# Patient Record
Sex: MALE | Race: WHITE | NOT HISPANIC OR LATINO
[De-identification: names, ages, dates, MRNs, and addresses within clinical notes are randomized per-mention and may not be internally consistent; named-entity substitution may affect disease eponyms.]

---

## 2017-01-10 ENCOUNTER — APPOINTMENT (OUTPATIENT)
Dept: ELECTROPHYSIOLOGY | Facility: CLINIC | Age: 77
End: 2017-01-10

## 2017-04-24 ENCOUNTER — APPOINTMENT (OUTPATIENT)
Dept: ELECTROPHYSIOLOGY | Facility: CLINIC | Age: 77
End: 2017-04-24

## 2017-05-03 ENCOUNTER — OUTPATIENT (OUTPATIENT)
Dept: OUTPATIENT SERVICES | Facility: HOSPITAL | Age: 77
LOS: 1 days | Discharge: HOME | End: 2017-05-03

## 2017-06-28 DIAGNOSIS — J81.0 ACUTE PULMONARY EDEMA: ICD-10-CM

## 2017-08-17 ENCOUNTER — APPOINTMENT (OUTPATIENT)
Dept: ELECTROPHYSIOLOGY | Facility: CLINIC | Age: 77
End: 2017-08-17

## 2017-10-13 ENCOUNTER — TRANSCRIPTION ENCOUNTER (OUTPATIENT)
Age: 77
End: 2017-10-13

## 2017-11-20 ENCOUNTER — APPOINTMENT (OUTPATIENT)
Dept: ELECTROPHYSIOLOGY | Facility: CLINIC | Age: 77
End: 2017-11-20
Payer: MEDICARE

## 2017-11-20 PROCEDURE — 93282 PRGRMG EVAL IMPLANTABLE DFB: CPT

## 2017-12-25 ENCOUNTER — TRANSCRIPTION ENCOUNTER (OUTPATIENT)
Age: 77
End: 2017-12-25

## 2018-01-02 ENCOUNTER — TRANSCRIPTION ENCOUNTER (OUTPATIENT)
Age: 78
End: 2018-01-02

## 2018-01-09 ENCOUNTER — TRANSCRIPTION ENCOUNTER (OUTPATIENT)
Age: 78
End: 2018-01-09

## 2018-01-09 ENCOUNTER — INPATIENT (INPATIENT)
Facility: HOSPITAL | Age: 78
LOS: 3 days | Discharge: HOME | End: 2018-01-13
Attending: HOSPITALIST

## 2018-01-09 DIAGNOSIS — I25.10 ATHEROSCLEROTIC HEART DISEASE OF NATIVE CORONARY ARTERY WITHOUT ANGINA PECTORIS: ICD-10-CM

## 2018-01-09 DIAGNOSIS — R06.02 SHORTNESS OF BREATH: ICD-10-CM

## 2018-01-17 DIAGNOSIS — I42.9 CARDIOMYOPATHY, UNSPECIFIED: ICD-10-CM

## 2018-01-17 DIAGNOSIS — I25.10 ATHEROSCLEROTIC HEART DISEASE OF NATIVE CORONARY ARTERY WITHOUT ANGINA PECTORIS: ICD-10-CM

## 2018-01-17 DIAGNOSIS — Z91.14 PATIENT'S OTHER NONCOMPLIANCE WITH MEDICATION REGIMEN: ICD-10-CM

## 2018-01-17 DIAGNOSIS — N18.5 CHRONIC KIDNEY DISEASE, STAGE 5: ICD-10-CM

## 2018-01-17 DIAGNOSIS — I13.2 HYPERTENSIVE HEART AND CHRONIC KIDNEY DISEASE WITH HEART FAILURE AND WITH STAGE 5 CHRONIC KIDNEY DISEASE, OR END STAGE RENAL DISEASE: ICD-10-CM

## 2018-01-17 DIAGNOSIS — Z95.1 PRESENCE OF AORTOCORONARY BYPASS GRAFT: ICD-10-CM

## 2018-01-17 DIAGNOSIS — I34.0 NONRHEUMATIC MITRAL (VALVE) INSUFFICIENCY: ICD-10-CM

## 2018-01-17 DIAGNOSIS — Z53.29 PROCEDURE AND TREATMENT NOT CARRIED OUT BECAUSE OF PATIENT'S DECISION FOR OTHER REASONS: ICD-10-CM

## 2018-01-17 DIAGNOSIS — R06.02 SHORTNESS OF BREATH: ICD-10-CM

## 2018-01-17 DIAGNOSIS — R09.81 NASAL CONGESTION: ICD-10-CM

## 2018-01-17 DIAGNOSIS — E11.40 TYPE 2 DIABETES MELLITUS WITH DIABETIC NEUROPATHY, UNSPECIFIED: ICD-10-CM

## 2018-01-17 DIAGNOSIS — R79.1 ABNORMAL COAGULATION PROFILE: ICD-10-CM

## 2018-01-17 DIAGNOSIS — I50.23 ACUTE ON CHRONIC SYSTOLIC (CONGESTIVE) HEART FAILURE: ICD-10-CM

## 2018-01-17 DIAGNOSIS — E11.22 TYPE 2 DIABETES MELLITUS WITH DIABETIC CHRONIC KIDNEY DISEASE: ICD-10-CM

## 2018-01-17 DIAGNOSIS — Z98.61 CORONARY ANGIOPLASTY STATUS: ICD-10-CM

## 2018-01-17 DIAGNOSIS — E78.5 HYPERLIPIDEMIA, UNSPECIFIED: ICD-10-CM

## 2018-01-17 DIAGNOSIS — R05 COUGH: ICD-10-CM

## 2018-01-17 DIAGNOSIS — I16.0 HYPERTENSIVE URGENCY: ICD-10-CM

## 2018-01-17 DIAGNOSIS — N17.9 ACUTE KIDNEY FAILURE, UNSPECIFIED: ICD-10-CM

## 2018-01-17 DIAGNOSIS — G47.33 OBSTRUCTIVE SLEEP APNEA (ADULT) (PEDIATRIC): ICD-10-CM

## 2018-01-17 DIAGNOSIS — Z79.4 LONG TERM (CURRENT) USE OF INSULIN: ICD-10-CM

## 2018-01-17 DIAGNOSIS — Z87.891 PERSONAL HISTORY OF NICOTINE DEPENDENCE: ICD-10-CM

## 2018-01-24 ENCOUNTER — APPOINTMENT (OUTPATIENT)
Dept: CARDIOLOGY | Facility: CLINIC | Age: 78
End: 2018-01-24

## 2018-01-24 VITALS
DIASTOLIC BLOOD PRESSURE: 76 MMHG | SYSTOLIC BLOOD PRESSURE: 130 MMHG | HEIGHT: 69 IN | BODY MASS INDEX: 37.18 KG/M2 | WEIGHT: 251 LBS

## 2018-01-24 RX ORDER — METOPROLOL TARTRATE 50 MG/1
50 TABLET, FILM COATED ORAL DAILY
Refills: 0 | Status: DISCONTINUED | COMMUNITY
End: 2018-01-24

## 2018-03-01 ENCOUNTER — APPOINTMENT (OUTPATIENT)
Dept: ELECTROPHYSIOLOGY | Facility: CLINIC | Age: 78
End: 2018-03-01
Payer: MEDICARE

## 2018-03-01 PROCEDURE — 93296 REM INTERROG EVL PM/IDS: CPT

## 2018-03-01 PROCEDURE — 93295 DEV INTERROG REMOTE 1/2/MLT: CPT

## 2018-03-16 ENCOUNTER — MEDICATION RENEWAL (OUTPATIENT)
Age: 78
End: 2018-03-16

## 2018-04-17 ENCOUNTER — APPOINTMENT (OUTPATIENT)
Dept: CARDIOLOGY | Facility: CLINIC | Age: 78
End: 2018-04-17

## 2018-04-17 VITALS
DIASTOLIC BLOOD PRESSURE: 66 MMHG | HEIGHT: 69 IN | BODY MASS INDEX: 36.88 KG/M2 | SYSTOLIC BLOOD PRESSURE: 110 MMHG | HEART RATE: 81 BPM | WEIGHT: 249 LBS

## 2018-04-17 RX ORDER — SOLIFENACIN SUCCINATE 10 MG/1
10 TABLET, FILM COATED ORAL
Qty: 30 | Refills: 0 | Status: DISCONTINUED | COMMUNITY
Start: 2017-08-21 | End: 2018-04-17

## 2018-04-17 RX ORDER — FUROSEMIDE 40 MG/1
40 TABLET ORAL DAILY
Refills: 0 | Status: DISCONTINUED | COMMUNITY
End: 2018-04-17

## 2018-06-04 ENCOUNTER — APPOINTMENT (OUTPATIENT)
Dept: ELECTROPHYSIOLOGY | Facility: CLINIC | Age: 78
End: 2018-06-04
Payer: MEDICARE

## 2018-06-04 PROCEDURE — 93282 PRGRMG EVAL IMPLANTABLE DFB: CPT

## 2018-07-09 ENCOUNTER — RX RENEWAL (OUTPATIENT)
Age: 78
End: 2018-07-09

## 2018-07-10 ENCOUNTER — RX RENEWAL (OUTPATIENT)
Age: 78
End: 2018-07-10

## 2018-07-24 ENCOUNTER — RX RENEWAL (OUTPATIENT)
Age: 78
End: 2018-07-24

## 2018-08-21 ENCOUNTER — MEDICATION RENEWAL (OUTPATIENT)
Age: 78
End: 2018-08-21

## 2018-08-21 ENCOUNTER — APPOINTMENT (OUTPATIENT)
Dept: CARDIOLOGY | Facility: CLINIC | Age: 78
End: 2018-08-21

## 2018-08-21 VITALS
DIASTOLIC BLOOD PRESSURE: 80 MMHG | BODY MASS INDEX: 36.58 KG/M2 | WEIGHT: 247 LBS | HEART RATE: 77 BPM | SYSTOLIC BLOOD PRESSURE: 138 MMHG | HEIGHT: 69 IN

## 2018-09-25 ENCOUNTER — APPOINTMENT (OUTPATIENT)
Dept: ELECTROPHYSIOLOGY | Facility: CLINIC | Age: 78
End: 2018-09-25
Payer: MEDICARE

## 2018-09-25 PROCEDURE — 93296 REM INTERROG EVL PM/IDS: CPT

## 2018-09-25 PROCEDURE — 93295 DEV INTERROG REMOTE 1/2/MLT: CPT

## 2018-10-12 ENCOUNTER — MEDICATION RENEWAL (OUTPATIENT)
Age: 78
End: 2018-10-12

## 2018-11-06 ENCOUNTER — MEDICATION RENEWAL (OUTPATIENT)
Age: 78
End: 2018-11-06

## 2018-11-20 ENCOUNTER — APPOINTMENT (OUTPATIENT)
Dept: CARDIOLOGY | Facility: CLINIC | Age: 78
End: 2018-11-20

## 2018-11-20 VITALS
SYSTOLIC BLOOD PRESSURE: 150 MMHG | BODY MASS INDEX: 36.73 KG/M2 | HEIGHT: 69 IN | HEART RATE: 76 BPM | WEIGHT: 248 LBS | DIASTOLIC BLOOD PRESSURE: 84 MMHG

## 2018-11-20 RX ORDER — OMEGA-3-ACID ETHYL ESTERS CAPSULES 1 G/1
1 CAPSULE, LIQUID FILLED ORAL
Qty: 180 | Refills: 3 | Status: DISCONTINUED | COMMUNITY
Start: 2018-03-16 | End: 2018-11-20

## 2018-11-22 NOTE — HISTORY OF PRESENT ILLNESS
[FreeTextEntry1] : 77 year-old male presents for hospital followup.\par \par Cardiac history of CAD s/p CABG s/p PCI, ischemic cardiomyopathy, and chronic systolic CHF s/p AICD. Previously followed with cardiologist in [place].\par \par Risk factors include hypertension, diabetes,hyperlipidemia, and CKD.\par \[facility] Presented 2 weeks ago with subacute decompensated CHF with volume overload and uncontrolled hypertension (possibly the setting of URI). Had been off Lasix for several weeks and antihypertensives for several days. Rapidly improved with diuresis.  Course complicated by KIANA.\par \par Feels well since discharge. No angina. Breathing comfortable. No palpitations, lightheadedness, syncope.  Previously had good functional capacity.  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 EF 25-35%. Moderate MR.\par \par Hospital labs reviewed:\par Creatinine 4.4\par Hemoglobin 14.7\par LFTs normal

## 2018-11-22 NOTE — ASSESSMENT
[FreeTextEntry1] : s/p CABG s/p PCI.  No angina.\par \par Severe LV dysfunction s/p AICD.\par Compensated / near euvolemic.\par Prior ACE-ARB intolerance (Cr rise).\par \par BP controlled.

## 2018-11-22 NOTE — REASON FOR VISIT
[Follow-Up - From Hospitalization] : follow-up of a recent hospitalization for [Cardiomyopathy] : cardiomyopathy [Coronary Artery Disease] : coronary artery disease [Heart Failure] : congestive heart failure [FreeTextEntry1] : Feels well.\par \par Exercising 3 / week.  30-min cardio + light weights / machines.  No exertional symptoms.\par \par No angina.  Breathing comfortable.  No palpitations, lightheadedness, syncope.\par \par Weight stable.  Recalls last Cr (3.9).  Following with Dr. Jay.\par \par Tolerating Rx.  Not taking ASA (miscommunication).  No bleeding.

## 2018-11-22 NOTE — DISCUSSION/SUMMARY
[FreeTextEntry1] : ASA 81mg.\par Cont Lipitor.\par Cont Toprol, Isosorbide, hydralazine.\par Light exercise / weight loss.\par Regular renal follow-up.\par Follow-up 3-months.

## 2018-12-10 ENCOUNTER — APPOINTMENT (OUTPATIENT)
Dept: ELECTROPHYSIOLOGY | Facility: CLINIC | Age: 78
End: 2018-12-10
Payer: MEDICARE

## 2018-12-10 PROCEDURE — 93282 PRGRMG EVAL IMPLANTABLE DFB: CPT

## 2018-12-12 ENCOUNTER — MEDICATION RENEWAL (OUTPATIENT)
Age: 78
End: 2018-12-12

## 2019-02-24 ENCOUNTER — FORM ENCOUNTER (OUTPATIENT)
Age: 79
End: 2019-02-24

## 2019-02-25 ENCOUNTER — OUTPATIENT (OUTPATIENT)
Dept: OUTPATIENT SERVICES | Facility: HOSPITAL | Age: 79
LOS: 1 days | Discharge: HOME | End: 2019-02-25

## 2019-02-25 ENCOUNTER — APPOINTMENT (OUTPATIENT)
Dept: CARDIOLOGY | Facility: CLINIC | Age: 79
End: 2019-02-25

## 2019-02-25 VITALS
HEART RATE: 81 BPM | HEIGHT: 69 IN | DIASTOLIC BLOOD PRESSURE: 68 MMHG | BODY MASS INDEX: 36.88 KG/M2 | SYSTOLIC BLOOD PRESSURE: 132 MMHG | WEIGHT: 249 LBS

## 2019-02-25 DIAGNOSIS — I34.0 NONRHEUMATIC MITRAL (VALVE) INSUFFICIENCY: ICD-10-CM

## 2019-02-25 DIAGNOSIS — I50.22 CHRONIC SYSTOLIC (CONGESTIVE) HEART FAILURE: ICD-10-CM

## 2019-02-25 RX ORDER — LIRAGLUTIDE 6 MG/ML
18 INJECTION SUBCUTANEOUS DAILY
Refills: 0 | Status: DISCONTINUED | COMMUNITY
End: 2019-02-25

## 2019-02-25 NOTE — HISTORY OF PRESENT ILLNESS
[FreeTextEntry1] : 77 year-old male presents for hospital followup.\par \par Cardiac history of CAD s/p CABG s/p PCI, ischemic cardiomyopathy, and chronic systolic CHF s/p AICD. Previously followed with cardiologist in [place].\par \par Risk factors include hypertension, diabetes,hyperlipidemia, and CKD.\par \[facility] Presented 2 weeks ago with subacute decompensated CHF with volume overload and uncontrolled hypertension (possibly the setting of URI). Had been off Lasix for several weeks and antihypertensives for several days. Rapidly improved with diuresis.  Course complicated by KIANA.\par \par Feels well since discharge. No angina. Breathing comfortable. No palpitations, lightheadedness, syncope.  Previously had good functional capacity.  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 EF 25-35%. Moderate MR.\par \par Hospital labs reviewed:\par Creatinine 4.4\par Hemoglobin 14.7\par LFTs normal

## 2019-02-25 NOTE — REASON FOR VISIT
[Cardiomyopathy] : cardiomyopathy [Coronary Artery Disease] : coronary artery disease [Heart Failure] : congestive heart failure [Follow-Up - Clinic] : a clinic follow-up of [FreeTextEntry1] : Not feeling well.\par \par Big meal / couple glasses wine Friday.  Chest burning / dyspnea overnight (worse supine).  Thought he had a cold and had several bowels of Asian soup broth with bouillon cubes.  Subsequent nocturnal symptoms with orthopnea.\par \par Exertional dyspnea.  Stopped exercising.  Breathing generally comfortable at rest.\par \par LE slightly worse.  Weight stable.  Dietary sodium seems high.\par \par No palpitations, lightheadedness, syncope.\par \par Tolerating Rx.  No bleeding.

## 2019-02-25 NOTE — ASSESSMENT
[FreeTextEntry1] : s/p CABG s/p PCI.  No angina.\par \par Severe LV dysfunction s/p AICD.\par \par Likely mild CHF decompensation.  Possibly secondary to high dietary sodium.\par Mild volume overload.\par \par Moderate MR (1/11/2018).\par \par BP controlled.

## 2019-02-25 NOTE — DISCUSSION/SUMMARY
[FreeTextEntry1] : CXR\par ECHO to reassess LVSF / MR.\par Increase Lasix 80mg q12 x 3-days, then resume 40mg q12.\par Labs.\par Cont ASA and Lipitor.\par Cont Toprol, Isosorbide, hydralazine.\par Renal follow-up.\par Follow-up 4-weeks.

## 2019-02-27 ENCOUNTER — OUTPATIENT (OUTPATIENT)
Dept: OUTPATIENT SERVICES | Facility: HOSPITAL | Age: 79
LOS: 1 days | Discharge: HOME | End: 2019-02-27

## 2019-02-27 DIAGNOSIS — I34.0 NONRHEUMATIC MITRAL (VALVE) INSUFFICIENCY: ICD-10-CM

## 2019-03-06 ENCOUNTER — INPATIENT (INPATIENT)
Facility: HOSPITAL | Age: 79
LOS: 5 days | Discharge: HOME | End: 2019-03-12
Attending: INTERNAL MEDICINE | Admitting: INTERNAL MEDICINE

## 2019-03-06 VITALS
SYSTOLIC BLOOD PRESSURE: 189 MMHG | TEMPERATURE: 96 F | DIASTOLIC BLOOD PRESSURE: 104 MMHG | WEIGHT: 242.07 LBS | HEART RATE: 83 BPM | OXYGEN SATURATION: 99 % | HEIGHT: 71 IN | RESPIRATION RATE: 18 BRPM

## 2019-03-06 DIAGNOSIS — N17.9 ACUTE KIDNEY FAILURE, UNSPECIFIED: ICD-10-CM

## 2019-03-06 LAB
ALBUMIN SERPL ELPH-MCNC: 5.3 G/DL — HIGH (ref 3.5–5.2)
ALP SERPL-CCNC: 65 U/L — SIGNIFICANT CHANGE UP (ref 30–115)
ALT FLD-CCNC: 17 U/L — SIGNIFICANT CHANGE UP (ref 0–41)
ANION GAP SERPL CALC-SCNC: 17 MMOL/L — HIGH (ref 7–14)
ANION GAP SERPL CALC-SCNC: 19 MMOL/L — HIGH (ref 7–14)
APPEARANCE UR: CLEAR — SIGNIFICANT CHANGE UP
AST SERPL-CCNC: 20 U/L — SIGNIFICANT CHANGE UP (ref 0–41)
BASE EXCESS BLDV CALC-SCNC: -1.2 MMOL/L — SIGNIFICANT CHANGE UP (ref -2–2)
BASOPHILS # BLD AUTO: 0.07 K/UL — SIGNIFICANT CHANGE UP (ref 0–0.2)
BASOPHILS NFR BLD AUTO: 0.7 % — SIGNIFICANT CHANGE UP (ref 0–1)
BILIRUB SERPL-MCNC: 0.4 MG/DL — SIGNIFICANT CHANGE UP (ref 0.2–1.2)
BILIRUB UR-MCNC: NEGATIVE — SIGNIFICANT CHANGE UP
BUN SERPL-MCNC: 140 MG/DL — CRITICAL HIGH (ref 10–20)
BUN SERPL-MCNC: 140 MG/DL — CRITICAL HIGH (ref 10–20)
CA-I SERPL-SCNC: 1.09 MMOL/L — LOW (ref 1.12–1.3)
CALCIUM SERPL-MCNC: 8.2 MG/DL — LOW (ref 8.5–10.1)
CALCIUM SERPL-MCNC: 9 MG/DL — SIGNIFICANT CHANGE UP (ref 8.5–10.1)
CHLORIDE SERPL-SCNC: 88 MMOL/L — LOW (ref 98–110)
CHLORIDE SERPL-SCNC: 89 MMOL/L — LOW (ref 98–110)
CO2 SERPL-SCNC: 27 MMOL/L — SIGNIFICANT CHANGE UP (ref 17–32)
CO2 SERPL-SCNC: 27 MMOL/L — SIGNIFICANT CHANGE UP (ref 17–32)
COLOR SPEC: YELLOW — SIGNIFICANT CHANGE UP
CREAT SERPL-MCNC: 9.2 MG/DL — CRITICAL HIGH (ref 0.7–1.5)
CREAT SERPL-MCNC: 9.4 MG/DL — CRITICAL HIGH (ref 0.7–1.5)
DIFF PNL FLD: ABNORMAL
EOSINOPHIL # BLD AUTO: 1.2 K/UL — HIGH (ref 0–0.7)
EOSINOPHIL NFR BLD AUTO: 11.4 % — HIGH (ref 0–8)
EPI CELLS # UR: ABNORMAL /HPF
GAS PNL BLDV: 136 MMOL/L — SIGNIFICANT CHANGE UP (ref 136–145)
GAS PNL BLDV: SIGNIFICANT CHANGE UP
GLUCOSE BLDC GLUCOMTR-MCNC: 259 MG/DL — HIGH (ref 70–99)
GLUCOSE BLDC GLUCOMTR-MCNC: 331 MG/DL — HIGH (ref 70–99)
GLUCOSE SERPL-MCNC: 171 MG/DL — HIGH (ref 70–99)
GLUCOSE SERPL-MCNC: 351 MG/DL — HIGH (ref 70–99)
GLUCOSE UR QL: NEGATIVE MG/DL — SIGNIFICANT CHANGE UP
HCO3 BLDV-SCNC: 26 MMOL/L — SIGNIFICANT CHANGE UP (ref 22–29)
HCT VFR BLD CALC: 46.6 % — SIGNIFICANT CHANGE UP (ref 42–52)
HCT VFR BLDA CALC: 49.2 % — HIGH (ref 34–44)
HGB BLD CALC-MCNC: 16 G/DL — SIGNIFICANT CHANGE UP (ref 14–18)
HGB BLD-MCNC: 15.6 G/DL — SIGNIFICANT CHANGE UP (ref 14–18)
HOROWITZ INDEX BLDV+IHG-RTO: 21 — SIGNIFICANT CHANGE UP
IMM GRANULOCYTES NFR BLD AUTO: 0.4 % — HIGH (ref 0.1–0.3)
KETONES UR-MCNC: NEGATIVE — SIGNIFICANT CHANGE UP
LACTATE BLDV-MCNC: 1.1 MMOL/L — SIGNIFICANT CHANGE UP (ref 0.5–1.6)
LEUKOCYTE ESTERASE UR-ACNC: NEGATIVE — SIGNIFICANT CHANGE UP
LYMPHOCYTES # BLD AUTO: 2.7 K/UL — SIGNIFICANT CHANGE UP (ref 1.2–3.4)
LYMPHOCYTES # BLD AUTO: 25.7 % — SIGNIFICANT CHANGE UP (ref 20.5–51.1)
MAGNESIUM SERPL-MCNC: 2.9 MG/DL — HIGH (ref 1.8–2.4)
MCHC RBC-ENTMCNC: 28.7 PG — SIGNIFICANT CHANGE UP (ref 27–31)
MCHC RBC-ENTMCNC: 33.5 G/DL — SIGNIFICANT CHANGE UP (ref 32–37)
MCV RBC AUTO: 85.7 FL — SIGNIFICANT CHANGE UP (ref 80–94)
MONOCYTES # BLD AUTO: 1.13 K/UL — HIGH (ref 0.1–0.6)
MONOCYTES NFR BLD AUTO: 10.8 % — HIGH (ref 1.7–9.3)
NEUTROPHILS # BLD AUTO: 5.37 K/UL — SIGNIFICANT CHANGE UP (ref 1.4–6.5)
NEUTROPHILS NFR BLD AUTO: 51 % — SIGNIFICANT CHANGE UP (ref 42.2–75.2)
NITRITE UR-MCNC: NEGATIVE — SIGNIFICANT CHANGE UP
NRBC # BLD: 0 /100 WBCS — SIGNIFICANT CHANGE UP (ref 0–0)
PCO2 BLDV: 50 MMHG — SIGNIFICANT CHANGE UP (ref 41–51)
PH BLDV: 7.32 — SIGNIFICANT CHANGE UP (ref 7.26–7.43)
PH UR: 6 — SIGNIFICANT CHANGE UP (ref 5–8)
PLATELET # BLD AUTO: 188 K/UL — SIGNIFICANT CHANGE UP (ref 130–400)
PO2 BLDV: 39 MMHG — SIGNIFICANT CHANGE UP (ref 20–40)
POTASSIUM BLDV-SCNC: 4.4 MMOL/L — SIGNIFICANT CHANGE UP (ref 3.3–5.6)
POTASSIUM SERPL-MCNC: 4.4 MMOL/L — SIGNIFICANT CHANGE UP (ref 3.5–5)
POTASSIUM SERPL-MCNC: 4.9 MMOL/L — SIGNIFICANT CHANGE UP (ref 3.5–5)
POTASSIUM SERPL-SCNC: 4.4 MMOL/L — SIGNIFICANT CHANGE UP (ref 3.5–5)
POTASSIUM SERPL-SCNC: 4.9 MMOL/L — SIGNIFICANT CHANGE UP (ref 3.5–5)
PROT SERPL-MCNC: 7.6 G/DL — SIGNIFICANT CHANGE UP (ref 6–8)
PROT UR-MCNC: 100 MG/DL
RBC # BLD: 5.44 M/UL — SIGNIFICANT CHANGE UP (ref 4.7–6.1)
RBC # FLD: 13.6 % — SIGNIFICANT CHANGE UP (ref 11.5–14.5)
RBC CASTS # UR COMP ASSIST: SIGNIFICANT CHANGE UP /HPF
SAO2 % BLDV: 70 % — SIGNIFICANT CHANGE UP
SODIUM SERPL-SCNC: 133 MMOL/L — LOW (ref 135–146)
SODIUM SERPL-SCNC: 134 MMOL/L — LOW (ref 135–146)
SP GR SPEC: 1.01 — SIGNIFICANT CHANGE UP (ref 1.01–1.03)
UROBILINOGEN FLD QL: 0.2 MG/DL — SIGNIFICANT CHANGE UP (ref 0.2–0.2)
WBC # BLD: 10.51 K/UL — SIGNIFICANT CHANGE UP (ref 4.8–10.8)
WBC # FLD AUTO: 10.51 K/UL — SIGNIFICANT CHANGE UP (ref 4.8–10.8)
WBC UR QL: SIGNIFICANT CHANGE UP /HPF

## 2019-03-06 RX ORDER — SODIUM CHLORIDE 9 MG/ML
1000 INJECTION INTRAMUSCULAR; INTRAVENOUS; SUBCUTANEOUS
Qty: 0 | Refills: 0 | Status: DISCONTINUED | OUTPATIENT
Start: 2019-03-06 | End: 2019-03-06

## 2019-03-06 RX ORDER — METOPROLOL TARTRATE 50 MG
25 TABLET ORAL DAILY
Qty: 0 | Refills: 0 | Status: DISCONTINUED | OUTPATIENT
Start: 2019-03-06 | End: 2019-03-11

## 2019-03-06 RX ORDER — HYDRALAZINE HCL 50 MG
25 TABLET ORAL EVERY 8 HOURS
Qty: 0 | Refills: 0 | Status: DISCONTINUED | OUTPATIENT
Start: 2019-03-06 | End: 2019-03-11

## 2019-03-06 RX ORDER — DOCUSATE SODIUM 100 MG
100 CAPSULE ORAL
Qty: 0 | Refills: 0 | Status: DISCONTINUED | OUTPATIENT
Start: 2019-03-06 | End: 2019-03-11

## 2019-03-06 RX ORDER — SODIUM CHLORIDE 9 MG/ML
1000 INJECTION INTRAMUSCULAR; INTRAVENOUS; SUBCUTANEOUS
Qty: 0 | Refills: 0 | Status: DISCONTINUED | OUTPATIENT
Start: 2019-03-06 | End: 2019-03-07

## 2019-03-06 RX ORDER — ATORVASTATIN CALCIUM 80 MG/1
40 TABLET, FILM COATED ORAL AT BEDTIME
Qty: 0 | Refills: 0 | Status: DISCONTINUED | OUTPATIENT
Start: 2019-03-06 | End: 2019-03-11

## 2019-03-06 RX ORDER — HYDRALAZINE HCL 50 MG
10 TABLET ORAL ONCE
Qty: 0 | Refills: 0 | Status: COMPLETED | OUTPATIENT
Start: 2019-03-06 | End: 2019-03-06

## 2019-03-06 RX ORDER — SODIUM CHLORIDE 9 MG/ML
250 INJECTION INTRAMUSCULAR; INTRAVENOUS; SUBCUTANEOUS ONCE
Qty: 0 | Refills: 0 | Status: COMPLETED | OUTPATIENT
Start: 2019-03-06 | End: 2019-03-06

## 2019-03-06 RX ORDER — AMLODIPINE BESYLATE 2.5 MG/1
5 TABLET ORAL ONCE
Qty: 0 | Refills: 0 | Status: DISCONTINUED | OUTPATIENT
Start: 2019-03-06 | End: 2019-03-06

## 2019-03-06 RX ORDER — INSULIN HUMAN 100 [IU]/ML
5 INJECTION, SOLUTION SUBCUTANEOUS ONCE
Qty: 0 | Refills: 0 | Status: COMPLETED | OUTPATIENT
Start: 2019-03-06 | End: 2019-03-06

## 2019-03-06 RX ORDER — ISOSORBIDE DINITRATE 5 MG/1
10 TABLET ORAL EVERY 8 HOURS
Qty: 0 | Refills: 0 | Status: DISCONTINUED | OUTPATIENT
Start: 2019-03-06 | End: 2019-03-11

## 2019-03-06 RX ADMIN — Medication 25 MILLIGRAM(S): at 13:29

## 2019-03-06 RX ADMIN — ISOSORBIDE DINITRATE 10 MILLIGRAM(S): 5 TABLET ORAL at 21:21

## 2019-03-06 RX ADMIN — Medication 100 MILLIGRAM(S): at 22:54

## 2019-03-06 RX ADMIN — INSULIN HUMAN 5 UNIT(S): 100 INJECTION, SOLUTION SUBCUTANEOUS at 20:16

## 2019-03-06 RX ADMIN — Medication 10 MILLIGRAM(S): at 22:54

## 2019-03-06 RX ADMIN — Medication 25 MILLIGRAM(S): at 21:21

## 2019-03-06 RX ADMIN — SODIUM CHLORIDE 250 MILLILITER(S): 9 INJECTION INTRAMUSCULAR; INTRAVENOUS; SUBCUTANEOUS at 11:41

## 2019-03-06 RX ADMIN — ATORVASTATIN CALCIUM 40 MILLIGRAM(S): 80 TABLET, FILM COATED ORAL at 21:21

## 2019-03-06 RX ADMIN — Medication 10 MILLIGRAM(S): at 23:25

## 2019-03-06 RX ADMIN — SODIUM CHLORIDE 125 MILLILITER(S): 9 INJECTION INTRAMUSCULAR; INTRAVENOUS; SUBCUTANEOUS at 11:41

## 2019-03-06 RX ADMIN — SODIUM CHLORIDE 50 MILLILITER(S): 9 INJECTION INTRAMUSCULAR; INTRAVENOUS; SUBCUTANEOUS at 20:14

## 2019-03-06 NOTE — ED PROVIDER NOTE - PMH
BPH (Benign Prostatic Hyperplasia)    CAD (coronary artery disease)    CAD (Coronary Artery Disease)    CHF (congestive heart failure)    Diabetes mellitus    Diabetes Mellitus Type II    GERD (gastroesophageal reflux disease)    H/O hyperlipidemia    History of PTCA  with stents 10 years ago ([facility])  HTN (Hypertension)    HTN - Hypertension    Hypercholesterolemia    Mitral valve regurgitation    Renal insufficiency    Sleep apnea  does not use machine BPH (Benign Prostatic Hyperplasia)    CAD (coronary artery disease)    CAD (Coronary Artery Disease)    CHF (congestive heart failure)    Diabetes mellitus    Diabetes Mellitus Type II    GERD (gastroesophageal reflux disease)    H/O hyperlipidemia    History of PTCA  with stents 10 years ago ([facility])  HTN (Hypertension)    HTN - Hypertension    Hypercholesterolemia    Mitral valve regurgitation    Renal insufficiency    Sleep apnea  does not use machine

## 2019-03-06 NOTE — H&P ADULT - NSHPPHYSICALEXAM_GEN_ALL_CORE
Gen NAD, A&O x3  CV +S1 and S2, RR  Resp +air entry B/L w no rales auscultated  Ext no edema, no CT      Vital Signs Last 24 Hrs  T(C): 35.7 (06 Mar 2019 11:22), Max: 35.7 (06 Mar 2019 11:22)  T(F): 96.2 (06 Mar 2019 11:22), Max: 96.2 (06 Mar 2019 11:22)  HR: 83 (06 Mar 2019 11:22) (83 - 83)  BP: 151/102 (06 Mar 2019 11:22) (151/102 - 189/104)  BP(mean): --  RR: 18 (06 Mar 2019 11:22) (18 - 18)  SpO2: 95% (06 Mar 2019 11:22) (95% - 99%)

## 2019-03-06 NOTE — H&P ADULT - ASSESSMENT
Pt is a 77 y/o male with Acute on Chronic Renal Failure    Strict I's and O's  Light IV hydration   Renal follow up  Monitor Creatinine  Hold Lasix  Continue to monitor pt respiratory status

## 2019-03-06 NOTE — PATIENT PROFILE ADULT - VISION (WITH CORRECTIVE LENSES IF THE PATIENT USUALLY WEARS THEM):
reading glasses at bedside./Normal vision: sees adequately in most situations; can see medication labels, newsprint

## 2019-03-06 NOTE — ED PROVIDER NOTE - CARE PLAN
Principal Discharge DX:	Renal insufficiency  Secondary Diagnosis:	Renal failure  Secondary Diagnosis:	Dehydration

## 2019-03-06 NOTE — H&P ADULT - PMH
BPH (Benign Prostatic Hyperplasia)    CAD (coronary artery disease)    CAD (Coronary Artery Disease)    CHF (congestive heart failure)    Diabetes mellitus    Diabetes Mellitus Type II    GERD (gastroesophageal reflux disease)    H/O hyperlipidemia    History of PTCA  with stents 10 years ago ([facility])  HTN (Hypertension)    HTN - Hypertension    Hypercholesterolemia    Mitral valve regurgitation    Renal insufficiency    Sleep apnea  does not use machine

## 2019-03-06 NOTE — H&P ADULT - HISTORY OF PRESENT ILLNESS
Pt is a 77 y/o male with hx DM/ESRD/HTN referred for admission from Dr Du for worsening renal failure. Pt evaluated by Dr Nance 2 days ago regarding SOB. His Lasix was inreased from 20mg q12h to 40 mg twice daily. He was seen by Dr Du yesterday blood obtained and he was notified today to present to the ER for an increase of Lasix from 4 to 9. Pt states he is voiding without difficulty.

## 2019-03-06 NOTE — ED PROVIDER NOTE - PROGRESS NOTE DETAILS
Patient seen by Dr Du in ED, Wants patient admitted to Dr. Jordan, gentle hydration and will evaluate in AM. Dr Patient accepts. Med PA aware. . Patient stable for floor admission

## 2019-03-06 NOTE — ED PROVIDER NOTE - PSH
S/P appendectomy    S/P knee surgery  b/l arthroscopic  S/P primary angioplasty with coronary stent  6-7 stents last one in 10/12  S/P tonsillectomy

## 2019-03-06 NOTE — H&P ADULT - NSHPLABSRESULTS_GEN_ALL_CORE
15.6   10.51 )-----------( 188      ( 06 Mar 2019 10:11 )             46.6       -06    134<L>  |  88<L>  |  140<HH>  ----------------------------<  171<H>  4.4   |  27  |  9.2<HH>    Ca    9.0      06 Mar 2019 10:11  Mg     2.9     -    TPro  7.6  /  Alb  5.3<H>  /  TBili  0.4  /  DBili  x   /  AST  20  /  ALT  17  /  AlkPhos  65  -              Urinalysis Basic - ( 06 Mar 2019 10:11 )    Color: Yellow / Appearance: Clear / S.015 / pH: x  Gluc: x / Ketone: Negative  / Bili: Negative / Urobili: 0.2 mg/dL   Blood: x / Protein: 100 mg/dL / Nitrite: Negative   Leuk Esterase: Negative / RBC: 1-2 /HPF / WBC 3-5 /HPF   Sq Epi: x / Non Sq Epi: Occasional /HPF / Bacteria: x          < from: Xray Chest 2 Views PA/Lat (19 @ 10:18) >    mpression:      No radiographic evidence of acute cardiopulmonary disease.      < end of copied text >

## 2019-03-06 NOTE — ED PROVIDER NOTE - CLINICAL SUMMARY MEDICAL DECISION MAKING FREE TEXT BOX
Patient presents with history of worsening kidney function on routine labs found cr was 9 his baseline was prior at 4, no chest pain or palpitation I will admit for further workup at this time.

## 2019-03-06 NOTE — ED PROVIDER NOTE - OBJECTIVE STATEMENT
Patient sent in from PMD, Cr 9 as per blood drawn yesterday. 1 week ago his cardiologist double the dose of his lasix for CHF. No chest pain, no SOB, States he had recent URI.

## 2019-03-06 NOTE — ED ADULT NURSE NOTE - PMH
BPH (Benign Prostatic Hyperplasia)    CAD (coronary artery disease)    CAD (Coronary Artery Disease)    Diabetes mellitus    Diabetes Mellitus Type II    GERD (gastroesophageal reflux disease)    H/O hyperlipidemia    History of PTCA  with stents 10 years ago ([facility])  HTN (Hypertension)    HTN - Hypertension    Hypercholesterolemia    Renal insufficiency    Sleep apnea  does not use machine BPH (Benign Prostatic Hyperplasia)    CAD (coronary artery disease)    CAD (Coronary Artery Disease)    CHF (congestive heart failure)    Diabetes mellitus    Diabetes Mellitus Type II    GERD (gastroesophageal reflux disease)    H/O hyperlipidemia    History of PTCA  with stents 10 years ago ([facility])  HTN (Hypertension)    HTN - Hypertension    Hypercholesterolemia    Mitral valve regurgitation    Renal insufficiency    Sleep apnea  does not use machine

## 2019-03-06 NOTE — ED PROVIDER NOTE - ATTENDING CONTRIBUTION TO CARE
I was present for and supervised the key and critical aspects of the procedures performed during the care of the patient. Patient presents for evaluation of richard after increasing diuretic medication approx 1 week prior, he denies any fevers or chills, he denies any shortness of breath he denies any palpitations   His baseline creat is 4 he follows with dr price.  On physical exam the patient is nc/at perrla eomi oropharynx clear cta b/l, rrr s1s2 noted radial pulses 2 += pedal pulses 2 += abd-soft nt ndb s+ ext from with no focal deficits   A/p- patient found to have elevated creat 9 no evidence of peaked t waves, no palpitations I will admit for further monitoring at this time

## 2019-03-06 NOTE — CONSULT NOTE ADULT - SUBJECTIVE AND OBJECTIVE BOX
NEPHROLOGY CONSULTATION NOTE    Patient is a 78y Male whom presented to the hospital with     PAST MEDICAL & SURGICAL HISTORY:  Mitral valve regurgitation  CHF (congestive heart failure)  BPH (Benign Prostatic Hyperplasia)  GERD (gastroesophageal reflux disease)  Sleep apnea: does not use machine  Renal insufficiency  HTN - Hypertension  H/O hyperlipidemia  CAD (coronary artery disease)  Diabetes mellitus  History of PTCA: with stents 10 years ago ([facility])  CAD (Coronary Artery Disease)  Hypercholesterolemia  Diabetes Mellitus Type II  HTN (Hypertension)  S/P appendectomy  S/P primary angioplasty with coronary stent: 6-7 stents last one in 10/12  S/P tonsillectomy  S/P knee surgery: b/l arthroscopic    Allergies:  No Known Allergies    Home Medications Reviewed    SOCIAL HISTORY:  Denies ETOH,Smoking,   FAMILY HISTORY:        REVIEW OF SYSTEMS:  CONSTITUTIONAL: No weakness, fevers or chills  EYES/ENT: No visual changes;  No vertigo or throat pain   NECK: No pain or stiffness  RESPIRATORY: No cough, wheezing, hemoptysis; No shortness of breath  CARDIOVASCULAR: No chest pain or palpitations.  GASTROINTESTINAL: No abdominal or epigastric pain. No nausea, vomiting, or hematemesis; No diarrhea or constipation. No melena or hematochezia.  GENITOURINARY: No dysuria, frequency, foamy urine, urinary urgency, incontinence or hematuria  NEUROLOGICAL: No numbness or weakness  SKIN: No itching, burning, rashes, or lesions   VASCULAR: No bilateral lower extremity edema.   All other review of systems is negative unless indicated above.    PHYSICAL EXAM:  NAD  awake and alert  moist mm  no jvd  cta bl  rrr  soft, nt   no edema  no rash    Hospital Medications:   MEDICATIONS  (STANDING):  amLODIPine   Tablet 5 milliGRAM(s) Oral once  metoprolol succinate ER 25 milliGRAM(s) Oral daily  sodium chloride 0.9%. 1000 milliLiter(s) (125 mL/Hr) IV Continuous <Continuous>        VITALS:  T(F): 96.2 (19 @ 11:22), Max: 96.2 (19 @ :22)  HR: 83 (19 @ :22)  BP: 151/102 (19 @ 11:22)  RR: 18 (19 @ 11:22)  SpO2: 95% (19 @ :22)  Wt(kg): --    Height (cm): 180.34 ( @ 09:42)  Weight (kg): 109.8 ( @ 09:42)  BMI (kg/m2): 33.8 ( @ 09:42)  BSA (m2): 2.29 ( @ 09:42)    LABS:      134<L>  |  88<L>  |  140<HH>  ----------------------------<  171<H>  4.4   |  27  |  9.2<HH>    Ca    9.0      06 Mar 2019 10:11  Mg     2.9         TPro  7.6  /  Alb  5.3<H>  /  TBili  0.4  /  DBili      /  AST  20  /  ALT  17  /  AlkPhos  65                            15.6   10.51 )-----------( 188      ( 06 Mar 2019 10:11 )             46.6       Urine Studies:  Urinalysis Basic - ( 06 Mar 2019 10:11 )    Color: Yellow / Appearance: Clear / S.015 / pH:   Gluc:  / Ketone: Negative  / Bili: Negative / Urobili: 0.2 mg/dL   Blood:  / Protein: 100 mg/dL / Nitrite: Negative   Leuk Esterase: Negative / RBC: 1-2 /HPF / WBC 3-5 /HPF   Sq Epi:  / Non Sq Epi: Occasional /HPF / Bacteria:           RADIOLOGY & ADDITIONAL STUDIES: NEPHROLOGY CONSULTATION NOTE    77 yo wm with pmh as below, sent in by me for worsening renal function.  Pt with baseline cr 4.  Developed sob / cough and lasix increased.  Labs returned from office yesterday with cr bump from 4-->9.  Pt denies any voiding complaints.  No cp, fever, swelling.    PAST MEDICAL & SURGICAL HISTORY:  Mitral valve regurgitation  CHF (congestive heart failure)  BPH (Benign Prostatic Hyperplasia)  GERD (gastroesophageal reflux disease)  Sleep apnea: does not use machine  Renal insufficiency  HTN - Hypertension  H/O hyperlipidemia  CAD (coronary artery disease)  Diabetes mellitus  History of PTCA: with stents 10 years ago ([facility])  CAD (Coronary Artery Disease)  Hypercholesterolemia  Diabetes Mellitus Type II  HTN (Hypertension)  S/P appendectomy  S/P primary angioplasty with coronary stent: 6-7 stents last one in 10/12  S/P tonsillectomy  S/P knee surgery: b/l arthroscopic    Allergies:  No Known Allergies    Home Medications Reviewed    SOCIAL HISTORY:  Denies ETOH,Smoking,   FAMILY HISTORY:        REVIEW OF SYSTEMS:  All other review of systems is negative unless indicated above.    PHYSICAL EXAM:  NAD  awake and alert  moist mm  no jvd  cta bl  rrr  soft, nt   no edema  no rash    Hospital Medications:   MEDICATIONS  (STANDING):  amLODIPine   Tablet 5 milliGRAM(s) Oral once  metoprolol succinate ER 25 milliGRAM(s) Oral daily  sodium chloride 0.9%. 1000 milliLiter(s) (125 mL/Hr) IV Continuous <Continuous>        VITALS:  T(F): 96.2 (19 @ 11:22), Max: 96.2 (19 @ 11:22)  HR: 83 (19 @ 11:22)  BP: 151/102 (19 @ 11:22)  RR: 18 (19 @ 11:22)  SpO2: 95% (19 @ 11:22)  Wt(kg): --    Height (cm): 180.34 ( @ 09:42)  Weight (kg): 109.8 ( @ :42)  BMI (kg/m2): 33.8 ( @ 09:42)  BSA (m2): 2.29 ( @ 09:42)    LABS:      134<L>  |  88<L>  |  140<HH>  ----------------------------<  171<H>  4.4   |  27  |  9.2<HH>    Ca    9.0      06 Mar 2019 10:11  Mg     2.9     -    TPro  7.6  /  Alb  5.3<H>  /  TBili  0.4  /  DBili      /  AST  20  /  ALT  17  /  AlkPhos  65                            15.6   10.51 )-----------( 188      ( 06 Mar 2019 10:11 )             46.6       Urine Studies:  Urinalysis Basic - ( 06 Mar 2019 10:11 )    Color: Yellow / Appearance: Clear / S.015 / pH:   Gluc:  / Ketone: Negative  / Bili: Negative / Urobili: 0.2 mg/dL   Blood:  / Protein: 100 mg/dL / Nitrite: Negative   Leuk Esterase: Negative / RBC: 1-2 /HPF / WBC 3-5 /HPF   Sq Epi:  / Non Sq Epi: Occasional /HPF / Bacteria:           RADIOLOGY & ADDITIONAL STUDIES:

## 2019-03-06 NOTE — ED ADULT NURSE NOTE - NSIMPLEMENTINTERV_GEN_ALL_ED
Implemented All Universal Safety Interventions:  [place] to call system. Call bell, personal items and telephone within reach. Instruct patient to call for assistance. Room bathroom lighting operational. Non-slip footwear when patient is off stretcher. Physically safe environment: no spills, clutter or unnecessary equipment. Stretcher in lowest position, wheels locked, appropriate side rails in place.

## 2019-03-07 LAB
ALBUMIN SERPL ELPH-MCNC: 4.4 G/DL — SIGNIFICANT CHANGE UP (ref 3.5–5.2)
ALP SERPL-CCNC: 61 U/L — SIGNIFICANT CHANGE UP (ref 30–115)
ALT FLD-CCNC: 14 U/L — SIGNIFICANT CHANGE UP (ref 0–41)
ANION GAP SERPL CALC-SCNC: 18 MMOL/L — HIGH (ref 7–14)
ANION GAP SERPL CALC-SCNC: 21 MMOL/L — HIGH (ref 7–14)
AST SERPL-CCNC: 17 U/L — SIGNIFICANT CHANGE UP (ref 0–41)
BILIRUB SERPL-MCNC: 0.3 MG/DL — SIGNIFICANT CHANGE UP (ref 0.2–1.2)
BUN SERPL-MCNC: 138 MG/DL — CRITICAL HIGH (ref 10–20)
BUN SERPL-MCNC: 144 MG/DL — SIGNIFICANT CHANGE UP (ref 10–20)
CALCIUM SERPL-MCNC: 8 MG/DL — LOW (ref 8.5–10.1)
CALCIUM SERPL-MCNC: 8.1 MG/DL — LOW (ref 8.5–10.1)
CHLORIDE SERPL-SCNC: 92 MMOL/L — LOW (ref 98–110)
CHLORIDE SERPL-SCNC: 93 MMOL/L — LOW (ref 98–110)
CO2 SERPL-SCNC: 24 MMOL/L — SIGNIFICANT CHANGE UP (ref 17–32)
CO2 SERPL-SCNC: 25 MMOL/L — SIGNIFICANT CHANGE UP (ref 17–32)
CREAT SERPL-MCNC: 8.8 MG/DL — CRITICAL HIGH (ref 0.7–1.5)
CREAT SERPL-MCNC: 9.3 MG/DL — CRITICAL HIGH (ref 0.7–1.5)
GLUCOSE BLDC GLUCOMTR-MCNC: 139 MG/DL — HIGH (ref 70–99)
GLUCOSE BLDC GLUCOMTR-MCNC: 249 MG/DL — HIGH (ref 70–99)
GLUCOSE BLDC GLUCOMTR-MCNC: 300 MG/DL — HIGH (ref 70–99)
GLUCOSE BLDC GLUCOMTR-MCNC: 313 MG/DL — HIGH (ref 70–99)
GLUCOSE SERPL-MCNC: 125 MG/DL — HIGH (ref 70–99)
GLUCOSE SERPL-MCNC: 309 MG/DL — HIGH (ref 70–99)
HCT VFR BLD CALC: 39.8 % — LOW (ref 42–52)
HGB BLD-MCNC: 13.5 G/DL — LOW (ref 14–18)
MCHC RBC-ENTMCNC: 29.3 PG — SIGNIFICANT CHANGE UP (ref 27–31)
MCHC RBC-ENTMCNC: 33.9 G/DL — SIGNIFICANT CHANGE UP (ref 32–37)
MCV RBC AUTO: 86.5 FL — SIGNIFICANT CHANGE UP (ref 80–94)
NRBC # BLD: 0 /100 WBCS — SIGNIFICANT CHANGE UP (ref 0–0)
PHOSPHATE SERPL-MCNC: 8.2 MG/DL — HIGH (ref 2.1–4.9)
PLATELET # BLD AUTO: 151 K/UL — SIGNIFICANT CHANGE UP (ref 130–400)
POTASSIUM SERPL-MCNC: 4.3 MMOL/L — SIGNIFICANT CHANGE UP (ref 3.5–5)
POTASSIUM SERPL-MCNC: 4.4 MMOL/L — SIGNIFICANT CHANGE UP (ref 3.5–5)
POTASSIUM SERPL-SCNC: 4.3 MMOL/L — SIGNIFICANT CHANGE UP (ref 3.5–5)
POTASSIUM SERPL-SCNC: 4.4 MMOL/L — SIGNIFICANT CHANGE UP (ref 3.5–5)
PROT SERPL-MCNC: 6.4 G/DL — SIGNIFICANT CHANGE UP (ref 6–8)
RBC # BLD: 4.6 M/UL — LOW (ref 4.7–6.1)
RBC # FLD: 13.3 % — SIGNIFICANT CHANGE UP (ref 11.5–14.5)
SODIUM SERPL-SCNC: 135 MMOL/L — SIGNIFICANT CHANGE UP (ref 135–146)
SODIUM SERPL-SCNC: 138 MMOL/L — SIGNIFICANT CHANGE UP (ref 135–146)
WBC # BLD: 10.03 K/UL — SIGNIFICANT CHANGE UP (ref 4.8–10.8)
WBC # FLD AUTO: 10.03 K/UL — SIGNIFICANT CHANGE UP (ref 4.8–10.8)

## 2019-03-07 RX ORDER — DEXTROSE 50 % IN WATER 50 %
25 SYRINGE (ML) INTRAVENOUS ONCE
Qty: 0 | Refills: 0 | Status: DISCONTINUED | OUTPATIENT
Start: 2019-03-07 | End: 2019-03-10

## 2019-03-07 RX ORDER — SODIUM CHLORIDE 9 MG/ML
1000 INJECTION, SOLUTION INTRAVENOUS
Qty: 0 | Refills: 0 | Status: DISCONTINUED | OUTPATIENT
Start: 2019-03-07 | End: 2019-03-10

## 2019-03-07 RX ORDER — GLUCAGON INJECTION, SOLUTION 0.5 MG/.1ML
1 INJECTION, SOLUTION SUBCUTANEOUS ONCE
Qty: 0 | Refills: 0 | Status: DISCONTINUED | OUTPATIENT
Start: 2019-03-07 | End: 2019-03-10

## 2019-03-07 RX ORDER — DEXTROSE 50 % IN WATER 50 %
12.5 SYRINGE (ML) INTRAVENOUS ONCE
Qty: 0 | Refills: 0 | Status: DISCONTINUED | OUTPATIENT
Start: 2019-03-07 | End: 2019-03-10

## 2019-03-07 RX ORDER — INSULIN LISPRO 100/ML
VIAL (ML) SUBCUTANEOUS
Qty: 0 | Refills: 0 | Status: DISCONTINUED | OUTPATIENT
Start: 2019-03-07 | End: 2019-03-10

## 2019-03-07 RX ORDER — DEXTROSE 50 % IN WATER 50 %
15 SYRINGE (ML) INTRAVENOUS ONCE
Qty: 0 | Refills: 0 | Status: DISCONTINUED | OUTPATIENT
Start: 2019-03-07 | End: 2019-03-10

## 2019-03-07 RX ADMIN — SODIUM CHLORIDE 50 MILLILITER(S): 9 INJECTION INTRAMUSCULAR; INTRAVENOUS; SUBCUTANEOUS at 05:39

## 2019-03-07 RX ADMIN — ATORVASTATIN CALCIUM 40 MILLIGRAM(S): 80 TABLET, FILM COATED ORAL at 21:24

## 2019-03-07 RX ADMIN — Medication 25 MILLIGRAM(S): at 21:24

## 2019-03-07 RX ADMIN — Medication 25 MILLIGRAM(S): at 05:40

## 2019-03-07 RX ADMIN — Medication 25 MILLIGRAM(S): at 15:04

## 2019-03-07 RX ADMIN — ISOSORBIDE DINITRATE 10 MILLIGRAM(S): 5 TABLET ORAL at 05:40

## 2019-03-07 RX ADMIN — ISOSORBIDE DINITRATE 10 MILLIGRAM(S): 5 TABLET ORAL at 15:04

## 2019-03-07 RX ADMIN — Medication 2: at 11:59

## 2019-03-07 RX ADMIN — Medication 4: at 17:03

## 2019-03-07 RX ADMIN — ISOSORBIDE DINITRATE 10 MILLIGRAM(S): 5 TABLET ORAL at 21:24

## 2019-03-07 RX ADMIN — Medication 100 MILLIGRAM(S): at 19:26

## 2019-03-07 RX ADMIN — Medication 100 MILLIGRAM(S): at 05:39

## 2019-03-07 RX ADMIN — Medication 25 MILLIGRAM(S): at 05:39

## 2019-03-08 LAB
ALBUMIN SERPL ELPH-MCNC: 4.4 G/DL — SIGNIFICANT CHANGE UP (ref 3.5–5.2)
ALP SERPL-CCNC: 42 U/L — SIGNIFICANT CHANGE UP (ref 30–115)
ALT FLD-CCNC: 14 U/L — SIGNIFICANT CHANGE UP (ref 0–41)
ANION GAP SERPL CALC-SCNC: 18 MMOL/L — HIGH (ref 7–14)
AST SERPL-CCNC: 18 U/L — SIGNIFICANT CHANGE UP (ref 0–41)
BILIRUB SERPL-MCNC: 0.5 MG/DL — SIGNIFICANT CHANGE UP (ref 0.2–1.2)
BUN SERPL-MCNC: 142 MG/DL — CRITICAL HIGH (ref 10–20)
CALCIUM SERPL-MCNC: 8.1 MG/DL — LOW (ref 8.5–10.1)
CHLORIDE SERPL-SCNC: 96 MMOL/L — LOW (ref 98–110)
CO2 SERPL-SCNC: 26 MMOL/L — SIGNIFICANT CHANGE UP (ref 17–32)
CREAT SERPL-MCNC: 9 MG/DL — CRITICAL HIGH (ref 0.7–1.5)
ESTIMATED AVERAGE GLUCOSE: 186 MG/DL — HIGH (ref 68–114)
GLUCOSE BLDC GLUCOMTR-MCNC: 233 MG/DL — HIGH (ref 70–99)
GLUCOSE BLDC GLUCOMTR-MCNC: 247 MG/DL — HIGH (ref 70–99)
GLUCOSE BLDC GLUCOMTR-MCNC: 252 MG/DL — HIGH (ref 70–99)
GLUCOSE BLDC GLUCOMTR-MCNC: 303 MG/DL — HIGH (ref 70–99)
GLUCOSE SERPL-MCNC: 192 MG/DL — HIGH (ref 70–99)
HBA1C BLD-MCNC: 8.1 % — HIGH (ref 4–5.6)
INR BLD: 1.13 RATIO — SIGNIFICANT CHANGE UP (ref 0.65–1.3)
PHOSPHATE SERPL-MCNC: 7.6 MG/DL — HIGH (ref 2.1–4.9)
POTASSIUM SERPL-MCNC: 4.4 MMOL/L — SIGNIFICANT CHANGE UP (ref 3.5–5)
POTASSIUM SERPL-SCNC: 4.4 MMOL/L — SIGNIFICANT CHANGE UP (ref 3.5–5)
PROT SERPL-MCNC: 6.2 G/DL — SIGNIFICANT CHANGE UP (ref 6–8)
PROTHROM AB SERPL-ACNC: 13 SEC — HIGH (ref 9.95–12.87)
SODIUM SERPL-SCNC: 140 MMOL/L — SIGNIFICANT CHANGE UP (ref 135–146)

## 2019-03-08 RX ORDER — FUROSEMIDE 40 MG
60 TABLET ORAL ONCE
Qty: 0 | Refills: 0 | Status: COMPLETED | OUTPATIENT
Start: 2019-03-08 | End: 2019-03-08

## 2019-03-08 RX ORDER — INSULIN LISPRO 100/ML
6 VIAL (ML) SUBCUTANEOUS ONCE
Qty: 0 | Refills: 0 | Status: COMPLETED | OUTPATIENT
Start: 2019-03-08 | End: 2019-03-08

## 2019-03-08 RX ORDER — FUROSEMIDE 40 MG
40 TABLET ORAL
Qty: 0 | Refills: 0 | Status: DISCONTINUED | OUTPATIENT
Start: 2019-03-08 | End: 2019-03-11

## 2019-03-08 RX ORDER — ALPRAZOLAM 0.25 MG
0.25 TABLET ORAL EVERY 12 HOURS
Qty: 0 | Refills: 0 | Status: DISCONTINUED | OUTPATIENT
Start: 2019-03-08 | End: 2019-03-11

## 2019-03-08 RX ADMIN — Medication 3: at 12:47

## 2019-03-08 RX ADMIN — Medication 25 MILLIGRAM(S): at 14:23

## 2019-03-08 RX ADMIN — Medication 6 UNIT(S): at 22:00

## 2019-03-08 RX ADMIN — Medication 0.25 MILLIGRAM(S): at 09:14

## 2019-03-08 RX ADMIN — ISOSORBIDE DINITRATE 10 MILLIGRAM(S): 5 TABLET ORAL at 05:34

## 2019-03-08 RX ADMIN — ISOSORBIDE DINITRATE 10 MILLIGRAM(S): 5 TABLET ORAL at 14:22

## 2019-03-08 RX ADMIN — Medication 100 MILLIGRAM(S): at 17:03

## 2019-03-08 RX ADMIN — Medication 0.25 MILLIGRAM(S): at 21:30

## 2019-03-08 RX ADMIN — Medication 60 MILLIGRAM(S): at 09:34

## 2019-03-08 RX ADMIN — ATORVASTATIN CALCIUM 40 MILLIGRAM(S): 80 TABLET, FILM COATED ORAL at 21:30

## 2019-03-08 RX ADMIN — Medication 25 MILLIGRAM(S): at 21:30

## 2019-03-08 RX ADMIN — Medication 100 MILLIGRAM(S): at 05:33

## 2019-03-08 RX ADMIN — Medication 2: at 17:02

## 2019-03-08 RX ADMIN — Medication 25 MILLIGRAM(S): at 05:35

## 2019-03-08 RX ADMIN — ISOSORBIDE DINITRATE 10 MILLIGRAM(S): 5 TABLET ORAL at 21:30

## 2019-03-08 RX ADMIN — Medication 40 MILLIGRAM(S): at 17:03

## 2019-03-08 RX ADMIN — Medication 2: at 09:00

## 2019-03-08 RX ADMIN — Medication 25 MILLIGRAM(S): at 05:34

## 2019-03-08 NOTE — CONSULT NOTE ADULT - SUBJECTIVE AND OBJECTIVE BOX
HPI:  Patient is a 79 y/o male with hx DM/ESRD/HTN admitted  for worsening renal failure. Dr Du recommending tesio cath placement for HD.    PAST MEDICAL & SURGICAL HISTORY:  Mitral valve regurgitation  CHF (congestive heart failure)  BPH (Benign Prostatic Hyperplasia)  GERD (gastroesophageal reflux disease)  Sleep apnea: does not use machine  Renal insufficiency  HTN - Hypertension  H/O hyperlipidemia  CAD (coronary artery disease)  Diabetes mellitus  History of PTCA: with st[facility] 10 years ago ([facility])  CAD (Coronary Artery Disease)  Hypercholesterolemia  Diabetes Mellitus Type II  HTN (Hypertension)  S/P appendectomy  S/P primary angioplasty with coronary stent: 6-7 stents last one in 10/12  S/P tonsillectomy  S/P knee surgery: b/l arthroscopic      Allergies    No Known Allergies          MEDICATIONS  (STANDING):  atorvastatin 40 milliGRAM(s) Oral at bedtime  dextrose 5%. 1000 milliLiter(s) (50 mL/Hr) IV Continuous <Continuous>  dextrose 50% Injectable 12.5 Gram(s) IV Push once  dextrose 50% Injectable 25 Gram(s) IV Push once  dextrose 50% Injectable 25 Gram(s) IV Push once  docusate sodium 100 milliGRAM(s) Oral two times a day  furosemide   Injectable 40 milliGRAM(s) IV Push two times a day  hydrALAZINE 25 milliGRAM(s) Oral every 8 hours  insulin lispro (HumaLOG) corrective regimen sliding scale   SubCutaneous three times a day before meals  isosorbide   dinitrate Tablet (ISORDIL) 10 milliGRAM(s) Oral every 8 hours  metoprolol succinate ER 25 milliGRAM(s) Oral daily    MEDICATIONS  (PRN):  ALPRAZolam 0.25 milliGRAM(s) Oral every 12 hours PRN anxiety  dextrose 40% Gel 15 Gram(s) Oral once PRN Blood Glucose LESS THAN 70 milliGRAM(s)/deciliter  glucagon  Injectable 1 milliGRAM(s) IntraMuscular once PRN Glucose LESS THAN 70 milligrams/deciliter      ROS:  General:	no fever, weight loss,  chills  Skin: no rash, ulcers  Ophthalmologic: no visual changes  ENMT:	no sore throat  Respiratory and Thorax: no cough, wheeze,  sob  Cardiovascular:	no chest pain, palpitations, dizziness  Gastrointestinal:	no nausea, vomiting, diarrhea, abd pain  Genitourinary:	no dysuria, hematuria  Musculoskeletal:	no joint pains  Neurological:	 no speech disturbance, focal weakness, numbness  Psychiatric:	no depression, anxiety, psychosis  Hematology/Lymphatics:	no anemia  Endocrine:	no polyuria, polydipsia        Vital Signs Last 24 Hrs  T(C): 35.9 (08 Mar 2019 05:39), Max: 36.5 (07 Mar 2019 21:00)  T(F): 96.6 (08 Mar 2019 05:39), Max: 97.7 (07 Mar 2019 21:00)  HR: 73 (08 Mar 2019 05:39) (73 - 75)  BP: 158/85 (08 Mar 2019 05:39) (158/85 - 183/92)  BP(mean): --  RR: 18 (08 Mar 2019 05:39) (16 - 18)  SpO2: 96% (08 Mar 2019 08:47) (96% - 96%)    PHYSICAL EXAM:      Constitutional: A&Ox4  Respiratory: cta b/l  Cardiovascular: s1 s2 rrr  Gastrointestinal: soft nt  nd + bs no rebound or guarding  Genitourinary: no cva tenderness  Extremities: normal rom, no edema, calf tenderness  Neurological: no focal deficits  Skin: no rash                            13.5   10.03 )-----------( 151      ( 07 Mar 2019 06:37 )             39.8       03-08    140  |  96<L>  |  142<HH>  ----------------------------<  192<H>  4.4   |  26  |  9.0<HH>    Ca    8.1<L>      08 Mar 2019 07:21  Phos  7.6     03-08    TPro  6.2  /  Alb  4.4  /  TBili  0.5  /  DBili  x   /  AST  18  /  ALT  14  /  AlkPhos  42  03-08      PT/INR - ( 08 Mar 2019 12:12 )   PT: 13.00 sec;   INR: 1.13 ratio

## 2019-03-09 LAB
ALBUMIN SERPL ELPH-MCNC: 4.6 G/DL — SIGNIFICANT CHANGE UP (ref 3.5–5.2)
ALP SERPL-CCNC: 49 U/L — SIGNIFICANT CHANGE UP (ref 30–115)
ALT FLD-CCNC: 14 U/L — SIGNIFICANT CHANGE UP (ref 0–41)
ANION GAP SERPL CALC-SCNC: 18 MMOL/L — HIGH (ref 7–14)
AST SERPL-CCNC: 17 U/L — SIGNIFICANT CHANGE UP (ref 0–41)
BILIRUB SERPL-MCNC: 0.4 MG/DL — SIGNIFICANT CHANGE UP (ref 0.2–1.2)
BUN SERPL-MCNC: 151 MG/DL — CRITICAL HIGH (ref 10–20)
CALCIUM SERPL-MCNC: 8.6 MG/DL — SIGNIFICANT CHANGE UP (ref 8.5–10.1)
CHLORIDE SERPL-SCNC: 93 MMOL/L — LOW (ref 98–110)
CO2 SERPL-SCNC: 29 MMOL/L — SIGNIFICANT CHANGE UP (ref 17–32)
CREAT SERPL-MCNC: 9.1 MG/DL — CRITICAL HIGH (ref 0.7–1.5)
GLUCOSE BLDC GLUCOMTR-MCNC: 186 MG/DL — HIGH (ref 70–99)
GLUCOSE BLDC GLUCOMTR-MCNC: 213 MG/DL — HIGH (ref 70–99)
GLUCOSE BLDC GLUCOMTR-MCNC: 236 MG/DL — HIGH (ref 70–99)
GLUCOSE BLDC GLUCOMTR-MCNC: 314 MG/DL — HIGH (ref 70–99)
GLUCOSE BLDC GLUCOMTR-MCNC: 327 MG/DL — HIGH (ref 70–99)
GLUCOSE SERPL-MCNC: 185 MG/DL — HIGH (ref 70–99)
HCT VFR BLD CALC: 40 % — LOW (ref 42–52)
HGB BLD-MCNC: 13.3 G/DL — LOW (ref 14–18)
MCHC RBC-ENTMCNC: 28.7 PG — SIGNIFICANT CHANGE UP (ref 27–31)
MCHC RBC-ENTMCNC: 33.3 G/DL — SIGNIFICANT CHANGE UP (ref 32–37)
MCV RBC AUTO: 86.2 FL — SIGNIFICANT CHANGE UP (ref 80–94)
NRBC # BLD: 0 /100 WBCS — SIGNIFICANT CHANGE UP (ref 0–0)
PLATELET # BLD AUTO: 168 K/UL — SIGNIFICANT CHANGE UP (ref 130–400)
POTASSIUM SERPL-MCNC: 4.1 MMOL/L — SIGNIFICANT CHANGE UP (ref 3.5–5)
POTASSIUM SERPL-SCNC: 4.1 MMOL/L — SIGNIFICANT CHANGE UP (ref 3.5–5)
PROT SERPL-MCNC: 6.8 G/DL — SIGNIFICANT CHANGE UP (ref 6–8)
RBC # BLD: 4.64 M/UL — LOW (ref 4.7–6.1)
RBC # FLD: 13.2 % — SIGNIFICANT CHANGE UP (ref 11.5–14.5)
SODIUM SERPL-SCNC: 140 MMOL/L — SIGNIFICANT CHANGE UP (ref 135–146)
WBC # BLD: 10.59 K/UL — SIGNIFICANT CHANGE UP (ref 4.8–10.8)
WBC # FLD AUTO: 10.59 K/UL — SIGNIFICANT CHANGE UP (ref 4.8–10.8)

## 2019-03-09 RX ORDER — INSULIN GLARGINE 100 [IU]/ML
35 INJECTION, SOLUTION SUBCUTANEOUS AT BEDTIME
Qty: 0 | Refills: 0 | Status: DISCONTINUED | OUTPATIENT
Start: 2019-03-09 | End: 2019-03-11

## 2019-03-09 RX ADMIN — ISOSORBIDE DINITRATE 10 MILLIGRAM(S): 5 TABLET ORAL at 21:07

## 2019-03-09 RX ADMIN — ISOSORBIDE DINITRATE 10 MILLIGRAM(S): 5 TABLET ORAL at 17:11

## 2019-03-09 RX ADMIN — INSULIN GLARGINE 35 UNIT(S): 100 INJECTION, SOLUTION SUBCUTANEOUS at 21:07

## 2019-03-09 RX ADMIN — Medication 40 MILLIGRAM(S): at 17:12

## 2019-03-09 RX ADMIN — Medication 25 MILLIGRAM(S): at 05:18

## 2019-03-09 RX ADMIN — Medication 100 MILLIGRAM(S): at 05:18

## 2019-03-09 RX ADMIN — Medication 2: at 12:43

## 2019-03-09 RX ADMIN — Medication 100 MILLIGRAM(S): at 17:12

## 2019-03-09 RX ADMIN — Medication 25 MILLIGRAM(S): at 17:11

## 2019-03-09 RX ADMIN — ATORVASTATIN CALCIUM 40 MILLIGRAM(S): 80 TABLET, FILM COATED ORAL at 21:07

## 2019-03-09 RX ADMIN — Medication 40 MILLIGRAM(S): at 05:18

## 2019-03-09 RX ADMIN — Medication 25 MILLIGRAM(S): at 05:19

## 2019-03-09 RX ADMIN — Medication 0.25 MILLIGRAM(S): at 21:07

## 2019-03-09 RX ADMIN — ISOSORBIDE DINITRATE 10 MILLIGRAM(S): 5 TABLET ORAL at 05:18

## 2019-03-09 RX ADMIN — Medication 1: at 08:39

## 2019-03-09 RX ADMIN — Medication 4: at 17:11

## 2019-03-09 RX ADMIN — Medication 25 MILLIGRAM(S): at 21:07

## 2019-03-09 NOTE — CONSULT NOTE ADULT - ASSESSMENT
Patient is a 79yo M with ESRD who will need HD. Surgery consulted for tesio cath placement.
Patient with hx mi cabg aicd. Now with esrd needing dialysis. He claims he can go up 2 flights steps and walk several blocks. No chest pain. He needs dialysis . Risk tesio low. Note he has probable severe haris and does not use c-pap. He also has a  AICD. Continue meds
KIANA   - Cr 4 (2/27/19) --> 9.2   - UA bland except for proteinuria   - nl renal sono   - may be just prerenal from diuretics   - not uremic, lytes fine, not overloaded  CKD 4  CHF   MR  CAD / PCI  HTN  DM    plan:    no indication for emergent dialysis  hold lasix 24 hours  very gentle fluids x 24 hours  monitor UOP  f/u labs  if worsening dyspnea --> lasix 80mg ivp prn  if no improvement in renal function or worsening CHF, then will ask surgery to place access and initiate dialysis  admit to Dr. Jordan  will follow closely

## 2019-03-09 NOTE — CONSULT NOTE ADULT - SUBJECTIVE AND OBJECTIVE BOX
CARDIOLOGY CONSULT NOTE     CHIEF COMPLAINT/REASON FOR CONSULT:    HPI:  Pt is a 77 y/o male with hx DM/ESRD/HTN referred for admission from Dr Du for worsening renal failure. Pt evaluated by Dr Nance 2 days ago regarding SOB. His Lasix was inreased from 20mg q12h to 40 mg twice daily. He was seen by Dr Du yesterday blood obtained and he was notified  to present to the ER .      PAST MEDICAL & SURGICAL HISTORY:  Mitral valve regurgitation  CHF (congestive heart failure)  BPH (Benign Prostatic Hyperplasia)  GERD (gastroesophageal reflux disease)  Sleep apnea: does not use machine  Renal insufficiency  HTN - Hypertension  H/O hyperlipidemia  CAD (coronary artery disease)  Diabetes mellitus  History of PTCA: with stents 10 years ago ([facility])  CAD (Coronary Artery Disease)  Hypercholesterolemia  Diabetes Mellitus Type II  HTN (Hypertension)  S/P appendectomy  S/P primary angioplasty with coronary stent: 6-7 stents last one in 10/12  S/P tonsillectomy  S/P knee surgery: b/l arthroscopic      Cardiac Risks:   [x ]HTN, [x ] DM, [ ] Smoking, [ ] FH,  [x ] Lipids        MEDICATIONS:  MEDICATIONS  (STANDING):  atorvastatin 40 milliGRAM(s) Oral at bedtime  dextrose 5%. 1000 milliLiter(s) (50 mL/Hr) IV Continuous <Continuous>  dextrose 50% Injectable 12.5 Gram(s) IV Push once  dextrose 50% Injectable 25 Gram(s) IV Push once  dextrose 50% Injectable 25 Gram(s) IV Push once  docusate sodium 100 milliGRAM(s) Oral two times a day  furosemide   Injectable 40 milliGRAM(s) IV Push two times a day  hydrALAZINE 25 milliGRAM(s) Oral every 8 hours  insulin lispro (HumaLOG) corrective regimen sliding scale   SubCutaneous three times a day before meals  isosorbide   dinitrate Tablet (ISORDIL) 10 milliGRAM(s) Oral every 8 hours  metoprolol succinate ER 25 milliGRAM(s) Oral daily      FAMILY HISTORY:  No pertinent family history in first degree relatives      SOCIAL HISTORY:      [ ] Marital status single  Allergies    No Known Allergies        	    REVIEW OF SYSTEMS:  CONSTITUTIONAL: No fever, weight loss, or fatigue  EYES: No eye pain, visual disturbances, or discharge  ENMT:  No difficulty hearing, tinnitus, vertigo; No sinus or throat pain  NECK: No pain or stiffness  RESPIRATORY: No cough, wheezing, chills or hemoptysis; No Shortness of Breath  CARDIOVASCULAR: No chest pain, palpitations, passing out, dizziness, or leg swelling  GASTROINTESTINAL: No abdominal or epigastric pain. No nausea, vomiting, or hematemesis; No diarrhea or constipation. No melena or hematochezia.  GENITOURINARY: No dysuria, frequency, hematuria, or incontinence  NEUROLOGICAL: No headaches, memory loss, loss of strength, numbness, or tremors  SKIN: No itching, burning, rashes, or lesions   	    [ ] All others negative	  [ ] Unable to obtain    PHYSICAL EXAM:  T(C): 35.8 (03-09-19 @ 05:09), Max: 36 (03-08-19 @ 14:41)  HR: 78 (03-09-19 @ 05:09) (73 - 78)  BP: 126/74 (03-09-19 @ 05:09) (126/74 - 189/85)  RR: 18 (03-09-19 @ 05:09) (16 - 18)  SpO2: 96% (03-08-19 @ 08:47) (96% - 96%)  Wt(kg): --  I&O's Summary    08 Mar 2019 07:01  -  09 Mar 2019 07:00  --------------------------------------------------------  IN: 0 mL / OUT: 280 mL / NET: -280 mL        Appearance: Normal	  Psychiatry: A & O x 3, Mood & affect appropriate  HEENT:   Normal oral mucosa, PERRL, EOMI	  Lymphatic: No lymphadenopathy  Cardiovascular: Normal S1 S2,RRR, No JVD, No murmurs  Respiratory: Lungs clear to auscultation	  Gastrointestinal:  Soft, Non-tender, + BS	  Skin: No rashes, No ecchymoses, No cyanosis	  Neurologic: Non-focal  Extremities: Normal range of motion, No clubbing, cyanosis or edema  Vascular: Peripheral pulses palpable 2+ bilaterally      ECG:  	< from: 12 Lead ECG (03.06.19 @ 10:24) >    T Axis 116 degrees    Diagnosis Line Sinus rhythm with Premature supraventricular complexes  Incomplete right bundle branch block  T wave abnormality, consider inferior ischemia  Abnormal ECG    Confirmed by GRABIEL AVALOS MD ([id number]) on 3/6/2019 3:04:57 PM    < end of copied text >      	  LABS:	 	    CARDIAC MARKERS:                                    13.3   10.59 )-----------( 168      ( 09 Mar 2019 06:26 )             40.0     03-09    140  |  93<L>  |  x   ----------------------------<  185<H>  4.1   |  29  |  x     Ca    8.6      09 Mar 2019 06:26  Phos  7.6     03-08    TPro  6.8  /  Alb  4.6  /  TBili  0.4  /  DBili  x   /  AST  17  /  ALT  14  /  AlkPhos  49  03-09    PT/INR - ( 08 Mar 2019 12:12 )   PT: 13.00 sec;   INR: 1.13 ratio

## 2019-03-10 LAB
ANION GAP SERPL CALC-SCNC: 23 MMOL/L — HIGH (ref 7–14)
APTT BLD: 33.1 SEC — SIGNIFICANT CHANGE UP (ref 27–39.2)
BASOPHILS # BLD AUTO: 0.05 K/UL — SIGNIFICANT CHANGE UP (ref 0–0.2)
BASOPHILS NFR BLD AUTO: 0.5 % — SIGNIFICANT CHANGE UP (ref 0–1)
BUN SERPL-MCNC: 151 MG/DL — CRITICAL HIGH (ref 10–20)
CALCIUM SERPL-MCNC: 8.8 MG/DL — SIGNIFICANT CHANGE UP (ref 8.5–10.1)
CHLORIDE SERPL-SCNC: 89 MMOL/L — LOW (ref 98–110)
CO2 SERPL-SCNC: 24 MMOL/L — SIGNIFICANT CHANGE UP (ref 17–32)
CREAT SERPL-MCNC: 8.9 MG/DL — CRITICAL HIGH (ref 0.7–1.5)
EOSINOPHIL # BLD AUTO: 0.7 K/UL — SIGNIFICANT CHANGE UP (ref 0–0.7)
EOSINOPHIL NFR BLD AUTO: 6.5 % — SIGNIFICANT CHANGE UP (ref 0–8)
GLUCOSE BLDC GLUCOMTR-MCNC: 218 MG/DL — HIGH (ref 70–99)
GLUCOSE BLDC GLUCOMTR-MCNC: 278 MG/DL — HIGH (ref 70–99)
GLUCOSE BLDC GLUCOMTR-MCNC: 284 MG/DL — HIGH (ref 70–99)
GLUCOSE BLDC GLUCOMTR-MCNC: 403 MG/DL — HIGH (ref 70–99)
GLUCOSE SERPL-MCNC: 256 MG/DL — HIGH (ref 70–99)
HAV IGM SER-ACNC: SIGNIFICANT CHANGE UP
HBV CORE IGM SER-ACNC: SIGNIFICANT CHANGE UP
HBV SURFACE AG SER-ACNC: SIGNIFICANT CHANGE UP
HCT VFR BLD CALC: 39.1 % — LOW (ref 42–52)
HCV AB S/CO SERPL IA: 0.13 S/CO — SIGNIFICANT CHANGE UP (ref 0–0.79)
HCV AB SERPL-IMP: SIGNIFICANT CHANGE UP
HGB BLD-MCNC: 13.3 G/DL — LOW (ref 14–18)
IMM GRANULOCYTES NFR BLD AUTO: 0.4 % — HIGH (ref 0.1–0.3)
INR BLD: 1.11 RATIO — SIGNIFICANT CHANGE UP (ref 0.65–1.3)
LYMPHOCYTES # BLD AUTO: 2.24 K/UL — SIGNIFICANT CHANGE UP (ref 1.2–3.4)
LYMPHOCYTES # BLD AUTO: 20.7 % — SIGNIFICANT CHANGE UP (ref 20.5–51.1)
MAGNESIUM SERPL-MCNC: 2.5 MG/DL — HIGH (ref 1.8–2.4)
MCHC RBC-ENTMCNC: 28.8 PG — SIGNIFICANT CHANGE UP (ref 27–31)
MCHC RBC-ENTMCNC: 34 G/DL — SIGNIFICANT CHANGE UP (ref 32–37)
MCV RBC AUTO: 84.6 FL — SIGNIFICANT CHANGE UP (ref 80–94)
MONOCYTES # BLD AUTO: 1.14 K/UL — HIGH (ref 0.1–0.6)
MONOCYTES NFR BLD AUTO: 10.5 % — HIGH (ref 1.7–9.3)
NEUTROPHILS # BLD AUTO: 6.64 K/UL — HIGH (ref 1.4–6.5)
NEUTROPHILS NFR BLD AUTO: 61.4 % — SIGNIFICANT CHANGE UP (ref 42.2–75.2)
NRBC # BLD: 0 /100 WBCS — SIGNIFICANT CHANGE UP (ref 0–0)
PHOSPHATE SERPL-MCNC: 8.7 MG/DL — HIGH (ref 2.1–4.9)
PLATELET # BLD AUTO: 174 K/UL — SIGNIFICANT CHANGE UP (ref 130–400)
POTASSIUM SERPL-MCNC: 3.8 MMOL/L — SIGNIFICANT CHANGE UP (ref 3.5–5)
POTASSIUM SERPL-SCNC: 3.8 MMOL/L — SIGNIFICANT CHANGE UP (ref 3.5–5)
PROTHROM AB SERPL-ACNC: 12.8 SEC — SIGNIFICANT CHANGE UP (ref 9.95–12.87)
RBC # BLD: 4.62 M/UL — LOW (ref 4.7–6.1)
RBC # FLD: 13.1 % — SIGNIFICANT CHANGE UP (ref 11.5–14.5)
SODIUM SERPL-SCNC: 136 MMOL/L — SIGNIFICANT CHANGE UP (ref 135–146)
TYPE + AB SCN PNL BLD: SIGNIFICANT CHANGE UP
WBC # BLD: 10.81 K/UL — HIGH (ref 4.8–10.8)
WBC # FLD AUTO: 10.81 K/UL — HIGH (ref 4.8–10.8)

## 2019-03-10 RX ORDER — DEXTROSE 50 % IN WATER 50 %
25 SYRINGE (ML) INTRAVENOUS ONCE
Qty: 0 | Refills: 0 | Status: DISCONTINUED | OUTPATIENT
Start: 2019-03-10 | End: 2019-03-11

## 2019-03-10 RX ORDER — INSULIN LISPRO 100/ML
VIAL (ML) SUBCUTANEOUS
Qty: 0 | Refills: 0 | Status: DISCONTINUED | OUTPATIENT
Start: 2019-03-10 | End: 2019-03-11

## 2019-03-10 RX ORDER — DEXTROSE 50 % IN WATER 50 %
15 SYRINGE (ML) INTRAVENOUS ONCE
Qty: 0 | Refills: 0 | Status: DISCONTINUED | OUTPATIENT
Start: 2019-03-10 | End: 2019-03-11

## 2019-03-10 RX ORDER — GLUCAGON INJECTION, SOLUTION 0.5 MG/.1ML
1 INJECTION, SOLUTION SUBCUTANEOUS ONCE
Qty: 0 | Refills: 0 | Status: DISCONTINUED | OUTPATIENT
Start: 2019-03-10 | End: 2019-03-11

## 2019-03-10 RX ORDER — INSULIN LISPRO 100/ML
6 VIAL (ML) SUBCUTANEOUS ONCE
Qty: 0 | Refills: 0 | Status: COMPLETED | OUTPATIENT
Start: 2019-03-10 | End: 2019-03-10

## 2019-03-10 RX ORDER — INSULIN LISPRO 100/ML
3 VIAL (ML) SUBCUTANEOUS
Qty: 0 | Refills: 0 | Status: DISCONTINUED | OUTPATIENT
Start: 2019-03-10 | End: 2019-03-11

## 2019-03-10 RX ORDER — SODIUM CHLORIDE 9 MG/ML
1000 INJECTION, SOLUTION INTRAVENOUS
Qty: 0 | Refills: 0 | Status: DISCONTINUED | OUTPATIENT
Start: 2019-03-10 | End: 2019-03-11

## 2019-03-10 RX ORDER — DEXTROSE 50 % IN WATER 50 %
12.5 SYRINGE (ML) INTRAVENOUS ONCE
Qty: 0 | Refills: 0 | Status: DISCONTINUED | OUTPATIENT
Start: 2019-03-10 | End: 2019-03-11

## 2019-03-10 RX ORDER — ACETAMINOPHEN 500 MG
650 TABLET ORAL EVERY 6 HOURS
Qty: 0 | Refills: 0 | Status: DISCONTINUED | OUTPATIENT
Start: 2019-03-10 | End: 2019-03-11

## 2019-03-10 RX ADMIN — Medication 6 UNIT(S): at 12:27

## 2019-03-10 RX ADMIN — Medication 40 MILLIGRAM(S): at 17:30

## 2019-03-10 RX ADMIN — Medication 40 MILLIGRAM(S): at 05:59

## 2019-03-10 RX ADMIN — Medication 25 MILLIGRAM(S): at 21:23

## 2019-03-10 RX ADMIN — Medication 25 MILLIGRAM(S): at 06:00

## 2019-03-10 RX ADMIN — Medication 100 MILLIGRAM(S): at 17:29

## 2019-03-10 RX ADMIN — Medication 3 UNIT(S): at 17:28

## 2019-03-10 RX ADMIN — ATORVASTATIN CALCIUM 40 MILLIGRAM(S): 80 TABLET, FILM COATED ORAL at 21:23

## 2019-03-10 RX ADMIN — INSULIN GLARGINE 35 UNIT(S): 100 INJECTION, SOLUTION SUBCUTANEOUS at 21:23

## 2019-03-10 RX ADMIN — Medication 3: at 17:29

## 2019-03-10 RX ADMIN — Medication 25 MILLIGRAM(S): at 12:27

## 2019-03-10 RX ADMIN — Medication 650 MILLIGRAM(S): at 18:31

## 2019-03-10 RX ADMIN — Medication 2: at 08:53

## 2019-03-10 RX ADMIN — Medication 0.25 MILLIGRAM(S): at 20:09

## 2019-03-10 RX ADMIN — Medication 100 MILLIGRAM(S): at 06:00

## 2019-03-10 RX ADMIN — ISOSORBIDE DINITRATE 10 MILLIGRAM(S): 5 TABLET ORAL at 21:23

## 2019-03-10 RX ADMIN — ISOSORBIDE DINITRATE 10 MILLIGRAM(S): 5 TABLET ORAL at 06:00

## 2019-03-10 RX ADMIN — ISOSORBIDE DINITRATE 10 MILLIGRAM(S): 5 TABLET ORAL at 12:27

## 2019-03-10 NOTE — CHART NOTE - NSCHARTNOTEFT_GEN_A_CORE
Called by RN, patient with persistent hyperglycemia.  Currently on lantus 35u QHS with AC s/s.  Will c/w Lantus at current dose and add Lispro 3u SQ AC TID w/SS coverage.  d/w RN

## 2019-03-11 LAB
GLUCOSE BLDC GLUCOMTR-MCNC: 177 MG/DL — HIGH (ref 70–99)
GLUCOSE BLDC GLUCOMTR-MCNC: 183 MG/DL — HIGH (ref 70–99)
GLUCOSE BLDC GLUCOMTR-MCNC: 187 MG/DL — HIGH (ref 70–99)
GLUCOSE BLDC GLUCOMTR-MCNC: 190 MG/DL — HIGH (ref 70–99)
GLUCOSE BLDC GLUCOMTR-MCNC: 198 MG/DL — HIGH (ref 70–99)
GLUCOSE BLDC GLUCOMTR-MCNC: 227 MG/DL — HIGH (ref 70–99)
GLUCOSE BLDC GLUCOMTR-MCNC: 258 MG/DL — HIGH (ref 70–99)

## 2019-03-11 RX ORDER — METOPROLOL TARTRATE 50 MG
25 TABLET ORAL DAILY
Qty: 0 | Refills: 0 | Status: DISCONTINUED | OUTPATIENT
Start: 2019-03-11 | End: 2019-03-12

## 2019-03-11 RX ORDER — ISOSORBIDE DINITRATE 5 MG/1
10 TABLET ORAL EVERY 8 HOURS
Qty: 0 | Refills: 0 | Status: DISCONTINUED | OUTPATIENT
Start: 2019-03-11 | End: 2019-03-12

## 2019-03-11 RX ORDER — ALPRAZOLAM 0.25 MG
0.25 TABLET ORAL EVERY 12 HOURS
Qty: 0 | Refills: 0 | Status: DISCONTINUED | OUTPATIENT
Start: 2019-03-11 | End: 2019-03-12

## 2019-03-11 RX ORDER — FUROSEMIDE 40 MG
40 TABLET ORAL
Qty: 0 | Refills: 0 | Status: DISCONTINUED | OUTPATIENT
Start: 2019-03-11 | End: 2019-03-12

## 2019-03-11 RX ORDER — GLUCAGON INJECTION, SOLUTION 0.5 MG/.1ML
1 INJECTION, SOLUTION SUBCUTANEOUS ONCE
Qty: 0 | Refills: 0 | Status: DISCONTINUED | OUTPATIENT
Start: 2019-03-11 | End: 2019-03-12

## 2019-03-11 RX ORDER — INSULIN GLARGINE 100 [IU]/ML
35 INJECTION, SOLUTION SUBCUTANEOUS AT BEDTIME
Qty: 0 | Refills: 0 | Status: DISCONTINUED | OUTPATIENT
Start: 2019-03-11 | End: 2019-03-12

## 2019-03-11 RX ORDER — OXYCODONE AND ACETAMINOPHEN 5; 325 MG/1; MG/1
2 TABLET ORAL EVERY 6 HOURS
Qty: 0 | Refills: 0 | Status: DISCONTINUED | OUTPATIENT
Start: 2019-03-11 | End: 2019-03-12

## 2019-03-11 RX ORDER — MORPHINE SULFATE 50 MG/1
4 CAPSULE, EXTENDED RELEASE ORAL
Qty: 0 | Refills: 0 | Status: DISCONTINUED | OUTPATIENT
Start: 2019-03-11 | End: 2019-03-12

## 2019-03-11 RX ORDER — ATORVASTATIN CALCIUM 80 MG/1
40 TABLET, FILM COATED ORAL AT BEDTIME
Qty: 0 | Refills: 0 | Status: DISCONTINUED | OUTPATIENT
Start: 2019-03-11 | End: 2019-03-12

## 2019-03-11 RX ORDER — INSULIN LISPRO 100/ML
VIAL (ML) SUBCUTANEOUS
Qty: 0 | Refills: 0 | Status: DISCONTINUED | OUTPATIENT
Start: 2019-03-11 | End: 2019-03-12

## 2019-03-11 RX ORDER — DOCUSATE SODIUM 100 MG
100 CAPSULE ORAL
Qty: 0 | Refills: 0 | Status: DISCONTINUED | OUTPATIENT
Start: 2019-03-11 | End: 2019-03-12

## 2019-03-11 RX ORDER — INSULIN LISPRO 100/ML
3 VIAL (ML) SUBCUTANEOUS
Qty: 0 | Refills: 0 | Status: DISCONTINUED | OUTPATIENT
Start: 2019-03-11 | End: 2019-03-12

## 2019-03-11 RX ORDER — HYDRALAZINE HCL 50 MG
25 TABLET ORAL EVERY 8 HOURS
Qty: 0 | Refills: 0 | Status: DISCONTINUED | OUTPATIENT
Start: 2019-03-11 | End: 2019-03-12

## 2019-03-11 RX ORDER — SODIUM CHLORIDE 9 MG/ML
1000 INJECTION INTRAMUSCULAR; INTRAVENOUS; SUBCUTANEOUS
Qty: 0 | Refills: 0 | Status: DISCONTINUED | OUTPATIENT
Start: 2019-03-11 | End: 2019-03-12

## 2019-03-11 RX ORDER — VANCOMYCIN HCL 1 G
1500 VIAL (EA) INTRAVENOUS ONCE
Qty: 0 | Refills: 0 | Status: COMPLETED | OUTPATIENT
Start: 2019-03-11 | End: 2019-03-11

## 2019-03-11 RX ORDER — ACETAMINOPHEN 500 MG
650 TABLET ORAL EVERY 6 HOURS
Qty: 0 | Refills: 0 | Status: DISCONTINUED | OUTPATIENT
Start: 2019-03-11 | End: 2019-03-12

## 2019-03-11 RX ORDER — OXYCODONE AND ACETAMINOPHEN 5; 325 MG/1; MG/1
1 TABLET ORAL EVERY 4 HOURS
Qty: 0 | Refills: 0 | Status: DISCONTINUED | OUTPATIENT
Start: 2019-03-11 | End: 2019-03-12

## 2019-03-11 RX ORDER — ONDANSETRON 8 MG/1
4 TABLET, FILM COATED ORAL ONCE
Qty: 0 | Refills: 0 | Status: DISCONTINUED | OUTPATIENT
Start: 2019-03-11 | End: 2019-03-12

## 2019-03-11 RX ADMIN — SODIUM CHLORIDE 50 MILLILITER(S): 9 INJECTION INTRAMUSCULAR; INTRAVENOUS; SUBCUTANEOUS at 15:23

## 2019-03-11 RX ADMIN — ISOSORBIDE DINITRATE 10 MILLIGRAM(S): 5 TABLET ORAL at 21:45

## 2019-03-11 RX ADMIN — ISOSORBIDE DINITRATE 10 MILLIGRAM(S): 5 TABLET ORAL at 06:00

## 2019-03-11 RX ADMIN — Medication 25 MILLIGRAM(S): at 14:46

## 2019-03-11 RX ADMIN — ATORVASTATIN CALCIUM 40 MILLIGRAM(S): 80 TABLET, FILM COATED ORAL at 21:45

## 2019-03-11 RX ADMIN — Medication 3: at 16:37

## 2019-03-11 RX ADMIN — OXYCODONE AND ACETAMINOPHEN 1 TABLET(S): 5; 325 TABLET ORAL at 18:53

## 2019-03-11 RX ADMIN — Medication 3 UNIT(S): at 16:37

## 2019-03-11 RX ADMIN — Medication 25 MILLIGRAM(S): at 06:01

## 2019-03-11 RX ADMIN — ISOSORBIDE DINITRATE 10 MILLIGRAM(S): 5 TABLET ORAL at 14:46

## 2019-03-11 RX ADMIN — Medication 25 MILLIGRAM(S): at 06:00

## 2019-03-11 RX ADMIN — Medication 40 MILLIGRAM(S): at 17:01

## 2019-03-11 RX ADMIN — Medication 40 MILLIGRAM(S): at 06:01

## 2019-03-11 RX ADMIN — Medication 3 UNIT(S): at 11:41

## 2019-03-11 RX ADMIN — SODIUM CHLORIDE 50 MILLILITER(S): 9 INJECTION INTRAMUSCULAR; INTRAVENOUS; SUBCUTANEOUS at 09:20

## 2019-03-11 RX ADMIN — Medication 100 MILLIGRAM(S): at 17:01

## 2019-03-11 RX ADMIN — Medication 3 UNIT(S): at 10:09

## 2019-03-11 RX ADMIN — Medication 1: at 10:10

## 2019-03-11 RX ADMIN — Medication 25 MILLIGRAM(S): at 21:45

## 2019-03-11 RX ADMIN — Medication 100 MILLIGRAM(S): at 06:00

## 2019-03-11 RX ADMIN — Medication 2: at 11:41

## 2019-03-11 RX ADMIN — OXYCODONE AND ACETAMINOPHEN 1 TABLET(S): 5; 325 TABLET ORAL at 14:46

## 2019-03-11 NOTE — BRIEF OPERATIVE NOTE - PROCEDURE
<<-----Click on this checkbox to enter Procedure Insertion, dialysis catheter, Tesio  03/11/2019    Active  ANUM

## 2019-03-11 NOTE — PROVIDER CONTACT NOTE (OTHER) - ACTION/TREATMENT ORDERED:
patient offered pain meds, but does not desire them at this time. will continue to monitor for s/s of distress or changes. JUNI Ku notified. no further intervention at this time.

## 2019-03-11 NOTE — PROVIDER CONTACT NOTE (OTHER) - SITUATION
patient c/o R foot tingling states "my foot feels cold" patients foot is warm to touch. no swelling/redness or edema noted.

## 2019-03-12 ENCOUNTER — TRANSCRIPTION ENCOUNTER (OUTPATIENT)
Age: 79
End: 2019-03-12

## 2019-03-12 VITALS — DIASTOLIC BLOOD PRESSURE: 48 MMHG | HEART RATE: 61 BPM | RESPIRATION RATE: 16 BRPM | SYSTOLIC BLOOD PRESSURE: 109 MMHG

## 2019-03-12 LAB
ALBUMIN SERPL ELPH-MCNC: 4.5 G/DL — SIGNIFICANT CHANGE UP (ref 3.5–5.2)
ALP SERPL-CCNC: 41 U/L — SIGNIFICANT CHANGE UP (ref 30–115)
ALT FLD-CCNC: 14 U/L — SIGNIFICANT CHANGE UP (ref 0–41)
ANION GAP SERPL CALC-SCNC: 22 MMOL/L — HIGH (ref 7–14)
AST SERPL-CCNC: 19 U/L — SIGNIFICANT CHANGE UP (ref 0–41)
BILIRUB SERPL-MCNC: 0.4 MG/DL — SIGNIFICANT CHANGE UP (ref 0.2–1.2)
BUN SERPL-MCNC: 128 MG/DL — CRITICAL HIGH (ref 10–20)
CALCIUM SERPL-MCNC: 8.2 MG/DL — LOW (ref 8.5–10.1)
CHLORIDE SERPL-SCNC: 93 MMOL/L — LOW (ref 98–110)
CO2 SERPL-SCNC: 24 MMOL/L — SIGNIFICANT CHANGE UP (ref 17–32)
CREAT SERPL-MCNC: 8.1 MG/DL — CRITICAL HIGH (ref 0.7–1.5)
GLUCOSE BLDC GLUCOMTR-MCNC: 181 MG/DL — HIGH (ref 70–99)
GLUCOSE BLDC GLUCOMTR-MCNC: 215 MG/DL — HIGH (ref 70–99)
GLUCOSE SERPL-MCNC: 174 MG/DL — HIGH (ref 70–99)
HBV SURFACE AB SER-ACNC: SIGNIFICANT CHANGE UP
HCT VFR BLD CALC: 38.7 % — LOW (ref 42–52)
HCV AB S/CO SERPL IA: 0.13 S/CO — SIGNIFICANT CHANGE UP (ref 0–0.79)
HCV AB SERPL-IMP: SIGNIFICANT CHANGE UP
HGB BLD-MCNC: 12.9 G/DL — LOW (ref 14–18)
MCHC RBC-ENTMCNC: 29.1 PG — SIGNIFICANT CHANGE UP (ref 27–31)
MCHC RBC-ENTMCNC: 33.3 G/DL — SIGNIFICANT CHANGE UP (ref 32–37)
MCV RBC AUTO: 87.4 FL — SIGNIFICANT CHANGE UP (ref 80–94)
NRBC # BLD: 0 /100 WBCS — SIGNIFICANT CHANGE UP (ref 0–0)
PHOSPHATE SERPL-MCNC: 7.8 MG/DL — HIGH (ref 2.1–4.9)
PLATELET # BLD AUTO: 171 K/UL — SIGNIFICANT CHANGE UP (ref 130–400)
POTASSIUM SERPL-MCNC: 4 MMOL/L — SIGNIFICANT CHANGE UP (ref 3.5–5)
POTASSIUM SERPL-SCNC: 4 MMOL/L — SIGNIFICANT CHANGE UP (ref 3.5–5)
PROT SERPL-MCNC: 6.7 G/DL — SIGNIFICANT CHANGE UP (ref 6–8)
RBC # BLD: 4.43 M/UL — LOW (ref 4.7–6.1)
RBC # FLD: 13.2 % — SIGNIFICANT CHANGE UP (ref 11.5–14.5)
SODIUM SERPL-SCNC: 139 MMOL/L — SIGNIFICANT CHANGE UP (ref 135–146)
WBC # BLD: 10.53 K/UL — SIGNIFICANT CHANGE UP (ref 4.8–10.8)
WBC # FLD AUTO: 10.53 K/UL — SIGNIFICANT CHANGE UP (ref 4.8–10.8)

## 2019-03-12 RX ORDER — INSULIN GLARGINE 100 [IU]/ML
15 INJECTION, SOLUTION SUBCUTANEOUS ONCE
Qty: 0 | Refills: 0 | Status: COMPLETED | OUTPATIENT
Start: 2019-03-12 | End: 2019-03-12

## 2019-03-12 RX ORDER — METOPROLOL TARTRATE 50 MG
1 TABLET ORAL
Qty: 0 | Refills: 0 | COMMUNITY

## 2019-03-12 RX ORDER — FUROSEMIDE 40 MG
1 TABLET ORAL
Qty: 0 | Refills: 0 | COMMUNITY

## 2019-03-12 RX ADMIN — Medication 25 MILLIGRAM(S): at 05:47

## 2019-03-12 RX ADMIN — Medication 25 MILLIGRAM(S): at 13:41

## 2019-03-12 RX ADMIN — Medication 2: at 12:33

## 2019-03-12 RX ADMIN — Medication 3 UNIT(S): at 08:35

## 2019-03-12 RX ADMIN — OXYCODONE AND ACETAMINOPHEN 1 TABLET(S): 5; 325 TABLET ORAL at 13:51

## 2019-03-12 RX ADMIN — OXYCODONE AND ACETAMINOPHEN 1 TABLET(S): 5; 325 TABLET ORAL at 03:35

## 2019-03-12 RX ADMIN — OXYCODONE AND ACETAMINOPHEN 1 TABLET(S): 5; 325 TABLET ORAL at 03:03

## 2019-03-12 RX ADMIN — Medication 100 MILLIGRAM(S): at 05:46

## 2019-03-12 RX ADMIN — ISOSORBIDE DINITRATE 10 MILLIGRAM(S): 5 TABLET ORAL at 13:41

## 2019-03-12 RX ADMIN — OXYCODONE AND ACETAMINOPHEN 1 TABLET(S): 5; 325 TABLET ORAL at 10:49

## 2019-03-12 RX ADMIN — ISOSORBIDE DINITRATE 10 MILLIGRAM(S): 5 TABLET ORAL at 05:46

## 2019-03-12 RX ADMIN — INSULIN GLARGINE 15 UNIT(S): 100 INJECTION, SOLUTION SUBCUTANEOUS at 00:13

## 2019-03-12 RX ADMIN — Medication 25 MILLIGRAM(S): at 05:46

## 2019-03-12 RX ADMIN — Medication 3 UNIT(S): at 12:32

## 2019-03-12 RX ADMIN — Medication 1: at 08:35

## 2019-03-12 RX ADMIN — Medication 40 MILLIGRAM(S): at 05:47

## 2019-03-12 NOTE — DISCHARGE NOTE NURSING/CASE MANAGEMENT/SOCIAL WORK - NSDCPEPT PROEDHF_GEN_ALL_CORE
Monitor weight daily/Low salt diet/Report signs and symptoms to primary care provider/Activities as tolerated/Call primary care provider for follow up after discharge

## 2019-03-12 NOTE — PROGRESS NOTE ADULT - ASSESSMENT
KIANA   - Cr 4 (2/27/19) --> 9.2   - UA bland except for proteinuria   - nl renal sono   - may be just prerenal from diuretics   - not uremic, lytes fine, not overloaded  CKD 4  CHF   MR  CAD / PCI  HTN  DM    plan:  -------------------------------------------------------    no indication for emergent dialysis  hold lasix 24 hours  very gentle fluids x 24 hours  monitor UOP  f/u labs  if worsening dyspnea --> lasix 80mg ivp prn  if no improvement in renal function or worsening CHF, then will ask surgery to place access and initiate dialysis  will follow closely  nephrology inputs appreciated
#ESRD      Tesio done    await dialysis
Impression:  77 y/o male with ESRD / ARF needing Hemodialysis --> For Tesio placement tomorrow with Dr. Cox.        Plan / Orders:  - NPO after midnight except medications.  - Consent to obtained by Dr. Cox in morning.  - Cardiology consult reviewed --> Risk tesio low.  Note he has probable severe haris and does not use c-pap. He also has a  AICD. Continue meds.
KIANA   - better with gentle fluids  CKD 4  CHF   MR  CAD / PCI  HTN  DM  cough    plan:    off diuretics  dc fluids  f/u labs  no need for urgent hd  if renal function improves some more, will dc home and have AVF creation as oupt with elective HD start  will follow closely  spoke with pt and wife in great detail  full code
KIANA on CKD 4 --> now ESRD  CHF   MR  CAD / PCI  HTN  DM  cough    plan:    HD 2nd run today  dc fluids  dc lasix  DC home today once HD slot at [facility] confirmed (TTS 3rd shift?)  cont all prior oupt meds
KIANA on CKD 4 --> now ESRD  CHF   MR  CAD / PCI  HTN  DM  cough    plan:    cont lasix 40mg iv q12h  HD 1st run today - 3K / opi 160 / UF 0.5kg off /   next HD tomorrow  obtain oupt HD slot - pt requesting L[facility] Tapia (requests 2nd shift)  dc plan to home once outpt HD slot availabe
KIANA on CKD 4 --> now ESRD - discussed renal replacement options with patient.  PT now agreeable, though reluctantly to initiate HD.  Risks and benefits of dialysis including death discussed with pt in detail.    CHF   MR  CAD / PCI  HTN  DM  cough    plan:    resume lasix 40mg iv q12h  HD initiation once HD catheter in place  Dr. Cox to place Tesio catheter Monday AM  Will initiate HD once access available  If pt decompensates this weekend, surgery will place bedside udall for urgent dialysis  f/u labs  cardio eval for cp
await TESIO placement     than dialysis new
going for dialysis today
need dialysis     d/w renal
surgery consult noted --Tesio placement     start dialysis

## 2019-03-12 NOTE — DISCHARGE NOTE PROVIDER - NSDCCPCAREPLAN_GEN_ALL_CORE_FT
PRINCIPAL DISCHARGE DIAGNOSIS  Problem: Renal insufficiency  Assessment and Plan of Treatment: Follow up with nephrologist Dr Florian HERNANDEZ scheduled for friday 3/15/19      SECONDARY DISCHARGE DIAGNOSES  Problem: Renal failure  Assessment and Plan of Treatment:     Problem: Dehydration  Assessment and Plan of Treatment:

## 2019-03-12 NOTE — DISCHARGE NOTE NURSING/CASE MANAGEMENT/SOCIAL WORK - NSDCDPATPORTLINK_GEN_ALL_CORE
You can access the [facility] Patient Portal, offered by [facility], by registering with the following website: [email or web address] [email or web address]

## 2019-03-12 NOTE — PROGRESS NOTE ADULT - SUBJECTIVE AND OBJECTIVE BOX
SUBJECTIVE:    Patient is a 78y old Male who presents with a chief complaint of arf (06 Mar 2019 14:21)    Currently admitted to medicine with the primary diagnosis of Renal insufficiency     Today is hospital day 1d. This morning he is resting comfortably in bed and reports no new issues or overnight events.     PAST MEDICAL & SURGICAL HISTORY  Mitral valve regurgitation  CHF (congestive heart failure)  BPH (Benign Prostatic Hyperplasia)  GERD (gastroesophageal reflux disease)  Sleep apnea: does not use machine  Renal insufficiency  HTN - Hypertension  H/O hyperlipidemia  CAD (coronary artery disease)  Diabetes mellitus  History of PTCA: with stents 10 years ago ([facility])  CAD (Coronary Artery Disease)  Hypercholesterolemia  Diabetes Mellitus Type II  HTN (Hypertension)  S/P appendectomy  S/P primary angioplasty with coronary stent: 6-7 stents last one in 10/12  S/P tonsillectomy  S/P knee surgery: b/l arthroscopic    SOCIAL HISTORY:  Negative for smoking/alcohol/drug use.     ALLERGIES:  No Known Allergies    MEDICATIONS:  STANDING MEDICATIONS  atorvastatin 40 milliGRAM(s) Oral at bedtime  docusate sodium 100 milliGRAM(s) Oral two times a day  hydrALAZINE 25 milliGRAM(s) Oral every 8 hours  isosorbide   dinitrate Tablet (ISORDIL) 10 milliGRAM(s) Oral every 8 hours  metoprolol succinate ER 25 milliGRAM(s) Oral daily  sodium chloride 0.9%. 1000 milliLiter(s) IV Continuous <Continuous>  sodium chloride 0.9%. 1000 milliLiter(s) IV Continuous <Continuous>    PRN MEDICATIONS    VITALS:   T(F): 96  HR: 70  BP: 151/87  RR: 16  SpO2: 98%    LABS:                        13.5   10.03 )-----------( 151      ( 07 Mar 2019 06:37 )             39.8     03-07    138  |  93<L>  |  138<HH>  ----------------------------<  125<H>  4.3   |  24  |  8.8<HH>    Ca    8.0<L>      07 Mar 2019 06:37  Mg     2.9     03-06    TPro  7.6  /  Alb  5.3<H>  /  TBili  0.4  /  DBili  x   /  AST  20  /  ALT  17  /  AlkPhos  65  03-06      Urinalysis Basic - ( 06 Mar 2019 10:11 )    Color: Yellow / Appearance: Clear / S.015 / pH: x  Gluc: x / Ketone: Negative  / Bili: Negative / Urobili: 0.2 mg/dL   Blood: x / Protein: 100 mg/dL / Nitrite: Negative   Leuk Esterase: Negative / RBC: 1-2 /HPF / WBC 3-5 /HPF   Sq Epi: x / Non Sq Epi: Occasional /HPF / Bacteria: x                RADIOLOGY:    PHYSICAL EXAM:  GEN: No acute distress  LUNGS: Clear to auscultation bilaterally   HEART: Regular  ABD: Soft, non-tender, non-distended.  EXT: NC/NC/NE/2+PP/ELDRIDGE/Skin Intact.   NEURO: AAOX3    Intravenous access:   NG tube:   Gomez Catheter:
NEPHROLOGY FOLLOW UP NOTE    pt seen and examined  renal function worse  c/o insomnia, anxiety, left flank discomfort, dry throat, bad taste in mouth    PAST MEDICAL & SURGICAL HISTORY:  Mitral valve regurgitation  CHF (congestive heart failure)  BPH (Benign Prostatic Hyperplasia)  GERD (gastroesophageal reflux disease)  Sleep apnea: does not use machine  Renal insufficiency  HTN - Hypertension  H/O hyperlipidemia  CAD (coronary artery disease)  Diabetes mellitus  History of PTCA: with stents 10 years ago ([facility])  CAD (Coronary Artery Disease)  Hypercholesterolemia  Diabetes Mellitus Type II  HTN (Hypertension)  S/P appendectomy  S/P primary angioplasty with coronary stent: 6-7 stents last one in 10/12  S/P tonsillectomy  S/P knee surgery: b/l arthroscopic    Allergies:  No Known Allergies    Home Medications Reviewed    SOCIAL HISTORY:  Denies ETOH,Smoking,   FAMILY HISTORY:        REVIEW OF SYSTEMS:  All other review of systems is negative unless indicated above.      PHYSICAL EXAM:  agitated  NAD  awake and alert  moist mm  no jvd  cta bl  rrr  soft, nt   no edema  no rash    Hospital Medications:   MEDICATIONS  (STANDING):  atorvastatin 40 milliGRAM(s) Oral at bedtime  dextrose 5%. 1000 milliLiter(s) (50 mL/Hr) IV Continuous <Continuous>  dextrose 50% Injectable 12.5 Gram(s) IV Push once  dextrose 50% Injectable 25 Gram(s) IV Push once  dextrose 50% Injectable 25 Gram(s) IV Push once  docusate sodium 100 milliGRAM(s) Oral two times a day  furosemide   Injectable 40 milliGRAM(s) IV Push two times a day  hydrALAZINE 25 milliGRAM(s) Oral every 8 hours  insulin lispro (HumaLOG) corrective regimen sliding scale   SubCutaneous three times a day before meals  isosorbide   dinitrate Tablet (ISORDIL) 10 milliGRAM(s) Oral every 8 hours  metoprolol succinate ER 25 milliGRAM(s) Oral daily        VITALS:  T(F): 96.6 (19 @ 05:39), Max: 97.7 (19 @ 21:00)  HR: 73 (19 @ 05:39)  BP: 158/85 (19 @ 05:39)  RR: 18 (19 @ 05:39)  SpO2: 96% (19 @ 08:47)  Wt(kg): --     @ 07:01  -   @ 07:00  --------------------------------------------------------  IN: 840 mL / OUT: 1025 mL / NET: -185 mL     @ 07:01  -   @ 07:00  --------------------------------------------------------  IN: 640 mL / OUT: 1850 mL / NET: -1210 mL      Height (cm): 175.26 ( @ 08:53)  Weight (kg): 111.7 ( @ 08:53)  BMI (kg/m2): 36.4 ( @ 08:53)  BSA (m2): 2.26 ( @ 08:53)    LABS:      135  |  92<L>  |  144  ----------------------------<  309<H>  4.4   |  25  |  9.3<HH>    Ca    8.1<L>      07 Mar 2019 21:31  Phos  8.2       Mg     2.9         TPro  6.4  /  Alb  4.4  /  TBili  0.3  /  DBili      /  AST  17  /  ALT  14  /  AlkPhos  61  07                          13.5   10.03 )-----------( 151      ( 07 Mar 2019 06:37 )             39.8       Urine Studies:  Urinalysis Basic - ( 06 Mar 2019 10:11 )    Color: Yellow / Appearance: Clear / S.015 / pH:   Gluc:  / Ketone: Negative  / Bili: Negative / Urobili: 0.2 mg/dL   Blood:  / Protein: 100 mg/dL / Nitrite: Negative   Leuk Esterase: Negative / RBC: 1-2 /HPF / WBC 3-5 /HPF   Sq Epi:  / Non Sq Epi: Occasional /HPF / Bacteria:           RADIOLOGY & ADDITIONAL STUDIES:
NEPHROLOGY FOLLOW UP NOTE    pt seen and examined  spoke with pt and wife in detail  c/o cough and dry throat  no sob  voiding well    PAST MEDICAL & SURGICAL HISTORY:  Mitral valve regurgitation  CHF (congestive heart failure)  BPH (Benign Prostatic Hyperplasia)  GERD (gastroesophageal reflux disease)  Sleep apnea: does not use machine  Renal insufficiency  HTN - Hypertension  H/O hyperlipidemia  CAD (coronary artery disease)  Diabetes mellitus  History of PTCA: with stents 10 years ago ([facility])  CAD (Coronary Artery Disease)  Hypercholesterolemia  Diabetes Mellitus Type II  HTN (Hypertension)  S/P appendectomy  S/P primary angioplasty with coronary stent: 6-7 stents last one in 10/12  S/P tonsillectomy  S/P knee surgery: b/l arthroscopic    Allergies:  No Known Allergies    Home Medications Reviewed    SOCIAL HISTORY:  Denies ETOH,Smoking,   FAMILY HISTORY:        REVIEW OF SYSTEMS:  All other review of systems is negative unless indicated above.    advance care planning and advance care directives reviewed and discussed    PHYSICAL EXAM:  NAD  awake and alert  moist mm  no jvd  cta bl  rrr  soft, nt   no edema  no rash    Hospital Medications:   MEDICATIONS  (STANDING):  atorvastatin 40 milliGRAM(s) Oral at bedtime  dextrose 5%. 1000 milliLiter(s) (50 mL/Hr) IV Continuous <Continuous>  dextrose 50% Injectable 12.5 Gram(s) IV Push once  dextrose 50% Injectable 25 Gram(s) IV Push once  dextrose 50% Injectable 25 Gram(s) IV Push once  docusate sodium 100 milliGRAM(s) Oral two times a day  hydrALAZINE 25 milliGRAM(s) Oral every 8 hours  insulin lispro (HumaLOG) corrective regimen sliding scale   SubCutaneous three times a day before meals  isosorbide   dinitrate Tablet (ISORDIL) 10 milliGRAM(s) Oral every 8 hours  metoprolol succinate ER 25 milliGRAM(s) Oral daily        VITALS:  T(F): 96 (19 @ 14:06), Max: 97.6 (19 @ 21:00)  HR: 75 (19 @ 14:06)  BP: 164/74 (19 @ 14:06)  RR: 16 (19 @ 14:06)  SpO2: 96% (19 @ 08:00)  Wt(kg): --     @ 07: @ 07:00  --------------------------------------------------------  IN: 840 mL / OUT: 1025 mL / NET: -185 mL     @ 07:01  -   @ 19:32  --------------------------------------------------------  IN: 400 mL / OUT: 650 mL / NET: -250 mL      Height (cm): 175.26 ( 09:52)  Weight (kg): 111.7 ( 09:52)  BMI (kg/m2): 36.4 ( 09:52)  BSA (m2): 2.26 ( 09:52)    LABS:      138  |  93<L>  |  138<HH>  ----------------------------<  125<H>  4.3   |  24  |  8.8<HH>    Ca    8.0<L>      07 Mar 2019 06:37  Mg     2.9         TPro  7.6  /  Alb  5.3<H>  /  TBili  0.4  /  DBili      /  AST  20  /  ALT  17  /  AlkPhos  65                            13.5   10.03 )-----------( 151      ( 07 Mar 2019 06:37 )             39.8       Urine Studies:  Urinalysis Basic - ( 06 Mar 2019 10:11 )    Color: Yellow / Appearance: Clear / S.015 / pH:   Gluc:  / Ketone: Negative  / Bili: Negative / Urobili: 0.2 mg/dL   Blood:  / Protein: 100 mg/dL / Nitrite: Negative   Leuk Esterase: Negative / RBC: 1-2 /HPF / WBC 3-5 /HPF   Sq Epi:  / Non Sq Epi: Occasional /HPF / Bacteria:           RADIOLOGY & ADDITIONAL STUDIES:
NEPHROLOGY FOLLOW UP NOTE    pt seen and examined  still c/o cough  s/p ACW tesio catheter placement this AM  spoke with surgery    PAST MEDICAL & SURGICAL HISTORY:  Mitral valve regurgitation  CHF (congestive heart failure)  BPH (Benign Prostatic Hyperplasia)  GERD (gastroesophageal reflux disease)  Sleep apnea: does not use machine  Renal insufficiency  HTN - Hypertension  H/O hyperlipidemia  CAD (coronary artery disease)  Diabetes mellitus  History of PTCA: with stents 10 years ago ([facility]  CAD (Coronary Artery Disease)  Hypercholesterolemia  Diabetes Mellitus Type II  HTN (Hypertension)  S/P appendectomy  S/P primary angioplasty with coronary stent: 6-7 stents last one in 10/12  S/P tonsillectomy  S/P knee surgery: b/l arthroscopic    Allergies:  No Known Allergies    Home Medications Reviewed    SOCIAL HISTORY:  Denies ETOH,Smoking,   FAMILY HISTORY:        REVIEW OF SYSTEMS:  All other review of systems is negative unless indicated above.      PHYSICAL EXAM:  agitated  NAD  awake and alert  moist mm  no jvd  cta bl  rrr  soft, nt   no edema  no rash    Hospital Medications:   MEDICATIONS  (STANDING):  atorvastatin 40 milliGRAM(s) Oral at bedtime  docusate sodium 100 milliGRAM(s) Oral two times a day  furosemide   Injectable 40 milliGRAM(s) IV Push two times a day  hydrALAZINE 25 milliGRAM(s) Oral every 8 hours  insulin glargine Injectable (LANTUS) 35 Unit(s) SubCutaneous at bedtime  insulin lispro (HumaLOG) corrective regimen sliding scale   SubCutaneous three times a day before meals  insulin lispro Injectable (HumaLOG) 3 Unit(s) SubCutaneous three times a day before meals  isosorbide   dinitrate Tablet (ISORDIL) 10 milliGRAM(s) Oral every 8 hours  metoprolol succinate ER 25 milliGRAM(s) Oral daily  sodium chloride 0.9%. 1000 milliLiter(s) (50 mL/Hr) IV Continuous <Continuous>        VITALS:  T(F): 96.8 (19 @ 10:21), Max: 98 (19 @ 09:40)  HR: 78 (19 @ 10:21)  BP: 115/80 (19 @ 10:21)  RR: 76 (19 @ 10:21)  SpO2: 95% (19 @ 10:21)  Wt(kg): --     @ 06:  -  03-10 @ 07:00  --------------------------------------------------------  IN: 480 mL / OUT: 1425 mL / NET: -945 mL    03-10 @ 07:01  -   @ 07:00  --------------------------------------------------------  IN: 240 mL / OUT: 1000 mL / NET: -760 mL     @ 07:  -   @ 10:40  --------------------------------------------------------  IN: 210 mL / OUT: 0 mL / NET: 210 mL      Height (cm): 175.26 ( 10:31)  Weight (kg): 111.7 ( 10:31)  BMI (kg/m2): 36.4 ( 10:31)  BSA (m2): 2.26 ( 10:31)    LABS:  03-10    136  |  89<L>  |  151<HH>  ----------------------------<  256<H>  3.8   |  24  |  8.9<HH>    Ca    8.8      10 Mar 2019 06:21  Phos  8.7     -10  Mg     2.5     03-10                            13.3   10.81 )-----------( 174      ( 10 Mar 2019 06:21 )             39.1       Urine Studies:  Urinalysis Basic - ( 06 Mar 2019 10:11 )    Color: Yellow / Appearance: Clear / S.015 / pH:   Gluc:  / Ketone: Negative  / Bili: Negative / Urobili: 0.2 mg/dL   Blood:  / Protein: 100 mg/dL / Nitrite: Negative   Leuk Esterase: Negative / RBC: 1-2 /HPF / WBC 3-5 /HPF   Sq Epi:  / Non Sq Epi: Occasional /HPF / Bacteria:           RADIOLOGY & ADDITIONAL STUDIES:
NEPHROLOGY FOLLOW UP NOTE    tesio placed yesterday  HD 1st run yesterday  less cough today  no cp, sob, fever    PAST MEDICAL & SURGICAL HISTORY:  Mitral valve regurgitation  CHF (congestive heart failure)  BPH (Benign Prostatic Hyperplasia)  GERD (gastroesophageal reflux disease)  Sleep apnea: does not use machine  Renal insufficiency  HTN - Hypertension  H/O hyperlipidemia  CAD (coronary artery disease)  Diabetes mellitus  History of PTCA: with stents 10 years ago ([facility])  CAD (Coronary Artery Disease)  Hypercholesterolemia  Diabetes Mellitus Type II  HTN (Hypertension)  S/P appendectomy  S/P primary angioplasty with coronary stent: 6-7 stents last one in 10/12  S/P tonsillectomy  S/P knee surgery: b/l arthroscopic    Allergies:  No Known Allergies    Home Medications Reviewed    SOCIAL HISTORY:  Denies ETOH,Smoking,   FAMILY HISTORY:        REVIEW OF SYSTEMS:  All other review of systems is negative unless indicated above.      PHYSICAL EXAM:  agitated  NAD  awake and alert  moist mm  no jvd  cta bl, + ACW tesio  rrr  soft, nt   no edema  no rash    Hospital Medications:   MEDICATIONS  (STANDING):  atorvastatin 40 milliGRAM(s) Oral at bedtime  docusate sodium 100 milliGRAM(s) Oral two times a day  hydrALAZINE 25 milliGRAM(s) Oral every 8 hours  insulin glargine Injectable (LANTUS) 35 Unit(s) SubCutaneous at bedtime  insulin lispro (HumaLOG) corrective regimen sliding scale   SubCutaneous three times a day before meals  insulin lispro Injectable (HumaLOG) 3 Unit(s) SubCutaneous three times a day before meals  isosorbide   dinitrate Tablet (ISORDIL) 10 milliGRAM(s) Oral every 8 hours  metoprolol succinate ER 25 milliGRAM(s) Oral daily        VITALS:  T(F): 97.2 (19 @ 05:00), Max: 98 (19 @ 09:40)  HR: 86 (19 @ 05:00)  BP: 136/73 (19 @ 05:00)  RR: 16 (19 @ 05:00)  SpO2: 95% (19 @ 10:21)  Wt(kg): --    03-10 @ 07:01  -   @ 07:00  --------------------------------------------------------  IN: 240 mL / OUT: 1000 mL / NET: -760 mL     @ 07:01  -   @ 07:00  --------------------------------------------------------  IN: 450 mL / OUT: 1300 mL / NET: -850 mL      Height (cm): 175.26 ( @ 10:31)  Weight (kg): 111.7 ( @ 10:31)  BMI (kg/m2): 36.4 ( 10:31)  BSA (m2): 2.26 ( @ 10:31)    LABS:            Urine Studies:  Urinalysis Basic - ( 06 Mar 2019 10:11 )    Color: Yellow / Appearance: Clear / S.015 / pH:   Gluc:  / Ketone: Negative  / Bili: Negative / Urobili: 0.2 mg/dL   Blood:  / Protein: 100 mg/dL / Nitrite: Negative   Leuk Esterase: Negative / RBC: 1-2 /HPF / WBC 3-5 /HPF   Sq Epi:  / Non Sq Epi: Occasional /HPF / Bacteria:           RADIOLOGY & ADDITIONAL STUDIES:
Pre-operative note:    Patient feels well and denies complaints.   Patient aware of planned Tesio catheter placement for tomorrow by Dr. Cox.      Pre-operative diagnosis:  ESRD / ARF / needs Hemodialysis.    Planned Procedure:  Tesio catheter placement on 3/11/19.    Surgeon:  Dr. Cox.    Labs:                          13.3   10.81 )-----------( 174      ( 10 Mar 2019 06:21 )             39.1     03-10    136  |  89<L>  |  151<HH>  ---------------------------------<  256<H>  3.8   |  24  |        8.9<HH>    Ca    8.8      10 Mar 2019 06:21  Phos  8.7     03-10  Mg     2.5     03-10    TPro  6.8  /  Alb  4.6  /  TBili  0.4  /  DBili  x   /  AST  17  /  ALT  14  /  Alk Phos  49  03-09      PT/INR - ( 10 Mar 2019 06:21 )   PT: 12.80 sec;   INR: 1.11 ratio    PTT - ( 10 Mar 2019 06:21 )       PTT:33.1 sec        CXR:  Xray Chest 2 Views PA/Lat (19 @ 10:18)   EXAM:  XR CHEST PA LAT 2V            PROCEDURE DATE:  2019        INTERPRETATION:  Clinical History / Reason for exam: Renal failure.    Comparison : Chest radiograph 2019.    Technique/Positioning: Satisfactory.    Findings:    Support devices: Stable left chest wall ICD.    Cardiac/mediastinum/hilum: Stable.    Lung parenchyma/Pleura: Within normal limits.    Skeleton/soft tissues: Stable.    Impression:      No radiographic evidence of acute cardiopulmonary disease.            EK Lead ECG (19 @ 10:24)   Ventricular Rate 74 BPM    Atrial Rate 74 BPM    P-R Interval 192 ms    QRS Duration 114 ms    Q-T Interval 394 ms    QTC Calculation(Bezet) 437 ms    P Axis 34 degrees    R Axis 55 degrees    T Axis 116 degrees    Diagnosis Line Sinus rhythm with Premature supraventricular complexes  Incomplete right bundle branch block  T wave abnormality, consider inferior ischemia  Abnormal ECG    Confirmed by GRABIEL AVALOS MD ([id number]) on 3/6/2019 3:04:57 PM
SUBJECTIVE:    Patient is a 78y old Male who presents with a chief complaint of arf (07 Mar 2019 19:31)    Currently admitted to medicine with the primary diagnosis of Renal insufficiency     Today is hospital day 2d. This morning he is resting comfortably in bed and reports no new issues or overnight events.     PAST MEDICAL & SURGICAL HISTORY  Mitral valve regurgitation  CHF (congestive heart failure)  BPH (Benign Prostatic Hyperplasia)  GERD (gastroesophageal reflux disease)  Sleep apnea: does not use machine  Renal insufficiency  HTN - Hypertension  H/O hyperlipidemia  CAD (coronary artery disease)  Diabetes mellitus  History of PTCA: with stents 10 years ago ([facility])  CAD (Coronary Artery Disease)  Hypercholesterolemia  Diabetes Mellitus Type II  HTN (Hypertension)  S/P appendectomy  S/P primary angioplasty with coronary stent: 6-7 stents last one in 10/12  S/P tonsillectomy  S/P knee surgery: b/l arthroscopic    SOCIAL HISTORY:  Negative for smoking/alcohol/drug use.     ALLERGIES:  No Known Allergies    MEDICATIONS:  STANDING MEDICATIONS  atorvastatin 40 milliGRAM(s) Oral at bedtime  dextrose 5%. 1000 milliLiter(s) IV Continuous <Continuous>  dextrose 50% Injectable 12.5 Gram(s) IV Push once  dextrose 50% Injectable 25 Gram(s) IV Push once  dextrose 50% Injectable 25 Gram(s) IV Push once  docusate sodium 100 milliGRAM(s) Oral two times a day  hydrALAZINE 25 milliGRAM(s) Oral every 8 hours  insulin lispro (HumaLOG) corrective regimen sliding scale   SubCutaneous three times a day before meals  isosorbide   dinitrate Tablet (ISORDIL) 10 milliGRAM(s) Oral every 8 hours  metoprolol succinate ER 25 milliGRAM(s) Oral daily    PRN MEDICATIONS  dextrose 40% Gel 15 Gram(s) Oral once PRN  glucagon  Injectable 1 milliGRAM(s) IntraMuscular once PRN    VITALS:   T(F): 96.6  HR: 73  BP: 158/85  RR: 18  SpO2: 96%    LABS:                        13.5   10.03 )-----------( 151      ( 07 Mar 2019 06:37 )             39.8     03-07    135  |  92<L>  |  144  ----------------------------<  309<H>  4.4   |  25  |  9.3<HH>    Ca    8.1<L>      07 Mar 2019 21:31  Phos  8.2     -  Mg     2.9     -06    TPro  6.4  /  Alb  4.4  /  TBili  0.3  /  DBili  x   /  AST  17  /  ALT  14  /  AlkPhos  61  03-07      Urinalysis Basic - ( 06 Mar 2019 10:11 )    Color: Yellow / Appearance: Clear / S.015 / pH: x  Gluc: x / Ketone: Negative  / Bili: Negative / Urobili: 0.2 mg/dL   Blood: x / Protein: 100 mg/dL / Nitrite: Negative   Leuk Esterase: Negative / RBC: 1-2 /HPF / WBC 3-5 /HPF   Sq Epi: x / Non Sq Epi: Occasional /HPF / Bacteria: x                RADIOLOGY:    PHYSICAL EXAM:  GEN: No acute distress  LUNGS: Clear to auscultation bilaterally   HEART: Regular  ABD: Soft, non-tender, non-distended.  EXT: NC/NC/NE/2+PP/ELDRIDGE/Skin Intact.   NEURO: AAOX3    Intravenous access:   NG tube:   Gomez Catheter:
SUBJECTIVE:    Patient is a 78y old Male who presents with a chief complaint of arf (08 Mar 2019 13:33)    Currently admitted to medicine with the primary diagnosis of Renal insufficiency     Today is hospital day 3d. This morning he is resting comfortably in bed and reports no new issues or overnight events.     PAST MEDICAL & SURGICAL HISTORY  Mitral valve regurgitation  CHF (congestive heart failure)  BPH (Benign Prostatic Hyperplasia)  GERD (gastroesophageal reflux disease)  Sleep apnea: does not use machine  Renal insufficiency  HTN - Hypertension  H/O hyperlipidemia  CAD (coronary artery disease)  Diabetes mellitus  History of PTCA: with stents 10 years ago ([facility])  CAD (Coronary Artery Disease)  Hypercholesterolemia  Diabetes Mellitus Type II  HTN (Hypertension)  S/P appendectomy  S/P primary angioplasty with coronary stent: 6-7 stents last one in 10/12  S/P tonsillectomy  S/P knee surgery: b/l arthroscopic    SOCIAL HISTORY:  Negative for smoking/alcohol/drug use.     ALLERGIES:  No Known Allergies    MEDICATIONS:  STANDING MEDICATIONS  atorvastatin 40 milliGRAM(s) Oral at bedtime  dextrose 5%. 1000 milliLiter(s) IV Continuous <Continuous>  dextrose 50% Injectable 12.5 Gram(s) IV Push once  dextrose 50% Injectable 25 Gram(s) IV Push once  dextrose 50% Injectable 25 Gram(s) IV Push once  docusate sodium 100 milliGRAM(s) Oral two times a day  furosemide   Injectable 40 milliGRAM(s) IV Push two times a day  hydrALAZINE 25 milliGRAM(s) Oral every 8 hours  insulin lispro (HumaLOG) corrective regimen sliding scale   SubCutaneous three times a day before meals  isosorbide   dinitrate Tablet (ISORDIL) 10 milliGRAM(s) Oral every 8 hours  metoprolol succinate ER 25 milliGRAM(s) Oral daily    PRN MEDICATIONS  ALPRAZolam 0.25 milliGRAM(s) Oral every 12 hours PRN  dextrose 40% Gel 15 Gram(s) Oral once PRN  glucagon  Injectable 1 milliGRAM(s) IntraMuscular once PRN    VITALS:   T(F): 96.4  HR: 78  BP: 126/74  RR: 18  SpO2: 96%    LABS:                        13.3   10.59 )-----------( 168      ( 09 Mar 2019 06:26 )             40.0     03-08    140  |  96<L>  |  142<HH>  ----------------------------<  192<H>  4.4   |  26  |  9.0<HH>    Ca    8.1<L>      08 Mar 2019 07:21  Phos  7.6     03-08    TPro  6.2  /  Alb  4.4  /  TBili  0.5  /  DBili  x   /  AST  18  /  ALT  14  /  AlkPhos  42  03-08    PT/INR - ( 08 Mar 2019 12:12 )   PT: 13.00 sec;   INR: 1.13 ratio                       RADIOLOGY:    PHYSICAL EXAM:  GEN: No acute distress  LUNGS: Clear to auscultation bilaterally   HEART: Regular  ABD: Soft, non-tender, non-distended.  EXT: NC/NC/NE/2+PP/ELDRIDGE/Skin Intact.   NEURO: AAOX3    Intravenous access:   NG tube:   Gomez Catheter:
SUBJECTIVE:    Patient is a 78y old Male who presents with a chief complaint of arf (09 Mar 2019 08:33)    Currently admitted to medicine with the primary diagnosis of Renal insufficiency     Today is hospital day 4d. This morning he is resting comfortably in bed and reports no new issues or overnight events.     PAST MEDICAL & SURGICAL HISTORY  Mitral valve regurgitation  CHF (congestive heart failure)  BPH (Benign Prostatic Hyperplasia)  GERD (gastroesophageal reflux disease)  Sleep apnea: does not use machine  Renal insufficiency  HTN - Hypertension  H/O hyperlipidemia  CAD (coronary artery disease)  Diabetes mellitus  History of PTCA: with stents 10 years ago ([facility])  CAD (Coronary Artery Disease)  Hypercholesterolemia  Diabetes Mellitus Type II  HTN (Hypertension)  S/P appendectomy  S/P primary angioplasty with coronary stent: 6-7 stents last one in 10/12  S/P tonsillectomy  S/P knee surgery: b/l arthroscopic    SOCIAL HISTORY:  Negative for smoking/alcohol/drug use.     ALLERGIES:  No Known Allergies    MEDICATIONS:  STANDING MEDICATIONS  atorvastatin 40 milliGRAM(s) Oral at bedtime  dextrose 5%. 1000 milliLiter(s) IV Continuous <Continuous>  dextrose 50% Injectable 12.5 Gram(s) IV Push once  dextrose 50% Injectable 25 Gram(s) IV Push once  dextrose 50% Injectable 25 Gram(s) IV Push once  docusate sodium 100 milliGRAM(s) Oral two times a day  furosemide   Injectable 40 milliGRAM(s) IV Push two times a day  hydrALAZINE 25 milliGRAM(s) Oral every 8 hours  insulin glargine Injectable (LANTUS) 35 Unit(s) SubCutaneous at bedtime  insulin lispro (HumaLOG) corrective regimen sliding scale   SubCutaneous three times a day before meals  isosorbide   dinitrate Tablet (ISORDIL) 10 milliGRAM(s) Oral every 8 hours  metoprolol succinate ER 25 milliGRAM(s) Oral daily    PRN MEDICATIONS  ALPRAZolam 0.25 milliGRAM(s) Oral every 12 hours PRN  dextrose 40% Gel 15 Gram(s) Oral once PRN  glucagon  Injectable 1 milliGRAM(s) IntraMuscular once PRN    VITALS:   T(F): 96.4  HR: 75  BP: 148/79  RR: 16  SpO2: 95%    LABS:                        13.3   10.81 )-----------( 174      ( 10 Mar 2019 06:21 )             39.1     03-10    136  |  89<L>  |  151<HH>  ----------------------------<  256<H>  3.8   |  24  |  8.9<HH>    Ca    8.8      10 Mar 2019 06:21  Phos  8.7     03-10  Mg     2.5     03-10    TPro  6.8  /  Alb  4.6  /  TBili  0.4  /  DBili  x   /  AST  17  /  ALT  14  /  AlkPhos  49  03-09    PT/INR - ( 10 Mar 2019 06:21 )   PT: 12.80 sec;   INR: 1.11 ratio         PTT - ( 10 Mar 2019 06:21 )  PTT:33.1 sec              RADIOLOGY:    PHYSICAL EXAM:  GEN: No acute distress  LUNGS: Clear to auscultation bilaterally   HEART: Regular  ABD: Soft, non-tender, non-distended.  EXT: NC/NC/NE/2+PP/ELDRIDGE/Skin Intact.   NEURO: AAOX3    Intravenous access:   NG tube:   Gomez Catheter:
SUBJECTIVE:    Patient is a 78y old Male who presents with a chief complaint of arf (10 Mar 2019 15:27)    Currently admitted to medicine with the primary diagnosis of Renal insufficiency     Today is hospital day 5d. This morning he is resting comfortably in bed and reports no new issues or overnight events.     PAST MEDICAL & SURGICAL HISTORY  Mitral valve regurgitation  CHF (congestive heart failure)  BPH (Benign Prostatic Hyperplasia)  GERD (gastroesophageal reflux disease)  Sleep apnea: does not use machine  Renal insufficiency  HTN - Hypertension  H/O hyperlipidemia  CAD (coronary artery disease)  Diabetes mellitus  History of PTCA: with stents 10 years ago ([facility]  CAD (Coronary Artery Disease)  Hypercholesterolemia  Diabetes Mellitus Type II  HTN (Hypertension)  S/P appendectomy  S/P primary angioplasty with coronary stent: 6-7 stents last one in 10/12  S/P tonsillectomy  S/P knee surgery: b/l arthroscopic    SOCIAL HISTORY:  Negative for smoking/alcohol/drug use.     ALLERGIES:  No Known Allergies    MEDICATIONS:  STANDING MEDICATIONS  atorvastatin 40 milliGRAM(s) Oral at bedtime  docusate sodium 100 milliGRAM(s) Oral two times a day  furosemide   Injectable 40 milliGRAM(s) IV Push two times a day  hydrALAZINE 25 milliGRAM(s) Oral every 8 hours  insulin glargine Injectable (LANTUS) 35 Unit(s) SubCutaneous at bedtime  insulin lispro (HumaLOG) corrective regimen sliding scale   SubCutaneous three times a day before meals  insulin lispro Injectable (HumaLOG) 3 Unit(s) SubCutaneous three times a day before meals  isosorbide   dinitrate Tablet (ISORDIL) 10 milliGRAM(s) Oral every 8 hours  metoprolol succinate ER 25 milliGRAM(s) Oral daily  sodium chloride 0.9%. 1000 milliLiter(s) IV Continuous <Continuous>    PRN MEDICATIONS  acetaminophen   Tablet .. 650 milliGRAM(s) Oral every 6 hours PRN  ALPRAZolam 0.25 milliGRAM(s) Oral every 12 hours PRN  glucagon  Injectable 1 milliGRAM(s) IntraMuscular once PRN  morphine  - Injectable 4 milliGRAM(s) IV Push every 5 minutes PRN  ondansetron Injectable 4 milliGRAM(s) IV Push once PRN  oxyCODONE    5 mG/acetaminophen 325 mG 1 Tablet(s) Oral every 4 hours PRN  oxyCODONE    5 mG/acetaminophen 325 mG 2 Tablet(s) Oral every 6 hours PRN    VITALS:   T(F): 96.8  HR: 78  BP: 115/80  RR: 76  SpO2: 95%    LABS:                        13.3   10.81 )-----------( 174      ( 10 Mar 2019 06:21 )             39.1     03-10    136  |  89<L>  |  151<HH>  ----------------------------<  256<H>  3.8   |  24  |  8.9<HH>    Ca    8.8      10 Mar 2019 06:21  Phos  8.7     03-10  Mg     2.5     03-10      PT/INR - ( 10 Mar 2019 06:21 )   PT: 12.80 sec;   INR: 1.11 ratio         PTT - ( 10 Mar 2019 06:21 )  PTT:33.1 sec              RADIOLOGY:    PHYSICAL EXAM:  GEN: No acute distress  LUNGS: Clear to auscultation bilaterally   HEART: Regular  ABD: Soft, non-tender, non-distended.  EXT: NC/NC/NE/2+PP/ELDRIDGE/Skin Intact.   NEURO: AAOX3    Intravenous access:   NG tube:   Gomez Catheter:
SUBJECTIVE:    Patient is a 78y old Male who presents with a chief complaint of arf (12 Mar 2019 08:40)    Currently admitted to medicine with the primary diagnosis of Renal insufficiency     Today is hospital day 6d. right foot pain gout    PAST MEDICAL & SURGICAL HISTORY  Mitral valve regurgitation  CHF (congestive heart failure)  BPH (Benign Prostatic Hyperplasia)  GERD (gastroesophageal reflux disease)  Sleep apnea: does not use machine  Renal insufficiency  HTN - Hypertension  H/O hyperlipidemia  CAD (coronary artery disease)  Diabetes mellitus  History of PTCA: with st[facility] 10 years ago ([facility])  CAD (Coronary Artery Disease)  Hypercholesterolemia  Diabetes Mellitus Type II  HTN (Hypertension)  S/P appendectomy  S/P primary angioplasty with coronary stent: 6-7 stents last one in 10/12  S/P tonsillectomy  S/P knee surgery: b/l arthroscopic    SOCIAL HISTORY:  Negative for smoking/alcohol/drug use.     ALLERGIES:  No Known Allergies    MEDICATIONS:  STANDING MEDICATIONS  atorvastatin 40 milliGRAM(s) Oral at bedtime  docusate sodium 100 milliGRAM(s) Oral two times a day  hydrALAZINE 25 milliGRAM(s) Oral every 8 hours  insulin glargine Injectable (LANTUS) 35 Unit(s) SubCutaneous at bedtime  insulin lispro (HumaLOG) corrective regimen sliding scale   SubCutaneous three times a day before meals  insulin lispro Injectable (HumaLOG) 3 Unit(s) SubCutaneous three times a day before meals  isosorbide   dinitrate Tablet (ISORDIL) 10 milliGRAM(s) Oral every 8 hours  metoprolol succinate ER 25 milliGRAM(s) Oral daily    PRN MEDICATIONS  acetaminophen   Tablet .. 650 milliGRAM(s) Oral every 6 hours PRN  ALPRAZolam 0.25 milliGRAM(s) Oral every 12 hours PRN  glucagon  Injectable 1 milliGRAM(s) IntraMuscular once PRN  morphine  - Injectable 4 milliGRAM(s) IV Push every 5 minutes PRN  ondansetron Injectable 4 milliGRAM(s) IV Push once PRN  oxyCODONE    5 mG/acetaminophen 325 mG 1 Tablet(s) Oral every 4 hours PRN  oxyCODONE    5 mG/acetaminophen 325 mG 2 Tablet(s) Oral every 6 hours PRN    VITALS:   T(F): 97.2  HR: 86  BP: 136/73  RR: 16  SpO2: --    LABS:                        12.9   10.53 )-----------( 171      ( 12 Mar 2019 09:14 )             38.7                         RADIOLOGY:    PHYSICAL EXAM:  GEN: No acute distress  LUNGS: Clear to auscultation bilaterally   HEART: Regular  ABD: Soft, non-tender, non-distended.  EXT: NC/NC/NE/2+PP/ELDRIDGE/Skin Intact.   NEURO: AAOX3    Intravenous access:   NG tube:   Gomez Catheter:

## 2019-03-12 NOTE — DISCHARGE NOTE PROVIDER - CARE PROVIDER_API CALL
Ector Jordan (MD)  Internal Medicine  [address] [place] [address]  Phone: [phone]  Fax: [phone]  Follow Up Time:     Nolberto Du)  Internal Medicine; Nephrology  [address] [place] [address]  Phone: [phone]  Fax: [phone]  Follow Up Time:

## 2019-03-18 DIAGNOSIS — Z79.4 LONG TERM (CURRENT) USE OF INSULIN: ICD-10-CM

## 2019-03-18 DIAGNOSIS — T50.1X5A ADVERSE EFFECT OF LOOP [HIGH-CEILING] DIURETICS, INITIAL ENCOUNTER: ICD-10-CM

## 2019-03-18 DIAGNOSIS — G47.33 OBSTRUCTIVE SLEEP APNEA (ADULT) (PEDIATRIC): ICD-10-CM

## 2019-03-18 DIAGNOSIS — N17.9 ACUTE KIDNEY FAILURE, UNSPECIFIED: ICD-10-CM

## 2019-03-18 DIAGNOSIS — I50.9 HEART FAILURE, UNSPECIFIED: ICD-10-CM

## 2019-03-18 DIAGNOSIS — I13.2 HYPERTENSIVE HEART AND CHRONIC KIDNEY DISEASE WITH HEART FAILURE AND WITH STAGE 5 CHRONIC KIDNEY DISEASE, OR END STAGE RENAL DISEASE: ICD-10-CM

## 2019-03-18 DIAGNOSIS — E11.65 TYPE 2 DIABETES MELLITUS WITH HYPERGLYCEMIA: ICD-10-CM

## 2019-03-18 DIAGNOSIS — E78.5 HYPERLIPIDEMIA, UNSPECIFIED: ICD-10-CM

## 2019-03-18 DIAGNOSIS — K21.9 GASTRO-ESOPHAGEAL REFLUX DISEASE WITHOUT ESOPHAGITIS: ICD-10-CM

## 2019-03-18 DIAGNOSIS — G47.00 INSOMNIA, UNSPECIFIED: ICD-10-CM

## 2019-03-18 DIAGNOSIS — Z95.5 PRESENCE OF CORONARY ANGIOPLASTY IMPLANT AND GRAFT: ICD-10-CM

## 2019-03-18 DIAGNOSIS — Z95.810 PRESENCE OF AUTOMATIC (IMPLANTABLE) CARDIAC DEFIBRILLATOR: ICD-10-CM

## 2019-03-18 DIAGNOSIS — E86.0 DEHYDRATION: ICD-10-CM

## 2019-03-18 DIAGNOSIS — N18.6 END STAGE RENAL DISEASE: ICD-10-CM

## 2019-03-18 DIAGNOSIS — E11.22 TYPE 2 DIABETES MELLITUS WITH DIABETIC CHRONIC KIDNEY DISEASE: ICD-10-CM

## 2019-03-18 DIAGNOSIS — I25.10 ATHEROSCLEROTIC HEART DISEASE OF NATIVE CORONARY ARTERY WITHOUT ANGINA PECTORIS: ICD-10-CM

## 2019-03-18 DIAGNOSIS — N40.0 BENIGN PROSTATIC HYPERPLASIA WITHOUT LOWER URINARY TRACT SYMPTOMS: ICD-10-CM

## 2019-03-18 DIAGNOSIS — I34.0 NONRHEUMATIC MITRAL (VALVE) INSUFFICIENCY: ICD-10-CM

## 2019-03-26 ENCOUNTER — APPOINTMENT (OUTPATIENT)
Dept: CARDIOLOGY | Facility: CLINIC | Age: 79
End: 2019-03-26

## 2019-04-03 ENCOUNTER — EMERGENCY (EMERGENCY)
Facility: HOSPITAL | Age: 79
LOS: 0 days | Discharge: HOME | End: 2019-04-03
Attending: EMERGENCY MEDICINE | Admitting: EMERGENCY MEDICINE
Payer: MEDICARE

## 2019-04-03 VITALS
RESPIRATION RATE: 18 BRPM | DIASTOLIC BLOOD PRESSURE: 59 MMHG | HEIGHT: 70 IN | WEIGHT: 235.01 LBS | TEMPERATURE: 96 F | SYSTOLIC BLOOD PRESSURE: 114 MMHG | HEART RATE: 78 BPM | OXYGEN SATURATION: 100 %

## 2019-04-03 VITALS — OXYGEN SATURATION: 100 % | RESPIRATION RATE: 18 BRPM

## 2019-04-03 DIAGNOSIS — Z23 ENCOUNTER FOR IMMUNIZATION: ICD-10-CM
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
## 2019-04-03 DIAGNOSIS — R55 SYNCOPE AND COLLAPSE: ICD-10-CM

## 2019-04-03 DIAGNOSIS — N18.6 END STAGE RENAL DISEASE: ICD-10-CM
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
## 2019-04-03 DIAGNOSIS — R45.1 RESTLESSNESS AND AGITATION: ICD-10-CM

## 2019-04-03 DIAGNOSIS — S01.81XA LACERATION WITHOUT FOREIGN BODY OF OTHER PART OF HEAD, INITIAL ENCOUNTER: ICD-10-CM

## 2019-04-03 DIAGNOSIS — S09.90XA UNSPECIFIED INJURY OF HEAD, INITIAL ENCOUNTER: ICD-10-CM
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
## 2019-04-03 DIAGNOSIS — I50.9 HEART FAILURE, UNSPECIFIED: ICD-10-CM

## 2019-04-03 DIAGNOSIS — I13.0 HYPERTENSIVE HEART AND CHRONIC KIDNEY DISEASE WITH HEART FAILURE AND STAGE 1 THROUGH STAGE 4 CHRONIC KIDNEY DISEASE, OR UNSPECIFIED CHRONIC KIDNEY DISEASE: ICD-10-CM

## 2019-04-03 LAB
ALBUMIN SERPL ELPH-MCNC: 4.5 G/DL — SIGNIFICANT CHANGE UP (ref 3.5–5.2)
ALP SERPL-CCNC: 43 U/L — SIGNIFICANT CHANGE UP (ref 30–115)
ALT FLD-CCNC: 13 U/L — SIGNIFICANT CHANGE UP (ref 0–41)
ANION GAP SERPL CALC-SCNC: 12 MMOL/L — SIGNIFICANT CHANGE UP (ref 7–14)
AST SERPL-CCNC: 21 U/L — SIGNIFICANT CHANGE UP (ref 0–41)
BASOPHILS # BLD AUTO: 0.03 K/UL — SIGNIFICANT CHANGE UP (ref 0–0.2)
BASOPHILS NFR BLD AUTO: 0.3 % — SIGNIFICANT CHANGE UP (ref 0–1)
BILIRUB SERPL-MCNC: 0.5 MG/DL — SIGNIFICANT CHANGE UP (ref 0.2–1.2)
BUN SERPL-MCNC: 32 MG/DL — HIGH (ref 10–20)
CALCIUM SERPL-MCNC: 9.6 MG/DL — SIGNIFICANT CHANGE UP (ref 8.5–10.1)
CHLORIDE SERPL-SCNC: 90 MMOL/L — LOW (ref 98–110)
CO2 SERPL-SCNC: 31 MMOL/L — SIGNIFICANT CHANGE UP (ref 17–32)
CREAT SERPL-MCNC: 5.1 MG/DL — CRITICAL HIGH (ref 0.7–1.5)
EOSINOPHIL # BLD AUTO: 0.28 K/UL — SIGNIFICANT CHANGE UP (ref 0–0.7)
EOSINOPHIL NFR BLD AUTO: 2.9 % — SIGNIFICANT CHANGE UP (ref 0–8)
GLUCOSE SERPL-MCNC: 191 MG/DL — HIGH (ref 70–99)
HCT VFR BLD CALC: 34.6 % — LOW (ref 42–52)
HGB BLD-MCNC: 11.3 G/DL — LOW (ref 14–18)
IMM GRANULOCYTES NFR BLD AUTO: 1 % — HIGH (ref 0.1–0.3)
LYMPHOCYTES # BLD AUTO: 0.74 K/UL — LOW (ref 1.2–3.4)
LYMPHOCYTES # BLD AUTO: 7.5 % — LOW (ref 20.5–51.1)
MAGNESIUM SERPL-MCNC: 1.9 MG/DL — SIGNIFICANT CHANGE UP (ref 1.8–2.4)
MCHC RBC-ENTMCNC: 28.8 PG — SIGNIFICANT CHANGE UP (ref 27–31)
MCHC RBC-ENTMCNC: 32.7 G/DL — SIGNIFICANT CHANGE UP (ref 32–37)
MCV RBC AUTO: 88.3 FL — SIGNIFICANT CHANGE UP (ref 80–94)
MONOCYTES # BLD AUTO: 1.03 K/UL — HIGH (ref 0.1–0.6)
MONOCYTES NFR BLD AUTO: 10.5 % — HIGH (ref 1.7–9.3)
NEUTROPHILS # BLD AUTO: 7.64 K/UL — HIGH (ref 1.4–6.5)
NEUTROPHILS NFR BLD AUTO: 77.8 % — HIGH (ref 42.2–75.2)
NRBC # BLD: 0 /100 WBCS — SIGNIFICANT CHANGE UP (ref 0–0)
PLATELET # BLD AUTO: 169 K/UL — SIGNIFICANT CHANGE UP (ref 130–400)
POTASSIUM SERPL-MCNC: 4.1 MMOL/L — SIGNIFICANT CHANGE UP (ref 3.5–5)
POTASSIUM SERPL-SCNC: 4.1 MMOL/L — SIGNIFICANT CHANGE UP (ref 3.5–5)
PROT SERPL-MCNC: 6.5 G/DL — SIGNIFICANT CHANGE UP (ref 6–8)
RBC # BLD: 3.92 M/UL — LOW (ref 4.7–6.1)
RBC # FLD: 13.2 % — SIGNIFICANT CHANGE UP (ref 11.5–14.5)
SODIUM SERPL-SCNC: 133 MMOL/L — LOW (ref 135–146)
TROPONIN T SERPL-MCNC: 0.04 NG/ML — CRITICAL HIGH
WBC # BLD: 9.82 K/UL — SIGNIFICANT CHANGE UP (ref 4.8–10.8)
WBC # FLD AUTO: 9.82 K/UL — SIGNIFICANT CHANGE UP (ref 4.8–10.8)

## 2019-04-03 PROCEDURE — 99285 EMERGENCY DEPT VISIT HI MDM: CPT | Mod: 25

## 2019-04-03 PROCEDURE — 12011 RPR F/E/E/N/L/M 2.5 CM/<: CPT

## 2019-04-03 PROCEDURE — 72125 CT NECK SPINE W/O DYE: CPT | Mod: 26

## 2019-04-03 PROCEDURE — 71045 X-RAY EXAM CHEST 1 VIEW: CPT | Mod: 26

## 2019-04-03 PROCEDURE — 70450 CT HEAD/BRAIN W/O DYE: CPT | Mod: 26

## 2019-04-03 PROCEDURE — 72170 X-RAY EXAM OF PELVIS: CPT | Mod: 26

## 2019-04-03 RX ORDER — TETANUS TOXOID, REDUCED DIPHTHERIA TOXOID AND ACELLULAR PERTUSSIS VACCINE, ADSORBED 5; 2.5; 8; 8; 2.5 [IU]/.5ML; [IU]/.5ML; UG/.5ML; UG/.5ML; UG/.5ML
0.5 SUSPENSION INTRAMUSCULAR ONCE
Qty: 0 | Refills: 0 | Status: COMPLETED | OUTPATIENT
Start: 2019-04-03 | End: 2019-04-03

## 2019-04-03 RX ORDER — SODIUM CHLORIDE 9 MG/ML
500 INJECTION INTRAMUSCULAR; INTRAVENOUS; SUBCUTANEOUS ONCE
Qty: 0 | Refills: 0 | Status: COMPLETED | OUTPATIENT
Start: 2019-04-03 | End: 2019-04-03

## 2019-04-03 RX ORDER — ACETAMINOPHEN 500 MG
975 TABLET ORAL ONCE
Qty: 0 | Refills: 0 | Status: COMPLETED | OUTPATIENT
Start: 2019-04-03 | End: 2019-04-03

## 2019-04-03 RX ADMIN — SODIUM CHLORIDE 500 MILLILITER(S): 9 INJECTION INTRAMUSCULAR; INTRAVENOUS; SUBCUTANEOUS at 19:22

## 2019-04-03 RX ADMIN — TETANUS TOXOID, REDUCED DIPHTHERIA TOXOID AND ACELLULAR PERTUSSIS VACCINE, ADSORBED 0.5 MILLILITER(S): 5; 2.5; 8; 8; 2.5 SUSPENSION INTRAMUSCULAR at 17:34

## 2019-04-03 RX ADMIN — SODIUM CHLORIDE 500 MILLILITER(S): 9 INJECTION INTRAMUSCULAR; INTRAVENOUS; SUBCUTANEOUS at 20:14

## 2019-04-03 RX ADMIN — Medication 975 MILLIGRAM(S): at 18:25

## 2019-04-03 NOTE — ED ADULT NURSE NOTE - NS ED NURSE ELOPE COMMENTS
pt refused to wait for d/c instructions, pt eloped from ED, pt became verbally aggressive with staff prior to leaving, RN attempted to convince pt to wait for his instructions but he refused. IV d/c at this time. pt ambulated from ED with steady gait

## 2019-04-03 NOTE — ED ADULT TRIAGE NOTE - CHIEF COMPLAINT QUOTE
pt states that he had dialysis today and felt dizzy afterwards. pt passed out and fell in the kitchen and hit L forehead against the corner of the countertop. Pt has small lac to forehead

## 2019-04-03 NOTE — ED PROVIDER NOTE - PHYSICAL EXAMINATION
GENERAL:  well appearing, non-toxic male in no acute distress  SKIN: skin warm, pink and dry. MMM. 2.5 cm irregular laceration to left eyebrow, wound explored no FB. cap refill < 2 sec   HEAD: + head trauma  EYE: Normal lids, conjunctiva and sclera, PERRL, EOMI  ENT:  Airway intact. Patent oropharynx without erythema or exudate. Uvula midline. TMs clear b/l.   NECK: Neck supple. No midline cervical tenderness  PULM: CTAB. Normal respiratory effort. No respiratory distress. No wheezes, stridor, rales or rhonchi. No retractions  CV: RRR, no M/R/G.   ABD: Soft, non-tender, non-distended  MSK: FROM of all extremities.  no chest wall tenderness. no pelvic tenderness. No edema, erythema, cyanosis. radial pulses equal and intact bilaterally   NEURO: A+Ox3, no sensory/motor deficits, CN II-XII intact. No speech slurring, pronator drift, facial asymmetry. Normal finger-to-nose  b/l. 5/5 strength throughout. Normal gait. Negative Romberg.  PSYCH: appropriate behavior, cooperative.

## 2019-04-03 NOTE — ED ADULT NURSE NOTE - PMH
BPH (Benign Prostatic Hyperplasia)    CAD (coronary artery disease)    CAD (Coronary Artery Disease)    CHF (congestive heart failure)    Diabetes mellitus    Diabetes Mellitus Type II    GERD (gastroesophageal reflux disease)    H/O hyperlipidemia    History of PTCA  with stents 10 years ago ([facility])  HTN (Hypertension)    HTN - Hypertension    Hypercholesterolemia    Mitral valve regurgitation    Renal insufficiency    Sleep apnea  does not use machine normal...

## 2019-04-03 NOTE — ED PROVIDER NOTE - ATTENDING CONTRIBUTION TO CARE
I was present for and supervised the key and critical aspects of the procedures performed during the care of the patient. 77yo M with ah/o ESRD presents with a laceration to the left eyebrow. Pt states that he went to dialysis from 10-1 and when he got home he went to get an apple from the refrigerator and became extremely lightheaded, falling and hitting his head on the cabinet. Pt denies any LOC, nausea, vomiting. On exam: NCAT. PERRLA, EOMI. OP clear. Lungs CTAB. RRR, S1S2 noted. Radial pulses 2+ and equal, pedal pulses 2+ and equal. Abdomen soft, NT/ND, no rebound or guarding. FROM x4 extremities. No focal neuro deficits. (+) laceration to the left eyebrow, abrasion to the chin. Plan: Wound repair, CT head, neck, EKG, Labs, Tetanus

## 2019-04-03 NOTE — ED PROVIDER NOTE - PMH
BPH (Benign Prostatic Hyperplasia)    CAD (coronary artery disease)    CAD (Coronary Artery Disease)    CHF (congestive heart failure)    Diabetes mellitus    Diabetes Mellitus Type II    GERD (gastroesophageal reflux disease)    H/O hyperlipidemia    History of PTCA  with stents 10 years ago ([facility])  HTN (Hypertension)    HTN - Hypertension    Hypercholesterolemia    Mitral valve regurgitation    Renal insufficiency    Sleep apnea  does not use machine BPH (Benign Prostatic Hyperplasia)    CAD (coronary artery disease)    CAD (Coronary Artery Disease)    CHF (congestive heart failure)    Diabetes mellitus    Diabetes Mellitus Type II    GERD (gastroesophageal reflux disease)    H/O hyperlipidemia    History of PTCA  with stents 10 years ago ([facility])  HTN (Hypertension)    HTN - Hypertension    Hypercholesterolemia    Mitral valve regurgitation    Renal insufficiency    Sleep apnea  does not use machine

## 2019-04-03 NOTE — ED PROVIDER NOTE - PROGRESS NOTE DETAILS
77yo M with ah/o ESRD presents with a laceration to the left eyebrow. Pt states that he went to dialysis from 10-1 and when he got home he went to get an apple from the refrigerator and became extremely lightheaded, falling and hitting his head on the cabinet. Pt denies any LOC, nausea, vomiting. On exam: NCAT. PERRLA, EOMI. OP clear. Lungs CTAB. RRR, S1S2 noted. Radial pulses 2+ and equal, pedal pulses 2+ and equal. Abdomen soft, NT/ND, no rebound or guarding. FROM x4 extremities. No focal neuro deficits. (+) laceration to the left eyebrow, abrasion to the chin. Plan: Wound repair, CT head, neck, EKG, Labs, Tetanus Case discussed with Dr. Du, recommend 500 cc bolus of fluids, reassess. Recommend stopping norvasc and hydralzine. If patient is feeling better can be discharged with close follow up. Pt feeling good wants to go home. Checked orthostatic VSS and are stable. D/w patient stopping norvasc and hydralzine and follow up with Dr. Du tomorrow for re-eval and to return to emergency room for any other concerns Went to discussed with patient discharge instructions with Nurse Johnson - Patient has eloped from bed with out signing paperwork or receiving written instruction. Went out to ER waiting room lobby and ambulance bay and parking lot patient was no where to be found. Nurse will attempt to call patient to come back for paperwork with written instructions with regards to medication and follow up.

## 2019-04-03 NOTE — ED PROVIDER NOTE - OBJECTIVE STATEMENT
77 yo male with h/o ESRD on HD (M/W/F), CAD s/p CABG, CHF, DM, GERD BIBA s/p head trauma. Patient explains he had dialysis today from 10-1 pm. Came home around 2 pm and was feeling lightheaded. Patient eat a burger, then went to the kitchen to get an apple, felt lightheaded and fell. Patient denies LOC. Patient hit head on counter top, + laceration to left eyebrow. unknown tetanus status. No additional trauma. Denies headache, dizziness, visual changes, chest pain, sob, abd pain, N/V, UE/LE weakness or paresthesias. Takes baby aspirin, no additional anticoagulation.

## 2019-04-03 NOTE — ED PROVIDER NOTE - NSFOLLOWUPINSTRUCTIONS_ED_ALL_ED_FT
Follow up with Dr. Du Tomorrow and he recommends stopping norvasc and hydralzine.     Please follow up with your Primary medical doctor and your cardiologist in 1-2 days.    SUTURE REMOVAL in 5 days return to emergency room or go to primary for removal if they take them out.    Syncope    WHAT YOU NEED TO KNOW:    Syncope is also called fainting or passing out. Syncope is a sudden, temporary loss of consciousness, followed by a fall from a standing or sitting position. Syncope ranges from not serious to a sign of a more serious condition that needs to be treated. You can control some health conditions that cause syncope. Your healthcare providers can help you create a plan to manage syncope and prevent episodes.    DISCHARGE INSTRUCTIONS:    Seek care immediately if:     You are bleeding because you hit your head when you fainted.       You suddenly have double vision, difficulty speaking, numbness, and cannot move your arms or legs.      You have chest pain and trouble breathing.      You vomit blood or material that looks like coffee grounds.      You see blood in your bowel movement.    Contact your healthcare provider if:     You have new or worsening symptoms.      You have another syncope episode.      You have a headache, fast heartbeat, or feel too dizzy to stand up.      You have questions or concerns about your condition or care.    Medicines:     Medicines may be needed to help your heart pump strongly and regularly. Your healthcare provider may also make changes to any medicines that are causing syncope.       Take your medicine as directed. Contact your healthcare provider if you think your medicine is not helping or if you have side effects. Tell him or her if you are allergic to any medicine. Keep a list of the medicines, vitamins, and herbs you take. Include the amounts, and when and why you take them. Bring the list or the pill bottles to follow-up visits. Carry your medicine list with you in case of an emergency.    Follow up with your healthcare provider as directed: Write down your questions so you remember to ask them during your visits.     Manage syncope:     Keep a record of your syncope episodes. Include your symptoms and your activity before and after the episode. The record can help your healthcare provider find the cause of your syncope and help you manage episodes.      Sit or lie down when needed. This includes when you feel dizzy, your throat is getting tight, and your vision changes. Raise your legs above the level of your heart.      Take slow, deep breaths if you start to breathe faster with anxiety or fear. This can help decrease dizziness and the feeling that you might faint.       Check your blood pressure often. This is important if you take medicine to lower your blood pressure. Check your blood pressure when you are lying down and when you are standing. Ask how often to check during the day. Keep a record of your blood pressure numbers. Your healthcare provider may use the record to help plan your treatment.How to take a Blood Pressure         Prevent a syncope episode:     Move slowly and let yourself get used to one position before you move to another position. This is very important when you change from a lying or sitting position to a standing position. Take some deep breaths before you stand up from a lying position. Stand up slowly. Sudden movements may cause a fainting spell. Sit on the side of the bed or couch for a few minutes before you stand up. If you are on bedrest, try to be upright for about 2 hours each day, or as directed. Do not lock your legs if you are standing for a long period of time. Move your legs and bend your knees to keep blood flowing.      Follow your healthcare provider's recommendations. Your provider may recommend that you drink more liquids to prevent dehydration. You may also need to have more salt to keep your blood pressure from dropping too low and causing syncope. Your provider will tell you how much liquid and sodium to have each day. He or she will also tell you how much physical activity is safe for you. This will depend on what is causing your syncope.      Watch for signs of low blood sugar. These include hunger, nervousness, sweating, and fast or fluttery heartbeats. Talk with your healthcare provider about ways to keep your blood sugar level steady.      Do not strain if you are constipated. You may faint if you strain to have a bowel movement. Walking is the best way to get your bowels moving. Eat foods high in fiber to make it easier to have a bowel movement. Good examples are high-fiber cereals, beans, vegetables, and whole-grain breads. Prune juice may help make bowel movements softer.      Be careful in hot weather. Heat can cause a syncope episode. Limit activity done outside on hot days. Physical activity in hot weather can lead to dehydration. This can cause an episode.         © Copyright [employer] 2019 All illustrations and images included in CareNotes are the copyrighted property of [employer] or [employer].       Head Injury    WHAT YOU NEED TO KNOW:    A head injury is most often caused by a blow to the head. This may occur from a fall, bicycle injury, sports injury, being struck in the head, or a motor vehicle accident.     DISCHARGE INSTRUCTIONS:    Call 911 or have someone else call for any of the following:     You cannot be woken.      You have a seizure.      You stop responding to others or you faint.      You have blurry or double vision.      Your speech becomes slurred or confused.      You have arm or leg weakness, loss of feeling, or new problems with coordination.      Your pupils are larger than usual or one pupil is a different size than the other.       You have blood or clear fluid coming out of your ears or nose.    Return to the emergency department if:     You have repeated or forceful vomiting.      You feel confused.      Your headache gets worse or becomes severe.      You or someone caring for you notices that you are harder to wake than usual.    Contact your healthcare provider if:     Your symptoms last longer than 6 weeks after the injury.      You have questions or concerns about your condition or care.    Medicines:     Acetaminophen decreases pain. Acetaminophen is available without a doctor's order. Ask how much to take and how often to take it. Follow directions. Acetaminophen can cause liver damage if not taken correctly.      Take your medicine as directed. Contact your healthcare provider if you think your medicine is not helping or if you have side effects. Tell him or her if you are allergic to any medicine. Keep a list of the medicines, vitamins, and herbs you take. Include the amounts, and when and why you take them. Bring the list or the pill bottles to follow-up visits. Carry your medicine list with you in case of an emergency.    Self-care:     Rest or do quiet activities for 24 to 48 hours. Limit your time watching TV, using the computer, or doing tasks that require a lot of thinking. Slowly return to your normal activities as directed. Do not play sports or do activities that may cause you to get hit in the head. Ask your healthcare provider when you can return to sports.       Apply ice on your head for 15 to 20 minutes every hour or as directed. Use an ice pack, or put crushed ice in a plastic bag. Cover it with a towel before you apply it to your skin. Ice helps prevent tissue damage and decreases swelling and pain.       Have someone stay with you for 24 hours or as directed. This person can monitor you for complications and call 911. When you are awake the person should ask you a few questions to see if you are thinking clearly. An example would be to ask your name or your address.     Prevent another head injury:     Wear a helmet that fits properly. Do this when you play sports, or ride a bike, scooter, or skateboard. Helmets help decrease your risk of a serious head injury. Talk to your healthcare provider about other ways you can protect yourself if you play sports.      Wear your seat belt every time you are in a car. This helps to decrease your risk for a head injury if you are in a car accident.     Follow up with your healthcare provider as directed: Write down your questions so you remember to ask them during your visits.        © Copyright [employer] 2019 All illustrations and images included in CareNotes are the copyrighted property of [employer]. or [employer].        Facial Laceration    WHAT YOU NEED TO KNOW:    A facial laceration is a tear or cut in the skin caused by blunt or shearing forces, or sharp objects. Facial lacerations may be closed within 24 hours of injury.    DISCHARGE INSTRUCTIONS:    Return to the emergency department if:     You have a fever and the wound is painful, warm, or swollen. The wound area may be red, or fluid may come out of it.      You have heavy bleeding or bleeding that does not stop after 10 minutes of holding firm, direct pressure over the wound.    Contact your healthcare provider if:     Your wound reopens or your tape comes off.      Your wound is very painful.      Your wound is not healing, or you think there is an object in the wound.      The skin around your wound stays numb.      You have questions or concerns about your condition or care.    Medicines:     Antibiotics may be given to prevent an infection if your wound was deep and had to be cleaned out.       Take your medicine as directed. Contact your healthcare provider if you think your medicine is not helping or if you have side effects. Tell him of her if you are allergic to any medicine. Keep a list of the medicines, vitamins, and herbs you take. Include the amounts, and when and why you take them. Bring the list or the pill bottles to follow-up visits. Carry your medicine list with you in case of an emergency.    Care for your wound: Care for your wound as directed to prevent infection and help it heal. Wash your hands with soap and warm water before and after you care for your wound. You may need to keep the wound dry for the first 24 to 48 hours. When your healthcare provider says it is okay, wash around your wound with soap and water, or as directed. Gently pat the area dry. Do not use alcohol or hydrogen peroxide to clean your wound unless you are directed to.    Do not take aspirin or NSAIDs for 24 hours after being injured. Aspirin and NSAIDs can increase blood flow. Your laceration may continue

## 2019-04-03 NOTE — ED PROVIDER NOTE - CARE PLAN
Principal Discharge DX:	Syncope  Secondary Diagnosis:	Head injury  Secondary Diagnosis:	Face lacerations

## 2019-04-03 NOTE — ED PROVIDER NOTE - CLINICAL SUMMARY MEDICAL DECISION MAKING FREE TEXT BOX
Patient presents for evaluation for syncope in addition laceration repair we have offered admission after workup was negative, discussed case with dr price who will follow patient.

## 2019-04-03 NOTE — ED PROVIDER NOTE - CARE PROVIDER_API CALL
Nolberto Du)  Internal Medicine; Nephrology  [address] [facility] [place] [address]  Phone: [phone]  Fax: [phone]  Follow Up Time:

## 2019-04-03 NOTE — ED PROVIDER NOTE - NS ED ROS FT
Constitutional: no fever, chills or generalized weakness  Eyes: no visual changes, no eye pain  Cardiovascular: no chest pain, no sob, no syncope  Respiratory: no cough, no shortness of breath  Gastrointestinal: no nausea, vomiting or diarrhea. no abdominal pain.   :  no urinary sxs, no flank pain.  Musculoskeletal: no msk pain or injury. no neck pain, no back pain, no joint pain.    Integumentary: + laceration to left eyebrow. + abrasion to chin.   Neurological: + head trauma, + headache. no dizziness, no visual changes, no UE/LE weakness or paresthesias. no change in mental status. no neck pain or stiffness.   Psychiatric: no suicidal or homicidal ideation or attempt. no hallucinations.   Allergic/Immunologic: no lymphadenopathy, no pruritus

## 2019-04-04 PROBLEM — I34.0 NONRHEUMATIC MITRAL (VALVE) INSUFFICIENCY: Chronic | Status: ACTIVE | Noted: 2019-03-06

## 2019-04-04 PROBLEM — I50.9 HEART FAILURE, UNSPECIFIED: Chronic | Status: ACTIVE | Noted: 2019-03-06

## 2019-04-04 NOTE — ED PROCEDURE NOTE - LACERATION NUMBER
1 How Severe Are Your Spot(S)?: moderate What Is The Reason For Today's Visit?: Full Body Skin Examination What Is The Reason For Today's Visit? (Being Monitored For X): concerning skin lesions on an annual basis

## 2019-04-08 ENCOUNTER — EMERGENCY (EMERGENCY)
Facility: HOSPITAL | Age: 79
LOS: 0 days | Discharge: HOME | End: 2019-04-08
Attending: EMERGENCY MEDICINE | Admitting: EMERGENCY MEDICINE

## 2019-04-08 VITALS
SYSTOLIC BLOOD PRESSURE: 146 MMHG | OXYGEN SATURATION: 99 % | DIASTOLIC BLOOD PRESSURE: 82 MMHG | TEMPERATURE: 98 F | HEART RATE: 88 BPM | RESPIRATION RATE: 18 BRPM

## 2019-04-08 DIAGNOSIS — I50.9 HEART FAILURE, UNSPECIFIED: ICD-10-CM

## 2019-04-08 DIAGNOSIS — Z99.2 DEPENDENCE ON RENAL DIALYSIS: ICD-10-CM

## 2019-04-08 DIAGNOSIS — N18.6 END STAGE RENAL DISEASE: ICD-10-CM

## 2019-04-08 DIAGNOSIS — S01.112D LACERATION WITHOUT FOREIGN BODY OF LEFT EYELID AND PERIOCULAR AREA, SUBSEQUENT ENCOUNTER: ICD-10-CM
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
## 2019-04-08 DIAGNOSIS — I13.2 HYPERTENSIVE HEART AND CHRONIC KIDNEY DISEASE WITH HEART FAILURE AND WITH STAGE 5 CHRONIC KIDNEY DISEASE, OR END STAGE RENAL DISEASE: ICD-10-CM

## 2019-04-08 DIAGNOSIS — E11.9 TYPE 2 DIABETES MELLITUS WITHOUT COMPLICATIONS: ICD-10-CM

## 2019-04-08 DIAGNOSIS — Z95.5 PRESENCE OF CORONARY ANGIOPLASTY IMPLANT AND GRAFT: ICD-10-CM

## 2019-04-08 DIAGNOSIS — Z90.49 ACQUIRED ABSENCE OF OTHER SPECIFIED PARTS OF DIGESTIVE TRACT: ICD-10-CM

## 2019-04-08 DIAGNOSIS — E78.00 PURE HYPERCHOLESTEROLEMIA, UNSPECIFIED: ICD-10-CM

## 2019-04-08 DIAGNOSIS — K21.9 GASTRO-ESOPHAGEAL REFLUX DISEASE WITHOUT ESOPHAGITIS: ICD-10-CM

## 2019-04-08 DIAGNOSIS — I25.10 ATHEROSCLEROTIC HEART DISEASE OF NATIVE CORONARY ARTERY WITHOUT ANGINA PECTORIS: ICD-10-CM

## 2019-04-08 NOTE — ED PROVIDER NOTE - NSFOLLOWUPINSTRUCTIONS_ED_ALL_ED_FT
Follow up with your primary care doctor as scheduled.     Suture/Staple Removal    After having your stitches or staples removed it is typical to have minor discomfort, swelling, or redness in the area. The wound is still healing so continue to protect it from injury. Keep the wound dry and if given creams, ointments, or medication, take as instructed to by your health care professional.    SEEK IMMEDIATE MEDICAL CARE IF YOU HAVE ANY OF THE FOLLOWING SYMPTOMS: increasing redness/swelling/pain in the wound, pus coming from the wound, bad smell coming from the wound, or fever.

## 2019-04-08 NOTE — ED PROVIDER NOTE - OBJECTIVE STATEMENT
79 yo male with h/o ESRD on HD, HTN, DM presents to the ED for suture removal. Patient was here on 4/3 for near-syncope/CHI. Patient had 8 sutures placed. CT head negative. Patient has no complaints. Denies fever, chills, headache, dizziness, syncope, swelling/erythema/discharge from wound, chest pain, sob, abd pain, N/V, UE/LE weakness or paresthesias.

## 2019-04-08 NOTE — ED PROVIDER NOTE - PHYSICAL EXAMINATION
GENERAL:  well appearing, non-toxic male in no acute distress  SKIN: skin warm, pink and dry. MMM. + well healing laceration over left eyebrow. no swelling, erythema, discharge from wound. cap refill < 2 sec  + healing ecchymosis around left eye and under chin.   PULM: CTAB. Normal respiratory effort. No respiratory distress. No wheezes, stridor, rales or rhonchi. No retractions  CV: RRR, no M/R/G.   ABD: Soft, non-tender, non-distended  NEURO: A+Ox3, no sensory/motor deficits.   PSYCH: appropriate behavior, cooperative.

## 2019-04-08 NOTE — ED ADULT NURSE NOTE - PMH
BPH (Benign Prostatic Hyperplasia)    CAD (coronary artery disease)    CAD (Coronary Artery Disease)    CHF (congestive heart failure)    Diabetes mellitus    Diabetes Mellitus Type II    GERD (gastroesophageal reflux disease)    H/O hyperlipidemia    History of PTCA  with stents 10 years ago ([facility])  HTN (Hypertension)    HTN - Hypertension    Hypercholesterolemia    Mitral valve regurgitation    Renal insufficiency    Sleep apnea  does not use machine

## 2019-04-08 NOTE — ED PROVIDER NOTE - NS ED ROS FT
Constitutional: no fever, chills or generalized weakness  Eyes: no visual changes, no eye pain, no photophobia  Cardiovascular: no chest pain, no sob, no syncope  Respiratory: no cough, no shortness of breath  Gastrointestinal: no nausea, vomiting or abd pain  Integumentary: healing laceration left eyebrow. no swelling, erythema, discharge.   Neurological: + recent CHI. no headache, no dizziness, no visual changes, no UE/LE weakness or paresthesias. no change in mental status. no neck pain or stiffness.

## 2019-04-10 ENCOUNTER — APPOINTMENT (OUTPATIENT)
Dept: ELECTROPHYSIOLOGY | Facility: CLINIC | Age: 79
End: 2019-04-10
Payer: MEDICARE

## 2019-04-10 PROCEDURE — 93295 DEV INTERROG REMOTE 1/2/MLT: CPT

## 2019-04-10 PROCEDURE — 93296 REM INTERROG EVL PM/IDS: CPT

## 2019-04-16 ENCOUNTER — APPOINTMENT (OUTPATIENT)
Dept: CARDIOLOGY | Facility: CLINIC | Age: 79
End: 2019-04-16
Payer: MEDICARE

## 2019-04-16 VITALS
HEIGHT: 69 IN | DIASTOLIC BLOOD PRESSURE: 70 MMHG | WEIGHT: 232 LBS | SYSTOLIC BLOOD PRESSURE: 150 MMHG | BODY MASS INDEX: 34.36 KG/M2 | HEART RATE: 91 BPM

## 2019-04-16 PROCEDURE — 93000 ELECTROCARDIOGRAM COMPLETE: CPT

## 2019-04-16 PROCEDURE — 99214 OFFICE O/P EST MOD 30 MIN: CPT

## 2019-04-16 RX ORDER — FINASTERIDE 5 MG/1
5 TABLET, FILM COATED ORAL DAILY
Refills: 0 | Status: DISCONTINUED | COMMUNITY
End: 2019-04-16

## 2019-04-16 RX ORDER — ISOSORBIDE DINITRATE 10 MG/1
10 TABLET ORAL EVERY 8 HOURS
Qty: 270 | Refills: 0 | Status: DISCONTINUED | COMMUNITY
End: 2019-04-16

## 2019-04-16 RX ORDER — HYDRALAZINE HYDROCHLORIDE 25 MG/1
25 TABLET ORAL 3 TIMES DAILY
Qty: 270 | Refills: 1 | Status: DISCONTINUED | COMMUNITY
End: 2019-04-16

## 2019-06-10 ENCOUNTER — APPOINTMENT (OUTPATIENT)
Dept: ELECTROPHYSIOLOGY | Facility: CLINIC | Age: 79
End: 2019-06-10

## 2019-07-10 ENCOUNTER — APPOINTMENT (OUTPATIENT)
Dept: CARDIOLOGY | Facility: CLINIC | Age: 79
End: 2019-07-10
Payer: MEDICARE

## 2019-07-10 VITALS — HEIGHT: 69 IN | HEART RATE: 91 BPM | DIASTOLIC BLOOD PRESSURE: 60 MMHG | SYSTOLIC BLOOD PRESSURE: 104 MMHG

## 2019-07-10 DIAGNOSIS — E11.9 TYPE 2 DIABETES MELLITUS W/OUT COMPLICATIONS: ICD-10-CM

## 2019-07-10 DIAGNOSIS — N18.9 CHRONIC KIDNEY DISEASE, UNSPECIFIED: ICD-10-CM

## 2019-07-10 PROCEDURE — 99213 OFFICE O/P EST LOW 20 MIN: CPT

## 2019-07-10 PROCEDURE — 93290 INTERROG DEV EVAL ICPMS IP: CPT | Mod: 26

## 2019-07-10 PROCEDURE — 93000 ELECTROCARDIOGRAM COMPLETE: CPT | Mod: 59

## 2019-07-10 PROCEDURE — 93282 PRGRMG EVAL IMPLANTABLE DFB: CPT

## 2019-07-10 NOTE — END OF VISIT
[FreeTextEntry3] : I was present with the nurse practitioner during the history and exam of the patient. I discussed patient's management with the NP in detail. I reviewed the NP’s note and agree with the documented findings and plan of care. I would like to take this opportunity to thank you for involving me in this patient’s care. Please do not hesitate to contact me if you have any further questions at [phone].\par

## 2019-07-10 NOTE — HISTORY OF PRESENT ILLNESS
[de-identified] : \par Cardiologist: Dr. Nance\par \par 78 yo M with history of CAD s/p 5v CABG (2/25/2015), ICM s/p single chamber ICD (12/7/15), obesity, ESRD on HD, DM, COPD, who presents to establish care in our clinic for his defibrillator. The patient denies any cardiovascular complaints including chest pain, dyspnea, palpitations, dizziness, lightheadedness, presyncope or syncope. He feels weak as he had dialysis earlier this morning. He states his device shocked him once after implant but he is not sure if it was appropriate. \par

## 2019-07-10 NOTE — ASSESSMENT
[FreeTextEntry1] : Mr. Healy presents to establish care in our device clinic. He is a 78 yo M with history of CAD s/p CABG (2015), HTN, HL, DM, and ESRD on HD. He has no cardiovascular complaints. Device interrogation was performed as detailed above and adjustments were made as outlined. Interrogation revealed a few short lived episodes of NSVT. He has known CAD and is on guideline directed medical therapy. \par \par At this time, I have asked him to continue his present regimen and to follow up in 6 months. I have also advised the patient to go to the nearest emergency room if he experiences any chest pain, dyspnea, syncope, or has any other compelling symptoms.\par

## 2019-07-10 NOTE — PHYSICAL EXAM
[Normal Appearance] : normal appearance [Well Groomed] : well groomed [No Deformities] : no deformities [General Appearance - In No Acute Distress] : no acute distress [Heart Rate And Rhythm] : heart rate and rhythm were normal [Heart Sounds] : normal S1 and S2 [] : no respiratory distress [Respiration, Rhythm And Depth] : normal respiratory rhythm and effort [Auscultation Breath Sounds / Voice Sounds] : lungs were clear to auscultation bilaterally [Exaggerated Use Of Accessory Muscles For Inspiration] : no accessory muscle use [Well-Healed] : well-healed [Abdomen Soft] : soft [FreeTextEntry1] : trace edema

## 2019-07-10 NOTE — PROCEDURE
[No] : not [NSR] : normal sinus rhythm [ICD] : Implantable cardioverter-defibrillator [VVI] : VVI [Threshold Testing Performed] : Threshold testing was performed [Longevity: ___ months] : The estimated remaining battery life is [unfilled] months [Lead Imp:  ___ohms] : lead impedance was [unfilled] ohms [___V @] : [unfilled] V [Sensing Amplitude ___mv] : sensing amplitude was [unfilled] mv [___ ms] : [unfilled] ms [Counters Reset] : the counters were reset [Programmed for Longevity] : output reprogrammed for improved battery longevity [See Device Printout] : See device printout [de-identified] : [facility] [de-identified] : D167 [de-identified] : [id number] [de-identified] : 12/07/15 [de-identified] : 5 NSVT, longest >5 seconds, no therapy. Fastest 215 bpm\par Artifact present\par Stable respiratory rate trend [de-identified] : 40

## 2019-07-23 ENCOUNTER — APPOINTMENT (OUTPATIENT)
Dept: CARDIOLOGY | Facility: CLINIC | Age: 79
End: 2019-07-23
Payer: MEDICARE

## 2019-07-23 VITALS
HEART RATE: 75 BPM | BODY MASS INDEX: 34.36 KG/M2 | WEIGHT: 232 LBS | SYSTOLIC BLOOD PRESSURE: 130 MMHG | HEIGHT: 69 IN | DIASTOLIC BLOOD PRESSURE: 70 MMHG

## 2019-07-23 PROCEDURE — 93000 ELECTROCARDIOGRAM COMPLETE: CPT

## 2019-07-23 PROCEDURE — 99214 OFFICE O/P EST MOD 30 MIN: CPT

## 2019-07-25 LAB
ALBUMIN SERPL ELPH-MCNC: 5.4 G/DL
ALP BLD-CCNC: 66 U/L
ALT SERPL-CCNC: 18 U/L
ANION GAP SERPL CALC-SCNC: 20 MMOL/L
AST SERPL-CCNC: 19 U/L
BASOPHILS # BLD AUTO: 0.07 K/UL
BASOPHILS NFR BLD AUTO: 0.7 %
BILIRUB SERPL-MCNC: 0.3 MG/DL
BUN SERPL-MCNC: 63 MG/DL
CALCIUM SERPL-MCNC: 9.9 MG/DL
CHLORIDE SERPL-SCNC: 93 MMOL/L
CO2 SERPL-SCNC: 27 MMOL/L
CREAT SERPL-MCNC: 4 MG/DL
EOSINOPHIL # BLD AUTO: 1.11 K/UL
EOSINOPHIL NFR BLD AUTO: 10.9 %
GLUCOSE SERPL-MCNC: 278 MG/DL
HCT VFR BLD CALC: 50.4 %
HGB BLD-MCNC: 16.5 G/DL
IMM GRANULOCYTES NFR BLD AUTO: 0.5 %
LYMPHOCYTES # BLD AUTO: 2.22 K/UL
LYMPHOCYTES NFR BLD AUTO: 21.7 %
MAN DIFF?: NORMAL
MCHC RBC-ENTMCNC: 28.7 PG
MCHC RBC-ENTMCNC: 32.7 G/DL
MCV RBC AUTO: 87.8 FL
MONOCYTES # BLD AUTO: 1.15 K/UL
MONOCYTES NFR BLD AUTO: 11.3 %
NEUTROPHILS # BLD AUTO: 5.61 K/UL
NEUTROPHILS NFR BLD AUTO: 54.9 %
NT-PROBNP SERPL-MCNC: 1133 PG/ML
PLATELET # BLD AUTO: 221 K/UL
POTASSIUM SERPL-SCNC: 4.6 MMOL/L
PROT SERPL-MCNC: 7.7 G/DL
RBC # BLD: 5.74 M/UL
RBC # FLD: 13.9 %
SODIUM SERPL-SCNC: 140 MMOL/L
WBC # FLD AUTO: 10.21 K/UL

## 2019-07-25 NOTE — ASSESSMENT
[FreeTextEntry1] : s/p CABG s/p PCI.\par No angina.\par \par ICM (severe LV dysfunction) s/p AICD.\par MIld AS.  Mod - Sev MR (likely exacerbated by volume overload).\par ECHO (2/25/19): Moderate LV dilatation.  EF 30-40%.  Mild AS.  Mod to Sev MR.  Mild TR.\par \par Clinically compensated.  Near euvolemic.\par \par BP mildly elevated controlled.

## 2019-07-25 NOTE — DISCUSSION/SUMMARY
[FreeTextEntry1] : Cont ASA and Lipitor.\par Cont Toprol.\par Start Lisinopril.\par Dialysis and diuretics per renal.\par Follow-up 3-months.

## 2019-07-25 NOTE — HISTORY OF PRESENT ILLNESS
[FreeTextEntry1] : 77 year-old male presents for hospital followup.\par \par Cardiac history of CAD s/p CABG s/p PCI, ischemic cardiomyopathy, and chronic systolic CHF s/p AICD. Previously followed with cardiologist in [place].\par \par Risk factors include hypertension, diabetes,hyperlipidemia, and CKD.\par \[facility] Presented 2 weeks ago with subacute decompensated CHF with volume overload and uncontrolled hypertension (possibly the setting of URI). Had been off Lasix for several weeks and antihypertensives for several days. Rapidly improved with diuresis.  Course complicated by KIANA.\par \par Feels well since discharge. No angina. Breathing comfortable. No palpitations, lightheadedness, syncope.  Previously had good functional capacity.  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 EF 25-35%. Moderate MR.\par \par Hospital labs reviewed:\par Creatinine 4.4\par Hemoglobin 14.7\par LFTs normal

## 2019-07-25 NOTE — REASON FOR VISIT
[Follow-Up - Clinic] : a clinic follow-up of [Cardiomyopathy] : cardiomyopathy [Coronary Artery Disease] : coronary artery disease [Heart Failure] : congestive heart failure [FreeTextEntry1] : Physically feels better on dialysis.  Breathing much improved.  Mentally difficult.\par \par No angina.  Breathing comfortable.  No palpitations, lightheadedness, syncope.\par \par Weight stable.\par \par Tolerating Rx.  No bleeding.\par \par Established EP care Dr. Wright.  AICD interrogated.  Few short episodes of NSVT.

## 2019-08-01 NOTE — HISTORY OF PRESENT ILLNESS
[FreeTextEntry1] : 77 year-old male presents for hospital followup.\par \par Cardiac history of CAD s/p CABG s/p PCI, ischemic cardiomyopathy, and chronic systolic CHF s/p AICD. Previously followed with cardiologist in [place].\par \par Risk factors include hypertension, diabetes,hyperlipidemia, and CKD.\par \[facility] Presented 2 weeks ago with subacute decompensated CHF with volume overload and uncontrolled hypertension (possibly the setting of URI). Had been off Lasix for several weeks and antihypertensives for several days. Rapidly improved with diuresis.  Course complicated by KIANA.\par \par Feels well since discharge. No angina. Breathing comfortable. No palpitations, lightheadedness, syncope.  Previously had good functional capacity.  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 EF 25-35%. Moderate MR.\par \par Hospital labs reviewed:\par Creatinine 4.4\par Hemoglobin 14.7\par LFTs normal

## 2019-08-01 NOTE — DISCUSSION/SUMMARY
[FreeTextEntry1] : Cont ASA and Lipitor.\par Cont Toprol.\par Dialysis and diuretics per renal.\par Monitor BP.\par \par No further cardiac testing required prior to AV fistula.\par If needed, ASA may be held 5-d prior to procedure al resumed after.\par Cont Toprol and Lipitor perioperatively.

## 2019-08-01 NOTE — REASON FOR VISIT
[Follow-Up - Clinic] : a clinic follow-up of [Cardiomyopathy] : cardiomyopathy [Coronary Artery Disease] : coronary artery disease [Heart Failure] : congestive heart failure [FreeTextEntry1] : Interim hospitalization for dialysis initiation.  Followed with renal as planned.  Could not be adequately diuresed / progressive renal dysfunction.\par \par Breathing improved with dialysis.  Lost 17-lbs.  Tolerating relatively well.\par \par Plan for AVR.  Started evaluation for transplant.\par \par No chest pain.  No palpitations, lightheadedness, syncope.\par \par Tolerating Rx.  No bleeding.\par \par ECHO (2/25/19): Moderate LV dilatation.  EF 30-40%.  Mild AS.  Mod to Sev MR.  Mild TR.

## 2019-08-01 NOTE — ASSESSMENT
[FreeTextEntry1] : s/p CABG s/p PCI.\par No angina.\par \par Severe LV dysfunction s/p AICD.\par MIld AS.  Mod - Sev MR (likely exacerbated by volume overload).\par ECHO (2/25/19): Moderate LV dilatation.  EF 30-40%.  Mild AS.  Mod to Sev MR.  Mild TR.\par \par Clinically compensated.  Near euvolemic.\par \par BP mildly elevated.\par \par High risk patient for perioperative cardiac events (RCRI = 4).\par Low to intermediate risk procedure.\par No cardiac decompensation.\par Functional capacity > 4 METS without angina.

## 2019-10-10 ENCOUNTER — APPOINTMENT (OUTPATIENT)
Dept: ELECTROPHYSIOLOGY | Facility: CLINIC | Age: 79
End: 2019-10-10
Payer: MEDICARE

## 2019-10-10 PROCEDURE — 93296 REM INTERROG EVL PM/IDS: CPT

## 2019-10-10 PROCEDURE — 93295 DEV INTERROG REMOTE 1/2/MLT: CPT

## 2019-10-30 ENCOUNTER — MEDICATION RENEWAL (OUTPATIENT)
Age: 79
End: 2019-10-30

## 2019-11-19 ENCOUNTER — APPOINTMENT (OUTPATIENT)
Dept: CARDIOLOGY | Facility: CLINIC | Age: 79
End: 2019-11-19
Payer: MEDICARE

## 2019-11-19 VITALS
SYSTOLIC BLOOD PRESSURE: 142 MMHG | BODY MASS INDEX: 34.21 KG/M2 | HEIGHT: 69 IN | DIASTOLIC BLOOD PRESSURE: 80 MMHG | WEIGHT: 231 LBS | HEART RATE: 88 BPM

## 2019-11-19 PROCEDURE — 99214 OFFICE O/P EST MOD 30 MIN: CPT

## 2019-11-19 PROCEDURE — 93000 ELECTROCARDIOGRAM COMPLETE: CPT

## 2019-11-19 RX ORDER — PEN NEEDLE, DIABETIC 29 G X1/2"
31G X 8 MM NEEDLE, DISPOSABLE MISCELLANEOUS
Qty: 270 | Refills: 0 | Status: DISCONTINUED | COMMUNITY
Start: 2018-01-09 | End: 2019-11-19

## 2019-11-19 RX ORDER — ATORVASTATIN CALCIUM 40 MG/1
40 TABLET, FILM COATED ORAL DAILY
Qty: 90 | Refills: 3 | Status: DISCONTINUED | COMMUNITY
Start: 2018-01-13 | End: 2019-11-19

## 2019-11-20 ENCOUNTER — OTHER (OUTPATIENT)
Age: 79
End: 2019-11-20

## 2019-11-29 NOTE — DISCHARGE NOTE NURSING/CASE MANAGEMENT/SOCIAL WORK - NSDCPEPTCAREGIVEDUMATLIST _GEN_ALL_CORE
Speech Language Pathology  Facility/Department: [facility]N   CLINICAL BEDSIDE SWALLOW EVALUATION    NAME: Sunil Conner  : 1960  MRN: [id number]    IMPRESSIONS: Sunil Conner was referred for a bedside swallow evaluation after being admitted to [facility] with respiratory failure. Medical hx includes COPD, cerebral palsy with intellectual disability, DM, seizures, CVA, HTN. She has been evaluated by SLP during multiple previous admissions and does have a history of oropharyngeal dysphagia. Most recent MBSS completed in 2017 revealed mild oropharyngeal dysphagia without aspiration. Pt was seen for evaluation seated upright in bed, alert, cooperative. Vocal quality is strained - likely baseline/related to CP. Oral mechanism examination reveals reduced lingual coordination and decreased labial strength. She was presented with PO trials of thin liquids via tsp/straw, puree, and regular solids. Oral stage is mild-moderately impaired with reduced labial seal and minimal anterior loss of liquids, lingual protrusion with vertical \"mashing\" mastication pattern and tongue thrust, adequate oral clearance. Suspect mild pharyngeal dysphagia consistent with pt's baseline, with slow swallow initiation, reduced laryngeal elevation, and no s/s aspiration across all trials. Recommend continued thin liquid/dysphagia III (soft and bite-sized diet) with general aspiration precautions. Give pills whole in applesauce or pudding. SLP will monitor chart for needs. ADMISSION DATE: 2019  ADMITTING DIAGNOSIS: has Pneumonia; HTN (hypertension), benign; Seizure disorder ([address] Altered mental status; Chest pain; MR (mental retardation); COPD (chronic obstructive pulmonary disease) ([address] Chronic obstructive pulmonary disease ([address] Left hemiplegia ([address] H/O: stroke; Mixed hyperlipidemia; Acquired hypothyroidism; Sepsis ([address]  Acute bronchitis; Acute respiratory failure with hypoxia ([address] and Acute respiratory failure ([address] Influenza Vaccination/Heart Failure status)  Follows Directions: Simple  Dentition: Some missing teeth  Patient Positioning: Upright in bed  Baseline Vocal Quality: (strained)  Prior Dysphagia History: yes; baseline diet soft solids/thin liquids  Consistencies Administered: Reg solid; Dysphagia Pureed (Dysphagia I); Thin - teaspoon; Thin - straw           Vision/Hearing  Vision  Vision: Within Functional Limits  Hearing  Hearing: Within functional limits    Oral Motor Deficits  Oral/Motor  Oral Motor: Exceptions to [facility]    Oral Phase Dysfunction  Oral Phase  Oral Phase: Exceptions     Indicators of Pharyngeal Phase Dysfunction   Pharyngeal Phase  Pharyngeal Phase: WFL    Prognosis  Prognosis  Prognosis for safe diet advancement: guarded  Individuals consulted  Consulted and agree with results and recommendations: Patient;RN    Education  Patient Education: recommendations  Patient Education Response: Verbalizes understanding  Safety Devices in place: Yes  Type of devices:  All fall risk precautions in place         Therapy Time  SLP Individual Minutes  Time In: [address] [place] [address]  [facility].  Minutes: [place], [facility]-SLP  11/29/2019 4:46 PM

## 2019-12-01 NOTE — REASON FOR VISIT
[Follow-Up - Clinic] : a clinic follow-up of [Cardiomyopathy] : cardiomyopathy [Coronary Artery Disease] : coronary artery disease [Heart Failure] : congestive heart failure [FreeTextEntry1] : Feels well.\par \par Tolerating dialysis (2 / week).  Physically feels better.\par \par No angina.  Breathing comfortable.  No palpitations, lightheadedness, syncope.\par \par Weight stable.\par \par Tolerating Rx.  No bleeding.

## 2019-12-01 NOTE — ASSESSMENT
[FreeTextEntry1] : s/p CABG s/p PCI.\par No angina.\par \par ICM (severe LV dysfunction) s/p AICD.\par MIld AS.  Mod - Sev MR (likely exacerbated by volume overload).\par ECHO (2/25/19): Moderate LV dilatation.  EF 30-40%.  Mild AS.  Mod to Sev MR.  Mild TR.\par \par Clinically compensated.  Near euvolemic.\par \par BP mildly elevated.

## 2019-12-01 NOTE — HISTORY OF PRESENT ILLNESS
[FreeTextEntry1] : 77 year-old male presents for hospital followup.\par \par Cardiac history of CAD s/p CABG s/p PCI, ischemic cardiomyopathy, and chronic systolic CHF s/p AICD. Previously followed with cardiologist in [place].\par \par Risk factors include hypertension, diabetes,hyperlipidemia, and CKD.\par \[facility] Presented 2 weeks ago with subacute decompensated CHF with volume overload and uncontrolled hypertension (possibly the setting of URI). Had been off Lasix for several weeks and antihypertensives for several days. Rapidly improved with diuresis.  Course complicated by KIANA.\par \par Feels well since discharge. No angina. Breathing comfortable. No palpitations, lightheadedness, syncope.  Previously had good functional capacity.  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 EF 25-35%. Moderate MR.\par \par Hospital labs reviewed:\par Creatinine 4.4\par Hemoglobin 14.7\par LFTs normal

## 2019-12-01 NOTE — DISCUSSION/SUMMARY
[FreeTextEntry1] : Cont ASA  and Lipitor.\par Cont Toprol.\par Start Entresto.\par Dialysis and diuretics per renal.\par Dermatology referral for skin tag.\par ECHO and follow-up 3-months.

## 2020-01-10 ENCOUNTER — APPOINTMENT (OUTPATIENT)
Dept: ELECTROPHYSIOLOGY | Facility: CLINIC | Age: 80
End: 2020-01-10
Payer: MEDICARE

## 2020-01-10 PROCEDURE — 93296 REM INTERROG EVL PM/IDS: CPT

## 2020-01-10 PROCEDURE — 93295 DEV INTERROG REMOTE 1/2/MLT: CPT

## 2020-01-15 ENCOUNTER — APPOINTMENT (OUTPATIENT)
Dept: CARDIOLOGY | Facility: CLINIC | Age: 80
End: 2020-01-15
Payer: MEDICARE

## 2020-01-15 VITALS
DIASTOLIC BLOOD PRESSURE: 91 MMHG | BODY MASS INDEX: 33.33 KG/M2 | SYSTOLIC BLOOD PRESSURE: 147 MMHG | HEART RATE: 89 BPM | WEIGHT: 225 LBS | HEIGHT: 69 IN

## 2020-01-15 VITALS — SYSTOLIC BLOOD PRESSURE: 142 MMHG | DIASTOLIC BLOOD PRESSURE: 88 MMHG

## 2020-01-15 DIAGNOSIS — Z82.49 FAMILY HISTORY OF ISCHEMIC HEART DISEASE AND OTHER DISEASES OF THE CIRCULATORY SYSTEM: ICD-10-CM

## 2020-01-15 DIAGNOSIS — Z78.9 OTHER SPECIFIED HEALTH STATUS: ICD-10-CM

## 2020-01-15 PROCEDURE — 93290 INTERROG DEV EVAL ICPMS IP: CPT | Mod: 26

## 2020-01-15 PROCEDURE — 93282 PRGRMG EVAL IMPLANTABLE DFB: CPT

## 2020-01-15 PROCEDURE — 93000 ELECTROCARDIOGRAM COMPLETE: CPT | Mod: 59

## 2020-01-15 PROCEDURE — 99213 OFFICE O/P EST LOW 20 MIN: CPT

## 2020-01-16 NOTE — END OF VISIT
[FreeTextEntry3] : I was present with the nurse practitioner during the history and exam of the patient. I discussed patient's management with the NP in detail. I reviewed the NP’s note and agree with the documented findings and plan of care. I would like to take this opportunity to thank you for involving me in this patient’s care. Please do not hesitate to contact me if you have any further questions at [phone].\par

## 2020-01-16 NOTE — HISTORY OF PRESENT ILLNESS
[de-identified] : \par Cardiologist: Dr. Nance\par \par 78 yo M with history of CAD s/p 5v CABG (2/25/2015), ICM s/p single chamber ICD (12/7/15), obesity, ESRD on HD, DM, COPD, who returns for a routine device interrogation. \par \par Denies CP/SOB or palpitations . Denies dizziness, no near syncope or syncope. No AICD shocks. \par \par ECG (1/15/2020):  SR at 85 bpm, inc RBBB,  ms

## 2020-01-16 NOTE — PHYSICAL EXAM
[Normal Appearance] : normal appearance [Well Groomed] : well groomed [No Deformities] : no deformities [General Appearance - In No Acute Distress] : no acute distress [Heart Rate And Rhythm] : heart rate and rhythm were normal [Heart Sounds] : normal S1 and S2 [] : no respiratory distress [Respiration, Rhythm And Depth] : normal respiratory rhythm and effort [Exaggerated Use Of Accessory Muscles For Inspiration] : no accessory muscle use [Auscultation Breath Sounds / Voice Sounds] : lungs were clear to auscultation bilaterally [Well-Healed] : well-healed [Abdomen Soft] : soft [General Appearance - Well Developed] : well developed [Nail Clubbing] : no clubbing of the fingernails [Cyanosis, Localized] : no localized cyanosis [Left Infraclavicular] : left infraclavicular area [FreeTextEntry1] : trace edema

## 2020-01-16 NOTE — ASSESSMENT
[FreeTextEntry1] : Mr. Centeno is a 80 yo M with history of CAD s/p CABG (2015), ICM s/p single chamber ICD 12/7/2015, HTN, HL, DM, and ESRD on HD.\par \par Device interrogation was performed as detailed above : Normal Single Chamber AICD function, no arrhythmias. He is enrolled in remote monitoring services and transmitting appropriately. \par \par At this time, we have asked him to continue his present regimen and to follow up in 9 months. We will continue remote AICD monitoring q 3 months . F/U with cardio/Dr. Nance as scheduled . \par \par We have also advised the patient to go to the nearest emergency room if he experiences any chest pain, dyspnea, syncope, or has any other compelling symptoms.

## 2020-01-16 NOTE — PROCEDURE
[No] : not [NSR] : normal sinus rhythm [ICD] : Implantable cardioverter-defibrillator [Longevity: ___ months] : The estimated remaining battery life is [unfilled] months [Threshold Testing Performed] : Threshold testing was performed [Lead Imp:  ___ohms] : lead impedance was [unfilled] ohms [Sensing Amplitude ___mv] : sensing amplitude was [unfilled] mv [___V @] : [unfilled] V [___ ms] : [unfilled] ms [Programmed for Longevity] : output reprogrammed for improved battery longevity [Pace ___ %] : Pace [unfilled]% [VVI] : VVI [See Device Printout] : See device printout [See Scanned Paceart Report] : See scanned paceart report [de-identified] : [facility] [de-identified] : D142 [de-identified] : [id number] [de-identified] : 12/7/15 [de-identified] : 40  [de-identified] : Normal device function. No episodes. Home monitor transmitting. Respiratory Rate Trend stable. \par Stable thoracic impedance

## 2020-02-11 ENCOUNTER — APPOINTMENT (OUTPATIENT)
Dept: CARDIOLOGY | Facility: CLINIC | Age: 80
End: 2020-02-11
Payer: MEDICARE

## 2020-02-11 PROCEDURE — 93306 TTE W/DOPPLER COMPLETE: CPT

## 2020-02-18 ENCOUNTER — APPOINTMENT (OUTPATIENT)
Dept: CARDIOLOGY | Facility: CLINIC | Age: 80
End: 2020-02-18
Payer: MEDICARE

## 2020-02-18 VITALS
HEIGHT: 69 IN | DIASTOLIC BLOOD PRESSURE: 72 MMHG | HEART RATE: 81 BPM | SYSTOLIC BLOOD PRESSURE: 118 MMHG | WEIGHT: 228 LBS | BODY MASS INDEX: 33.77 KG/M2

## 2020-02-18 PROCEDURE — 99214 OFFICE O/P EST MOD 30 MIN: CPT

## 2020-02-18 PROCEDURE — 93000 ELECTROCARDIOGRAM COMPLETE: CPT

## 2020-02-18 NOTE — HISTORY OF PRESENT ILLNESS
[FreeTextEntry1] : 77 year-old male presents for hospital followup.\par \par Cardiac history of CAD s/p CABG s/p PCI, ischemic cardiomyopathy, and chronic systolic CHF s/p AICD. Previously followed with cardiologist in [place].\par \par Risk factors include hypertension, diabetes,hyperlipidemia, and CKD.\par \[facility] Presented 2 weeks ago with subacute decompensated CHF with volume overload and uncontrolled hypertension (possibly the setting of URI). Had been off Lasix for several weeks and antihypertensives for several days. Rapidly improved with diuresis.  Course complicated by KIANA.\par \par Feels well since discharge. No angina. Breathing comfortable. No palpitations, lightheadedness, syncope.  Previously had good functional capacity.  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 EF 25-35%. Moderate MR.\par \par Hospital labs reviewed:\par Creatinine 4.4\par Hemoglobin 14.7\par LFTs normal

## 2020-02-18 NOTE — ASSESSMENT
[FreeTextEntry1] : s/p CABG s/p PCI.\par No angina.\par \par ICM (severe LV dysfunction) s/p AICD.\par Mod AS / Mod MR.\par \par Clinically compensated / euvolemic.\par \par BP controlled.\par \par ECHO (2/11/20): Mild LV dilatation.  EF 30-40%.  Mod AS.  Mod MR.  Mild TR.

## 2020-02-18 NOTE — REASON FOR VISIT
[Follow-Up - Clinic] : a clinic follow-up of [Cardiomyopathy] : cardiomyopathy [Coronary Artery Disease] : coronary artery disease [Heart Failure] : congestive heart failure [FreeTextEntry1] : Feels well.\par \par Tolerating dialysis (2 / week).\par \par No angina.  Breathing comfortable.  No palpitations, lightheadedness, syncope.\par \par Weight stable.\par \par Only taking Entresto q24.\par \par Tolerating Rx.  No bleeding.\par \par AICD interrogated (Shah - 1/1520): nL function.  No arrhythmias.\par \par ECHO (2/11/20): Mild LV dilatation.  EF 30-40%.  Mod AS.  Mod MR.  Mild TR.

## 2020-04-16 ENCOUNTER — APPOINTMENT (OUTPATIENT)
Dept: ELECTROPHYSIOLOGY | Facility: CLINIC | Age: 80
End: 2020-04-16
Payer: MEDICARE

## 2020-04-16 PROCEDURE — 93296 REM INTERROG EVL PM/IDS: CPT

## 2020-04-16 PROCEDURE — 93295 DEV INTERROG REMOTE 1/2/MLT: CPT

## 2020-07-16 ENCOUNTER — APPOINTMENT (OUTPATIENT)
Dept: ELECTROPHYSIOLOGY | Facility: CLINIC | Age: 80
End: 2020-07-16
Payer: MEDICARE

## 2020-07-16 PROCEDURE — 93296 REM INTERROG EVL PM/IDS: CPT

## 2020-07-16 PROCEDURE — 93295 DEV INTERROG REMOTE 1/2/MLT: CPT

## 2020-08-20 ENCOUNTER — APPOINTMENT (OUTPATIENT)
Dept: CARDIOLOGY | Facility: CLINIC | Age: 80
End: 2020-08-20
Payer: MEDICARE

## 2020-08-20 VITALS
DIASTOLIC BLOOD PRESSURE: 80 MMHG | SYSTOLIC BLOOD PRESSURE: 146 MMHG | HEIGHT: 69 IN | HEART RATE: 76 BPM | BODY MASS INDEX: 34.21 KG/M2 | WEIGHT: 231 LBS | TEMPERATURE: 98 F

## 2020-08-20 PROCEDURE — 99214 OFFICE O/P EST MOD 30 MIN: CPT

## 2020-08-20 PROCEDURE — 93000 ELECTROCARDIOGRAM COMPLETE: CPT

## 2020-08-20 RX ORDER — LIRAGLUTIDE 6 MG/ML
18 INJECTION SUBCUTANEOUS DAILY
Refills: 0 | Status: DISCONTINUED | COMMUNITY
End: 2020-08-20

## 2020-08-25 RX ORDER — FENOFIBRATE 145 MG/1
145 TABLET, COATED ORAL DAILY
Qty: 90 | Refills: 0 | Status: DISCONTINUED | COMMUNITY
Start: 2018-01-20 | End: 2020-08-25

## 2020-08-25 NOTE — ASSESSMENT
[FreeTextEntry1] : s/p CABG s/p PCI.\par No angina.\par \par ICM (severe LV dysfunction) s/p AICD.\par Mod AS / Mod MR.\par \par Clinically compensated / euvolemic.\par \par BP mildly elevated.\par \par ECHO (2/11/20): Mild LV dilatation.  EF 30-40%.  Mod AS.  Mod MR.  Mild TR.

## 2020-08-25 NOTE — DISCUSSION/SUMMARY
[FreeTextEntry1] : Cont ASA  and Lipitor.\par Cont Toprol and Entresto.\par Dialysis and diuretics per renal.\par Low-intensity exercise.\par Stop Fenofibrate.  Start Lipitor.\par Follow-up 3-months.

## 2020-08-25 NOTE — REASON FOR VISIT
[Follow-Up - Clinic] : a clinic follow-up of [Cardiomyopathy] : cardiomyopathy [Coronary Artery Disease] : coronary artery disease [Heart Failure] : congestive heart failure [FreeTextEntry1] : Feels well.\par \par Still doing well on 2 / week dialysis.  Relatively active.\par \par No angina.  Breathing comfortable.  No palpitations, lightheadedness, syncope.\par \par Weight stable.\par \par Moving to [place].\par \par Increased Entresto to q12.\par \par Tolerating Rx.  No bleeding.

## 2020-08-25 NOTE — HISTORY OF PRESENT ILLNESS
[FreeTextEntry1] : 77 year-old male presents for hospital followup.\par \par Cardiac history of CAD s/p CABG s/p PCI, ischemic cardiomyopathy, and chronic systolic CHF s/p AICD. Previously followed with cardiologist in [place].\par \par Risk factors include hypertension, diabetes,hyperlipidemia, and CKD.\par \[facility] Presented 2 weeks ago with subacute decompensated CHF with volume overload and uncontrolled hypertension (possibly the setting of URI). Had been off Lasix for several weeks and antihypertensives for several days. Rapidly improved with diuresis.  Course complicated by KIANA.\par \par Feels well since discharge. No angina. Breathing comfortable. No palpitations, lightheadedness, syncope.  Previously had good functional capacity.  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 EF 25-35%. Moderate MR.\par \par Hospital labs reviewed:\par Creatinine 4.4\par Hemoglobin 14.7\par LFTs normal

## 2020-10-14 ENCOUNTER — APPOINTMENT (OUTPATIENT)
Dept: CARDIOLOGY | Facility: CLINIC | Age: 80
End: 2020-10-14

## 2020-10-15 ENCOUNTER — APPOINTMENT (OUTPATIENT)
Dept: ELECTROPHYSIOLOGY | Facility: CLINIC | Age: 80
End: 2020-10-15

## 2021-01-14 ENCOUNTER — APPOINTMENT (OUTPATIENT)
Dept: CARDIOLOGY | Facility: CLINIC | Age: 81
End: 2021-01-14
Payer: MEDICARE

## 2021-01-14 VITALS
SYSTOLIC BLOOD PRESSURE: 138 MMHG | HEART RATE: 70 BPM | DIASTOLIC BLOOD PRESSURE: 70 MMHG | BODY MASS INDEX: 34.21 KG/M2 | HEIGHT: 69 IN | TEMPERATURE: 97.3 F | WEIGHT: 231 LBS

## 2021-01-14 PROCEDURE — 99214 OFFICE O/P EST MOD 30 MIN: CPT

## 2021-01-14 PROCEDURE — 93000 ELECTROCARDIOGRAM COMPLETE: CPT

## 2021-01-14 PROCEDURE — 99072 ADDL SUPL MATRL&STAF TM PHE: CPT

## 2021-01-14 NOTE — DISCUSSION/SUMMARY
[FreeTextEntry1] : Cont ASA  and Lipitor.\par Cont Toprol and Entresto.\par Dialysis and diuretics per renal.\par Low-intensity exercise.\par Start Lipitor.\par ECHO.\par Follow-up 3-months.

## 2021-01-14 NOTE — HISTORY OF PRESENT ILLNESS
[FreeTextEntry1] : 77 year-old male presents for hospital followup.\par \par Cardiac history of CAD s/p CABG s/p PCI, ischemic cardiomyopathy, and chronic systolic CHF s/p AICD. Previously followed with cardiologist in [place].\par \par Risk factors include hypertension, diabetes,hyperlipidemia, and CKD.\par \[facility] Presented 2 weeks ago with subacute decompensated CHF with volume overload and uncontrolled hypertension (possibly the setting of URI). Had been off Lasix for several weeks and antihypertensives for several days. Rapidly improved with diuresis.  Course complicated by KIANA.\par \par Feels well since discharge. No angina. Breathing comfortable. No palpitations, lightheadedness, syncope.  Previously had good functional capacity.  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 EF 25-35%. Moderate MR.\par \par Hospital labs reviewed:\par Creatinine 4.4\par Hemoglobin 14.7\par LFTs normal

## 2021-01-14 NOTE — REASON FOR VISIT
[Follow-Up - Clinic] : a clinic follow-up of [Cardiomyopathy] : cardiomyopathy [Coronary Artery Disease] : coronary artery disease [Heart Failure] : congestive heart failure [FreeTextEntry1] : Feels well.\par \par Doing well on 2 / week dialysis.\par \par Active / renovating house.  No exertional symptoms.\par \par No angina.  Breathing comfortable.\par \par Weight stable.\par \par Tolerating Rx.

## 2021-01-25 ENCOUNTER — TRANSCRIPTION ENCOUNTER (OUTPATIENT)
Age: 81
End: 2021-01-25

## 2021-01-28 ENCOUNTER — INPATIENT (INPATIENT)
Facility: HOSPITAL | Age: 81
LOS: 5 days | Discharge: HOME | End: 2021-02-03
Attending: INTERNAL MEDICINE | Admitting: INTERNAL MEDICINE
Payer: MEDICARE

## 2021-01-28 VITALS
HEIGHT: 70 IN | HEART RATE: 97 BPM | TEMPERATURE: 100 F | RESPIRATION RATE: 18 BRPM | OXYGEN SATURATION: 98 % | SYSTOLIC BLOOD PRESSURE: 141 MMHG | DIASTOLIC BLOOD PRESSURE: 91 MMHG

## 2021-01-28 LAB
ALBUMIN SERPL ELPH-MCNC: 4.4 G/DL — SIGNIFICANT CHANGE UP (ref 3.5–5.2)
ALP SERPL-CCNC: 47 U/L — SIGNIFICANT CHANGE UP (ref 30–115)
ALT FLD-CCNC: 10 U/L — SIGNIFICANT CHANGE UP (ref 0–41)
ANION GAP SERPL CALC-SCNC: 17 MMOL/L — HIGH (ref 7–14)
AST SERPL-CCNC: 16 U/L — SIGNIFICANT CHANGE UP (ref 0–41)
BASOPHILS # BLD AUTO: 0.01 K/UL — SIGNIFICANT CHANGE UP (ref 0–0.2)
BASOPHILS NFR BLD AUTO: 0.2 % — SIGNIFICANT CHANGE UP (ref 0–1)
BILIRUB SERPL-MCNC: 0.6 MG/DL — SIGNIFICANT CHANGE UP (ref 0.2–1.2)
BUN SERPL-MCNC: 73 MG/DL — CRITICAL HIGH (ref 10–20)
CALCIUM SERPL-MCNC: 8.6 MG/DL — SIGNIFICANT CHANGE UP (ref 8.5–10.1)
CHLORIDE SERPL-SCNC: 96 MMOL/L — LOW (ref 98–110)
CO2 SERPL-SCNC: 23 MMOL/L — SIGNIFICANT CHANGE UP (ref 17–32)
CREAT SERPL-MCNC: 5 MG/DL — CRITICAL HIGH (ref 0.7–1.5)
D DIMER BLD IA.RAPID-MCNC: 533 NG/ML DDU — HIGH (ref 0–230)
EOSINOPHIL # BLD AUTO: 0.04 K/UL — SIGNIFICANT CHANGE UP (ref 0–0.7)
EOSINOPHIL NFR BLD AUTO: 0.6 % — SIGNIFICANT CHANGE UP (ref 0–8)
ERYTHROCYTE [SEDIMENTATION RATE] IN BLOOD: 63 MM/HR — HIGH (ref 0–10)
GLUCOSE BLDC GLUCOMTR-MCNC: 151 MG/DL — HIGH (ref 70–99)
GLUCOSE BLDC GLUCOMTR-MCNC: 155 MG/DL — HIGH (ref 70–99)
GLUCOSE SERPL-MCNC: 146 MG/DL — HIGH (ref 70–99)
HCT VFR BLD CALC: 39.7 % — LOW (ref 42–52)
HGB BLD-MCNC: 13 G/DL — LOW (ref 14–18)
IMM GRANULOCYTES NFR BLD AUTO: 0.8 % — HIGH (ref 0.1–0.3)
LACTATE SERPL-SCNC: 1.5 MMOL/L — SIGNIFICANT CHANGE UP (ref 0.7–2)
LYMPHOCYTES # BLD AUTO: 1.01 K/UL — LOW (ref 1.2–3.4)
LYMPHOCYTES # BLD AUTO: 15.5 % — LOW (ref 20.5–51.1)
MCHC RBC-ENTMCNC: 28.4 PG — SIGNIFICANT CHANGE UP (ref 27–31)
MCHC RBC-ENTMCNC: 32.7 G/DL — SIGNIFICANT CHANGE UP (ref 32–37)
MCV RBC AUTO: 86.9 FL — SIGNIFICANT CHANGE UP (ref 80–94)
MONOCYTES # BLD AUTO: 1.09 K/UL — HIGH (ref 0.1–0.6)
MONOCYTES NFR BLD AUTO: 16.7 % — HIGH (ref 1.7–9.3)
NEUTROPHILS # BLD AUTO: 4.31 K/UL — SIGNIFICANT CHANGE UP (ref 1.4–6.5)
NEUTROPHILS NFR BLD AUTO: 66.2 % — SIGNIFICANT CHANGE UP (ref 42.2–75.2)
NRBC # BLD: 0 /100 WBCS — SIGNIFICANT CHANGE UP (ref 0–0)
NT-PROBNP SERPL-SCNC: 594 PG/ML — HIGH (ref 0–300)
PLATELET # BLD AUTO: 138 K/UL — SIGNIFICANT CHANGE UP (ref 130–400)
POTASSIUM SERPL-MCNC: 4.3 MMOL/L — SIGNIFICANT CHANGE UP (ref 3.5–5)
POTASSIUM SERPL-SCNC: 4.3 MMOL/L — SIGNIFICANT CHANGE UP (ref 3.5–5)
PROT SERPL-MCNC: 6.6 G/DL — SIGNIFICANT CHANGE UP (ref 6–8)
RBC # BLD: 4.57 M/UL — LOW (ref 4.7–6.1)
RBC # FLD: 13.6 % — SIGNIFICANT CHANGE UP (ref 11.5–14.5)
SARS-COV-2 RNA SPEC QL NAA+PROBE: DETECTED
SODIUM SERPL-SCNC: 136 MMOL/L — SIGNIFICANT CHANGE UP (ref 135–146)
TROPONIN T SERPL-MCNC: 0.07 NG/ML — CRITICAL HIGH
WBC # BLD: 6.51 K/UL — SIGNIFICANT CHANGE UP (ref 4.8–10.8)
WBC # FLD AUTO: 6.51 K/UL — SIGNIFICANT CHANGE UP (ref 4.8–10.8)

## 2021-01-28 PROCEDURE — 71045 X-RAY EXAM CHEST 1 VIEW: CPT | Mod: 26

## 2021-01-28 PROCEDURE — 93010 ELECTROCARDIOGRAM REPORT: CPT

## 2021-01-28 PROCEDURE — 74177 CT ABD & PELVIS W/CONTRAST: CPT | Mod: 26

## 2021-01-28 PROCEDURE — 99285 EMERGENCY DEPT VISIT HI MDM: CPT

## 2021-01-28 RX ORDER — ERGOCALCIFEROL 1.25 MG/1
50000 CAPSULE ORAL
Refills: 0 | Status: DISCONTINUED | OUTPATIENT
Start: 2021-01-28 | End: 2021-02-03

## 2021-01-28 RX ORDER — INSULIN ASPART 100 [IU]/ML
0 INJECTION, SOLUTION SUBCUTANEOUS
Qty: 0 | Refills: 0 | DISCHARGE

## 2021-01-28 RX ORDER — ATORVASTATIN CALCIUM 80 MG/1
40 TABLET, FILM COATED ORAL AT BEDTIME
Refills: 0 | Status: DISCONTINUED | OUTPATIENT
Start: 2021-01-28 | End: 2021-02-03

## 2021-01-28 RX ORDER — LIRAGLUTIDE 6 MG/ML
0 INJECTION SUBCUTANEOUS
Qty: 0 | Refills: 0 | DISCHARGE

## 2021-01-28 RX ORDER — CHOLECALCIFEROL (VITAMIN D3) 125 MCG
50000 CAPSULE ORAL
Refills: 0 | Status: DISCONTINUED | OUTPATIENT
Start: 2021-01-28 | End: 2021-01-28

## 2021-01-28 RX ORDER — CHLORHEXIDINE GLUCONATE 213 G/1000ML
1 SOLUTION TOPICAL
Refills: 0 | Status: DISCONTINUED | OUTPATIENT
Start: 2021-01-28 | End: 2021-02-03

## 2021-01-28 RX ORDER — HEPARIN SODIUM 5000 [USP'U]/ML
5000 INJECTION INTRAVENOUS; SUBCUTANEOUS EVERY 8 HOURS
Refills: 0 | Status: DISCONTINUED | OUTPATIENT
Start: 2021-01-28 | End: 2021-02-03

## 2021-01-28 RX ORDER — METOPROLOL TARTRATE 50 MG
50 TABLET ORAL DAILY
Refills: 0 | Status: DISCONTINUED | OUTPATIENT
Start: 2021-01-28 | End: 2021-02-03

## 2021-01-28 RX ORDER — INSULIN GLARGINE 100 [IU]/ML
30 INJECTION, SOLUTION SUBCUTANEOUS AT BEDTIME
Refills: 0 | Status: DISCONTINUED | OUTPATIENT
Start: 2021-01-28 | End: 2021-02-03

## 2021-01-28 RX ORDER — HYDRALAZINE HCL 50 MG
1 TABLET ORAL
Qty: 0 | Refills: 0 | DISCHARGE

## 2021-01-28 RX ORDER — ISOSORBIDE DINITRATE 5 MG/1
10 TABLET ORAL
Qty: 0 | Refills: 0 | DISCHARGE

## 2021-01-28 RX ORDER — FUROSEMIDE 40 MG
40 TABLET ORAL
Refills: 0 | Status: DISCONTINUED | OUTPATIENT
Start: 2021-01-28 | End: 2021-02-03

## 2021-01-28 RX ORDER — HEPARIN SODIUM 5000 [USP'U]/ML
2000 INJECTION INTRAVENOUS; SUBCUTANEOUS ONCE
Refills: 0 | Status: DISCONTINUED | OUTPATIENT
Start: 2021-01-28 | End: 2021-02-03

## 2021-01-28 RX ORDER — SACUBITRIL AND VALSARTAN 24; 26 MG/1; MG/1
1 TABLET, FILM COATED ORAL
Refills: 0 | Status: DISCONTINUED | OUTPATIENT
Start: 2021-01-28 | End: 2021-01-29

## 2021-01-28 RX ORDER — CALCIUM ACETATE 667 MG
667 TABLET ORAL
Refills: 0 | Status: DISCONTINUED | OUTPATIENT
Start: 2021-01-28 | End: 2021-02-03

## 2021-01-28 RX ORDER — ONDANSETRON 8 MG/1
4 TABLET, FILM COATED ORAL ONCE
Refills: 0 | Status: COMPLETED | OUTPATIENT
Start: 2021-01-28 | End: 2021-01-28

## 2021-01-28 RX ORDER — ONDANSETRON 8 MG/1
4 TABLET, FILM COATED ORAL EVERY 6 HOURS
Refills: 0 | Status: DISCONTINUED | OUTPATIENT
Start: 2021-01-28 | End: 2021-01-28

## 2021-01-28 RX ORDER — ACETAMINOPHEN 500 MG
650 TABLET ORAL ONCE
Refills: 0 | Status: COMPLETED | OUTPATIENT
Start: 2021-01-28 | End: 2021-01-28

## 2021-01-28 RX ADMIN — SACUBITRIL AND VALSARTAN 1 TABLET(S): 24; 26 TABLET, FILM COATED ORAL at 18:55

## 2021-01-28 RX ADMIN — ONDANSETRON 4 MILLIGRAM(S): 8 TABLET, FILM COATED ORAL at 21:54

## 2021-01-28 RX ADMIN — ATORVASTATIN CALCIUM 40 MILLIGRAM(S): 80 TABLET, FILM COATED ORAL at 21:54

## 2021-01-28 RX ADMIN — HEPARIN SODIUM 5000 UNIT(S): 5000 INJECTION INTRAVENOUS; SUBCUTANEOUS at 21:53

## 2021-01-28 RX ADMIN — Medication 650 MILLIGRAM(S): at 11:03

## 2021-01-28 RX ADMIN — Medication 667 MILLIGRAM(S): at 18:54

## 2021-01-28 RX ADMIN — INSULIN GLARGINE 30 UNIT(S): 100 INJECTION, SOLUTION SUBCUTANEOUS at 21:47

## 2021-01-28 NOTE — H&P ADULT - ATTENDING COMMENTS
81 YO M with a PMH of ESRD on HD (TS), DLD, CAD s/p CABG, HTN, DM2, and COVID19+ (1/15) who presents to the hospital with a c/o progressively worsening SOB for the past x 2 weeks. Associated with fevers and non-productive cough. Denies any runny nose, sore throat, rashes, CP, palpitations, LE swelling, N/V/D, ABD pain, and dysuria. - sick contacts. - recent travel. In the ED, Chest X-ray read as negative. D-dimer elevated. CTA-chest w/o PE but did show B/L GGOs. Hypoxic, placed on NC. Isolated and COVID19 swab was positive.     Physical exam shows pt in NAD. VSS, afebrile, not hypoxic on RA. A&Ox3. Non-focal neuro exam. Muscle strength/sensation intact. CTA B/L with no W/C/R. RRR, no M/G/R. ABD is soft and non-tender, normoactive BSs. LUE AVF with good thrill. No rashes. Labs and radiology as above.     SOB + Fevers + Cough due to COVID19 pneumonia, SIRs present on admission (temp + HR>90 + source, no end organ damage). - recent travel. - COVID19 contact. Admit to COVID19 isolation unit. Send Coags, CRP, procal, D-dimer, and LDH. Trend CBC, Ck, and LFTs. Send ferritin and fibrinogen. IV Steroids. APAP PRN. Anti-tussives PRN. Supplemental O2 PRN. Prone pt as tolerated. AC as per [facility] COVID protocol. ID Consult.    HAGMA (High Anion Gap Metabolic Acidosis) from uremic acidosis. IVFs (LR). Repeat BMP in the AM.     Troponin elevation likely from ESRD. Doubt ACS. Repeat one more set of cardiac enzymes and EKgs in the AM.     Normocytic anemia, above baseline. Replace as necessary.    Hx of ESRD on HD (TS). Renal consult for in-pt HD.     Hx of DLD, CAD s/p CABG, HTN, and DM2. Restart home meds, except as stated above. DVT PPX. Inform PCP of pt's admission to hospital. My note supersedes the residents note.

## 2021-01-28 NOTE — ED PROVIDER NOTE - OBJECTIVE STATEMENT
Patient is a 79yo male with PMH of h/o ESRD on HD, CAD s/p CABG, HTN, and DM who presents to the ED w/ chest pain, SOB, nausea after testing positive for COVID 2 weeks ago. Patient was trying to avoid coming to the hospital, but felt he needed to after developing cough, chest pain, SOB, body aches, and non-bloody diarrhea yesterday. He also reports some dry heaving this AM. He has not had a full meal in nearly two weeks. Patient quit smoking 50 years ago, denies current alcohol and drug use. Denies headache, urinary symptoms. Patient is a 79yo male with PMH of h/o ESRD on HD (TS), CAD s/p CABG, HTN, and DM who presents to the ED w/ chest pain and SOB nausea for the past 2 weeks, worsening in the last day with chills after testing positive for COVID 2 weeks ago. sating 93% on RA in ED. Patient is a 79yo male with PMH of h/o ESRD on HD (TS), CAD s/p CABG, HTN, and DM who presents to the ED w/ chest pain and SOB nausea, poor appetite for the past 2 weeks, worsening in the last day with chills after testing positive for COVID 2 weeks ago. sating 93% on RA in ED. Pt's wife also has covid and is currently admitted.

## 2021-01-28 NOTE — H&P ADULT - NSICDXPASTSURGICALHX_GEN_ALL_CORE_FT
PAST SURGICAL HISTORY:  S/P appendectomy     S/P knee surgery b/l arthroscopic    S/P primary angioplasty with coronary stent 6-7 stents last one in 10/12    S/P tonsillectomy

## 2021-01-28 NOTE — ED ADULT NURSE NOTE - PMH
BPH (Benign Prostatic Hyperplasia)    CAD (coronary artery disease)    CAD (Coronary Artery Disease)    CHF (congestive heart failure)    Diabetes mellitus    Diabetes Mellitus Type II    GERD (gastroesophageal reflux disease)    H/O hyperlipidemia    History of PTCA  with stents 10 years ago ([facility])  HTN (Hypertension)    HTN - Hypertension    Hypercholesterolemia    Mitral valve regurgitation    Renal insufficiency    Sleep apnea  does not use machine

## 2021-01-28 NOTE — H&P ADULT - HISTORY OF PRESENT ILLNESS
79yo male with PMH of h/o ESRD on HD, DLD, CAD s/p CABG, HTN, and DM who presents to the ED w/ chest pain and SOB nausea, poor appetite for the past 2 weeks, worsening in the last day with chills after testing positive for COVID 2 weeks ago.  Patients wife is also admitted to [facility] for COVID. Patient states that he has been progressively getting weaker, with poor PO intake.  Patient admits to intermittent SOB, N/V, and abdominal pain. He denies CP.       In the ED:  Patient is currently saturating at 98% of 3L NC, BP: 141/91 HR: 97  Temp: 101  CT AP showed patchy bibasilar ground-glass airspace opacities, which may be attributed to viral pneumonia in the appropriate clinical setting.  Troponin : 0.07   Serum BNP: 594   81yo male with PMH of h/o ESRD on HD, DLD, CAD s/p CABG, HTN, and DM who presents to the ED w/ chest pain and SOB nausea, poor appetite for the past 2 weeks, worsening in the last day with chills after testing positive for COVID 2 weeks ago. Patients wife is also admitted to [facility] for COVID. Patient states that he has been progressively getting weaker, with poor PO intake. Patient admits to intermittent SOB, N/V, and abdominal pain.       In the ED:  Patient is currently saturating at 98% of 3L NC, BP: 141/91 HR: 97  Temp: 101  CT AP showed patchy bibasilar ground-glass airspace opacities, which may be attributed to viral pneumonia in the appropriate clinical setting.  Troponin : 0.07   Serum BNP: 594   81yo male with PMH of h/o ESRD on HD, DLD, CAD s/p CABG, HTN, and DM who presents to the ED w/ chest pain and SOB nausea, poor appetite for the past 2 weeks, worsening in the last day with chills after testing positive for COVID 2 weeks ago.  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  He suspects he transmitted the virus to his wife. Patients wife is also admitted to [facility] for COVID. Patient states that he has been progressively getting weaker, with poor PO intake. Patient admits to intermittent SOB, N/V, and abdominal pain.       In the ED:  Patient is currently saturating at 98% of 3L NC, BP: 141/91 HR: 97  Temp: 101  CT AP showed patchy bibasilar ground-glass airspace opacities, which may be attributed to viral pneumonia in the appropriate clinical setting.  Troponin : 0.07   Serum BNP: 594   81yo male with PMH of h/o ESRD on HD, DLD, CAD s/p CABG, HTN, and DM who presents to the ED w/ chest pain and SOB nausea, poor appetite for the past 2 weeks, worsening in the last day with chills after testing positive for COVID 2 weeks ago.  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  He suspects he transmitted the virus to his wife. Patients wife is also admitted to [facility] for COVID. Patient states that he has been progressively getting weaker, with poor PO intake. Patient admits to intermittent SOB, N/V, and abdominal pain.     In the ED:  Patient is currently saturating at 98% of 3L NC, BP: 141/91 HR: 97  Temp: 101  CT AP showed patchy bibasilar ground-glass airspace opacities, which may be attributed to viral pneumonia in the appropriate clinical setting.  Troponin : 0.07   Serum BNP: 594

## 2021-01-28 NOTE — H&P ADULT - ASSESSMENT
80 yr old m w/ a PMHx significant for HTN, DMII, DLD, ESRD on HD. CAD s/p CABG,  presents with nausea, vomiting, weakness after testing positive for COVID 19 two weeks ago.     # COVID  - f/u CRP, ESR, Ferritin. D-dimer, procal  - Steroid?  - f/u ID consult    #ESRD  - scheduled for HD tomorrow with Dr. Du  - f/u nephro    #HTN  - controlled on home medications  - c/w metoprolol 25 mg daily  - c/w hydralazine 25 mg daily  - c/w amlodipine 5 mg daily  - c/w isosorbide dinitrate 10 mg daily    #DLD  - c/w lipitor 40 mg daily + omega 3 fatty acid daily     #DMII  - Lantus 30 mg qhs    #MISC  DIET: DASH  DVT ppx:  Dispo: Acute  Activity: IAT  CHG    80 yr old m w/ a PMHx significant for HTN, DMII, DLD, ESRD on HD. CAD s/p CABG,  presents with nausea, vomiting, weakness after testing positive for COVID 19 two weeks ago.     # COVID  - f/u CRP, ESR, Ferritin. D-dimer, procal  - BNP: 594  - D-dimer : 533  - CT AP: Patchy bibasilar groundglass airspace opacities, which may be attributed to viral pneumonia in the appropriate clinical setting  - Steroid?  - f/u ID consult    #ESRD  - scheduled for HD tomorrow with Dr. Du  - f/u nephro  - f/u lytes, CMP    #HTN  - controlled on home medications  - c/w metoprolol 25 mg daily  - c/w hydralazine 25 mg daily  - c/w amlodipine 5 mg daily  - c/w isosorbide dinitrate 10 mg daily    #DLD  - c/w lipitor 40 mg daily + omega 3 fatty acid daily     #DMII  - Lantus 30 mg qhs    #MISC  DIET: DASH  DVT ppx:  Dispo: Acute  Activity: IAT  CHG    80 yr old m w/ a PMHx significant for HTN, DMII, DLD, ESRD on HD. CAD s/p CABG,  presents with nausea, vomiting, weakness after testing positive for COVID 19 two weeks ago.     # COVID  - f/u CRP, ESR, Ferritin. D-dimer, procal  - BNP: 594  - D-dimer : 533  - CT AP: Patchy bibasilar groundglass airspace opacities, which may be attributed to viral pneumonia in the appropriate clinical setting  - f/u ID consult    #ESRD  - scheduled for HD tomorrow with Dr. Du  - f/u nephro  - f/u lytes, CMP    #HTN  - controlled on home medications  - c/w metoprolol 50 mg daily  - c/w lasix 40 mg BID  - c/w entresto    #DLD  - c/w lipitor 40 mg daily    #DMII  - Lantus 30 mg qhs    #MISC  DIET: DASH  DVT ppx:  Dispo: Acute  Activity: IAT  CHG    80 yr old m w/ a PMHx significant for HTN, DMII, DLD, ESRD on HD, CAD s/p CABG,  presents with nausea, vomiting, weakness after testing positive for COVID 19 two weeks ago.     #SOB  - COVID PCR + for two weeks  - f/u CRP, ESR, Ferritin. D-dimer, procal  - currently saturating 98% on 3L NC  - BNP: 594  - D-dimer : 533  - CT AP: Patchy bibasilar groundglass airspace opacities, which may be attributed to viral pneumonia in the appropriate clinical setting  - f/u ID consult    #ESRD  - scheduled for HD tomorrow with Dr. Du  - f/u nephro  - f/u lytes, CMP    #HTN  - controlled on home medications  - c/w metoprolol 50 mg daily  - c/w lasix 40 mg BID  - c/w entresto 24-26    #DLD  - c/w lipitor 40 mg daily    #DMII  - Lantus 30 mg qhs    #MISC  DIET: DASH  DVT ppx: Heparin 5000  Dispo: Acute  Activity: IAT  CHG

## 2021-01-28 NOTE — CONSULT NOTE ADULT - ASSESSMENT
ESRD on HD 2x /week  COVID-19 PNA   - with dyspnea / n/v / weakness   CAD / CABG / CMP / CHF  HTN  DM2    plan:    HD tomorrow  renal diet  left arm precautions  phoslo with meals   can cont lasix and entresto  ID eval  O2  consider dexamethasone  RDV contraindicated  f/u inflamm markers

## 2021-01-28 NOTE — H&P ADULT - NSHPPHYSICALEXAM_GEN_ALL_CORE
CONSTITUTIONAL: NAD  EYES: PERRLA, EOMI  ENT: tongue midline  CARD: RRR, no MRG  RESP: CTAB, no WRR. Slightly decreased breath sounds bilateral at bases. 98% on 3L NC.  ABD: Soft, non tender in all quadrants  EXT: FROM. No edema. No calves tenderness  NEURO: Alert, oriented. CN2-12 intact, equal strength bilaterally.  PSYCH: Cooperative, appropriate.

## 2021-01-28 NOTE — CONSULT NOTE ADULT - SUBJECTIVE AND OBJECTIVE BOX
NEPHROLOGY CONSULTATION NOTE        PAST MEDICAL & SURGICAL HISTORY:  Mitral valve regurgitation    CHF (congestive heart failure)    BPH (Benign Prostatic Hyperplasia)    GERD (gastroesophageal reflux disease)    Sleep apnea  does not use machine    Renal insufficiency    HTN - Hypertension    H/O hyperlipidemia    CAD (coronary artery disease)    Diabetes mellitus    History of PTCA  with [facility] 10 years ago ([facility]    CAD (Coronary Artery Disease)    Hypercholesterolemia    Diabetes Mellitus Type II    HTN (Hypertension)    S/P appendectomy    S/P primary angioplasty with coronary stent  6-7 stents last one in 10/12    S/P tonsillectomy    S/P knee surgery  b/l arthroscopic      Allergies:  No Known Allergies    Home Medications Reviewed    SOCIAL HISTORY:  Denies ETOH,Smoking,   FAMILY HISTORY:  No pertinent family history in first degree relatives          REVIEW OF SYSTEMS:  CONSTITUTIONAL: + weakness, fevers or chills  EYES/ENT: No visual changes;  No vertigo or throat pain   NECK: No pain or stiffness  RESPIRATORY: No cough, wheezing, hemoptysis; No shortness of breath  CARDIOVASCULAR: No chest pain or palpitations.  GASTROINTESTINAL: No abdominal or epigastric pain. No nausea, vomiting, or hematemesis; No diarrhea or constipation. No melena or hematochezia.  GENITOURINARY: No dysuria, frequency, foamy urine, urinary urgency, incontinence or hematuria  NEUROLOGICAL: No numbness or weakness  SKIN: No itching, burning, rashes, or lesions   VASCULAR: No bilateral lower extremity edema.   All other review of systems is negative unless indicated above.    PHYSICAL EXAM:  Constitutional: NAD  HEENT: anicteric sclera, oropharynx clear, MMM 98% on 3l o2  Neck: No JVD  Respiratory: CTAB, no wheezes, rales or rhonchi  Cardiovascular: S1, S2, RRR  Gastrointestinal: BS+, soft, NT/ND  Extremities: No cyanosis or clubbing. No peripheral edema  Neurological: A/O x 3, no focal deficits  Psychiatric: Normal mood, normal affect  : No CVA tenderness. No woods.   Skin: No rashes  Vascular Access: left arm AVF + [facility] Medications:   MEDICATIONS  (STANDING):  atorvastatin 40 milliGRAM(s) Oral at bedtime  calcium acetate 667 milliGRAM(s) Oral three times a day with meals  chlorhexidine 4% Liquid 1 Application(s) Topical <User Schedule>  cholecalciferol Oral Tab/Cap - Peds 66979 Unit(s) Oral every week  furosemide    Tablet 40 milliGRAM(s) Oral two times a day  heparin   Injectable 5000 Unit(s) SubCutaneous every 8 hours  insulin glargine SubCutaneous Injection (LANTUS) - Peds 30 Unit(s) SubCutaneous at bedtime  metoprolol succinate ER 50 milliGRAM(s) Oral daily  sacubitril 24 mG/valsartan 26 mG 1 Tablet(s) Oral two times a day        VITALS:  T(F): 99.3 (01-28-21 @ 15:58), Max: 101 (01-28-21 @ 10:37)  HR: 102 (01-28-21 @ 15:58)  BP: 126/67 (01-28-21 @ 15:58)  RR: 17 (01-28-21 @ 15:58)  SpO2: 93% (01-28-21 @ 15:58)  Wt(kg): --    Height (cm): 177.8 (01-28 @ 13:37)  Weight (kg): 104.326 (01-28 @ 13:37)  BMI (kg/m2): 33 (01-28 @ 13:37)  BSA (m2): 2.22 (01-28 @ 13:37)    LABS:  01-28    136  |  96<L>  |  73<HH>  ----------------------------<  146<H>  4.3   |  23  |  5.0<HH>    Ca    8.6      28 Jan 2021 09:39    TPro  6.6  /  Alb  4.4  /  TBili  0.6  /  DBili      /  AST  16  /  ALT  10  /  AlkPhos  47  01-28                          13.0   6.51  )-----------( 138      ( 28 Jan 2021 09:39 )             39.7       Urine Studies:        RADIOLOGY & ADDITIONAL STUDIES:   NEPHROLOGY CONSULTATION NOTE    79yo male with PMH of h/o ESRD on HD, DLD, CAD s/p CABG, HTN, and DM who presents to the ED w/ chest pain and SOB nausea, poor appetite for the past 2 weeks, worsening in the last day with chills after testing positive for COVID 2 weeks ago.  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  He suspects he transmitted the virus to his wife. Patients wife is also admitted to [facility] for COVID. Patient states that he has been progressively getting weaker, with poor PO intake. Patient admits to intermittent SOB, N/V, and abdominal pain.       In the ED:  Patient is currently saturating at 98% of 3L NC, BP: 141/91 HR: 97  Temp: 101  CT AP showed patchy bibasilar ground-glass airspace opacities, which may be attributed to viral pneumonia in the appropriate clinical setting.  Troponin : 0.07   Serum BNP: 594      PAST MEDICAL & SURGICAL HISTORY:  Mitral valve regurgitation    CHF (congestive heart failure)    BPH (Benign Prostatic Hyperplasia)    GERD (gastroesophageal reflux disease)    Sleep apnea  does not use machine    Renal insufficiency    HTN - Hypertension    H/O hyperlipidemia    CAD (coronary artery disease)    Diabetes mellitus    History of PTCA  with [facility] 10 years ago ([facility]    CAD (Coronary Artery Disease)    Hypercholesterolemia    Diabetes Mellitus Type II    HTN (Hypertension)    S/P appendectomy    S/P primary angioplasty with coronary stent  6-7 stents last one in 10/12    S/P tonsillectomy    S/P knee surgery  b/l arthroscopic      Allergies:  No Known Allergies    Home Medications Reviewed    SOCIAL HISTORY:  Denies ETOH,Smoking,   FAMILY HISTORY:  No pertinent family history in first degree relatives          REVIEW OF SYSTEMS:  CONSTITUTIONAL: + weakness, fevers or chills  EYES/ENT: No visual changes;  No vertigo or throat pain   NECK: No pain or stiffness  RESPIRATORY: No cough, wheezing, hemoptysis; No shortness of breath  CARDIOVASCULAR: No chest pain or palpitations.  GASTROINTESTINAL: No abdominal or epigastric pain. No nausea, vomiting, or hematemesis; No diarrhea or constipation. No melena or hematochezia.  GENITOURINARY: No dysuria, frequency, foamy urine, urinary urgency, incontinence or hematuria  NEUROLOGICAL: No numbness or weakness  SKIN: No itching, burning, rashes, or lesions   VASCULAR: No bilateral lower extremity edema.   All other review of systems is negative unless indicated above.    PHYSICAL EXAM:  Constitutional: NAD  HEENT: anicteric sclera, oropharynx clear, MMM 98% on 3l o2  Neck: No JVD  Respiratory: CTAB, no wheezes, rales or rhonchi  Cardiovascular: S1, S2, RRR  Gastrointestinal: BS+, soft, NT/ND  Extremities: No cyanosis or clubbing. No peripheral edema  Neurological: A/O x 3, no focal deficits  Psychiatric: Normal mood, normal affect  : No CVA tenderness. No woods.   Skin: No rashes  Vascular Access: left arm AVF + [facility] Medications:   MEDICATIONS  (STANDING):  atorvastatin 40 milliGRAM(s) Oral at bedtime  calcium acetate 667 milliGRAM(s) Oral three times a day with meals  chlorhexidine 4% Liquid 1 Application(s) Topical <User Schedule>  cholecalciferol Oral Tab/Cap - Peds 58352 Unit(s) Oral every week  furosemide    Tablet 40 milliGRAM(s) Oral two times a day  heparin   Injectable 5000 Unit(s) SubCutaneous every 8 hours  insulin glargine SubCutaneous Injection (LANTUS) - Peds 30 Unit(s) SubCutaneous at bedtime  metoprolol succinate ER 50 milliGRAM(s) Oral daily  sacubitril 24 mG/valsartan 26 mG 1 Tablet(s) Oral two times a day        VITALS:  T(F): 99.3 (01-28-21 @ 15:58), Max: 101 (01-28-21 @ 10:37)  HR: 102 (01-28-21 @ 15:58)  BP: 126/67 (01-28-21 @ 15:58)  RR: 17 (01-28-21 @ 15:58)  SpO2: 93% (01-28-21 @ 15:58)  Wt(kg): --    Height (cm): 177.8 (01-28 @ 13:37)  Weight (kg): 104.326 (01-28 @ 13:37)  BMI (kg/m2): 33 (01-28 @ 13:37)  BSA (m2): 2.22 (01-28 @ 13:37)    LABS:  01-28    136  |  96<L>  |  73<HH>  ----------------------------<  146<H>  4.3   |  23  |  5.0<HH>    Ca    8.6      28 Jan 2021 09:39    TPro  6.6  /  Alb  4.4  /  TBili  0.6  /  DBili      /  AST  16  /  ALT  10  /  AlkPhos  47  01-28                          13.0   6.51  )-----------( 138      ( 28 Jan 2021 09:39 )             39.7       Urine Studies:        RADIOLOGY & ADDITIONAL STUDIES:

## 2021-01-28 NOTE — H&P ADULT - NSHPLABSRESULTS_GEN_ALL_CORE
`Troponin T, Serum (01.28.21 @ 09:39)   Troponin T, Serum: 0.07: Critical value:   TYPE:(C=Critical, N=Notification, A=Abnormal) C Serum Pro-Brain Natriuretic Peptide (01.28.21 @ 09:39)   Serum Pro-Brain Natriuretic Peptide: 594 pg/mL Lactate, Blood (01.28.21 @ 09:39)   Lactate, Blood: 1.5 mmol/L D-Dimer Assay, Quantitative (01.28.21 @ 09:39)   D-Dimer Assay, Quantitative: 533: Comprehensive Metabolic Panel (01.28.21 @ 09:39)   Sodium, Serum: 136 mmol/L   Potassium, Serum: 4.3 mmol/L   Chloride, Serum: 96 mmol/L   Carbon Dioxide, Serum: 23 mmol/L   Anion Gap, Serum: 17 mmol/L   Blood Urea Nitrogen, Serum: 73: Critical value: Complete Blood Count + Automated Diff (01.28.21 @ 09:39)   WBC Count: 6.51 K/uL   RBC Count: 4.57 M/uL   Hemoglobin: 13.0 g/dL   Hematocrit: 39.7 %   Mean Cell Volume: 86.9 fL   Mean Cell Hemoglobin: 28.4 pg   Mean Cell Hemoglobin Conc: 32.7 g/dL   Red Cell Distrib Width: 13.6 %   Platelet Count - Automated: 138 K/uL   Auto Neutrophil #: 4.31 K/uL     `< from: CT Abdomen and Pelvis w/ IV Cont (01.28.21 @ 12:54) >      IMPRESSION:    Patchy bibasilar groundglass airspace opacities, which may be attributed to viral pneumonia in the appropriate clinical setting.    No additional evidence of acute pathology.          < end of copied text >      Auto Lymphocyte #: 1.01 K/uL   Auto Monocyte #: 1.09 K/uL   Auto Eosinophil #: 0.04 K/uL   Auto Basophil #: 0.01 K/uL   Auto Neutrophil %: 66.2: Differential percentages must be correlated with absolute numbers for   clinical significance. %   Auto Lymphocyte %: 15.5 %   Auto Monocyte %: 16.7 %   Auto Eosinophil %: 0.6 %   Auto Basophil %: 0.2 %   Auto Immature Granulocyte %: 0.8 %   Nucleated RBC: 0 /100 WBCs COVID-19 PCR . (01.28.21 @ 09:29)   COVID-19 PCR: Detected:

## 2021-01-28 NOTE — ED PROVIDER NOTE - CLINICAL SUMMARY MEDICAL DECISION MAKING FREE TEXT BOX
80 yr old m that presents with weakness, covid+, inability to tolerate po. labs, ekg, imaging obtained. pt admitted to medicine.

## 2021-01-28 NOTE — ED PROVIDER NOTE - NS ED ROS FT
Constitutional:  see HPI  Head:  (+) headache. denies dizziness, loss of consciousness  Eyes:  no visual changes; no eye pain, redness, or discharge  ENMT:  (+) throat pain, no ear pain or discharge; no hearing problems,   Cardiac: (+) chest pain, denies tachycardia or palpitations  Respiratory: (+) cough, (+) shortness of breath, (+) chest tightness, (+) trouble breathing  GI: (+) nausea, (+) diarrhea or (+) abdominal pain, denies vomiting  :  no dysuria, frequency, or burning with urination; no change in urine output  MS: (+) myalgias  Neuro: no weakness; (+) tingling in b/l hands; no seizure  Skin:  no rashes or color changes; no lacerations or abrasions

## 2021-01-28 NOTE — H&P ADULT - NSICDXPASTMEDICALHX_GEN_ALL_CORE_FT
PAST MEDICAL HISTORY:  BPH (Benign Prostatic Hyperplasia)     CAD (Coronary Artery Disease)     CAD (coronary artery disease)     CHF (congestive heart failure)     Diabetes mellitus     Diabetes Mellitus Type II     GERD (gastroesophageal reflux disease)     H/O hyperlipidemia     History of PTCA with stents 10 years ago ([facility])    HTN (Hypertension)     HTN - Hypertension     Hypercholesterolemia     Mitral valve regurgitation     Renal insufficiency     Sleep apnea does not use machine

## 2021-01-28 NOTE — H&P ADULT - NSHPSOCIALHISTORY_GEN_ALL_CORE
Never smoker Never smoker  live with wife and son  recently built his own home  completes ADLs independently Never smoker  denies drug use  lives with wife and son  recently built his own single level [facility] home  completes ADLs independently

## 2021-01-28 NOTE — ED ADULT NURSE NOTE - NSIMPLEMENTINTERV_GEN_ALL_ED
Implemented All Universal Safety Interventions:  [place] to call system. Call bell, personal items and telephone within reach. Instruct patient to call for assistance. Room bathroom lighting operational. Non-slip footwear when patient is off stretcher. Physically safe environment: no spills, clutter or unnecessary equipment. Stretcher in lowest position, wheels locked, appropriate side rails in place.

## 2021-01-28 NOTE — ED PROVIDER NOTE - ATTENDING CONTRIBUTION TO CARE
80 yr old m w/ a pmh significant for ESRD (TS), htn, dm who presents with nausea, vomiting, weakness. Pt reports that he was diagnosed with covid ~ 2 weeks ago. pts wife has also covid and is currently hospitalized. pt states that he has not been able to tolerate po and has been feeling more and more weak. pt states that he has also been having sob, but denies any chest pain during this time. Pt also complains of abdominal pain, nausea, vomiting but denies any diarrhea.     VITAL SIGNS: I have reviewed nursing notes and confirm.  CONSTITUTIONAL: non-toxic, well appearing  SKIN: no rash, no petechiae.  EYES: PERRL, EOMI, pink conjunctiva, anicteric  ENT: tongue midline, no exudates, MMM  NECK: Supple; no meningismus, no JVD  CARD: RRR, no murmurs, equal radial pulses bilaterally 2+  RESP: CTAB, no respiratory distress. pt noted to be 91% on room air   ABD: Soft, diffuse tenderness, non-distended, no peritoneal signs, no HSM, no CVA tenderness  EXT: Normal ROM x4. No edema. No calves tenderness  NEURO: Alert, oriented. CN2-12 intact, equal strength bilaterally, nl gait.  PSYCH: Cooperative, appropriate.    a/p  80 yr old m covid + presenting with nausea, vomiting, abd pain, weakness  -labs  -ekg  -imaging  -consider admission

## 2021-01-28 NOTE — ED PROVIDER NOTE - PMH
BPH (Benign Prostatic Hyperplasia)    CAD (coronary artery disease)    CAD (Coronary Artery Disease)    CHF (congestive heart failure)    Diabetes mellitus    Diabetes Mellitus Type II    GERD (gastroesophageal reflux disease)    H/O hyperlipidemia    History of PTCA  with stents 10 years ago ([facility])  HTN (Hypertension)    HTN - Hypertension    Hypercholesterolemia    Mitral valve regurgitation    Renal insufficiency    Sleep apnea  does not use machine

## 2021-01-28 NOTE — ED PROVIDER NOTE - PHYSICAL EXAMINATION
CONSTITUTIONAL: Well-developed; well-nourished; in no acute distress.   SKIN: warm, dry  HEAD: Normocephalic; atraumatic.  EYES: PERRL, EOMI, normal sclera and conjunctiva   ENT: No nasal discharge; airway clear.  NECK: Supple; non tender.  CARD: S1, S2 normal; no murmurs, gallops, or rubs. Regular rate and rhythm.   RESP: No wheezes, rales or rhonchi.  ABD: soft TTP R side  EXT: Normal ROM.  No clubbing, cyanosis or edema.   LYMPH: No acute cervical adenopathy.  NEURO: Alert, oriented, grossly unremarkable  PSYCH: Cooperative, appropriate. CONSTITUTIONAL: Well-developed; well-nourished; in no acute distress.   SKIN: warm, dry. L fistuala w/ palpable thrill  HEAD: Normocephalic; atraumatic.  EYES: PERRL, EOMI, normal sclera and conjunctiva   ENT: No nasal discharge; airway clear.  NECK: Supple; non tender.  CARD: S1, S2 normal; no murmurs, gallops, or rubs. Regular rate and rhythm.   RESP: No wheezes, rales or rhonchi.  ABD: soft TTP R side  EXT: Normal ROM.  No clubbing, cyanosis or edema.   LYMPH: No acute cervical adenopathy.  NEURO: Alert, oriented, grossly unremarkable  PSYCH: Cooperative, appropriate.

## 2021-01-28 NOTE — ED ADULT NURSE NOTE - OBJECTIVE STATEMENT
pt c/o sob, chest pain and N/V, decreased PO intake x2 weeks with no improvement. pt also reports tingling in BL hands.

## 2021-01-29 LAB
ALBUMIN SERPL ELPH-MCNC: 4.1 G/DL — SIGNIFICANT CHANGE UP (ref 3.5–5.2)
ALP SERPL-CCNC: 42 U/L — SIGNIFICANT CHANGE UP (ref 30–115)
ALT FLD-CCNC: 9 U/L — SIGNIFICANT CHANGE UP (ref 0–41)
ANION GAP SERPL CALC-SCNC: 16 MMOL/L — HIGH (ref 7–14)
APTT BLD: 32.8 SEC — SIGNIFICANT CHANGE UP (ref 27–39.2)
AST SERPL-CCNC: 15 U/L — SIGNIFICANT CHANGE UP (ref 0–41)
BASOPHILS # BLD AUTO: 0.01 K/UL — SIGNIFICANT CHANGE UP (ref 0–0.2)
BASOPHILS NFR BLD AUTO: 0.2 % — SIGNIFICANT CHANGE UP (ref 0–1)
BILIRUB SERPL-MCNC: 0.6 MG/DL — SIGNIFICANT CHANGE UP (ref 0.2–1.2)
BUN SERPL-MCNC: 79 MG/DL — CRITICAL HIGH (ref 10–20)
CALCIUM SERPL-MCNC: 8.5 MG/DL — SIGNIFICANT CHANGE UP (ref 8.5–10.1)
CHLORIDE SERPL-SCNC: 96 MMOL/L — LOW (ref 98–110)
CO2 SERPL-SCNC: 23 MMOL/L — SIGNIFICANT CHANGE UP (ref 17–32)
CREAT SERPL-MCNC: 5.1 MG/DL — CRITICAL HIGH (ref 0.7–1.5)
CRP SERPL-MCNC: 5.73 MG/DL — HIGH (ref 0–0.4)
D DIMER BLD IA.RAPID-MCNC: 630 NG/ML DDU — HIGH (ref 0–230)
EOSINOPHIL # BLD AUTO: 0.05 K/UL — SIGNIFICANT CHANGE UP (ref 0–0.7)
EOSINOPHIL NFR BLD AUTO: 0.9 % — SIGNIFICANT CHANGE UP (ref 0–8)
FERRITIN SERPL-MCNC: 1032 NG/ML — HIGH (ref 30–400)
GLUCOSE BLDC GLUCOMTR-MCNC: 125 MG/DL — HIGH (ref 70–99)
GLUCOSE BLDC GLUCOMTR-MCNC: 129 MG/DL — HIGH (ref 70–99)
GLUCOSE BLDC GLUCOMTR-MCNC: 193 MG/DL — HIGH (ref 70–99)
GLUCOSE BLDC GLUCOMTR-MCNC: 195 MG/DL — HIGH (ref 70–99)
GLUCOSE SERPL-MCNC: 119 MG/DL — HIGH (ref 70–99)
HCT VFR BLD CALC: 36.3 % — LOW (ref 42–52)
HGB BLD-MCNC: 12.1 G/DL — LOW (ref 14–18)
IMM GRANULOCYTES NFR BLD AUTO: 0.5 % — HIGH (ref 0.1–0.3)
INR BLD: 1.14 RATIO — SIGNIFICANT CHANGE UP (ref 0.65–1.3)
LYMPHOCYTES # BLD AUTO: 1.03 K/UL — LOW (ref 1.2–3.4)
LYMPHOCYTES # BLD AUTO: 18 % — LOW (ref 20.5–51.1)
MAGNESIUM SERPL-MCNC: 2.1 MG/DL — SIGNIFICANT CHANGE UP (ref 1.8–2.4)
MCHC RBC-ENTMCNC: 28.9 PG — SIGNIFICANT CHANGE UP (ref 27–31)
MCHC RBC-ENTMCNC: 33.3 G/DL — SIGNIFICANT CHANGE UP (ref 32–37)
MCV RBC AUTO: 86.6 FL — SIGNIFICANT CHANGE UP (ref 80–94)
MONOCYTES # BLD AUTO: 1 K/UL — HIGH (ref 0.1–0.6)
MONOCYTES NFR BLD AUTO: 17.5 % — HIGH (ref 1.7–9.3)
NEUTROPHILS # BLD AUTO: 3.59 K/UL — SIGNIFICANT CHANGE UP (ref 1.4–6.5)
NEUTROPHILS NFR BLD AUTO: 62.9 % — SIGNIFICANT CHANGE UP (ref 42.2–75.2)
NRBC # BLD: 0 /100 WBCS — SIGNIFICANT CHANGE UP (ref 0–0)
PLATELET # BLD AUTO: 130 K/UL — SIGNIFICANT CHANGE UP (ref 130–400)
POTASSIUM SERPL-MCNC: 4.3 MMOL/L — SIGNIFICANT CHANGE UP (ref 3.5–5)
POTASSIUM SERPL-SCNC: 4.3 MMOL/L — SIGNIFICANT CHANGE UP (ref 3.5–5)
PROCALCITONIN SERPL-MCNC: 0.47 NG/ML — HIGH (ref 0.02–0.1)
PROT SERPL-MCNC: 6.2 G/DL — SIGNIFICANT CHANGE UP (ref 6–8)
PROTHROM AB SERPL-ACNC: 13.1 SEC — HIGH (ref 9.95–12.87)
RBC # BLD: 4.19 M/UL — LOW (ref 4.7–6.1)
RBC # FLD: 13.6 % — SIGNIFICANT CHANGE UP (ref 11.5–14.5)
SODIUM SERPL-SCNC: 135 MMOL/L — SIGNIFICANT CHANGE UP (ref 135–146)
TROPONIN T SERPL-MCNC: 0.05 NG/ML — CRITICAL HIGH
WBC # BLD: 5.71 K/UL — SIGNIFICANT CHANGE UP (ref 4.8–10.8)
WBC # FLD AUTO: 5.71 K/UL — SIGNIFICANT CHANGE UP (ref 4.8–10.8)

## 2021-01-29 PROCEDURE — 99223 1ST HOSP IP/OBS HIGH 75: CPT | Mod: AI

## 2021-01-29 RX ORDER — POLYETHYLENE GLYCOL 3350 17 G/17G
17 POWDER, FOR SOLUTION ORAL DAILY
Refills: 0 | Status: DISCONTINUED | OUTPATIENT
Start: 2021-01-29 | End: 2021-01-29

## 2021-01-29 RX ORDER — SODIUM CHLORIDE 9 MG/ML
250 INJECTION INTRAMUSCULAR; INTRAVENOUS; SUBCUTANEOUS ONCE
Refills: 0 | Status: COMPLETED | OUTPATIENT
Start: 2021-01-29 | End: 2021-01-29

## 2021-01-29 RX ORDER — DEXAMETHASONE 0.5 MG/5ML
6 ELIXIR ORAL DAILY
Refills: 0 | Status: DISCONTINUED | OUTPATIENT
Start: 2021-01-29 | End: 2021-02-03

## 2021-01-29 RX ORDER — ACETAMINOPHEN 500 MG
650 TABLET ORAL EVERY 6 HOURS
Refills: 0 | Status: DISCONTINUED | OUTPATIENT
Start: 2021-01-29 | End: 2021-02-03

## 2021-01-29 RX ORDER — SENNA PLUS 8.6 MG/1
2 TABLET ORAL AT BEDTIME
Refills: 0 | Status: DISCONTINUED | OUTPATIENT
Start: 2021-01-29 | End: 2021-01-29

## 2021-01-29 RX ADMIN — Medication 667 MILLIGRAM(S): at 07:55

## 2021-01-29 RX ADMIN — Medication 40 MILLIGRAM(S): at 04:51

## 2021-01-29 RX ADMIN — Medication 6 MILLIGRAM(S): at 13:00

## 2021-01-29 RX ADMIN — INSULIN GLARGINE 30 UNIT(S): 100 INJECTION, SOLUTION SUBCUTANEOUS at 20:45

## 2021-01-29 RX ADMIN — Medication 50 MILLIGRAM(S): at 04:52

## 2021-01-29 RX ADMIN — ERGOCALCIFEROL 50000 UNIT(S): 1.25 CAPSULE ORAL at 04:52

## 2021-01-29 RX ADMIN — HEPARIN SODIUM 5000 UNIT(S): 5000 INJECTION INTRAVENOUS; SUBCUTANEOUS at 20:48

## 2021-01-29 RX ADMIN — HEPARIN SODIUM 5000 UNIT(S): 5000 INJECTION INTRAVENOUS; SUBCUTANEOUS at 12:39

## 2021-01-29 RX ADMIN — Medication 667 MILLIGRAM(S): at 16:49

## 2021-01-29 RX ADMIN — ATORVASTATIN CALCIUM 40 MILLIGRAM(S): 80 TABLET, FILM COATED ORAL at 20:48

## 2021-01-29 RX ADMIN — Medication 667 MILLIGRAM(S): at 11:06

## 2021-01-29 RX ADMIN — SACUBITRIL AND VALSARTAN 1 TABLET(S): 24; 26 TABLET, FILM COATED ORAL at 04:51

## 2021-01-29 RX ADMIN — HEPARIN SODIUM 5000 UNIT(S): 5000 INJECTION INTRAVENOUS; SUBCUTANEOUS at 04:51

## 2021-01-29 RX ADMIN — SODIUM CHLORIDE 500 MILLILITER(S): 9 INJECTION INTRAMUSCULAR; INTRAVENOUS; SUBCUTANEOUS at 11:58

## 2021-01-29 RX ADMIN — Medication 650 MILLIGRAM(S): at 21:23

## 2021-01-29 RX ADMIN — Medication 40 MILLIGRAM(S): at 16:49

## 2021-01-29 RX ADMIN — CHLORHEXIDINE GLUCONATE 1 APPLICATION(S): 213 SOLUTION TOPICAL at 04:50

## 2021-01-29 NOTE — PROGRESS NOTE ADULT - ATTENDING COMMENTS
Patient seen and examined independently. Agree with resident note with exceptions.     # Acute Hypoxic respiratory failure sec to COVID 19 pneumonia  -  Xray Chest 1 View- PORTABLE-Urgent (01.28.21 @ 10:06) >No radiographic evidence of acute cardiopulmonary disease.  -  CT Abdomen and Pelvis w/ IV Cont (01.28.21 @ 12:54) >lOWER CHEST: Patchy bibasilar groundglass airspace opacities. Post median sternotomy/CABG.  - COVID-19 PCR: Detected (01.28.21 @ 09:29)  - D-Dimer Assay, Quantitative: 630: Manufacturers recommended Cut off for VTE is 230 ng/ml D-DU ng/mL DDU (01.29.21 @ 05:53)  - Procalcitonin, Serum: 0.47 ng/mL (01.28.21 @ 09:39)  -C-Reactive Protein, Serum: 5.73 mg/dL (01.28.21 @ 09:39)  - c/w Dexamethasone  - ID F/u  - oxygen sat 94 on  3lit    # ESRD  - HD tday  - c/w phoslo    # Hypertension  - c/w lasix, entresto, metoprolol    # DM type   - monitor FS  - c/w lantus    # DVT prophylaxis    # Full code    #Pending: clinical improvement, ID F/u  # Discussed plan of care with patient  # Disposition: Home when stable

## 2021-01-29 NOTE — CHART NOTE - NSCHARTNOTEFT_GEN_A_CORE
Communications Team    Called family, Ivonne Centeno  to relay pt's clinical status and plan per primary team AM rounds.

## 2021-01-29 NOTE — PROGRESS NOTE ADULT - ASSESSMENT
ESRD on HD 2x /week   - still with residual renal function and urine output  COVID-19 PNA   - with dyspnea / n/v / weakness   CAD / CABG / CMP / CHF  HTN  DM2    plan:    NS 250cc bolus x 1   HD today - no UF  next HD planned for Tue  renal diet  left arm precautions  phoslo with meals   dc entresto  still cont lasix    ID eval  O2      addendum:  tolerating HD.  BP's fair.  Circuit clotted after 2.5H.  Next HD Tue

## 2021-01-29 NOTE — PROGRESS NOTE ADULT - SUBJECTIVE AND OBJECTIVE BOX
** This is an incomplete note - pending AM rounds**   SUBJECTIVE:   LENGTH OF HOSPITAL STAY: 1d    CHIEF COMPLAINT:  Patient is a 80y old  Male who presents with a chief complaint of SOB (28 Jan 2021 17:25)      Events over the past 24 hours:      REVIEW OF SYSTEMS  Negative Except as mentioned above.    ALLERGIES:  No Known Allergies        MEDICATIONS:  STANDING MEDICATIONS  atorvastatin 40 milliGRAM(s) Oral at bedtime  calcium acetate 667 milliGRAM(s) Oral three times a day with meals  chlorhexidine 4% Liquid 1 Application(s) Topical <User Schedule>  ergocalciferol 29591 Unit(s) Oral <User Schedule>  furosemide    Tablet 40 milliGRAM(s) Oral two times a day  heparin   Injectable 5000 Unit(s) SubCutaneous every 8 hours  heparin   Injectable. 2000 Unit(s) Dialysis. once  insulin glargine SubCutaneous Injection (LANTUS) - Peds 30 Unit(s) SubCutaneous at bedtime  metoprolol succinate ER 50 milliGRAM(s) Oral daily  sacubitril 24 mG/valsartan 26 mG 1 Tablet(s) Oral two times a day    PRN MEDICATIONS        OBJECTIVE:  VITALS:   T(F): 99.9 (01-29-21 @ 04:00), Max: 101 (01-28-21 @ 10:37)  HR: 99 (01-29-21 @ 04:00) (86 - 102)  BP: 118/58 (01-29-21 @ 04:00) (118/58 - 141/91)  BP(mean): --  RR: 19 (01-29-21 @ 04:00) (17 - 20)  SpO2: 92% (01-29-21 @ 04:00) (92% - 98%)    I&O's:   I&O's Summary      PHYSICAL EXAM:      LABS:                        13.0   6.51  )-----------( 138      ( 28 Jan 2021 09:39 )             39.7             01-28    136  |  96<L>  |  73<HH>  ----------------------------<  146<H>  4.3   |  23  |  5.0<HH>    Ca    8.6      28 Jan 2021 09:39    TPro  6.6  /  Alb  4.4  /  TBili  0.6  /  DBili  x   /  AST  16  /  ALT  10  /  AlkPhos  47  01-28    LIVER FUNCTIONS - ( 28 Jan 2021 09:39 )  Alb: 4.4 g/dL / Pro: 6.6 g/dL / ALK PHOS: 47 U/L / ALT: 10 U/L / AST: 16 U/L / GGT: x                   CARDIAC MARKERS ( 28 Jan 2021 09:39 )  x     / 0.07 ng/mL / x     / x     / x                Blood Glucose:  151 (01-28-21 @ 21:12)  155 (01-28-21 @ 17:40)        RADIOLOGY & ADDITIONAL TESTS:                       SUBJECTIVE:   LENGTH OF HOSPITAL STAY: 1d    CHIEF COMPLAINT:  Patient is a 80y old  Male who presents with a chief complaint of SOB (28 Jan 2021 17:25)    Events over the past 24 hours:  Patient was seen at the bedside this morning. He is lying comfortably on the bed. There were no acute events overnight. He was admitted overnight d/t increasing shortness of breath after being diagnosed with COVID 2 weeks ago.     REVIEW OF SYSTEMS  Negative Except as mentioned above.    ALLERGIES:  No Known Allergies        MEDICATIONS:  STANDING MEDICATIONS  atorvastatin 40 milliGRAM(s) Oral at bedtime  calcium acetate 667 milliGRAM(s) Oral three times a day with meals  chlorhexidine 4% Liquid 1 Application(s) Topical <User Schedule>  ergocalciferol 84562 Unit(s) Oral <User Schedule>  furosemide    Tablet 40 milliGRAM(s) Oral two times a day  heparin   Injectable 5000 Unit(s) SubCutaneous every 8 hours  heparin   Injectable. 2000 Unit(s) Dialysis. once  insulin glargine SubCutaneous Injection (LANTUS) - Peds 30 Unit(s) SubCutaneous at bedtime  metoprolol succinate ER 50 milliGRAM(s) Oral daily  sacubitril 24 mG/valsartan 26 mG 1 Tablet(s) Oral two times a day    PRN MEDICATIONS        OBJECTIVE:  VITALS:   T(F): 99.9 (01-29-21 @ 04:00), Max: 101 (01-28-21 @ 10:37)  HR: 99 (01-29-21 @ 04:00) (86 - 102)  BP: 118/58 (01-29-21 @ 04:00) (118/58 - 141/91)  BP(mean): --  RR: 19 (01-29-21 @ 04:00) (17 - 20)  SpO2: 92% (01-29-21 @ 04:00) (92% - 98%)    I&O's:   I&O's Summary      PHYSICAL EXAM:  General: Not in distress; Pallor (-), Icterus (-), Cyanosis (-), Clubbing (-)  HEENT: Pupils equal, round and reactive to light symmetrically, EOM - Normal bilaterally; Hearing - b/l normal; No external discharge noted, JVD (-), Lymphadenopathy(-)  PULM: Bilaterally equal and clear breath sounds, some basal crackles, no wheeze, rubs  CVS: Normal S1 and S2, no murmurs, rubs, or gallops.   GI: Soft, nondistended, nontender, BS +  MSK: Edema (-), no joint or muscle tenderness  SKIN: Warm and well perfused, no rashes noted  NEURO:  Alert and Oriented x 3; Cranial Nerves are all grossly intact; Normal strength and sensation in all four extremities.      LABS:                        13.0   6.51  )-----------( 138      ( 28 Jan 2021 09:39 )             39.7             01-28    136  |  96<L>  |  73<HH>  ----------------------------<  146<H>  4.3   |  23  |  5.0<HH>    Ca    8.6      28 Jan 2021 09:39    TPro  6.6  /  Alb  4.4  /  TBili  0.6  /  DBili  x   /  AST  16  /  ALT  10  /  AlkPhos  47  01-28    LIVER FUNCTIONS - ( 28 Jan 2021 09:39 )  Alb: 4.4 g/dL / Pro: 6.6 g/dL / ALK PHOS: 47 U/L / ALT: 10 U/L / AST: 16 U/L / GGT: x                   CARDIAC MARKERS ( 28 Jan 2021 09:39 )  x     / 0.07 ng/mL / x     / x     / x                Blood Glucose:  151 (01-28-21 @ 21:12)  155 (01-28-21 @ 17:40)        RADIOLOGY & ADDITIONAL TESTS:                       SUBJECTIVE:   LENGTH OF HOSPITAL STAY: 1d    CHIEF COMPLAINT:  Patient is a 80y old  Male who presents with a chief complaint of SOB (28 Jan 2021 17:25)    Events over the past 24 hours:  Patient was seen at the bedside this morning. He is lying comfortably on the bed. There were no acute events overnight. He was admitted overnight d/t increasing shortness of breath after being diagnosed with COVID 2 weeks ago.    REVIEW OF SYSTEMS  Negative Except as mentioned above.    ALLERGIES:  No Known Allergies        MEDICATIONS:  STANDING MEDICATIONS  atorvastatin 40 milliGRAM(s) Oral at bedtime  calcium acetate 667 milliGRAM(s) Oral three times a day with meals  chlorhexidine 4% Liquid 1 Application(s) Topical <User Schedule>  ergocalciferol 84970 Unit(s) Oral <User Schedule>  furosemide    Tablet 40 milliGRAM(s) Oral two times a day  heparin   Injectable 5000 Unit(s) SubCutaneous every 8 hours  heparin   Injectable. 2000 Unit(s) Dialysis. once  insulin glargine SubCutaneous Injection (LANTUS) - Peds 30 Unit(s) SubCutaneous at bedtime  metoprolol succinate ER 50 milliGRAM(s) Oral daily  sacubitril 24 mG/valsartan 26 mG 1 Tablet(s) Oral two times a day    PRN MEDICATIONS        OBJECTIVE:  VITALS:   T(F): 99.9 (01-29-21 @ 04:00), Max: 101 (01-28-21 @ 10:37)  HR: 99 (01-29-21 @ 04:00) (86 - 102)  BP: 118/58 (01-29-21 @ 04:00) (118/58 - 141/91)  BP(mean): --  RR: 19 (01-29-21 @ 04:00) (17 - 20)  SpO2: 92% (01-29-21 @ 04:00) (92% - 98%)    I&O's:   I&O's Summary      PHYSICAL EXAM:  General: Not in distress; Pallor (-), Icterus (-), Cyanosis (-), Clubbing (-)  HEENT: Pupils equal, round and reactive to light symmetrically, EOM - Normal bilaterally; Hearing - b/l normal; No external discharge noted, JVD (-), Lymphadenopathy(-)  PULM: Bilaterally equal and clear breath sounds, some basal crackles, no wheeze, rubs  CVS: Normal S1 and S2, no murmurs, rubs, or gallops.   GI: Soft, nondistended, nontender, BS +  MSK: Edema (-), no joint or muscle tenderness  SKIN: Warm and well perfused, no rashes noted  NEURO:  Alert and Oriented x 3; Cranial Nerves are all grossly intact; Normal strength and sensation in all four extremities.      LABS:                        13.0   6.51  )-----------( 138      ( 28 Jan 2021 09:39 )             39.7             01-28    136  |  96<L>  |  73<HH>  ----------------------------<  146<H>  4.3   |  23  |  5.0<HH>    Ca    8.6      28 Jan 2021 09:39    TPro  6.6  /  Alb  4.4  /  TBili  0.6  /  DBili  x   /  AST  16  /  ALT  10  /  AlkPhos  47  01-28    LIVER FUNCTIONS - ( 28 Jan 2021 09:39 )  Alb: 4.4 g/dL / Pro: 6.6 g/dL / ALK PHOS: 47 U/L / ALT: 10 U/L / AST: 16 U/L / GGT: x                   CARDIAC MARKERS ( 28 Jan 2021 09:39 )  x     / 0.07 ng/mL / x     / x     / x                Blood Glucose:  151 (01-28-21 @ 21:12)  155 (01-28-21 @ 17:40)        RADIOLOGY & ADDITIONAL TESTS:                       SUBJECTIVE:   LENGTH OF HOSPITAL STAY: 1d    CHIEF COMPLAINT:  Patient is a 80y old  Male who presents with a chief complaint of SOB (28 Jan 2021 17:25)    Events over the past 24 hours:  Patient was seen at the bedside this morning. He is lying comfortably on the bed. There were no acute events overnight. He was admitted overnight d/t increasing shortness of breath after being diagnosed with COVID 2 weeks ago.    REVIEW OF SYSTEMS  Negative Except as mentioned above.    ALLERGIES:  No Known Allergies        MEDICATIONS:  STANDING MEDICATIONS  atorvastatin 40 milliGRAM(s) Oral at bedtime  calcium acetate 667 milliGRAM(s) Oral three times a day with meals  chlorhexidine 4% Liquid 1 Application(s) Topical <User Schedule>  ergocalciferol 67071 Unit(s) Oral <User Schedule>  furosemide    Tablet 40 milliGRAM(s) Oral two times a day  heparin   Injectable 5000 Unit(s) SubCutaneous every 8 hours  heparin   Injectable. 2000 Unit(s) Dialysis. once  insulin glargine SubCutaneous Injection (LANTUS) - Peds 30 Unit(s) SubCutaneous at bedtime  metoprolol succinate ER 50 milliGRAM(s) Oral daily  sacubitril 24 mG/valsartan 26 mG 1 Tablet(s) Oral two times a day    PRN MEDICATIONS        OBJECTIVE:  VITALS:   T(F): 99.9 (01-29-21 @ 04:00), Max: 101 (01-28-21 @ 10:37)  HR: 99 (01-29-21 @ 04:00) (86 - 102)  BP: 118/58 (01-29-21 @ 04:00) (118/58 - 141/91)  RR: 19 (01-29-21 @ 04:00) (17 - 20)  SpO2: 92% (01-29-21 @ 04:00) (92% - 98%)    I&O's:   I&O's Summary      PHYSICAL EXAM:  General: Not in distress; Pallor (-), Icterus (-), Cyanosis (-), Clubbing (-)  HEENT: Pupils equal, round and reactive to light symmetrically, EOM - Normal bilaterally; Hearing - b/l normal; No external discharge noted, JVD (-), Lymphadenopathy(-)  PULM: Bilaterally equal and clear breath sounds, some basal crackles, no wheeze, rubs  CVS: Normal S1 and S2, no murmurs, rubs, or gallops.   GI: Soft, nondistended, nontender, BS +  MSK: Edema (-), no joint or muscle tenderness  SKIN: Warm and well perfused, no rashes noted  NEURO:  Alert and Oriented x 3; Cranial Nerves are all grossly intact; Normal strength and sensation in all four extremities.      LABS:                        13.0   6.51  )-----------( 138      ( 28 Jan 2021 09:39 )             39.7             01-28    136  |  96<L>  |  73<HH>  ----------------------------<  146<H>  4.3   |  23  |  5.0<HH>    Ca    8.6      28 Jan 2021 09:39    TPro  6.6  /  Alb  4.4  /  TBili  0.6  /  DBili  x   /  AST  16  /  ALT  10  /  AlkPhos  47  01-28    LIVER FUNCTIONS - ( 28 Jan 2021 09:39 )  Alb: 4.4 g/dL / Pro: 6.6 g/dL / ALK PHOS: 47 U/L / ALT: 10 U/L / AST: 16 U/L / GGT: x                   CARDIAC MARKERS ( 28 Jan 2021 09:39 )  x     / 0.07 ng/mL / x     / x     / x                Blood Glucose:  151 (01-28-21 @ 21:12)  155 (01-28-21 @ 17:40)        RADIOLOGY & ADDITIONAL TESTS:

## 2021-01-29 NOTE — PROGRESS NOTE ADULT - SUBJECTIVE AND OBJECTIVE BOX
NEPHROLOGY FOLLOW UP NOTE    pt seen and examined  on O2 via NC  no fever  bp's low this am      PAST MEDICAL & SURGICAL HISTORY:  Mitral valve regurgitation    CHF (congestive heart failure)    BPH (Benign Prostatic Hyperplasia)    GERD (gastroesophageal reflux disease)    Sleep apnea  does not use machine    Renal insufficiency    HTN - Hypertension    H/O hyperlipidemia    CAD (coronary artery disease)    Diabetes mellitus    History of PTCA  with [facility] years ago ([facility]    CAD (Coronary Artery Disease)    Hypercholesterolemia    Diabetes Mellitus Type II    HTN (Hypertension)    S/P appendectomy    S/P primary angioplasty with coronary stent  6-7 stents last one in 10/12    S/P tonsillectomy    S/P knee surgery  b/l arthroscopic      Allergies:  No Known Allergies    Home Medications Reviewed    SOCIAL HISTORY:  Denies ETOH,Smoking,   FAMILY HISTORY:  No pertinent family history in first degree relatives          REVIEW OF SYSTEMS:  CONSTITUTIONAL: + weakness, fevers or chills  EYES/ENT: No visual changes;  No vertigo or throat pain   NECK: No pain or stiffness  RESPIRATORY: No cough, wheezing, hemoptysis; No shortness of breath  CARDIOVASCULAR: No chest pain or palpitations.  GASTROINTESTINAL: No abdominal or epigastric pain. No nausea, vomiting, or hematemesis; No diarrhea or constipation. No melena or hematochezia.  GENITOURINARY: No dysuria, frequency, foamy urine, urinary urgency, incontinence or hematuria  NEUROLOGICAL: No numbness or weakness  SKIN: No itching, burning, rashes, or lesions   VASCULAR: No bilateral lower extremity edema.   All other review of systems is negative unless indicated above.    PHYSICAL EXAM:  Constitutional: NAD  HEENT: anicteric sclera, oropharynx clear, MMM 98% on 3l o2  Neck: No JVD  Respiratory: CTAB, no wheezes, rales or rhonchi  Cardiovascular: S1, S2, RRR  Gastrointestinal: BS+, soft, NT/ND  Extremities: No cyanosis or clubbing. No peripheral edema  Neurological: A/O x 3, no focal deficits  Psychiatric: Normal mood, normal affect  : No CVA tenderness. No woods.   Skin: No rashes  Vascular Access: left arm AVF + [facility] Medications:   MEDICATIONS  (STANDING):  atorvastatin 40 milliGRAM(s) Oral at bedtime  calcium acetate 667 milliGRAM(s) Oral three times a day with meals  chlorhexidine 4% Liquid 1 Application(s) Topical <User Schedule>  dexAMETHasone  Injectable 6 milliGRAM(s) IV Push daily  ergocalciferol 01578 Unit(s) Oral <User Schedule>  furosemide    Tablet 40 milliGRAM(s) Oral two times a day  heparin   Injectable 5000 Unit(s) SubCutaneous every 8 hours  heparin   Injectable. 2000 Unit(s) Dialysis. once  insulin glargine SubCutaneous Injection (LANTUS) - Peds 30 Unit(s) SubCutaneous at bedtime  metoprolol succinate ER 50 milliGRAM(s) Oral daily        VITALS:  T(F): 99.1 (01-29-21 @ 11:43), Max: 99.9 (01-29-21 @ 04:00)  HR: 86 (01-29-21 @ 16:00)  BP: 141/86 (01-29-21 @ 16:00)  RR: 18 (01-29-21 @ 16:00)  SpO2: 94% (01-29-21 @ 13:20)  Wt(kg): --    01-29 @ 07:01 - 01-29 @ 17:19  --------------------------------------------------------  IN: 240 mL / OUT: 0 mL / NET: 240 mL      Height (cm): 175.3 (01-28 @ 19:35)  Weight (kg): 103.5 (01-28 @ 19:35)  BMI (kg/m2): 33.7 (01-28 @ 19:35)  BSA (m2): 2.19 (01-28 @ 19:35)    LABS:  01-29    135  |  96<L>  |  79<HH>  ----------------------------<  119<H>  4.3   |  23  |  5.1<HH>    Ca    8.5      29 Jan 2021 05:53  Mg     2.1     01-29    TPro  6.2  /  Alb  4.1  /  TBili  0.6  /  DBili      /  AST  15  /  ALT  9   /  AlkPhos  42  01-29                          12.1   5.71  )-----------( 130      ( 29 Jan 2021 05:53 )             36.3       Urine Studies:        RADIOLOGY & ADDITIONAL STUDIES:      RADIOLOGY & ADDITIONAL STUDIES:

## 2021-01-29 NOTE — PROGRESS NOTE ADULT - ASSESSMENT
80 yr old m w/ a PMHx significant for HTN, DMII, DLD, ESRD on HD, CAD s/p CABG,  presents with nausea, vomiting, weakness after testing positive for COVID 19 two weeks ago.     #SOB  - COVID PCR + for two weeks  - f/u CRP, ESR, Ferritin. D-dimer, procal  - currently saturating 98% on 3L NC  - BNP: 594  - D-dimer : 533  - CT AP: Patchy bibasilar groundglass airspace opacities, which may be attributed to viral pneumonia in the appropriate clinical setting  - f/u ID consult    #ESRD  - scheduled for HD tomorrow with Dr. Du  - f/u nephro  - f/u lytes, CMP    #HTN  - controlled on home medications  - c/w metoprolol 50 mg daily  - c/w lasix 40 mg BID  - c/w entresto 24-26    #DLD  - c/w lipitor 40 mg daily    #DMII  - Lantus 30 mg qhs    #MISC  DIET: DASH  DVT ppx: Heparin 5000  Dispo: Acute  Activity: IAT  CHG  80 yr old m w/ a PMHx significant for HTN, DMII, DLD, ESRD on HD, CAD s/p CABG,  presents with nausea, vomiting, weakness after testing positive for COVID 19 two weeks ago.     # SOB  - COVID PCR + for two weeks  - Inflammatory Markers:  CRP: 5.73 (01-28-21)  Procalcitonin: 0.47 (01-28-21)  Ferritin: 1032 (01-28-21)  D-Dimer: 630 (01-29-21)<--533 (01-28-21)  - currently saturating 94% on 3L NC  - BNP: 594  - CT AP: Patchy bibasilar ground-glass airspace opacities, which may be attributed to viral pneumonia in the appropriate clinical setting  - Start dexamethasone  - f/u ID consult    # ESRD  - scheduled for HD today  - f/u nephro  - f/u lytes, CMP    #HTN  - controlled on home medications  - c/w metoprolol 50 mg daily  - c/w lasix 40 mg BID  - c/w entresto 24-26    #DLD  - c/w lipitor 40 mg daily    #DMII  - Lantus 30 mg qhs      #MISC  - DIET: DASH  - DVT ppx: Heparin 5000  - Dispo: Acute  - Activity: IAT   80 yr old m w/ a PMHx significant for HTN, DMII, DLD, ESRD on HD, CAD s/p CABG,  presents with nausea, vomiting, weakness after testing positive for COVID 19 two weeks ago.     # SOB  - COVID PCR + for two weeks  - Inflammatory Markers:  CRP: 5.73 (01-28-21)  Procalcitonin: 0.47 (01-28-21)  Ferritin: 1032 (01-28-21)  D-Dimer: 630 (01-29-21)<--533 (01-28-21)  - currently saturating 94% on 3L NC  - BNP: 594  - CT AP: Patchy bibasilar ground-glass airspace opacities, which may be attributed to viral pneumonia in the appropriate clinical setting  - Start dexamethasone  - f/u ID consult    # ESRD  - scheduled for HD today  - f/u nephro  - f/u lytes, CMP    #HTN  - controlled on home medications  - c/w metoprolol 50 mg daily  - c/w lasix 40 mg BID  - c/w entresto 24-26    #DLD  - c/w lipitor 40 mg daily    #DMII  - Lantus 30 mg qhs    #MISC  - DIET: DASH  - DVT ppx: Heparin 5000  - Dispo: Acute  - Activity: IAT   80 yr old m w/ a PMHx significant for HTN, DMII, DLD, ESRD on HD, CAD s/p CABG,  presents with nausea, vomiting, weakness after testing positive for COVID 19 two weeks ago.     # SOB  - COVID PCR + for two weeks  - Inflammatory Markers:  CRP: 5.73 (01-28-21)  Procalcitonin: 0.47 (01-28-21)  Ferritin: 1032 (01-28-21)  D-Dimer: 630 (01-29-21)<--533 (01-28-21)  - currently saturating 94% on 3L NC  - BNP: 594  - CT AP: Patchy bibasilar ground-glass airspace opacities, which may be attributed to viral pneumonia in the appropriate clinical setting  - Start dexamethasone  - f/u ID consult    # ESRD  - scheduled for HD today  - f/u nephro  - f/u lytes, CMP    #HTN  - controlled on home medications  - c/w metoprolol 50 mg daily  - c/w lasix 40 mg BID  - BP on the lower side today; will hold entresto for now    #DLD  - c/w lipitor 40 mg daily    #DMII  - Lantus 30 mg qhs    #MISC  - DIET: DASH  - DVT ppx: Heparin 5000  - Dispo: Acute  - Activity: IAT   80 yr old m w/ a PMHx significant for HTN, DMII, DLD, ESRD on HD, CAD s/p CABG,  presents with nausea, vomiting, weakness after testing positive for COVID 19 two weeks ago.     # SOB  - COVID PCR + for two weeks  - Inflammatory Markers:  CRP: 5.73 (01-28-21)  Procalcitonin: 0.47 (01-28-21)  Ferritin: 1032 (01-28-21)  D-Dimer: 630 (01-29-21)<--533 (01-28-21)  - currently saturating 94% on 3L NC  - BNP: 594  - CT AP: Patchy bibasilar ground-glass airspace opacities, which may be attributed to viral pneumonia in the appropriate clinical setting  - Start dexamethasone  - f/u ID consult    # ESRD  - scheduled for HD today  - f/u nephro  - f/u lytes, CMP  - Strict I/O  - Daily weight    #HTN  - controlled on home medications  - c/w metoprolol 50 mg daily  - c/w lasix 40 mg BID  - BP on the lower side today; will hold entresto for now    #DLD  - c/w lipitor 40 mg daily    #DMII  - Lantus 30 mg qhs    #MISC  - DIET: DASH  - DVT ppx: Heparin 5000  - Dispo: Acute  - Activity: IAT

## 2021-01-30 LAB
ALBUMIN SERPL ELPH-MCNC: 3.8 G/DL — SIGNIFICANT CHANGE UP (ref 3.5–5.2)
ALP SERPL-CCNC: 44 U/L — SIGNIFICANT CHANGE UP (ref 30–115)
ALT FLD-CCNC: 11 U/L — SIGNIFICANT CHANGE UP (ref 0–41)
ANION GAP SERPL CALC-SCNC: 17 MMOL/L — HIGH (ref 7–14)
AST SERPL-CCNC: 22 U/L — SIGNIFICANT CHANGE UP (ref 0–41)
BASOPHILS # BLD AUTO: 0.02 K/UL — SIGNIFICANT CHANGE UP (ref 0–0.2)
BASOPHILS NFR BLD AUTO: 0.3 % — SIGNIFICANT CHANGE UP (ref 0–1)
BILIRUB SERPL-MCNC: 0.7 MG/DL — SIGNIFICANT CHANGE UP (ref 0.2–1.2)
BUN SERPL-MCNC: 61 MG/DL — CRITICAL HIGH (ref 10–20)
CALCIUM SERPL-MCNC: 8.5 MG/DL — SIGNIFICANT CHANGE UP (ref 8.5–10.1)
CHLORIDE SERPL-SCNC: 98 MMOL/L — SIGNIFICANT CHANGE UP (ref 98–110)
CO2 SERPL-SCNC: 23 MMOL/L — SIGNIFICANT CHANGE UP (ref 17–32)
CREAT SERPL-MCNC: 5.2 MG/DL — CRITICAL HIGH (ref 0.7–1.5)
EOSINOPHIL # BLD AUTO: 0.03 K/UL — SIGNIFICANT CHANGE UP (ref 0–0.7)
EOSINOPHIL NFR BLD AUTO: 0.5 % — SIGNIFICANT CHANGE UP (ref 0–8)
GLUCOSE BLDC GLUCOMTR-MCNC: 220 MG/DL — HIGH (ref 70–99)
GLUCOSE BLDC GLUCOMTR-MCNC: 260 MG/DL — HIGH (ref 70–99)
GLUCOSE BLDC GLUCOMTR-MCNC: 292 MG/DL — HIGH (ref 70–99)
GLUCOSE BLDC GLUCOMTR-MCNC: 325 MG/DL — HIGH (ref 70–99)
GLUCOSE BLDC GLUCOMTR-MCNC: 335 MG/DL — HIGH (ref 70–99)
GLUCOSE SERPL-MCNC: 110 MG/DL — HIGH (ref 70–99)
HCT VFR BLD CALC: 35.8 % — LOW (ref 42–52)
HGB BLD-MCNC: 11.9 G/DL — LOW (ref 14–18)
IMM GRANULOCYTES NFR BLD AUTO: 0.8 % — HIGH (ref 0.1–0.3)
LYMPHOCYTES # BLD AUTO: 0.86 K/UL — LOW (ref 1.2–3.4)
LYMPHOCYTES # BLD AUTO: 14.6 % — LOW (ref 20.5–51.1)
MAGNESIUM SERPL-MCNC: 1.8 MG/DL — SIGNIFICANT CHANGE UP (ref 1.8–2.4)
MCHC RBC-ENTMCNC: 28.8 PG — SIGNIFICANT CHANGE UP (ref 27–31)
MCHC RBC-ENTMCNC: 33.2 G/DL — SIGNIFICANT CHANGE UP (ref 32–37)
MCV RBC AUTO: 86.7 FL — SIGNIFICANT CHANGE UP (ref 80–94)
MONOCYTES # BLD AUTO: 0.7 K/UL — HIGH (ref 0.1–0.6)
MONOCYTES NFR BLD AUTO: 11.9 % — HIGH (ref 1.7–9.3)
NEUTROPHILS # BLD AUTO: 4.23 K/UL — SIGNIFICANT CHANGE UP (ref 1.4–6.5)
NEUTROPHILS NFR BLD AUTO: 71.9 % — SIGNIFICANT CHANGE UP (ref 42.2–75.2)
NRBC # BLD: 0 /100 WBCS — SIGNIFICANT CHANGE UP (ref 0–0)
PLATELET # BLD AUTO: 123 K/UL — LOW (ref 130–400)
POTASSIUM SERPL-MCNC: 4.6 MMOL/L — SIGNIFICANT CHANGE UP (ref 3.5–5)
POTASSIUM SERPL-SCNC: 4.6 MMOL/L — SIGNIFICANT CHANGE UP (ref 3.5–5)
PROT SERPL-MCNC: 5.7 G/DL — LOW (ref 6–8)
RBC # BLD: 4.13 M/UL — LOW (ref 4.7–6.1)
RBC # FLD: 13.5 % — SIGNIFICANT CHANGE UP (ref 11.5–14.5)
SODIUM SERPL-SCNC: 138 MMOL/L — SIGNIFICANT CHANGE UP (ref 135–146)
TROPONIN T SERPL-MCNC: 0.07 NG/ML — CRITICAL HIGH
WBC # BLD: 5.89 K/UL — SIGNIFICANT CHANGE UP (ref 4.8–10.8)
WBC # FLD AUTO: 5.89 K/UL — SIGNIFICANT CHANGE UP (ref 4.8–10.8)

## 2021-01-30 PROCEDURE — 99233 SBSQ HOSP IP/OBS HIGH 50: CPT

## 2021-01-30 PROCEDURE — 71045 X-RAY EXAM CHEST 1 VIEW: CPT | Mod: 26

## 2021-01-30 RX ORDER — INSULIN LISPRO 100/ML
8 VIAL (ML) SUBCUTANEOUS
Refills: 0 | Status: DISCONTINUED | OUTPATIENT
Start: 2021-01-30 | End: 2021-02-03

## 2021-01-30 RX ORDER — DEXTROSE 50 % IN WATER 50 %
12.5 SYRINGE (ML) INTRAVENOUS ONCE
Refills: 0 | Status: DISCONTINUED | OUTPATIENT
Start: 2021-01-30 | End: 2021-02-03

## 2021-01-30 RX ORDER — GLUCAGON INJECTION, SOLUTION 0.5 MG/.1ML
1 INJECTION, SOLUTION SUBCUTANEOUS ONCE
Refills: 0 | Status: DISCONTINUED | OUTPATIENT
Start: 2021-01-30 | End: 2021-02-03

## 2021-01-30 RX ORDER — DEXTROSE 50 % IN WATER 50 %
25 SYRINGE (ML) INTRAVENOUS ONCE
Refills: 0 | Status: DISCONTINUED | OUTPATIENT
Start: 2021-01-30 | End: 2021-02-03

## 2021-01-30 RX ORDER — SODIUM CHLORIDE 9 MG/ML
1000 INJECTION, SOLUTION INTRAVENOUS
Refills: 0 | Status: DISCONTINUED | OUTPATIENT
Start: 2021-01-30 | End: 2021-02-03

## 2021-01-30 RX ORDER — INSULIN LISPRO 100/ML
VIAL (ML) SUBCUTANEOUS
Refills: 0 | Status: DISCONTINUED | OUTPATIENT
Start: 2021-01-30 | End: 2021-02-03

## 2021-01-30 RX ORDER — DEXTROSE 50 % IN WATER 50 %
15 SYRINGE (ML) INTRAVENOUS ONCE
Refills: 0 | Status: DISCONTINUED | OUTPATIENT
Start: 2021-01-30 | End: 2021-02-03

## 2021-01-30 RX ADMIN — HEPARIN SODIUM 5000 UNIT(S): 5000 INJECTION INTRAVENOUS; SUBCUTANEOUS at 22:06

## 2021-01-30 RX ADMIN — Medication 50 MILLIGRAM(S): at 04:08

## 2021-01-30 RX ADMIN — Medication 6 MILLIGRAM(S): at 04:08

## 2021-01-30 RX ADMIN — Medication 667 MILLIGRAM(S): at 11:39

## 2021-01-30 RX ADMIN — Medication 8 UNIT(S): at 16:55

## 2021-01-30 RX ADMIN — Medication 40 MILLIGRAM(S): at 16:54

## 2021-01-30 RX ADMIN — INSULIN GLARGINE 30 UNIT(S): 100 INJECTION, SOLUTION SUBCUTANEOUS at 22:05

## 2021-01-30 RX ADMIN — Medication 667 MILLIGRAM(S): at 16:52

## 2021-01-30 RX ADMIN — ATORVASTATIN CALCIUM 40 MILLIGRAM(S): 80 TABLET, FILM COATED ORAL at 20:57

## 2021-01-30 RX ADMIN — Medication 8: at 16:55

## 2021-01-30 RX ADMIN — HEPARIN SODIUM 5000 UNIT(S): 5000 INJECTION INTRAVENOUS; SUBCUTANEOUS at 04:07

## 2021-01-30 RX ADMIN — Medication 6: at 10:24

## 2021-01-30 RX ADMIN — Medication 667 MILLIGRAM(S): at 08:02

## 2021-01-30 RX ADMIN — Medication 40 MILLIGRAM(S): at 04:07

## 2021-01-30 RX ADMIN — HEPARIN SODIUM 5000 UNIT(S): 5000 INJECTION INTRAVENOUS; SUBCUTANEOUS at 14:12

## 2021-01-30 NOTE — PROGRESS NOTE ADULT - ASSESSMENT
80 yr old m w/ a PMHx significant for HTN, DMII, DLD, ESRD on HD, CAD s/p CABG,  presents with nausea, vomiting, weakness after testing positive for COVID 19 two weeks ago.     Dypsnea  - likely due to COVID-19  - doubt PE as no hypoxia  - monitor    COVID-19  - COVID PCR + for two weeks  - Inflammatory Markers:  CRP: 5.73 (01-28-21)  Procalcitonin: 0.47 (01-28-21)  Ferritin: 1032 (01-28-21)  D-Dimer: 630 (01-29-21)<--533 (01-28-21)  - currently saturating 93% on RA  - BNP: 594  - CT AP: Patchy bibasilar ground-glass airspace opacities, which may be attributed to viral pneumonia in the appropriate clinical setting  - c/w dexamethasone  - ID consult  - no RDV due to ESRD and duration of infection    Fevers  - follow up ID  - likely due to COVID-19  - no other source of infection-fistula site intact  - check UA as pt still makes some urine    Troponemia  -  likely due to CKD  - has RBBB which is new  - not a candidate for cardiac cath or echo due to COVID-19 infection  - cardio consult    ESRD  - next HD on 2/2/2020    HTN  - entresto held yesterday due to low BP-restart in AM  -  - controlled on home medications  - c/w metoprolol 50 mg daily  - c/w lasix 40 mg BID      DLD  - c/w lipitor 40 mg daily    DM II  - FS controlled  - Lantus 30 mg qhs    #MISC  - DIET: DASH  - DVT ppx: Heparin 5000  - Dispo: Acute  - Activity: IAT    Progress Note Handoff:  Pending:  ID, clinical improvement  Family discussion: Discussed ID, UA with pt at bedside.   Disposition: Home___/SNF___/Other________/Unknown at this time___x_____

## 2021-01-30 NOTE — PROGRESS NOTE ADULT - SUBJECTIVE AND OBJECTIVE BOX
[place] [facility] FOLLOW UP NOTE  --------------------------------------------------------------------------------  24 hour events/subjective: Patient examined. Appears comfortable.    PAST HISTORY  --------------------------------------------------------------------------------  No significant changes to PMH, PSH, FHx, SHx, unless otherwise noted    ALLERGIES & MEDICATIONS  --------------------------------------------------------------------------------  Allergies    No Known Allergies    Intolerances      Standing Inpatient Medications  atorvastatin 40 milliGRAM(s) Oral at bedtime  calcium acetate 667 milliGRAM(s) Oral three times a day with meals  chlorhexidine 4% Liquid 1 Application(s) Topical <User Schedule>  dexAMETHasone  Injectable 6 milliGRAM(s) IV Push daily  dextrose 40% Gel 15 Gram(s) Oral once  dextrose 5%. 1000 milliLiter(s) IV Continuous <Continuous>  dextrose 5%. 1000 milliLiter(s) IV Continuous <Continuous>  dextrose 50% Injectable 25 Gram(s) IV Push once  dextrose 50% Injectable 12.5 Gram(s) IV Push once  dextrose 50% Injectable 25 Gram(s) IV Push once  ergocalciferol 93857 Unit(s) Oral <User Schedule>  furosemide    Tablet 40 milliGRAM(s) Oral two times a day  glucagon  Injectable 1 milliGRAM(s) IntraMuscular once  heparin   Injectable 5000 Unit(s) SubCutaneous every 8 hours  heparin   Injectable. 2000 Unit(s) Dialysis. once  insulin glargine SubCutaneous Injection (LANTUS) - Peds 30 Unit(s) SubCutaneous at bedtime  insulin lispro (ADMELOG) corrective regimen sliding scale   SubCutaneous three times a day before meals  metoprolol succinate ER 50 milliGRAM(s) Oral daily    PRN Inpatient Medications  acetaminophen   Tablet .. 650 milliGRAM(s) Oral every 6 hours PRN      VITALS/PHYSICAL EXAM  --------------------------------------------------------------------------------  T(C): 36.6 (01-30-21 @ 07:35), Max: 38.4 (01-30-21 @ 00:00)  HR: 91 (01-30-21 @ 07:35) (86 - 105)  BP: 117/67 (01-30-21 @ 07:35) (111/65 - 141/86)  RR: 18 (01-30-21 @ 07:35) (18 - 18)  SpO2: 94% (01-30-21 @ 07:35) (93% - 94%)  Wt(kg): --  Height (cm): 175.3 (01-28-21 @ 19:35)  Weight (kg): 103.5 (01-28-21 @ 19:35)  BMI (kg/m2): 33.7 (01-28-21 @ 19:35)  BSA (m2): 2.19 (01-28-21 @ 19:35)      01-29-21 @ 07:01  -  01-30-21 @ 07:00  --------------------------------------------------------  IN: 240 mL / OUT: 0 mL / NET: 240 mL      Physical Exam:  	Gen: NAD  	Pulm: CTA B/L  	CV: RRR, S1S2  	Abd: +BS, soft, nontender/nondistended  	: No suprapubic tenderness  	LE: Warm,  no edema  	Vascular access: AVF    LABS/STUDIES  --------------------------------------------------------------------------------              11.9   5.89  >-----------<  123      [01-30-21 @ 04:30]              35.8     138  |  98  |  61  ----------------------------<  110      [01-30-21 @ 04:30]  4.6   |  23  |  5.2        Ca     8.5     [01-30-21 @ 04:30]      Mg     1.8     [01-30-21 @ 04:30]    TPro  5.7  /  Alb  3.8  /  TBili  0.7  /  DBili  x   /  AST  22  /  ALT  11  /  AlkPhos  44  [01-30-21 @ 04:30]    PT/INR: PT 13.10, INR 1.14       [01-29-21 @ 05:53]  PTT: 32.8       [01-29-21 @ 05:53]    Troponin 0.07      [01-30-21 @ 00:10]    Creatinine Trend:  SCr 5.2 [01-30 @ 04:30]  SCr 5.1 [01-29 @ 05:53]  SCr 5.0 [01-28 @ 09:39]        Ferritin 1032      [01-28-21 @ 09:39]  HbA1c 8.1      [03-08-19 @ 07:21]

## 2021-01-30 NOTE — PROGRESS NOTE ADULT - ASSESSMENT
ESRD on HD 2x /week   - still with residual renal function and urine output  COVID-19 PNA   - with dyspnea / n/v / weakness   - on dexamethasone  CAD / CABG / CMP / CHF  HTN  DM2    plan:    next HD planned for Tue  renal diet  left arm precautions  phoslo with meals

## 2021-01-31 LAB
ALBUMIN SERPL ELPH-MCNC: 4 G/DL — SIGNIFICANT CHANGE UP (ref 3.5–5.2)
ALP SERPL-CCNC: 45 U/L — SIGNIFICANT CHANGE UP (ref 30–115)
ALT FLD-CCNC: 11 U/L — SIGNIFICANT CHANGE UP (ref 0–41)
ANION GAP SERPL CALC-SCNC: 15 MMOL/L — HIGH (ref 7–14)
APPEARANCE UR: CLEAR — SIGNIFICANT CHANGE UP
AST SERPL-CCNC: 19 U/L — SIGNIFICANT CHANGE UP (ref 0–41)
BACTERIA # UR AUTO: NEGATIVE — SIGNIFICANT CHANGE UP
BASOPHILS # BLD AUTO: 0.01 K/UL — SIGNIFICANT CHANGE UP (ref 0–0.2)
BASOPHILS NFR BLD AUTO: 0.1 % — SIGNIFICANT CHANGE UP (ref 0–1)
BILIRUB SERPL-MCNC: 0.7 MG/DL — SIGNIFICANT CHANGE UP (ref 0.2–1.2)
BILIRUB UR-MCNC: NEGATIVE — SIGNIFICANT CHANGE UP
BUN SERPL-MCNC: 84 MG/DL — CRITICAL HIGH (ref 10–20)
CALCIUM SERPL-MCNC: 8.2 MG/DL — LOW (ref 8.5–10.1)
CHLORIDE SERPL-SCNC: 96 MMOL/L — LOW (ref 98–110)
CO2 SERPL-SCNC: 25 MMOL/L — SIGNIFICANT CHANGE UP (ref 17–32)
COLOR SPEC: SIGNIFICANT CHANGE UP
CREAT SERPL-MCNC: 5.5 MG/DL — CRITICAL HIGH (ref 0.7–1.5)
DIFF PNL FLD: SIGNIFICANT CHANGE UP
EOSINOPHIL # BLD AUTO: 0 K/UL — SIGNIFICANT CHANGE UP (ref 0–0.7)
EOSINOPHIL NFR BLD AUTO: 0 % — SIGNIFICANT CHANGE UP (ref 0–8)
EPI CELLS # UR: 0 /HPF — SIGNIFICANT CHANGE UP (ref 0–5)
GLUCOSE BLDC GLUCOMTR-MCNC: 131 MG/DL — HIGH (ref 70–99)
GLUCOSE BLDC GLUCOMTR-MCNC: 164 MG/DL — HIGH (ref 70–99)
GLUCOSE BLDC GLUCOMTR-MCNC: 202 MG/DL — HIGH (ref 70–99)
GLUCOSE BLDC GLUCOMTR-MCNC: 212 MG/DL — HIGH (ref 70–99)
GLUCOSE SERPL-MCNC: 144 MG/DL — HIGH (ref 70–99)
GLUCOSE UR QL: NEGATIVE — SIGNIFICANT CHANGE UP
HCT VFR BLD CALC: 35.7 % — LOW (ref 42–52)
HGB BLD-MCNC: 11.6 G/DL — LOW (ref 14–18)
HYALINE CASTS # UR AUTO: 1 /LPF — SIGNIFICANT CHANGE UP (ref 0–7)
IMM GRANULOCYTES NFR BLD AUTO: 0.4 % — HIGH (ref 0.1–0.3)
KETONES UR-MCNC: NEGATIVE — SIGNIFICANT CHANGE UP
LEUKOCYTE ESTERASE UR-ACNC: NEGATIVE — SIGNIFICANT CHANGE UP
LYMPHOCYTES # BLD AUTO: 1.07 K/UL — LOW (ref 1.2–3.4)
LYMPHOCYTES # BLD AUTO: 15.1 % — LOW (ref 20.5–51.1)
MAGNESIUM SERPL-MCNC: 2.1 MG/DL — SIGNIFICANT CHANGE UP (ref 1.8–2.4)
MCHC RBC-ENTMCNC: 28.1 PG — SIGNIFICANT CHANGE UP (ref 27–31)
MCHC RBC-ENTMCNC: 32.5 G/DL — SIGNIFICANT CHANGE UP (ref 32–37)
MCV RBC AUTO: 86.4 FL — SIGNIFICANT CHANGE UP (ref 80–94)
MONOCYTES # BLD AUTO: 1.08 K/UL — HIGH (ref 0.1–0.6)
MONOCYTES NFR BLD AUTO: 15.2 % — HIGH (ref 1.7–9.3)
NEUTROPHILS # BLD AUTO: 4.91 K/UL — SIGNIFICANT CHANGE UP (ref 1.4–6.5)
NEUTROPHILS NFR BLD AUTO: 69.2 % — SIGNIFICANT CHANGE UP (ref 42.2–75.2)
NITRITE UR-MCNC: NEGATIVE — SIGNIFICANT CHANGE UP
NRBC # BLD: 0 /100 WBCS — SIGNIFICANT CHANGE UP (ref 0–0)
PH UR: 6 — SIGNIFICANT CHANGE UP (ref 5–8)
PLATELET # BLD AUTO: 142 K/UL — SIGNIFICANT CHANGE UP (ref 130–400)
POTASSIUM SERPL-MCNC: 4.4 MMOL/L — SIGNIFICANT CHANGE UP (ref 3.5–5)
POTASSIUM SERPL-SCNC: 4.4 MMOL/L — SIGNIFICANT CHANGE UP (ref 3.5–5)
PROCALCITONIN SERPL-MCNC: 0.47 NG/ML — HIGH (ref 0.02–0.1)
PROT SERPL-MCNC: 5.9 G/DL — LOW (ref 6–8)
PROT UR-MCNC: ABNORMAL
RBC # BLD: 4.13 M/UL — LOW (ref 4.7–6.1)
RBC # FLD: 13.2 % — SIGNIFICANT CHANGE UP (ref 11.5–14.5)
RBC CASTS # UR COMP ASSIST: 3 /HPF — SIGNIFICANT CHANGE UP (ref 0–4)
SODIUM SERPL-SCNC: 136 MMOL/L — SIGNIFICANT CHANGE UP (ref 135–146)
SP GR SPEC: 1.01 — SIGNIFICANT CHANGE UP (ref 1.01–1.03)
UROBILINOGEN FLD QL: SIGNIFICANT CHANGE UP
WBC # BLD: 7.1 K/UL — SIGNIFICANT CHANGE UP (ref 4.8–10.8)
WBC # FLD AUTO: 7.1 K/UL — SIGNIFICANT CHANGE UP (ref 4.8–10.8)
WBC UR QL: 1 /HPF — SIGNIFICANT CHANGE UP (ref 0–5)

## 2021-01-31 PROCEDURE — 99232 SBSQ HOSP IP/OBS MODERATE 35: CPT

## 2021-01-31 RX ORDER — SACUBITRIL AND VALSARTAN 24; 26 MG/1; MG/1
1 TABLET, FILM COATED ORAL
Refills: 0 | Status: DISCONTINUED | OUTPATIENT
Start: 2021-01-31 | End: 2021-02-03

## 2021-01-31 RX ADMIN — Medication 40 MILLIGRAM(S): at 04:37

## 2021-01-31 RX ADMIN — HEPARIN SODIUM 5000 UNIT(S): 5000 INJECTION INTRAVENOUS; SUBCUTANEOUS at 13:32

## 2021-01-31 RX ADMIN — Medication 8 UNIT(S): at 13:31

## 2021-01-31 RX ADMIN — Medication 667 MILLIGRAM(S): at 08:07

## 2021-01-31 RX ADMIN — Medication 40 MILLIGRAM(S): at 16:47

## 2021-01-31 RX ADMIN — Medication 667 MILLIGRAM(S): at 16:44

## 2021-01-31 RX ADMIN — HEPARIN SODIUM 5000 UNIT(S): 5000 INJECTION INTRAVENOUS; SUBCUTANEOUS at 04:37

## 2021-01-31 RX ADMIN — Medication 6 MILLIGRAM(S): at 04:37

## 2021-01-31 RX ADMIN — HEPARIN SODIUM 5000 UNIT(S): 5000 INJECTION INTRAVENOUS; SUBCUTANEOUS at 21:23

## 2021-01-31 RX ADMIN — CHLORHEXIDINE GLUCONATE 1 APPLICATION(S): 213 SOLUTION TOPICAL at 04:37

## 2021-01-31 RX ADMIN — Medication 4: at 16:42

## 2021-01-31 RX ADMIN — Medication 4: at 13:31

## 2021-01-31 RX ADMIN — ATORVASTATIN CALCIUM 40 MILLIGRAM(S): 80 TABLET, FILM COATED ORAL at 21:23

## 2021-01-31 RX ADMIN — Medication 8 UNIT(S): at 16:42

## 2021-01-31 RX ADMIN — INSULIN GLARGINE 30 UNIT(S): 100 INJECTION, SOLUTION SUBCUTANEOUS at 21:23

## 2021-01-31 RX ADMIN — Medication 50 MILLIGRAM(S): at 04:36

## 2021-01-31 RX ADMIN — Medication 667 MILLIGRAM(S): at 13:32

## 2021-01-31 RX ADMIN — SACUBITRIL AND VALSARTAN 1 TABLET(S): 24; 26 TABLET, FILM COATED ORAL at 16:43

## 2021-01-31 RX ADMIN — Medication 8 UNIT(S): at 08:07

## 2021-01-31 NOTE — PROGRESS NOTE ADULT - ASSESSMENT
80 yr old m w/ a PMHx significant for HTN, DMII, DLD, ESRD on HD, CAD s/p CABG,  presents with nausea, vomiting, weakness after testing positive for COVID 19 two weeks ago.     Dypsnea  - likely due to COVID-19  - doubt PE as no hypoxia  - monitor off antibiotics    Rhinorrhea  - d/w ID-if fever persists sent RVP    Fever  - likely due to covid long haul    COVID-19  - COVID PCR + for two weeks  - Inflammatory Markers:  CRP: 5.73 (01-28-21)  Procalcitonin: 0.47 (01-28-21)  Ferritin: 1032 (01-28-21)  D-Dimer: 630 (01-29-21)<--533 (01-28-21)  - currently saturating 98% on RA  - BNP: 594  - CT AP: Patchy bibasilar ground-glass airspace opacities, which may be attributed to viral pneumonia in the appropriate clinical setting  - c/w dexamethasone  - ID consult  - no RDV due to ESRD and duration of infection    Fevers  - follow up ID  - likely due to COVID-19  - no other source of infection-fistula site intact  - check UA as pt still makes some urine    Troponemia  -  likely due to CKD  - has RBBB which is new  - not a candidate for cardiac cath or echo due to COVID-19 infection  - cardio consult    ESRD  - next HD on 2/2/2020  - pt states he gets fistula "cleaned out" every 3 months but hasn't recently due to the pandemic. f/u nephro if vascular eval is warranted    HTN  - bp stable, restart entresto  - controlled on home medications  - c/w metoprolol 50 mg daily  - c/w lasix 40 mg BID      DLD  - c/w lipitor 40 mg daily    DM II  - FS controlled  - Lantus 30 mg qhs    #MISC  - DIET: DASH  - DVT ppx: Heparin 5000  - Dispo: Acute  - Activity: IAT    Progress Note Handoff:  Pending:  Likely dc in AM-offerred for today but pt declined. will anticipate  Family discussion: Discussed dc in for AM  Disposition: Home___/SNF___/Other________/Unknown at this time___x_____

## 2021-01-31 NOTE — PROGRESS NOTE ADULT - SUBJECTIVE AND OBJECTIVE BOX
[place] [facility] FOLLOW UP NOTE/COVERING DR MATOS  --------------------------------------------------------------------------------  24 hour events/subjective: Patient examined. Appears comfortable.    PAST HISTORY  --------------------------------------------------------------------------------  No significant changes to PMH, PSH, FHx, SHx, unless otherwise noted    ALLERGIES & MEDICATIONS  --------------------------------------------------------------------------------  Allergies    No Known Allergies    Intolerances      Standing Inpatient Medications  atorvastatin 40 milliGRAM(s) Oral at bedtime  calcium acetate 667 milliGRAM(s) Oral three times a day with meals  chlorhexidine 4% Liquid 1 Application(s) Topical <User Schedule>  dexAMETHasone  Injectable 6 milliGRAM(s) IV Push daily  dextrose 40% Gel 15 Gram(s) Oral once  dextrose 5%. 1000 milliLiter(s) IV Continuous <Continuous>  dextrose 5%. 1000 milliLiter(s) IV Continuous <Continuous>  dextrose 50% Injectable 25 Gram(s) IV Push once  dextrose 50% Injectable 12.5 Gram(s) IV Push once  dextrose 50% Injectable 25 Gram(s) IV Push once  ergocalciferol 84985 Unit(s) Oral <User Schedule>  furosemide    Tablet 40 milliGRAM(s) Oral two times a day  glucagon  Injectable 1 milliGRAM(s) IntraMuscular once  heparin   Injectable 5000 Unit(s) SubCutaneous every 8 hours  heparin   Injectable. 2000 Unit(s) Dialysis. once  insulin glargine SubCutaneous Injection (LANTUS) - Peds 30 Unit(s) SubCutaneous at bedtime  insulin lispro (ADMELOG) corrective regimen sliding scale   SubCutaneous three times a day before meals  insulin lispro Injectable (ADMELOG) 8 Unit(s) SubCutaneous three times a day before meals  metoprolol succinate ER 50 milliGRAM(s) Oral daily    PRN Inpatient Medications  acetaminophen   Tablet .. 650 milliGRAM(s) Oral every 6 hours PRN      VITALS/PHYSICAL EXAM  --------------------------------------------------------------------------------  T(C): 36.1 (01-31-21 @ 08:41), Max: 36.4 (01-30-21 @ 12:00)  HR: 79 (01-31-21 @ 08:41) (79 - 90)  BP: 135/66 (01-31-21 @ 08:41) (100/56 - 163/87)  RR: 18 (01-31-21 @ 08:41) (18 - 19)  SpO2: 98% (01-31-21 @ 08:41) (95% - 98%)  Wt(kg): --        01-30-21 @ 07:01  -  01-31-21 @ 07:00  --------------------------------------------------------  IN: 960 mL / OUT: 1310 mL / NET: -350 mL    01-31-21 @ 07:01  -  01-31-21 @ 11:31  --------------------------------------------------------  IN: 360 mL / OUT: 0 mL / NET: 360 mL      Physical Exam:  	Gen: NAD  	Pulm: CTA B/L  	CV: RRR, S1S2  	Abd: +BS, soft, nontender/nondistended  	: No suprapubic tenderness  	LE: Warm, no edema    LABS/STUDIES  --------------------------------------------------------------------------------              11.6   7.10  >-----------<  142      [01-31-21 @ 05:42]              35.7     136  |  96  |  84  ----------------------------<  144      [01-31-21 @ 05:42]  4.4   |  25  |  5.5        Ca     8.2     [01-31-21 @ 05:42]      Mg     2.1     [01-31-21 @ 05:42]    TPro  5.9  /  Alb  4.0  /  TBili  0.7  /  DBili  x   /  AST  19  /  ALT  11  /  AlkPhos  45  [01-31-21 @ 05:42]        Troponin 0.07      [01-30-21 @ 00:10]    Creatinine Trend:  SCr 5.5 [01-31 @ 05:42]  SCr 5.2 [01-30 @ 04:30]  SCr 5.1 [01-29 @ 05:53]  SCr 5.0 [01-28 @ 09:39]    Urinalysis - [01-31-21 @ 10:25]      Color Light Yellow / Appearance Clear / SG 1.014 / pH 6.0      Gluc Negative / Ketone Negative  / Bili Negative / Urobili <2 mg/dL       Blood Trace / Protein 30 mg/dL / Leuk Est Negative / Nitrite Negative      RBC 3 / WBC 1 / Hyaline 1 / Gran  / Sq Epi  / Non Sq Epi 0 / Bacteria Negative      Ferritin 1032      [01-28-21 @ 09:39]  HbA1c 8.1      [03-08-19 @ 07:21]

## 2021-01-31 NOTE — PROGRESS NOTE ADULT - ASSESSMENT
ESRD on HD 2x /week   - still with residual renal function and urine output  COVID-19 PNA   - with dyspnea / n/v / weakness   - on dexamethasone  CAD / CABG / CMP / CHF  HTN  DM2    plan:    next HD planned for Tue  continue furosemide  renal diet  left arm precautions  phoslo with meals

## 2021-01-31 NOTE — CONSULT NOTE ADULT - ASSESSMENT
ASSESSMENT  81yo male with PMH of h/o ESRD on HD, DLD, CAD s/p CABG, HTN, and DM who presents to the ED w/ chest pain and SOB nausea, poor appetite for the past 2 weeks, worsening in the last day with chills after testing positive for COVID 2 weeks ago.  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  He suspects he transmitted the virus to his wife. Patients wife is also admitted to [facility] for COVID. Patient states that he has been progressively getting weaker, with poor PO intake. Patient admits to intermittent SOB, N/V, and abdominal pain.     IMPRESSION  #COVID-19  - D-Dimer Assay, Quantitative: 630: Manufacturers recommended Cut off for VTE is 230 ng/ml D-DU ng/mL DDU (01.29.21 @ 05:53)  - C-Reactive Protein, Serum: 5.73 mg/dL (01.28.21 @ 09:39)  - Ferritin, Serum: 1032 ng/mL (01.28.21 @ 09:39)  - Procalcitonin, Serum: 0.47 (01.30.21 @ 12:16)    #Fevers  #ESRD on HD  #CAD s/p CABG  #Obesity BMI (kg/m2): 33.7  #Abx allergy: NKDA    RECOMMENDATIONS  - fevers may be from "long covid" syndrome   - agree with monitoring off antibiotics - procalcitonin is stable, and elevated due to ESRD  - check urine legionella antigen  - if febrile again, would send for RVP -- having some cold symptoms today   - follow-up repeat blood cx 1/30    Please call or message on [employer] Teams if with any questions.  Spectra [phone]

## 2021-01-31 NOTE — PROGRESS NOTE ADULT - SUBJECTIVE AND OBJECTIVE BOX
CHIEF COMPLAINT:    Patient is a 80y old  Male who presents with a chief complaint of SOB       INTERVAL HPI/OVERNIGHT EVENTS:    Patient seen and examined at bedside. No acute overnight events occurred.    ROS: Reports runny nose. All other systems are negative.    Vital Signs:    T(F): 97 (01-31-21 @ 08:41), Max: 97.6 (01-30-21 @ 12:00)  HR: 79 (01-31-21 @ 08:41) (79 - 90)  BP: 135/66 (01-31-21 @ 08:41) (100/56 - 163/87)  RR: 18 (01-31-21 @ 08:41) (18 - 19)  SpO2: 98% (01-31-21 @ 08:41) (95% - 98%)  I&O's Summary    30 Jan 2021 07:01  -  31 Jan 2021 07:00  --------------------------------------------------------  IN: 960 mL / OUT: 1310 mL / NET: -350 mL    31 Jan 2021 07:01  -  31 Jan 2021 11:58  --------------------------------------------------------  IN: 360 mL / OUT: 0 mL / NET: 360 mL      POCT Blood Glucose.: 131 mg/dL (31 Jan 2021 08:02)  POCT Blood Glucose.: 220 mg/dL (30 Jan 2021 21:26)  POCT Blood Glucose.: 325 mg/dL (30 Jan 2021 16:48)  POCT Blood Glucose.: 335 mg/dL (30 Jan 2021 12:28)      PHYSICAL EXAM:  GENERAL:  NAD  SKIN: No rashes or lesions  HEENT: Atraumatic. Normocephalic. Anicteric  NECK:  No JVD.   PULMONARY: No accessory muscle use  CVS: Regular rate. no palpapable thrill in LUE fistula  ABDOMEN/GI: Soft  EXTREMITIES:  No edema B/L LE.  NEUROLOGIC:  No motor deficit.  PSYCH: Alert & oriented x 3, normal affect    Consultant(s) Notes Reviewed:  [x ] YES  [ ] NO    LABS:                        11.6   7.10  )-----------( 142      ( 31 Jan 2021 05:42 )             35.7     01-31    136  |  96<L>  |  84<HH>  ----------------------------<  144<H>  4.4   |  25  |  5.5<HH>    Ca    8.2<L>      31 Jan 2021 05:42  Mg     2.1     01-31    TPro  5.9<L>  /  Alb  4.0  /  TBili  0.7  /  DBili  x   /  AST  19  /  ALT  11  /  AlkPhos  45  01-31      Serum Pro-Brain Natriuretic Peptide: 594 pg/mL (01-28-21 @ 09:39)    Trop 0.07, CKMB --, CK --, 01-30-21 @ 00:10  Trop 0.05, CKMB --, CK --, 01-29-21 @ 18:31        RADIOLOGY & ADDITIONAL TESTS:  No new images    Medications:  Standing  atorvastatin 40 milliGRAM(s) Oral at bedtime  calcium acetate 667 milliGRAM(s) Oral three times a day with meals  chlorhexidine 4% Liquid 1 Application(s) Topical <User Schedule>  dexAMETHasone  Injectable 6 milliGRAM(s) IV Push daily  dextrose 40% Gel 15 Gram(s) Oral once  dextrose 5%. 1000 milliLiter(s) IV Continuous <Continuous>  dextrose 5%. 1000 milliLiter(s) IV Continuous <Continuous>  dextrose 50% Injectable 25 Gram(s) IV Push once  dextrose 50% Injectable 12.5 Gram(s) IV Push once  dextrose 50% Injectable 25 Gram(s) IV Push once  ergocalciferol 20901 Unit(s) Oral <User Schedule>  furosemide    Tablet 40 milliGRAM(s) Oral two times a day  glucagon  Injectable 1 milliGRAM(s) IntraMuscular once  heparin   Injectable 5000 Unit(s) SubCutaneous every 8 hours  heparin   Injectable. 2000 Unit(s) Dialysis. once  insulin glargine SubCutaneous Injection (LANTUS) - Peds 30 Unit(s) SubCutaneous at bedtime  insulin lispro (ADMELOG) corrective regimen sliding scale   SubCutaneous three times a day before meals  insulin lispro Injectable (ADMELOG) 8 Unit(s) SubCutaneous three times a day before meals  metoprolol succinate ER 50 milliGRAM(s) Oral daily    PRN Meds  acetaminophen   Tablet .. 650 milliGRAM(s) Oral every 6 hours PRN

## 2021-01-31 NOTE — CONSULT NOTE ADULT - SUBJECTIVE AND OBJECTIVE BOX
NINFA URBAN  80y, Male  Allergy: No Known Allergies      CHIEF COMPLAINT: SOB (30 Jan 2021 13:04)      LOS  3d    HPI:  81yo male with PMH of h/o ESRD on HD, DLD, CAD s/p CABG, HTN, and DM who presents to the ED w/ chest pain and SOB nausea, poor appetite for the past 2 weeks, worsening in the last day with chills after testing positive for COVID 2 weeks ago.  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  He suspects he transmitted the virus to his wife. Patients wife is also admitted to [facility] for COVID. Patient states that he has been progressively getting weaker, with poor PO intake. Patient admits to intermittent SOB, N/V, and abdominal pain.     In the ED:  Patient is currently saturating at 98% of 3L NC, BP: 141/91 HR: 97  Temp: 101  CT AP showed patchy bibasilar ground-glass airspace opacities, which may be attributed to viral pneumonia in the appropriate clinical setting.  Troponin : 0.07   Serum BNP: 594   (28 Jan 2021 13:33)      INFECTIOUS DISEASE HISTORY:  History as above  Tested positive for COVID 2 weeks ago.   CT Abd/Pelvis without intra-abdominal process -- showing ground glass opacities  Interval CXR from admission has shown development of patchy bibasilar opacities.   Previously on 2L -- now on room air.   Blood cx 1/28 on admission NG  Fevers starting on evening on 1/29.  Has not been started on any antibiotics        PAST MEDICAL & SURGICAL HISTORY:  Mitral valve regurgitation    CHF (congestive heart failure)    BPH (Benign Prostatic Hyperplasia)    GERD (gastroesophageal reflux disease)    Sleep apnea  does not use machine    Renal insufficiency    HTN - Hypertension    H/O hyperlipidemia    CAD (coronary artery disease)    Diabetes mellitus    History of PTCA  with stents 10 years ago ([facility])    CAD (Coronary Artery Disease)    Hypercholesterolemia    Diabetes Mellitus Type II    HTN (Hypertension)    S/P appendectomy    S/P primary angioplasty with coronary stent  6-7 stents last one in 10/12    S/P tonsillectomy    S/P knee surgery  b/l arthroscopic        FAMILY HISTORY  No pertinent family history in first degree relatives        SOCIAL HISTORY  Social History:  Never smoker  denies drug use  lives with wife and son  recently built his own single level assisted home  completes ADLs independently (28 Jan 2021 13:33)        ROS  General: Denies rigors, nightsweats  HEENT: Denies headache, rhinorrhea, sore throat, eye pain  CV: Denies CP, palpitations  PULM: Denies wheezing, hemoptysis  GI: Denies hematemesis, hematochezia, melena  : Denies discharge, hematuria  MSK: Denies arthralgias, myalgias  SKIN: Denies rash, lesions  NEURO: Denies paresthesias, weakness  PSYCH: Denies depression, anxiety    VITALS:  T(F): 96.8, Max: 97.6 (01-30-21 @ 12:00)  HR: 79  BP: 130/68  RR: 18Vital Signs Last 24 Hrs  T(C): 36 (31 Jan 2021 04:00), Max: 36.4 (30 Jan 2021 12:00)  T(F): 96.8 (31 Jan 2021 04:00), Max: 97.6 (30 Jan 2021 12:00)  HR: 79 (31 Jan 2021 04:00) (79 - 90)  BP: 130/68 (31 Jan 2021 04:00) (100/56 - 163/87)  BP(mean): --  RR: 18 (31 Jan 2021 04:00) (18 - 19)  SpO2: 98% (31 Jan 2021 04:00) (95% - 98%)    PHYSICAL EXAM:  Gen: NAD, resting in bed  HEENT: Normocephalic, atraumatic  Neck: supple, no lymphadenopathy  CV: Regular rate & regular rhythm  Lungs: decreased BS at bases, no fremitus  Abdomen: Soft, BS present  Ext: Warm, well perfused  Neuro: non focal, awake  Skin: no rash, no erythema  Lines: no phlebitis    TESTS & MEASUREMENTS:                        11.9   5.89  )-----------( 123      ( 30 Jan 2021 04:30 )             35.8     01-30    138  |  98  |  61<HH>  ----------------------------<  110<H>  4.6   |  23  |  5.2<HH>    Ca    8.5      30 Jan 2021 04:30  Mg     1.8     01-30    TPro  5.7<L>  /  Alb  3.8  /  TBili  0.7  /  DBili  x   /  AST  22  /  ALT  11  /  AlkPhos  44  01-30      LIVER FUNCTIONS - ( 30 Jan 2021 04:30 )  Alb: 3.8 g/dL / Pro: 5.7 g/dL / ALK PHOS: 44 U/L / ALT: 11 U/L / AST: 22 U/L / GGT: x               Culture - Blood (collected 01-28-21 @ 09:20)  Source: .Blood Blood  Preliminary Report (01-29-21 @ 23:01):    No growth to date.    Culture - Blood (collected 01-28-21 @ 09:15)  Source: .Blood Blood  Preliminary Report (01-29-21 @ 23:01):    No growth to date.        Lactate, Blood: 1.5 mmol/L (01-28-21 @ 09:39)      INFECTIOUS DISEASES TESTING  Procalcitonin, Serum: 0.47 ng/mL (01-30-21 @ 12:16)  Procalcitonin, Serum: 0.47 ng/mL (01-28-21 @ 09:39)  COVID-19 PCR: Detected (01-28-21 @ 09:29)      RADIOLOGY & ADDITIONAL TESTS:  I have personally reviewed the last Chest xray  CXR  Xray Chest 1 View- PORTABLE-Urgent:   EXAM:  XR CHEST PORTABLE URGENT 1V            PROCEDURE DATE:  01/28/2021            INTERPRETATION:  Clinical History / Reason for exam: Shortness of breath, cough and fever    Comparison : Chest radiograph March 11, 2019.    Technique/Positioning: AP.    Findings:    Support devices: Left chest ICD with lead projecting over the right heart.    Cardiac/mediastinum/hilum: Unremarkable.    Lung parenchyma/Pleura: Within normal limits.    Skeleton/soft tissues: Stable.    Impression:    No radiographic evidence of acute cardiopulmonary disease.                  CHARANJIT JEAN MD; Attending Radiologist  This document has been electronically signed. Jan 28 2021  3:30PM (01-28-21 @ 10:06)      CT  CT Abdomen and Pelvis w/ IV Cont:   EXAM:  CT ABDOMEN AND PELVIS IC            PROCEDURE DATE:  01/28/2021            INTERPRETATION:  CLINICAL STATEMENT: Abdominal pain.    TECHNIQUE: Contiguous axial CT images were obtained from the lower chest to the pubic symphysis following administration of 100cc Optiray 320 intravenous contrast.  Oral contrast was not administered.  Reformatted images in the coronal and sagittal planes were acquired.    Comparison: None.    FINDINGS:    LOWER CHEST: Patchy bibasilar groundglass airspace opacities. Post median sternotomy/CABG.    HEPATOBILIARY: Unremarkable.    SPLEEN: Unremarkable.    PANCREAS: Unremarkable.    ADRENAL GLANDS: Unremarkable.    KIDNEYS: No hydronephrosis or renal calculus. Subcentimeter bilateral renal hypodensities, too small to characterize.    ABDOMINOPELVIC NODES: Unremarkable.    PELVIC ORGANS: BPH. Urinary bladder partially distended.    PERITONEUM/MESENTERY/BOWEL: Unremarkable.    BONES/SOFT TISSUES: Degenerative change of lumbar spine.    OTHER: Vascular calcifications.      IMPRESSION:    Patchy bibasilar groundglass airspace opacities, which may be attributed to viral pneumonia in the appropriate clinical setting.    No additional evidence of acute pathology.              DANA NEWTON MD; Attending Radiologist  This document has been electronically signed. Jan 28 2021  1:10PM (01-28-21 @ 12:54)      CARDIOLOGY TESTING  12 Lead ECG:   Ventricular Rate 98 BPM    Atrial Rate 98 BPM    P-R Interval 192 ms    QRS Duration 148 ms    Q-T Interval 386 ms    QTC Calculation(Bazett) 492 ms    P Axis 29 degrees    R Axis 208 degrees    T Axis 17 degrees    Diagnosis Line Normal sinus rhythm  Right bundle branch block  Abnormal ECG    Confirmed by Norman Mueller ([id number]) on 1/28/2021 12:04:40 PM (01-28-21 @ 10:23)      MEDICATIONS  atorvastatin 40 Oral at bedtime  calcium acetate 667 Oral three times a day with meals  chlorhexidine 4% Liquid 1 Topical <User Schedule>  dexAMETHasone  Injectable 6 IV Push daily  dextrose 40% Gel 15 Oral once  dextrose 5%. 1000 IV Continuous <Continuous>  dextrose 5%. 1000 IV Continuous <Continuous>  dextrose 50% Injectable 25 IV Push once  dextrose 50% Injectable 12.5 IV Push once  dextrose 50% Injectable 25 IV Push once  ergocalciferol 53702 Oral <User Schedule>  furosemide    Tablet 40 Oral two times a day  glucagon  Injectable 1 IntraMuscular once  heparin   Injectable 5000 SubCutaneous every 8 hours  heparin   Injectable. 2000 Dialysis. once  insulin glargine SubCutaneous Injection (LANTUS) - Peds 30 SubCutaneous at bedtime  insulin lispro (ADMELOG) corrective regimen sliding scale  SubCutaneous three times a day before meals  insulin lispro Injectable (ADMELOG) 8 SubCutaneous three times a day before meals  metoprolol succinate ER 50 Oral daily      Weight  Weight (kg): 103.5 (01-28-21 @ 19:35)    ANTIBIOTICS:      ALLERGIES:  No Known Allergies      ASSESSMENT  81yo male with PMH of h/o ESRD on HD, DLD, CAD s/p CABG, HTN, and DM who presents to the ED w/ chest pain and SOB nausea, poor appetite for the past 2 weeks, worsening in the last day with chills after testing positive for COVID 2 weeks ago.  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  He suspects he transmitted the virus to his wife. Patients wife is also admitted to [facility] for COVID. Patient states that he has been progressively getting weaker, with poor PO intake. Patient admits to intermittent SOB, N/V, and abdominal pain.     IMPRESSION  #COVID-19  - D-Dimer Assay, Quantitative: 630: Manufacturers recommended Cut off for VTE is 230 ng/ml D-DU ng/mL DDU (01.29.21 @ 05:53)  - C-Reactive Protein, Serum: 5.73 mg/dL (01.28.21 @ 09:39)  - Ferritin, Serum: 1032 ng/mL (01.28.21 @ 09:39)  - Procalcitonin, Serum: 0.47 (01.30.21 @ 12:16)    #Fevers  #ESRD on HD  #CAD s/p CABG  #Obesity BMI (kg/m2): 33.7  #Abx allergy: NKDA    RECOMMENDATIONS  - fevers may be from "long covid" syndrome   - agree with monitoring off antibiotics - procalcitonin is stable, and elevated due to ESRD  - check urine legionella antigen  - if febrile again, would send for RVP  - follow-up repeat blood cx 1/30    Spectra [id number]    This is a preliminary incomplete pended note, all final recommendations to follow after interview and examination of the patient.   NINFA URBAN  80y, Male  Allergy: No Known Allergies      CHIEF COMPLAINT: SOB (30 Jan 2021 13:04)      LOS  3d    HPI:  79yo male with PMH of h/o ESRD on HD, DLD, CAD s/p CABG, HTN, and DM who presents to the ED w/ chest pain and SOB nausea, poor appetite for the past 2 weeks, worsening in the last day with chills after testing positive for COVID 2 weeks ago.  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  He suspects he transmitted the virus to his wife. Patients wife is also admitted to [facility] for COVID. Patient states that he has been progressively getting weaker, with poor PO intake. Patient admits to intermittent SOB, N/V, and abdominal pain.     In the ED:  Patient is currently saturating at 98% of 3L NC, BP: 141/91 HR: 97  Temp: 101  CT AP showed patchy bibasilar ground-glass airspace opacities, which may be attributed to viral pneumonia in the appropriate clinical setting.  Troponin : 0.07   Serum BNP: 594   (28 Jan 2021 13:33)      INFECTIOUS DISEASE HISTORY:  History as above  Tested positive for COVID 2 weeks ago.   CT Abd/Pelvis without intra-abdominal process -- showing ground glass opacities  Interval CXR from admission has shown development of patchy bibasilar opacities.   Previously on 2L -- now on room air.   Blood cx 1/28 on admission NG  Fevers starting on evening on 1/29.  Has not been started on any antibiotics        PAST MEDICAL & SURGICAL HISTORY:  Mitral valve regurgitation    CHF (congestive heart failure)    BPH (Benign Prostatic Hyperplasia)    GERD (gastroesophageal reflux disease)    Sleep apnea  does not use machine    Renal insufficiency    HTN - Hypertension    H/O hyperlipidemia    CAD (coronary artery disease)    Diabetes mellitus    History of PTCA  with stents 10 years ago ([facility])    CAD (Coronary Artery Disease)    Hypercholesterolemia    Diabetes Mellitus Type II    HTN (Hypertension)    S/P appendectomy    S/P primary angioplasty with coronary stent  6-7 stents last one in 10/12    S/P tonsillectomy    S/P knee surgery  b/l arthroscopic        FAMILY HISTORY  No pertinent family history in first degree relatives        SOCIAL HISTORY  Social History:  Never smoker  denies drug use  lives with wife and son  recently built his own single level intermediate home  completes ADLs independently (28 Jan 2021 13:33)        ROS  General: Denies rigors, nightsweats  HEENT: Denies headache, rhinorrhea, sore throat, eye pain  CV: Denies CP, palpitations  PULM: Denies wheezing, hemoptysis  GI: Denies hematemesis, hematochezia, melena  : Denies discharge, hematuria  MSK: Denies arthralgias, myalgias  SKIN: Denies rash, lesions  NEURO: Denies paresthesias, weakness  PSYCH: Denies depression, anxiety    VITALS:  T(F): 96.8, Max: 97.6 (01-30-21 @ 12:00)  HR: 79  BP: 130/68  RR: 18Vital Signs Last 24 Hrs  T(C): 36 (31 Jan 2021 04:00), Max: 36.4 (30 Jan 2021 12:00)  T(F): 96.8 (31 Jan 2021 04:00), Max: 97.6 (30 Jan 2021 12:00)  HR: 79 (31 Jan 2021 04:00) (79 - 90)  BP: 130/68 (31 Jan 2021 04:00) (100/56 - 163/87)  BP(mean): --  RR: 18 (31 Jan 2021 04:00) (18 - 19)  SpO2: 98% (31 Jan 2021 04:00) (95% - 98%)    PHYSICAL EXAM:  Gen: NAD, resting in bed  HEENT: Normocephalic, atraumatic  Neck: supple, no lymphadenopathy  CV: Regular rate & regular rhythm  Lungs: decreased BS at bases, no fremitus  Abdomen: Soft, BS present  Ext: Warm, well perfused  Neuro: non focal, awake  Skin: no rash, no erythema  Lines: no phlebitis    TESTS & MEASUREMENTS:                        11.9   5.89  )-----------( 123      ( 30 Jan 2021 04:30 )             35.8     01-30    138  |  98  |  61<HH>  ----------------------------<  110<H>  4.6   |  23  |  5.2<HH>    Ca    8.5      30 Jan 2021 04:30  Mg     1.8     01-30    TPro  5.7<L>  /  Alb  3.8  /  TBili  0.7  /  DBili  x   /  AST  22  /  ALT  11  /  AlkPhos  44  01-30      LIVER FUNCTIONS - ( 30 Jan 2021 04:30 )  Alb: 3.8 g/dL / Pro: 5.7 g/dL / ALK PHOS: 44 U/L / ALT: 11 U/L / AST: 22 U/L / GGT: x               Culture - Blood (collected 01-28-21 @ 09:20)  Source: .Blood Blood  Preliminary Report (01-29-21 @ 23:01):    No growth to date.    Culture - Blood (collected 01-28-21 @ 09:15)  Source: .Blood Blood  Preliminary Report (01-29-21 @ 23:01):    No growth to date.        Lactate, Blood: 1.5 mmol/L (01-28-21 @ 09:39)      INFECTIOUS DISEASES TESTING  Procalcitonin, Serum: 0.47 ng/mL (01-30-21 @ 12:16)  Procalcitonin, Serum: 0.47 ng/mL (01-28-21 @ 09:39)  COVID-19 PCR: Detected (01-28-21 @ 09:29)      RADIOLOGY & ADDITIONAL TESTS:  I have personally reviewed the last Chest xray  CXR  Xray Chest 1 View- PORTABLE-Urgent:   EXAM:  XR CHEST PORTABLE URGENT 1V            PROCEDURE DATE:  01/28/2021            INTERPRETATION:  Clinical History / Reason for exam: Shortness of breath, cough and fever    Comparison : Chest radiograph March 11, 2019.    Technique/Positioning: AP.    Findings:    Support devices: Left chest ICD with lead projecting over the right heart.    Cardiac/mediastinum/hilum: Unremarkable.    Lung parenchyma/Pleura: Within normal limits.    Skeleton/soft tissues: Stable.    Impression:    No radiographic evidence of acute cardiopulmonary disease.                  CHARANJIT JEAN MD; Attending Radiologist  This document has been electronically signed. Jan 28 2021  3:30PM (01-28-21 @ 10:06)      CT  CT Abdomen and Pelvis w/ IV Cont:   EXAM:  CT ABDOMEN AND PELVIS IC            PROCEDURE DATE:  01/28/2021            INTERPRETATION:  CLINICAL STATEMENT: Abdominal pain.    TECHNIQUE: Contiguous axial CT images were obtained from the lower chest to the pubic symphysis following administration of 100cc Optiray 320 intravenous contrast.  Oral contrast was not administered.  Reformatted images in the coronal and sagittal planes were acquired.    Comparison: None.    FINDINGS:    LOWER CHEST: Patchy bibasilar groundglass airspace opacities. Post median sternotomy/CABG.    HEPATOBILIARY: Unremarkable.    SPLEEN: Unremarkable.    PANCREAS: Unremarkable.    ADRENAL GLANDS: Unremarkable.    KIDNEYS: No hydronephrosis or renal calculus. Subcentimeter bilateral renal hypodensities, too small to characterize.    ABDOMINOPELVIC NODES: Unremarkable.    PELVIC ORGANS: BPH. Urinary bladder partially distended.    PERITONEUM/MESENTERY/BOWEL: Unremarkable.    BONES/SOFT TISSUES: Degenerative change of lumbar spine.    OTHER: Vascular calcifications.      IMPRESSION:    Patchy bibasilar groundglass airspace opacities, which may be attributed to viral pneumonia in the appropriate clinical setting.    No additional evidence of acute pathology.              DANA NEWTON MD; Attending Radiologist  This document has been electronically signed. Jan 28 2021  1:10PM (01-28-21 @ 12:54)      CARDIOLOGY TESTING  12 Lead ECG:   Ventricular Rate 98 BPM    Atrial Rate 98 BPM    P-R Interval 192 ms    QRS Duration 148 ms    Q-T Interval 386 ms    QTC Calculation(Bazett) 492 ms    P Axis 29 degrees    R Axis 208 degrees    T Axis 17 degrees    Diagnosis Line Normal sinus rhythm  Right bundle branch block  Abnormal ECG    Confirmed by Norman Mueller ([id number]) on 1/28/2021 12:04:40 PM (01-28-21 @ 10:23)      MEDICATIONS  atorvastatin 40 Oral at bedtime  calcium acetate 667 Oral three times a day with meals  chlorhexidine 4% Liquid 1 Topical <User Schedule>  dexAMETHasone  Injectable 6 IV Push daily  dextrose 40% Gel 15 Oral once  dextrose 5%. 1000 IV Continuous <Continuous>  dextrose 5%. 1000 IV Continuous <Continuous>  dextrose 50% Injectable 25 IV Push once  dextrose 50% Injectable 12.5 IV Push once  dextrose 50% Injectable 25 IV Push once  ergocalciferol 81163 Oral <User Schedule>  furosemide    Tablet 40 Oral two times a day  glucagon  Injectable 1 IntraMuscular once  heparin   Injectable 5000 SubCutaneous every 8 hours  heparin   Injectable. 2000 Dialysis. once  insulin glargine SubCutaneous Injection (LANTUS) - Peds 30 SubCutaneous at bedtime  insulin lispro (ADMELOG) corrective regimen sliding scale  SubCutaneous three times a day before meals  insulin lispro Injectable (ADMELOG) 8 SubCutaneous three times a day before meals  metoprolol succinate ER 50 Oral daily      Weight  Weight (kg): 103.5 (01-28-21 @ 19:35)    ANTIBIOTICS:      ALLERGIES:  No Known Allergies

## 2021-01-31 NOTE — PROVIDER CONTACT NOTE (OTHER) - SITUATION
Pt w/ temp of 100.9
Pt refusing intent to discharge for tomorrow (Monday) 2/1/2021. Pt states "my cardiologist was just here and he says he would prefer I left Tuesday, or Wednesday after dialysis.

## 2021-01-31 NOTE — PROVIDER CONTACT NOTE (OTHER) - ACTION/TREATMENT ORDERED:
Md Maddox made aware. Will administer Tylenol and continue to monitor.
MD to order 250 NS bolus
Ok, TY

## 2021-02-01 LAB
A1C WITH ESTIMATED AVERAGE GLUCOSE RESULT: 8.1 % — HIGH (ref 4–5.6)
ALBUMIN SERPL ELPH-MCNC: 4 G/DL — SIGNIFICANT CHANGE UP (ref 3.5–5.2)
ALP SERPL-CCNC: 47 U/L — SIGNIFICANT CHANGE UP (ref 30–115)
ALT FLD-CCNC: 16 U/L — SIGNIFICANT CHANGE UP (ref 0–41)
ANION GAP SERPL CALC-SCNC: 16 MMOL/L — HIGH (ref 7–14)
AST SERPL-CCNC: 23 U/L — SIGNIFICANT CHANGE UP (ref 0–41)
BASOPHILS # BLD AUTO: 0.01 K/UL — SIGNIFICANT CHANGE UP (ref 0–0.2)
BASOPHILS NFR BLD AUTO: 0.1 % — SIGNIFICANT CHANGE UP (ref 0–1)
BILIRUB SERPL-MCNC: 0.6 MG/DL — SIGNIFICANT CHANGE UP (ref 0.2–1.2)
BUN SERPL-MCNC: 81 MG/DL — CRITICAL HIGH (ref 10–20)
CALCIUM SERPL-MCNC: 8.5 MG/DL — SIGNIFICANT CHANGE UP (ref 8.5–10.1)
CHLORIDE SERPL-SCNC: 97 MMOL/L — LOW (ref 98–110)
CO2 SERPL-SCNC: 24 MMOL/L — SIGNIFICANT CHANGE UP (ref 17–32)
CREAT SERPL-MCNC: 5.4 MG/DL — CRITICAL HIGH (ref 0.7–1.5)
EOSINOPHIL # BLD AUTO: 0.03 K/UL — SIGNIFICANT CHANGE UP (ref 0–0.7)
EOSINOPHIL NFR BLD AUTO: 0.4 % — SIGNIFICANT CHANGE UP (ref 0–8)
ESTIMATED AVERAGE GLUCOSE: 186 MG/DL — HIGH (ref 68–114)
GLUCOSE BLDC GLUCOMTR-MCNC: 114 MG/DL — HIGH (ref 70–99)
GLUCOSE BLDC GLUCOMTR-MCNC: 185 MG/DL — HIGH (ref 70–99)
GLUCOSE BLDC GLUCOMTR-MCNC: 230 MG/DL — HIGH (ref 70–99)
GLUCOSE BLDC GLUCOMTR-MCNC: 236 MG/DL — HIGH (ref 70–99)
GLUCOSE SERPL-MCNC: 74 MG/DL — SIGNIFICANT CHANGE UP (ref 70–99)
HCT VFR BLD CALC: 38 % — LOW (ref 42–52)
HGB BLD-MCNC: 12.4 G/DL — LOW (ref 14–18)
IMM GRANULOCYTES NFR BLD AUTO: 0.6 % — HIGH (ref 0.1–0.3)
LYMPHOCYTES # BLD AUTO: 1.1 K/UL — LOW (ref 1.2–3.4)
LYMPHOCYTES # BLD AUTO: 15.3 % — LOW (ref 20.5–51.1)
MAGNESIUM SERPL-MCNC: 1.9 MG/DL — SIGNIFICANT CHANGE UP (ref 1.8–2.4)
MCHC RBC-ENTMCNC: 28.3 PG — SIGNIFICANT CHANGE UP (ref 27–31)
MCHC RBC-ENTMCNC: 32.6 G/DL — SIGNIFICANT CHANGE UP (ref 32–37)
MCV RBC AUTO: 86.8 FL — SIGNIFICANT CHANGE UP (ref 80–94)
MONOCYTES # BLD AUTO: 0.88 K/UL — HIGH (ref 0.1–0.6)
MONOCYTES NFR BLD AUTO: 12.3 % — HIGH (ref 1.7–9.3)
NEUTROPHILS # BLD AUTO: 5.12 K/UL — SIGNIFICANT CHANGE UP (ref 1.4–6.5)
NEUTROPHILS NFR BLD AUTO: 71.3 % — SIGNIFICANT CHANGE UP (ref 42.2–75.2)
NRBC # BLD: 0 /100 WBCS — SIGNIFICANT CHANGE UP (ref 0–0)
PHOSPHATE SERPL-MCNC: 3.6 MG/DL — SIGNIFICANT CHANGE UP (ref 2.1–4.9)
PLATELET # BLD AUTO: 176 K/UL — SIGNIFICANT CHANGE UP (ref 130–400)
POTASSIUM SERPL-MCNC: 4.9 MMOL/L — SIGNIFICANT CHANGE UP (ref 3.5–5)
POTASSIUM SERPL-SCNC: 4.9 MMOL/L — SIGNIFICANT CHANGE UP (ref 3.5–5)
PROT SERPL-MCNC: 6.2 G/DL — SIGNIFICANT CHANGE UP (ref 6–8)
RBC # BLD: 4.38 M/UL — LOW (ref 4.7–6.1)
RBC # FLD: 13.3 % — SIGNIFICANT CHANGE UP (ref 11.5–14.5)
SODIUM SERPL-SCNC: 137 MMOL/L — SIGNIFICANT CHANGE UP (ref 135–146)
WBC # BLD: 7.18 K/UL — SIGNIFICANT CHANGE UP (ref 4.8–10.8)
WBC # FLD AUTO: 7.18 K/UL — SIGNIFICANT CHANGE UP (ref 4.8–10.8)

## 2021-02-01 PROCEDURE — 99233 SBSQ HOSP IP/OBS HIGH 50: CPT

## 2021-02-01 RX ADMIN — Medication 8 UNIT(S): at 12:08

## 2021-02-01 RX ADMIN — SACUBITRIL AND VALSARTAN 1 TABLET(S): 24; 26 TABLET, FILM COATED ORAL at 17:21

## 2021-02-01 RX ADMIN — Medication 40 MILLIGRAM(S): at 04:46

## 2021-02-01 RX ADMIN — Medication 667 MILLIGRAM(S): at 08:41

## 2021-02-01 RX ADMIN — HEPARIN SODIUM 5000 UNIT(S): 5000 INJECTION INTRAVENOUS; SUBCUTANEOUS at 21:46

## 2021-02-01 RX ADMIN — Medication 667 MILLIGRAM(S): at 12:07

## 2021-02-01 RX ADMIN — Medication 4: at 17:21

## 2021-02-01 RX ADMIN — Medication 50 MILLIGRAM(S): at 04:46

## 2021-02-01 RX ADMIN — HEPARIN SODIUM 5000 UNIT(S): 5000 INJECTION INTRAVENOUS; SUBCUTANEOUS at 04:47

## 2021-02-01 RX ADMIN — CHLORHEXIDINE GLUCONATE 1 APPLICATION(S): 213 SOLUTION TOPICAL at 04:47

## 2021-02-01 RX ADMIN — Medication 40 MILLIGRAM(S): at 17:21

## 2021-02-01 RX ADMIN — Medication 8 UNIT(S): at 08:40

## 2021-02-01 RX ADMIN — Medication 667 MILLIGRAM(S): at 17:21

## 2021-02-01 RX ADMIN — SACUBITRIL AND VALSARTAN 1 TABLET(S): 24; 26 TABLET, FILM COATED ORAL at 04:46

## 2021-02-01 RX ADMIN — Medication 8 UNIT(S): at 17:21

## 2021-02-01 RX ADMIN — Medication 2: at 12:07

## 2021-02-01 RX ADMIN — ATORVASTATIN CALCIUM 40 MILLIGRAM(S): 80 TABLET, FILM COATED ORAL at 21:46

## 2021-02-01 RX ADMIN — INSULIN GLARGINE 30 UNIT(S): 100 INJECTION, SOLUTION SUBCUTANEOUS at 21:46

## 2021-02-01 RX ADMIN — HEPARIN SODIUM 5000 UNIT(S): 5000 INJECTION INTRAVENOUS; SUBCUTANEOUS at 12:07

## 2021-02-01 RX ADMIN — Medication 6 MILLIGRAM(S): at 04:47

## 2021-02-01 NOTE — PROGRESS NOTE ADULT - ATTENDING COMMENTS
80 yr old m w/ a PMHx significant for HTN, DMII, DLD, ESRD on HD, CAD s/p CABG,  presents with nausea, vomiting, weakness after testing positive for COVID 19 two weeks ago.     Dypsnea  - likely due to COVID-19  - doubt PE as no hypoxia  - monitor off antibiotics    Rhinorrhea and fevers  - check RVP  - as per ID monitor off abx  - likely due to covid long haul  - repeat inflammatory markers  - UA unremarkable    COVID-19  - COVID PCR + for two weeks  - Inflammatory Markers:  CRP: 5.73 (01-28-21)  Procalcitonin: 0.47 (01-28-21)  Ferritin: 1032 (01-28-21)  D-Dimer: 630 (01-29-21)<--533 (01-28-21)  - currently saturating 98% on RA  - BNP: 594  - CT AP: Patchy bibasilar ground-glass airspace opacities, which may be attributed to viral pneumonia in the appropriate clinical setting  - c/w dexamethasone  - no RDV due to ESRD and duration of infection        Troponemia  -  likely due to CKD  - has RBBB which is new  - not a candidate for cardiac cath or echo due to COVID-19 infection  - cardio consult    ESRD  - next HD on 2/2/2020  - pt states he gets fistula "cleaned out" every 3 months but hasn't recently due to the pandemic. f/u nephro if vascular eval is warranted    HTN  - bp stable, restart entresto  - controlled on home medications  - c/w metoprolol 50 mg daily  - c/w lasix 40 mg BID      DLD  - c/w lipitor 40 mg daily    DM II  - FS controlled  - Lantus 30 mg qhs    #MISC  - DIET: DASH  - DVT ppx: Heparin 5000  - Dispo: Acute  - Activity: IAT    Progress Note Handoff:  Pending:  Remains febrile, pending RVP swab  Family discussion: Discussed pending swab, fever  Disposition: Home___/SNF___/Other________/Unknown at this time___x_____ 80 yr old m w/ a PMHx significant for HTN, DMII, DLD, ESRD on HD, CAD s/p CABG,  presents with nausea, vomiting, weakness after testing positive for COVID 19 two weeks ago.     Dypsnea  - sepsis due to covid - 19 present on admission, resolved  - likely due to COVID-19  - doubt PE as no hypoxia  - monitor off antibiotics    Rhinorrhea and fevers  - check RVP  - as per ID monitor off abx  - likely due to covid long haul  - repeat inflammatory markers  - UA unremarkable    COVID-19  - COVID PCR + for two weeks  - Inflammatory Markers:  CRP: 5.73 (01-28-21)  Procalcitonin: 0.47 (01-28-21)  Ferritin: 1032 (01-28-21)  D-Dimer: 630 (01-29-21)<--533 (01-28-21)  - currently saturating 98% on RA  - BNP: 594  - CT AP: Patchy bibasilar ground-glass airspace opacities, which may be attributed to viral pneumonia in the appropriate clinical setting  - c/w dexamethasone  - no RDV due to ESRD and duration of infection        Troponemia  -  likely due to CKD  - has RBBB which is new  - not a candidate for cardiac cath or echo due to COVID-19 infection  - cardio consult    ESRD  - next HD on 2/2/2020  - pt states he gets fistula "cleaned out" every 3 months but hasn't recently due to the pandemic. f/u nephro if vascular eval is warranted    HTN  - bp stable, restart entresto  - controlled on home medications  - c/w metoprolol 50 mg daily  - c/w lasix 40 mg BID      DLD  - c/w lipitor 40 mg daily    DM II  - FS controlled  - Lantus 30 mg qhs    #MISC  - DIET: DASH  - DVT ppx: Heparin 5000  - Dispo: Acute  - Activity: IAT    Progress Note Handoff:  Pending:  Remains febrile, pending RVP swab  Family discussion: Discussed pending swab, fever  Disposition: Home___/SNF___/Other________/Unknown at this time___x_____

## 2021-02-01 NOTE — PROGRESS NOTE ADULT - SUBJECTIVE AND OBJECTIVE BOX
HPI:  81yo male with PMH of h/o ESRD on HD, DLD, CAD s/p CABG, HTN, and DM who presents to the ED w/ chest pain and SOB nausea, poor appetite for the past 2 weeks, worsening in the last day with chills after testing positive for COVID 2 weeks ago.  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  He suspects he transmitted the virus to his wife. Patients wife is also admitted to [facility] for COVID. Patient states that he has been progressively getting weaker, with poor PO intake. Patient admits to intermittent SOB, N/V, and abdominal pain.     In the ED:  Patient is currently saturating at 98% of 3L NC, BP: 141/91 HR: 97  Temp: 101  CT AP showed patchy bibasilar ground-glass airspace opacities, which may be attributed to viral pneumonia in the appropriate clinical setting.  Troponin : 0.07   Serum BNP: 594   (2021 13:33)    Currently admitted to medicine with the primary diagnosis of COVID-19       Today is hospital day 4d.     INTERVAL HPI / OVERNIGHT EVENTS:  Patient was examined and seen at bedside. This morning he is resting comfortably in bed and reports no new issues or overnight events.     ROS: Otherwise unremarkable     PAST MEDICAL & SURGICAL HISTORY  Mitral valve regurgitation    CHF (congestive heart failure)    BPH (Benign Prostatic Hyperplasia)    GERD (gastroesophageal reflux disease)    Sleep apnea  does not use machine    Renal insufficiency    HTN - Hypertension    H/O hyperlipidemia    CAD (coronary artery disease)    Diabetes mellitus    History of PTCA  with st[facility] 10 years ago ([facility]    CAD (Coronary Artery Disease)    Hypercholesterolemia    Diabetes Mellitus Type II    HTN (Hypertension)    S/P appendectomy    S/P primary angioplasty with coronary stent  6-7 stents last one in 10/12    S/P tonsillectomy    S/P knee surgery  b/l arthroscopic      ALLERGIES  No Known Allergies    MEDICATIONS  STANDING MEDICATIONS  atorvastatin 40 milliGRAM(s) Oral at bedtime  calcium acetate 667 milliGRAM(s) Oral three times a day with meals  chlorhexidine 4% Liquid 1 Application(s) Topical <User Schedule>  dexAMETHasone  Injectable 6 milliGRAM(s) IV Push daily  dextrose 40% Gel 15 Gram(s) Oral once  dextrose 5%. 1000 milliLiter(s) IV Continuous <Continuous>  dextrose 5%. 1000 milliLiter(s) IV Continuous <Continuous>  dextrose 50% Injectable 25 Gram(s) IV Push once  dextrose 50% Injectable 12.5 Gram(s) IV Push once  dextrose 50% Injectable 25 Gram(s) IV Push once  ergocalciferol 36861 Unit(s) Oral <User Schedule>  furosemide    Tablet 40 milliGRAM(s) Oral two times a day  glucagon  Injectable 1 milliGRAM(s) IntraMuscular once  heparin   Injectable 5000 Unit(s) SubCutaneous every 8 hours  heparin   Injectable. 2000 Unit(s) Dialysis. once  insulin glargine SubCutaneous Injection (LANTUS) - Peds 30 Unit(s) SubCutaneous at bedtime  insulin lispro (ADMELOG) corrective regimen sliding scale   SubCutaneous three times a day before meals  insulin lispro Injectable (ADMELOG) 8 Unit(s) SubCutaneous three times a day before meals  metoprolol succinate ER 50 milliGRAM(s) Oral daily  sacubitril 24 mG/valsartan 26 mG 1 Tablet(s) Oral two times a day    PRN MEDICATIONS  acetaminophen   Tablet .. 650 milliGRAM(s) Oral every 6 hours PRN    VITALS:  T(F): 97.5  HR: 94  BP: 104/58  RR: 18  SpO2: 94%      LABS                        12.4   7.18  )-----------( 176      ( 2021 05:56 )             38.0     02-01    137  |  97<L>  |  81<HH>  ----------------------------<  74  4.9   |  24  |  5.4<HH>    Ca    8.5      2021 05:56  Phos  3.6     02-01  Mg     1.9     02-    TPro  6.2  /  Alb  4.0  /  TBili  0.6  /  DBili  x   /  AST  23  /  ALT  16  /  AlkPhos  47  02-01      Urinalysis Basic - ( 2021 10:25 )    Color: Light Yellow / Appearance: Clear / S.014 / pH: x  Gluc: x / Ketone: Negative  / Bili: Negative / Urobili: <2 mg/dL   Blood: x / Protein: 30 mg/dL / Nitrite: Negative   Leuk Esterase: Negative / RBC: 3 /HPF / WBC 1 /HPF   Sq Epi: x / Non Sq Epi: 0 /HPF / Bacteria: Negative      Culture - Blood (collected 2021 12:16)  Source: .Blood None  Preliminary Report (2021 18:01):    No growth to date.        PHYSICAL EXAM  GEN: NAD, Resting comfortably in bed  PULM: Clear to auscultation bilaterally, No wheezes  CVS: Regular rate and rhythm, S1-S2, no murmurs  ABD: Soft, non-tender, non-distended, no guarding  EXT: No edema  NEURO: A&Ox3, no focal deficits    ASSESSMENT AND PLAN    80 yr old m w/ a PMHx significant for HTN, DMII, DLD, ESRD on HD, CAD s/p CABG,  presents with nausea, vomiting, weakness after testing positive for COVID 19 two weeks ago.     #Fever of unclear etiology, associated with dyspnea  COVID PCR + for two weeks  BNP: 594  CT AP: Patchy bibasilar ground-glass airspace opacities, which may be attributed to viral pneumonia in the appropriate clinical setting  Not hypoxic  Cxs negative; monitor off antibiotics   As per ID may be long term COVID syndrome  Send RVP    # ESRD  - scheduled for HD Tuesday   - Continue lasix   - renal diet  - continue phoslo  - trend BMP    #HTN  - controlled on home medications  - c/w metoprolol 50 mg daily  - c/w lasix 40 mg BID  - c/w entresto    #DLD  - c/w lipitor 40 mg daily    #DMII  - Lantus 30 mg qhs    #MISC  - DIET: DASH  - DVT ppx: Heparin 5000  - Dispo: Acute  - Activity: IAT HPI:  81yo male with PMH of h/o ESRD on HD, DLD, CAD s/p CABG, HTN, and DM who presents to the ED w/ chest pain and SOB nausea, poor appetite for the past 2 weeks, worsening in the last day with chills after testing positive for COVID 2 weeks ago.  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  He suspects he transmitted the virus to his wife. Patients wife is also admitted to [facility] for COVID. Patient states that he has been progressively getting weaker, with poor PO intake. Patient admits to intermittent SOB, N/V, and abdominal pain.     In the ED:  Patient is currently saturating at 98% of 3L NC, BP: 141/91 HR: 97  Temp: 101  CT AP showed patchy bibasilar ground-glass airspace opacities, which may be attributed to viral pneumonia in the appropriate clinical setting.  Troponin : 0.07   Serum BNP: 594   (2021 13:33)    Currently admitted to medicine with the primary diagnosis of COVID-19       Today is hospital day 4d.     INTERVAL HPI / OVERNIGHT EVENTS:  Patient was examined and seen at bedside. This morning he is resting comfortably in bed and reports no new issues or overnight events.     ROS: Otherwise unremarkable     PAST MEDICAL & SURGICAL HISTORY  Mitral valve regurgitation    CHF (congestive heart failure)    BPH (Benign Prostatic Hyperplasia)    GERD (gastroesophageal reflux disease)    Sleep apnea  does not use machine    Renal insufficiency    HTN - Hypertension    H/O hyperlipidemia    CAD (coronary artery disease)    Diabetes mellitus    History of PTCA  with stents 10 years ago ([facility])    CAD (Coronary Artery Disease)    Hypercholesterolemia    Diabetes Mellitus Type II    HTN (Hypertension)    S/P appendectomy    S/P primary angioplasty with coronary stent  6-7 stents last one in 10/12    S/P tonsillectomy    S/P knee surgery  b/l arthroscopic      ALLERGIES  No Known Allergies    MEDICATIONS  STANDING MEDICATIONS  atorvastatin 40 milliGRAM(s) Oral at bedtime  calcium acetate 667 milliGRAM(s) Oral three times a day with meals  chlorhexidine 4% Liquid 1 Application(s) Topical <User Schedule>  dexAMETHasone  Injectable 6 milliGRAM(s) IV Push daily  dextrose 40% Gel 15 Gram(s) Oral once  dextrose 5%. 1000 milliLiter(s) IV Continuous <Continuous>  dextrose 5%. 1000 milliLiter(s) IV Continuous <Continuous>  dextrose 50% Injectable 25 Gram(s) IV Push once  dextrose 50% Injectable 12.5 Gram(s) IV Push once  dextrose 50% Injectable 25 Gram(s) IV Push once  ergocalciferol 96899 Unit(s) Oral <User Schedule>  furosemide    Tablet 40 milliGRAM(s) Oral two times a day  glucagon  Injectable 1 milliGRAM(s) IntraMuscular once  heparin   Injectable 5000 Unit(s) SubCutaneous every 8 hours  heparin   Injectable. 2000 Unit(s) Dialysis. once  insulin glargine SubCutaneous Injection (LANTUS) - Peds 30 Unit(s) SubCutaneous at bedtime  insulin lispro (ADMELOG) corrective regimen sliding scale   SubCutaneous three times a day before meals  insulin lispro Injectable (ADMELOG) 8 Unit(s) SubCutaneous three times a day before meals  metoprolol succinate ER 50 milliGRAM(s) Oral daily  sacubitril 24 mG/valsartan 26 mG 1 Tablet(s) Oral two times a day    PRN MEDICATIONS  acetaminophen   Tablet .. 650 milliGRAM(s) Oral every 6 hours PRN    VITALS:  T(F): 97.5  HR: 94  BP: 104/58  RR: 18  SpO2: 94%      LABS                        12.4   7.18  )-----------( 176      ( 2021 05:56 )             38.0     02-01    137  |  97<L>  |  81<HH>  ----------------------------<  74  4.9   |  24  |  5.4<HH>    Ca    8.5      2021 05:56  Phos  3.6     02-01  Mg     1.9     02-    TPro  6.2  /  Alb  4.0  /  TBili  0.6  /  DBili  x   /  AST  23  /  ALT  16  /  AlkPhos  47  02-01      Urinalysis Basic - ( 2021 10:25 )    Color: Light Yellow / Appearance: Clear / S.014 / pH: x  Gluc: x / Ketone: Negative  / Bili: Negative / Urobili: <2 mg/dL   Blood: x / Protein: 30 mg/dL / Nitrite: Negative   Leuk Esterase: Negative / RBC: 3 /HPF / WBC 1 /HPF   Sq Epi: x / Non Sq Epi: 0 /HPF / Bacteria: Negative      Culture - Blood (collected 2021 12:16)  Source: .Blood None  Preliminary Report (2021 18:01):    No growth to date.        PHYSICAL EXAM  GEN: NAD, Resting comfortably in bed  PULM: Clear to auscultation bilaterally, No wheezes  CVS: Regular rate and rhythm, S1-S2, no murmurs  ABD: Soft, non-tender, non-distended, no guarding  EXT: No edema  NEURO: A&Ox3, no focal deficits    ASSESSMENT AND PLAN    80 yr old m w/ a PMHx significant for HTN, DMII, DLD, ESRD on HD, CAD s/p CABG,  presents with nausea, vomiting, weakness after testing positive for COVID 19 two weeks ago.     #Fever of unclear etiology, associated with dyspnea  COVID PCR + for two weeks  BNP: 594  CT AP: Patchy bibasilar ground-glass airspace opacities, which may be attributed to viral pneumonia in the appropriate clinical setting  Not hypoxic  Cxs negative; monitor off antibiotics   As per ID may be long term COVID syndrome  Send RVP    # ESRD  - scheduled for HD Tuesday   - Continue lasix   - renal diet  - continue phoslo  - trend BMP  --> f/u with [facility]  [address] for AVF maintenance     #HTN  - controlled on home medications  - c/w metoprolol 50 mg daily  - c/w lasix 40 mg BID  - c/w entresto    #DLD  - c/w lipitor 40 mg daily    #DMII  - Lantus 30 mg qhs    #MISC  - DIET: DASH  - DVT ppx: Heparin 5000  - Dispo: Acute  - Activity: IAT

## 2021-02-01 NOTE — CDI QUERY NOTE - [id number]_GEN_ALL_CORE_HH
DOCUMENTATION CLARIFICATION FORM     Encounter #: [id number]                                  Patient’s Name: Sushant Centeno  Medical Record #: [id number]                        Admit Date: 1-  CDI Specialist/: Ashlee                        Contact #: [phone]    Dear Dr. Conte                  Date: 2-1-2021    The Physician’s or Provider’s documentation of the patient’s presentation, evaluation and  medical management, as identified below, may support a diagnosis that is not documented in the medical record.  In order to accurately capture all diagnoses to the greatest degree of specificity reflecting the patient’s actual severity of illness, the documentation in this patient’s medical record requires additional clarification.  Please include more specific documentation, either known or suspected, of a corresponding diagnosis associated with the clinical information described below in your Progress Note and/or Discharge Summary.    ?	1-28 In ED-  T 101, HR 97, RR 20, Covid positive, d-dimer 533, ferritin 1032, CRP 5.73  ?	1-28 CT Ab/pelvis Radiology Impression: Patchy bibasilar ground glass airspace opacities, which may be attributed to viral pneumonia in the appropriate clinical setting.  ?	1-28 H&P states, “SOB + Fevers + Cough due to COVID19 pneumonia, SIRs present on admission (temp + HR>90 + source, no end organ damage).”  ?	1-30 ID Consult states, “fevers may be from "long covid" syndrome”  ?	Treatment: dexamethasone IVP, inflammatory markers trended, supplemental oxygen    Based upon your clinical judgment and the above clinical indicators, please clarify the diagnosis of SIRS?  (  ) SIRS with Sepsis due to Covid PNA is ruled in  (  ) SIRS with Sepsis due to Covid PNA is ruled out  (  ) Non Infectious SIRS due to (please specify)___________  (  ) Other (please specify): __________    Present on Admission:  Was the condition present on admission, if so, please document in the chart that “(the condition) was present on admission.”         Documentation clarification is required for compliance, accuracy in coding and billing, and reporting severity of illness, quality data   and risk of mortality.  --------------------------------------------------------------------------------------------------------------------------------------------  DO NOT REMOVE THIS RECORD WITHOUT FIRST NOTIFYING THE CDI SPECIALIST  This form is NOT a part of the permanent Medical Record.

## 2021-02-01 NOTE — PROGRESS NOTE ADULT - SUBJECTIVE AND OBJECTIVE BOX
NEPHROLOGY FOLLOW UP NOTE    pt seen and examined  off O2   + fever  c/o weakness      PAST MEDICAL & SURGICAL HISTORY:  Mitral valve regurgitation    CHF (congestive heart failure)    BPH (Benign Prostatic Hyperplasia)    GERD (gastroesophageal reflux disease)    Sleep apnea  does not use machine    Renal insufficiency    HTN - Hypertension    H/O hyperlipidemia    CAD (coronary artery disease)    Diabetes mellitus    History of PTCA  with stents 10 years ago ([facility])    CAD (Coronary Artery Disease)    Hypercholesterolemia    Diabetes Mellitus Type II    HTN (Hypertension)    S/P appendectomy    S/P primary angioplasty with coronary stent  6-7 stents last one in 10/12    S/P tonsillectomy    S/P knee surgery  b/l arthroscopic      Allergies:  No Known Allergies    Home Medications Reviewed    SOCIAL HISTORY:  Denies ETOH,Smoking,   FAMILY HISTORY:  No pertinent family history in first degree relatives          REVIEW OF SYSTEMS:  CONSTITUTIONAL: + weakness, fevers or chills  EYES/ENT: No visual changes;  No vertigo or throat pain   NECK: No pain or stiffness  RESPIRATORY: No cough, wheezing, hemoptysis; No shortness of breath  CARDIOVASCULAR: No chest pain or palpitations.  GASTROINTESTINAL: No abdominal or epigastric pain. No nausea, vomiting, or hematemesis; No diarrhea or constipation. No melena or hematochezia.  GENITOURINARY: No dysuria, frequency, foamy urine, urinary urgency, incontinence or hematuria  NEUROLOGICAL: No numbness or weakness  SKIN: No itching, burning, rashes, or lesions   VASCULAR: No bilateral lower extremity edema.   All other review of systems is negative unless indicated above.    PHYSICAL EXAM:  Constitutional: NAD  HEENT: anicteric sclera, oropharynx clear, MMM    Neck: No JVD  Respiratory: CTAB, no wheezes, rales or rhonchi  Cardiovascular: S1, S2, RRR  Gastrointestinal: BS+, soft, NT/ND  Extremities: No cyanosis or clubbing. No peripheral edema  Neurological: A/O x 3, no focal deficits  Psychiatric: Normal mood, normal affect  : No CVA tenderness. No woods.   Skin: No rashes  Vascular Access: left arm AVF + [facility]      advance care planning and advance care directives reviewed and discussed    Hospital Medications:   MEDICATIONS  (STANDING):  atorvastatin 40 milliGRAM(s) Oral at bedtime  calcium acetate 667 milliGRAM(s) Oral three times a day with meals  chlorhexidine 4% Liquid 1 Application(s) Topical <User Schedule>  dexAMETHasone  Injectable 6 milliGRAM(s) IV Push daily  dextrose 40% Gel 15 Gram(s) Oral once  dextrose 5%. 1000 milliLiter(s) (50 mL/Hr) IV Continuous <Continuous>  dextrose 5%. 1000 milliLiter(s) (100 mL/Hr) IV Continuous <Continuous>  dextrose 50% Injectable 25 Gram(s) IV Push once  dextrose 50% Injectable 12.5 Gram(s) IV Push once  dextrose 50% Injectable 25 Gram(s) IV Push once  ergocalciferol 92696 Unit(s) Oral <User Schedule>  furosemide    Tablet 40 milliGRAM(s) Oral two times a day  glucagon  Injectable 1 milliGRAM(s) IntraMuscular once  heparin   Injectable 5000 Unit(s) SubCutaneous every 8 hours  heparin   Injectable. 2000 Unit(s) Dialysis. once  insulin glargine SubCutaneous Injection (LANTUS) - Peds 30 Unit(s) SubCutaneous at bedtime  insulin lispro (ADMELOG) corrective regimen sliding scale   SubCutaneous three times a day before meals  insulin lispro Injectable (ADMELOG) 8 Unit(s) SubCutaneous three times a day before meals  metoprolol succinate ER 50 milliGRAM(s) Oral daily  sacubitril 24 mG/valsartan 26 mG 1 Tablet(s) Oral two times a day        VITALS:  T(F): 97.5 (21 @ 10:55), Max: 101.1 (21 @ 00:00)  HR: 94 (21 @ 10:55)  BP: 104/58 (21 @ 10:55)  RR: 18 (21 @ 10:55)  SpO2: 94% (21 @ 10:55)  Wt(kg): --     @ :  -   @ 07:00  --------------------------------------------------------  IN: 960 mL / OUT: 1310 mL / NET: -350 mL     @ 07:01  -   @ 07:00  --------------------------------------------------------  IN: 780 mL / OUT: 400 mL / NET: 380 mL          LABS:      137  |  97<L>  |  81<HH>  ----------------------------<  74  4.9   |  24  |  5.4<HH>    Ca    8.5      2021 05:56  Phos  3.6       Mg     1.9         TPro  6.2  /  Alb  4.0  /  TBili  0.6  /  DBili      /  AST  23  /  ALT  16  /  AlkPhos  47                            12.4   7.18  )-----------( 176      ( 2021 05:56 )             38.0       Urine Studies:  Urinalysis Basic - ( 2021 10:25 )    Color: Light Yellow / Appearance: Clear / S.014 / pH:   Gluc:  / Ketone: Negative  / Bili: Negative / Urobili: <2 mg/dL   Blood:  / Protein: 30 mg/dL / Nitrite: Negative   Leuk Esterase: Negative / RBC: 3 /HPF / WBC 1 /HPF   Sq Epi:  / Non Sq Epi: 0 /HPF / Bacteria: Negative          RADIOLOGY & ADDITIONAL STUDIES:

## 2021-02-01 NOTE — PROGRESS NOTE ADULT - ASSESSMENT
ESRD on HD 2x /week  COVID-19 PNA  CAD / CABG / CMP / CHF  HTN  DM2    plan:    HD tomorrow  renal diet  left arm precautions  phoslo with meals   off entresto  cont lasix    full code      addendum:  tolerating HD.  BP's fair.  Circuit clotted after 2.5H.  Next HD Tue

## 2021-02-02 LAB
ALBUMIN SERPL ELPH-MCNC: 4.1 G/DL — SIGNIFICANT CHANGE UP (ref 3.5–5.2)
ALP SERPL-CCNC: 48 U/L — SIGNIFICANT CHANGE UP (ref 30–115)
ALT FLD-CCNC: 16 U/L — SIGNIFICANT CHANGE UP (ref 0–41)
ANION GAP SERPL CALC-SCNC: 18 MMOL/L — HIGH (ref 7–14)
AST SERPL-CCNC: 21 U/L — SIGNIFICANT CHANGE UP (ref 0–41)
BASOPHILS # BLD AUTO: 0.02 K/UL — SIGNIFICANT CHANGE UP (ref 0–0.2)
BASOPHILS NFR BLD AUTO: 0.2 % — SIGNIFICANT CHANGE UP (ref 0–1)
BILIRUB SERPL-MCNC: 0.6 MG/DL — SIGNIFICANT CHANGE UP (ref 0.2–1.2)
BUN SERPL-MCNC: 91 MG/DL — CRITICAL HIGH (ref 10–20)
CALCIUM SERPL-MCNC: 8.5 MG/DL — SIGNIFICANT CHANGE UP (ref 8.5–10.1)
CHLORIDE SERPL-SCNC: 94 MMOL/L — LOW (ref 98–110)
CO2 SERPL-SCNC: 23 MMOL/L — SIGNIFICANT CHANGE UP (ref 17–32)
CREAT SERPL-MCNC: 5.2 MG/DL — CRITICAL HIGH (ref 0.7–1.5)
CULTURE RESULTS: SIGNIFICANT CHANGE UP
CULTURE RESULTS: SIGNIFICANT CHANGE UP
EOSINOPHIL # BLD AUTO: 0.01 K/UL — SIGNIFICANT CHANGE UP (ref 0–0.7)
EOSINOPHIL NFR BLD AUTO: 0.1 % — SIGNIFICANT CHANGE UP (ref 0–8)
GLUCOSE BLDC GLUCOMTR-MCNC: 204 MG/DL — HIGH (ref 70–99)
GLUCOSE BLDC GLUCOMTR-MCNC: 268 MG/DL — HIGH (ref 70–99)
GLUCOSE BLDC GLUCOMTR-MCNC: 339 MG/DL — HIGH (ref 70–99)
GLUCOSE BLDC GLUCOMTR-MCNC: 367 MG/DL — HIGH (ref 70–99)
GLUCOSE SERPL-MCNC: 171 MG/DL — HIGH (ref 70–99)
HCT VFR BLD CALC: 36.6 % — LOW (ref 42–52)
HGB BLD-MCNC: 12.7 G/DL — LOW (ref 14–18)
IMM GRANULOCYTES NFR BLD AUTO: 1 % — HIGH (ref 0.1–0.3)
LYMPHOCYTES # BLD AUTO: 0.95 K/UL — LOW (ref 1.2–3.4)
LYMPHOCYTES # BLD AUTO: 11.6 % — LOW (ref 20.5–51.1)
MCHC RBC-ENTMCNC: 29.8 PG — SIGNIFICANT CHANGE UP (ref 27–31)
MCHC RBC-ENTMCNC: 34.7 G/DL — SIGNIFICANT CHANGE UP (ref 32–37)
MCV RBC AUTO: 85.9 FL — SIGNIFICANT CHANGE UP (ref 80–94)
MONOCYTES # BLD AUTO: 0.84 K/UL — HIGH (ref 0.1–0.6)
MONOCYTES NFR BLD AUTO: 10.3 % — HIGH (ref 1.7–9.3)
NEUTROPHILS # BLD AUTO: 6.26 K/UL — SIGNIFICANT CHANGE UP (ref 1.4–6.5)
NEUTROPHILS NFR BLD AUTO: 76.8 % — HIGH (ref 42.2–75.2)
NRBC # BLD: 0 /100 WBCS — SIGNIFICANT CHANGE UP (ref 0–0)
PLATELET # BLD AUTO: 182 K/UL — SIGNIFICANT CHANGE UP (ref 130–400)
POTASSIUM SERPL-MCNC: 4.8 MMOL/L — SIGNIFICANT CHANGE UP (ref 3.5–5)
POTASSIUM SERPL-SCNC: 4.8 MMOL/L — SIGNIFICANT CHANGE UP (ref 3.5–5)
PROT SERPL-MCNC: 6.2 G/DL — SIGNIFICANT CHANGE UP (ref 6–8)
RAPID RVP RESULT: SIGNIFICANT CHANGE UP
RBC # BLD: 4.26 M/UL — LOW (ref 4.7–6.1)
RBC # FLD: 13.3 % — SIGNIFICANT CHANGE UP (ref 11.5–14.5)
SARS-COV-2 RNA SPEC QL NAA+PROBE: SIGNIFICANT CHANGE UP
SODIUM SERPL-SCNC: 135 MMOL/L — SIGNIFICANT CHANGE UP (ref 135–146)
SPECIMEN SOURCE: SIGNIFICANT CHANGE UP
SPECIMEN SOURCE: SIGNIFICANT CHANGE UP
WBC # BLD: 8.16 K/UL — SIGNIFICANT CHANGE UP (ref 4.8–10.8)
WBC # FLD AUTO: 8.16 K/UL — SIGNIFICANT CHANGE UP (ref 4.8–10.8)

## 2021-02-02 PROCEDURE — 71045 X-RAY EXAM CHEST 1 VIEW: CPT | Mod: 26

## 2021-02-02 PROCEDURE — 99233 SBSQ HOSP IP/OBS HIGH 50: CPT

## 2021-02-02 RX ORDER — POLYETHYLENE GLYCOL 3350 17 G/17G
17 POWDER, FOR SOLUTION ORAL DAILY
Refills: 0 | Status: DISCONTINUED | OUTPATIENT
Start: 2021-02-02 | End: 2021-02-03

## 2021-02-02 RX ORDER — SENNA PLUS 8.6 MG/1
2 TABLET ORAL ONCE
Refills: 0 | Status: COMPLETED | OUTPATIENT
Start: 2021-02-02 | End: 2021-02-02

## 2021-02-02 RX ADMIN — Medication 6: at 18:13

## 2021-02-02 RX ADMIN — SACUBITRIL AND VALSARTAN 1 TABLET(S): 24; 26 TABLET, FILM COATED ORAL at 18:11

## 2021-02-02 RX ADMIN — INSULIN GLARGINE 30 UNIT(S): 100 INJECTION, SOLUTION SUBCUTANEOUS at 20:24

## 2021-02-02 RX ADMIN — ATORVASTATIN CALCIUM 40 MILLIGRAM(S): 80 TABLET, FILM COATED ORAL at 20:18

## 2021-02-02 RX ADMIN — Medication 50 MILLIGRAM(S): at 04:27

## 2021-02-02 RX ADMIN — Medication 40 MILLIGRAM(S): at 18:11

## 2021-02-02 RX ADMIN — SACUBITRIL AND VALSARTAN 1 TABLET(S): 24; 26 TABLET, FILM COATED ORAL at 04:28

## 2021-02-02 RX ADMIN — Medication 4: at 08:23

## 2021-02-02 RX ADMIN — Medication 8 UNIT(S): at 18:13

## 2021-02-02 RX ADMIN — HEPARIN SODIUM 5000 UNIT(S): 5000 INJECTION INTRAVENOUS; SUBCUTANEOUS at 04:28

## 2021-02-02 RX ADMIN — Medication 8 UNIT(S): at 12:25

## 2021-02-02 RX ADMIN — Medication 10: at 12:25

## 2021-02-02 RX ADMIN — Medication 40 MILLIGRAM(S): at 04:27

## 2021-02-02 RX ADMIN — Medication 8 UNIT(S): at 08:24

## 2021-02-02 RX ADMIN — Medication 667 MILLIGRAM(S): at 18:12

## 2021-02-02 RX ADMIN — SENNA PLUS 2 TABLET(S): 8.6 TABLET ORAL at 11:12

## 2021-02-02 RX ADMIN — HEPARIN SODIUM 5000 UNIT(S): 5000 INJECTION INTRAVENOUS; SUBCUTANEOUS at 20:18

## 2021-02-02 RX ADMIN — Medication 667 MILLIGRAM(S): at 08:22

## 2021-02-02 RX ADMIN — HEPARIN SODIUM 5000 UNIT(S): 5000 INJECTION INTRAVENOUS; SUBCUTANEOUS at 11:13

## 2021-02-02 RX ADMIN — Medication 6 MILLIGRAM(S): at 04:27

## 2021-02-02 RX ADMIN — Medication 667 MILLIGRAM(S): at 11:12

## 2021-02-02 NOTE — PROGRESS NOTE ADULT - SUBJECTIVE AND OBJECTIVE BOX
NEPHROLOGY FOLLOW UP NOTE    tolerating HD      PAST MEDICAL & SURGICAL HISTORY:  Mitral valve regurgitation    CHF (congestive heart failure)    BPH (Benign Prostatic Hyperplasia)    GERD (gastroesophageal reflux disease)    Sleep apnea  does not use machine    Renal insufficiency    HTN - Hypertension    H/O hyperlipidemia    CAD (coronary artery disease)    Diabetes mellitus    History of PTCA  with [facility] ([facility])    CAD (Coronary Artery Disease)    Hypercholesterolemia    Diabetes Mellitus Type II    HTN (Hypertension)    S/P appendectomy    S/P primary angioplasty with coronary stent  6-7 stents last one in 10/12    S/P tonsillectomy    S/P knee surgery  b/l arthroscopic      Allergies:  No Known Allergies    Home Medications Reviewed    SOCIAL HISTORY:  Denies ETOH,Smoking,   FAMILY HISTORY:  No pertinent family history in first degree relatives          REVIEW OF SYSTEMS:  CONSTITUTIONAL: + weakness, fevers or chills  EYES/ENT: No visual changes;  No vertigo or throat pain   NECK: No pain or stiffness  RESPIRATORY: No cough, wheezing, hemoptysis; No shortness of breath  CARDIOVASCULAR: No chest pain or palpitations.  GASTROINTESTINAL: No abdominal or epigastric pain. No nausea, vomiting, or hematemesis; No diarrhea or constipation. No melena or hematochezia.  GENITOURINARY: No dysuria, frequency, foamy urine, urinary urgency, incontinence or hematuria  NEUROLOGICAL: No numbness or weakness  SKIN: No itching, burning, rashes, or lesions   VASCULAR: No bilateral lower extremity edema.   All other review of systems is negative unless indicated above.    PHYSICAL EXAM:  Constitutional: NAD  HEENT: anicteric sclera, oropharynx clear, MMM    Neck: No JVD  Respiratory: CTAB, no wheezes, rales or rhonchi  Cardiovascular: S1, S2, RRR  Gastrointestinal: BS+, soft, NT/ND  Extremities: No cyanosis or clubbing. No peripheral edema  Neurological: A/O x 3, no focal deficits  Psychiatric: Normal mood, normal affect  : No CVA tenderness. No woods.   Skin: No rashes  Vascular Access: left arm AVF + [facility] Medications:   MEDICATIONS  (STANDING):  atorvastatin 40 milliGRAM(s) Oral at bedtime  calcium acetate 667 milliGRAM(s) Oral three times a day with meals  chlorhexidine 4% Liquid 1 Application(s) Topical <User Schedule>  dexAMETHasone  Injectable 6 milliGRAM(s) IV Push daily  dextrose 40% Gel 15 Gram(s) Oral once  dextrose 5%. 1000 milliLiter(s) (50 mL/Hr) IV Continuous <Continuous>  dextrose 5%. 1000 milliLiter(s) (100 mL/Hr) IV Continuous <Continuous>  dextrose 50% Injectable 25 Gram(s) IV Push once  dextrose 50% Injectable 12.5 Gram(s) IV Push once  dextrose 50% Injectable 25 Gram(s) IV Push once  ergocalciferol 59351 Unit(s) Oral <User Schedule>  furosemide    Tablet 40 milliGRAM(s) Oral two times a day  glucagon  Injectable 1 milliGRAM(s) IntraMuscular once  heparin   Injectable 5000 Unit(s) SubCutaneous every 8 hours  heparin   Injectable. 2000 Unit(s) Dialysis. once  insulin glargine SubCutaneous Injection (LANTUS) - Peds 30 Unit(s) SubCutaneous at bedtime  insulin lispro (ADMELOG) corrective regimen sliding scale   SubCutaneous three times a day before meals  insulin lispro Injectable (ADMELOG) 8 Unit(s) SubCutaneous three times a day before meals  metoprolol succinate ER 50 milliGRAM(s) Oral daily  sacubitril 24 mG/valsartan 26 mG 1 Tablet(s) Oral two times a day        VITALS:  T(F): 96.8 (21 @ 08:45), Max: 97.4 (21 @ 02:38)  HR: 67 (21 @ 13:50)  BP: 133/74 (21 @ 13:50)  RR: 18 (21 @ 13:50)  SpO2: 96% (21 @ 08:45)  Wt(kg): --     @ :  -   @ 07:00  --------------------------------------------------------  IN: 780 mL / OUT: 400 mL / NET: 380 mL     @ 07:  -   @ 07:00  --------------------------------------------------------  IN: 420 mL / OUT: 450 mL / NET: -30 mL     @ 07:01  -  - @ 16:48  --------------------------------------------------------  IN: 0 mL / OUT: 500 mL / NET: -500 mL          LABS:      135  |  94<L>  |  91<HH>  ----------------------------<  171<H>  4.8   |  23  |  5.2<HH>    Ca    8.5      2021 06:13  Phos  3.6       Mg     1.9         TPro  6.2  /  Alb  4.1  /  TBili  0.6  /  DBili      /  AST  21  /  ALT  16  /  AlkPhos  48                            12.7   8.16  )-----------( 182      ( 2021 06:13 )             36.6       Urine Studies:  Urinalysis Basic - ( 2021 10:25 )    Color: Light Yellow / Appearance: Clear / S.014 / pH:   Gluc:  / Ketone: Negative  / Bili: Negative / Urobili: <2 mg/dL   Blood:  / Protein: 30 mg/dL / Nitrite: Negative   Leuk Esterase: Negative / RBC: 3 /HPF / WBC 1 /HPF   Sq Epi:  / Non Sq Epi: 0 /HPF / Bacteria: Negative          RADIOLOGY & ADDITIONAL STUDIES:

## 2021-02-02 NOTE — PROGRESS NOTE ADULT - ASSESSMENT
ESRD on HD 2x /week  COVID-19 PNA  CAD / CABG / CMP / CHF  HTN  DM2    plan:    HD today  renal diet  left arm precautions  phoslo with meals   entresto resumed  cont lasix    no epogen    addendum:  lowered UF to 0.5kg per pt request.        addendum:  tolerating HD.  BP's fair.  Circuit clotted after 2.5H.  Next HD Tue

## 2021-02-02 NOTE — PROGRESS NOTE ADULT - ATTENDING COMMENTS
80 yr old m w/ a PMHx significant for HTN, DMII, DLD, ESRD on HD, CAD s/p CABG,  presents with nausea, vomiting, weakness after testing positive for COVID 19 two weeks ago.     Dypsnea  - sepsis due to covid - 19 present on admission, resolved  - likely due to COVID-19  - doubt PE as no hypoxia  - monitor off antibiotics    Rhinorrhea and fevers  - check RVP  - f/u repeat blood cultures  - as per ID monitor off abx  - likely due to covid long haul  - repeat inflammatory markers  - UA unremarkable  - has PPM, doubt lead infection    COVID-19  - COVID PCR + for two weeks  - Inflammatory Markers:  CRP: 5.73 (01-28-21)  Procalcitonin: 0.47 (01-28-21)  Ferritin: 1032 (01-28-21)  D-Dimer: 630 (01-29-21)<--533 (01-28-21)  - currently saturating 98% on RA  - BNP: 594  - CT AP: Patchy bibasilar ground-glass airspace opacities, which may be attributed to viral pneumonia in the appropriate clinical setting  - c/w dexamethasone  - no RDV due to ESRD and duration of infection  - check repeat inflammatory markers    Troponemia  -  likely due to CKD  - has RBBB which is new  - not a candidate for cardiac cath or echo due to COVID-19 infection  - cardio consult    ESRD  - next HD on 2/2/2020  - pt states he gets fistula "cleaned out" every 3 months - follow up with outpt vascular    HTN  - bp stable, restart entresto  - controlled on home medications  - c/w metoprolol 50 mg daily  - c/w lasix 40 mg BID      DLD  - c/w lipitor 40 mg daily    DM II  - FS controlled  - Lantus 30 mg qhs    #MISC  - DIET: DASH  - DVT ppx: Heparin 5000  - Dispo: Acute  - Activity: IAT    Progress Note Handoff:  Pending:  Remains febrile, pending RVP results  Family discussion: Discussed pending rvp results, fever  Disposition: Home___/SNF___/Other________/Unknown at this time___x_____

## 2021-02-03 ENCOUNTER — TRANSCRIPTION ENCOUNTER (OUTPATIENT)
Age: 81
End: 2021-02-03

## 2021-02-03 VITALS
OXYGEN SATURATION: 95 % | DIASTOLIC BLOOD PRESSURE: 88 MMHG | SYSTOLIC BLOOD PRESSURE: 159 MMHG | HEART RATE: 80 BPM | TEMPERATURE: 96 F | RESPIRATION RATE: 19 BRPM

## 2021-02-03 LAB
ALBUMIN SERPL ELPH-MCNC: 3.7 G/DL — SIGNIFICANT CHANGE UP (ref 3.5–5.2)
ALP SERPL-CCNC: 53 U/L — SIGNIFICANT CHANGE UP (ref 30–115)
ALT FLD-CCNC: 14 U/L — SIGNIFICANT CHANGE UP (ref 0–41)
ANION GAP SERPL CALC-SCNC: 15 MMOL/L — HIGH (ref 7–14)
AST SERPL-CCNC: 15 U/L — SIGNIFICANT CHANGE UP (ref 0–41)
BILIRUB SERPL-MCNC: 0.4 MG/DL — SIGNIFICANT CHANGE UP (ref 0.2–1.2)
BUN SERPL-MCNC: 74 MG/DL — CRITICAL HIGH (ref 10–20)
CALCIUM SERPL-MCNC: 8.8 MG/DL — SIGNIFICANT CHANGE UP (ref 8.5–10.1)
CHLORIDE SERPL-SCNC: 96 MMOL/L — LOW (ref 98–110)
CO2 SERPL-SCNC: 25 MMOL/L — SIGNIFICANT CHANGE UP (ref 17–32)
CREAT SERPL-MCNC: 4 MG/DL — HIGH (ref 0.7–1.5)
GLUCOSE BLDC GLUCOMTR-MCNC: 177 MG/DL — HIGH (ref 70–99)
GLUCOSE BLDC GLUCOMTR-MCNC: 320 MG/DL — HIGH (ref 70–99)
GLUCOSE SERPL-MCNC: 202 MG/DL — HIGH (ref 70–99)
HCT VFR BLD CALC: 36.5 % — LOW (ref 42–52)
HGB BLD-MCNC: 12.1 G/DL — LOW (ref 14–18)
MAGNESIUM SERPL-MCNC: 2.1 MG/DL — SIGNIFICANT CHANGE UP (ref 1.8–2.4)
MCHC RBC-ENTMCNC: 28.5 PG — SIGNIFICANT CHANGE UP (ref 27–31)
MCHC RBC-ENTMCNC: 33.2 G/DL — SIGNIFICANT CHANGE UP (ref 32–37)
MCV RBC AUTO: 85.9 FL — SIGNIFICANT CHANGE UP (ref 80–94)
NRBC # BLD: 0 /100 WBCS — SIGNIFICANT CHANGE UP (ref 0–0)
PHOSPHATE SERPL-MCNC: 3.7 MG/DL — SIGNIFICANT CHANGE UP (ref 2.1–4.9)
PLATELET # BLD AUTO: 220 K/UL — SIGNIFICANT CHANGE UP (ref 130–400)
POTASSIUM SERPL-MCNC: 4.7 MMOL/L — SIGNIFICANT CHANGE UP (ref 3.5–5)
POTASSIUM SERPL-SCNC: 4.7 MMOL/L — SIGNIFICANT CHANGE UP (ref 3.5–5)
PROT SERPL-MCNC: 5.8 G/DL — LOW (ref 6–8)
RBC # BLD: 4.25 M/UL — LOW (ref 4.7–6.1)
RBC # FLD: 13.2 % — SIGNIFICANT CHANGE UP (ref 11.5–14.5)
SODIUM SERPL-SCNC: 136 MMOL/L — SIGNIFICANT CHANGE UP (ref 135–146)
WBC # BLD: 13.24 K/UL — HIGH (ref 4.8–10.8)
WBC # FLD AUTO: 13.24 K/UL — HIGH (ref 4.8–10.8)

## 2021-02-03 PROCEDURE — 99239 HOSP IP/OBS DSCHRG MGMT >30: CPT

## 2021-02-03 RX ORDER — ASPIRIN/CALCIUM CARB/MAGNESIUM 324 MG
1 TABLET ORAL
Qty: 30 | Refills: 0
Start: 2021-02-03 | End: 2021-03-04

## 2021-02-03 RX ADMIN — Medication 8 UNIT(S): at 09:27

## 2021-02-03 RX ADMIN — Medication 667 MILLIGRAM(S): at 08:35

## 2021-02-03 RX ADMIN — Medication 8 UNIT(S): at 11:27

## 2021-02-03 RX ADMIN — Medication 6 MILLIGRAM(S): at 04:44

## 2021-02-03 RX ADMIN — Medication 8: at 11:26

## 2021-02-03 RX ADMIN — Medication 50 MILLIGRAM(S): at 04:45

## 2021-02-03 RX ADMIN — Medication 2: at 09:27

## 2021-02-03 RX ADMIN — SACUBITRIL AND VALSARTAN 1 TABLET(S): 24; 26 TABLET, FILM COATED ORAL at 04:45

## 2021-02-03 RX ADMIN — HEPARIN SODIUM 5000 UNIT(S): 5000 INJECTION INTRAVENOUS; SUBCUTANEOUS at 04:45

## 2021-02-03 RX ADMIN — Medication 40 MILLIGRAM(S): at 04:44

## 2021-02-03 RX ADMIN — Medication 667 MILLIGRAM(S): at 11:19

## 2021-02-03 RX ADMIN — HEPARIN SODIUM 5000 UNIT(S): 5000 INJECTION INTRAVENOUS; SUBCUTANEOUS at 12:30

## 2021-02-03 NOTE — PROGRESS NOTE ADULT - SUBJECTIVE AND OBJECTIVE BOX
SUBJECTIVE:    Patient is a 80y old Male who presents with a chief complaint of SOB (03 Feb 2021 12:52)    Currently admitted to medicine with the primary diagnosis of COVID-19       Today is hospital day 6d.     PAST MEDICAL & SURGICAL HISTORY  Mitral valve regurgitation    CHF (congestive heart failure)    BPH (Benign Prostatic Hyperplasia)    GERD (gastroesophageal reflux disease)    Sleep apnea  does not use machine    Renal insufficiency    HTN - Hypertension    H/O hyperlipidemia    CAD (coronary artery disease)    Diabetes mellitus    History of PTCA  with st[facility] 10 years ago ([facility])    CAD (Coronary Artery Disease)    Hypercholesterolemia    Diabetes Mellitus Type II    HTN (Hypertension)    S/P appendectomy    S/P primary angioplasty with coronary stent  6-7 stents last one in 10/12    S/P tonsillectomy    S/P knee surgery  b/l arthroscopic      ALLERGIES:  No Known Allergies    MEDICATIONS:  STANDING MEDICATIONS  atorvastatin 40 milliGRAM(s) Oral at bedtime  calcium acetate 667 milliGRAM(s) Oral three times a day with meals  chlorhexidine 4% Liquid 1 Application(s) Topical <User Schedule>  dexAMETHasone  Injectable 6 milliGRAM(s) IV Push daily  dextrose 40% Gel 15 Gram(s) Oral once  dextrose 5%. 1000 milliLiter(s) IV Continuous <Continuous>  dextrose 5%. 1000 milliLiter(s) IV Continuous <Continuous>  dextrose 50% Injectable 25 Gram(s) IV Push once  dextrose 50% Injectable 12.5 Gram(s) IV Push once  dextrose 50% Injectable 25 Gram(s) IV Push once  ergocalciferol 21799 Unit(s) Oral <User Schedule>  furosemide    Tablet 40 milliGRAM(s) Oral two times a day  glucagon  Injectable 1 milliGRAM(s) IntraMuscular once  heparin   Injectable 5000 Unit(s) SubCutaneous every 8 hours  heparin   Injectable. 2000 Unit(s) Dialysis. once  insulin glargine SubCutaneous Injection (LANTUS) - Peds 30 Unit(s) SubCutaneous at bedtime  insulin lispro (ADMELOG) corrective regimen sliding scale   SubCutaneous three times a day before meals  insulin lispro Injectable (ADMELOG) 8 Unit(s) SubCutaneous three times a day before meals  metoprolol succinate ER 50 milliGRAM(s) Oral daily  sacubitril 24 mG/valsartan 26 mG 1 Tablet(s) Oral two times a day    PRN MEDICATIONS  acetaminophen   Tablet .. 650 milliGRAM(s) Oral every 6 hours PRN  polyethylene glycol 3350 17 Gram(s) Oral daily PRN    VITALS:   T(F): 96.5  HR: 80  BP: 159/88  RR: 19  SpO2: 95%    LABS:                        12.1   13.24 )-----------( 220      ( 03 Feb 2021 07:27 )             36.5     02-03    136  |  96<L>  |  74<HH>  ----------------------------<  202<H>  4.7   |  25  |  4.0<H>    Ca    8.8      03 Feb 2021 07:27  Phos  3.7     02-03  Mg     2.1     02-03    TPro  5.8<L>  /  Alb  3.7  /  TBili  0.4  /  DBili  x   /  AST  15  /  ALT  14  /  AlkPhos  53  02-03                  RADIOLOGY:    PHYSICAL EXAM:  GEN: No acute distress  LUNGS: Clear to auscultation bilaterally   HEART: S1/S2 present. RRR.   ABD/ GI: Soft, non-tender, non-distended. Bowel sounds present  EXT: NC/NC/NE/2+PP/ELDRIDGE  NEURO: AAOX3

## 2021-02-03 NOTE — PROGRESS NOTE ADULT - PROVIDER SPECIALTY LIST ADULT
Internal Medicine
Nephrology
Internal Medicine
Nephrology
Hospitalist
Internal Medicine
Nephrology

## 2021-02-03 NOTE — PROGRESS NOTE ADULT - SUBJECTIVE AND OBJECTIVE BOX
NEPHROLOGY FOLLOW UP NOTE    HD yesterday  no fever  no desats  bp's fair  strength slowly improving      PAST MEDICAL & SURGICAL HISTORY:  Mitral valve regurgitation    CHF (congestive heart failure)    BPH (Benign Prostatic Hyperplasia)    GERD (gastroesophageal reflux disease)    Sleep apnea  does not use machine    Renal insufficiency    HTN - Hypertension    H/O hyperlipidemia    CAD (coronary artery disease)    Diabetes mellitus    History of PTCA  with stents 10 years ago ([facility])    CAD (Coronary Artery Disease)    Hypercholesterolemia    Diabetes Mellitus Type II    HTN (Hypertension)    S/P appendectomy    S/P primary angioplasty with coronary stent  6-7 stents last one in 10/12    S/P tonsillectomy    S/P knee surgery  b/l arthroscopic      Allergies:  No Known Allergies    Home Medications Reviewed    SOCIAL HISTORY:  Denies ETOH,Smoking,   FAMILY HISTORY:  No pertinent family history in first degree relatives          REVIEW OF SYSTEMS:  CONSTITUTIONAL: + weakness, fevers or chills  EYES/ENT: No visual changes;  No vertigo or throat pain   NECK: No pain or stiffness  RESPIRATORY: No cough, wheezing, hemoptysis; No shortness of breath  CARDIOVASCULAR: No chest pain or palpitations.  GASTROINTESTINAL: No abdominal or epigastric pain. No nausea, vomiting, or hematemesis; No diarrhea or constipation. No melena or hematochezia.  GENITOURINARY: No dysuria, frequency, foamy urine, urinary urgency, incontinence or hematuria  NEUROLOGICAL: No numbness or weakness  SKIN: No itching, burning, rashes, or lesions   VASCULAR: No bilateral lower extremity edema.   All other review of systems is negative unless indicated above.    PHYSICAL EXAM:  Constitutional: NAD  HEENT: anicteric sclera, oropharynx clear, MMM    Neck: No JVD  Respiratory: CTAB, no wheezes, rales or rhonchi  Cardiovascular: S1, S2, RRR  Gastrointestinal: BS+, soft, NT/ND  Extremities: No cyanosis or clubbing. No peripheral edema  Neurological: A/O x 3, no focal deficits  Psychiatric: Normal mood, normal affect  : No CVA tenderness. No woods.   Skin: No rashes  Vascular Access: left arm AVF + [facility] Medications:   MEDICATIONS  (STANDING):  atorvastatin 40 milliGRAM(s) Oral at bedtime  calcium acetate 667 milliGRAM(s) Oral three times a day with meals  chlorhexidine 4% Liquid 1 Application(s) Topical <User Schedule>  dexAMETHasone  Injectable 6 milliGRAM(s) IV Push daily  dextrose 40% Gel 15 Gram(s) Oral once  dextrose 5%. 1000 milliLiter(s) (50 mL/Hr) IV Continuous <Continuous>  dextrose 5%. 1000 milliLiter(s) (100 mL/Hr) IV Continuous <Continuous>  dextrose 50% Injectable 25 Gram(s) IV Push once  dextrose 50% Injectable 12.5 Gram(s) IV Push once  dextrose 50% Injectable 25 Gram(s) IV Push once  ergocalciferol 99745 Unit(s) Oral <User Schedule>  furosemide    Tablet 40 milliGRAM(s) Oral two times a day  glucagon  Injectable 1 milliGRAM(s) IntraMuscular once  heparin   Injectable 5000 Unit(s) SubCutaneous every 8 hours  heparin   Injectable. 2000 Unit(s) Dialysis. once  insulin glargine SubCutaneous Injection (LANTUS) - Peds 30 Unit(s) SubCutaneous at bedtime  insulin lispro (ADMELOG) corrective regimen sliding scale   SubCutaneous three times a day before meals  insulin lispro Injectable (ADMELOG) 8 Unit(s) SubCutaneous three times a day before meals  metoprolol succinate ER 50 milliGRAM(s) Oral daily  sacubitril 24 mG/valsartan 26 mG 1 Tablet(s) Oral two times a day        VITALS:  T(F): 96.5 (21 @ 12:00), Max: 97 (21 @ 04:00)  HR: 80 (21 @ 12:00)  BP: 159/88 (21 @ 12:00)  RR: 19 (21 @ 12:00)  SpO2: 95% (21 @ 12:00)  Wt(kg): --     @ :  -   @ 07:00  --------------------------------------------------------  IN: 420 mL / OUT: 450 mL / NET: -30 mL     @ 07:01  -   @ 07:00  --------------------------------------------------------  IN: 0 mL / OUT: 500 mL / NET: -500 mL          LABS:      136  |  96<L>  |  74<HH>  ----------------------------<  202<H>  4.7   |  25  |  4.0<H>    Ca    8.8      2021 07:27  Phos  3.7     -  Mg     2.1     -    TPro  5.8<L>  /  Alb  3.7  /  TBili  0.4  /  DBili      /  AST  15  /  ALT  14  /  AlkPhos  53                            12.1   13.24 )-----------( 220      ( 2021 07:27 )             36.5       Urine Studies:  Urinalysis Basic - ( 2021 10:25 )    Color: Light Yellow / Appearance: Clear / S.014 / pH:   Gluc:  / Ketone: Negative  / Bili: Negative / Urobili: <2 mg/dL   Blood:  / Protein: 30 mg/dL / Nitrite: Negative   Leuk Esterase: Negative / RBC: 3 /HPF / WBC 1 /HPF   Sq Epi:  / Non Sq Epi: 0 /HPF / Bacteria: Negative          RADIOLOGY & ADDITIONAL STUDIES:

## 2021-02-03 NOTE — PROGRESS NOTE ADULT - ASSESSMENT
ESRD on HD 2x /week  COVID-19 PNA  CAD / CABG / CMP / CHF  HTN  DM2    plan:    HD 2x /wwek  next at facility in [place], otherwise Friday as inpt  renal diet  left arm precautions  phoslo with meals   entresto resumed  cont lasix    no epogen  possible dc home today

## 2021-02-03 NOTE — DISCHARGE NOTE NURSING/CASE MANAGEMENT/SOCIAL WORK - PATIENT PORTAL LINK FT
You can access the FollowMyHealth Patient Portal offered by [facility] by registering at the following website: [email or web address]. By joining [employer]’s FollowMyHealth portal, you will also be able to view your health information using other applications (apps) compatible with our system.

## 2021-02-03 NOTE — DISCHARGE NOTE PROVIDER - PROVIDER TOKENS
PROVIDER:[TOKEN:[[id number]],FOLLOWUP:[Routine],ESTABLISHEDPATIENT:[T]],PROVIDER:[TOKEN:[[id number]],FOLLOWUP:[Routine],ESTABLISHEDPATIENT:[T]]

## 2021-02-03 NOTE — DISCHARGE NOTE PROVIDER - NSDCFUADDAPPT_GEN_ALL_CORE_FT
Please continue to follow your normal dialysis schedule twice a week and follow up with your nephrologist.     Please see sameer vascular at [address] for AVF maintenance.

## 2021-02-03 NOTE — CHART NOTE - NSCHARTNOTEFT_GEN_A_CORE
Informed patient that he needs to take aspirin daily on d/c; sent to pharmacy Informed patient that he needs to take aspirin 81mg daily for 30 days on d/c; sent to pharmacy

## 2021-02-03 NOTE — PROGRESS NOTE ADULT - ASSESSMENT
80 yr old m w/ a PMHx significant for HTN, DMII, DLD, ESRD on HD, CAD s/p CABG,  presents with nausea, vomiting, weakness after testing positive for COVID 19 two weeks ago.     Covid 19 PNA- sepsis due to covid - 19 present on admission, resolved  - likely due to COVID-19  - doubt PE as no hypoxia  - treated only with Dexa-- day 6-- no RDV as is ESRD  -  repeat blood cultures were negative  - - UA unremarkable  - has PPM, doubt lead infection   CT AP: Patchy bibasilar ground-glass airspace opacities, which may be attributed to viral pneumonia in the appropriate clinical setting  -   Troponemia  -  likely due to CKD  - has RBBB which is new  - not a candidate for cardiac cath or echo due to COVID-19 infection  -    ESRD  -  HD on TTS  - pt states he gets fistula "cleaned out" every 3 months - follow up with outpt vascular    HTN  - bp stable, restart entresto  - controlled on home medications  - c/w metoprolol 50 mg daily  - c/w lasix 40 mg BID      DLD  - c/w lipitor 40 mg daily    DM II  - FS controlled  - Lantus 30 mg qhs    Dc home today-- spent more than 30mins. 80 yr old m w/ a PMHx significant for HTN, DMII, DLD, ESRD on HD, CAD s/p CABG,  presents with nausea, vomiting, weakness after testing positive for COVID 19 two weeks ago.     Covid 19 positive  - - doubt PE as no hypoxia  - treated only with Dexa-- day 6-- no RDV as is ESRD  Does not need further prednisone at DC-- no hypoxia and is worsening blood sugar  -  repeat blood cultures were negative  - - UA unremarkable  - has PPM, doubt lead infection   CT AP: Patchy bibasilar ground-glass airspace opacities, which may be attributed to viral pneumonia in the appropriate clinical setting  -   Troponemia  -  likely due to CKD  - has RBBB which is new  - not a candidate for cardiac cath or echo due to COVID-19 infection  -    ESRD  -  HD on TTS  - pt states he gets fistula "cleaned out" every 3 months - follow up with outpt vascular    HTN  - bp stable, restart entresto  - controlled on home medications  - c/w metoprolol 50 mg daily  - c/w lasix 40 mg BID      DLD  - c/w lipitor 40 mg daily    DM II  - FS uncontrolled-- stopped steroids as not hypoxic  - Lantus 30 mg qhs    Dc home today-- spent more than 30mins.

## 2021-02-03 NOTE — DISCHARGE NOTE PROVIDER - NSDCMRMEDTOKEN_GEN_ALL_CORE_FT
calcium acetate 667 mg oral tablet: 1 tab(s) orally 3 times a day  Entresto 24 mg-26 mg oral tablet: 1 tab(s) orally 2 times a day  Lantus 100 units/mL subcutaneous solution: 30 unit(s) subcutaneous once a day (at bedtime)  Lasix 40 mg oral tablet: 1 cap(s) orally 2 times a day  Lipitor 40 mg oral tablet: 1 tab(s) orally once a day (at bedtime)  metoprolol succinate 50 mg oral tablet, extended release: 1 tab(s) orally once a day  Vitamin D3 50,000 intl units (1250 mcg) oral capsule: 1 cap(s) orally once a week

## 2021-02-03 NOTE — DISCHARGE NOTE PROVIDER - CARE PROVIDERS DIRECT ADDRESSES
[email or web address],[email or web address]_Unknown

## 2021-02-04 LAB
CULTURE RESULTS: SIGNIFICANT CHANGE UP
SPECIMEN SOURCE: SIGNIFICANT CHANGE UP

## 2021-02-08 LAB
CULTURE RESULTS: SIGNIFICANT CHANGE UP
SPECIMEN SOURCE: SIGNIFICANT CHANGE UP

## 2021-02-09 ENCOUNTER — EMERGENCY (EMERGENCY)
Facility: HOSPITAL | Age: 81
LOS: 0 days | Discharge: HOME | End: 2021-02-09
Attending: EMERGENCY MEDICINE | Admitting: EMERGENCY MEDICINE
Payer: MEDICARE

## 2021-02-09 VITALS — SYSTOLIC BLOOD PRESSURE: 124 MMHG | HEART RATE: 72 BPM | DIASTOLIC BLOOD PRESSURE: 70 MMHG

## 2021-02-09 VITALS
HEART RATE: 90 BPM | HEIGHT: 69 IN | RESPIRATION RATE: 18 BRPM | SYSTOLIC BLOOD PRESSURE: 166 MMHG | OXYGEN SATURATION: 93 % | WEIGHT: 225.09 LBS | DIASTOLIC BLOOD PRESSURE: 84 MMHG | TEMPERATURE: 97 F

## 2021-02-09 DIAGNOSIS — I13.2 HYPERTENSIVE HEART AND CHRONIC KIDNEY DISEASE WITH HEART FAILURE AND WITH STAGE 5 CHRONIC KIDNEY DISEASE, OR END STAGE RENAL DISEASE: ICD-10-CM

## 2021-02-09 DIAGNOSIS — E87.2 ACIDOSIS: ICD-10-CM

## 2021-02-09 DIAGNOSIS — I25.10 ATHEROSCLEROTIC HEART DISEASE OF NATIVE CORONARY ARTERY WITHOUT ANGINA PECTORIS: ICD-10-CM

## 2021-02-09 DIAGNOSIS — Z20.822 CONTACT WITH AND (SUSPECTED) EXPOSURE TO COVID-19: ICD-10-CM

## 2021-02-09 DIAGNOSIS — K21.9 GASTRO-ESOPHAGEAL REFLUX DISEASE WITHOUT ESOPHAGITIS: ICD-10-CM

## 2021-02-09 DIAGNOSIS — I50.9 HEART FAILURE, UNSPECIFIED: ICD-10-CM

## 2021-02-09 DIAGNOSIS — Z99.2 DEPENDENCE ON RENAL DIALYSIS: ICD-10-CM

## 2021-02-09 DIAGNOSIS — E66.9 OBESITY, UNSPECIFIED: ICD-10-CM

## 2021-02-09 DIAGNOSIS — N18.6 END STAGE RENAL DISEASE: ICD-10-CM

## 2021-02-09 DIAGNOSIS — N40.0 BENIGN PROSTATIC HYPERPLASIA WITHOUT LOWER URINARY TRACT SYMPTOMS: ICD-10-CM

## 2021-02-09 DIAGNOSIS — G47.30 SLEEP APNEA, UNSPECIFIED: ICD-10-CM

## 2021-02-09 DIAGNOSIS — J11.1 INFLUENZA DUE TO UNIDENTIFIED INFLUENZA VIRUS WITH OTHER RESPIRATORY MANIFESTATIONS: ICD-10-CM

## 2021-02-09 DIAGNOSIS — E11.9 TYPE 2 DIABETES MELLITUS WITHOUT COMPLICATIONS: ICD-10-CM

## 2021-02-09 DIAGNOSIS — E78.5 HYPERLIPIDEMIA, UNSPECIFIED: ICD-10-CM

## 2021-02-09 DIAGNOSIS — J12.82 PNEUMONIA DUE TO CORONAVIRUS DISEASE 2019: ICD-10-CM

## 2021-02-09 DIAGNOSIS — Z95.1 PRESENCE OF AORTOCORONARY BYPASS GRAFT: ICD-10-CM

## 2021-02-09 DIAGNOSIS — E11.22 TYPE 2 DIABETES MELLITUS WITH DIABETIC CHRONIC KIDNEY DISEASE: ICD-10-CM

## 2021-02-09 DIAGNOSIS — Z98.890 OTHER SPECIFIED POSTPROCEDURAL STATES: ICD-10-CM

## 2021-02-09 DIAGNOSIS — Z79.4 LONG TERM (CURRENT) USE OF INSULIN: ICD-10-CM

## 2021-02-09 DIAGNOSIS — U07.1 COVID-19: ICD-10-CM

## 2021-02-09 DIAGNOSIS — I34.0 NONRHEUMATIC MITRAL (VALVE) INSUFFICIENCY: ICD-10-CM

## 2021-02-09 DIAGNOSIS — J96.00 ACUTE RESPIRATORY FAILURE, UNSPECIFIED WHETHER WITH HYPOXIA OR HYPERCAPNIA: ICD-10-CM

## 2021-02-09 DIAGNOSIS — J34.89 OTHER SPECIFIED DISORDERS OF NOSE AND NASAL SINUSES: ICD-10-CM

## 2021-02-09 DIAGNOSIS — E78.00 PURE HYPERCHOLESTEROLEMIA, UNSPECIFIED: ICD-10-CM

## 2021-02-09 DIAGNOSIS — Z95.5 PRESENCE OF CORONARY ANGIOPLASTY IMPLANT AND GRAFT: ICD-10-CM

## 2021-02-09 DIAGNOSIS — Z87.891 PERSONAL HISTORY OF NICOTINE DEPENDENCE: ICD-10-CM

## 2021-02-09 DIAGNOSIS — A41.89 OTHER SPECIFIED SEPSIS: ICD-10-CM

## 2021-02-09 LAB
ALBUMIN SERPL ELPH-MCNC: 3.7 G/DL — SIGNIFICANT CHANGE UP (ref 3.5–5.2)
ALP SERPL-CCNC: 48 U/L — SIGNIFICANT CHANGE UP (ref 30–115)
ALT FLD-CCNC: 14 U/L — SIGNIFICANT CHANGE UP (ref 0–41)
ANION GAP SERPL CALC-SCNC: 12 MMOL/L — SIGNIFICANT CHANGE UP (ref 7–14)
ANISOCYTOSIS BLD QL: SLIGHT — SIGNIFICANT CHANGE UP
AST SERPL-CCNC: 14 U/L — SIGNIFICANT CHANGE UP (ref 0–41)
BASOPHILS # BLD AUTO: 0 K/UL — SIGNIFICANT CHANGE UP (ref 0–0.2)
BASOPHILS NFR BLD AUTO: 0 % — SIGNIFICANT CHANGE UP (ref 0–1)
BILIRUB SERPL-MCNC: 0.4 MG/DL — SIGNIFICANT CHANGE UP (ref 0.2–1.2)
BUN SERPL-MCNC: 77 MG/DL — CRITICAL HIGH (ref 10–20)
CALCIUM SERPL-MCNC: 9.9 MG/DL — SIGNIFICANT CHANGE UP (ref 8.5–10.1)
CHLORIDE SERPL-SCNC: 97 MMOL/L — LOW (ref 98–110)
CO2 SERPL-SCNC: 25 MMOL/L — SIGNIFICANT CHANGE UP (ref 17–32)
CREAT SERPL-MCNC: 4.6 MG/DL — CRITICAL HIGH (ref 0.7–1.5)
EOSINOPHIL # BLD AUTO: 0 K/UL — SIGNIFICANT CHANGE UP (ref 0–0.7)
EOSINOPHIL NFR BLD AUTO: 0 % — SIGNIFICANT CHANGE UP (ref 0–8)
GLUCOSE SERPL-MCNC: 204 MG/DL — HIGH (ref 70–99)
HCT VFR BLD CALC: 37.7 % — LOW (ref 42–52)
HGB BLD-MCNC: 12.3 G/DL — LOW (ref 14–18)
LYMPHOCYTES # BLD AUTO: 1.28 K/UL — SIGNIFICANT CHANGE UP (ref 1.2–3.4)
LYMPHOCYTES # BLD AUTO: 13.1 % — LOW (ref 20.5–51.1)
MANUAL SMEAR VERIFICATION: SIGNIFICANT CHANGE UP
MCHC RBC-ENTMCNC: 28.5 PG — SIGNIFICANT CHANGE UP (ref 27–31)
MCHC RBC-ENTMCNC: 32.6 G/DL — SIGNIFICANT CHANGE UP (ref 32–37)
MCV RBC AUTO: 87.3 FL — SIGNIFICANT CHANGE UP (ref 80–94)
MICROCYTES BLD QL: SLIGHT — SIGNIFICANT CHANGE UP
MONOCYTES # BLD AUTO: 0.76 K/UL — HIGH (ref 0.1–0.6)
MONOCYTES NFR BLD AUTO: 7.8 % — SIGNIFICANT CHANGE UP (ref 1.7–9.3)
MYELOCYTES NFR BLD: 1.7 % — HIGH (ref 0–0)
NEUTROPHILS # BLD AUTO: 7.56 K/UL — HIGH (ref 1.4–6.5)
NEUTROPHILS NFR BLD AUTO: 77.4 % — HIGH (ref 42.2–75.2)
OVALOCYTES BLD QL SMEAR: SLIGHT — SIGNIFICANT CHANGE UP
PLAT MORPH BLD: ABNORMAL
PLATELET # BLD AUTO: 323 K/UL — SIGNIFICANT CHANGE UP (ref 130–400)
POIKILOCYTOSIS BLD QL AUTO: SLIGHT — SIGNIFICANT CHANGE UP
POLYCHROMASIA BLD QL SMEAR: SLIGHT — SIGNIFICANT CHANGE UP
POTASSIUM SERPL-MCNC: 4.8 MMOL/L — SIGNIFICANT CHANGE UP (ref 3.5–5)
POTASSIUM SERPL-SCNC: 4.8 MMOL/L — SIGNIFICANT CHANGE UP (ref 3.5–5)
PROT SERPL-MCNC: 6.2 G/DL — SIGNIFICANT CHANGE UP (ref 6–8)
RBC # BLD: 4.32 M/UL — LOW (ref 4.7–6.1)
RBC # FLD: 13.4 % — SIGNIFICANT CHANGE UP (ref 11.5–14.5)
RBC BLD AUTO: ABNORMAL
SARS-COV-2 RNA SPEC QL NAA+PROBE: SIGNIFICANT CHANGE UP
SODIUM SERPL-SCNC: 134 MMOL/L — LOW (ref 135–146)
WBC # BLD: 9.77 K/UL — SIGNIFICANT CHANGE UP (ref 4.8–10.8)
WBC # FLD AUTO: 9.77 K/UL — SIGNIFICANT CHANGE UP (ref 4.8–10.8)

## 2021-02-09 PROCEDURE — 99218: CPT

## 2021-02-09 PROCEDURE — 93010 ELECTROCARDIOGRAM REPORT: CPT

## 2021-02-09 NOTE — ED CDU PROVIDER DISPOSITION NOTE - NSFOLLOWUPINSTRUCTIONS_ED_ALL_ED_FT
Continue following a renal diet as advised by your Nephrologist. If you have been started on any medications for your kidney function, be sure to take them regularly. Be sure to follow up and have dialysis as scheduled, if it very important that you do not miss sessions! If something comes up and you feel that you cannot make a session, please alert your nephrologist immediately.

## 2021-02-09 NOTE — ED ADULT NURSE NOTE - PMH
BPH (Benign Prostatic Hyperplasia)    CAD (coronary artery disease)    CAD (Coronary Artery Disease)    CHF (congestive heart failure)    Diabetes mellitus    Diabetes Mellitus Type II    GERD (gastroesophageal reflux disease)    H/O hyperlipidemia    History of PTCA  with stents 10 years ago ([facility])  HTN (Hypertension)    HTN - Hypertension    Hypercholesterolemia    Mitral valve regurgitation    Renal insufficiency    Sleep apnea  does not use machine

## 2021-02-09 NOTE — ED PROVIDER NOTE - PMH
BPH (Benign Prostatic Hyperplasia)    CAD (coronary artery disease)    CAD (Coronary Artery Disease)    CHF (congestive heart failure)    Diabetes mellitus    Diabetes Mellitus Type II    GERD (gastroesophageal reflux disease)    H/O hyperlipidemia    History of PTCA  with stents 10 years ago ([facility])  HTN (Hypertension)    HTN - Hypertension    Hypercholesterolemia    Mitral valve regurgitation    Renal insufficiency    Sleep apnea  does not use machine

## 2021-02-09 NOTE — ED ADULT NURSE NOTE - INTERVENTIONS DEFINITIONS
Darling to call system/Call bell, personal items and telephone within reach/Non-slip footwear when patient is off stretcher/Physically safe environment: no spills, clutter or unnecessary equipment/Stretcher in lowest position, wheels locked, appropriate side rails in place/Monitor gait and stability/Reinforce activity limits and safety measures with patient and family

## 2021-02-09 NOTE — ED CDU PROVIDER INITIAL DAY NOTE - PMH
BPH (Benign Prostatic Hyperplasia)    CAD (coronary artery disease)    CAD (Coronary Artery Disease)    CHF (congestive heart failure)    Diabetes mellitus    Diabetes Mellitus Type II    GERD (gastroesophageal reflux disease)    H/O hyperlipidemia    History of PTCA  with stents 10 years ago ([facility])  HTN (Hypertension)    HTN - Hypertension    Hypercholesterolemia    Mitral valve regurgitation    Renal insufficiency    Sleep apnea  does not use machine

## 2021-02-09 NOTE — ED CDU PROVIDER DISPOSITION NOTE - PATIENT PORTAL LINK FT
You can access the FollowMyHealth Patient Portal offered by [facility] by registering at the following website: [email or web address]. By joining [employer]’s FollowMyHealth portal, you will also be able to view your health information using other applications (apps) compatible with our system.

## 2021-02-09 NOTE — ED CDU PROVIDER INITIAL DAY NOTE - ATTENDING CONTRIBUTION TO CARE
81yo man h/o ESRD on HD, CAD, CHF, HTN, DM presents for HD; he was recently hospitalized due to COVID and was d/c to do HD as an outpt, however he is in the process of moving to [place] and felt too fatigued to get there for his usual HD. He called Dr Du, who sent him to the ED to have HD and then d/c. ED eval was unremarkable, pt looks well. Seen by Dr Du and placed in CDU. VS, exam as noted. HD, likely d/c.

## 2021-02-09 NOTE — CONSULT NOTE ADULT - ASSESSMENT
ESRD on HD 2x /week in [place]   - missed HD sessions as unable to travel to [place] (last HD one week ago)   - no uremic symptoms with acceptable labs and volume status  as pt still with relatively fair amount of residual renal function  recent COVID-19 PNA, now with negative PCR x 2 including today  CAD / CABG / CMP / CHF  HTN  DM2    plan:    HD today - 3H / 1.5Kg off / 2K / opti 160 / left arm AVF  spoke with Lydia Guerrero in [place] (neptali BRADSHAW).  Pt has a slot starting HD this Saturday  stable for dc home today after HD  [facility]

## 2021-02-09 NOTE — ED CDU PROVIDER DISPOSITION NOTE - ATTENDING CONTRIBUTION TO CARE
79yo man h/o ESRD on HD, CAD, CHF, HTN, DM presents for HD; he was recently hospitalized due to COVID and was d/c to do HD as an outpt, however he is in the process of moving to [place] and felt too fatigued to get there for his usual HD. He called Dr Du, who sent him to the ED to have HD and then d/c. ED eval was unremarkable, pt looks well. Seen by Dr Du and placed in CDU. VS, exam as noted. Pt had HD and came back to CDU feeling well. He is comfortable with discharge and will be in touch with Dr Du regarding site for next HD.

## 2021-02-09 NOTE — ED CDU PROVIDER INITIAL DAY NOTE - OBJECTIVE STATEMENT
81 y/o M, PMHx ESRD - on HD 2x/week, CAD, CHF, HTN & DM, presents to the ED for dialysis. Patient was recently hospitalized with covid PNA. He is in the process of moving to [place] and has dialysis set up for him in [place] however felt too weak to travel for last dialysis session. He contacted Dr. Du who advised him to come to the ED for dialysis and he would help facilitate future sessions. He denies chest pain, dyspnea, nausea, vomiting, abdominal pain, fever and chills.

## 2021-02-09 NOTE — ED CDU PROVIDER DISPOSITION NOTE - CARE PROVIDER_API CALL
Nolberto Du  INTERNAL MEDICINE  [id number] [place] [address]  Phone: [phone]  Fax: [phone]  Follow Up Time: 1-3 Days

## 2021-02-09 NOTE — ED CDU PROVIDER INITIAL DAY NOTE - PROGRESS NOTE DETAILS
Patient returned from dialysis. Dr. Du set patient up to receive dialysis at [facility] on Saturday, 2/13. Patient feeling well; strict return precautions discussed.

## 2021-02-09 NOTE — ED PROVIDER NOTE - OBJECTIVE STATEMENT
The pt is a 80y M w/ hx of DM, DLD, CAD s/p CABG, defibrillator, ESRD on dialysis M/F in [facility] sent in by Dr. Du for dialysis. Pt was recently hospitalized for COVID (+) and last had dialysis here 1 week ago. He has not wanted to drive back to [place] due to his current weakness. Dr. Du wanted him to go to [facility] for dialysis but they require neg covid swab x2 and he only has 1 neg swab so far. Denies headache, chest pain, SOB, N/V, abdominal pain, diarrhea.

## 2021-02-09 NOTE — ED ADULT NURSE NOTE - CHIEF COMPLAINT QUOTE
Last Tuesday got dialysis in hospital, was supposed to go [place] for dialysis today. Stated pt. needed a covid test and Dr Du sent patient to ED for dialysis treatment. Pt. recently treated for PNA in [facility]. Denies sob, cp, fever, chills, n/v/d.

## 2021-02-09 NOTE — ED ADULT TRIAGE NOTE - CHIEF COMPLAINT QUOTE
Last Tuesday got dialysis in hospital, was supposed to go [place] for dialysis today. Stated pt. needed a covid test and Dr Du sent patient to ED for dialysis treatment. Pt. recently treated for PNA in [facility]. Denies sob, cp, fever, chills, n/v/d.

## 2021-02-09 NOTE — ED CDU PROVIDER DISPOSITION NOTE - CLINICAL COURSE
81yo man h/o ESRD on HD, CAD, CHF, HTN, DM presents for HD; he was recently hospitalized due to COVID and was d/c to do HD as an outpt, however he is in the process of moving to [place] and felt too fatigued to get there for his usual HD. He called Dr Du, who sent him to the ED to have HD and then d/c. ED eval was unremarkable, pt looks well. Seen by Dr Du and placed in CDU. VS, exam as noted. Pt had HD and came back to CDU feeling well. He is comfortable with discharge and will be in touch with Dr Du regarding site for next HD.

## 2021-02-09 NOTE — CONSULT NOTE ADULT - SUBJECTIVE AND OBJECTIVE BOX
NEPHROLOGY CONSULTATION NOTE    79 yo wm with pmh as below, including esrd on HD 2x week in [place], CAD, CHF, HTN, DM with recent covid pneumonia.  Discharged from [facility] last week.  Unable to travel to HD unit in [place] over one hour away.  Pt was supposed to move to [place] but caught covid and was too ill to physically move.  Did not have HD since last week, thus presents to ED for HD.  Pt sc/o malaize, but no fever, sob, swelling or uremic symptoms    PAST MEDICAL & SURGICAL HISTORY:  Mitral valve regurgitation    CHF (congestive heart failure)    BPH (Benign Prostatic Hyperplasia)    GERD (gastroesophageal reflux disease)    Sleep apnea  does not use machine    Renal insufficiency    HTN - Hypertension    H/O hyperlipidemia    CAD (coronary artery disease)    Diabetes mellitus    History of PTCA  with stents 10 years ago ([facility])    CAD (Coronary Artery Disease)    Hypercholesterolemia    Diabetes Mellitus Type II    HTN (Hypertension)    S/P appendectomy    S/P primary angioplasty with coronary stent  6-7 stents last one in 10/12    S/P tonsillectomy    S/P knee surgery  b/l arthroscopic      Allergies:  No Known Allergies    Home Medications Reviewed    SOCIAL HISTORY:  Denies ETOH,Smoking,   FAMILY HISTORY:  No pertinent family history in first degree relatives          REVIEW OF SYSTEMS:  CONSTITUTIONAL: + weakness, fevers or chills  EYES/ENT: No visual changes;  No vertigo or throat pain   NECK: No pain or stiffness  RESPIRATORY: No cough, wheezing, hemoptysis; No shortness of breath  CARDIOVASCULAR: No chest pain or palpitations.  GASTROINTESTINAL: No abdominal or epigastric pain. No nausea, vomiting, or hematemesis; No diarrhea or constipation. No melena or hematochezia.  GENITOURINARY: No dysuria, frequency, foamy urine, urinary urgency, incontinence or hematuria  NEUROLOGICAL: No numbness or weakness  SKIN: No itching, burning, rashes, or lesions   VASCULAR: No bilateral lower extremity edema.   All other review of systems is negative unless indicated above.    PHYSICAL EXAM:  Constitutional: NAD  HEENT: anicteric sclera, oropharynx clear, MMM 98% on 3l o2  Neck: No JVD  Respiratory: CTAB, no wheezes, rales or rhonchi  Cardiovascular: S1, S2, RRR  Gastrointestinal: BS+, soft, NT/ND  Extremities: No cyanosis or clubbing. No peripheral edema  Neurological: A/O x 3, no focal deficits  Psychiatric: Normal mood, normal affect  : No CVA tenderness. No woods.   Skin: No rashes  Vascular Access: left arm AVF + [facility] Medications:   MEDICATIONS  (STANDING):        VITALS:  T(F): 97 (02-09-21 @ 09:17), Max: 97 (02-09-21 @ 09:17)  HR: 75 (02-09-21 @ 12:36)  BP: 125/74 (02-09-21 @ 12:36)  RR: 18 (02-09-21 @ 09:17)  SpO2: 93% (02-09-21 @ 09:17)  Wt(kg): --    Height (cm): 175.3 (02-09 @ 09:17)  Weight (kg): 102.1 (02-09 @ 09:17)  BMI (kg/m2): 33.2 (02-09 @ 09:17)  BSA (m2): 2.17 (02-09 @ 09:17)    LABS:  02-09    134<L>  |  97<L>  |  77<HH>  ----------------------------<  204<H>  4.8   |  25  |  4.6<HH>    Ca    9.9      09 Feb 2021 10:40    TPro  6.2  /  Alb  3.7  /  TBili  0.4  /  DBili      /  AST  14  /  ALT  14  /  AlkPhos  48  02-09                          12.3   9.77  )-----------( 323      ( 09 Feb 2021 10:40 )             37.7       Urine Studies:        RADIOLOGY & ADDITIONAL STUDIES:

## 2021-02-09 NOTE — ED PROVIDER NOTE - CLINICAL SUMMARY MEDICAL DECISION MAKING FREE TEXT BOX
81 y/o M PMH ESR on dialysis Tuesday, Thursday, Saturday presents to the ED for dialysis. Pt was unable to get o/p dialysis due to insufficient o/p Covid testing. Spoke with Dr. Churchill nephrologist who is requesting Covid testing as well as dialysis in the ER. Labs reviewed, normal potassium. EKG unremarkable. Placed in OBS for dialysis.

## 2021-02-09 NOTE — ED ADULT NURSE NOTE - OBJECTIVE STATEMENT
Last Tuesday got dialysis in hospital, was supposed to go [place] for dialysis today. Stated pt. needed a covid test and Dr Du sent patient to ED for dialysis treatment. Pt. recently treated for PNA in [facility]. Denies sob, cp, fever, chills, n/v/d.

## 2021-03-16 ENCOUNTER — INPATIENT (INPATIENT)
Facility: HOSPITAL | Age: 81
LOS: 2 days | Discharge: HOME | End: 2021-03-19
Attending: INTERNAL MEDICINE | Admitting: INTERNAL MEDICINE
Payer: MEDICARE

## 2021-03-16 VITALS
TEMPERATURE: 97 F | SYSTOLIC BLOOD PRESSURE: 174 MMHG | OXYGEN SATURATION: 98 % | HEART RATE: 78 BPM | HEIGHT: 69 IN | RESPIRATION RATE: 18 BRPM | DIASTOLIC BLOOD PRESSURE: 79 MMHG | WEIGHT: 225.09 LBS

## 2021-03-16 LAB
ALBUMIN SERPL ELPH-MCNC: 4.5 G/DL — SIGNIFICANT CHANGE UP (ref 3.5–5.2)
ALP SERPL-CCNC: 83 U/L — SIGNIFICANT CHANGE UP (ref 30–115)
ALT FLD-CCNC: 7 U/L — SIGNIFICANT CHANGE UP (ref 0–41)
ANION GAP SERPL CALC-SCNC: 17 MMOL/L — HIGH (ref 7–14)
APTT BLD: 34.7 SEC — SIGNIFICANT CHANGE UP (ref 27–39.2)
AST SERPL-CCNC: 14 U/L — SIGNIFICANT CHANGE UP (ref 0–41)
BASOPHILS # BLD AUTO: 0.05 K/UL — SIGNIFICANT CHANGE UP (ref 0–0.2)
BASOPHILS NFR BLD AUTO: 0.5 % — SIGNIFICANT CHANGE UP (ref 0–1)
BILIRUB SERPL-MCNC: 0.3 MG/DL — SIGNIFICANT CHANGE UP (ref 0.2–1.2)
BUN SERPL-MCNC: 75 MG/DL — CRITICAL HIGH (ref 10–20)
CALCIUM SERPL-MCNC: 9.4 MG/DL — SIGNIFICANT CHANGE UP (ref 8.5–10.1)
CHLORIDE SERPL-SCNC: 99 MMOL/L — SIGNIFICANT CHANGE UP (ref 98–110)
CO2 SERPL-SCNC: 22 MMOL/L — SIGNIFICANT CHANGE UP (ref 17–32)
CREAT SERPL-MCNC: 5.4 MG/DL — CRITICAL HIGH (ref 0.7–1.5)
EOSINOPHIL # BLD AUTO: 0.37 K/UL — SIGNIFICANT CHANGE UP (ref 0–0.7)
EOSINOPHIL NFR BLD AUTO: 4.1 % — SIGNIFICANT CHANGE UP (ref 0–8)
GLUCOSE BLDC GLUCOMTR-MCNC: 133 MG/DL — HIGH (ref 70–99)
GLUCOSE BLDC GLUCOMTR-MCNC: 263 MG/DL — HIGH (ref 70–99)
GLUCOSE SERPL-MCNC: 213 MG/DL — HIGH (ref 70–99)
HCT VFR BLD CALC: 32 % — LOW (ref 42–52)
HGB BLD-MCNC: 10.5 G/DL — LOW (ref 14–18)
IMM GRANULOCYTES NFR BLD AUTO: 1.1 % — HIGH (ref 0.1–0.3)
INR BLD: 1.02 RATIO — SIGNIFICANT CHANGE UP (ref 0.65–1.3)
LYMPHOCYTES # BLD AUTO: 2.03 K/UL — SIGNIFICANT CHANGE UP (ref 1.2–3.4)
LYMPHOCYTES # BLD AUTO: 22.3 % — SIGNIFICANT CHANGE UP (ref 20.5–51.1)
MCHC RBC-ENTMCNC: 30 PG — SIGNIFICANT CHANGE UP (ref 27–31)
MCHC RBC-ENTMCNC: 32.8 G/DL — SIGNIFICANT CHANGE UP (ref 32–37)
MCV RBC AUTO: 91.4 FL — SIGNIFICANT CHANGE UP (ref 80–94)
MONOCYTES # BLD AUTO: 1.05 K/UL — HIGH (ref 0.1–0.6)
MONOCYTES NFR BLD AUTO: 11.5 % — HIGH (ref 1.7–9.3)
NEUTROPHILS # BLD AUTO: 5.51 K/UL — SIGNIFICANT CHANGE UP (ref 1.4–6.5)
NEUTROPHILS NFR BLD AUTO: 60.5 % — SIGNIFICANT CHANGE UP (ref 42.2–75.2)
NRBC # BLD: 0 /100 WBCS — SIGNIFICANT CHANGE UP (ref 0–0)
NT-PROBNP SERPL-SCNC: 1980 PG/ML — HIGH (ref 0–300)
PLATELET # BLD AUTO: 217 K/UL — SIGNIFICANT CHANGE UP (ref 130–400)
POTASSIUM SERPL-MCNC: 4.9 MMOL/L — SIGNIFICANT CHANGE UP (ref 3.5–5)
POTASSIUM SERPL-SCNC: 4.9 MMOL/L — SIGNIFICANT CHANGE UP (ref 3.5–5)
PROT SERPL-MCNC: 6.6 G/DL — SIGNIFICANT CHANGE UP (ref 6–8)
PROTHROM AB SERPL-ACNC: 11.7 SEC — SIGNIFICANT CHANGE UP (ref 9.95–12.87)
RBC # BLD: 3.5 M/UL — LOW (ref 4.7–6.1)
RBC # FLD: 15.5 % — HIGH (ref 11.5–14.5)
SARS-COV-2 RNA SPEC QL NAA+PROBE: SIGNIFICANT CHANGE UP
SODIUM SERPL-SCNC: 138 MMOL/L — SIGNIFICANT CHANGE UP (ref 135–146)
TROPONIN T SERPL-MCNC: 0.29 NG/ML — CRITICAL HIGH
TROPONIN T SERPL-MCNC: 0.29 NG/ML — CRITICAL HIGH
TROPONIN T SERPL-MCNC: 0.31 NG/ML — CRITICAL HIGH
WBC # BLD: 9.11 K/UL — SIGNIFICANT CHANGE UP (ref 4.8–10.8)
WBC # FLD AUTO: 9.11 K/UL — SIGNIFICANT CHANGE UP (ref 4.8–10.8)

## 2021-03-16 PROCEDURE — 99285 EMERGENCY DEPT VISIT HI MDM: CPT

## 2021-03-16 PROCEDURE — 93010 ELECTROCARDIOGRAM REPORT: CPT

## 2021-03-16 PROCEDURE — 71045 X-RAY EXAM CHEST 1 VIEW: CPT | Mod: 26

## 2021-03-16 PROCEDURE — 99223 1ST HOSP IP/OBS HIGH 75: CPT | Mod: AI

## 2021-03-16 RX ORDER — ATORVASTATIN CALCIUM 80 MG/1
40 TABLET, FILM COATED ORAL AT BEDTIME
Refills: 0 | Status: DISCONTINUED | OUTPATIENT
Start: 2021-03-16 | End: 2021-03-19

## 2021-03-16 RX ORDER — CALCIUM ACETATE 667 MG
667 TABLET ORAL
Refills: 0 | Status: DISCONTINUED | OUTPATIENT
Start: 2021-03-16 | End: 2021-03-19

## 2021-03-16 RX ORDER — SODIUM CHLORIDE 9 MG/ML
1000 INJECTION, SOLUTION INTRAVENOUS
Refills: 0 | Status: DISCONTINUED | OUTPATIENT
Start: 2021-03-16 | End: 2021-03-19

## 2021-03-16 RX ORDER — DEXTROSE 50 % IN WATER 50 %
12.5 SYRINGE (ML) INTRAVENOUS ONCE
Refills: 0 | Status: DISCONTINUED | OUTPATIENT
Start: 2021-03-16 | End: 2021-03-19

## 2021-03-16 RX ORDER — GLUCAGON INJECTION, SOLUTION 0.5 MG/.1ML
1 INJECTION, SOLUTION SUBCUTANEOUS ONCE
Refills: 0 | Status: DISCONTINUED | OUTPATIENT
Start: 2021-03-16 | End: 2021-03-19

## 2021-03-16 RX ORDER — ASPIRIN/CALCIUM CARB/MAGNESIUM 324 MG
325 TABLET ORAL ONCE
Refills: 0 | Status: COMPLETED | OUTPATIENT
Start: 2021-03-16 | End: 2021-03-16

## 2021-03-16 RX ORDER — ASPIRIN/CALCIUM CARB/MAGNESIUM 324 MG
81 TABLET ORAL DAILY
Refills: 0 | Status: DISCONTINUED | OUTPATIENT
Start: 2021-03-16 | End: 2021-03-19

## 2021-03-16 RX ORDER — DEXTROSE 50 % IN WATER 50 %
15 SYRINGE (ML) INTRAVENOUS ONCE
Refills: 0 | Status: DISCONTINUED | OUTPATIENT
Start: 2021-03-16 | End: 2021-03-19

## 2021-03-16 RX ORDER — DEXTROSE 50 % IN WATER 50 %
25 SYRINGE (ML) INTRAVENOUS ONCE
Refills: 0 | Status: DISCONTINUED | OUTPATIENT
Start: 2021-03-16 | End: 2021-03-19

## 2021-03-16 RX ORDER — INSULIN LISPRO 100/ML
VIAL (ML) SUBCUTANEOUS
Refills: 0 | Status: DISCONTINUED | OUTPATIENT
Start: 2021-03-16 | End: 2021-03-19

## 2021-03-16 RX ORDER — INSULIN GLARGINE 100 [IU]/ML
30 INJECTION, SOLUTION SUBCUTANEOUS AT BEDTIME
Refills: 0 | Status: DISCONTINUED | OUTPATIENT
Start: 2021-03-16 | End: 2021-03-19

## 2021-03-16 RX ORDER — FUROSEMIDE 40 MG
40 TABLET ORAL DAILY
Refills: 0 | Status: DISCONTINUED | OUTPATIENT
Start: 2021-03-16 | End: 2021-03-19

## 2021-03-16 RX ORDER — METOPROLOL TARTRATE 50 MG
50 TABLET ORAL DAILY
Refills: 0 | Status: DISCONTINUED | OUTPATIENT
Start: 2021-03-16 | End: 2021-03-19

## 2021-03-16 RX ORDER — SACUBITRIL AND VALSARTAN 24; 26 MG/1; MG/1
1 TABLET, FILM COATED ORAL
Refills: 0 | Status: DISCONTINUED | OUTPATIENT
Start: 2021-03-16 | End: 2021-03-19

## 2021-03-16 RX ADMIN — ATORVASTATIN CALCIUM 40 MILLIGRAM(S): 80 TABLET, FILM COATED ORAL at 22:30

## 2021-03-16 RX ADMIN — INSULIN GLARGINE 30 UNIT(S): 100 INJECTION, SOLUTION SUBCUTANEOUS at 22:04

## 2021-03-16 RX ADMIN — Medication 325 MILLIGRAM(S): at 12:01

## 2021-03-16 NOTE — ED ADULT NURSE NOTE - PMH
BPH (Benign Prostatic Hyperplasia)    CAD (coronary artery disease)    CAD (Coronary Artery Disease)    CHF (congestive heart failure)    Diabetes mellitus    Diabetes Mellitus Type II    GERD (gastroesophageal reflux disease)    H/O hyperlipidemia    History of PTCA  with stents 10 years ago ([facility])  HTN (Hypertension)    HTN - Hypertension    Hypercholesterolemia    Mitral valve regurgitation    Renal insufficiency    Sleep apnea  does not use machine

## 2021-03-16 NOTE — H&P ADULT - NSHPLABSRESULTS_GEN_ALL_CORE
CBC Full  -  ( 16 Mar 2021 10:00 )  WBC Count : 9.11 K/uL  RBC Count : 3.50 M/uL  Hemoglobin : 10.5 g/dL  Hematocrit : 32.0 %  Platelet Count - Automated : 217 K/uL  Mean Cell Volume : 91.4 fL  Mean Cell Hemoglobin : 30.0 pg  Mean Cell Hemoglobin Concentration : 32.8 g/dL  Auto Neutrophil # : 5.51 K/uL  Auto Lymphocyte # : 2.03 K/uL  Auto Monocyte # : 1.05 K/uL  Auto Eosinophil # : 0.37 K/uL  Auto Basophil # : 0.05 K/uL  Auto Neutrophil % : 60.5 %  Auto Lymphocyte % : 22.3 %  Auto Monocyte % : 11.5 %  Auto Eosinophil % : 4.1 %  Auto Basophil % : 0.5 %  03-16    138  |  99  |  75<HH>  ----------------------------<  213<H>  4.9   |  22  |  5.4<HH>    Ca    9.4      16 Mar 2021 10:00    TPro  6.6  /  Alb  4.5  /  TBili  0.3  /  DBili  x   /  AST  14  /  ALT  7   /  AlkPhos  83  03-16  < from: Xray Chest 1 View- PORTABLE-Urgent (Xray Chest 1 View- PORTABLE-Urgent .) (03.16.21 @ 10:39) >    Impression:    Cardiomegaly without acute opacifications or effusions.    < end of copied text >

## 2021-03-16 NOTE — ED PROVIDER NOTE - OBJECTIVE STATEMENT
80m h/o cad s/p stents and cabg, dm type 2, esrd on dialysis t/th ([facility] nephrology), chf, htn, bph p/w CP. onset over the past week with exertion. moderate in severity. substernal, non-radiating, pressure-like, improved by rest. a/w dry heaving and nausea. denies dyspnea, palpitations, fever/chills, cough, sore throat, abd pain, vomiting, diarrhea, leg swelling.

## 2021-03-16 NOTE — ED PROVIDER NOTE - CLINICAL SUMMARY MEDICAL DECISION MAKING FREE TEXT BOX
79 yo male PMH as noted c/o several episodes of exertional CP associated with nausea and NBNB emesis since Saturday.  No pain at present, no SOB, abdominal  or back/flank pain., no leg pain or swelling, no fever  or chills, no focal weakness or paresthesias.   Well-appearing well-nourished, NAD, head AT/NC, PERRL, pink conjunctivae,  mmm, nml oropharynx, nml phonation,, supple neck without midline spine ttp, nml work of breathing, lungs CTA b/l, equal air entry, RRR, well-perfused extremities, distal pulses intact, abdomen soft, NT/ND, BS present in all quadrants, no midline spine or CVA ttp, no leg edema or unilateral calf swelling, A&Ox3, no focal neuro deficits, nml mood and affect.  Troponin is positive, cardiology was consulted, admit to tele for further work up.

## 2021-03-16 NOTE — CONSULT NOTE ADULT - ASSESSMENT
IMPRESSION  - NSTEMI with typical cardiac angina episodes x 2 over past 2 days,  ekg with inferior std worse than prior ekgs.    - CAD s/p CABG s/p PCI (bypass 5y ago)  - ICM / HFrEF sp AICD - clinically euvolemic  - HTN, DM,  ESRD on HD. former smoker  -----  Ekg:     rbbb,  std inferior leads. More than priors  Echo:   1/'18 ECHO:Mild LV dilatation. EF 25-35%. Moderate MR.  Cath:    5/’13:  lm minor. Lad o25. Lcx o40. Rca patent mid stents. Rpda 80, rpl 90.   Labs:    trop .31 (prev ~.07 1/’30).  BNP 1980      PLAN  -  load asa and start on asa 81 daily afterward  -  repeat another troponin now and ~4pm -- if uptrending also start on hep gtt (acs protocol)  -  repeat /trend EKG now and later tonight  -  check lipids, a1c  -  check official TTE  -  cw lipitor 40, entresto 50bid, lasix 20, toprol 50 / insulin.   -  will d/w attd re: timing of further ischemic w/u.  The patient is a 14y Female complaining of flu-like symptoms. IMPRESSION  - NSTEMI with typical cardiac angina episodes x 2 over past 2 days,  ekg with inferior std worse than prior ekgs.    - CAD s/p CABG s/p PCI (bypass 5y ago)  - ICM / HFrEF sp AICD - clinically euvolemic  - HTN, DM,  ESRD on HD. former smoker  -----  Ekg:     rbbb,  std inferior leads. More than priors  Echo:   1/'18 ECHO:Mild LV dilatation. EF 25-35%. Moderate MR.  Cath:    5/’13:  lm minor. Lad o25. Lcx o40. Rca patent mid stents. Rpda 80, rpl 90.   Labs:    trop .31 (prev ~.07 1/’30).  BNP 1980      PLAN  -  load asa and start on asa 81 daily afterward  -  repeat another troponin now and ~4pm -- if uptrending also start on hep gtt (acs protocol)  -  repeat /trend EKG now and later tonight  -  check lipids, a1c  -  check official TTE  -  cw lipitor 40, entresto 50bid, lasix 20, toprol 50 / insulin.   -  added on for cath tomorrow  -  npo after mn

## 2021-03-16 NOTE — H&P ADULT - ATTENDING COMMENTS
81 YO M with a PMH of CAD s/p CABG s/p PCI, ICM/HFrEF s/p AICD, HTN, DM2, and ESRD on HD (2x/week) who presents to the hospital with a c/o CP for the past x 2 days. Described as pressure, central, non-radiating, and intermittent. + worse with exertion. Associated with - SOB, + nausea, and - diaphoresis. Did not take SLN or ASA prior to arrival. Denies any fevers/chills, cough, ABD pain, LE swelling, or rashes.     In the ED, cardiac enzymes were elevated (.31) and an EKG showed ST depressions in the inferior leads. ASA given in the ED. Cardio consulted and wants to take pt for CATH in the AM.     Physical exam shows pt in NAD, resting comfortably. VSS, afebrile, not hypoxic on RA. A&Ox3. Neuro exam intact. CTA B/L with no W/C/R. RRR, systolic murmur in the aortic region. ABD is soft and non-tender to palpation, normoactive BSs. LEs without swelling, pulses palpated bilaterally; Left forearm AVF (poor thrill). No rashes. Labs and imaging as above resident note.     Chest pain, typical, NSTEMI. Admit to tele. Cardio is following, NPO at [facility], plan is for CATH in the AM. Serial cardiac enzymes and EKGs. A1c, Lipids, and TSH. ASA given in the ED. Echo. PRN pain meds. Restart home meds.     Normocytic anemia, slightly below baseline, at goal for ESRD pt. Pt denies bleeding symptoms. Replace as necessary.     Diabetes mellitus with hyperglycemia. A1c. FSs. Insulin PRN.     ESRD on HD. No current indication for emergent HD. Renal consult for in-pt HD. Restart home meds.     HX of CAD s/p CABG s/p PCI, ICM/HFrEF s/p AICD, and HTN. Restart home meds, except as stated above. DVT PPX. Inform PCP of pt's admission to hospital. My note supersedes the residents note.

## 2021-03-16 NOTE — H&P ADULT - HISTORY OF PRESENT ILLNESS
80 year old F with a PMHx  ESRD on HD, DLD, CAD s/p CABG and s/p PCI, Ischemic Cardiomyopathy s/p AICD, , HTN, and DM, COVID PNA presents to ED with a two day history of chest pain.  Patient was in his usual state of health on Friday night, when he was doing work on his house when he had a sudden onset of chest pain.  Patient endorses a pressure like substernal chest pain that is pressure like in sensation and is moderate in severity. It occurs intermittently in frequency, and worsens with exertion. and relieves with rest. The chest pain has been increasing intensity today, which prompted him to go to the ED after he talekd to his cardiologist.    80 year old F with a PMHx  ESRD on HD, DLD, CAD s/p CABG and s/p PCI, Ischemic Cardiomyopathy s/p AICD, , HTN, and DM, COVID PNA presents to ED with a two day history of chest pain. Patient was in his usual state of health on Sunday night, when he was doing work on his house when he had a sudden onset of chest pain.   Patient walked 20-30 feet and felt nauseous while the pain occurred on 3/14/2021.  On 3/15/2021, patient was doing house work when patient felt chest pain again, which relieved after 5 min of rest. Patient endorses a pressure like substernal chest pain that is pressure like in sensation and is moderate in severity. It occurs intermittently in frequency, and worsens with exertion  and relieves with rest.  It does not worsen with inspiration.  Today he did not have an episode of chest pain, but did avinash cardiologist who advised him to go to hospital. He also complains of SOB on exertion which relieves with rest. Patient denies fever, rigors, abdominal pain, n/v/d.     ICU Vital Signs Last 24 Hrs  T(C): 37.1 (16 Mar 2021 15:02), Max: 37.1 (16 Mar 2021 15:02)  T(F): 98.8 (16 Mar 2021 15:02), Max: 98.8 (16 Mar 2021 15:02)  HR: 68 (16 Mar 2021 15:02) (68 - 78)  BP: 132/62 (16 Mar 2021 15:02) (132/62 - 174/79)  BP(mean): --  ABP: --  ABP(mean): --  RR: 18 (16 Mar 2021 15:02) (18 - 18)  SpO2: 100% (16 Mar 2021 15:02) (98% - 100%)    In the ED: patient was loaded with ASA 325mg PO.  EKG demonstrated RBBB w/ST depressions in the inferior leads.  CBC and CMP unremarkable.  Trop .31. Glucose 213.

## 2021-03-16 NOTE — H&P ADULT - NSICDXPASTMEDICALHX_GEN_ALL_CORE_FT
PAST MEDICAL HISTORY:  BPH (Benign Prostatic Hyperplasia)     CAD (Coronary Artery Disease)     CAD (coronary artery disease)     CHF (congestive heart failure)     Diabetes mellitus     Diabetes Mellitus Type II     GERD (gastroesophageal reflux disease)     H/O hyperlipidemia     History of PTCA with stents 10 years ago ([facility])    HTN (Hypertension)     HTN - Hypertension     Hypercholesterolemia     Mitral valve regurgitation     Renal insufficiency     Sleep apnea does not use machine

## 2021-03-16 NOTE — H&P ADULT - ASSESSMENT
80 year old F with a PMHx  ESRD on HD, DLD, CAD s/p CABG and s/p PCI, Ischemic Cardiomyopathy s/p AICD, , HTN, and DM, COVID PNA presents to ED with a two day history of chest pain.    #NSTEMI w/typical Chest Pain   CBC demonstrates Hgb of 10.6, baseline 11-13  CMP demonstrates no e-lyte abnormalities.  SCr elevated, but patient is ESRD  Troponins-->.31-->.29 (previous trops from previous admission was .4); Significant elevation from previous admission.  BNP 1980  EKG demonstrated ST depressions in the inferior leads   CXR demonstrated cardiomegaly w/AICD, w/o opacities.   NPO after midnight for cath tomorrow as per cardiology  If troponin are elevating-->starting ACS protocol heparin gtt  Loaded ASA 325mg , 81mg PO qdaily thereafter     Ischemic Cardiomyopathy s/p AICD  1/11/18 ECHO:Mild LV dilatation. EF 25-35%. Moderate MR.  Not fluid overloaded clinically  BNP 1980  CXR demonstrated cardiomegaly w/AICD, w/o opacities.   repeat 2d echo     #HTN  - controlled on home medications  - c/w metoprolol 50 mg daily  - c/w lasix 40 mg qdaily  - c/w entresto 24/26 PO BID    #ESRD on HD  c/w calcium acetate 667 PO TID  consult for Dr. Du  Patient receives HD twice a week     #DLD  - c/w lipitor 40 mg daily    #DMII  - Lantus 30 mg qhs    #DVT ppx: Heparin subq   #GI: Not indicated  #Diet: DASH  #Activity: AAT   #Dispo: Acute  Full Code

## 2021-03-16 NOTE — ED PROVIDER NOTE - PMH
BPH (Benign Prostatic Hyperplasia)    CAD (coronary artery disease)    CAD (Coronary Artery Disease)    CHF (congestive heart failure)    Diabetes mellitus    Diabetes Mellitus Type II    GERD (gastroesophageal reflux disease)    H/O hyperlipidemia    History of PTCA  with stents 10 years ago ([facility])  HTN (Hypertension)    HTN - Hypertension    Hypercholesterolemia    Mitral valve regurgitation    Renal insufficiency    Sleep apnea  does not use machine

## 2021-03-16 NOTE — H&P ADULT - NSHPPHYSICALEXAM_GEN_ALL_CORE
PHYSICAL EXAM:  GENERAL: NAD, lying in bed comfortably  HEAD:  Atraumatic, Normocephalic  NECK: Supple, No JVD  CHEST/LUNG: Clear to auscultation bilaterally; No rales, rhonchi, wheezing, or rubs. Unlabored respirations  HEART: Regular rate and rhythm; No murmurs, rubs, or gallops  ABDOMEN: Bowel sounds present; Soft, Nontender, Nondistended. No hepatomegally  EXTREMITIES:  2+ Peripheral Pulses, brisk capillary refill. No clubbing, cyanosis, or edema  NERVOUS SYSTEM:  Alert & Oriented X3, speech clear. No deficits   MSK: FROM all 4 extremities, full and equal strength

## 2021-03-16 NOTE — ED PROVIDER NOTE - PROGRESS NOTE DETAILS
AA: Pending trop, will admit to telemetry for unstable angina in the setting of prior ischemic heart disease. EKG demonstrating no STEMI at this time. AA: Trop elevated to 0.31. EKG demonstrating STD in inferior leads. D/w Cardio fellow Rubina, she will come and evaluate pt.

## 2021-03-16 NOTE — ED PROVIDER NOTE - NS ED ROS FT
General: No fever, chills, or weakness.  Eyes:  No visual changes, eye pain or discharge.  ENMT:  No hearing changes, pain, no sore throat or runny nose, no difficulty swallowing  Cardiac: CP. no dyspnea.   Respiratory:  No cough or respiratory distress. No hemoptysis. No history of asthma or RAD.  GI:  No nausea, vomiting, diarrhea or abdominal pain.  :  No dysuria, frequency or burning.  MS:  No myalgia, muscle weakness, joint pain or back pain.  Neuro:  No headache.  No LOC. No change in ambulation. No dizziness.  Skin:  No skin rash.   Endocrine: No history of thyroid disease or diabetes.

## 2021-03-16 NOTE — ED PROVIDER NOTE - ATTENDING CONTRIBUTION TO CARE
81 yo male PMH as noted c/o several episodes of exertional CP associated with nausea and NBNB emesis since Saturday.  No pain at present, no SOB, abdominal  or back/flank pain., no leg pain or swelling, no fever  or chills, no focal weakness or paresthesias.   Well-appearing well-nourished, NAD, head AT/NC, PERRL, pink conjunctivae,  mmm, nml oropharynx, nml phonation,, supple neck without midline spine ttp, nml work of breathing, lungs CTA b/l, equal air entry, RRR, well-perfused extremities, distal pulses intact, abdomen soft, NT/ND, BS present in all quadrants, no midline spine or CVA ttp, no leg edema or unilateral calf swelling, A&Ox3, no focal neuro deficits, nml mood and affect.  Troponin is positive, cardiology was consulted, admit to tele for further work up.

## 2021-03-17 LAB
A1C WITH ESTIMATED AVERAGE GLUCOSE RESULT: 7.3 % — HIGH (ref 4–5.6)
ALBUMIN SERPL ELPH-MCNC: 4.1 G/DL — SIGNIFICANT CHANGE UP (ref 3.5–5.2)
ALP SERPL-CCNC: 52 U/L — SIGNIFICANT CHANGE UP (ref 30–115)
ALT FLD-CCNC: 6 U/L — SIGNIFICANT CHANGE UP (ref 0–41)
ANION GAP SERPL CALC-SCNC: 15 MMOL/L — HIGH (ref 7–14)
AST SERPL-CCNC: 12 U/L — SIGNIFICANT CHANGE UP (ref 0–41)
BASOPHILS # BLD AUTO: 0.05 K/UL — SIGNIFICANT CHANGE UP (ref 0–0.2)
BASOPHILS NFR BLD AUTO: 0.6 % — SIGNIFICANT CHANGE UP (ref 0–1)
BILIRUB SERPL-MCNC: 0.2 MG/DL — SIGNIFICANT CHANGE UP (ref 0.2–1.2)
BUN SERPL-MCNC: 78 MG/DL — CRITICAL HIGH (ref 10–20)
CALCIUM SERPL-MCNC: 9.1 MG/DL — SIGNIFICANT CHANGE UP (ref 8.5–10.1)
CHLORIDE SERPL-SCNC: 103 MMOL/L — SIGNIFICANT CHANGE UP (ref 98–110)
CHOLEST SERPL-MCNC: 204 MG/DL — HIGH
CO2 SERPL-SCNC: 21 MMOL/L — SIGNIFICANT CHANGE UP (ref 17–32)
CREAT SERPL-MCNC: 4.7 MG/DL — CRITICAL HIGH (ref 0.7–1.5)
EOSINOPHIL # BLD AUTO: 0.38 K/UL — SIGNIFICANT CHANGE UP (ref 0–0.7)
EOSINOPHIL NFR BLD AUTO: 4.5 % — SIGNIFICANT CHANGE UP (ref 0–8)
ESTIMATED AVERAGE GLUCOSE: 163 MG/DL — HIGH (ref 68–114)
GLUCOSE BLDC GLUCOMTR-MCNC: 102 MG/DL — HIGH (ref 70–99)
GLUCOSE BLDC GLUCOMTR-MCNC: 104 MG/DL — HIGH (ref 70–99)
GLUCOSE BLDC GLUCOMTR-MCNC: 244 MG/DL — HIGH (ref 70–99)
GLUCOSE BLDC GLUCOMTR-MCNC: 80 MG/DL — SIGNIFICANT CHANGE UP (ref 70–99)
GLUCOSE SERPL-MCNC: 101 MG/DL — HIGH (ref 70–99)
HCT VFR BLD CALC: 31 % — LOW (ref 42–52)
HDLC SERPL-MCNC: 32 MG/DL — LOW
HGB BLD-MCNC: 9.8 G/DL — LOW (ref 14–18)
IMM GRANULOCYTES NFR BLD AUTO: 0.7 % — HIGH (ref 0.1–0.3)
LIPID PNL WITH DIRECT LDL SERPL: 96 MG/DL — SIGNIFICANT CHANGE UP
LYMPHOCYTES # BLD AUTO: 2.04 K/UL — SIGNIFICANT CHANGE UP (ref 1.2–3.4)
LYMPHOCYTES # BLD AUTO: 24.1 % — SIGNIFICANT CHANGE UP (ref 20.5–51.1)
MAGNESIUM SERPL-MCNC: 2.3 MG/DL — SIGNIFICANT CHANGE UP (ref 1.8–2.4)
MCHC RBC-ENTMCNC: 29.1 PG — SIGNIFICANT CHANGE UP (ref 27–31)
MCHC RBC-ENTMCNC: 31.6 G/DL — LOW (ref 32–37)
MCV RBC AUTO: 92 FL — SIGNIFICANT CHANGE UP (ref 80–94)
MONOCYTES # BLD AUTO: 0.98 K/UL — HIGH (ref 0.1–0.6)
MONOCYTES NFR BLD AUTO: 11.6 % — HIGH (ref 1.7–9.3)
NEUTROPHILS # BLD AUTO: 4.94 K/UL — SIGNIFICANT CHANGE UP (ref 1.4–6.5)
NEUTROPHILS NFR BLD AUTO: 58.5 % — SIGNIFICANT CHANGE UP (ref 42.2–75.2)
NON HDL CHOLESTEROL: 172 MG/DL — HIGH
NRBC # BLD: 0 /100 WBCS — SIGNIFICANT CHANGE UP (ref 0–0)
PHOSPHATE SERPL-MCNC: 4.6 MG/DL — SIGNIFICANT CHANGE UP (ref 2.1–4.9)
PLATELET # BLD AUTO: 201 K/UL — SIGNIFICANT CHANGE UP (ref 130–400)
POTASSIUM SERPL-MCNC: 4.6 MMOL/L — SIGNIFICANT CHANGE UP (ref 3.5–5)
POTASSIUM SERPL-SCNC: 4.6 MMOL/L — SIGNIFICANT CHANGE UP (ref 3.5–5)
PROT SERPL-MCNC: 6.2 G/DL — SIGNIFICANT CHANGE UP (ref 6–8)
RBC # BLD: 3.37 M/UL — LOW (ref 4.7–6.1)
RBC # FLD: 15.6 % — HIGH (ref 11.5–14.5)
SODIUM SERPL-SCNC: 139 MMOL/L — SIGNIFICANT CHANGE UP (ref 135–146)
TRIGL SERPL-MCNC: 349 MG/DL — HIGH
TROPONIN T SERPL-MCNC: 0.31 NG/ML — CRITICAL HIGH
WBC # BLD: 8.45 K/UL — SIGNIFICANT CHANGE UP (ref 4.8–10.8)
WBC # FLD AUTO: 8.45 K/UL — SIGNIFICANT CHANGE UP (ref 4.8–10.8)

## 2021-03-17 PROCEDURE — 99233 SBSQ HOSP IP/OBS HIGH 50: CPT

## 2021-03-17 PROCEDURE — 93010 ELECTROCARDIOGRAM REPORT: CPT

## 2021-03-17 PROCEDURE — 93306 TTE W/DOPPLER COMPLETE: CPT | Mod: 26

## 2021-03-17 RX ADMIN — ATORVASTATIN CALCIUM 40 MILLIGRAM(S): 80 TABLET, FILM COATED ORAL at 21:53

## 2021-03-17 RX ADMIN — SACUBITRIL AND VALSARTAN 1 TABLET(S): 24; 26 TABLET, FILM COATED ORAL at 17:13

## 2021-03-17 RX ADMIN — Medication 50 MILLIGRAM(S): at 05:40

## 2021-03-17 RX ADMIN — Medication 667 MILLIGRAM(S): at 17:13

## 2021-03-17 RX ADMIN — Medication 40 MILLIGRAM(S): at 05:40

## 2021-03-17 RX ADMIN — SACUBITRIL AND VALSARTAN 1 TABLET(S): 24; 26 TABLET, FILM COATED ORAL at 05:43

## 2021-03-17 RX ADMIN — Medication 81 MILLIGRAM(S): at 13:38

## 2021-03-17 RX ADMIN — Medication 667 MILLIGRAM(S): at 08:15

## 2021-03-17 RX ADMIN — INSULIN GLARGINE 30 UNIT(S): 100 INJECTION, SOLUTION SUBCUTANEOUS at 21:53

## 2021-03-17 NOTE — PROGRESS NOTE ADULT - SUBJECTIVE AND OBJECTIVE BOX
Patient is a 80y old  Male who presents with a chief complaint of Chest Pain (17 Mar 2021 07:45)      HPI:  80 year old F with a PMHx  ESRD on HD, DLD, CAD s/p CABG and s/p PCI, Ischemic Cardiomyopathy s/p AICD, , HTN, and DM, COVID PNA presents to ED with a two day history of chest pain. Patient was in his usual state of health on Sunday night, when he was doing work on his house when he had a sudden onset of chest pain.   Patient walked 20-30 feet and felt nauseous while the pain occurred on 3/14/2021.  On 3/15/2021, patient was doing house work when patient felt chest pain again, which relieved after 5 min of rest. Patient endorses a pressure like substernal chest pain that is pressure like in sensation and is moderate in severity. It occurs intermittently in frequency, and worsens with exertion  and relieves with rest.  It does not worsen with inspiration.  Today he did not have an episode of chest pain, but did avinash cardiologist who advised him to go to hospital. He also complains of SOB on exertion which relieves with rest. Patient denies fever, rigors, abdominal pain, n/v/d.     ICU Vital Signs Last 24 Hrs  T(C): 37.1 (16 Mar 2021 15:02), Max: 37.1 (16 Mar 2021 15:02)  T(F): 98.8 (16 Mar 2021 15:02), Max: 98.8 (16 Mar 2021 15:02)  HR: 68 (16 Mar 2021 15:02) (68 - 78)  BP: 132/62 (16 Mar 2021 15:02) (132/62 - 174/79)  BP(mean): --  ABP: --  ABP(mean): --  RR: 18 (16 Mar 2021 15:02) (18 - 18)  SpO2: 100% (16 Mar 2021 15:02) (98% - 100%)    In the ED: patient was loaded with ASA 325mg PO.  EKG demonstrated RBBB w/ST depressions in the inferior leads.  CBC and CMP unremarkable.  Trop .31. Glucose 213.  (16 Mar 2021 18:00)      PAST MEDICAL & SURGICAL HISTORY:  Mitral valve regurgitation    CHF (congestive heart failure)    BPH (Benign Prostatic Hyperplasia)    GERD (gastroesophageal reflux disease)    Sleep apnea  does not use machine    Renal insufficiency    HTN - Hypertension    H/O hyperlipidemia    CAD (coronary artery disease)    Diabetes mellitus    History of PTCA  with stents 10 years ago ([facility])    CAD (Coronary Artery Disease)    Hypercholesterolemia    Diabetes Mellitus Type II    HTN (Hypertension)    S/P appendectomy    S/P primary angioplasty with coronary stent  6-7 stents last one in 10/12    S/P tonsillectomy    S/P knee surgery  b/l arthroscopic        Home Medications:  calcium acetate 667 mg oral tablet: 1 tab(s) orally 3 times a day (28 Jan 2021 15:52)  Entresto 24 mg-26 mg oral tablet: 1 tab(s) orally 2 times a day (28 Jan 2021 15:52)  Lantus 100 units/mL subcutaneous solution: 30 unit(s) subcutaneous once a day (at bedtime) (28 Jan 2021 15:52)  Lasix 40 mg oral tablet: 1 cap(s) orally 2 times a day (28 Jan 2021 15:52)  Lipitor 40 mg oral tablet: 1 tab(s) orally once a day (at bedtime) (28 Jan 2021 15:52)  metoprolol succinate 50 mg oral tablet, extended release: 1 tab(s) orally once a day (28 Jan 2021 15:52)  Vitamin D3 50,000 intl units (1250 mcg) oral capsule: 1 cap(s) orally once a week (28 Jan 2021 15:52)      .  Tobacco use:   EtOH use:  Illicit drug use:    Living situation:    Allergies:  No Known Allergies      FAMILY HISTORY:  No pertinent family history in first degree relatives        Hospital Medications:  aspirin enteric coated 81 milliGRAM(s) Oral daily  atorvastatin 40 milliGRAM(s) Oral at bedtime  calcium acetate 667 milliGRAM(s) Oral three times a day with meals  dextrose 40% Gel 15 Gram(s) Oral once  dextrose 5%. 1000 milliLiter(s) IV Continuous <Continuous>  dextrose 5%. 1000 milliLiter(s) IV Continuous <Continuous>  dextrose 50% Injectable 25 Gram(s) IV Push once  dextrose 50% Injectable 12.5 Gram(s) IV Push once  dextrose 50% Injectable 25 Gram(s) IV Push once  furosemide    Tablet 40 milliGRAM(s) Oral daily  glucagon  Injectable 1 milliGRAM(s) IntraMuscular once  insulin glargine Injectable (LANTUS) 30 Unit(s) SubCutaneous at bedtime  insulin lispro (ADMELOG) corrective regimen sliding scale   SubCutaneous three times a day before meals  metoprolol succinate ER 50 milliGRAM(s) Oral daily  sacubitril 24 mG/valsartan 26 mG 1 Tablet(s) Oral two times a day      Vital Signs Last 24 Hrs  T(C): 36.4 (17 Mar 2021 05:00), Max: 37.1 (16 Mar 2021 15:02)  T(F): 97.6 (17 Mar 2021 05:00), Max: 98.8 (16 Mar 2021 15:02)  HR: 68 (17 Mar 2021 08:40) (68 - 87)  BP: 135/78 (17 Mar 2021 08:40) (121/73 - 142/81)  BP(mean): --  RR: 17 (17 Mar 2021 08:40) (16 - 18)  SpO2: 99% (17 Mar 2021 07:54) (99% - 100%)    I&O's Summary    16 Mar 2021 07:01  -  17 Mar 2021 07:00  --------------------------------------------------------  IN: 100 mL / OUT: 0 mL / NET: 100 mL        LABS:                        9.8    8.45  )-----------( 201      ( 17 Mar 2021 07:15 )             31.0       03-17    139  |  103  |  78<HH>  ----------------------------<  101<H>  4.6   |  21  |  4.7<HH>    Ca    9.1      17 Mar 2021 07:15  Phos  4.6     03-17  Mg     2.3     03-17    TPro  6.2  /  Alb  4.1  /  TBili  0.2  /  DBili  x   /  AST  12  /  ALT  6   /  AlkPhos  52  03-17      CARDIAC MARKERS ( 17 Mar 2021 07:15 )  x     / 0.31 ng/mL / x     / x     / x      CARDIAC MARKERS ( 16 Mar 2021 20:06 )  x     / 0.29 ng/mL / x     / x     / x      CARDIAC MARKERS ( 16 Mar 2021 13:41 )  x     / 0.29 ng/mL / x     / x     / x      CARDIAC MARKERS ( 16 Mar 2021 10:00 )  x     / 0.31 ng/mL / x     / x     / x                PHYSICAL EXAM:  GENERAL: NAD, lying in bed comfortably  HEAD:  Atraumatic, Normocephalic  EYES: EOMI, PERRLA, conjunctiva and sclera clear  ENT: Moist mucous membranes  NECK: Supple, No JVD  CHEST/LUNG: Clear to auscultation bilaterally; No rales, rhonchi, wheezing, or rubs. Unlabored respirations  HEART: Regular rate and rhythm; No murmurs, rubs, or gallops  ABDOMEN: Bowel sounds present; Soft, Nontender, Nondistended. No hepatomegally  EXTREMITIES:  BL LE pitting edema  NERVOUS SYSTEM:  Alert & Oriented X3, speech clear. No deficits        Patient is a 80y old  Male who presents with a chief complaint of Chest Pain (17 Mar 2021 07:45)      HPI:  80 year old F with a PMHx  ESRD on HD, DLD, CAD s/p CABG and s/p PCI, Ischemic Cardiomyopathy s/p AICD, , HTN, and DM, COVID PNA presents to ED with a two day history of chest pain. Patient was in his usual state of health on Sunday night, when he was doing work on his house when he had a sudden onset of chest pain.   Patient walked 20-30 feet and felt nauseous while the pain occurred on 3/14/2021.  On 3/15/2021, patient was doing house work when patient felt chest pain again, which relieved after 5 min of rest. Patient endorses a pressure like substernal chest pain that is pressure like in sensation and is moderate in severity. It occurs intermittently in frequency, and worsens with exertion  and relieves with rest.  It does not worsen with inspiration.  Today he did not have an episode of chest pain, but did avinash cardiologist who advised him to go to hospital. He also complains of SOB on exertion which relieves with rest. Patient denies fever, rigors, abdominal pain, n/v/d.     ICU Vital Signs Last 24 Hrs  T(C): 37.1 (16 Mar 2021 15:02), Max: 37.1 (16 Mar 2021 15:02)  T(F): 98.8 (16 Mar 2021 15:02), Max: 98.8 (16 Mar 2021 15:02)  HR: 68 (16 Mar 2021 15:02) (68 - 78)  BP: 132/62 (16 Mar 2021 15:02) (132/62 - 174/79)  BP(mean): --  ABP: --  ABP(mean): --  RR: 18 (16 Mar 2021 15:02) (18 - 18)  SpO2: 100% (16 Mar 2021 15:02) (98% - 100%)    In the ED: patient was loaded with ASA 325mg PO.  EKG demonstrated RBBB w/ST depressions in the inferior leads.  CBC and CMP unremarkable.  Trop .31. Glucose 213.  (16 Mar 2021 18:00)      PAST MEDICAL & SURGICAL HISTORY:  Mitral valve regurgitation    CHF (congestive heart failure)    BPH (Benign Prostatic Hyperplasia)    GERD (gastroesophageal reflux disease)    Sleep apnea  does not use machine    Renal insufficiency    HTN - Hypertension    H/O hyperlipidemia    CAD (coronary artery disease)    Diabetes mellitus    History of PTCA  with stents 10 years ago ([facility])    CAD (Coronary Artery Disease)    Hypercholesterolemia    Diabetes Mellitus Type II    HTN (Hypertension)    S/P appendectomy    S/P primary angioplasty with coronary stent  6-7 stents last one in 10/12    S/P tonsillectomy    S/P knee surgery  b/l arthroscopic        Home Medications:  calcium acetate 667 mg oral tablet: 1 tab(s) orally 3 times a day (28 Jan 2021 15:52)  Entresto 24 mg-26 mg oral tablet: 1 tab(s) orally 2 times a day (28 Jan 2021 15:52)  Lantus 100 units/mL subcutaneous solution: 30 unit(s) subcutaneous once a day (at bedtime) (28 Jan 2021 15:52)  Lasix 40 mg oral tablet: 1 cap(s) orally 2 times a day (28 Jan 2021 15:52)  Lipitor 40 mg oral tablet: 1 tab(s) orally once a day (at bedtime) (28 Jan 2021 15:52)  metoprolol succinate 50 mg oral tablet, extended release: 1 tab(s) orally once a day (28 Jan 2021 15:52)  Vitamin D3 50,000 intl units (1250 mcg) oral capsule: 1 cap(s) orally once a week (28 Jan 2021 15:52)      Allergies:  No Known Allergies      FAMILY HISTORY:  No pertinent family history in first degree relatives        Hospital Medications:  aspirin enteric coated 81 milliGRAM(s) Oral daily  atorvastatin 40 milliGRAM(s) Oral at bedtime  calcium acetate 667 milliGRAM(s) Oral three times a day with meals  dextrose 40% Gel 15 Gram(s) Oral once  dextrose 5%. 1000 milliLiter(s) IV Continuous <Continuous>  dextrose 5%. 1000 milliLiter(s) IV Continuous <Continuous>  dextrose 50% Injectable 25 Gram(s) IV Push once  dextrose 50% Injectable 12.5 Gram(s) IV Push once  dextrose 50% Injectable 25 Gram(s) IV Push once  furosemide    Tablet 40 milliGRAM(s) Oral daily  glucagon  Injectable 1 milliGRAM(s) IntraMuscular once  insulin glargine Injectable (LANTUS) 30 Unit(s) SubCutaneous at bedtime  insulin lispro (ADMELOG) corrective regimen sliding scale   SubCutaneous three times a day before meals  metoprolol succinate ER 50 milliGRAM(s) Oral daily  sacubitril 24 mG/valsartan 26 mG 1 Tablet(s) Oral two times a day      Vital Signs Last 24 Hrs  T(C): 36.4 (17 Mar 2021 05:00), Max: 37.1 (16 Mar 2021 15:02)  T(F): 97.6 (17 Mar 2021 05:00), Max: 98.8 (16 Mar 2021 15:02)  HR: 68 (17 Mar 2021 08:40) (68 - 87)  BP: 135/78 (17 Mar 2021 08:40) (121/73 - 142/81)  BP(mean): --  RR: 17 (17 Mar 2021 08:40) (16 - 18)  SpO2: 99% (17 Mar 2021 07:54) (99% - 100%)    I&O's Summary    16 Mar 2021 07:01  -  17 Mar 2021 07:00  --------------------------------------------------------  IN: 100 mL / OUT: 0 mL / NET: 100 mL        LABS:                        9.8    8.45  )-----------( 201      ( 17 Mar 2021 07:15 )             31.0       03-17    139  |  103  |  78<HH>  ----------------------------<  101<H>  4.6   |  21  |  4.7<HH>    Ca    9.1      17 Mar 2021 07:15  Phos  4.6     03-17  Mg     2.3     03-17    TPro  6.2  /  Alb  4.1  /  TBili  0.2  /  DBili  x   /  AST  12  /  ALT  6   /  AlkPhos  52  03-17      CARDIAC MARKERS ( 17 Mar 2021 07:15 )  x     / 0.31 ng/mL / x     / x     / x      CARDIAC MARKERS ( 16 Mar 2021 20:06 )  x     / 0.29 ng/mL / x     / x     / x      CARDIAC MARKERS ( 16 Mar 2021 13:41 )  x     / 0.29 ng/mL / x     / x     / x      CARDIAC MARKERS ( 16 Mar 2021 10:00 )  x     / 0.31 ng/mL / x     / x     / x                PHYSICAL EXAM:  GENERAL: NAD, lying in bed comfortably  HEAD:  Atraumatic, Normocephalic  EYES: EOMI, conjunctiva and sclera clear  ENT: Moist mucous membranes  NECK: Supple, No JVD  CHEST/LUNG: Clear to auscultation bilaterally; No rales, rhonchi, wheezing, or rubs. Unlabored respirations  HEART: Regular rate and rhythm; No murmurs, rubs, or gallops  ABDOMEN: Bowel sounds present; Soft, Nontender, Nondistended. No hepatomegaly  EXTREMITIES:  BL LE pitting edema  NERVOUS SYSTEM:  Alert & Oriented X3, speech clear. No deficits

## 2021-03-17 NOTE — CONSULT NOTE ADULT - ASSESSMENT
ESRD on HD 2x /week (Tue/Thu) @ [facility]   Last HD Thursday last week  pt nonoliguric with residual renal function  ACS / NSTEMI  recent COVID-19 PNA   CAD / CABG / CMP / CHF  HTN  DM2    plan:    HD today - 3H / 0.5Kg off / 2K / opti 160 / left arm AVF  cardiac cath today - please limit dye volume, avoid aggressive IVF if at all   renal diet when no longer NPO  phoslo with meals  left arm precautions   may cont entresto  f/u cardio ESRD on HD 2x /week (Tue/Thu) @ [facility]   Last HD Thursday last week  pt nonoliguric with residual renal function  ACS / NSTEMI  recent COVID-19 PNA   CAD / CABG / CMP / CHF  HTN  DM2  anemia    plan:    HD today - 3H / 0.5Kg off / 2K / opti 160 / left arm AVF  cardiac cath today - please limit dye volume, avoid aggressive IVF if at all   renal diet when no longer NPO  phoslo with meals  left arm precautions   may cont entresto  f/u cardio    addendum: pt tolerating HD.  NPO.  Minimal UF as pt still with significant void.  Add epo next HD

## 2021-03-17 NOTE — PROGRESS NOTE ADULT - ASSESSMENT
80 year old F with a PMHx  ESRD on HD, DLD, CAD s/p CABG and s/p PCI, Ischemic Cardiomyopathy s/p AICD, , HTN, and DM, COVID PNA presents to ED with a two day history of chest pain.    #NSTEMI w/typical Chest Pain   CBC demonstrates Hgb of 10.6, baseline 11-13  Troponins-->.31-->.29 (previous trops from previous admission was .07); Significant elevation from previous admission.  BNP 1980  EKG demonstrated ST depressions in the inferior leads   CXR demonstrated cardiomegaly w/AICD, w/o opacities.   CATH TODAY  c/w asa 81mg daily     #Ischemic Cardiomyopathy s/p AICD  1/11/18 ECHO:Mild LV dilatation. EF 25-35%. Moderate MR. Cardiomegaly on CXR  BL LE pitting edema on exam, no crackles   BNP 1980  f/u repeat 2d echo     #HTN  - controlled on home medications  - c/w metoprolol 50 mg daily  - c/w lasix 40 mg qdaily  - c/w entresto 24/26 PO BID    #ESRD on HD  c/w calcium acetate 667 PO TID  Patient receives HD twice a week   f/u nephro recs     #DLD  - c/w lipitor 40 mg daily    #DMII  - Lantus 30 mg qhs    #DVT ppx: Heparin subq   #GI: Not indicated  #Diet: DASH  #Activity: AAT   #Dispo: Acute  Full Code    Pending: Cath today

## 2021-03-17 NOTE — PROGRESS NOTE ADULT - SUBJECTIVE AND OBJECTIVE BOX
NINFA URBAN  80y Male    CHIEF COMPLAINT:    Patient is a 80y old  Male who presents with a chief complaint of Chest Pain (16 Mar 2021 18:00)      INTERVAL HPI/OVERNIGHT EVENTS:    Patient seen and examined.    ROS: All other systems are negative.    Vital Signs:    T(F): 97.6 (03-17-21 @ 05:00), Max: 98.8 (03-16-21 @ 15:02)  HR: 85 (03-17-21 @ 05:00) (68 - 87)  BP: 142/81 (03-17-21 @ 05:00) (121/73 - 174/79)  RR: 18 (03-17-21 @ 05:00) (18 - 18)  SpO2: 100% (03-17-21 @ 05:00) (98% - 100%)  I&O's Summary    16 Mar 2021 07:01  -  17 Mar 2021 07:00  --------------------------------------------------------  IN: 100 mL / OUT: 0 mL / NET: 100 mL      Daily Height in cm: 175.26 (16 Mar 2021 08:59)    Daily   CAPILLARY BLOOD GLUCOSE      POCT Blood Glucose.: 133 mg/dL (16 Mar 2021 21:48)  POCT Blood Glucose.: 263 mg/dL (16 Mar 2021 17:03)      PHYSICAL EXAM:    GENERAL:  NAD  SKIN: No rashes or lesions  HENT: Atrumatic. Normocephalic. PERRL. Moist membranes.  NECK: Supple, No JVD. No lymphadenopathy.  PULMONARY: CTA B/L. No wheezing. No rales  CVS: Normal S1, S2. Rate and Rythm are regular. No murmurs.  ABDOMEN/GI: Soft, Nontender, Nondistended; BS present  EXTREMITIES: Peripheral pulses intact. No edema B/L LE.  NEUROLOGIC:  No motor or sensory deficit.  PSYCH: Alert & oriented x 3    Consultant(s) Notes Reviewed:  [x ] YES  [ ] NO  Care Discussed with Consultants/Other Providers [ x] YES  [ ] NO    EKG reviewed  Telemetry reviewed    LABS:                        10.5   9.11  )-----------( 217      ( 16 Mar 2021 10:00 )             32.0     03-16    138  |  99  |  75<HH>  ----------------------------<  213<H>  4.9   |  22  |  5.4<HH>    Ca    9.4      16 Mar 2021 10:00    TPro  6.6  /  Alb  4.5  /  TBili  0.3  /  DBili  x   /  AST  14  /  ALT  7   /  AlkPhos  83  03-16    PT/INR - ( 16 Mar 2021 10:00 )   PT: 11.70 sec;   INR: 1.02 ratio         PTT - ( 16 Mar 2021 10:00 )  PTT:34.7 sec  Serum Pro-Brain Natriuretic Peptide: 1980 pg/mL (03-16-21 @ 10:00)    Trop 0.29, CKMB --, CK --, 03-16-21 @ 20:06  Trop 0.29, CKMB --, CK --, 03-16-21 @ 13:41  Trop 0.31, CKMB --, CK --, 03-16-21 @ 10:00        RADIOLOGY & ADDITIONAL TESTS:      Imaging or report Personally Reviewed:  [ ] YES  [ ] NO    Medications:  Standing  aspirin enteric coated 81 milliGRAM(s) Oral daily  atorvastatin 40 milliGRAM(s) Oral at bedtime  calcium acetate 667 milliGRAM(s) Oral three times a day with meals  dextrose 40% Gel 15 Gram(s) Oral once  dextrose 5%. 1000 milliLiter(s) IV Continuous <Continuous>  dextrose 5%. 1000 milliLiter(s) IV Continuous <Continuous>  dextrose 50% Injectable 25 Gram(s) IV Push once  dextrose 50% Injectable 12.5 Gram(s) IV Push once  dextrose 50% Injectable 25 Gram(s) IV Push once  furosemide    Tablet 40 milliGRAM(s) Oral daily  glucagon  Injectable 1 milliGRAM(s) IntraMuscular once  insulin glargine Injectable (LANTUS) 30 Unit(s) SubCutaneous at bedtime  insulin lispro (ADMELOG) corrective regimen sliding scale   SubCutaneous three times a day before meals  metoprolol succinate ER 50 milliGRAM(s) Oral daily  sacubitril 24 mG/valsartan 26 mG 1 Tablet(s) Oral two times a day    PRN Meds      Case discussed with resident    Care discussed with pt/family           NINFA URBAN  80y Male    CHIEF COMPLAINT:    Patient is a 80y old  Male who presents with a chief complaint of Chest Pain (16 Mar 2021 18:00)      INTERVAL HPI/OVERNIGHT EVENTS:    Patient seen and examined. Pt states that he had two episodes of cp associated with nausea since Friday. No pain today. No sob.     ROS: All other systems are negative.    Vital Signs:    T(F): 97.6 (03-17-21 @ 05:00), Max: 98.8 (03-16-21 @ 15:02)  HR: 85 (03-17-21 @ 05:00) (68 - 87)  BP: 142/81 (03-17-21 @ 05:00) (121/73 - 174/79)  RR: 18 (03-17-21 @ 05:00) (18 - 18)  SpO2: 100% (03-17-21 @ 05:00) (98% - 100%)  I&O's Summary    16 Mar 2021 07:01  -  17 Mar 2021 07:00  --------------------------------------------------------  IN: 100 mL / OUT: 0 mL / NET: 100 mL      Daily Height in cm: 175.26 (16 Mar 2021 08:59)    Daily   CAPILLARY BLOOD GLUCOSE      POCT Blood Glucose.: 133 mg/dL (16 Mar 2021 21:48)  POCT Blood Glucose.: 263 mg/dL (16 Mar 2021 17:03)      PHYSICAL EXAM:    GENERAL:  NAD  SKIN: No rashes or lesions  HENT: Atraumatic Normocephalic. PERRL. Moist membranes.  NECK: Supple, No JVD. No lymphadenopathy.  PULMONARY: CTA B/L. No wheezing. No rales  CVS: Normal S1, S2. Rate and Rhythm are regular. No murmurs.  ABDOMEN/GI: Soft, Nontender, Nondistended; BS present  EXTREMITIES: Peripheral pulses intact. No edema B/L LE.  NEUROLOGIC:  No motor or sensory deficit.  PSYCH: Alert & oriented x 3    Consultant(s) Notes Reviewed:  [x ] YES  [ ] NO  Care Discussed with Consultants/Other Providers [ x] YES  [ ] NO    EKG reviewed  Telemetry reviewed    LABS:                        10.5   9.11  )-----------( 217      ( 16 Mar 2021 10:00 )             32.0     03-16    138  |  99  |  75<HH>  ----------------------------<  213<H>  4.9   |  22  |  5.4<HH>    Ca    9.4      16 Mar 2021 10:00    TPro  6.6  /  Alb  4.5  /  TBili  0.3  /  DBili  x   /  AST  14  /  ALT  7   /  AlkPhos  83  03-16    PT/INR - ( 16 Mar 2021 10:00 )   PT: 11.70 sec;   INR: 1.02 ratio         PTT - ( 16 Mar 2021 10:00 )  PTT:34.7 sec  Serum Pro-Brain Natriuretic Peptide: 1980 pg/mL (03-16-21 @ 10:00)    Trop 0.29, CKMB --, CK --, 03-16-21 @ 20:06  Trop 0.29, CKMB --, CK --, 03-16-21 @ 13:41  Trop 0.31, CKMB --, CK --, 03-16-21 @ 10:00        RADIOLOGY & ADDITIONAL TESTS:      Imaging or report Personally Reviewed:  [ ] YES  [ ] NO    Medications:  Standing  aspirin enteric coated 81 milliGRAM(s) Oral daily  atorvastatin 40 milliGRAM(s) Oral at bedtime  calcium acetate 667 milliGRAM(s) Oral three times a day with meals  dextrose 40% Gel 15 Gram(s) Oral once  dextrose 5%. 1000 milliLiter(s) IV Continuous <Continuous>  dextrose 5%. 1000 milliLiter(s) IV Continuous <Continuous>  dextrose 50% Injectable 25 Gram(s) IV Push once  dextrose 50% Injectable 12.5 Gram(s) IV Push once  dextrose 50% Injectable 25 Gram(s) IV Push once  furosemide    Tablet 40 milliGRAM(s) Oral daily  glucagon  Injectable 1 milliGRAM(s) IntraMuscular once  insulin glargine Injectable (LANTUS) 30 Unit(s) SubCutaneous at bedtime  insulin lispro (ADMELOG) corrective regimen sliding scale   SubCutaneous three times a day before meals  metoprolol succinate ER 50 milliGRAM(s) Oral daily  sacubitril 24 mG/valsartan 26 mG 1 Tablet(s) Oral two times a day    PRN Meds      Case discussed with resident    Care discussed with pt/family

## 2021-03-17 NOTE — CONSULT NOTE ADULT - SUBJECTIVE AND OBJECTIVE BOX
Outpt cardiologist:  Dr. Nance    HPI:  79 yo M  PMH  CAD s/p CABG s/p PCI (bypass 5y ago), ICM / HFrEF sp AICD , HTN, DM,  ESRD on HD. former smoker    Presented with 2 episodes of exertional angina over past 2 days.     ,  pt walking 20 to 30 ft, felt sob,  nauseous - resolved spontaneously.    Monday, pt was doign demo work, felt cp again,  a/w sob,  nauseous,  lasted 5 min, resolved with rest after several minutes.  Called office monday was told to come in to hospital, but pt was asymptomatic later on,  so decided to come in today instead.     Pains felt similar to prior MI 'elephant on chest' like when occurring    Currently Now with pleuritc pains,  upon deep breath.          PAST MEDICAL & SURGICAL HISTORY  Mitral valve regurgitation    CHF (congestive heart failure)    BPH (Benign Prostatic Hyperplasia)    GERD (gastroesophageal reflux disease)    Sleep apnea  does not use machine    Renal insufficiency    HTN - Hypertension    H/O hyperlipidemia    CAD (coronary artery disease)    Diabetes mellitus    History of PTCA  with stents 10 years ago ([facility] [place])    CAD (Coronary Artery Disease)    Hypercholesterolemia    Diabetes Mellitus Type II    HTN (Hypertension)    S/P appendectomy    S/P primary angioplasty with coronary stent  6-7 stents last one in 10/12    S/P tonsillectomy    S/P knee surgery  b/l arthroscopic        FAMILY HISTORY:  FAMILY HISTORY:  No pertinent family history in first degree relatives        SOCIAL HISTORY:  Social History:  former smoker    ALLERGIES:  No Known Allergies      MEDICATIONS:    PRN:      HOME MEDICATIONS:  Home Medications:  calcium acetate 667 mg oral tablet: 1 tab(s) orally 3 times a day (2021 15:52)  Entresto 24 mg-26 mg oral tablet: 1 tab(s) orally 2 times a day (2021 15:52)  Lantus 100 units/mL subcutaneous solution: 30 unit(s) subcutaneous once a day (at bedtime) (2021 15:52)  Lasix 40 mg oral tablet: 1 cap(s) orally 2 times a day (2021 15:52)  Lipitor 40 mg oral tablet: 1 tab(s) orally once a day (at bedtime) (2021 15:52)  metoprolol succinate 50 mg oral tablet, extended release: 1 tab(s) orally once a day (2021 15:52)  Vitamin D3 50,000 intl units (1250 mcg) oral capsule: 1 cap(s) orally once a week (2021 15:52)      VITALS:   T(F): 96.7 ( @ 08:59), Max: 96.7 ( @ 08:59)  HR: 78 ( @ 08:59) (78 - 78)  BP: 174/79 ( @ 08:59) (174/79 - 174/79)  BP(mean): --  RR: 18 ( @ 08:59) (18 - 18)  SpO2: 98% ( @ 08:59) (98% - 98%)    I&O's Summary      REVIEW OF SYSTEMS:  CONSTITUTIONAL: No weakness, fevers or chills  HEENT: No visual changes, neck/ear pain  RESPIRATORY: No cough, sob  CARDIOVASCULAR: See HPI  GASTROINTESTINAL: No abdominal pain. No nausea, vomiting, diarrhea   GENITOURINARY: No dysuria, frequency or hematuria  NEUROLOGICAL: No new focal deficits  SKIN: No new rashes    PHYSICAL EXAM:  General: Not in distress.  Non-toxic appearing.   HEENT: EOMI  Cardio: regular, S1, S2, harsh sys murmur  Pulm: B/L BS.  No wheezing / crackles / rales  Abdomen: Soft, non-tender, non-distended. Normoactive bowel sounds  Extremities: minimal to no pitting edema b/l.    Neuro: A&O x3. No focal deficits    LABS:                        10.5   9.11  )-----------( 217      ( 16 Mar 2021 10:00 )             32.0     03-    138  |  99  |  75<HH>  ----------------------------<  213<H>  4.9   |  22  |  5.4<HH>    Ca    9.4      16 Mar 2021 10:00    TPro  6.6  /  Alb  4.5  /  TBili  0.3  /  DBili  x   /  AST  14  /  ALT  7   /  AlkPhos  83  03-16    PT/INR - ( 16 Mar 2021 10:00 )   PT: 11.70 sec;   INR: 1.02 ratio         PTT - ( 16 Mar 2021 10:00 )  PTT:34.7 sec  Troponin T, Serum: 0.31 ng/mL <HH> (21 @ 10:00)    CARDIAC MARKERS ( 16 Mar 2021 10:00 )  x     / 0.31 ng/mL / x     / x     / x            Troponin trend:    Serum Pro-Brain Natriuretic Peptide: 1980 pg/mL (21 @ 10:00)      COVID-19 PCR: NotDetec (2021 11:30)  SARS-CoV-2: NotDetec (2021 15:38)  COVID-19 PCR: Detected (2021 09:29)      RADIOLOGY:  Ekg:    rbbb,  std inferior leads. More than priors  Echo:    18 ECHO:Mild LV dilatation. EF 25-35%. Moderate MR.    Stress:      Cath:    :  lm minor. Lad o25. Lcx o40. Rca patent mid stents. Rpda 80, rpl 90.   ----  Labs:    trop .31 (prev ~.07 ).  BNP 1980;  hg 10.5 (baselien ~12)  Rad:    cxr no effusions.   EC Lead ECG:   Ventricular Rate 78 BPM    Atrial Rate 78 BPM    P-R Interval 182 ms    QRS Duration 138 ms    Q-T Interval 428 ms    QTC Calculation(Bazett) 487 ms    P Axis 61 degrees    R Axis 214 degrees    T Axis 28 degrees    Diagnosis Line Normal sinus rhythm  Right bundle branch block  Abnormal ECG    Confirmed by SHAHAB THOMPSON MD ([id number]) on 3/16/2021 10:48:38 AM ( @ 09:01)      TELEMETRY EVENTS:
NEPHROLOGY CONSULTATION NOTE    80 year old F with a PMHx  ESRD on HD, DLD, CAD s/p CABG and s/p PCI, Ischemic Cardiomyopathy s/p AICD, , HTN, and DM, COVID PNA presents to ED with a two day history of chest pain. Patient was in his usual state of health on Sunday night, when he was doing work on his house when he had a sudden onset of chest pain.   Patient walked 20-30 feet and felt nauseous while the pain occurred on 3/14/2021.  On 3/15/2021, patient was doing house work when patient felt chest pain again, which relieved after 5 min of rest. Patient endorses a pressure like substernal chest pain that is pressure like in sensation and is moderate in severity. It occurs intermittently in frequency, and worsens with exertion  and relieves with rest.  It does not worsen with inspiration.  Today he did not have an episode of chest pain, but did avinash cardiologist who advised him to go to hospital. He also complains of SOB on exertion which relieves with rest. Patient denies fever, rigors, abdominal pain, n/v/d.       PAST MEDICAL & SURGICAL HISTORY:  Mitral valve regurgitation    CHF (congestive heart failure)    BPH (Benign Prostatic Hyperplasia)    GERD (gastroesophageal reflux disease)    Sleep apnea  does not use machine    Renal insufficiency    HTN - Hypertension    H/O hyperlipidemia    CAD (coronary artery disease)    Diabetes mellitus    History of PTCA  with stents 10 years ago ([facility])    CAD (Coronary Artery Disease)    Hypercholesterolemia    Diabetes Mellitus Type II    HTN (Hypertension)    S/P appendectomy    S/P primary angioplasty with coronary stent  6-7 stents last one in 10/12    S/P tonsillectomy    S/P knee surgery  b/l arthroscopic      Allergies:  No Known Allergies    Home Medications Reviewed    SOCIAL HISTORY:  Denies ETOH,Smoking,   FAMILY HISTORY:  No pertinent family history in first degree relatives          REVIEW OF SYSTEMS:  CONSTITUTIONAL: + weakness, fevers or chills  EYES/ENT: No visual changes;  No vertigo or throat pain   NECK: No pain or stiffness  RESPIRATORY: No cough, wheezing, hemoptysis; No shortness of breath  CARDIOVASCULAR: No chest pain or palpitations.  GASTROINTESTINAL: No abdominal or epigastric pain. No nausea, vomiting, or hematemesis; No diarrhea or constipation. No melena or hematochezia.  GENITOURINARY: No dysuria, frequency, foamy urine, urinary urgency, incontinence or hematuria  NEUROLOGICAL: No numbness or weakness  SKIN: No itching, burning, rashes, or lesions   VASCULAR: No bilateral lower extremity edema.   All other review of systems is negative unless indicated above.    PHYSICAL EXAM:  Constitutional: NAD  HEENT: anicteric sclera, oropharynx clear, MMM    Neck: No JVD  Respiratory: CTAB, no wheezes, rales or rhonchi  Cardiovascular: S1, S2, RRR  Gastrointestinal: BS+, soft, NT/ND  Extremities: No cyanosis or clubbing. No peripheral edema  Neurological: A/O x 3, no focal deficits  Psychiatric: Normal mood, normal affect  : No CVA tenderness   Skin: No rashes  Vascular Access: left arm AVF + thrill + bruit      Hospital Medications:   MEDICATIONS  (STANDING):  aspirin enteric coated 81 milliGRAM(s) Oral daily  atorvastatin 40 milliGRAM(s) Oral at bedtime  calcium acetate 667 milliGRAM(s) Oral three times a day with meals  dextrose 40% Gel 15 Gram(s) Oral once  dextrose 5%. 1000 milliLiter(s) (50 mL/Hr) IV Continuous <Continuous>  dextrose 5%. 1000 milliLiter(s) (100 mL/Hr) IV Continuous <Continuous>  dextrose 50% Injectable 25 Gram(s) IV Push once  dextrose 50% Injectable 12.5 Gram(s) IV Push once  dextrose 50% Injectable 25 Gram(s) IV Push once  furosemide    Tablet 40 milliGRAM(s) Oral daily  glucagon  Injectable 1 milliGRAM(s) IntraMuscular once  insulin glargine Injectable (LANTUS) 30 Unit(s) SubCutaneous at bedtime  insulin lispro (ADMELOG) corrective regimen sliding scale   SubCutaneous three times a day before meals  metoprolol succinate ER 50 milliGRAM(s) Oral daily  sacubitril 24 mG/valsartan 26 mG 1 Tablet(s) Oral two times a day        VITALS:  T(F): 97.6 (03-17-21 @ 05:00), Max: 98.8 (03-16-21 @ 15:02)  HR: 68 (03-17-21 @ 08:40)  BP: 135/78 (03-17-21 @ 08:40)  RR: 17 (03-17-21 @ 08:40)  SpO2: 99% (03-17-21 @ 07:54)  Wt(kg): --    03-16 @ 07:01  -  03-17 @ 07:00  --------------------------------------------------------  IN: 100 mL / OUT: 0 mL / NET: 100 mL          LABS:  03-17    139  |  103  |  78<HH>  ----------------------------<  101<H>  4.6   |  21  |  4.7<HH>    Ca    9.1      17 Mar 2021 07:15  Phos  4.6     03-17  Mg     2.3     03-17    TPro  6.2  /  Alb  4.1  /  TBili  0.2  /  DBili      /  AST  12  /  ALT  6   /  AlkPhos  52  03-17                          9.8    8.45  )-----------( 201      ( 17 Mar 2021 07:15 )             31.0       Urine Studies:        RADIOLOGY & ADDITIONAL STUDIES:

## 2021-03-17 NOTE — PROGRESS NOTE ADULT - ASSESSMENT
80 year old F with a PMHx  ESRD on HD, DLD, CAD s/p CABG and s/p PCI, Ischemic Cardiomyopathy s/p AICD, , HTN, and DM, COVID PNA presents to ED with a two day history of chest pain.     NSTEMI  CAD S/P CABG  Ischemic CM  HTN / DL 80 year old F with a PMHx  ESRD on HD, DLD, CAD s/p CABG and s/p PCI, Ischemic Cardiomyopathy s/p AICD, , HTN, and DM, COVID PNA presents to ED with a two day history of chest pain.     NSTEMI  CAD S/P CABG  Ischemic CM  ESRD on HD  DM-2 / HTN / DL  H/O COVID infection           PLAN:    ·	Cont tele  ·	Scheduled for cath today  ·	Cont NPO for now.   ·	Cont ASA 81 mg po daily and Lipitor 40 mg po qhs  ·	Triglyceride is 349 and LDL is 96  ·	ECHO  ·	Cardiology eval  ·	Monitor FS. Cont Insulin and his other meds.

## 2021-03-18 LAB
ALBUMIN SERPL ELPH-MCNC: 4.1 G/DL — SIGNIFICANT CHANGE UP (ref 3.5–5.2)
ALP SERPL-CCNC: 50 U/L — SIGNIFICANT CHANGE UP (ref 30–115)
ALT FLD-CCNC: 6 U/L — SIGNIFICANT CHANGE UP (ref 0–41)
ANION GAP SERPL CALC-SCNC: 10 MMOL/L — SIGNIFICANT CHANGE UP (ref 7–14)
AST SERPL-CCNC: 12 U/L — SIGNIFICANT CHANGE UP (ref 0–41)
BASOPHILS # BLD AUTO: 0.02 K/UL — SIGNIFICANT CHANGE UP (ref 0–0.2)
BASOPHILS NFR BLD AUTO: 0.2 % — SIGNIFICANT CHANGE UP (ref 0–1)
BILIRUB SERPL-MCNC: 0.3 MG/DL — SIGNIFICANT CHANGE UP (ref 0.2–1.2)
BUN SERPL-MCNC: 49 MG/DL — HIGH (ref 10–20)
CALCIUM SERPL-MCNC: 9 MG/DL — SIGNIFICANT CHANGE UP (ref 8.5–10.1)
CHLORIDE SERPL-SCNC: 102 MMOL/L — SIGNIFICANT CHANGE UP (ref 98–110)
CO2 SERPL-SCNC: 25 MMOL/L — SIGNIFICANT CHANGE UP (ref 17–32)
CREAT SERPL-MCNC: 4 MG/DL — HIGH (ref 0.7–1.5)
EOSINOPHIL # BLD AUTO: 0.36 K/UL — SIGNIFICANT CHANGE UP (ref 0–0.7)
EOSINOPHIL NFR BLD AUTO: 3.9 % — SIGNIFICANT CHANGE UP (ref 0–8)
GLUCOSE BLDC GLUCOMTR-MCNC: 110 MG/DL — HIGH (ref 70–99)
GLUCOSE BLDC GLUCOMTR-MCNC: 122 MG/DL — HIGH (ref 70–99)
GLUCOSE BLDC GLUCOMTR-MCNC: 196 MG/DL — HIGH (ref 70–99)
GLUCOSE BLDC GLUCOMTR-MCNC: 244 MG/DL — HIGH (ref 70–99)
GLUCOSE SERPL-MCNC: 113 MG/DL — HIGH (ref 70–99)
HCT VFR BLD CALC: 32.6 % — LOW (ref 42–52)
HGB BLD-MCNC: 10.7 G/DL — LOW (ref 14–18)
IMM GRANULOCYTES NFR BLD AUTO: 0.7 % — HIGH (ref 0.1–0.3)
LYMPHOCYTES # BLD AUTO: 2.07 K/UL — SIGNIFICANT CHANGE UP (ref 1.2–3.4)
LYMPHOCYTES # BLD AUTO: 22.4 % — SIGNIFICANT CHANGE UP (ref 20.5–51.1)
MAGNESIUM SERPL-MCNC: 2.1 MG/DL — SIGNIFICANT CHANGE UP (ref 1.8–2.4)
MCHC RBC-ENTMCNC: 29.7 PG — SIGNIFICANT CHANGE UP (ref 27–31)
MCHC RBC-ENTMCNC: 32.8 G/DL — SIGNIFICANT CHANGE UP (ref 32–37)
MCV RBC AUTO: 90.6 FL — SIGNIFICANT CHANGE UP (ref 80–94)
MONOCYTES # BLD AUTO: 1.17 K/UL — HIGH (ref 0.1–0.6)
MONOCYTES NFR BLD AUTO: 12.7 % — HIGH (ref 1.7–9.3)
NEUTROPHILS # BLD AUTO: 5.55 K/UL — SIGNIFICANT CHANGE UP (ref 1.4–6.5)
NEUTROPHILS NFR BLD AUTO: 60.1 % — SIGNIFICANT CHANGE UP (ref 42.2–75.2)
NRBC # BLD: 0 /100 WBCS — SIGNIFICANT CHANGE UP (ref 0–0)
PLATELET # BLD AUTO: 200 K/UL — SIGNIFICANT CHANGE UP (ref 130–400)
POTASSIUM SERPL-MCNC: 4.2 MMOL/L — SIGNIFICANT CHANGE UP (ref 3.5–5)
POTASSIUM SERPL-SCNC: 4.2 MMOL/L — SIGNIFICANT CHANGE UP (ref 3.5–5)
PROT SERPL-MCNC: 6.2 G/DL — SIGNIFICANT CHANGE UP (ref 6–8)
RBC # BLD: 3.6 M/UL — LOW (ref 4.7–6.1)
RBC # FLD: 15.3 % — HIGH (ref 11.5–14.5)
SODIUM SERPL-SCNC: 137 MMOL/L — SIGNIFICANT CHANGE UP (ref 135–146)
WBC # BLD: 9.23 K/UL — SIGNIFICANT CHANGE UP (ref 4.8–10.8)
WBC # FLD AUTO: 9.23 K/UL — SIGNIFICANT CHANGE UP (ref 4.8–10.8)

## 2021-03-18 PROCEDURE — 93018 CV STRESS TEST I&R ONLY: CPT

## 2021-03-18 PROCEDURE — 78452 HT MUSCLE IMAGE SPECT MULT: CPT | Mod: 26

## 2021-03-18 PROCEDURE — 99233 SBSQ HOSP IP/OBS HIGH 50: CPT

## 2021-03-18 PROCEDURE — 93016 CV STRESS TEST SUPVJ ONLY: CPT

## 2021-03-18 RX ORDER — ACETAMINOPHEN 500 MG
650 TABLET ORAL EVERY 6 HOURS
Refills: 0 | Status: DISCONTINUED | OUTPATIENT
Start: 2021-03-18 | End: 2021-03-19

## 2021-03-18 RX ORDER — HEPARIN SODIUM 5000 [USP'U]/ML
5000 INJECTION INTRAVENOUS; SUBCUTANEOUS EVERY 12 HOURS
Refills: 0 | Status: DISCONTINUED | OUTPATIENT
Start: 2021-03-18 | End: 2021-03-19

## 2021-03-18 RX ORDER — REGADENOSON 0.08 MG/ML
0.4 INJECTION, SOLUTION INTRAVENOUS ONCE
Refills: 0 | Status: COMPLETED | OUTPATIENT
Start: 2021-03-18 | End: 2021-03-18

## 2021-03-18 RX ADMIN — INSULIN GLARGINE 30 UNIT(S): 100 INJECTION, SOLUTION SUBCUTANEOUS at 21:36

## 2021-03-18 RX ADMIN — SACUBITRIL AND VALSARTAN 1 TABLET(S): 24; 26 TABLET, FILM COATED ORAL at 21:36

## 2021-03-18 RX ADMIN — Medication 4: at 17:48

## 2021-03-18 RX ADMIN — REGADENOSON 0.4 MILLIGRAM(S): 0.08 INJECTION, SOLUTION INTRAVENOUS at 15:02

## 2021-03-18 RX ADMIN — Medication 667 MILLIGRAM(S): at 17:52

## 2021-03-18 RX ADMIN — Medication 81 MILLIGRAM(S): at 11:52

## 2021-03-18 RX ADMIN — Medication 50 MILLIGRAM(S): at 06:04

## 2021-03-18 RX ADMIN — Medication 40 MILLIGRAM(S): at 06:04

## 2021-03-18 RX ADMIN — Medication 650 MILLIGRAM(S): at 11:51

## 2021-03-18 RX ADMIN — HEPARIN SODIUM 5000 UNIT(S): 5000 INJECTION INTRAVENOUS; SUBCUTANEOUS at 17:50

## 2021-03-18 RX ADMIN — SACUBITRIL AND VALSARTAN 1 TABLET(S): 24; 26 TABLET, FILM COATED ORAL at 06:04

## 2021-03-18 RX ADMIN — Medication 650 MILLIGRAM(S): at 09:25

## 2021-03-18 RX ADMIN — ATORVASTATIN CALCIUM 40 MILLIGRAM(S): 80 TABLET, FILM COATED ORAL at 21:36

## 2021-03-18 NOTE — PROGRESS NOTE ADULT - SUBJECTIVE AND OBJECTIVE BOX
NINFA URBAN  80y Male    CHIEF COMPLAINT:    Patient is a 80y old  Male who presents with a chief complaint of Chest Pain (18 Mar 2021 10:45)      INTERVAL HPI/OVERNIGHT EVENTS:    Patient seen and examined. No cp. No sob. Scheduled for stress test today    ROS: All other systems are negative.    Vital Signs:    T(F): 98 (03-17-21 @ 20:45), Max: 98 (03-17-21 @ 20:45)  HR: 76 (03-18-21 @ 06:08) (76 - 79)  BP: 112/64 (03-18-21 @ 06:08) (112/64 - 140/68)  RR: 16 (03-18-21 @ 06:08) (16 - 16)  SpO2: --  I&O's Summary    17 Mar 2021 07:01  -  18 Mar 2021 07:00  --------------------------------------------------------  IN: 0 mL / OUT: 1310 mL / NET: -1310 mL      Daily     Daily   CAPILLARY BLOOD GLUCOSE      POCT Blood Glucose.: 110 mg/dL (18 Mar 2021 11:50)  POCT Blood Glucose.: 122 mg/dL (18 Mar 2021 08:09)  POCT Blood Glucose.: 244 mg/dL (17 Mar 2021 21:20)  POCT Blood Glucose.: 80 mg/dL (17 Mar 2021 16:16)  POCT Blood Glucose.: 102 mg/dL (17 Mar 2021 13:36)      PHYSICAL EXAM:    GENERAL:  NAD  SKIN: No rashes or lesions  HENT: Atraumatic Normocephalic. PERRL. Moist membranes.  NECK: Supple, No JVD. No lymphadenopathy.  PULMONARY: CTA B/L. No wheezing. No rales  CVS: Normal S1, S2. Rate and Rhythm are regular. No murmurs.  ABDOMEN/GI: Soft, Nontender, Nondistended; BS present  EXTREMITIES: Peripheral pulses intact. No edema B/L LE.  NEUROLOGIC:  No motor or sensory deficit.  PSYCH: Alert & oriented x 3    Consultant(s) Notes Reviewed:  [x ] YES  [ ] NO  Care Discussed with Consultants/Other Providers [ x] YES  [ ] NO    EKG reviewed  Telemetry reviewed    LABS:                        10.7   9.23  )-----------( 200      ( 18 Mar 2021 06:59 )             32.6     03-18    137  |  102  |  49<H>  ----------------------------<  113<H>  4.2   |  25  |  4.0<H>    Ca    9.0      18 Mar 2021 06:59  Phos  4.6     03-17  Mg     2.1     03-18    TPro  6.2  /  Alb  4.1  /  TBili  0.3  /  DBili  x   /  AST  12  /  ALT  6   /  AlkPhos  50  03-18      Serum Pro-Brain Natriuretic Peptide: 1980 pg/mL (03-16-21 @ 10:00)    Trop 0.31, CKMB --, CK --, 03-17-21 @ 07:15  Trop 0.29, CKMB --, CK --, 03-16-21 @ 20:06  Trop 0.29, CKMB --, CK --, 03-16-21 @ 13:41  Trop 0.31, CKMB --, CK --, 03-16-21 @ 10:00        RADIOLOGY & ADDITIONAL TESTS:    < from: TTE Echo Complete w/o Contrast w/ Doppler (03.17.21 @ 12:29) >    Summary:   1. LV Ejection Fraction by Wynn's Method with a biplane EF of 55 %.   2. Spectral Doppler shows impaired relaxation pattern of left ventricular myocardial filling (Grade I diastolic dysfunction).   3. Normal leftatrial size.   4. Normal right atrial size.   5. Mitral annular calcification.   6. Moderate mitral valve regurgitation.   7. Thickening and calcification of the anterior and posterior mitral valve leaflets.   8. Mild tricuspid regurgitation.   9. Aortic valve thickening with decreased leaflet opening.  10. Mild aortic regurgitation.  11. Peak transaortic gradient equals 37.7 mmHg, mean transaortic gradient equals 22.1 mmHg, the calculated aortic valve area equals 1.35 cm² by the continuity equation consistent with moderate aortic stenosis.    < end of copied text >    Imaging or report Personally Reviewed:  [ ] YES  [ ] NO    Medications:  Standing  aspirin enteric coated 81 milliGRAM(s) Oral daily  atorvastatin 40 milliGRAM(s) Oral at bedtime  calcium acetate 667 milliGRAM(s) Oral three times a day with meals  dextrose 40% Gel 15 Gram(s) Oral once  dextrose 5%. 1000 milliLiter(s) IV Continuous <Continuous>  dextrose 5%. 1000 milliLiter(s) IV Continuous <Continuous>  dextrose 50% Injectable 25 Gram(s) IV Push once  dextrose 50% Injectable 12.5 Gram(s) IV Push once  dextrose 50% Injectable 25 Gram(s) IV Push once  furosemide    Tablet 40 milliGRAM(s) Oral daily  glucagon  Injectable 1 milliGRAM(s) IntraMuscular once  heparin   Injectable 5000 Unit(s) SubCutaneous every 12 hours  insulin glargine Injectable (LANTUS) 30 Unit(s) SubCutaneous at bedtime  insulin lispro (ADMELOG) corrective regimen sliding scale   SubCutaneous three times a day before meals  metoprolol succinate ER 50 milliGRAM(s) Oral daily  regadenoson Injectable 0.4 milliGRAM(s) IV Push once  sacubitril 24 mG/valsartan 26 mG 1 Tablet(s) Oral two times a day    PRN Meds  acetaminophen   Tablet .. 650 milliGRAM(s) Oral every 6 hours PRN      Case discussed with resident    Care discussed with pt/family

## 2021-03-18 NOTE — PROGRESS NOTE ADULT - ASSESSMENT
ESRD on HD 2x /week (Tue/Thu) @ [facility]   Last HD Thursday last week  pt nonoliguric with residual renal function  ACS / NSTEMI  recent COVID-19 PNA   CAD / CABG / CMP / CHF  HTN  DM2  anemia    plan:    next HD Fri or Sat  await stress test  renal diet   phoslo with meals  left arm precautions   may cont entresto  full code  d/w team

## 2021-03-18 NOTE — PROGRESS NOTE ADULT - SUBJECTIVE AND OBJECTIVE BOX
NEPHROLOGY FOLLOW UP NOTE    pt seen and examined  HD yesterday  no cath done  awaiting stress test  no cp / sob      PAST MEDICAL & SURGICAL HISTORY:  Mitral valve regurgitation    CHF (congestive heart failure)    BPH (Benign Prostatic Hyperplasia)    GERD (gastroesophageal reflux disease)    Sleep apnea  does not use machine    Renal insufficiency    HTN - Hypertension    H/O hyperlipidemia    CAD (coronary artery disease)    Diabetes mellitus    History of PTCA  with stents 10 years ago ([facility])    CAD (Coronary Artery Disease)    Hypercholesterolemia    Diabetes Mellitus Type II    HTN (Hypertension)    S/P appendectomy    S/P primary angioplasty with coronary stent  6-7 stents last one in 10/12    S/P tonsillectomy    S/P knee surgery  b/l arthroscopic      Allergies:  No Known Allergies    Home Medications Reviewed    SOCIAL HISTORY:  Denies ETOH,Smoking,   FAMILY HISTORY:  No pertinent family history in first degree relatives          REVIEW OF SYSTEMS:  CONSTITUTIONAL: + weakness, fevers or chills  EYES/ENT: No visual changes;  No vertigo or throat pain   NECK: No pain or stiffness  RESPIRATORY: No cough, wheezing, hemoptysis; No shortness of breath  CARDIOVASCULAR: No chest pain or palpitations.  GASTROINTESTINAL: No abdominal or epigastric pain. No nausea, vomiting, or hematemesis; No diarrhea or constipation. No melena or hematochezia.  GENITOURINARY: No dysuria, frequency, foamy urine, urinary urgency, incontinence or hematuria  NEUROLOGICAL: No numbness or weakness  SKIN: No itching, burning, rashes, or lesions   VASCULAR: No bilateral lower extremity edema.   All other review of systems is negative unless indicated above.    advance care planning and advance care directives reviewed     PHYSICAL EXAM:  Constitutional: NAD  HEENT: anicteric sclera, oropharynx clear, MMM    Neck: No JVD  Respiratory: CTAB, no wheezes, rales or rhonchi  Cardiovascular: S1, S2, RRR  Gastrointestinal: BS+, soft, NT/ND  Extremities: No cyanosis or clubbing. No peripheral edema  Neurological: A/O x 3, no focal deficits  Psychiatric: Normal mood, normal affect  : No CVA tenderness   Skin: No rashes  Vascular Access: left arm AVF + thrill + bruit    Hospital Medications:   MEDICATIONS  (STANDING):  aspirin enteric coated 81 milliGRAM(s) Oral daily  atorvastatin 40 milliGRAM(s) Oral at bedtime  calcium acetate 667 milliGRAM(s) Oral three times a day with meals  dextrose 40% Gel 15 Gram(s) Oral once  dextrose 5%. 1000 milliLiter(s) (50 mL/Hr) IV Continuous <Continuous>  dextrose 5%. 1000 milliLiter(s) (100 mL/Hr) IV Continuous <Continuous>  dextrose 50% Injectable 25 Gram(s) IV Push once  dextrose 50% Injectable 12.5 Gram(s) IV Push once  dextrose 50% Injectable 25 Gram(s) IV Push once  furosemide    Tablet 40 milliGRAM(s) Oral daily  glucagon  Injectable 1 milliGRAM(s) IntraMuscular once  heparin   Injectable 5000 Unit(s) SubCutaneous every 12 hours  insulin glargine Injectable (LANTUS) 30 Unit(s) SubCutaneous at bedtime  insulin lispro (ADMELOG) corrective regimen sliding scale   SubCutaneous three times a day before meals  metoprolol succinate ER 50 milliGRAM(s) Oral daily  sacubitril 24 mG/valsartan 26 mG 1 Tablet(s) Oral two times a day        VITALS:  T(F): 97.9 (03-18-21 @ 14:58), Max: 98 (03-17-21 @ 20:45)  HR: 78 (03-18-21 @ 14:58)  BP: 145/85 (03-18-21 @ 14:58)  RR: 18 (03-18-21 @ 14:58)  SpO2: --  Wt(kg): --    03-16 @ 07:01  -  03-17 @ 07:00  --------------------------------------------------------  IN: 100 mL / OUT: 0 mL / NET: 100 mL    03-17 @ 07:01  -  03-18 @ 07:00  --------------------------------------------------------  IN: 0 mL / OUT: 1310 mL / NET: -1310 mL          LABS:  03-18    137  |  102  |  49<H>  ----------------------------<  113<H>  4.2   |  25  |  4.0<H>    Ca    9.0      18 Mar 2021 06:59  Phos  4.6     03-17  Mg     2.1     03-18    TPro  6.2  /  Alb  4.1  /  TBili  0.3  /  DBili      /  AST  12  /  ALT  6   /  AlkPhos  50  03-18                          10.7   9.23  )-----------( 200      ( 18 Mar 2021 06:59 )             32.6       Urine Studies:        RADIOLOGY & ADDITIONAL STUDIES:

## 2021-03-18 NOTE — PROGRESS NOTE ADULT - ASSESSMENT
80 year old F with a PMHx  ESRD on HD, DLD, CAD s/p CABG and s/p PCI, Ischemic Cardiomyopathy s/p AICD, , HTN, and DM, COVID PNA presents to ED with a two day history of chest pain.     CAD S/P CABG  CP / No NSTEMI  Ischemic CM  ESRD on HD  DM-2 / HTN / DL  H/O COVID infection           PLAN:    ·	Cont tele  ·	Troponin not trending up. Likely elevated due to ESRD  ·	Care D/W the cardiology. Recommended nuclear stress test.   ·	Cont NPO for now.   ·	Cont ASA 81 mg po daily and Lipitor 40 mg po qhs  ·	Triglyceride is 349 and LDL is 96  ·	ECHO reviewed. EF is 55%. Moderate MR. Moderate AS  ·	Monitor FS. Cont Insulin and his other meds.     Progress Note Handoff    Pending (specify):  Consults_________, Tests_Nuclear stress test_______, Test Results_______, Other_________  Family discussion:  Disposition: Home___/SNF___/Other____x____/Unknown at this time________

## 2021-03-18 NOTE — PROGRESS NOTE ADULT - SUBJECTIVE AND OBJECTIVE BOX
Patient is a 80y old  Male who presents with a chief complaint of Chest Pain (17 Mar 2021 10:41)      HPI:  80 year old F with a PMHx  ESRD on HD, DLD, CAD s/p CABG and s/p PCI, Ischemic Cardiomyopathy s/p AICD, , HTN, and DM, COVID PNA presents to ED with a two day history of chest pain. Patient was in his usual state of health on Sunday night, when he was doing work on his house when he had a sudden onset of chest pain.   Patient walked 20-30 feet and felt nauseous while the pain occurred on 3/14/2021.  On 3/15/2021, patient was doing house work when patient felt chest pain again, which relieved after 5 min of rest. Patient endorses a pressure like substernal chest pain that is pressure like in sensation and is moderate in severity. It occurs intermittently in frequency, and worsens with exertion  and relieves with rest.  It does not worsen with inspiration.  Today he did not have an episode of chest pain, but did avinash cardiologist who advised him to go to hospital. He also complains of SOB on exertion which relieves with rest. Patient denies fever, rigors, abdominal pain, n/v/d.     ICU Vital Signs Last 24 Hrs  T(C): 37.1 (16 Mar 2021 15:02), Max: 37.1 (16 Mar 2021 15:02)  T(F): 98.8 (16 Mar 2021 15:02), Max: 98.8 (16 Mar 2021 15:02)  HR: 68 (16 Mar 2021 15:02) (68 - 78)  BP: 132/62 (16 Mar 2021 15:02) (132/62 - 174/79)  BP(mean): --  ABP: --  ABP(mean): --  RR: 18 (16 Mar 2021 15:02) (18 - 18)  SpO2: 100% (16 Mar 2021 15:02) (98% - 100%)    In the ED: patient was loaded with ASA 325mg PO.  EKG demonstrated RBBB w/ST depressions in the inferior leads.  CBC and CMP unremarkable.  Trop .31. Glucose 213.  (16 Mar 2021 18:00)      PAST MEDICAL & SURGICAL HISTORY:  Mitral valve regurgitation    CHF (congestive heart failure)    BPH (Benign Prostatic Hyperplasia)    GERD (gastroesophageal reflux disease)    Sleep apnea  does not use machine    Renal insufficiency    HTN - Hypertension    H/O hyperlipidemia    CAD (coronary artery disease)    Diabetes mellitus    History of PTCA  with stents 10 years ago ([facility])    CAD (Coronary Artery Disease)    Hypercholesterolemia    Diabetes Mellitus Type II    HTN (Hypertension)    S/P appendectomy    S/P primary angioplasty with coronary stent  6-7 stents last one in 10/12    S/P tonsillectomy    S/P knee surgery  b/l arthroscopic        Home Medications:  calcium acetate 667 mg oral tablet: 1 tab(s) orally 3 times a day (28 Jan 2021 15:52)  Entresto 24 mg-26 mg oral tablet: 1 tab(s) orally 2 times a day (28 Jan 2021 15:52)  Lantus 100 units/mL subcutaneous solution: 30 unit(s) subcutaneous once a day (at bedtime) (28 Jan 2021 15:52)  Lasix 40 mg oral tablet: 1 cap(s) orally 2 times a day (28 Jan 2021 15:52)  Lipitor 40 mg oral tablet: 1 tab(s) orally once a day (at bedtime) (28 Jan 2021 15:52)  metoprolol succinate 50 mg oral tablet, extended release: 1 tab(s) orally once a day (28 Jan 2021 15:52)  Vitamin D3 50,000 intl units (1250 mcg) oral capsule: 1 cap(s) orally once a week (28 Jan 2021 15:52)    Allergies:  No Known Allergies      FAMILY HISTORY:  No pertinent family history in first degree relatives        Hospital Medications:  acetaminophen   Tablet .. 650 milliGRAM(s) Oral every 6 hours PRN  aspirin enteric coated 81 milliGRAM(s) Oral daily  atorvastatin 40 milliGRAM(s) Oral at bedtime  calcium acetate 667 milliGRAM(s) Oral three times a day with meals  dextrose 40% Gel 15 Gram(s) Oral once  dextrose 5%. 1000 milliLiter(s) IV Continuous <Continuous>  dextrose 5%. 1000 milliLiter(s) IV Continuous <Continuous>  dextrose 50% Injectable 25 Gram(s) IV Push once  dextrose 50% Injectable 12.5 Gram(s) IV Push once  dextrose 50% Injectable 25 Gram(s) IV Push once  furosemide    Tablet 40 milliGRAM(s) Oral daily  glucagon  Injectable 1 milliGRAM(s) IntraMuscular once  insulin glargine Injectable (LANTUS) 30 Unit(s) SubCutaneous at bedtime  insulin lispro (ADMELOG) corrective regimen sliding scale   SubCutaneous three times a day before meals  metoprolol succinate ER 50 milliGRAM(s) Oral daily  regadenoson Injectable 0.4 milliGRAM(s) IV Push once  sacubitril 24 mG/valsartan 26 mG 1 Tablet(s) Oral two times a day      Vital Signs Last 24 Hrs  T(C): 36.7 (17 Mar 2021 20:45), Max: 36.7 (17 Mar 2021 20:45)  T(F): 98 (17 Mar 2021 20:45), Max: 98 (17 Mar 2021 20:45)  HR: 76 (18 Mar 2021 06:08) (68 - 79)  BP: 112/64 (18 Mar 2021 06:08) (112/64 - 155/67)  BP(mean): --  RR: 16 (18 Mar 2021 06:08) (16 - 18)  SpO2: --    I&O's Summary    17 Mar 2021 07:01  -  18 Mar 2021 07:00  --------------------------------------------------------  IN: 0 mL / OUT: 1310 mL / NET: -1310 mL        LABS:                        10.7   9.23  )-----------( 200      ( 18 Mar 2021 06:59 )             32.6       03-18    137  |  102  |  49<H>  ----------------------------<  113<H>  4.2   |  25  |  4.0<H>    Ca    9.0      18 Mar 2021 06:59  Phos  4.6     03-17  Mg     2.1     03-18    TPro  6.2  /  Alb  4.1  /  TBili  0.3  /  DBili  x   /  AST  12  /  ALT  6   /  AlkPhos  50  03-18      CARDIAC MARKERS ( 17 Mar 2021 07:15 )  x     / 0.31 ng/mL / x     / x     / x      CARDIAC MARKERS ( 16 Mar 2021 20:06 )  x     / 0.29 ng/mL / x     / x     / x      CARDIAC MARKERS ( 16 Mar 2021 13:41 )  x     / 0.29 ng/mL / x     / x     / x            PHYSICAL EXAM:  GENERAL: NAD, lying in bed comfortably  HEAD:  Atraumatic, Normocephalic  EYES: EOMI, conjunctiva and sclera clear  ENT: Moist mucous membranes  NECK: Supple, No JVD  CHEST/LUNG: Clear to auscultation bilaterally; No rales, rhonchi, wheezing, or rubs. Unlabored respirations  HEART: Regular rate and rhythm; No murmurs, rubs, or gallops  ABDOMEN: Bowel sounds present; Soft, Nontender, Nondistended. No hepatomegaly  EXTREMITIES:  BL LE pitting edema  NERVOUS SYSTEM:  Alert & Oriented X3, speech clear. No deficits     IMAGES:    < from: TTE Echo Complete w/o Contrast w/ Doppler (03.17.21 @ 12:29) >  Summary:   1. LV Ejection Fraction by Wynn's Method with a biplane EF of 55 %.   2. Spectral Doppler shows impaired relaxation pattern of left ventricular myocardial filling (Grade I diastolic dysfunction).   3. Normal leftatrial size.   4. Normal right atrial size.   5. Mitral annular calcification.   6. Moderate mitral valve regurgitation.   7. Thickening and calcification of the anterior and posterior mitral valve leaflets.   8. Mild tricuspid regurgitation.   9. Aortic valve thickening with decreased leaflet opening.  10. Mild aortic regurgitation.  11. Peak transaortic gradient equals 37.7 mmHg, mean transaortic gradient equals 22.1 mmHg, the calculated aortic valve area equals 1.35 cm² by the continuity equation consistent with moderate aortic stenosis.    < from: Xray Chest 1 View- PORTABLE-Urgent (Xray Chest 1 View- PORTABLE-Urgent .) (03.16.21 @ 10:39) >  Impression:  Cardiomegaly without acute opacifications or effusions.

## 2021-03-18 NOTE — PROGRESS NOTE ADULT - ASSESSMENT
80 year old F with a PMHx  ESRD on HD, DLD, CAD s/p CABG and s/p PCI, Ischemic Cardiomyopathy s/p AICD, , HTN, and DM, COVID PNA presents to ED with a two day history of chest pain.    #Ischemic Cardiomyopathy s/p AICD  #Stable angina/typical chest pain on exertion   Troponins-->.31-->.29 (previous trops from previous admission was .07); Significant elevation from previous admission.  BNP 1980  CXR demonstrated cardiomegaly w/AICD, w/o opacities.   c/w asa 81mg daily   1/11/18 ECHO:Mild LV dilatation. EF 25-35%. Moderate MR.   Echo 3/17 shows EF 55%, G1DD  BL LE pitting edema on exam, no crackles   Stress test today    #HTN  - controlled on home medications  - c/w metoprolol 50 mg daily  - c/w lasix 40 mg qdaily  - c/w entresto 24/26 PO BID    #ESRD on HD  c/w calcium acetate 667 PO TID  Patient receives HD twice a week   nephro consult appreciated     #DLD  - c/w lipitor 40 mg daily    #DMII  - Lantus 30 mg qhs    #DVT ppx: Heparin subq   #GI: Not indicated  #Diet: NPO for stress test, renal after   #Activity: AAT   #Dispo: Acute  Full Code    Pending: stress test today and possible cath tomorrow

## 2021-03-19 ENCOUNTER — TRANSCRIPTION ENCOUNTER (OUTPATIENT)
Age: 81
End: 2021-03-19

## 2021-03-19 VITALS — HEART RATE: 84 BPM | SYSTOLIC BLOOD PRESSURE: 138 MMHG | DIASTOLIC BLOOD PRESSURE: 82 MMHG | RESPIRATION RATE: 18 BRPM

## 2021-03-19 LAB
ALBUMIN SERPL ELPH-MCNC: 4.2 G/DL — SIGNIFICANT CHANGE UP (ref 3.5–5.2)
ALP SERPL-CCNC: 44 U/L — SIGNIFICANT CHANGE UP (ref 30–115)
ALT FLD-CCNC: 6 U/L — SIGNIFICANT CHANGE UP (ref 0–41)
ANION GAP SERPL CALC-SCNC: 16 MMOL/L — HIGH (ref 7–14)
AST SERPL-CCNC: 12 U/L — SIGNIFICANT CHANGE UP (ref 0–41)
BASOPHILS # BLD AUTO: 0.05 K/UL — SIGNIFICANT CHANGE UP (ref 0–0.2)
BASOPHILS NFR BLD AUTO: 0.5 % — SIGNIFICANT CHANGE UP (ref 0–1)
BILIRUB SERPL-MCNC: 0.4 MG/DL — SIGNIFICANT CHANGE UP (ref 0.2–1.2)
BUN SERPL-MCNC: 55 MG/DL — HIGH (ref 10–20)
CALCIUM SERPL-MCNC: 9.4 MG/DL — SIGNIFICANT CHANGE UP (ref 8.5–10.1)
CHLORIDE SERPL-SCNC: 100 MMOL/L — SIGNIFICANT CHANGE UP (ref 98–110)
CO2 SERPL-SCNC: 24 MMOL/L — SIGNIFICANT CHANGE UP (ref 17–32)
CREAT SERPL-MCNC: 4.7 MG/DL — CRITICAL HIGH (ref 0.7–1.5)
EOSINOPHIL # BLD AUTO: 0.38 K/UL — SIGNIFICANT CHANGE UP (ref 0–0.7)
EOSINOPHIL NFR BLD AUTO: 4.1 % — SIGNIFICANT CHANGE UP (ref 0–8)
GLUCOSE BLDC GLUCOMTR-MCNC: 99 MG/DL — SIGNIFICANT CHANGE UP (ref 70–99)
GLUCOSE SERPL-MCNC: 97 MG/DL — SIGNIFICANT CHANGE UP (ref 70–99)
HCT VFR BLD CALC: 33.2 % — LOW (ref 42–52)
HGB BLD-MCNC: 10.8 G/DL — LOW (ref 14–18)
IMM GRANULOCYTES NFR BLD AUTO: 0.6 % — HIGH (ref 0.1–0.3)
LYMPHOCYTES # BLD AUTO: 2.22 K/UL — SIGNIFICANT CHANGE UP (ref 1.2–3.4)
LYMPHOCYTES # BLD AUTO: 23.9 % — SIGNIFICANT CHANGE UP (ref 20.5–51.1)
MAGNESIUM SERPL-MCNC: 2.2 MG/DL — SIGNIFICANT CHANGE UP (ref 1.8–2.4)
MCHC RBC-ENTMCNC: 29.4 PG — SIGNIFICANT CHANGE UP (ref 27–31)
MCHC RBC-ENTMCNC: 32.5 G/DL — SIGNIFICANT CHANGE UP (ref 32–37)
MCV RBC AUTO: 90.5 FL — SIGNIFICANT CHANGE UP (ref 80–94)
MONOCYTES # BLD AUTO: 1.12 K/UL — HIGH (ref 0.1–0.6)
MONOCYTES NFR BLD AUTO: 12.1 % — HIGH (ref 1.7–9.3)
NEUTROPHILS # BLD AUTO: 5.44 K/UL — SIGNIFICANT CHANGE UP (ref 1.4–6.5)
NEUTROPHILS NFR BLD AUTO: 58.8 % — SIGNIFICANT CHANGE UP (ref 42.2–75.2)
NRBC # BLD: 0 /100 WBCS — SIGNIFICANT CHANGE UP (ref 0–0)
PLATELET # BLD AUTO: 191 K/UL — SIGNIFICANT CHANGE UP (ref 130–400)
POTASSIUM SERPL-MCNC: 4.2 MMOL/L — SIGNIFICANT CHANGE UP (ref 3.5–5)
POTASSIUM SERPL-SCNC: 4.2 MMOL/L — SIGNIFICANT CHANGE UP (ref 3.5–5)
PROT SERPL-MCNC: 6.2 G/DL — SIGNIFICANT CHANGE UP (ref 6–8)
RBC # BLD: 3.67 M/UL — LOW (ref 4.7–6.1)
RBC # FLD: 15.2 % — HIGH (ref 11.5–14.5)
SODIUM SERPL-SCNC: 140 MMOL/L — SIGNIFICANT CHANGE UP (ref 135–146)
TROPONIN T SERPL-MCNC: 0.26 NG/ML — CRITICAL HIGH
WBC # BLD: 9.27 K/UL — SIGNIFICANT CHANGE UP (ref 4.8–10.8)
WBC # FLD AUTO: 9.27 K/UL — SIGNIFICANT CHANGE UP (ref 4.8–10.8)

## 2021-03-19 PROCEDURE — 99239 HOSP IP/OBS DSCHRG MGMT >30: CPT

## 2021-03-19 RX ORDER — FUROSEMIDE 40 MG
1 TABLET ORAL
Qty: 0 | Refills: 0 | DISCHARGE
Start: 2021-03-19

## 2021-03-19 RX ORDER — FUROSEMIDE 40 MG
1 TABLET ORAL
Qty: 0 | Refills: 0 | DISCHARGE

## 2021-03-19 RX ORDER — RANOLAZINE 500 MG/1
1 TABLET, FILM COATED, EXTENDED RELEASE ORAL
Qty: 60 | Refills: 0
Start: 2021-03-19 | End: 2021-04-17

## 2021-03-19 RX ADMIN — Medication 81 MILLIGRAM(S): at 13:20

## 2021-03-19 RX ADMIN — SACUBITRIL AND VALSARTAN 1 TABLET(S): 24; 26 TABLET, FILM COATED ORAL at 05:56

## 2021-03-19 RX ADMIN — Medication 667 MILLIGRAM(S): at 13:20

## 2021-03-19 RX ADMIN — Medication 50 MILLIGRAM(S): at 05:56

## 2021-03-19 RX ADMIN — Medication 667 MILLIGRAM(S): at 07:46

## 2021-03-19 RX ADMIN — Medication 40 MILLIGRAM(S): at 05:56

## 2021-03-19 RX ADMIN — HEPARIN SODIUM 5000 UNIT(S): 5000 INJECTION INTRAVENOUS; SUBCUTANEOUS at 05:56

## 2021-03-19 NOTE — PROGRESS NOTE ADULT - ASSESSMENT
80 year old F with a PMHx  ESRD on HD, DLD, CAD s/p CABG and s/p PCI, Ischemic Cardiomyopathy s/p AICD, , HTN, and DM, COVID PNA presents to ED with a two day history of chest pain.    #Ischemic Cardiomyopathy s/p AICD  #Stable angina/typical chest pain on exertion   Troponins-->.31-->.29 (previous trops from previous admission was .07); Significant elevation from previous admission.  BNP 1980  CXR demonstrated cardiomegaly w/AICD, w/o opacities.   c/w asa 81mg daily   1/11/18 ECHO:Mild LV dilatation. EF 25-35%. Moderate MR.   Echo 3/17 shows EF 55%, G1DD  BL LE pitting edema resolved, no crackles   Stress test showed small reversible defect in the apex and anteroseptal wall of the left ventricle (3/18)  cardio f/u     #HTN  - controlled on home medications  - c/w metoprolol 50 mg daily  - c/w lasix 40 mg qdaily  - c/w entresto 24/26 PO BID    #ESRD on HD  c/w calcium acetate 667 PO TID  Patient receives HD twice a week   nephro consult appreciated     #DLD  - c/w lipitor 40 mg daily    #DMII  - Lantus 30 mg qhs    #DVT ppx: Heparin subq   #GI: Not indicated  #Diet: renal diet   #Activity: AAT   #Dispo: Acute  Full Code    Pending: cardio f/u

## 2021-03-19 NOTE — PROGRESS NOTE ADULT - PROVIDER SPECIALTY LIST ADULT
Internal Medicine
Hospitalist
Internal Medicine
Internal Medicine
Nephrology
Nephrology

## 2021-03-19 NOTE — PROGRESS NOTE ADULT - SUBJECTIVE AND OBJECTIVE BOX
Patient is a 80y old  Male who presents with a chief complaint of Chest Pain (18 Mar 2021 17:02)      HPI:  80 year old F with a PMHx  ESRD on HD, DLD, CAD s/p CABG and s/p PCI, Ischemic Cardiomyopathy s/p AICD, , HTN, and DM, COVID PNA presents to ED with a two day history of chest pain. Patient was in his usual state of health on Sunday night, when he was doing work on his house when he had a sudden onset of chest pain.   Patient walked 20-30 feet and felt nauseous while the pain occurred on 3/14/2021.  On 3/15/2021, patient was doing house work when patient felt chest pain again, which relieved after 5 min of rest. Patient endorses a pressure like substernal chest pain that is pressure like in sensation and is moderate in severity. It occurs intermittently in frequency, and worsens with exertion  and relieves with rest.  It does not worsen with inspiration.  Today he did not have an episode of chest pain, but did avinash cardiologist who advised him to go to hospital. He also complains of SOB on exertion which relieves with rest. Patient denies fever, rigors, abdominal pain, n/v/d.     ICU Vital Signs Last 24 Hrs  T(C): 37.1 (16 Mar 2021 15:02), Max: 37.1 (16 Mar 2021 15:02)  T(F): 98.8 (16 Mar 2021 15:02), Max: 98.8 (16 Mar 2021 15:02)  HR: 68 (16 Mar 2021 15:02) (68 - 78)  BP: 132/62 (16 Mar 2021 15:02) (132/62 - 174/79)  BP(mean): --  ABP: --  ABP(mean): --  RR: 18 (16 Mar 2021 15:02) (18 - 18)  SpO2: 100% (16 Mar 2021 15:02) (98% - 100%)    In the ED: patient was loaded with ASA 325mg PO.  EKG demonstrated RBBB w/ST depressions in the inferior leads.  CBC and CMP unremarkable.  Trop .31. Glucose 213.  (16 Mar 2021 18:00)      PAST MEDICAL & SURGICAL HISTORY:  Mitral valve regurgitation    CHF (congestive heart failure)    BPH (Benign Prostatic Hyperplasia)    GERD (gastroesophageal reflux disease)    Sleep apnea  does not use machine    Renal insufficiency    HTN - Hypertension    H/O hyperlipidemia    CAD (coronary artery disease)    Diabetes mellitus    History of PTCA  with stents 10 years ago ([facility])    CAD (Coronary Artery Disease)    Hypercholesterolemia    Diabetes Mellitus Type II    HTN (Hypertension)    S/P appendectomy    S/P primary angioplasty with coronary stent  6-7 stents last one in 10/12    S/P tonsillectomy    S/P knee surgery  b/l arthroscopic        Home Medications:  calcium acetate 667 mg oral tablet: 1 tab(s) orally 3 times a day (28 Jan 2021 15:52)  Entresto 24 mg-26 mg oral tablet: 1 tab(s) orally 2 times a day (28 Jan 2021 15:52)  Lantus 100 units/mL subcutaneous solution: 30 unit(s) subcutaneous once a day (at bedtime) (28 Jan 2021 15:52)  Lasix 40 mg oral tablet: 1 cap(s) orally 2 times a day (28 Jan 2021 15:52)  Lipitor 40 mg oral tablet: 1 tab(s) orally once a day (at bedtime) (28 Jan 2021 15:52)  metoprolol succinate 50 mg oral tablet, extended release: 1 tab(s) orally once a day (28 Jan 2021 15:52)  Vitamin D3 50,000 intl units (1250 mcg) oral capsule: 1 cap(s) orally once a week (28 Jan 2021 15:52)        Allergies:  No Known Allergies      FAMILY HISTORY:  No pertinent family history in first degree relatives        Hospital Medications:  acetaminophen   Tablet .. 650 milliGRAM(s) Oral every 6 hours PRN  aspirin enteric coated 81 milliGRAM(s) Oral daily  atorvastatin 40 milliGRAM(s) Oral at bedtime  calcium acetate 667 milliGRAM(s) Oral three times a day with meals  dextrose 40% Gel 15 Gram(s) Oral once  dextrose 5%. 1000 milliLiter(s) IV Continuous <Continuous>  dextrose 5%. 1000 milliLiter(s) IV Continuous <Continuous>  dextrose 50% Injectable 25 Gram(s) IV Push once  dextrose 50% Injectable 12.5 Gram(s) IV Push once  dextrose 50% Injectable 25 Gram(s) IV Push once  furosemide    Tablet 40 milliGRAM(s) Oral daily  glucagon  Injectable 1 milliGRAM(s) IntraMuscular once  heparin   Injectable 5000 Unit(s) SubCutaneous every 12 hours  insulin glargine Injectable (LANTUS) 30 Unit(s) SubCutaneous at bedtime  insulin lispro (ADMELOG) corrective regimen sliding scale   SubCutaneous three times a day before meals  metoprolol succinate ER 50 milliGRAM(s) Oral daily  sacubitril 24 mG/valsartan 26 mG 1 Tablet(s) Oral two times a day      Vital Signs Last 24 Hrs  T(C): 36.4 (19 Mar 2021 05:55), Max: 36.6 (18 Mar 2021 14:58)  T(F): 97.6 (19 Mar 2021 05:55), Max: 97.9 (18 Mar 2021 14:58)  HR: 75 (19 Mar 2021 08:45) (67 - 78)  BP: 122/78 (19 Mar 2021 08:45) (122/78 - 164/75)  BP(mean): --  RR: 18 (19 Mar 2021 08:45) (16 - 18)  SpO2: 95% (18 Mar 2021 20:29) (95% - 95%)    I&O's Summary    18 Mar 2021 07:01  -  19 Mar 2021 07:00  --------------------------------------------------------  IN: 240 mL / OUT: 340 mL / NET: -100 mL    19 Mar 2021 07:01  -  19 Mar 2021 10:46  --------------------------------------------------------  IN: 0 mL / OUT: 220 mL / NET: -220 mL        LABS:                        10.8   9.27  )-----------( 191      ( 19 Mar 2021 07:32 )             33.2       03-19    140  |  100  |  55<H>  ----------------------------<  97  4.2   |  24  |  4.7<HH>    Ca    9.4      19 Mar 2021 07:32  Mg     2.2     03-19    TPro  6.2  /  Alb  4.2  /  TBili  0.4  /  DBili  x   /  AST  12  /  ALT  6   /  AlkPhos  44  03-19      CARDIAC MARKERS ( 19 Mar 2021 07:32 )  x     / 0.26 ng/mL / x     / x     / x              PHYSICAL EXAM:  GENERAL: NAD, lying in bed comfortably  HEAD:  Atraumatic, Normocephalic  EYES: EOMI, conjunctiva and sclera clear  ENT: Moist mucous membranes  NECK: Supple, No JVD  CHEST/LUNG: Clear to auscultation bilaterally; No rales, rhonchi, wheezing, or rubs. Unlabored respirations  HEART: Regular rate and rhythm; No murmurs, rubs, or gallops  ABDOMEN: Bowel sounds present; Soft, Nontender, Nondistended. No hepatomegaly  EXTREMITIES: no cyanosis or edema   NERVOUS SYSTEM:  Alert & Oriented X3, speech clear. No deficits     IMAGES:    < from: TTE Echo Complete w/o Contrast w/ Doppler (03.17.21 @ 12:29) >  Summary:   1. LV Ejection Fraction by Wynn's Method with a biplane EF of 55 %.   2. Spectral Doppler shows impaired relaxation pattern of left ventricular myocardial filling (Grade I diastolic dysfunction).   3. Normal leftatrial size.   4. Normal right atrial size.   5. Mitral annular calcification.   6. Moderate mitral valve regurgitation.   7. Thickening and calcification of the anterior and posterior mitral valve leaflets.   8. Mild tricuspid regurgitation.   9. Aortic valve thickening with decreased leaflet opening.  10. Mild aortic regurgitation.  11. Peak transaortic gradient equals 37.7 mmHg, mean transaortic gradient equals 22.1 mmHg, the calculated aortic valve area equals 1.35 cm² by the continuity equation consistent with moderate aortic stenosis.    < from: Xray Chest 1 View- PORTABLE-Urgent (Xray Chest 1 View- PORTABLE-Urgent .) (03.16.21 @ 10:39) >  Impression:  Cardiomegaly without acute opacifications or effusions.

## 2021-03-19 NOTE — DISCHARGE NOTE PROVIDER - HOSPITAL COURSE
80 year old F with a PMHx  ESRD on HD, DLD, CAD s/p CABG and s/p PCI, Ischemic Cardiomyopathy s/p AICD, , HTN, and DM, COVID PNA presents to ED with a two day history of chest pain. Patient was in his usual state of health on Sunday night, when he was doing work on his house when he had a sudden onset of chest pain.   Patient walked 20-30 feet and felt nauseous while the pain occurred on 3/14/2021.  On 3/15/2021, patient was doing house work when patient felt chest pain again, which relieved after 5 min of rest. Patient endorses a pressure like substernal chest pain that is pressure like in sensation and is moderate in severity. It occurs intermittently in frequency, and worsens with exertion  and relieves with rest.  It does not worsen with inspiration.  Today he did not have an episode of chest pain, but did avinash cardiologist who advised him to go to hospital. He also complains of SOB on exertion which relieves with rest. Patient denies fever, rigors, abdominal pain, n/v/d.   ICU Vital Signs Last 24 Hrs  T(C): 37.1 (16 Mar 2021 15:02), Max: 37.1 (16 Mar 2021 15:02)  T(F): 98.8 (16 Mar 2021 15:02), Max: 98.8 (16 Mar 2021 15:02)  HR: 68 (16 Mar 2021 15:02) (68 - 78)  BP: 132/62 (16 Mar 2021 15:02) (132/62 - 174/79)  RR: 18 (16 Mar 2021 15:02) (18 - 18)  SpO2: 100% (16 Mar 2021 15:02) (98% - 100%)  In the ED: patient was loaded with ASA 325mg PO.  EKG demonstrated RBBB w/ST depressions in the inferior leads.  CBC and CMP unremarkable.  Trop .31. Glucose 213.   Patient was managed for the following acute issues during her hospital stay:   #Ischemic Cardiomyopathy s/p AICD  #Stable angina/typical chest pain on exertion   Troponins-->.31-->.29 (previous trops from previous admission was .07); Significant elevation from previous admission.  BNP 1980  CXR demonstrated cardiomegaly w/AICD, w/o opacities.   received asa 81mg daily   1/11/18 ECHO:Mild LV dilatation. EF 25-35%. Moderate MR.   Echo 3/17 shows EF 55%, G1DD  BL LE pitting edema resolved, no crackles   Stress test showed small reversible defect in the apex and anteroseptal wall of the left ventricle (3/18)  #HTN was controlled on home medications   #ESRD on HD  received calcium acetate 667 PO TID  Patient receives HD twice a week   nephrology also evaluated the patient     Patient is now medically stable to be discharged home.

## 2021-03-19 NOTE — PROGRESS NOTE ADULT - ASSESSMENT
ESRD on HD 2x /week (THu/Sat) @ [facility]   Last HD Thursday last week  pt nonoliguric with residual renal function  ACS / NSTEMI  recent COVID-19 PNA   CAD / CABG / CMP / CHF  HTN  DM2  anemia    plan:    HD today  no cath per primary team  renal diet   phoslo with meals  left arm precautions   cardio management  cont HD at [facility] upon discharge      addendum:  tolerating HD. adjusted UF accordingly

## 2021-03-19 NOTE — PROGRESS NOTE ADULT - SUBJECTIVE AND OBJECTIVE BOX
NINFA URBAN  80y Male    CHIEF COMPLAINT:    Patient is a 80y old  Male who presents with a chief complaint of Chest Pain (19 Mar 2021 13:44)      INTERVAL HPI/OVERNIGHT EVENTS:    Patient seen and examined. No cp. No sob. Feels better. S/P HD today.     ROS: All other systems are negative.    Vital Signs:    T(F): 97.6 (21 @ 05:55), Max: 97.7 (21 @ 21:03)  HR: 84 (21 @ 13:34) (67 - 84)  BP: 138/82 (21 @ 13:34) (115/97 - 164/75)  RR: 18 (21 @ 13:34) (16 - 18)  SpO2: 95% (21 @ 20:29) (95% - 95%)  I&O's Summary    18 Mar 2021 07:  -  19 Mar 2021 07:00  --------------------------------------------------------  IN: 240 mL / OUT: 340 mL / NET: -100 mL    19 Mar 2021 07:01  -  19 Mar 2021 15:05  --------------------------------------------------------  IN: 0 mL / OUT: 720 mL / NET: -720 mL      Daily     Daily Weight in k.6 (19 Mar 2021 05:55)  CAPILLARY BLOOD GLUCOSE      POCT Blood Glucose.: 99 mg/dL (19 Mar 2021 07:36)  POCT Blood Glucose.: 196 mg/dL (18 Mar 2021 21:27)  POCT Blood Glucose.: 244 mg/dL (18 Mar 2021 16:49)      PHYSICAL EXAM:    GENERAL:  NAD  SKIN: No rashes or lesions  HENT: Atraumatic Normocephalic. PERRL. Moist membranes.  NECK: Supple, No JVD. No lymphadenopathy.  PULMONARY: CTA B/L. No wheezing. No rales  CVS: Normal S1, S2. Rate and Rhythm are regular. No murmurs.  ABDOMEN/GI: Soft, Nontender, Nondistended; BS present  EXTREMITIES: Peripheral pulses intact. No edema B/L LE.  NEUROLOGIC:  No motor or sensory deficit.  PSYCH: Alert & oriented x 3    Consultant(s) Notes Reviewed:  [x ] YES  [ ] NO  Care Discussed with Consultants/Other Providers [ x] YES  [ ] NO    EKG reviewed  Telemetry reviewed    LABS:                        10.8   9.27  )-----------( 191      ( 19 Mar 2021 07:32 )             33.2     03-    140  |  100  |  55<H>  ----------------------------<  97  4.2   |  24  |  4.7<HH>    Ca    9.4      19 Mar 2021 07:32  Mg     2.2     -    TPro  6.2  /  Alb  4.2  /  TBili  0.4  /  DBili  x   /  AST  12  /  ALT  6   /  AlkPhos  44  -      Serum Pro-Brain Natriuretic Peptide: 1980 pg/mL (21 @ 10:00)    Trop 0.26, CKMB --, CK --, 21 @ 07:32  Trop 0.31, CKMB --, CK --, 21 @ 07:15  Trop 0.29, CKMB --, CK --, 21 @ 20:06        RADIOLOGY & ADDITIONAL TESTS:    < from: NM Nuclear Stress Pharmacologic Multiple (21 @ 14:47) >  Impression:      1.  Small reversible defect in the apex and anteroseptal wall of the left ventricle consistent with ischemia superimposed on inferolateral scar  2.   Fixed inferolateral defect which does not completely thicken consistent with prior injury (scar).  3.  Dilated left ventricle with mild to moderate global hypokinesis. Incomplete thickening of the inferolateral wall of the left ventricle consistent with prior injury (scar). All other walls of the left ventricle do thicken. .  4.  Left ventricular ejection fraction calculated as 35%, which is low. Echocardiographic estimated ejection fraction was 55% on 3/17/2021. However wall motion analysis suggests ejection fraction of 35-40%        < end of copied text >    Imaging or report Personally Reviewed:  [ ] YES  [ ] NO    Medications:  Standing  aspirin enteric coated 81 milliGRAM(s) Oral daily  atorvastatin 40 milliGRAM(s) Oral at bedtime  calcium acetate 667 milliGRAM(s) Oral three times a day with meals  dextrose 40% Gel 15 Gram(s) Oral once  dextrose 5%. 1000 milliLiter(s) IV Continuous <Continuous>  dextrose 5%. 1000 milliLiter(s) IV Continuous <Continuous>  dextrose 50% Injectable 25 Gram(s) IV Push once  dextrose 50% Injectable 12.5 Gram(s) IV Push once  dextrose 50% Injectable 25 Gram(s) IV Push once  furosemide    Tablet 40 milliGRAM(s) Oral daily  glucagon  Injectable 1 milliGRAM(s) IntraMuscular once  heparin   Injectable 5000 Unit(s) SubCutaneous every 12 hours  insulin glargine Injectable (LANTUS) 30 Unit(s) SubCutaneous at bedtime  insulin lispro (ADMELOG) corrective regimen sliding scale   SubCutaneous three times a day before meals  metoprolol succinate ER 50 milliGRAM(s) Oral daily  sacubitril 24 mG/valsartan 26 mG 1 Tablet(s) Oral two times a day    PRN Meds  acetaminophen   Tablet .. 650 milliGRAM(s) Oral every 6 hours PRN      Case discussed with resident    Care discussed with pt/family

## 2021-03-19 NOTE — DISCHARGE NOTE NURSING/CASE MANAGEMENT/SOCIAL WORK - PATIENT PORTAL LINK FT
You can access the FollowMyHealth Patient Portal offered by [facility] by registering at the following website: [email or web address]. By joining [employer]’s FollowMyHealth portal, you will also be able to view your health information using other applications (apps) compatible with our system.

## 2021-03-19 NOTE — DISCHARGE NOTE PROVIDER - CARE PROVIDER_API CALL
Zay Nance)  Cardiovascular Disease; Internal Medicine  [address] [place] [address]  Phone: [phone]  Fax: [phone]  Established Patient  Follow Up Time: 2 weeks

## 2021-03-19 NOTE — DISCHARGE NOTE PROVIDER - INSTRUCTIONS
Eat more vegetables and fruits.  Swap refined grains for whole grains.  Choose fat-free or low-fat dairy products.  Choose lean protein sources like fish, poultry and beans.  Cook with vegetable oils.  Limit your intake of foods high in added sugars, like soda and candy.   Please take low salt diet, low in potassium and decreased intake of fluid to 1.5L per day

## 2021-03-19 NOTE — DISCHARGE NOTE PROVIDER - NSDCCPCAREPLAN_GEN_ALL_CORE_FT
PRINCIPAL DISCHARGE DIAGNOSIS  Diagnosis: Chest pain  Assessment and Plan of Treatment: You were found to have stable angina. Stress test showed a small reversible defect and an ejection fraction of 35-40%. please follow up with your cardiologist and continue medications as prescribed.      SECONDARY DISCHARGE DIAGNOSES  Diagnosis: CHF exacerbation  Assessment and Plan of Treatment: you have heart failure where the heart muscle is not able to pump blood forward properly. Your ejection fraction is low. please take your medications as prescribed    Diagnosis: ESRD on dialysis  Assessment and Plan of Treatment: You have end stage kidney disease. Please take your medications as prescribed.

## 2021-03-19 NOTE — DISCHARGE NOTE PROVIDER - NSDCFUSCHEDAPPT_GEN_ALL_CORE_FT
NINFA URBAN ; 05/06/2021 ; [facility] [address] NINFA De Leon ; 05/06/2021 ; [facility] [address]

## 2021-03-19 NOTE — PROGRESS NOTE ADULT - ASSESSMENT
80 year old F with a PMHx  ESRD on HD, DLD, CAD s/p CABG and s/p PCI, Ischemic Cardiomyopathy s/p AICD, , HTN, and DM, COVID PNA presents to ED with a two day history of chest pain.     CAD S/P CABG  CP due to stable angina / No NSTEMI  Ischemic CM  ESRD on HD  DM-2 / HTN / DL  H/O COVID infection           PLAN:    ·	Stress test reviewed. Small reversible defect in the apex and anteroseptal wall. D/W the cardiology. Medical management. Will add Ranexa  mg po q 12h.   ·	Troponin not trending up. Likely elevated due to ESRD  ·	Cont ASA 81 mg po daily and Lipitor 40 mg po qhs  ·	Triglyceride is 349 and LDL is 96  ·	ECHO reviewed. EF is 55%. Moderate MR. Moderate AS  ·	D/C home    * Med rec reviewed. Plan of care D/W the pt. Time spent 39 minutes.

## 2021-03-19 NOTE — DISCHARGE NOTE PROVIDER - NSDCMRMEDTOKEN_GEN_ALL_CORE_FT
Aspirin Enteric Coated 81 mg oral delayed release tablet: 1 tab(s) orally once a day   calcium acetate 667 mg oral tablet: 1 tab(s) orally 3 times a day  Entresto 24 mg-26 mg oral tablet: 1 tab(s) orally 2 times a day  Lantus 100 units/mL subcutaneous solution: 30 unit(s) subcutaneous once a day (at bedtime)  Lipitor 40 mg oral tablet: 1 tab(s) orally once a day (at bedtime)  metoprolol succinate 50 mg oral tablet, extended release: 1 tab(s) orally once a day  Vitamin D3 50,000 intl units (1250 mcg) oral capsule: 1 cap(s) orally once a week   Aspirin Enteric Coated 81 mg oral delayed release tablet: 1 tab(s) orally once a day   calcium acetate 667 mg oral tablet: 1 tab(s) orally 3 times a day  Entresto 24 mg-26 mg oral tablet: 1 tab(s) orally 2 times a day  furosemide 40 mg oral tablet: 1 tab(s) orally once a day  Lantus 100 units/mL subcutaneous solution: 30 unit(s) subcutaneous once a day (at bedtime)  Lipitor 40 mg oral tablet: 1 tab(s) orally once a day (at bedtime)  metoprolol succinate 50 mg oral tablet, extended release: 1 tab(s) orally once a day  Ranexa 500 mg oral tablet, extended release: 1 tab(s) orally 2 times a day   Vitamin D3 50,000 intl units (1250 mcg) oral capsule: 1 cap(s) orally once a week

## 2021-03-19 NOTE — PROGRESS NOTE ADULT - SUBJECTIVE AND OBJECTIVE BOX
NEPHROLOGY FOLLOW UP NOTE    stress test noted  no plan for cath per cardio  on HD      PAST MEDICAL & SURGICAL HISTORY:  Mitral valve regurgitation    CHF (congestive heart failure)    BPH (Benign Prostatic Hyperplasia)    GERD (gastroesophageal reflux disease)    Sleep apnea  does not use machine    Renal insufficiency    HTN - Hypertension    H/O hyperlipidemia    CAD (coronary artery disease)    Diabetes mellitus    History of PTCA  with stents 10 years ago ([facility]    CAD (Coronary Artery Disease)    Hypercholesterolemia    Diabetes Mellitus Type II    HTN (Hypertension)    S/P appendectomy    S/P primary angioplasty with coronary stent  6-7 stents last one in 10/12    S/P tonsillectomy    S/P knee surgery  b/l arthroscopic      Allergies:  No Known Allergies    Home Medications Reviewed    SOCIAL HISTORY:  Denies ETOH,Smoking,   FAMILY HISTORY:  No pertinent family history in first degree relatives          REVIEW OF SYSTEMS:  CONSTITUTIONAL: + weakness, fevers or chills  EYES/ENT: No visual changes;  No vertigo or throat pain   NECK: No pain or stiffness  RESPIRATORY: No cough, wheezing, hemoptysis; No shortness of breath  CARDIOVASCULAR: No chest pain or palpitations.  GASTROINTESTINAL: No abdominal or epigastric pain. No nausea, vomiting, or hematemesis; No diarrhea or constipation. No melena or hematochezia.  GENITOURINARY: No dysuria, frequency, foamy urine, urinary urgency, incontinence or hematuria  NEUROLOGICAL: No numbness or weakness  SKIN: No itching, burning, rashes, or lesions   VASCULAR: No bilateral lower extremity edema.   All other review of systems is negative unless indicated above.      PHYSICAL EXAM:  Constitutional: NAD  HEENT: anicteric sclera, oropharynx clear, MMM    Neck: No JVD  Respiratory: CTAB, no wheezes, rales or rhonchi  Cardiovascular: S1, S2, RRR  Gastrointestinal: BS+, soft, NT/ND  Extremities: No cyanosis or clubbing. No peripheral edema  Neurological: A/O x 3, no focal deficits  Psychiatric: Normal mood, normal affect  : No CVA tenderness   Skin: No rashes  Vascular Access: left arm AVF + thrill + bruit    Hospital Medications:   MEDICATIONS  (STANDING):  aspirin enteric coated 81 milliGRAM(s) Oral daily  atorvastatin 40 milliGRAM(s) Oral at bedtime  calcium acetate 667 milliGRAM(s) Oral three times a day with meals  dextrose 40% Gel 15 Gram(s) Oral once  dextrose 5%. 1000 milliLiter(s) (50 mL/Hr) IV Continuous <Continuous>  dextrose 5%. 1000 milliLiter(s) (100 mL/Hr) IV Continuous <Continuous>  dextrose 50% Injectable 25 Gram(s) IV Push once  dextrose 50% Injectable 12.5 Gram(s) IV Push once  dextrose 50% Injectable 25 Gram(s) IV Push once  furosemide    Tablet 40 milliGRAM(s) Oral daily  glucagon  Injectable 1 milliGRAM(s) IntraMuscular once  heparin   Injectable 5000 Unit(s) SubCutaneous every 12 hours  insulin glargine Injectable (LANTUS) 30 Unit(s) SubCutaneous at bedtime  insulin lispro (ADMELOG) corrective regimen sliding scale   SubCutaneous three times a day before meals  metoprolol succinate ER 50 milliGRAM(s) Oral daily  sacubitril 24 mG/valsartan 26 mG 1 Tablet(s) Oral two times a day        VITALS:  T(F): 97.6 (03-19-21 @ 05:55), Max: 97.9 (03-18-21 @ 14:58)  HR: 84 (03-19-21 @ 13:34)  BP: 138/82 (03-19-21 @ 13:34)  RR: 18 (03-19-21 @ 13:34)  SpO2: 95% (03-18-21 @ 20:29)  Wt(kg): --    03-17 @ 07:01  -  03-18 @ 07:00  --------------------------------------------------------  IN: 0 mL / OUT: 1310 mL / NET: -1310 mL    03-18 @ 07:01  -  03-19 @ 07:00  --------------------------------------------------------  IN: 240 mL / OUT: 340 mL / NET: -100 mL    03-19 @ 07:01  -  03-19 @ 13:44  --------------------------------------------------------  IN: 0 mL / OUT: 720 mL / NET: -720 mL          LABS:  03-19    140  |  100  |  55<H>  ----------------------------<  97  4.2   |  24  |  4.7<HH>    Ca    9.4      19 Mar 2021 07:32  Mg     2.2     03-19    TPro  6.2  /  Alb  4.2  /  TBili  0.4  /  DBili      /  AST  12  /  ALT  6   /  AlkPhos  44  03-19                          10.8   9.27  )-----------( 191      ( 19 Mar 2021 07:32 )             33.2       Urine Studies:        RADIOLOGY & ADDITIONAL STUDIES:

## 2021-03-26 DIAGNOSIS — I50.22 CHRONIC SYSTOLIC (CONGESTIVE) HEART FAILURE: ICD-10-CM

## 2021-03-26 DIAGNOSIS — I08.0 RHEUMATIC DISORDERS OF BOTH MITRAL AND AORTIC VALVES: ICD-10-CM

## 2021-03-26 DIAGNOSIS — I25.5 ISCHEMIC CARDIOMYOPATHY: ICD-10-CM

## 2021-03-26 DIAGNOSIS — I25.2 OLD MYOCARDIAL INFARCTION: ICD-10-CM

## 2021-03-26 DIAGNOSIS — R07.9 CHEST PAIN, UNSPECIFIED: ICD-10-CM

## 2021-03-26 DIAGNOSIS — D64.9 ANEMIA, UNSPECIFIED: ICD-10-CM

## 2021-03-26 DIAGNOSIS — I13.2 HYPERTENSIVE HEART AND CHRONIC KIDNEY DISEASE WITH HEART FAILURE AND WITH STAGE 5 CHRONIC KIDNEY DISEASE, OR END STAGE RENAL DISEASE: ICD-10-CM

## 2021-03-26 DIAGNOSIS — Z95.810 PRESENCE OF AUTOMATIC (IMPLANTABLE) CARDIAC DEFIBRILLATOR: ICD-10-CM

## 2021-03-26 DIAGNOSIS — I25.118 ATHEROSCLEROTIC HEART DISEASE OF NATIVE CORONARY ARTERY WITH OTHER FORMS OF ANGINA PECTORIS: ICD-10-CM

## 2021-03-26 DIAGNOSIS — N40.0 BENIGN PROSTATIC HYPERPLASIA WITHOUT LOWER URINARY TRACT SYMPTOMS: ICD-10-CM

## 2021-03-26 DIAGNOSIS — Z86.16 PERSONAL HISTORY OF COVID-19: ICD-10-CM

## 2021-03-26 DIAGNOSIS — Z99.2 DEPENDENCE ON RENAL DIALYSIS: ICD-10-CM

## 2021-03-26 DIAGNOSIS — Z95.1 PRESENCE OF AORTOCORONARY BYPASS GRAFT: ICD-10-CM

## 2021-03-26 DIAGNOSIS — I45.10 UNSPECIFIED RIGHT BUNDLE-BRANCH BLOCK: ICD-10-CM

## 2021-03-26 DIAGNOSIS — K21.9 GASTRO-ESOPHAGEAL REFLUX DISEASE WITHOUT ESOPHAGITIS: ICD-10-CM

## 2021-03-26 DIAGNOSIS — N18.6 END STAGE RENAL DISEASE: ICD-10-CM

## 2021-03-26 DIAGNOSIS — Z87.891 PERSONAL HISTORY OF NICOTINE DEPENDENCE: ICD-10-CM

## 2021-03-26 DIAGNOSIS — E11.65 TYPE 2 DIABETES MELLITUS WITH HYPERGLYCEMIA: ICD-10-CM

## 2021-03-26 DIAGNOSIS — E78.5 HYPERLIPIDEMIA, UNSPECIFIED: ICD-10-CM

## 2021-03-26 DIAGNOSIS — E11.22 TYPE 2 DIABETES MELLITUS WITH DIABETIC CHRONIC KIDNEY DISEASE: ICD-10-CM

## 2021-05-06 ENCOUNTER — APPOINTMENT (OUTPATIENT)
Dept: CARDIOLOGY | Facility: CLINIC | Age: 81
End: 2021-05-06
Payer: MEDICARE

## 2021-05-06 ENCOUNTER — RESULT CHARGE (OUTPATIENT)
Age: 81
End: 2021-05-06

## 2021-05-06 ENCOUNTER — APPOINTMENT (OUTPATIENT)
Dept: CARDIOLOGY | Facility: CLINIC | Age: 81
End: 2021-05-06

## 2021-05-06 VITALS
DIASTOLIC BLOOD PRESSURE: 80 MMHG | SYSTOLIC BLOOD PRESSURE: 150 MMHG | HEART RATE: 69 BPM | TEMPERATURE: 97.5 F | HEIGHT: 69 IN | BODY MASS INDEX: 33.62 KG/M2 | WEIGHT: 227 LBS

## 2021-05-06 PROCEDURE — 99214 OFFICE O/P EST MOD 30 MIN: CPT

## 2021-05-06 PROCEDURE — 93000 ELECTROCARDIOGRAM COMPLETE: CPT

## 2021-05-06 PROCEDURE — 99072 ADDL SUPL MATRL&STAF TM PHE: CPT

## 2021-05-06 RX ORDER — INSULIN ASPART 100 [IU]/ML
100 INJECTION, SOLUTION INTRAVENOUS; SUBCUTANEOUS
Qty: 15 | Refills: 0 | Status: DISCONTINUED | COMMUNITY
Start: 2018-06-06 | End: 2021-05-06

## 2021-05-06 NOTE — REASON FOR VISIT
[Follow-Up - Clinic] : a clinic follow-up of [Cardiomyopathy] : cardiomyopathy [Coronary Artery Disease] : coronary artery disease [Heart Failure] : congestive heart failure [FreeTextEntry1] : Feels well.\par \par Interval hospitalizations.\par \par 2/2021: COVID.  Moderate to severe disease.  Few days in hospital.\par \par 3/2021.  Exertional chest pain.  Not clearly ACS.  Medical therapy given comorbidities and low-risk MPI.  Symptoms resolved.  Started Ranexa.\par \par MPI: IL infarct.  Small apical / AS reversible defect.  EF 35-40%.\par ECHO (3/2021): EF 45-50%.  Mod MR.  Mod AS (MG 22 / KELTON 1.4).\par \par Recovered well.  Less physical work, but remains active.\par \par No angina.  Breathing comfortable.\par \par Tolerating dialysis.\par \par Weight stable.\par \par Tolerating Rx.

## 2021-05-06 NOTE — HISTORY OF PRESENT ILLNESS
[FreeTextEntry1] : 77 year-old male presents for hospital followup.\par \par Cardiac history of CAD s/p CABG s/p PCI, ischemic cardiomyopathy, and chronic systolic CHF s/p AICD. Previously followed with cardiologist in [place].\par \par Risk factors include hypertension, diabetes,hyperlipidemia, and CKD.\par \[facility] Presented 2 weeks ago with subacute decompensated CHF with volume overload and uncontrolled hypertension (possibly the setting of URI). Had been off Lasix for several weeks and antihypertensives for several days. Rapidly improved with diuresis.  Course complicated by KIANA.\par \par Feels well since discharge. No angina. Breathing comfortable. No palpitations, lightheadedness, syncope.  Previously had good functional capacity.  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 EF 25-35%. Moderate MR.\par \par Hospital labs reviewed:\par Creatinine 4.4\par Hemoglobin 14.7\par LFTs normal

## 2021-05-06 NOTE — ASSESSMENT
[FreeTextEntry1] : s/p CABG s/p PCI.\par No angina.\par MPI (3/2021): IL infarct.  Small apical / AS reversible defect.  EF 35-40%.\par \par ICM s/p AICD (moderate LV dysfunction).\par \par Mod AS / Mod MR.\par \par Compensated / euvolemic.\par \par BP mildly elevated.\par \par ECHO (3/2021): EF 45-50%.  Mod MR.  Mod AS (MG 22 / KELTON 1.4).

## 2021-05-06 NOTE — DISCUSSION/SUMMARY
[FreeTextEntry1] : Cont ASA  and Lipitor.\par Cont Toprol and Entresto.\par Cont Ranexa.\par Dialysis and diuretics per renal.\par Low-intensity exercise.\par Follow-up 4-months.

## 2021-06-23 ENCOUNTER — APPOINTMENT (OUTPATIENT)
Dept: CARDIOLOGY | Facility: CLINIC | Age: 81
End: 2021-06-23
Payer: MEDICARE

## 2021-06-23 VITALS
DIASTOLIC BLOOD PRESSURE: 80 MMHG | SYSTOLIC BLOOD PRESSURE: 135 MMHG | OXYGEN SATURATION: 97 % | HEIGHT: 69 IN | WEIGHT: 225 LBS | RESPIRATION RATE: 20 BRPM | HEART RATE: 80 BPM | TEMPERATURE: 97.9 F | BODY MASS INDEX: 33.33 KG/M2

## 2021-06-23 PROCEDURE — 93282 PRGRMG EVAL IMPLANTABLE DFB: CPT

## 2021-06-23 PROCEDURE — 99072 ADDL SUPL MATRL&STAF TM PHE: CPT

## 2021-06-23 PROCEDURE — 99213 OFFICE O/P EST LOW 20 MIN: CPT

## 2021-06-23 PROCEDURE — 93290 INTERROG DEV EVAL ICPMS IP: CPT | Mod: 26

## 2021-06-23 PROCEDURE — 93000 ELECTROCARDIOGRAM COMPLETE: CPT | Mod: 59

## 2021-06-23 NOTE — HISTORY OF PRESENT ILLNESS
[de-identified] : \par Cardiologist: Dr. Nance\par \par 80 yo M with history of CAD s/p 5v CABG (2/25/2015), ICM s/p single chamber ICD (12/7/15), obesity, ESRD on HD (Mon/Fri), DM, COPD, who presents for routine device interrogation.  He states his device shocked him once after implant but he is not sure if it was appropriate. \par \par The patient denies any cardiovascular complaints including chest pain, dyspnea, palpitations, dizziness, lightheadedness, presyncope or syncope. \par \par Since his last visit, he had Covid in Feb 2021 and was hospitalized. He had angina and was started on Ranexa with resolution of symptoms.  MPI: IL infarct. Small apical / AS reversible defect. EF 35-40%.

## 2021-06-23 NOTE — ASSESSMENT
[FreeTextEntry1] : Mr. Centeno is a 80 yo M with history of CAD s/p CABG (2015), ICM s/p single chamber ICD 12/7/2015, HTN, HL, DM, and ESRD on HD.\par \par # ICM s/p SC ICD\par - Device interrogation was performed as detailed above. Single Chamber AICD with normal function, no arrhythmias. Stable resp rate\par - He had unplugged his remote monitor because he moved and didn't have a landline. Cell adapter was provided at the appt today. Will set him up with remote again.\par - Cont GDMT.\par \par # HTN\par - BP well controlled\par - 2g Na diet enforced\par \par We have also advised the patient to go to the nearest emergency room if he experiences any chest pain, dyspnea, syncope, or has any other compelling symptoms. \par \par Follow up in 6 mo for in office interrogation with NP

## 2021-06-23 NOTE — PHYSICAL EXAM
[Normal Appearance] : normal appearance [Well Groomed] : well groomed [No Deformities] : no deformities [General Appearance - In No Acute Distress] : no acute distress [Heart Rate And Rhythm] : heart rate and rhythm were normal [Heart Sounds] : normal S1 and S2 [] : no respiratory distress [Respiration, Rhythm And Depth] : normal respiratory rhythm and effort [Auscultation Breath Sounds / Voice Sounds] : lungs were clear to auscultation bilaterally [Exaggerated Use Of Accessory Muscles For Inspiration] : no accessory muscle use [Left Infraclavicular] : left infraclavicular area [Well-Healed] : well-healed [Abdomen Soft] : soft [Edema] : no peripheral edema present [Clean] : clean [Dry] : dry [Erythema] : not erythematous [Warm] : not warm [Tender] : not tender [FreeTextEntry1] : obese [Nail Clubbing] : no clubbing of the fingernails [Cyanosis, Localized] : no localized cyanosis

## 2021-06-23 NOTE — PROCEDURE
[No] : not [NSR] : normal sinus rhythm [ICD] : Implantable cardioverter-defibrillator [VVI] : VVI [Longevity: ___ months] : The estimated remaining battery life is [unfilled] months [Threshold Testing Performed] : Threshold testing was performed [Lead Imp:  ___ohms] : lead impedance was [unfilled] ohms [Sensing Amplitude ___mv] : sensing amplitude was [unfilled] mv [___V @] : [unfilled] V [___ ms] : [unfilled] ms [Programmed for Longevity] : output reprogrammed for improved battery longevity [Counters Reset] : the counters were reset [Pace ___ %] : Pace [unfilled]% [See Device Printout] : See device printout [de-identified] : [facility]  [de-identified] : [id number] [de-identified] : [id number] [de-identified] : 12/01/2015 [de-identified] : 40 [de-identified] : Normal device function. No events noted. No fluid. Stable resp rate

## 2021-08-05 ENCOUNTER — APPOINTMENT (OUTPATIENT)
Dept: CARDIOLOGY | Facility: CLINIC | Age: 81
End: 2021-08-05
Payer: MEDICARE

## 2021-08-05 ENCOUNTER — NON-APPOINTMENT (OUTPATIENT)
Age: 81
End: 2021-08-05

## 2021-08-05 PROCEDURE — 93295 DEV INTERROG REMOTE 1/2/MLT: CPT

## 2021-08-05 PROCEDURE — 93296 REM INTERROG EVL PM/IDS: CPT

## 2021-09-02 ENCOUNTER — APPOINTMENT (OUTPATIENT)
Dept: CARDIOLOGY | Facility: CLINIC | Age: 81
End: 2021-09-02
Payer: MEDICARE

## 2021-09-02 VITALS
DIASTOLIC BLOOD PRESSURE: 80 MMHG | BODY MASS INDEX: 33.92 KG/M2 | SYSTOLIC BLOOD PRESSURE: 124 MMHG | TEMPERATURE: 97.8 F | WEIGHT: 229 LBS | HEIGHT: 69 IN | HEART RATE: 76 BPM

## 2021-09-02 DIAGNOSIS — L91.8 OTHER HYPERTROPHIC DISORDERS OF THE SKIN: ICD-10-CM

## 2021-09-02 PROCEDURE — 99214 OFFICE O/P EST MOD 30 MIN: CPT

## 2021-09-02 PROCEDURE — 93000 ELECTROCARDIOGRAM COMPLETE: CPT

## 2021-09-05 PROBLEM — L91.8 ST (SKIN TAG): Status: RESOLVED | Noted: 2019-11-19 | Resolved: 2021-09-05

## 2021-09-05 NOTE — HISTORY OF PRESENT ILLNESS
[FreeTextEntry1] : 77 year-old male presents for hospital followup.\par \par Cardiac history of CAD s/p CABG s/p PCI, ischemic cardiomyopathy, and chronic systolic CHF s/p AICD. Previously followed with cardiologist in [place].\par \par Risk factors include hypertension, diabetes,hyperlipidemia, and CKD.\par \[facility] Presented 2 weeks ago with subacute decompensated CHF with volume overload and uncontrolled hypertension (possibly the setting of URI). Had been off Lasix for several weeks and antihypertensives for several days. Rapidly improved with diuresis.  Course complicated by KIANA.\par \par Feels well since discharge. No angina. Breathing comfortable. No palpitations, lightheadedness, syncope.  Previously had good functional capacity.  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 EF 25-35%. Moderate MR.\par \par Hospital labs reviewed:\par Creatinine 4.4\par Hemoglobin 14.7\par LFTs normal

## 2021-09-05 NOTE — DISCUSSION/SUMMARY
[FreeTextEntry1] : Cont ASA  and Lipitor.\par Cont Toprol and Entresto.\par Cont Ranexa.\par Dialysis and diuretics per renal.\par Low-intensity exercise.\par ECHO / follow-up 6-months.

## 2021-09-05 NOTE — REASON FOR VISIT
[Follow-Up - Clinic] : a clinic follow-up of [Cardiomyopathy] : cardiomyopathy [Coronary Artery Disease] : coronary artery disease [Heart Failure] : congestive heart failure [FreeTextEntry1] : Feels well.\par \par Still doing dialysis 2 / week.  Fatigue on those days.  Otherwise, active without exertional symptoms.\par \par No angina.  Breathing comfortable.\par \par Weight stable.\par \par Tolerating Rx.\par \par AICD interrogated (6/23/21): nL function / no arrhythmias.

## 2021-09-11 ENCOUNTER — APPOINTMENT (OUTPATIENT)
Dept: CARDIOLOGY | Facility: CLINIC | Age: 81
End: 2021-09-11

## 2021-11-04 NOTE — PROGRESS NOTE ADULT - SUBJECTIVE AND OBJECTIVE BOX
CHIEF COMPLAINT:    Patient is a 80y old  Male who presents with a chief complaint of SOB (30 Jan 2021 12:13)      INTERVAL HPI/OVERNIGHT EVENTS:    Patient seen and examined at bedside. No acute overnight events occurred.    ROS: Denies SOB, chest pain. All other systems are negative.    Vital Signs:    T(F): 97.8 (01-30-21 @ 07:35), Max: 101.1 (01-30-21 @ 00:00)  HR: 91 (01-30-21 @ 07:35) (86 - 105)  BP: 117/67 (01-30-21 @ 07:35) (111/65 - 141/86)  RR: 18 (01-30-21 @ 07:35) (18 - 18)  SpO2: 94% (01-30-21 @ 07:35) (93% - 94%)  I&O's Summary    29 Jan 2021 07:01  -  30 Jan 2021 07:00  --------------------------------------------------------  IN: 240 mL / OUT: 0 mL / NET: 240 mL    30 Jan 2021 07:01  -  30 Jan 2021 13:10  --------------------------------------------------------  IN: 600 mL / OUT: 860 mL / NET: -260 mL      POCT Blood Glucose.: 335 mg/dL (30 Jan 2021 12:28)  POCT Blood Glucose.: 292 mg/dL (30 Jan 2021 10:21)  POCT Blood Glucose.: 260 mg/dL (30 Jan 2021 08:45)  POCT Blood Glucose.: 193 mg/dL (29 Jan 2021 20:25)  POCT Blood Glucose.: 129 mg/dL (29 Jan 2021 16:48)      PHYSICAL EXAM:  GENERAL:  NAD  SKIN: No rashes or lesions  HEENT: Atraumatic. Normocephalic. Anicteric  NECK:  No JVD.   PULMONARY: No accessory muscle use  CVS: Regular rate  ABDOMEN/GI:  Bowel sounds are present  EXTREMITIES:  No edema B/L LE.  NEUROLOGIC:  No motor deficit.  PSYCH: Alert & oriented x 3, normal affect    Consultant(s) Notes Reviewed:  [x ] YES  [ ] NO      LABS:                        11.9   5.89  )-----------( 123      ( 30 Jan 2021 04:30 )             35.8     01-30    138  |  98  |  61<HH>  ----------------------------<  110<H>  4.6   |  23  |  5.2<HH>    Ca    8.5      30 Jan 2021 04:30  Mg     1.8     01-30    TPro  5.7<L>  /  Alb  3.8  /  TBili  0.7  /  DBili  x   /  AST  22  /  ALT  11  /  AlkPhos  44  01-30    PT/INR - ( 29 Jan 2021 05:53 )   PT: 13.10 sec;   INR: 1.14 ratio         PTT - ( 29 Jan 2021 05:53 )  PTT:32.8 sec  Serum Pro-Brain Natriuretic Peptide: 594 pg/mL (01-28-21 @ 09:39)    Trop 0.07, CKMB --, CK --, 01-30-21 @ 00:10  Trop 0.05, CKMB --, CK --, 01-29-21 @ 18:31  Trop 0.07, CKMB --, CK --, 01-28-21 @ 09:39      Culture - Blood (collected 28 Jan 2021 09:20)  Source: .Blood Blood  Preliminary Report (29 Jan 2021 23:01):    No growth to date.    Culture - Blood (collected 28 Jan 2021 09:15)  Source: .Blood Blood  Preliminary Report (29 Jan 2021 23:01):    No growth to date.        RADIOLOGY & ADDITIONAL TESTS:  No new images    Medications:  Standing  atorvastatin 40 milliGRAM(s) Oral at bedtime  calcium acetate 667 milliGRAM(s) Oral three times a day with meals  chlorhexidine 4% Liquid 1 Application(s) Topical <User Schedule>  dexAMETHasone  Injectable 6 milliGRAM(s) IV Push daily  dextrose 40% Gel 15 Gram(s) Oral once  dextrose 5%. 1000 milliLiter(s) IV Continuous <Continuous>  dextrose 5%. 1000 milliLiter(s) IV Continuous <Continuous>  dextrose 50% Injectable 25 Gram(s) IV Push once  dextrose 50% Injectable 12.5 Gram(s) IV Push once  dextrose 50% Injectable 25 Gram(s) IV Push once  ergocalciferol 80079 Unit(s) Oral <User Schedule>  furosemide    Tablet 40 milliGRAM(s) Oral two times a day  glucagon  Injectable 1 milliGRAM(s) IntraMuscular once  heparin   Injectable 5000 Unit(s) SubCutaneous every 8 hours  heparin   Injectable. 2000 Unit(s) Dialysis. once  insulin glargine SubCutaneous Injection (LANTUS) - Peds 30 Unit(s) SubCutaneous at bedtime  insulin lispro (ADMELOG) corrective regimen sliding scale   SubCutaneous three times a day before meals  metoprolol succinate ER 50 milliGRAM(s) Oral daily    PRN Meds  acetaminophen   Tablet .. 650 milliGRAM(s) Oral every 6 hours PRN      Case discussed with resident  Care discussed with pt         Gen: AAOx3, non-toxic  Head: NCAT  HEENT: EOMI, oral mucosa moist, normal conjunctiva  Lung: CTAB, no respiratory distress, no wheezes/rhonchi/rales B/L, speaking in full sentences  CV: RRR, no murmurs, rubs or gallops  Abd: soft, NTND, no guarding, no CVA tenderness  MSK: no visible deformities  Neuro: No focal sensory or motor deficits  Skin: Warm, well perfused, no rash  Psych: normal affect.   ~Jaison Ortiz M.D. Resident

## 2021-11-05 ENCOUNTER — APPOINTMENT (OUTPATIENT)
Dept: CARDIOLOGY | Facility: CLINIC | Age: 81
End: 2021-11-05
Payer: MEDICARE

## 2021-11-05 ENCOUNTER — NON-APPOINTMENT (OUTPATIENT)
Age: 81
End: 2021-11-05

## 2021-11-05 PROCEDURE — 93295 DEV INTERROG REMOTE 1/2/MLT: CPT | Mod: NC

## 2021-11-05 PROCEDURE — 93296 REM INTERROG EVL PM/IDS: CPT | Mod: NC

## 2021-12-16 ENCOUNTER — APPOINTMENT (OUTPATIENT)
Dept: CARDIOLOGY | Facility: CLINIC | Age: 81
End: 2021-12-16
Payer: MEDICARE

## 2021-12-16 VITALS
DIASTOLIC BLOOD PRESSURE: 70 MMHG | SYSTOLIC BLOOD PRESSURE: 124 MMHG | WEIGHT: 230 LBS | HEIGHT: 69 IN | HEART RATE: 76 BPM | TEMPERATURE: 97.7 F | BODY MASS INDEX: 34.07 KG/M2

## 2021-12-16 PROCEDURE — 99214 OFFICE O/P EST MOD 30 MIN: CPT

## 2021-12-16 PROCEDURE — 93000 ELECTROCARDIOGRAM COMPLETE: CPT

## 2021-12-16 PROCEDURE — 93306 TTE W/DOPPLER COMPLETE: CPT

## 2021-12-26 NOTE — HISTORY OF PRESENT ILLNESS
[FreeTextEntry1] : 77 year-old male presents for hospital followup.\par \par Cardiac history of CAD s/p CABG s/p PCI, ischemic cardiomyopathy, and chronic systolic CHF s/p AICD. Previously followed with cardiologist in [place].\par \par Risk factors include hypertension, diabetes,hyperlipidemia, and CKD.\par \[facility] Presented 2 weeks ago with subacute decompensated CHF with volume overload and uncontrolled hypertension (possibly the setting of URI). Had been off Lasix for several weeks and antihypertensives for several days. Rapidly improved with diuresis.  Course complicated by KIANA.\par \par Feels well since discharge. No angina. Breathing comfortable. No palpitations, lightheadedness, syncope.  Previously had good functional capacity.  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 EF 25-35%. Moderate MR.\par \par Hospital labs reviewed:\par Creatinine 4.4\par Hemoglobin 14.7\par LFTs normal

## 2021-12-26 NOTE — REASON FOR VISIT
[Follow-Up - Clinic] : a clinic follow-up of [Cardiomyopathy] : cardiomyopathy [Coronary Artery Disease] : coronary artery disease [Heart Failure] : congestive heart failure [FreeTextEntry1] : Feels well.\par \par Occasional exertional lightheadedness.  Does not notice clear association with dialysis (still doing\par 2 / week).\par \par Remains relatively active.\par \par No cheat pain.  Breathing relatively comfortable.\par \par Weight stable.\par \par Tolerating Rx.

## 2021-12-26 NOTE — ASSESSMENT
[FreeTextEntry1] : s/p CABG s/p PCI.\par No angina.\par \par ICM s/p AICD (moderate LV dysfunction).\par \par Mod AS / Mod MR.\par \par Compensated / euvolemic.\par \par BP controlled.\par \par ECHO (3/2021): EF 45-50%.  Mod MR.  Mod AS (MG 22 / KELTON 1.4).\par MPI (3/2021): IL infarct.  Small apical / AS reversible defect.  EF 35-40%.

## 2021-12-26 NOTE — DISCUSSION/SUMMARY
[FreeTextEntry1] : Cont ASA  and Lipitor.\par Cont Toprol and Entresto.\par Cont Ranexa.\par Dialysis and diuretics per renal.\par ECHO\par Follow-up 6-months.

## 2022-01-04 NOTE — PRE-ANESTHESIA EVALUATION ADULT - HEIGHT IN INCHES
9
You can access the FollowMyHealth Patient Portal offered by [facility] by registering at the following website: [email or web address]. By joining [email or web address]’s FollowMyHealth portal, you will also be able to view your health information using other applications (apps) compatible with our system.

## 2022-01-05 ENCOUNTER — APPOINTMENT (OUTPATIENT)
Dept: CARDIOLOGY | Facility: CLINIC | Age: 82
End: 2022-01-05

## 2022-01-12 ENCOUNTER — APPOINTMENT (OUTPATIENT)
Dept: CARDIOLOGY | Facility: CLINIC | Age: 82
End: 2022-01-12

## 2022-01-17 NOTE — CONSULT NOTE ADULT - ATTENDING COMMENTS
Patient informed thyroid normal lipids good LDL 76 kidney function normal D-dimer elevated let patient know next step is ultrasound left right lower extremity to evaluate for any blood clots orders placed.  Patient should complete ultrasound today. Explained to patient the reason for urgent imaging. Ultrasound at [place] can get him in today. Called patient back to inform him of this and he did not answer. Message left to return call. Patient called back, he is not able to be here by 1615. [facility] had an opening at 1815 today and patient agreeable. Appointment scheduled.   above noted abdomen soft neck no mass infection  for tessio placement on monday pt fully examined by me and agree with above

## 2022-02-04 ENCOUNTER — APPOINTMENT (OUTPATIENT)
Dept: CARDIOLOGY | Facility: CLINIC | Age: 82
End: 2022-02-04
Payer: COMMERCIAL

## 2022-02-04 ENCOUNTER — NON-APPOINTMENT (OUTPATIENT)
Age: 82
End: 2022-02-04

## 2022-02-04 PROCEDURE — 93295 DEV INTERROG REMOTE 1/2/MLT: CPT

## 2022-02-04 PROCEDURE — 93296 REM INTERROG EVL PM/IDS: CPT

## 2022-02-16 ENCOUNTER — APPOINTMENT (OUTPATIENT)
Dept: CARDIOLOGY | Facility: CLINIC | Age: 82
End: 2022-02-16
Payer: MEDICARE

## 2022-02-16 VITALS
SYSTOLIC BLOOD PRESSURE: 138 MMHG | HEART RATE: 85 BPM | DIASTOLIC BLOOD PRESSURE: 84 MMHG | HEIGHT: 70 IN | WEIGHT: 232.19 LBS | TEMPERATURE: 97.3 F | BODY MASS INDEX: 33.24 KG/M2

## 2022-02-16 PROCEDURE — 93000 ELECTROCARDIOGRAM COMPLETE: CPT | Mod: 59

## 2022-02-16 PROCEDURE — 93282 PRGRMG EVAL IMPLANTABLE DFB: CPT

## 2022-02-16 PROCEDURE — 99215 OFFICE O/P EST HI 40 MIN: CPT | Mod: 25

## 2022-02-16 RX ORDER — CALCIUM ACETATE 667 MG/1
667 CAPSULE ORAL
Qty: 540 | Refills: 0 | Status: COMPLETED | COMMUNITY
Start: 2021-07-02 | End: 2022-02-16

## 2022-02-16 NOTE — CARDIOLOGY SUMMARY
[de-identified] : 2/16/22 NSR (HR 85 bpm), RBBB, LAFB [de-identified] : 3/18/21  [de-identified] : Echo: 2/25/19, Moderate LV dilatation. EF 30-40%. Mild AS. Mod to Sev MR. Mild TR. LVEF 30-40%. \par ECHO (3/2021): EF 45-50%. Mod MR. Mod AS (MG 22 / KELTON 1.4).  [de-identified] : Severe triple vessel disease 80% stenosis of the LAD, diagonal, obtuse marginal, third obtuse marginal, ostial circumflex, as well as multiple seminal alert lesions throughout the RCA and PDA A. status post stents,

## 2022-02-16 NOTE — PHYSICAL EXAM
[Normal Appearance] : normal appearance [Well Groomed] : well groomed [No Deformities] : no deformities [General Appearance - In No Acute Distress] : no acute distress [Heart Rate And Rhythm] : heart rate and rhythm were normal [Heart Sounds] : normal S1 and S2 [Edema] : no peripheral edema present [] : no respiratory distress [Respiration, Rhythm And Depth] : normal respiratory rhythm and effort [Exaggerated Use Of Accessory Muscles For Inspiration] : no accessory muscle use [Auscultation Breath Sounds / Voice Sounds] : lungs were clear to auscultation bilaterally [Left Infraclavicular] : left infraclavicular area [Clean] : clean [Dry] : dry [Well-Healed] : well-healed [Erythema] : not erythematous [Warm] : not warm [Tender] : not tender [Abdomen Soft] : soft [FreeTextEntry1] : obese [Nail Clubbing] : no clubbing of the fingernails [Cyanosis, Localized] : no localized cyanosis

## 2022-02-16 NOTE — ASSESSMENT
[FreeTextEntry1] : Mr. Centeno is a 82 yo M with history of CAD s/p CABG (2015), ICM s/p single chamber ICD 12/7/2015, HTN, HL, DM, and ESRD on HD.\par \par # ICM s/p SC ICD\par - Device interrogation was performed as detailed above. Single Chamber AICD with normal function. Patient with two episodes of VT/VF. \par - Spoke with Dr. Nance who will set patient up for cath. \par - Cont GDMT.\par - Start Amio. \par - Recent labs from [facility] reviewed. \par \par # HTN\par - BP well controlled\par - Cont Metoprolol\par - 2g Na diet enforced\par \par We have also advised the patient to go to the nearest emergency room if he experiences any chest pain, dyspnea, syncope, or has any other compelling symptoms. \par \par Follow up in 6 weeks

## 2022-02-16 NOTE — PROCEDURE
[No] : not [NSR] : normal sinus rhythm [See Device Printout] : See device printout [ICD] : Implantable cardioverter-defibrillator [VVI] : VVI [Longevity: ___ months] : The estimated remaining battery life is [unfilled] months [Threshold Testing Performed] : Threshold testing was performed [Lead Imp:  ___ohms] : lead impedance was [unfilled] ohms [Sensing Amplitude ___mv] : sensing amplitude was [unfilled] mv [___V @] : [unfilled] V [___ ms] : [unfilled] ms [Programmed for Longevity] : output reprogrammed for improved battery longevity [Counters Reset] : the counters were reset [Pace ___ %] : Pace [unfilled]% [de-identified] : [facility]  [de-identified] : D145 [de-identified] : [id number] [de-identified] : 12/01/2015 [de-identified] : 40 [de-identified] : 2 treated VF events \par 2/1/22  10:06 PM successfully terminated by [facility] \par 2/2/22  10:14 PM successfully terminated by [facility]

## 2022-02-16 NOTE — HISTORY OF PRESENT ILLNESS
[de-identified] : \par Cardiologist: Dr. Nance\par \par 80 yo M with history of CAD s/p 5v CABG (2/25/2015), ICM s/p single chamber ICD (12/7/2015), obesity, ESRD on HD (Mon/Fri for 4.5 hrs), DM, COPD, who presents for routine device interrogation. He recalls that his device shocked him once after implant but he is not sure if it was appropriate. He had Covid in Feb 2021 and was hospitalized. He had angina and was started on Ranexa with resolution of symptoms.  MPI: IL infarct. Small apical / AS reversible defect. EF 35-40%.\par \par He states on Feb 1st, he was under a lot of stress. His son's car was stuck and he was carrying a heavy box to help him. Later that night he felt "terrible." He felt his heart racing. No syncope. He denies chest pain but admits to several months of worsening dyspnea with exertion. \par \par He denies chest pain, palpitations, dizziness, lightheadedness, presyncope or syncope.

## 2022-02-26 ENCOUNTER — LABORATORY RESULT (OUTPATIENT)
Age: 82
End: 2022-02-26

## 2022-03-01 ENCOUNTER — INPATIENT (INPATIENT)
Facility: HOSPITAL | Age: 82
LOS: 0 days | Discharge: HOME | End: 2022-03-02
Attending: INTERNAL MEDICINE | Admitting: INTERNAL MEDICINE
Payer: MEDICARE

## 2022-03-01 VITALS — HEART RATE: 64 BPM | DIASTOLIC BLOOD PRESSURE: 86 MMHG | SYSTOLIC BLOOD PRESSURE: 144 MMHG | RESPIRATION RATE: 20 BRPM

## 2022-03-01 LAB
ANION GAP SERPL CALC-SCNC: 13 MMOL/L — SIGNIFICANT CHANGE UP (ref 7–14)
BUN SERPL-MCNC: 53 MG/DL — HIGH (ref 10–20)
CALCIUM SERPL-MCNC: 9.2 MG/DL — SIGNIFICANT CHANGE UP (ref 8.5–10.1)
CHLORIDE SERPL-SCNC: 96 MMOL/L — LOW (ref 98–110)
CO2 SERPL-SCNC: 30 MMOL/L — SIGNIFICANT CHANGE UP (ref 17–32)
CREAT SERPL-MCNC: 4.7 MG/DL — CRITICAL HIGH (ref 0.7–1.5)
EGFR: 12 ML/MIN/1.73M2 — LOW
GLUCOSE BLDC GLUCOMTR-MCNC: 138 MG/DL — HIGH (ref 70–99)
GLUCOSE BLDC GLUCOMTR-MCNC: 239 MG/DL — HIGH (ref 70–99)
GLUCOSE BLDC GLUCOMTR-MCNC: 288 MG/DL — HIGH (ref 70–99)
GLUCOSE SERPL-MCNC: 131 MG/DL — HIGH (ref 70–99)
HCT VFR BLD CALC: 45 % — SIGNIFICANT CHANGE UP (ref 42–52)
HGB BLD-MCNC: 14.5 G/DL — SIGNIFICANT CHANGE UP (ref 14–18)
MCHC RBC-ENTMCNC: 28.5 PG — SIGNIFICANT CHANGE UP (ref 27–31)
MCHC RBC-ENTMCNC: 32.2 G/DL — SIGNIFICANT CHANGE UP (ref 32–37)
MCV RBC AUTO: 88.6 FL — SIGNIFICANT CHANGE UP (ref 80–94)
NRBC # BLD: 0 /100 WBCS — SIGNIFICANT CHANGE UP (ref 0–0)
PLATELET # BLD AUTO: 177 K/UL — SIGNIFICANT CHANGE UP (ref 130–400)
POTASSIUM SERPL-MCNC: 4.8 MMOL/L — SIGNIFICANT CHANGE UP (ref 3.5–5)
POTASSIUM SERPL-SCNC: 4.8 MMOL/L — SIGNIFICANT CHANGE UP (ref 3.5–5)
RBC # BLD: 5.08 M/UL — SIGNIFICANT CHANGE UP (ref 4.7–6.1)
RBC # FLD: 13.9 % — SIGNIFICANT CHANGE UP (ref 11.5–14.5)
SODIUM SERPL-SCNC: 139 MMOL/L — SIGNIFICANT CHANGE UP (ref 135–146)
WBC # BLD: 10.39 K/UL — SIGNIFICANT CHANGE UP (ref 4.8–10.8)
WBC # FLD AUTO: 10.39 K/UL — SIGNIFICANT CHANGE UP (ref 4.8–10.8)

## 2022-03-01 PROCEDURE — 93455 CORONARY ART/GRFT ANGIO S&I: CPT | Mod: 26

## 2022-03-01 RX ORDER — CALCIUM ACETATE 667 MG
1 TABLET ORAL
Qty: 0 | Refills: 0 | DISCHARGE

## 2022-03-01 RX ORDER — AMIODARONE HYDROCHLORIDE 400 MG/1
200 TABLET ORAL DAILY
Refills: 0 | Status: DISCONTINUED | OUTPATIENT
Start: 2022-03-01 | End: 2022-03-02

## 2022-03-01 RX ORDER — INSULIN GLARGINE 100 [IU]/ML
30 INJECTION, SOLUTION SUBCUTANEOUS AT BEDTIME
Refills: 0 | Status: DISCONTINUED | OUTPATIENT
Start: 2022-03-01 | End: 2022-03-02

## 2022-03-01 RX ORDER — ATORVASTATIN CALCIUM 80 MG/1
40 TABLET, FILM COATED ORAL AT BEDTIME
Refills: 0 | Status: DISCONTINUED | OUTPATIENT
Start: 2022-03-01 | End: 2022-03-02

## 2022-03-01 RX ORDER — SACUBITRIL AND VALSARTAN 24; 26 MG/1; MG/1
1 TABLET, FILM COATED ORAL
Refills: 0 | Status: DISCONTINUED | OUTPATIENT
Start: 2022-03-01 | End: 2022-03-02

## 2022-03-01 RX ORDER — FUROSEMIDE 40 MG
40 TABLET ORAL DAILY
Refills: 0 | Status: DISCONTINUED | OUTPATIENT
Start: 2022-03-01 | End: 2022-03-02

## 2022-03-01 RX ORDER — CHOLECALCIFEROL (VITAMIN D3) 125 MCG
1 CAPSULE ORAL
Qty: 0 | Refills: 0 | DISCHARGE

## 2022-03-01 RX ADMIN — ATORVASTATIN CALCIUM 40 MILLIGRAM(S): 80 TABLET, FILM COATED ORAL at 22:29

## 2022-03-01 RX ADMIN — INSULIN GLARGINE 30 UNIT(S): 100 INJECTION, SOLUTION SUBCUTANEOUS at 23:28

## 2022-03-01 NOTE — ASU PATIENT PROFILE, ADULT - FALL HARM RISK - UNIVERSAL INTERVENTIONS
Bed in lowest position, wheels locked, appropriate side rails in place/Call bell, personal items and telephone in reach/Instruct patient to call for assistance before getting out of bed or chair/Non-slip footwear when patient is out of bed/[place] to call system/Physically safe environment - no spills, clutter or unnecessary equipment/Purposeful Proactive Rounding/Room/bathroom lighting operational, light cord in reach

## 2022-03-01 NOTE — H&P CARDIOLOGY - NSICDXPASTMEDICALHX_GEN_ALL_CORE_FT
PAST MEDICAL HISTORY:  BPH (Benign Prostatic Hyperplasia)     CAD (Coronary Artery Disease)     CAD (coronary artery disease)     CHF (congestive heart failure)     Diabetes mellitus     Diabetes Mellitus Type II     GERD (gastroesophageal reflux disease)     H/O hyperlipidemia     History of PTCA with stents 10 years ago ([facility])    HTN (Hypertension)     HTN - Hypertension     Hypercholesterolemia     Mitral valve regurgitation     Renal insufficiency     Sleep apnea does not use machine

## 2022-03-01 NOTE — CHART NOTE - NSCHARTNOTEFT_GEN_A_CORE
PRE-OP DIAGNOSIS: VT/VF on device interrogation      PROCEDURE: Coronary angiogram, [facility]      Attending:  Dr. Goyo LEBRON [facility]    Interventional Fellow: Dr. Ureña       Consent:      [x] Patient     [] Family Member     []  Used        Anesthesia:     [] General     [x] Sedation     [x] Local        Access & Closure:     [x] 6 Fr Right Femoral Artery -> Perclosed      IV Contrast: 90  mL        Intervention: None        FINDINGS:     Coronary circulation    RCA, dominant : proximal - 90% ISR, mid-   LM : proximal -     GRAFTS  -Patent LIMA to distal LAD: healthy graft  -SVG jump graft to OM/Diagonal: graft has luminal irregularities, native vessels are mildly diseased,   -SVG jump graft to PDA/RI: graft is mildly diseased, severe disease in RI and PDA           ESTIMATED BLOOD LOSS: < 15 mL        CONDITION:     [x] Good     [] Fair     [] Critical        SPECIMEN REMOVED: N/A       POST-OP DIAGNOSIS:    Severe triple vessel CAD   Patent grafts       PLAN OF CARE:   Admit to floor post recovery for HD in am  No IVF  Optimize medical therapy for CAD PRE-OP DIAGNOSIS: VF, CAD, ICM, CHF    PROCEDURE: Coronary / Graft Angiogram    PHYSICIAN: Zay Nance  ASSISTANT: Fellow     Consent:    [x] Patient     [] Family Member     []  Used     Anesthesia:   [] General     [x] Sedation     [x] Local     Access & Closure:   [x] 6 Fr Right Femoral Artery -> Perclose    IV Contrast: 90  mL      Intervention: None    FINDINGS:   Dominance: Right    Severe native CAD  Patent bypass grafts  (see report for details)    ESTIMATED BLOOD LOSS: < 10 mL        CONDITION:   [x] Good     [] Fair     [] Critical      SPECIMEN REMOVED: N/A     POST-OP DIAGNOSIS:    Same    PLAN OF CARE:   Admission for dialysis.  Medical therapy.

## 2022-03-01 NOTE — ASU PATIENT PROFILE, ADULT - AS SC BRADEN MOISTURE
How Severe Is Your Acne?: moderate (4) rarely moist Is This A New Presentation, Or A Follow-Up?: Acne Additional Comments (Use Complete Sentences): Patient has had acne on the face, chest and back for over 10 years. She has tried numerous over-the-counter topical medications but no prescription-based medications. She is simply tired of the problem and is interested in treatment options to clear this up. Of note she did recently see her gynecologist who put her on a generic version of Ldua but she has not started taking this birth-control pill yet. She did take birth control in high school but it did not help her acne at that time. She does have some episodic worsening around her menstrual. But it is not consistent

## 2022-03-01 NOTE — PATIENT PROFILE ADULT - FALL HARM RISK - HARM RISK INTERVENTIONS

## 2022-03-01 NOTE — H&P CARDIOLOGY - HISTORY OF PRESENT ILLNESS
81Y M w/h/o CAD s/p 5V CABG (2\25\2015), ICM s/p AICD 2015, ESRD on HD (m/w/f), DM, COPD, HLD, and MR referred for on account VT x2 on device interrogation 81Y M w/h/o CAD s/p 5V CABG (2\25\2015), ICM s/p AICD 2015, ESRD on HD (m/w/f), DM, COPD, HLD, and MR referred for on account VT/VF x2 on device interrogation

## 2022-03-01 NOTE — ASU PATIENT PROFILE, ADULT - NSICDXPASTMEDICALHX_GEN_ALL_CORE_FT
PAST MEDICAL HISTORY:  BPH (Benign Prostatic Hyperplasia)     CAD (Coronary Artery Disease)     CAD (coronary artery disease)     CHF (congestive heart failure)     Diabetes mellitus     Diabetes Mellitus Type II     GERD (gastroesophageal reflux disease)     H/O hyperlipidemia     History of PTCA with stents 10 years ago ([facility])    HTN (Hypertension)     HTN - Hypertension     Hypercholesterolemia     Mitral valve regurgitation     Renal insufficiency     Sleep apnea does not use machine

## 2022-03-02 ENCOUNTER — TRANSCRIPTION ENCOUNTER (OUTPATIENT)
Age: 82
End: 2022-03-02

## 2022-03-02 VITALS — HEART RATE: 73 BPM | SYSTOLIC BLOOD PRESSURE: 130 MMHG | DIASTOLIC BLOOD PRESSURE: 65 MMHG | RESPIRATION RATE: 18 BRPM

## 2022-03-02 LAB
A1C WITH ESTIMATED AVERAGE GLUCOSE RESULT: 8.7 % — HIGH (ref 4–5.6)
ALBUMIN SERPL ELPH-MCNC: 4.3 G/DL — SIGNIFICANT CHANGE UP (ref 3.5–5.2)
ALP SERPL-CCNC: 61 U/L — SIGNIFICANT CHANGE UP (ref 30–115)
ALT FLD-CCNC: 7 U/L — SIGNIFICANT CHANGE UP (ref 0–41)
ANION GAP SERPL CALC-SCNC: 15 MMOL/L — HIGH (ref 7–14)
AST SERPL-CCNC: 12 U/L — SIGNIFICANT CHANGE UP (ref 0–41)
BASOPHILS # BLD AUTO: 0.05 K/UL — SIGNIFICANT CHANGE UP (ref 0–0.2)
BASOPHILS NFR BLD AUTO: 0.5 % — SIGNIFICANT CHANGE UP (ref 0–1)
BILIRUB SERPL-MCNC: 0.4 MG/DL — SIGNIFICANT CHANGE UP (ref 0.2–1.2)
BUN SERPL-MCNC: 55 MG/DL — HIGH (ref 10–20)
CALCIUM SERPL-MCNC: 8.8 MG/DL — SIGNIFICANT CHANGE UP (ref 8.5–10.1)
CHLORIDE SERPL-SCNC: 99 MMOL/L — SIGNIFICANT CHANGE UP (ref 98–110)
CO2 SERPL-SCNC: 24 MMOL/L — SIGNIFICANT CHANGE UP (ref 17–32)
CREAT SERPL-MCNC: 4.7 MG/DL — CRITICAL HIGH (ref 0.7–1.5)
EGFR: 12 ML/MIN/1.73M2 — LOW
EOSINOPHIL # BLD AUTO: 0.26 K/UL — SIGNIFICANT CHANGE UP (ref 0–0.7)
EOSINOPHIL NFR BLD AUTO: 2.5 % — SIGNIFICANT CHANGE UP (ref 0–8)
ESTIMATED AVERAGE GLUCOSE: 203 MG/DL — HIGH (ref 68–114)
GLUCOSE BLDC GLUCOMTR-MCNC: 128 MG/DL — HIGH (ref 70–99)
GLUCOSE SERPL-MCNC: 123 MG/DL — HIGH (ref 70–99)
HCT VFR BLD CALC: 43.9 % — SIGNIFICANT CHANGE UP (ref 42–52)
HGB BLD-MCNC: 14.6 G/DL — SIGNIFICANT CHANGE UP (ref 14–18)
IMM GRANULOCYTES NFR BLD AUTO: 0.5 % — HIGH (ref 0.1–0.3)
LYMPHOCYTES # BLD AUTO: 1.82 K/UL — SIGNIFICANT CHANGE UP (ref 1.2–3.4)
LYMPHOCYTES # BLD AUTO: 17.3 % — LOW (ref 20.5–51.1)
MAGNESIUM SERPL-MCNC: 2.1 MG/DL — SIGNIFICANT CHANGE UP (ref 1.8–2.4)
MCHC RBC-ENTMCNC: 28.9 PG — SIGNIFICANT CHANGE UP (ref 27–31)
MCHC RBC-ENTMCNC: 33.3 G/DL — SIGNIFICANT CHANGE UP (ref 32–37)
MCV RBC AUTO: 86.8 FL — SIGNIFICANT CHANGE UP (ref 80–94)
MONOCYTES # BLD AUTO: 1.37 K/UL — HIGH (ref 0.1–0.6)
MONOCYTES NFR BLD AUTO: 13.1 % — HIGH (ref 1.7–9.3)
NEUTROPHILS # BLD AUTO: 6.94 K/UL — HIGH (ref 1.4–6.5)
NEUTROPHILS NFR BLD AUTO: 66.1 % — SIGNIFICANT CHANGE UP (ref 42.2–75.2)
NRBC # BLD: 0 /100 WBCS — SIGNIFICANT CHANGE UP (ref 0–0)
PLATELET # BLD AUTO: 169 K/UL — SIGNIFICANT CHANGE UP (ref 130–400)
POTASSIUM SERPL-MCNC: 4.4 MMOL/L — SIGNIFICANT CHANGE UP (ref 3.5–5)
POTASSIUM SERPL-SCNC: 4.4 MMOL/L — SIGNIFICANT CHANGE UP (ref 3.5–5)
PROT SERPL-MCNC: 5.9 G/DL — LOW (ref 6–8)
RBC # BLD: 5.06 M/UL — SIGNIFICANT CHANGE UP (ref 4.7–6.1)
RBC # FLD: 14.2 % — SIGNIFICANT CHANGE UP (ref 11.5–14.5)
SARS-COV-2 RNA SPEC QL NAA+PROBE: SIGNIFICANT CHANGE UP
SODIUM SERPL-SCNC: 138 MMOL/L — SIGNIFICANT CHANGE UP (ref 135–146)
WBC # BLD: 10.49 K/UL — SIGNIFICANT CHANGE UP (ref 4.8–10.8)
WBC # FLD AUTO: 10.49 K/UL — SIGNIFICANT CHANGE UP (ref 4.8–10.8)

## 2022-03-02 PROCEDURE — 99238 HOSP IP/OBS DSCHRG MGMT 30/<: CPT

## 2022-03-02 RX ORDER — SODIUM CHLORIDE 9 MG/ML
1000 INJECTION, SOLUTION INTRAVENOUS
Refills: 0 | Status: DISCONTINUED | OUTPATIENT
Start: 2022-03-02 | End: 2022-03-02

## 2022-03-02 RX ORDER — INSULIN LISPRO 100/ML
VIAL (ML) SUBCUTANEOUS
Refills: 0 | Status: DISCONTINUED | OUTPATIENT
Start: 2022-03-02 | End: 2022-03-02

## 2022-03-02 RX ORDER — DEXTROSE 50 % IN WATER 50 %
12.5 SYRINGE (ML) INTRAVENOUS ONCE
Refills: 0 | Status: DISCONTINUED | OUTPATIENT
Start: 2022-03-02 | End: 2022-03-02

## 2022-03-02 RX ORDER — DEXTROSE 50 % IN WATER 50 %
25 SYRINGE (ML) INTRAVENOUS ONCE
Refills: 0 | Status: DISCONTINUED | OUTPATIENT
Start: 2022-03-02 | End: 2022-03-02

## 2022-03-02 RX ORDER — DEXTROSE 50 % IN WATER 50 %
15 SYRINGE (ML) INTRAVENOUS ONCE
Refills: 0 | Status: DISCONTINUED | OUTPATIENT
Start: 2022-03-02 | End: 2022-03-02

## 2022-03-02 RX ORDER — GLUCAGON INJECTION, SOLUTION 0.5 MG/.1ML
1 INJECTION, SOLUTION SUBCUTANEOUS ONCE
Refills: 0 | Status: DISCONTINUED | OUTPATIENT
Start: 2022-03-02 | End: 2022-03-02

## 2022-03-02 RX ADMIN — SACUBITRIL AND VALSARTAN 1 TABLET(S): 24; 26 TABLET, FILM COATED ORAL at 05:35

## 2022-03-02 RX ADMIN — AMIODARONE HYDROCHLORIDE 200 MILLIGRAM(S): 400 TABLET ORAL at 05:35

## 2022-03-02 RX ADMIN — Medication 40 MILLIGRAM(S): at 05:35

## 2022-03-02 NOTE — DISCHARGE NOTE PROVIDER - NSDCCPCAREPLAN_GEN_ALL_CORE_FT
PRINCIPAL DISCHARGE DIAGNOSIS  Diagnosis: Ventricular tachycardia  Assessment and Plan of Treatment: For VT/Vfib on device interrogation  For CAD  - Patient was referred for VT/VF on device interrogation  - He is status post cardiac catheterization 03/01: patent LIMA and AVG jump graft to PDA and distal LAD  - He was  admitted to  telemetry for monitoring  - On discharge  --> Will ask patient to follow up with Dr Nance in 1 week  --> Will ask patient to continue Amiodarone 200mg QD  --> Will ask patient to continue Atorvastatin 40mg QD      SECONDARY DISCHARGE DIAGNOSES  Diagnosis: HF (heart failure)  Assessment and Plan of Treatment: For HFpEF   - TTE 03/07/21 EF 55%, DD I, mild TR, mod AS  - On discharge  --> Will ask patient to continue PO lasix 40mg QD  --> Will ask patient to continue entresto 24/26mg BID    Diagnosis: ESRD on dialysis  Assessment and Plan of Treatment: For ESRD on HD  - Nephrology Team was consulted (Dr Du)  - Patient was sent for HD session on 03/02  - On discharge  --> Will ask patient to follow up outpatient for HD session resumption

## 2022-03-02 NOTE — DISCHARGE NOTE PROVIDER - HOSPITAL COURSE
Mr Centeno is a 81 year old male patient with history of COPD, CAD s/p CABG, ICM s/p AICD 2015, ESRD, HTN, DM, and HFpEF who was referred for evaluation of VT V fib x2 on device interrogation, admitted for cardiac catheterization, found to have CAD. Please refer to below for more details:      For VT/Vfib on device interrogation  For CAD  - Patient was referred for VT/VF on device interrogation  - He is status post cardiac catheterization 03/01: patent LIMA and AVG jump graft to PDA and distal LAD  - He was  admitted to  telemetry for monitoring  - On discharge  --> Will ask patient to follow up with Dr Nance in 1 week  --> Will ask patient to continue Amiodarone 200mg QD  --> Will ask patient to continue Atorvastatin 40mg QD      For HFpEF and ICM s/p AICD 2015  - TTE 03/07/21 EF 55%, DD I, mild TR, mod AS  - On discharge  --> Will ask patient to continue PO lasix 40mg QD  --> Will ask patient to continue entresto 24/26mg BID      For ESRD on HD  - Nephrology Team was consulted (Dr Du)  - Patient was sent for HD session on 03/02  - On discharge  --> Will ask patient to follow up outpatient for HD session resumption   Mr Centeno is a 81 year old male patient with history of COPD, CAD s/p CABG, ICM s/p AICD 2015, ESRD, HTN, DM, and HFpEF who was referred for evaluation of VT V fib x2 on device interrogation, admitted for cardiac catheterization, found to have patent grafts. Please refer to below for more details:      For VT/Vfib on device interrogation  For CAD  - Patient was referred for VT/VF on device interrogation  - He is status post cardiac catheterization 03/01: patent LIMA and AVG jump graft to PDA and distal LAD  - He was  admitted to  telemetry for monitoring  - On discharge  --> Will ask patient to follow up with Dr Nance in 1 week  --> Will ask patient to continue Amiodarone 200mg QD  --> Will ask patient to continue Atorvastatin 40mg QD      For HFpEF and ICM s/p AICD 2015  - TTE 03/07/21 EF 55%, DD I, mild TR, mod AS  - On discharge  --> Will ask patient to continue PO lasix 40mg QD  --> Will ask patient to continue entresto 24/26mg BID      For ESRD on HD  - Nephrology Team was consulted (Dr Du)  - Patient was sent for HD session on 03/02  - On discharge  --> Will ask patient to follow up outpatient for HD session resumption

## 2022-03-02 NOTE — DISCHARGE NOTE NURSING/CASE MANAGEMENT/SOCIAL WORK - NSDCPEFALRISK_GEN_ALL_CORE
For information on Fall & Injury Prevention, visit: [email or web address] OR  [email or web address] OR  https://www.cdc.gov/steadi/patient.html

## 2022-03-02 NOTE — DISCHARGE NOTE NURSING/CASE MANAGEMENT/SOCIAL WORK - NSDCVIVACCINE_GEN_ALL_CORE_FT
Tdap; 03-Apr-2019 17:34; Lucero Johnson (PIOTR); [employer]; [id number] (Exp. Date: 20-Nov-2020); IntraMuscular; Deltoid Right.; 0.5 milliLiter(s); VIS (VIS Published: 09-May-2013, VIS Presented: 03-Apr-2019);

## 2022-03-02 NOTE — DISCHARGE NOTE NURSING/CASE MANAGEMENT/SOCIAL WORK - PATIENT PORTAL LINK FT
You can access the FollowMyHealth Patient Portal offered by [facility] by registering at the following website: [email or web address]. By joining [employer]’s FollowMyHealth portal, you will also be able to view your health information using other applications (apps) compatible with our system.

## 2022-03-02 NOTE — DISCHARGE NOTE PROVIDER - NSDCMRMEDTOKEN_GEN_ALL_CORE_FT
amiodarone 200 mg oral tablet: 1 tab(s) orally once a day  Entresto 24 mg-26 mg oral tablet: 1 tab(s) orally 2 times a day  furosemide 40 mg oral tablet: 1 tab(s) orally once a day  Lantus 100 units/mL subcutaneous solution: 30 unit(s) subcutaneous once a day (at bedtime)  Lipitor 40 mg oral tablet: 1 tab(s) orally once a day (at bedtime)  metoprolol succinate 50 mg oral tablet, extended release: 1 tab(s) orally once a day  Ranexa 500 mg oral tablet, extended release: 1 tab(s) orally 2 times a day

## 2022-03-02 NOTE — CONSULT NOTE ADULT - SUBJECTIVE AND OBJECTIVE BOX
NEPHROLOGY CONSULTATION NOTE        PAST MEDICAL & SURGICAL HISTORY:  HTN (Hypertension)    Diabetes Mellitus Type II    Hypercholesterolemia    CAD (Coronary Artery Disease)    History of PTCA  with stents 10 years ago ([facility])    Diabetes mellitus    CAD (coronary artery disease)    H/O hyperlipidemia    HTN - Hypertension    Renal insufficiency    Sleep apnea  does not use machine    GERD (gastroesophageal reflux disease)    BPH (Benign Prostatic Hyperplasia)    CHF (congestive heart failure)    Mitral valve regurgitation    S/P knee surgery  b/l arthroscopic    S/P tonsillectomy    S/P primary angioplasty with coronary stent  6-7 stents last one in 10/12    S/P appendectomy      Allergies:  No Known Allergies    Home Medications Reviewed    SOCIAL HISTORY:  Denies ETOH,Smoking,   FAMILY HISTORY:  No pertinent family history in first degree relatives          REVIEW OF SYSTEMS:  CONSTITUTIONAL: No weakness, fevers or chills  EYES/ENT: No visual changes;  No vertigo or throat pain   NECK: No pain or stiffness  RESPIRATORY: No cough, wheezing, hemoptysis; No shortness of breath  CARDIOVASCULAR: No chest pain or palpitations.  GASTROINTESTINAL: No abdominal or epigastric pain. No nausea, vomiting, or hematemesis; No diarrhea or constipation. No melena or hematochezia.  GENITOURINARY: No dysuria, frequency, foamy urine, urinary urgency, incontinence or hematuria  NEUROLOGICAL: No numbness or weakness  SKIN: No itching, burning, rashes, or lesions   VASCULAR: No bilateral lower extremity edema.   All other review of systems is negative unless indicated above.    PHYSICAL EXAM:  Constitutional: NAD  HEENT: anicteric sclera, oropharynx clear, MMM  Neck: No JVD  Respiratory: CTAB, no wheezes, rales or rhonchi  Cardiovascular: S1, S2, RRR  Gastrointestinal: BS+, soft, NT/ND  Extremities: No cyanosis or clubbing. No peripheral edema  Neurological: A/O x 3, no focal deficits  Psychiatric: Normal mood, normal affect  : No CVA tenderness. No woods.   Skin: No rashes  Vascular Access: NEPHROLOGY CONSULTATION NOTE    82 yo male with PMH of CAD s/p 5V CABG (2\25\2015), ICM s/p AICD 2015, ESRD on HD (m/f), DM, COPD, HLD, and MR found to have VT/VF on AICD.  s/p cardiac cath without intervention.  Last HD Monday.  Pt dialyzes  in [place], but well known to me.  No current complaints.  Wants to go home immediately after dialysis    PAST MEDICAL & SURGICAL HISTORY:  HTN (Hypertension)    Diabetes Mellitus Type II    Hypercholesterolemia    CAD (Coronary Artery Disease)    History of PTCA  with [facility]    Diabetes mellitus    CAD (coronary artery disease)    H/O hyperlipidemia    HTN - Hypertension    Renal insufficiency    Sleep apnea  does not use machine    GERD (gastroesophageal reflux disease)    BPH (Benign Prostatic Hyperplasia)    CHF (congestive heart failure)    Mitral valve regurgitation    S/P knee surgery  b/l arthroscopic    S/P tonsillectomy    S/P primary angioplasty with coronary stent  6-7 stents last one in 10/12    S/P appendectomy      Allergies:  No Known Allergies    Home Medications Reviewed    SOCIAL HISTORY:  Denies ETOH,Smoking,   FAMILY HISTORY:  No pertinent family history in first degree relatives          REVIEW OF SYSTEMS:  CONSTITUTIONAL: No weakness, fevers or chills  EYES/ENT: No visual changes;  No vertigo or throat pain   NECK: No pain or stiffness  RESPIRATORY: No cough, wheezing, hemoptysis; No shortness of breath  CARDIOVASCULAR: No chest pain or palpitations.  GASTROINTESTINAL: No abdominal or epigastric pain. No nausea, vomiting, or hematemesis; No diarrhea or constipation. No melena or hematochezia.  GENITOURINARY: No dysuria, frequency, foamy urine, urinary urgency, incontinence or hematuria  NEUROLOGICAL: No numbness or weakness  SKIN: No itching, burning, rashes, or lesions   VASCULAR: No bilateral lower extremity edema.   All other review of systems is negative unless indicated above.    PHYSICAL EXAM:  Constitutional: NAD  HEENT: anicteric sclera, oropharynx clear, MMM  Neck: No JVD  Respiratory: CTAB, no wheezes, rales or rhonchi  Cardiovascular: S1, S2, RRR  Gastrointestinal: BS+, soft, NT/ND  Extremities: No cyanosis or clubbing. No peripheral edema  Neurological: A/O x 3, no focal deficits  Psychiatric: Normal mood, normal affect  : No CVA tenderness. No woods.   Skin: No rashes  Vascular Access: left arm AVF    Hospital Medications:   MEDICATIONS  (STANDING):  aMIOdarone    Tablet 200 milliGRAM(s) Oral daily  atorvastatin 40 milliGRAM(s) Oral at bedtime  dextrose 40% Gel 15 Gram(s) Oral once  dextrose 5%. 1000 milliLiter(s) (50 mL/Hr) IV Continuous <Continuous>  dextrose 5%. 1000 milliLiter(s) (100 mL/Hr) IV Continuous <Continuous>  dextrose 50% Injectable 25 Gram(s) IV Push once  dextrose 50% Injectable 12.5 Gram(s) IV Push once  dextrose 50% Injectable 25 Gram(s) IV Push once  furosemide    Tablet 40 milliGRAM(s) Oral daily  glucagon  Injectable 1 milliGRAM(s) IntraMuscular once  insulin glargine Injectable (LANTUS) 30 Unit(s) SubCutaneous at bedtime  insulin lispro (ADMELOG) corrective regimen sliding scale   SubCutaneous three times a day before meals  sacubitril 24 mG/valsartan 26 mG 1 Tablet(s) Oral two times a day        VITALS:  T(F): 96.5 (03-02-22 @ 04:34), Max: 96.7 (03-01-22 @ 18:20)  HR: 72 (03-02-22 @ 06:32)  BP: 169/89 (03-02-22 @ 06:32)  RR: 18 (03-02-22 @ 04:34)  SpO2: 98% (03-01-22 @ 18:20)  Wt(kg): --    03-01 @ 07:01  -  03-02 @ 07:00  --------------------------------------------------------  IN: 600 mL / OUT: 800 mL / NET: -200 mL          LABS:  03-02    138  |  99  |  55<H>  ----------------------------<  123<H>  4.4   |  24  |  4.7<HH>    Ca    8.8      02 Mar 2022 06:30  Mg     2.1     03-02    TPro  5.9<L>  /  Alb  4.3  /  TBili  0.4  /  DBili      /  AST  12  /  ALT  7   /  AlkPhos  61  03-02                          14.6   10.49 )-----------( 169      ( 02 Mar 2022 06:30 )             43.9       Urine Studies:        RADIOLOGY & ADDITIONAL STUDIES:

## 2022-03-02 NOTE — DISCHARGE NOTE PROVIDER - CARE PROVIDER_API CALL
Zay Nance)  Cardiovascular Disease; Internal Medicine  [address] [place] [address]  Phone: [phone]  Fax: [phone]  Established Patient  Follow Up Time:

## 2022-03-02 NOTE — DISCHARGE NOTE PROVIDER - NSDCFUSCHEDAPPT_GEN_ALL_CORE_FT
[facility] ; 03/10/2022 ; [facility] [address] [facility] ; 03/10/2022 ; [facility] [address] [facility] ; 03/30/2022 ; [facility] [address] [facility] ; 05/17/2022 ; [facility] [address] [facility]

## 2022-03-10 ENCOUNTER — APPOINTMENT (OUTPATIENT)
Dept: CARDIOLOGY | Facility: CLINIC | Age: 82
End: 2022-03-10

## 2022-03-15 DIAGNOSIS — J44.9 CHRONIC OBSTRUCTIVE PULMONARY DISEASE, UNSPECIFIED: ICD-10-CM

## 2022-03-15 DIAGNOSIS — N18.6 END STAGE RENAL DISEASE: ICD-10-CM

## 2022-03-15 DIAGNOSIS — I25.10 ATHEROSCLEROTIC HEART DISEASE OF NATIVE CORONARY ARTERY WITHOUT ANGINA PECTORIS: ICD-10-CM

## 2022-03-15 DIAGNOSIS — I25.5 ISCHEMIC CARDIOMYOPATHY: ICD-10-CM

## 2022-03-15 DIAGNOSIS — I47.2 VENTRICULAR TACHYCARDIA: ICD-10-CM

## 2022-03-15 DIAGNOSIS — I13.2 HYPERTENSIVE HEART AND CHRONIC KIDNEY DISEASE WITH HEART FAILURE AND WITH STAGE 5 CHRONIC KIDNEY DISEASE, OR END STAGE RENAL DISEASE: ICD-10-CM

## 2022-03-15 DIAGNOSIS — I34.0 NONRHEUMATIC MITRAL (VALVE) INSUFFICIENCY: ICD-10-CM

## 2022-03-15 DIAGNOSIS — F17.290 NICOTINE DEPENDENCE, OTHER TOBACCO PRODUCT, UNCOMPLICATED: ICD-10-CM

## 2022-03-15 DIAGNOSIS — Z95.810 PRESENCE OF AUTOMATIC (IMPLANTABLE) CARDIAC DEFIBRILLATOR: ICD-10-CM

## 2022-03-15 DIAGNOSIS — Z90.89 ACQUIRED ABSENCE OF OTHER ORGANS: ICD-10-CM

## 2022-03-15 DIAGNOSIS — I49.01 VENTRICULAR FIBRILLATION: ICD-10-CM

## 2022-03-15 DIAGNOSIS — G47.30 SLEEP APNEA, UNSPECIFIED: ICD-10-CM

## 2022-03-15 DIAGNOSIS — Z95.1 PRESENCE OF AORTOCORONARY BYPASS GRAFT: ICD-10-CM

## 2022-03-15 DIAGNOSIS — D64.9 ANEMIA, UNSPECIFIED: ICD-10-CM

## 2022-03-15 DIAGNOSIS — Z99.2 DEPENDENCE ON RENAL DIALYSIS: ICD-10-CM

## 2022-03-15 DIAGNOSIS — Z79.4 LONG TERM (CURRENT) USE OF INSULIN: ICD-10-CM

## 2022-03-15 DIAGNOSIS — Z20.822 CONTACT WITH AND (SUSPECTED) EXPOSURE TO COVID-19: ICD-10-CM

## 2022-03-15 DIAGNOSIS — Z79.899 OTHER LONG TERM (CURRENT) DRUG THERAPY: ICD-10-CM

## 2022-03-15 DIAGNOSIS — Z90.49 ACQUIRED ABSENCE OF OTHER SPECIFIED PARTS OF DIGESTIVE TRACT: ICD-10-CM

## 2022-03-15 DIAGNOSIS — E78.5 HYPERLIPIDEMIA, UNSPECIFIED: ICD-10-CM

## 2022-03-15 DIAGNOSIS — E11.22 TYPE 2 DIABETES MELLITUS WITH DIABETIC CHRONIC KIDNEY DISEASE: ICD-10-CM

## 2022-03-15 DIAGNOSIS — Z95.5 PRESENCE OF CORONARY ANGIOPLASTY IMPLANT AND GRAFT: ICD-10-CM

## 2022-03-15 DIAGNOSIS — I50.32 CHRONIC DIASTOLIC (CONGESTIVE) HEART FAILURE: ICD-10-CM

## 2022-03-15 DIAGNOSIS — K21.9 GASTRO-ESOPHAGEAL REFLUX DISEASE WITHOUT ESOPHAGITIS: ICD-10-CM

## 2022-03-15 DIAGNOSIS — N40.0 BENIGN PROSTATIC HYPERPLASIA WITHOUT LOWER URINARY TRACT SYMPTOMS: ICD-10-CM

## 2022-03-23 ENCOUNTER — APPOINTMENT (OUTPATIENT)
Dept: CARDIOLOGY | Facility: CLINIC | Age: 82
End: 2022-03-23
Payer: MEDICARE

## 2022-03-23 PROCEDURE — 93306 TTE W/DOPPLER COMPLETE: CPT

## 2022-04-01 ENCOUNTER — RX RENEWAL (OUTPATIENT)
Age: 82
End: 2022-04-01

## 2022-04-20 ENCOUNTER — APPOINTMENT (OUTPATIENT)
Dept: CARDIOLOGY | Facility: CLINIC | Age: 82
End: 2022-04-20
Payer: MEDICARE

## 2022-04-20 VITALS
BODY MASS INDEX: 32.21 KG/M2 | TEMPERATURE: 97.8 F | DIASTOLIC BLOOD PRESSURE: 83 MMHG | WEIGHT: 225 LBS | SYSTOLIC BLOOD PRESSURE: 149 MMHG | HEART RATE: 75 BPM | HEIGHT: 70 IN

## 2022-04-20 DIAGNOSIS — Z45.02 ENCOUNTER FOR ADJUSTMENT AND MANAGEMENT OF AUTOMATIC IMPLANTABLE CARDIAC DEFIBRILLATOR: ICD-10-CM

## 2022-04-20 PROCEDURE — 99214 OFFICE O/P EST MOD 30 MIN: CPT

## 2022-04-20 PROCEDURE — 93000 ELECTROCARDIOGRAM COMPLETE: CPT | Mod: 59

## 2022-04-20 PROCEDURE — 93290 INTERROG DEV EVAL ICPMS IP: CPT | Mod: 26

## 2022-04-20 PROCEDURE — 93282 PRGRMG EVAL IMPLANTABLE DFB: CPT

## 2022-04-20 RX ORDER — BLOOD SUGAR DIAGNOSTIC
STRIP MISCELLANEOUS
Qty: 200 | Refills: 0 | Status: ACTIVE | COMMUNITY
Start: 2021-08-23

## 2022-04-20 RX ORDER — INSULIN GLARGINE 100 [IU]/ML
100 INJECTION, SOLUTION SUBCUTANEOUS DAILY
Refills: 0 | Status: ACTIVE | COMMUNITY
Start: 2019-01-07

## 2022-04-20 RX ORDER — AMIODARONE HYDROCHLORIDE 200 MG/1
200 TABLET ORAL
Qty: 90 | Refills: 2 | Status: COMPLETED | COMMUNITY
Start: 2022-02-16 | End: 2022-04-20

## 2022-04-20 RX ORDER — FUROSEMIDE 20 MG/1
20 TABLET ORAL DAILY
Qty: 90 | Refills: 1 | Status: ACTIVE | COMMUNITY

## 2022-04-20 NOTE — PROCEDURE
[No] : not [ICD] : Implantable cardioverter-defibrillator [VVI] : VVI [Charge Time: ___ sec] : charge time was [unfilled] seconds [Longevity: ___ months] : The estimated remaining battery life is [unfilled] months [Threshold Testing Performed] : Threshold testing was performed [Lead Imp:  ___ohms] : lead impedance was [unfilled] ohms [Sensing Amplitude ___mv] : sensing amplitude was [unfilled] mv [___V @] : [unfilled] V [___ ms] : [unfilled] ms [Pace ___ %] : Pace [unfilled]% [See Device Printout] : See device printout [Programmed for Longevity] : output reprogrammed for improved battery longevity [de-identified] : [facility] [de-identified] : D160 [de-identified] : [id number] [de-identified] : 12/07/2015 [de-identified] : 40 [de-identified] : No events since last visit\par Stable impedance\par V-paced < 1%

## 2022-04-20 NOTE — CARDIOLOGY SUMMARY
[de-identified] : 4/20/22 NSR (HR 75 bpm), RBBB\par 2/16/22 NSR (HR 85 bpm), RBBB, LAFB [de-identified] : 3/18/21 Small reversible defect in apex and anteroseptal wall of LV c/w ischemia superimposed on inferolateral scar. Fixed inferolateral defect which does not completely thicken c/w prior injury (scar). EF 35-40% [de-identified] : 3/23/22 LVEF 40-45% Severe LAE. Mod-severe MR.\par ECHO (3/2021): EF 45-50%. Mod MR. Mod AS (MG 22 / KELTON 1.4)\par Echo: 2/25/19, Moderate LV dilatation. EF 30-40%. Mild AS. Mod to Sev MR. Mild TR. LVEF 30-40%.  [de-identified] : 3/1/22 Severe triple vessel disease. Patent LIMA-LAD, SVG seq-Diag/OM, SVG seq-RPDA/ramus. \par \par Severe triple vessel disease 80% stenosis of the LAD, diagonal, obtuse marginal, third obtuse marginal, ostial circumflex, as well as multiple seminal alert lesions throughout the RCA and PDA A. status post stents,  [de-identified] : q

## 2022-04-20 NOTE — ASSESSMENT
[FreeTextEntry1] : Mr. Centeno is a 82 yo M with history of CAD s/p CABG (2015), ICM s/p single chamber ICD 12/7/2015, HTN, HL, DM, and ESRD on HD.\par \par # ICM s/p SC ICD\par - Device interrogation was performed as detailed above. Stable respiratory impedance\par - Remote monitor is set up and patient is transmitting. \par \par # VT/VF\par - Recent cath with severe native CAD but patent grafts\par - Cont GDMT.\par - Cont Amio. \par - Labs ordered for next visit.\par - Asked pt to follow up with pulm and ophtho since he is on Amio\par \par # HTN\par - BP well controlled\par - Cont Metoprolol and Entresto\par - 2g Na diet enforced\par \par We have also advised the patient to go to the nearest emergency room if he experiences any chest pain, dyspnea, syncope, or has any other compelling symptoms. \par \par Follow up in 6 months

## 2022-04-20 NOTE — HISTORY OF PRESENT ILLNESS
[de-identified] : \par Cardiologist: Dr. Nance\par \par 80 yo M with history of CAD s/p 5v CABG (2/25/2015), ICM s/p single chamber ICD (12/7/2015), obesity, ESRD on HD (Mon/Fri for 4.5 hrs), DM, COPD, who presents for routine device interrogation. He recalls that his device shocked him once after implant but he is not sure if it was appropriate. He had Covid Feb 2021 and was hospitalized. He had angina and was started on Ranexa with resolution of symptoms.  MPI: IL infarct. Small apical / AS reversible defect. EF 35-40%.\par \par He states on Feb 1st, he was under a lot of stress. His son's car was stuck and he was carrying a heavy box to help him. Later that night he felt "terrible." He felt his heart racing. No syncope. He denies chest pain but admits to several months of worsening dyspnea with exertion. \par \par He presents today for routine follow up after starting Amio for VT/VF. He was cath'ed 3/1. He denies chest pain, palpitations, dizziness, lightheadedness, presyncope or syncope. He feels well.

## 2022-04-28 ENCOUNTER — APPOINTMENT (OUTPATIENT)
Dept: CARDIOLOGY | Facility: CLINIC | Age: 82
End: 2022-04-28
Payer: MEDICARE

## 2022-04-28 VITALS
DIASTOLIC BLOOD PRESSURE: 70 MMHG | SYSTOLIC BLOOD PRESSURE: 118 MMHG | HEART RATE: 71 BPM | WEIGHT: 234 LBS | TEMPERATURE: 97.9 F | BODY MASS INDEX: 33.5 KG/M2 | HEIGHT: 70 IN

## 2022-04-28 PROCEDURE — 99214 OFFICE O/P EST MOD 30 MIN: CPT

## 2022-04-28 PROCEDURE — 93000 ELECTROCARDIOGRAM COMPLETE: CPT

## 2022-04-28 RX ORDER — ATORVASTATIN CALCIUM 40 MG/1
40 TABLET, FILM COATED ORAL DAILY
Qty: 90 | Refills: 3 | Status: ACTIVE | COMMUNITY
Start: 2020-08-25 | End: 1900-01-01

## 2022-04-28 NOTE — DISCUSSION/SUMMARY
[FreeTextEntry1] : Cont ASA  and Lipitor.\par Cont Toprol and Entresto.\par Cont Ranexa.\par Dialysis and diuretics per renal.\par JING to  further evaluate valve disease (TAVR / MitraClip evaluation).\par Follow-up 3-months.

## 2022-04-28 NOTE — ASSESSMENT
[FreeTextEntry1] : s/p CABG s/p PCI.\par Diffuse disease.  Patent grafts.  Poor revascularization targets.\par Chronic stable angina.\par \par ICM s/p AICD (moderate LV dysfunction).\par \par Mod AS / Mod - Sev MR.\par \par Compensated / euvolemic.\par \par BP controlled.\par \par VT / VF\par No recurrence on Amiodarone.\par \par ECHO (3/2021): EF 45-50%.  Mod MR.  Mod AS (MG 22 / KELTON 1.4).\par MPI (3/2021): IL infarct.  Small apical / AS reversible defect.  EF 35-40%.

## 2022-04-28 NOTE — REASON FOR VISIT
[Follow-Up - Clinic] : a clinic follow-up of [Cardiomyopathy] : cardiomyopathy [Coronary Artery Disease] : coronary artery disease [Heart Failure] : congestive heart failure [FreeTextEntry1] : Feels well.\par \par Remains active but exertional dyspnea worse.  Lower threshold .  Has to pace self more.\par \par Still doing dialysis 2 / week.\par \par Weight stable.\par \par Tolerating Rx.\par \par AICD (4/20/22): No events.\par \par ECHO (3/23/22): EF 40-45%.  Likely Mod  AS.  Mild AI.  Mod to Sev MR.  Mild TR. \par CATH (3/1/22): Severe diffuse CAD.  Patent grafts.  Poor revascularization targets.

## 2022-04-28 NOTE — HISTORY OF PRESENT ILLNESS
[FreeTextEntry1] : 77 year-old male presents for hospital followup.\par \par Cardiac history of CAD s/p CABG s/p PCI, ischemic cardiomyopathy, and chronic systolic CHF s/p AICD. Previously followed with cardiologist in [place].\par \par Risk factors include hypertension, diabetes,hyperlipidemia, and CKD.\par \[facility] Presented 2 weeks ago with subacute decompensated CHF with volume overload and uncontrolled hypertension (possibly the setting of URI). Had been off Lasix for several weeks and antihypertensives for several days. Rapidly improved with diuresis.  Course complicated by KIANA.\par \par Feels well since discharge. No angina. Breathing comfortable. No palpitations, lightheadedness, syncope.  Previously had good functional capacity.  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 EF 25-35%. Moderate MR.\par \par Hospital labs reviewed:\par Creatinine 4.4\par Hemoglobin 14.7\par LFTs normal

## 2022-05-12 RX ORDER — RANOLAZINE 500 MG/1
500 TABLET, EXTENDED RELEASE ORAL
Qty: 180 | Refills: 1 | Status: ACTIVE | COMMUNITY
Start: 2021-04-15 | End: 1900-01-01

## 2022-07-14 ENCOUNTER — APPOINTMENT (OUTPATIENT)
Dept: CARDIOLOGY | Facility: CLINIC | Age: 82
End: 2022-07-14

## 2022-07-21 ENCOUNTER — APPOINTMENT (OUTPATIENT)
Dept: CARDIOLOGY | Facility: CLINIC | Age: 82
End: 2022-07-21

## 2022-07-21 ENCOUNTER — NON-APPOINTMENT (OUTPATIENT)
Age: 82
End: 2022-07-21

## 2022-07-21 PROCEDURE — 93295 DEV INTERROG REMOTE 1/2/MLT: CPT

## 2022-07-21 PROCEDURE — 93296 REM INTERROG EVL PM/IDS: CPT

## 2022-08-11 ENCOUNTER — APPOINTMENT (OUTPATIENT)
Dept: CARDIOLOGY | Facility: CLINIC | Age: 82
End: 2022-08-11

## 2022-08-11 VITALS
HEART RATE: 90 BPM | DIASTOLIC BLOOD PRESSURE: 85 MMHG | WEIGHT: 233 LBS | SYSTOLIC BLOOD PRESSURE: 135 MMHG | HEIGHT: 70 IN | BODY MASS INDEX: 33.36 KG/M2

## 2022-08-11 DIAGNOSIS — E78.5 HYPERLIPIDEMIA, UNSPECIFIED: ICD-10-CM

## 2022-08-11 DIAGNOSIS — I49.9 CARDIAC ARRHYTHMIA, UNSPECIFIED: ICD-10-CM

## 2022-08-11 DIAGNOSIS — Z01.810 ENCOUNTER FOR PREPROCEDURAL CARDIOVASCULAR EXAMINATION: ICD-10-CM

## 2022-08-11 PROCEDURE — 93000 ELECTROCARDIOGRAM COMPLETE: CPT

## 2022-08-11 PROCEDURE — 99213 OFFICE O/P EST LOW 20 MIN: CPT | Mod: 25

## 2022-08-14 NOTE — ASSESSMENT
[FreeTextEntry1] : s/p CABG s/p PCI.\par Diffuse disease.  Patent grafts.  Poor revascularization targets.\par Chronic stable angina.\par \par ICM s/p AICD (moderate LV dysfunction).\par \par Mod AS\par Mod - Sev MR\par \par Compensated / euvolemic.\par \par BP controlled.\par \par VT / VF\par No recurrence on Amiodarone.\par \par High risk patient for perioperative cardiac events.\par Low risk procedure.\par No cardiac decompensation.\par \par ECHO (3/2021): EF 45-50%.  Mod MR.  Mod AS (MG 22 / KELTON 1.4).\par MPI (3/2021): IL infarct.  Small apical / AS reversible defect.  EF 35-40%.

## 2022-08-14 NOTE — HISTORY OF PRESENT ILLNESS
[FreeTextEntry1] : 77 year-old male presents for hospital followup.\par \par Cardiac history of CAD s/p CABG s/p PCI, ischemic cardiomyopathy, and chronic systolic CHF s/p AICD. Previously followed with cardiologist in [place].\par \par Risk factors include hypertension, diabetes,hyperlipidemia, and CKD.\par \[facility] Presented 2 weeks ago with subacute decompensated CHF with volume overload and uncontrolled hypertension (possibly the setting of URI). Had been off Lasix for several weeks and antihypertensives for several days. Rapidly improved with diuresis.  Course complicated by KIANA.\par \par Feels well since discharge. No angina. Breathing comfortable. No palpitations, lightheadedness, syncope.  Previously had good functional capacity.  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 EF 25-35%. Moderate MR.\par \par Hospital labs reviewed:\par Creatinine 4.4\par Hemoglobin 14.7\par LFTs normal

## 2022-08-14 NOTE — REASON FOR VISIT
[Follow-Up - Clinic] : a clinic follow-up of [Cardiomyopathy] : cardiomyopathy [Coronary Artery Disease] : coronary artery disease [Heart Failure] : congestive heart failure [FreeTextEntry1] : Remains active, but more fatigued / exertional dyspnea.\par \par Still doing dialysis 2 / week.\par \par Weight stable.\par \par Tolerating Rx.\par \par Scheduled for cataract surgery.\par \par Did not get JING.

## 2022-08-14 NOTE — DISCUSSION/SUMMARY
[FreeTextEntry1] : Cont ASA  and Lipitor.\par Cont Toprol and Entresto.\par Cont Ranexa.\par Dialysis and diuretics per renal.\par JING as previously planned.\par Follow-up 3-months.\par \par No further cardiac testing required prior to cataract surgery.\par If needed, ASA may be held 5-days prior to procedure and resumed after.

## 2022-08-23 NOTE — PATIENT PROFILE ADULT - NSPROEDAREADYLEARN_GEN_A_NUR
pt found laying in bed sleeping, in no visible distress. 5000 band placed. pt able to ambulate independently to bathroom. vitals WDL.
pt requested update on results. spoke with Admitting team BRAYDEN Solomon and was told someone was going to come down to see him.
none

## 2022-09-11 ENCOUNTER — LABORATORY RESULT (OUTPATIENT)
Age: 82
End: 2022-09-11

## 2022-09-14 ENCOUNTER — INPATIENT (INPATIENT)
Facility: HOSPITAL | Age: 82
LOS: 2 days | Discharge: HOME | End: 2022-09-17
Attending: STUDENT IN AN ORGANIZED HEALTH CARE EDUCATION/TRAINING PROGRAM | Admitting: STUDENT IN AN ORGANIZED HEALTH CARE EDUCATION/TRAINING PROGRAM

## 2022-09-14 VITALS — WEIGHT: 229.28 LBS | HEIGHT: 70 IN

## 2022-09-14 DIAGNOSIS — I47.2 VENTRICULAR TACHYCARDIA: ICD-10-CM

## 2022-09-14 DIAGNOSIS — I35.0 NONRHEUMATIC AORTIC (VALVE) STENOSIS: ICD-10-CM

## 2022-09-14 LAB
CK MB CFR SERPL CALC: 3.6 NG/ML — SIGNIFICANT CHANGE UP (ref 0.6–6.3)
GLUCOSE BLDC GLUCOMTR-MCNC: 132 MG/DL — HIGH (ref 70–99)
GLUCOSE BLDC GLUCOMTR-MCNC: 135 MG/DL — HIGH (ref 70–99)
GLUCOSE BLDC GLUCOMTR-MCNC: 180 MG/DL — HIGH (ref 70–99)
HCT VFR BLD CALC: 24.6 % — LOW (ref 42–52)
HGB BLD-MCNC: 7.5 G/DL — LOW (ref 14–18)
MAGNESIUM SERPL-MCNC: 2.5 MG/DL — HIGH (ref 1.8–2.4)
MCHC RBC-ENTMCNC: 28.1 PG — SIGNIFICANT CHANGE UP (ref 27–31)
MCHC RBC-ENTMCNC: 30.5 G/DL — LOW (ref 32–37)
MCV RBC AUTO: 92.1 FL — SIGNIFICANT CHANGE UP (ref 80–94)
MYOGLOBIN SERPL-MCNC: 246 NG/ML — HIGH (ref 0–70)
NRBC # BLD: 0 /100 WBCS — SIGNIFICANT CHANGE UP (ref 0–0)
NT-PROBNP SERPL-SCNC: HIGH PG/ML (ref 0–300)
PLATELET # BLD AUTO: 235 K/UL — SIGNIFICANT CHANGE UP (ref 130–400)
RBC # BLD: 2.67 M/UL — LOW (ref 4.7–6.1)
RBC # FLD: 18.1 % — HIGH (ref 11.5–14.5)
TROPONIN T SERPL-MCNC: 0.25 NG/ML — CRITICAL HIGH
WBC # BLD: 14.14 K/UL — HIGH (ref 4.8–10.8)
WBC # FLD AUTO: 14.14 K/UL — HIGH (ref 4.8–10.8)

## 2022-09-14 PROCEDURE — 99221 1ST HOSP IP/OBS SF/LOW 40: CPT

## 2022-09-14 PROCEDURE — 71045 X-RAY EXAM CHEST 1 VIEW: CPT | Mod: 26

## 2022-09-14 PROCEDURE — 93312 ECHO TRANSESOPHAGEAL: CPT | Mod: 26,XU

## 2022-09-14 PROCEDURE — 93320 DOPPLER ECHO COMPLETE: CPT | Mod: 26

## 2022-09-14 PROCEDURE — 93325 DOPPLER ECHO COLOR FLOW MAPG: CPT | Mod: 26

## 2022-09-14 PROCEDURE — 93010 ELECTROCARDIOGRAM REPORT: CPT | Mod: 76

## 2022-09-14 RX ORDER — SODIUM CHLORIDE 9 MG/ML
1000 INJECTION, SOLUTION INTRAVENOUS
Refills: 0 | Status: DISCONTINUED | OUTPATIENT
Start: 2022-09-14 | End: 2022-09-17

## 2022-09-14 RX ORDER — GLUCAGON INJECTION, SOLUTION 0.5 MG/.1ML
1 INJECTION, SOLUTION SUBCUTANEOUS ONCE
Refills: 0 | Status: DISCONTINUED | OUTPATIENT
Start: 2022-09-14 | End: 2022-09-17

## 2022-09-14 RX ORDER — ONDANSETRON 8 MG/1
4 TABLET, FILM COATED ORAL ONCE
Refills: 0 | Status: COMPLETED | OUTPATIENT
Start: 2022-09-14 | End: 2022-09-14

## 2022-09-14 RX ORDER — SACUBITRIL AND VALSARTAN 24; 26 MG/1; MG/1
1 TABLET, FILM COATED ORAL
Refills: 0 | Status: DISCONTINUED | OUTPATIENT
Start: 2022-09-14 | End: 2022-09-16

## 2022-09-14 RX ORDER — DEXTROSE 50 % IN WATER 50 %
25 SYRINGE (ML) INTRAVENOUS ONCE
Refills: 0 | Status: DISCONTINUED | OUTPATIENT
Start: 2022-09-14 | End: 2022-09-17

## 2022-09-14 RX ORDER — ACETAMINOPHEN 500 MG
650 TABLET ORAL EVERY 6 HOURS
Refills: 0 | Status: DISCONTINUED | OUTPATIENT
Start: 2022-09-14 | End: 2022-09-17

## 2022-09-14 RX ORDER — DEXTROSE 50 % IN WATER 50 %
15 SYRINGE (ML) INTRAVENOUS ONCE
Refills: 0 | Status: DISCONTINUED | OUTPATIENT
Start: 2022-09-14 | End: 2022-09-17

## 2022-09-14 RX ORDER — ASPIRIN/CALCIUM CARB/MAGNESIUM 324 MG
81 TABLET ORAL DAILY
Refills: 0 | Status: DISCONTINUED | OUTPATIENT
Start: 2022-09-14 | End: 2022-09-17

## 2022-09-14 RX ORDER — ATORVASTATIN CALCIUM 80 MG/1
40 TABLET, FILM COATED ORAL AT BEDTIME
Refills: 0 | Status: DISCONTINUED | OUTPATIENT
Start: 2022-09-14 | End: 2022-09-17

## 2022-09-14 RX ORDER — DEXTROSE 50 % IN WATER 50 %
12.5 SYRINGE (ML) INTRAVENOUS ONCE
Refills: 0 | Status: DISCONTINUED | OUTPATIENT
Start: 2022-09-14 | End: 2022-09-17

## 2022-09-14 RX ORDER — FUROSEMIDE 40 MG
40 TABLET ORAL ONCE
Refills: 0 | Status: COMPLETED | OUTPATIENT
Start: 2022-09-14 | End: 2022-09-14

## 2022-09-14 RX ORDER — INSULIN GLARGINE 100 [IU]/ML
15 INJECTION, SOLUTION SUBCUTANEOUS EVERY MORNING
Refills: 0 | Status: DISCONTINUED | OUTPATIENT
Start: 2022-09-14 | End: 2022-09-16

## 2022-09-14 RX ORDER — BENZOCAINE AND MENTHOL 5; 1 G/100ML; G/100ML
1 LIQUID ORAL THREE TIMES A DAY
Refills: 0 | Status: DISCONTINUED | OUTPATIENT
Start: 2022-09-14 | End: 2022-09-14

## 2022-09-14 RX ORDER — INSULIN GLARGINE 100 [IU]/ML
30 INJECTION, SOLUTION SUBCUTANEOUS AT BEDTIME
Refills: 0 | Status: DISCONTINUED | OUTPATIENT
Start: 2022-09-14 | End: 2022-09-14

## 2022-09-14 RX ORDER — AMIODARONE HYDROCHLORIDE 400 MG/1
200 TABLET ORAL DAILY
Refills: 0 | Status: DISCONTINUED | OUTPATIENT
Start: 2022-09-14 | End: 2022-09-17

## 2022-09-14 RX ORDER — INSULIN LISPRO 100/ML
VIAL (ML) SUBCUTANEOUS
Refills: 0 | Status: DISCONTINUED | OUTPATIENT
Start: 2022-09-14 | End: 2022-09-17

## 2022-09-14 RX ADMIN — Medication 650 MILLIGRAM(S): at 16:23

## 2022-09-14 RX ADMIN — Medication 40 MILLIGRAM(S): at 18:54

## 2022-09-14 RX ADMIN — Medication 650 MILLIGRAM(S): at 16:53

## 2022-09-14 RX ADMIN — ONDANSETRON 4 MILLIGRAM(S): 8 TABLET, FILM COATED ORAL at 16:21

## 2022-09-14 RX ADMIN — ATORVASTATIN CALCIUM 40 MILLIGRAM(S): 80 TABLET, FILM COATED ORAL at 21:40

## 2022-09-14 NOTE — H&P ADULT - NSICDXPASTMEDICALHX_GEN_ALL_CORE_FT
PAST MEDICAL HISTORY:  BPH (Benign Prostatic Hyperplasia)     CAD (Coronary Artery Disease)     CAD (coronary artery disease)     CHF (congestive heart failure)     Diabetes mellitus     Diabetes Mellitus Type II     GERD (gastroesophageal reflux disease)     H/O hyperlipidemia     History of PTCA with stents 10 years ago ([facility])    HTN (Hypertension)     HTN - Hypertension     Hypercholesterolemia     Mitral valve regurgitation     Renal insufficiency     Sleep apnea does not use machine

## 2022-09-14 NOTE — H&P ADULT - NSHPPHYSICALEXAM_GEN_ALL_CORE
LOS:     VITALS:   T(C): 36.4 (09-14-22 @ 15:55), Max: 36.4 (09-14-22 @ 15:55)  HR: 87 (09-14-22 @ 15:55) (87 - 87)  BP: 107/65 (09-14-22 @ 15:55) (107/65 - 107/65)  RR: 18 (09-14-22 @ 15:55) (18 - 18)  SpO2: 2L NC    GENERAL: NAD, lying in bed comfortably  HEAD:  Atraumatic, Normocephalic  EYES: EOMI, PERRLA, conjunctiva and sclera clear  ENT: Moist mucous membranes  NECK: Supple, No JVD  CHEST/LUNG: B/L Crackles appreciated more prominent to right lung, No rales, rhonchi, wheezing, or rubs.   HEART: Murmurs appreciated, Regular rate and rhythm, rubs, or gallops  ABDOMEN:  BSx4; Soft, nontender, nondistended  EXTREMITIES: 2+ Peripheral Pulses, brisk capillary refill. No clubbing, cyanosis, or edema  NERVOUS SYSTEM:  A&Ox3, no focal deficits   SKIN: No rashes or lesions

## 2022-09-14 NOTE — PATIENT PROFILE ADULT - FALL HARM RISK - HARM RISK INTERVENTIONS

## 2022-09-14 NOTE — H&P ADULT - ASSESSMENT
1: Acute On chronic Diastolic Heart Failure; Moderate mitral valve regurgitation., s/p ICD ( 10 yrs ago)  - Pt admitted for SOB  - 09/14/22 s/p JING  - 03/21 Echo: EF of 55 %. Spectral Doppler shows impaired relaxation pattern of left ventricular myocardial filling (Grade I diastolic dysfunction).Moderate mitral valve regurgitation. Thickening and calcification of the anterior and posterior mitral valve leaflets. Aortic valve thickening with decreased leaflet opening. Peak transaortic gradient equals 37.7 mmHg, mean transaortic gradient equals 22.1 mmHg, the calculated aortic valve area equals 1.35 cm² by the continuity equation consistent with moderate aortic stenosis.  ** Plan:   - f/u BNP  - f/u CXR  -  09/14 s/p Lasix 40 mg x1  - Lasix 40 mg prn   - Cont: entresto    2: Chest pain; Hx: CAD  - Patient reports mid sternal chest pain for the past few days  - s/p x5 stents (10 yrs ago) 3: Hx: End Stage Renal Disease  *** Plan:  - cont: aspirin, lipidor, Toprol   - f/u troponin, ckmb, myoglobin  - f/u new ekgs  - May consider heparin drip if troponin start to trend up    3: Hx: End Stage Renal Failure  - HD on Monday and Friday / Left Arm IV Fistula  - Patient currently makes urine sometimes  ** Plan:  - will need Nephrology consult  - f/u daily creatine level    3: Hx: Diabetes type II  - Held home dose med  - ISS, Monitor bgm    4: Hx: HTN, HLD, BPH, Hx of PTCA, Sleep Apnea  - cont:       1: Acute On chronic Diastolic Heart Failure; Moderate mitral valve regurgitation, Ischemic Cardiomyopathy, s/p ICD (10 yrs ago)   - Pt admitted for SOB  - 09/14/22 s/p JING  - 03/21 Echo:EF of 55 %. Spectral Doppler shows impaired relaxation pattern of left ventricular myocardial filling (Grade I diastolic dysfunction).Moderate mitral valve regurgitation. Thickening and calcification of the anterior and posterior mitral valve leaflets. Aortic valve thickening with decreased leaflet opening. Peak transaortic gradient equals 37.7 mmHg, mean transaortic gradient equals 22.1 mmHg, the calculated aortic valve area equals 1.35 cm² by the continuity equation consistent with moderate aortic stenosis.  - ICD device last interrogated 4 months ( as per patient next device check appointment this october)  ** Plan:   - f/u BNP  - f/u CXR  -  09/14 s/p Lasix 40 mg x1  - Lasix 40 mg prn   - Cont: Entresto, Toprol  - f/u with Structural heart team ( TAVR protocol)  - F/U CTA, CT Abomen / pelvis w/contrats  - carotid duplex    2: Chest pain; Hx: CAD (s/p CABG s/p PCI)  - Patient reports mid sternal chest pain for the past few days  - s/p x5 stents (10 yrs ago) 3: Hx: End Stage Renal Disease  *** Plan:  - cont: aspirin, lipitor, Toprol   - f/u troponin, ckmb, myoglobin  - f/u new ekgs  - May consider heparin drip if troponin start to trend up    3: Hx: End Stage Renal Failure  - HD on Monday and Friday / Left Arm IV Fistula  - Patient currently makes urine sometimes  ** Plan:  - will need Nephrology consult  - f/u daily creatine level    3: Hx: Diabetes type II  - Held home dose med  - ISS, Monitor bgm  ** F/U a1c  - started Lantus 15 units at night will increase as needed pt is on 30 units at home)  - May consider Endocrinology consult    4: Hx: HTN, HLD, BPH, Hx of PTCA, Sleep Apnea  - cont: Lipitor  - pt not on CPAP or oxygen at home       1: Acute On chronic Diastolic Heart Failure; Moderate mitral valve regurgitation, Ischemic Cardiomyopathy, s/p ICD (10 yrs ago)   - Pt admitted for SOB  - 09/14/22 s/p JING  - 03/21 Echo:EF of 55 %. Spectral Doppler shows impaired relaxation pattern of left ventricular myocardial filling (Grade I diastolic dysfunction).Moderate mitral valve regurgitation. Thickening and calcification of the anterior and posterior mitral valve leaflets. Aortic valve thickening with decreased leaflet opening. Peak transaortic gradient equals 37.7 mmHg, mean transaortic gradient equals 22.1 mmHg, the calculated aortic valve area equals 1.35 cm² by the continuity equation consistent with moderate aortic stenosis.  - ICD device last interrogated 4 months ( as per patient next device check appointment this october)  ** Plan:   - f/u BNP  - f/u CXR  -  09/14 s/p Lasix 40 mg x1  - Lasix 40 mg prn   - Cont: Entresto, Toprol  - f/u with Structural heart team ( TAVR protocol)  - F/U CTA, CT Abomen / pelvis w/contrats  - carotid duplex  - simple PFTs    2: Chest pain; Hx: CAD (s/p CABG s/p PCI)  - Patient reports mid sternal chest pain for the past few days  - s/p x5 stents (10 yrs ago) 3: Hx: End Stage Renal Disease  *** Plan:  - cont: aspirin, lipitor, Toprol   - f/u troponin, ckmb, myoglobin  - f/u new ekgs  - May consider heparin drip if troponin start to trend up    3: Hx: End Stage Renal Failure  - HD on Monday and Friday / Left Arm IV Fistula  - Patient currently makes urine sometimes  ** Plan:  - will need Nephrology consult  - f/u daily creatine level    3: Hx: Diabetes type II  - Held home dose med  - ISS, Monitor bgm  ** F/U a1c  - started Lantus 15 units at night will increase as needed pt is on 30 units at home)  - May consider Endocrinology consult    4: Hx: HTN, HLD, BPH, Hx of PTCA, Sleep Apnea  - cont: Lipitor  - pt not on CPAP or oxygen at home

## 2022-09-14 NOTE — CHART NOTE - NSCHARTNOTEFT_GEN_A_CORE
POST OPERATIVE PROCEDURAL DOCUMENTATION  PRE-OP DIAGNOSIS: Moderate-severe AS. Moderate- severe MR.    POST-OP DIAGNOSIS: Moderate-severe AS. Severe MR. Moderately decreased LV systolic function    PROCEDURE: Transesophageal echocardiogram    Primary Physician: Dr. Rizo  Fellow: Dr. Rahman    ANESTHESIA TYPE  [  ] General Anesthesia  [ x ] Conscious Sedation  [  ] Local/Regional    CONDITION  [  ] Critical  [  ] Serious  [  ] Fair  [ x ] Good    SPECIMENS REMOVED (IF APPLICABLE): N/A    IMPLANTS (IF APPLICABLE): None    ESTIMATED BLOOD LOSS: None    COMPLICATIONS: None      FINDINGS:    After risks and benefits of procedures were explained, informed consent was obtained and placed in chart. Refer to Anesthesia note for sedation details.  The JING probe was passed into the esophagus without difficulty.  Transesophageal and transgastric images were obtained.  The JING probe was removed without difficulty and examined.  There was no evidence for bleeding.  The patient tolerated the procedure well without any immediate JING-related complications.      Preliminary Findings:  LA: DIlated  LILIAM: Left atrial appendage was clear of clot and smoke.  LV: LVEF was estimated at 25-30%  MV: Severe MR, no evidence of MS.   AV: No evidence of AI, Moderate-Severe AS  RA: Normal  TV: Trace TR.   PV: no PI.   IAS: no PFO. No R-> L shunt.   There was mild, non-mobile atheroma seen in the thoracic aorta.         DIAGNOSIS/IMPRESSION: Moderate-severe AS. Severe MR. Moderately decreased LV systolic function    PLAN OF CARE:  Return to Cath lab for recovery  Follow up with Dr. Nance POST OPERATIVE PROCEDURAL DOCUMENTATION  PRE-OP DIAGNOSIS: Moderate-severe AS. Moderate- severe MR.    POST-OP DIAGNOSIS: Moderate-severe AS. Severe MR. Moderately decreased LV systolic function    PROCEDURE: Transesophageal echocardiogram    Primary Physician: Dr. Rizo  Fellow: Dr. Rahman    ANESTHESIA TYPE  [  ] General Anesthesia  [ x ] Conscious Sedation  [  ] Local/Regional    CONDITION  [  ] Critical  [  ] Serious  [  ] Fair  [ x ] Good    SPECIMENS REMOVED (IF APPLICABLE): N/A    IMPLANTS (IF APPLICABLE): None    ESTIMATED BLOOD LOSS: None    COMPLICATIONS: None      FINDINGS:    After risks and benefits of procedures were explained, informed consent was obtained and placed in chart. Refer to Anesthesia note for sedation details.  The JING probe was passed into the esophagus without difficulty.  Transesophageal and transgastric images were obtained.  The JING probe was removed without difficulty and examined.  There was no evidence for bleeding.  The patient tolerated the procedure well without any immediate JING-related complications.      Preliminary Findings:  < from: Transesophageal Echocardiogram (09.14.22 @ 08:10) >     1. Left ventricular ejection fraction, by visual estimation, is 25 to   30%.   2. Moderately to severely decreased global left ventricular systolic   function.   3. Calcified Aortic valve with decreased leaflet opening. Peak   transvalvular velocity is 3.1 m/s. Peak/mean pressure gradient 39/25   mmHg. KELTON VTI 1.02 cm2. Findings are suggestive of moderate to severe   Aortic valve stenosis. Low flow with stroke volume index of 29 ml/m2.   4. The mitral valve leaflets are tethered which is due to reduced   systolic function and elevated LVDP.   5. Severe mitral valve regurgitation.   6. Mild tricuspid regurgitation.   7. Left atrial enlargement.   8. Normal right atrial size.   9. Color flow doppler and intravenous injection of agitated saline   demonstrates the presence of an intact intra atrial septum.  10. Findings discussed with referring cardiologist.    < end of copied text >    e      DIAGNOSIS/IMPRESSION: Moderate-severe AS. Severe MR. Moderately decreased LV systolic function    PLAN OF CARE:  Return to Cath lab for recovery  Follow up with Dr. Nance

## 2022-09-14 NOTE — H&P ADULT - HISTORY OF PRESENT ILLNESS
Pt is 81 y/o male ---> PMHx CHF, CAD ( X5 stents), HTN, HLD, DM, MVR, s/p ICD ( 10 yrs ago), ESRF (hemodialysis Monday and Friday with right arm fistula), BPH, Hx of PTCA, Sleep Apnea.   Patient presents today for an inpatient JING procedure and also complaints of shortness of breath for the past few weeks associated with mid-sternal chest tightness radiating to the back. Patient stated that for the past 1 week he developed worsening SOB on both exertion and at rest and is unable to lay flat/ on his back to night. He initially when to a hospital at [place] and was told that he had fluid in both lungs, was treated with Lasix with few hemodialysis sections and  was discharged home last week Wednesday (09/07/22). However, the SOB did not improved for the past few days, which prompted him to came to the ED  today and for his inpatient JING procedure. Pt is 83 y/o male ---> PMHx CHF, CAD ( X5 stents), HTN, HLD, DM, MVR, s/p ICD ( 10 yrs ago), ESRF (hemodialysis Monday and Friday with right arm fistula), BPH, Hx of PTCA, Sleep Apnea.   Patient presents today for an inpatient JING procedure and also complaints of shortness of breath for the past few weeks associated with mid-sternal chest tightness radiating to the back. Patient stated that for the past 1 week he developed worsening SOB on both exertion and at rest and is unable to lay flat/ on his back to night. He initially when to a hospital at [place] and was told that he had fluid in both lungs, was treated with Lasix with few hemodialysis sections and  was discharged home last week Wednesday (09/07/22). However, the SOB did not improved for the past few days, which prompted him to came to the ED  today and for his inpatient JING procedure. Pt is 83 y/o male ---> PMHx CHF, CAD ( X5 stents), HTN, HLD, DM, MVR, s/p ICD ( 10 yrs ago), ESRF (hemodialysis Monday and Friday with right arm fistula), BPH, Hx of PTCA, Sleep Apnea.   Patient presents today for an inpatient JING procedure and also complaints of shortness of breath for the past few weeks associated with mid-sternal chest tightness radiating to the back. Patient stated that for the past 1 week he developed worsening SOB on both exertion and at rest and is unable to lay flat/ on his back to night. He initially when to a hospital at [place] and was told that he had fluid in both lungs, was treated with Lasix with few hemodialysis sections and  was discharged home last week Wednesday (09/07/22). However, the SOB did not improved for the past few days, which prompted him to came to the ED  today and for his inpatient JING procedure.    Of note: Patient had Cath on 3/1/22 (Findings below)    IMPRESSIONS:    1. Severe diffuse native coronary artery disease (left main + multivessel).    2. Patent LIMA-to-LAD.    3. Patent SVG [id number] / OM.    4. Patent SVG [id number] / Ramus.    5. Poor revascularization targets       RECOMMENDATIONS: Medical therapy and risk factor modification.

## 2022-09-14 NOTE — H&P ADULT - NSHPREVIEWOFSYSTEMS_GEN_ALL_CORE
CONSTITUTIONAL: No weakness, fevers or chills  EYES/ENT: No visual changes;  No vertigo or throat pain   NECK: No pain or stiffness  RESPIRATORY:  b/l crackles appreciated, No cough, wheezing, hemoptysis; No shortness of breath  CARDIOVASCULAR: mid sternal chest pain, no palpitations  GASTROINTESTINAL: No abdominal or epigastric pain. No nausea, vomiting, or hematemesis; No diarrhea or constipation. No melena or hematochezia.  GENITOURINARY: No dysuria, frequency or hematuria  NEUROLOGICAL: No numbness or weakness  SKIN: No itching, rashes  Extremity: no pitting edema

## 2022-09-15 LAB
A1C WITH ESTIMATED AVERAGE GLUCOSE RESULT: 5.9 % — HIGH (ref 4–5.6)
ALBUMIN SERPL ELPH-MCNC: 4.3 G/DL — SIGNIFICANT CHANGE UP (ref 3.5–5.2)
ALP SERPL-CCNC: 54 U/L — SIGNIFICANT CHANGE UP (ref 30–115)
ALT FLD-CCNC: 20 U/L — SIGNIFICANT CHANGE UP (ref 0–41)
ANION GAP SERPL CALC-SCNC: 16 MMOL/L — HIGH (ref 7–14)
AST SERPL-CCNC: 32 U/L — SIGNIFICANT CHANGE UP (ref 0–41)
BILIRUB DIRECT SERPL-MCNC: 0.2 MG/DL — SIGNIFICANT CHANGE UP (ref 0–0.3)
BILIRUB INDIRECT FLD-MCNC: 0.3 MG/DL — SIGNIFICANT CHANGE UP (ref 0.2–1.2)
BILIRUB SERPL-MCNC: 0.5 MG/DL — SIGNIFICANT CHANGE UP (ref 0.2–1.2)
BLD GP AB SCN SERPL QL: SIGNIFICANT CHANGE UP
BUN SERPL-MCNC: 70 MG/DL — CRITICAL HIGH (ref 10–20)
CALCIUM SERPL-MCNC: 8.8 MG/DL — SIGNIFICANT CHANGE UP (ref 8.4–10.5)
CHLORIDE SERPL-SCNC: 94 MMOL/L — LOW (ref 98–110)
CHOLEST SERPL-MCNC: 103 MG/DL — SIGNIFICANT CHANGE UP
CK MB CFR SERPL CALC: 3.1 NG/ML — SIGNIFICANT CHANGE UP (ref 0.6–6.3)
CO2 SERPL-SCNC: 25 MMOL/L — SIGNIFICANT CHANGE UP (ref 17–32)
CREAT SERPL-MCNC: 7.9 MG/DL — CRITICAL HIGH (ref 0.7–1.5)
EGFR: 6 ML/MIN/1.73M2 — LOW
ESTIMATED AVERAGE GLUCOSE: 123 MG/DL — HIGH (ref 68–114)
GLUCOSE BLDC GLUCOMTR-MCNC: 127 MG/DL — HIGH (ref 70–99)
GLUCOSE BLDC GLUCOMTR-MCNC: 140 MG/DL — HIGH (ref 70–99)
GLUCOSE BLDC GLUCOMTR-MCNC: 146 MG/DL — HIGH (ref 70–99)
GLUCOSE BLDC GLUCOMTR-MCNC: 167 MG/DL — HIGH (ref 70–99)
GLUCOSE BLDC GLUCOMTR-MCNC: 175 MG/DL — HIGH (ref 70–99)
GLUCOSE SERPL-MCNC: 127 MG/DL — HIGH (ref 70–99)
HCT VFR BLD CALC: 24.4 % — LOW (ref 42–52)
HDLC SERPL-MCNC: 39 MG/DL — LOW
HGB BLD-MCNC: 7.5 G/DL — LOW (ref 14–18)
INR BLD: 1.23 RATIO — SIGNIFICANT CHANGE UP (ref 0.65–1.3)
IRON SATN MFR SERPL: 16 % — SIGNIFICANT CHANGE UP (ref 15–50)
IRON SATN MFR SERPL: 40 UG/DL — SIGNIFICANT CHANGE UP (ref 35–150)
LDH SERPL L TO P-CCNC: 201 U/L — SIGNIFICANT CHANGE UP (ref 50–242)
LIPID PNL WITH DIRECT LDL SERPL: 33 MG/DL — SIGNIFICANT CHANGE UP
MAGNESIUM SERPL-MCNC: 2.4 MG/DL — SIGNIFICANT CHANGE UP (ref 1.8–2.4)
MCHC RBC-ENTMCNC: 28.6 PG — SIGNIFICANT CHANGE UP (ref 27–31)
MCHC RBC-ENTMCNC: 30.7 G/DL — LOW (ref 32–37)
MCV RBC AUTO: 93.1 FL — SIGNIFICANT CHANGE UP (ref 80–94)
NON HDL CHOLESTEROL: 64 MG/DL — SIGNIFICANT CHANGE UP
NRBC # BLD: 0 /100 WBCS — SIGNIFICANT CHANGE UP (ref 0–0)
PHOSPHATE SERPL-MCNC: 5.4 MG/DL — HIGH (ref 2.1–4.9)
PLATELET # BLD AUTO: 230 K/UL — SIGNIFICANT CHANGE UP (ref 130–400)
POTASSIUM SERPL-MCNC: 5.9 MMOL/L — HIGH (ref 3.5–5)
POTASSIUM SERPL-SCNC: 5.9 MMOL/L — HIGH (ref 3.5–5)
PROT SERPL-MCNC: 6 G/DL — SIGNIFICANT CHANGE UP (ref 6–8)
PROTHROM AB SERPL-ACNC: 14.1 SEC — HIGH (ref 9.95–12.87)
RBC # BLD: 2.61 M/UL — LOW (ref 4.7–6.1)
RBC # BLD: 2.62 M/UL — LOW (ref 4.7–6.1)
RBC # FLD: 18.3 % — HIGH (ref 11.5–14.5)
RETICS #: 171.5 K/UL — HIGH (ref 25–125)
RETICS/RBC NFR: 6.6 % — HIGH (ref 0.5–1.5)
SARS-COV-2 RNA SPEC QL NAA+PROBE: SIGNIFICANT CHANGE UP
SODIUM SERPL-SCNC: 135 MMOL/L — SIGNIFICANT CHANGE UP (ref 135–146)
TIBC SERPL-MCNC: 245 UG/DL — SIGNIFICANT CHANGE UP (ref 220–430)
TRIGL SERPL-MCNC: 153 MG/DL — HIGH
TROPONIN T SERPL-MCNC: 0.26 NG/ML — CRITICAL HIGH
TSH SERPL-MCNC: 3.97 UIU/ML — SIGNIFICANT CHANGE UP (ref 0.27–4.2)
UIBC SERPL-MCNC: 205 UG/DL — SIGNIFICANT CHANGE UP (ref 110–370)
WBC # BLD: 12.66 K/UL — HIGH (ref 4.8–10.8)
WBC # FLD AUTO: 12.66 K/UL — HIGH (ref 4.8–10.8)

## 2022-09-15 PROCEDURE — 99233 SBSQ HOSP IP/OBS HIGH 50: CPT

## 2022-09-15 PROCEDURE — 93010 ELECTROCARDIOGRAM REPORT: CPT

## 2022-09-15 PROCEDURE — 93880 EXTRACRANIAL BILAT STUDY: CPT | Mod: 26

## 2022-09-15 PROCEDURE — 75574 CT ANGIO HRT W/3D IMAGE: CPT | Mod: 26

## 2022-09-15 PROCEDURE — 74174 CTA ABD&PLVS W/CONTRAST: CPT | Mod: 26

## 2022-09-15 RX ORDER — POLYETHYLENE GLYCOL 3350 17 G/17G
17 POWDER, FOR SOLUTION ORAL DAILY
Refills: 0 | Status: DISCONTINUED | OUTPATIENT
Start: 2022-09-15 | End: 2022-09-17

## 2022-09-15 RX ORDER — FUROSEMIDE 40 MG
80 TABLET ORAL ONCE
Refills: 0 | Status: COMPLETED | OUTPATIENT
Start: 2022-09-15 | End: 2022-09-15

## 2022-09-15 RX ORDER — HEPARIN SODIUM 5000 [USP'U]/ML
5000 INJECTION INTRAVENOUS; SUBCUTANEOUS EVERY 8 HOURS
Refills: 0 | Status: DISCONTINUED | OUTPATIENT
Start: 2022-09-15 | End: 2022-09-17

## 2022-09-15 RX ORDER — FUROSEMIDE 40 MG
80 TABLET ORAL DAILY
Refills: 0 | Status: DISCONTINUED | OUTPATIENT
Start: 2022-09-15 | End: 2022-09-16

## 2022-09-15 RX ORDER — FUROSEMIDE 40 MG
80 TABLET ORAL
Refills: 0 | Status: DISCONTINUED | OUTPATIENT
Start: 2022-09-15 | End: 2022-09-15

## 2022-09-15 RX ORDER — SENNA PLUS 8.6 MG/1
2 TABLET ORAL AT BEDTIME
Refills: 0 | Status: DISCONTINUED | OUTPATIENT
Start: 2022-09-15 | End: 2022-09-17

## 2022-09-15 RX ORDER — ERYTHROPOIETIN 10000 [IU]/ML
10000 INJECTION, SOLUTION INTRAVENOUS; SUBCUTANEOUS
Refills: 0 | Status: DISCONTINUED | OUTPATIENT
Start: 2022-09-15 | End: 2022-09-17

## 2022-09-15 RX ADMIN — SACUBITRIL AND VALSARTAN 1 TABLET(S): 24; 26 TABLET, FILM COATED ORAL at 18:29

## 2022-09-15 RX ADMIN — Medication 81 MILLIGRAM(S): at 11:45

## 2022-09-15 RX ADMIN — Medication 650 MILLIGRAM(S): at 12:05

## 2022-09-15 RX ADMIN — Medication 10 MILLIGRAM(S): at 18:37

## 2022-09-15 RX ADMIN — Medication 650 MILLIGRAM(S): at 02:26

## 2022-09-15 RX ADMIN — AMIODARONE HYDROCHLORIDE 200 MILLIGRAM(S): 400 TABLET ORAL at 05:40

## 2022-09-15 RX ADMIN — POLYETHYLENE GLYCOL 3350 17 GRAM(S): 17 POWDER, FOR SOLUTION ORAL at 11:45

## 2022-09-15 RX ADMIN — Medication 650 MILLIGRAM(S): at 21:47

## 2022-09-15 RX ADMIN — SACUBITRIL AND VALSARTAN 1 TABLET(S): 24; 26 TABLET, FILM COATED ORAL at 05:40

## 2022-09-15 RX ADMIN — Medication 80 MILLIGRAM(S): at 18:30

## 2022-09-15 RX ADMIN — INSULIN GLARGINE 15 UNIT(S): 100 INJECTION, SOLUTION SUBCUTANEOUS at 13:26

## 2022-09-15 RX ADMIN — Medication 650 MILLIGRAM(S): at 03:17

## 2022-09-15 RX ADMIN — ATORVASTATIN CALCIUM 40 MILLIGRAM(S): 80 TABLET, FILM COATED ORAL at 21:48

## 2022-09-15 RX ADMIN — Medication 80 MILLIGRAM(S): at 11:47

## 2022-09-15 RX ADMIN — Medication 650 MILLIGRAM(S): at 22:27

## 2022-09-15 RX ADMIN — Medication 650 MILLIGRAM(S): at 12:35

## 2022-09-15 NOTE — PROGRESS NOTE ADULT - SUBJECTIVE AND OBJECTIVE BOX
Chief complaint: Patient is a 82y old  Male who presents with a chief complaint of Patient admitted for shortness of breath and mid sternal chest pain (14 Sep 2022 18:07)    Interval history:  9/15: Lasix 40mg PO x1 no response, patient oliguric and retaining fluid. Lasix 80 IV began. Pt also complains of constipation and was given 1x suppository and miralax and senna. Patient seen and examined, all questions answered.   Review of systems: A complete 10-point review of systems was obtained and is negative except as stated in the interval history.    Vitals:  T(F): 97.3, Max: 98 (09-15 @ 00:56)  HR: 84 (84 - 91)  BP: 108/61 (107/65 - 117/55)  RR: 18 (18 - 18)  SpO2: 98% (98% - 98%)    Ins & outs:     09-14 @ 07:01  -  09-15 @ 07:00  --------------------------------------------------------  IN: 180 mL / OUT: 375 mL / NET: -195 mL    09-15 @ 07:01  -  09-15 @ 12:07  --------------------------------------------------------  IN: 0 mL / OUT: 0 mL / NET: 0 mL      Weight trend:  Weight (kg): 102.1 (09-14)    Physical exam:  General: No apparent distress  HEENT: Anicteric sclera. Moist mucous membranes.   Cardiac: Regular rate and rhythm. Systolic murmurs appreciated, no rubs, or gallops.   Vascular: Symmetric radial pulses. Dorsalis pedis pulses palpable.   Respiratory: Normal effort. Bibasilar crackles.   Abdomen: Soft, nontender. Audible bowel sounds.   Extremities: Warm with +2 pitting edema. No cyanosis or clubbing.   Skin: Warm and dry. No rash.   Neurologic: Grossly normal motor function.   Psychiatric: Oriented to person, place, and time.     Data reviewed:  - Telemetry: Bigeminy 9/15/22  - ECG (date 9/14/22):   < from: 12 Lead ECG (09.14.22 @ 23:23) >  Ventricular Rate 82 BPM    Atrial Rate 82 BPM    P-R Interval 216 ms    QRS Duration 176 ms    Q-T Interval 474 ms    QTC Calculation(Bazett) 553 ms    P Axis 53 degrees    R Axis 214 degrees    T Axis 2 degrees    Diagnosis Line Sinus rhythm with 1st degree A-V block with Premature atrial complexes in a  pattern of bigeminy  Right bundle branch block  Inferior infarct , age undetermined  Abnormal ECG    - TTE (date 9/14/22):     Left Ventricle: The left ventricular internal cavity size is moderately   increased. Global LV systolic function was moderately to severely   decreased. Left ventricular ejection fraction, by visual estimation, is   25 to 30%.  Right Ventricle: Normal right ventricular size and function. RV systolic   function is normal.  Left Atrium: Left atrial enlargement. No left atrial appendage thrombus   is seen. Color flow doppler and intravenous injection of agitated saline   demonstrates the presence of an intact intra atrial septum.  Right Atrium: Normal right atrial size.  Pericardium: There is no evidence of pericardial effusion.  Mitral Valve: The mitral valve leaflets are tethered which is due to   reduced systolic function and elevated LVDP. No evidence of mitral valve   stenosis. Severe mitral valve regurgitation isseen.  Tricuspid Valve: The tricuspid valve is normal in structure. Mild   tricuspid regurgitation is visualized.  Aortic Valve: The aortic valve is trileaflet. Trivial aortic valve   regurgitation is seen. Calcified Aortic valve with decreased leaflet   opening. Peak transvalvular velocity is 3.1 m/s. Peak/mean pressure   gradient 39/25 mmHg. KELTON VTI 1.02 cm2. Findings are suggestive of   moderate to severe Aortic valve stenosis. Low flow with stroke volume   index of 29 ml/m2.  Pulmonic Valve: Structurally normal pulmonic valve, with normal leaflet   excursion.  Aorta: Simple atheroma seen in the aortic arch and descending aorta.  Venous: A systolic blunting flow pattern is recorded from three pulmonary   veins.  Shunts: Agitated saline contrast was given intravenously to evaluate for   intracardiac shunting. There is no evidence of a patent foramen ovale.  SPECTRAL DOPPLER ANALYSIS:  Aortic Valve:  AoV VMax:    3.11 m/s  AoV Area, Vmax: 1.09 cm² Vmax Indx: 0.50 cm²/m²  AoV VTI:     0.62 m    AoV Area, VTI:  1.02 cm² VTI Indx:  0.46 cm²/m²  AoV Pk Grad: 38.8 mmHg  AoV Mn Grad: 25.3 mmHg    LVOT Vmax: 0.93 m/s  LVOT VTI:  0.17 m  LVOT Diam: 2.16 cm    - Chest x-ray (date 3/16):   `< from: Xray Chest 1 View- PORTABLE-Urgent (Xray Chest 1 View- PORTABLE-Urgent .) (03.16.21 @ 10:39) >  Findings:    Support devices: Left-sided ICD.    Cardiac/mediastinum/hilum: Heart is enlarged. Patient is status post median sternotomy.    Lung parenchyma/Pleura: Within normal limits.    Skeleton/soft tissues: Unremarkable.    Impression:    Cardiomegaly without acute opacifications or effusions.  `  - Stress test:   `< from: NM Nuclear Stress Pharmacologic Multiple (03.18.21 @ 14:47) >    Impression:      1.  Small reversible defect in the apex and anteroseptal wall of the left ventricle consistent with ischemia superimposed on inferolateral scar  2.   Fixed inferolateral defect which does not completely thicken consistent with prior injury (scar).  3.  Dilated left ventricle with mild to moderate global hypokinesis. Incomplete thickening of the inferolateral wall of the left ventricle consistent with prior injury (scar). All other walls of the left ventricle do thicken. .  4.  Left ventricular ejection fraction calculated as 35%, which is low. Echocardiographic estimated ejection fraction was 55% on 3/17/2021. However wall motion analysis suggests ejection fraction of 35-40%    - CCTA:  - Cardiac catheterization:  - Cardiac MRI:    - Labs:                        7.5    12.66 )-----------( 230      ( 15 Sep 2022 06:53 )             24.4     09-15    135  |  94<L>  |  70<HH>  ----------------------------<  127<H>  5.9<H>   |  25  |  7.9<HH>    Ca    8.8      15 Sep 2022 06:53  Mg     2.4     09-15    TPro  6.0  /  Alb  4.3  /  TBili  0.5  /  DBili  0.2  /  AST  32  /  ALT  20  /  AlkPhos  54  09-15    PT/INR - ( 15 Sep 2022 06:53 )   PT: 14.10 sec;   INR: 1.23 ratio           Troponin T, Serum: 0.26 ng/mL (09-15-22 @ 06:53)  Troponin T, Serum: 0.25 ng/mL (09-14-22 @ 22:02)    Serum Pro-Brain Natriuretic Peptide: 33150 pg/mL (09-14)      Triglycerides, Serum: 153 mg/dL (09-15-22 @ 06:53)  LDL Cholesterol Calculated: 33 mg/dL (09-15-22 @ 06:53)          Medications:  aMIOdarone    Tablet 200 milliGRAM(s) Oral daily  aspirin enteric coated 81 milliGRAM(s) Oral daily  atorvastatin 40 milliGRAM(s) Oral at bedtime  bisacodyl Suppository 10 milliGRAM(s) Rectal once  dextrose 50% Injectable 25 Gram(s) IV Push once  dextrose 50% Injectable 12.5 Gram(s) IV Push once  dextrose 50% Injectable 25 Gram(s) IV Push once  furosemide   Injectable 80 milliGRAM(s) IV Push daily  glucagon  Injectable 1 milliGRAM(s) IntraMuscular once  heparin   Injectable 5000 Unit(s) SubCutaneous every 8 hours  insulin glargine Injectable (LANTUS) 15 Unit(s) SubCutaneous every morning  insulin lispro (ADMELOG) corrective regimen sliding scale   SubCutaneous three times a day before meals  polyethylene glycol 3350 17 Gram(s) Oral daily  sacubitril 24 mG/valsartan 26 mG 1 Tablet(s) Oral two times a day  senna 2 Tablet(s) Oral at bedtime    Drips:  dextrose 5%. 1000 milliLiter(s) (50 mL/Hr) IV Continuous <Continuous>  dextrose 5%. 1000 milliLiter(s) (100 mL/Hr) IV Continuous <Continuous>    PRN:     Allergies    Allergy Status Unknown    Intolerances    Plan:   #Acute On chronic Diastolic Heart Failure( EF 25-30%) ; Moderate mitral valve regurgitation, Ischemic Cardiomyopathy, s/p ICD (10 yrs ago)   - Pt admitted for SOB  - 09/14/22 s/p JING  `< from: Transesophageal Echocardiogram (09.14.22 @ 08:10) >  1. Left ventricular ejection fraction, by visual estimation, is 25 to   30%.   2. Moderately to severely decreased global left ventricular systolic   function.   3. Calcified Aortic valve with decreased leaflet opening. Peak   transvalvular velocity is 3.1 m/s. Peak/mean pressure gradient 39/25   mmHg. KELTON VTI 1.02 cm2. Findings are suggestive of moderate to severe   Aortic valve stenosis. Low flow with stroke volume index of 29 ml/m2.   4. The mitral valve leaflets are tethered which is due to reduced   systolic function and elevated LVDP.   5. Severe mitral valve regurgitation.   6. Mild tricuspid regurgitation.   7. Left atrial enlargement.   8. Normal right atrial size.   9. Color flow doppler and intravenous injection of agitated saline   demonstrates the presence of an intact intra atrial septum.  10. Findings discussed with referring cardiologist.  Plan:   - f/u BNP 50201 pg/mL (09.14.22 @ 22:02)   - f/u CXR  -  09/14 s/p Lasix 40 mg x1, patient olguric and unable to lie flat OVN. Lasix dose increased to 80 mg BID IV  - Lasix 40 mg prn   - Cont: Entresto, Toprol  - hgb 7.5 baseline     #New Moderate to Severe Aortic Stenosis.  As per JING above  Plan  -  f/u with Structural heart team (TAVR protocol)  - F/U CTA, CT Abomen / pelvis w/ contrast  - carotid duplex  - simple PFTs     #Chest pain; Hx: CAD (s/p CABG s/p PCI)  - Patient reports mid sternal chest pain for the past few days  - s/p x5 stents (10 yrs ago) 3: Hx: End Stage Renal Disease  - stop trending troponins; troponins elevated but at baseline so ischemia r/o   Plan:  - cont: aspirin, lipitor, Toprol   914 troponin 0.25--> 9/15 0.26. Heparin q8 begun  `CKMB 3.1, myoglobin 246,     #ESRD  -HD on Monday and Friday / Left Arm IV Fistula  Plan:  - 9/15: 7.9 continue to monitor   - HD increase from 2x wk--> 3x a week   - nephrology consulted--> HD today and given epogen with HD due to hgb 7.5     #Hgb 7.5  - anemia w/u   - may need transfusion tomorrow 9/16    #New constipation   9/14 overnight patient complained of constipation  Plan:   -suppository x1 on 9/15, miralax and senna ordered, will f/u with patient on 9/16     #DM II   - Held home dose   - ISS; Monitor BGM   - F/u A1c   - Started Lantus 15 units at night will increase as needed since patient is on 30 units at home   - may need endocrinology consult  - Diet in place     # Hx: HTN, HLD, BPH, Hx of PTCA, Sleep Apnea  - cont: Lipitor  - pt not on CPAP or oxygen at home      Please contact me with any questions or concerns at [phone]. Chief complaint: Patient is a 82y old  Male who presents with a chief complaint of Patient admitted for shortness of breath and mid sternal chest pain (14 Sep 2022 18:07)    Interval history:  9/15: Lasix 40mg PO x1 no response, patient oliguric and retaining fluid. Lasix 80 IV began. Pt also complains of constipation and was given 1x suppository and miralax and senna. Patient seen and examined, all questions answered.   Review of systems: A complete 10-point review of systems was obtained and is negative except as stated in the interval history.    Vitals:  T(F): 97.3, Max: 98 (09-15 @ 00:56)  HR: 84 (84 - 91)  BP: 108/61 (107/65 - 117/55)  RR: 18 (18 - 18)  SpO2: 98% (98% - 98%)    Ins & outs:     09-14 @ 07:01  -  09-15 @ 07:00  --------------------------------------------------------  IN: 180 mL / OUT: 375 mL / NET: -195 mL    09-15 @ 07:01  -  09-15 @ 12:07  --------------------------------------------------------  IN: 0 mL / OUT: 0 mL / NET: 0 mL      Weight trend:  Weight (kg): 102.1 (09-14)    Physical exam:  General: No apparent distress  HEENT: Anicteric sclera. Moist mucous membranes.   Cardiac: Regular rate and rhythm. Systolic murmurs appreciated, no rubs, or gallops.   Vascular: Symmetric radial pulses. Dorsalis pedis pulses palpable.   Respiratory: Normal effort. Bibasilar crackles.   Abdomen: Soft, nontender. Audible bowel sounds.   Extremities: Warm with +2 pitting edema. No cyanosis or clubbing.   Skin: Warm and dry. No rash.   Neurologic: Grossly normal motor function.   Psychiatric: Oriented to person, place, and time.     Data reviewed:  - Telemetry: Bigeminy 9/15/22  - ECG (date 9/14/22):   < from: 12 Lead ECG (09.14.22 @ 23:23) >  Ventricular Rate 82 BPM    Atrial Rate 82 BPM    P-R Interval 216 ms    QRS Duration 176 ms    Q-T Interval 474 ms    QTC Calculation(Bazett) 553 ms    P Axis 53 degrees    R Axis 214 degrees    T Axis 2 degrees    Diagnosis Line Sinus rhythm with 1st degree A-V block with Premature atrial complexes in a  pattern of bigeminy  Right bundle branch block  Inferior infarct , age undetermined  Abnormal ECG    - TTE (date 9/14/22):     Left Ventricle: The left ventricular internal cavity size is moderately   increased. Global LV systolic function was moderately to severely   decreased. Left ventricular ejection fraction, by visual estimation, is   25 to 30%.  Right Ventricle: Normal right ventricular size and function. RV systolic   function is normal.  Left Atrium: Left atrial enlargement. No left atrial appendage thrombus   is seen. Color flow doppler and intravenous injection of agitated saline   demonstrates the presence of an intact intra atrial septum.  Right Atrium: Normal right atrial size.  Pericardium: There is no evidence of pericardial effusion.  Mitral Valve: The mitral valve leaflets are tethered which is due to   reduced systolic function and elevated LVDP. No evidence of mitral valve   stenosis. Severe mitral valve regurgitation isseen.  Tricuspid Valve: The tricuspid valve is normal in structure. Mild   tricuspid regurgitation is visualized.  Aortic Valve: The aortic valve is trileaflet. Trivial aortic valve   regurgitation is seen. Calcified Aortic valve with decreased leaflet   opening. Peak transvalvular velocity is 3.1 m/s. Peak/mean pressure   gradient 39/25 mmHg. KELTON VTI 1.02 cm2. Findings are suggestive of   moderate to severe Aortic valve stenosis. Low flow with stroke volume   index of 29 ml/m2.  Pulmonic Valve: Structurally normal pulmonic valve, with normal leaflet   excursion.  Aorta: Simple atheroma seen in the aortic arch and descending aorta.  Venous: A systolic blunting flow pattern is recorded from three pulmonary   veins.  Shunts: Agitated saline contrast was given intravenously to evaluate for   intracardiac shunting. There is no evidence of a patent foramen ovale.  SPECTRAL DOPPLER ANALYSIS:  Aortic Valve:  AoV VMax:    3.11 m/s  AoV Area, Vmax: 1.09 cm² Vmax Indx: 0.50 cm²/m²  AoV VTI:     0.62 m    AoV Area, VTI:  1.02 cm² VTI Indx:  0.46 cm²/m²  AoV Pk Grad: 38.8 mmHg  AoV Mn Grad: 25.3 mmHg    LVOT Vmax: 0.93 m/s  LVOT VTI:  0.17 m  LVOT Diam: 2.16 cm    - Chest x-ray (date 3/16):   `< from: Xray Chest 1 View- PORTABLE-Urgent (Xray Chest 1 View- PORTABLE-Urgent .) (03.16.21 @ 10:39) >  Findings:    Support devices: Left-sided ICD.    Cardiac/mediastinum/hilum: Heart is enlarged. Patient is status post median sternotomy.    Lung parenchyma/Pleura: Within normal limits.    Skeleton/soft tissues: Unremarkable.    Impression:    Cardiomegaly without acute opacifications or effusions.  `  - Stress test:   `< from: NM Nuclear Stress Pharmacologic Multiple (03.18.21 @ 14:47) >    Impression:      1.  Small reversible defect in the apex and anteroseptal wall of the left ventricle consistent with ischemia superimposed on inferolateral scar  2.   Fixed inferolateral defect which does not completely thicken consistent with prior injury (scar).  3.  Dilated left ventricle with mild to moderate global hypokinesis. Incomplete thickening of the inferolateral wall of the left ventricle consistent with prior injury (scar). All other walls of the left ventricle do thicken. .  4.  Left ventricular ejection fraction calculated as 35%, which is low. Echocardiographic estimated ejection fraction was 55% on 3/17/2021. However wall motion analysis suggests ejection fraction of 35-40%    - CCTA:  - Cardiac catheterization:  - Cardiac MRI:    - Labs:                        7.5    12.66 )-----------( 230      ( 15 Sep 2022 06:53 )             24.4     09-15    135  |  94<L>  |  70<HH>  ----------------------------<  127<H>  5.9<H>   |  25  |  7.9<HH>    Ca    8.8      15 Sep 2022 06:53  Mg     2.4     09-15    TPro  6.0  /  Alb  4.3  /  TBili  0.5  /  DBili  0.2  /  AST  32  /  ALT  20  /  AlkPhos  54  09-15    PT/INR - ( 15 Sep 2022 06:53 )   PT: 14.10 sec;   INR: 1.23 ratio           Troponin T, Serum: 0.26 ng/mL (09-15-22 @ 06:53)  Troponin T, Serum: 0.25 ng/mL (09-14-22 @ 22:02)    Serum Pro-Brain Natriuretic Peptide: 58182 pg/mL (09-14)      Triglycerides, Serum: 153 mg/dL (09-15-22 @ 06:53)  LDL Cholesterol Calculated: 33 mg/dL (09-15-22 @ 06:53)          Medications:  aMIOdarone    Tablet 200 milliGRAM(s) Oral daily  aspirin enteric coated 81 milliGRAM(s) Oral daily  atorvastatin 40 milliGRAM(s) Oral at bedtime  bisacodyl Suppository 10 milliGRAM(s) Rectal once  dextrose 50% Injectable 25 Gram(s) IV Push once  dextrose 50% Injectable 12.5 Gram(s) IV Push once  dextrose 50% Injectable 25 Gram(s) IV Push once  furosemide   Injectable 80 milliGRAM(s) IV Push daily  glucagon  Injectable 1 milliGRAM(s) IntraMuscular once  heparin   Injectable 5000 Unit(s) SubCutaneous every 8 hours  insulin glargine Injectable (LANTUS) 15 Unit(s) SubCutaneous every morning  insulin lispro (ADMELOG) corrective regimen sliding scale   SubCutaneous three times a day before meals  polyethylene glycol 3350 17 Gram(s) Oral daily  sacubitril 24 mG/valsartan 26 mG 1 Tablet(s) Oral two times a day  senna 2 Tablet(s) Oral at bedtime    Drips:  dextrose 5%. 1000 milliLiter(s) (50 mL/Hr) IV Continuous <Continuous>  dextrose 5%. 1000 milliLiter(s) (100 mL/Hr) IV Continuous <Continuous>    PRN:     Allergies    Allergy Status Unknown    Intolerances    Plan:   #Acute On chronic Diastolic Heart Failure( EF 25-30%) ; Moderate mitral valve regurgitation, Ischemic Cardiomyopathy, s/p ICD (10 yrs ago)   - Pt admitted for SOB  - 09/14/22 s/p JING  `< from: Transesophageal Echocardiogram (09.14.22 @ 08:10) >  1. Left ventricular ejection fraction, by visual estimation, is 25 to   30%.   2. Moderately to severely decreased global left ventricular systolic   function.   3. Calcified Aortic valve with decreased leaflet opening. Peak   transvalvular velocity is 3.1 m/s. Peak/mean pressure gradient 39/25   mmHg. KELTON VTI 1.02 cm2. Findings are suggestive of moderate to severe   Aortic valve stenosis. Low flow with stroke volume index of 29 ml/m2.   4. The mitral valve leaflets are tethered which is due to reduced   systolic function and elevated LVDP.   5. Severe mitral valve regurgitation.   6. Mild tricuspid regurgitation.   7. Left atrial enlargement.   8. Normal right atrial size.   9. Color flow doppler and intravenous injection of agitated saline   demonstrates the presence of an intact intra atrial septum.  10. Findings discussed with referring cardiologist.  Plan:   - f/u BNP 93052 pg/mL (09.14.22 @ 22:02)   - f/u CXR  -  09/14 s/p Lasix 40 mg x1, patient olguric and unable to lie flat OVN. Lasix dose increased to 80 mg BID IV  - Lasix 40 mg prn   - Cont: Entresto, Toprol  - hgb 7.5 baseline     #New Moderate to Severe Aortic Stenosis.  As per JING above  Plan  -  f/u with Structural heart team (TAVR protocol)  - F/U CTA, CT Abdomen / pelvis w/ contrast  - carotid duplex  - simple PFTs     #hx of VT  -continue amiodarone  - f/u tele and EKG    #Chest pain; Hx: CAD (s/p CABG s/p PCI)  - Patient reports mid sternal chest pain for the past few days  - s/p x5 stents (10 yrs ago) 3: Hx: End Stage Renal Disease  - stop trending troponins; ischemia r/o  Plan:  - cont: aspirin, lipitor, Toprol   914 troponin 0.25--> 9/15 0.26. Heparin q8 begun  `CKMB 3.1, myoglobin 246,     #ESRD  -HD on Monday and Friday / Left Arm IV Fistula  Plan:  - 9/15: 7.9 continue to monitor   - HD increase from 2x wk--> 3x a week   - nephrology consulted--> HD today and given epogen with HD due to hgb 7.5     #Hgb 7.5  - anemia w/u   - may need transfusion tomorrow 9/16    #New constipation   9/14 overnight patient complained of constipation  Plan:   -suppository x1 on 9/15, miralax and senna ordered, will f/u with patient on 9/16     #DM II   - Held home dose   - ISS; Monitor BGM   - F/u A1c   - Started Lantus 15 units at night will increase as needed since patient is on 30 units at home   - may need endocrinology consult  - Diet in place     # Hx: HTN, HLD, BPH, Hx of PTCA, Sleep Apnea  - cont: Lipitor  - pt not on CPAP or oxygen at home      Please contact me with any questions or concerns at [phone]. Chief complaint: Patient is a 82y old  Male who presents with a chief complaint of Patient admitted for shortness of breath and mid sternal chest pain (14 Sep 2022 18:07)    Interval history:  9/15: Lasix 40mg PO x1 no response, patient oliguric and retaining fluid. Lasix 80 IV began. Pt also complains of constipation and was given 1x suppository and miralax and senna. Patient seen and examined, all questions answered.   Review of systems: A complete 10-point review of systems was obtained and is negative except as stated in the interval history.    Vitals:  T(F): 97.3, Max: 98 (09-15 @ 00:56)  HR: 84 (84 - 91)  BP: 108/61 (107/65 - 117/55)  RR: 18 (18 - 18)  SpO2: 98% (98% - 98%)    Ins & outs:     09-14 @ 07:01  -  09-15 @ 07:00  --------------------------------------------------------  IN: 180 mL / OUT: 375 mL / NET: -195 mL    09-15 @ 07:01  -  09-15 @ 12:07  --------------------------------------------------------  IN: 0 mL / OUT: 0 mL / NET: 0 mL      Weight trend:  Weight (kg): 102.1 (09-14)    Physical exam:  General: No apparent distress  HEENT: Anicteric sclera. Moist mucous membranes.   Cardiac: Regular rate and rhythm. Systolic murmurs appreciated, no rubs, or gallops.   Vascular: Symmetric radial pulses. Dorsalis pedis pulses palpable.   Respiratory: Normal effort. Bibasilar crackles.   Abdomen: Soft, nontender. Audible bowel sounds.   Extremities: Warm with +2 pitting edema. No cyanosis or clubbing.   Skin: Warm and dry. No rash.   Neurologic: Grossly normal motor function.   Psychiatric: Oriented to person, place, and time.     Data reviewed:  - Telemetry: Bigeminy 9/15/22  - ECG (date 9/14/22):   < from: 12 Lead ECG (09.14.22 @ 23:23) >  Ventricular Rate 82 BPM    Atrial Rate 82 BPM    P-R Interval 216 ms    QRS Duration 176 ms    Q-T Interval 474 ms    QTC Calculation(Bazett) 553 ms    P Axis 53 degrees    R Axis 214 degrees    T Axis 2 degrees    Diagnosis Line Sinus rhythm with 1st degree A-V block with Premature atrial complexes in a  pattern of bigeminy  Right bundle branch block  Inferior infarct , age undetermined  Abnormal ECG    - TTE (date 9/14/22):     Left Ventricle: The left ventricular internal cavity size is moderately   increased. Global LV systolic function was moderately to severely   decreased. Left ventricular ejection fraction, by visual estimation, is   25 to 30%.  Right Ventricle: Normal right ventricular size and function. RV systolic   function is normal.  Left Atrium: Left atrial enlargement. No left atrial appendage thrombus   is seen. Color flow doppler and intravenous injection of agitated saline   demonstrates the presence of an intact intra atrial septum.  Right Atrium: Normal right atrial size.  Pericardium: There is no evidence of pericardial effusion.  Mitral Valve: The mitral valve leaflets are tethered which is due to   reduced systolic function and elevated LVDP. No evidence of mitral valve   stenosis. Severe mitral valve regurgitation isseen.  Tricuspid Valve: The tricuspid valve is normal in structure. Mild   tricuspid regurgitation is visualized.  Aortic Valve: The aortic valve is trileaflet. Trivial aortic valve   regurgitation is seen. Calcified Aortic valve with decreased leaflet   opening. Peak transvalvular velocity is 3.1 m/s. Peak/mean pressure   gradient 39/25 mmHg. KELTON VTI 1.02 cm2. Findings are suggestive of   moderate to severe Aortic valve stenosis. Low flow with stroke volume   index of 29 ml/m2.  Pulmonic Valve: Structurally normal pulmonic valve, with normal leaflet   excursion.  Aorta: Simple atheroma seen in the aortic arch and descending aorta.  Venous: A systolic blunting flow pattern is recorded from three pulmonary   veins.  Shunts: Agitated saline contrast was given intravenously to evaluate for   intracardiac shunting. There is no evidence of a patent foramen ovale.  SPECTRAL DOPPLER ANALYSIS:  Aortic Valve:  AoV VMax:    3.11 m/s  AoV Area, Vmax: 1.09 cm² Vmax Indx: 0.50 cm²/m²  AoV VTI:     0.62 m    AoV Area, VTI:  1.02 cm² VTI Indx:  0.46 cm²/m²  AoV Pk Grad: 38.8 mmHg  AoV Mn Grad: 25.3 mmHg    LVOT Vmax: 0.93 m/s  LVOT VTI:  0.17 m  LVOT Diam: 2.16 cm    - Chest x-ray 9/14/22    < from: Xray Chest 1 View- PORTABLE-Routine (Xray Chest 1 View- PORTABLE-Routine .) (09.14.22 @ 22:12) >    Impression:    Increased bilateral interstitial opacities.    - Stress test:   `< from: NM Nuclear Stress Pharmacologic Multiple (03.18.21 @ 14:47) >    Impression:      1.  Small reversible defect in the apex and anteroseptal wall of the left ventricle consistent with ischemia superimposed on inferolateral scar  2.   Fixed inferolateral defect which does not completely thicken consistent with prior injury (scar).  3.  Dilated left ventricle with mild to moderate global hypokinesis. Incomplete thickening of the inferolateral wall of the left ventricle consistent with prior injury (scar). All other walls of the left ventricle do thicken. .  4.  Left ventricular ejection fraction calculated as 35%, which is low. Echocardiographic estimated ejection fraction was 55% on 3/17/2021. However wall motion analysis suggests ejection fraction of 35-40%    - CCTA:  - Cardiac catheterization:  - Cardiac MRI:    - Labs:                        7.5    12.66 )-----------( 230      ( 15 Sep 2022 06:53 )             24.4     09-15    135  |  94<L>  |  70<HH>  ----------------------------<  127<H>  5.9<H>   |  25  |  7.9<HH>    Ca    8.8      15 Sep 2022 06:53  Mg     2.4     09-15    TPro  6.0  /  Alb  4.3  /  TBili  0.5  /  DBili  0.2  /  AST  32  /  ALT  20  /  AlkPhos  54  09-15    PT/INR - ( 15 Sep 2022 06:53 )   PT: 14.10 sec;   INR: 1.23 ratio           Troponin T, Serum: 0.26 ng/mL (09-15-22 @ 06:53)  Troponin T, Serum: 0.25 ng/mL (09-14-22 @ 22:02)    Serum Pro-Brain Natriuretic Peptide: 72754 pg/mL (09-14)      Triglycerides, Serum: 153 mg/dL (09-15-22 @ 06:53)  LDL Cholesterol Calculated: 33 mg/dL (09-15-22 @ 06:53)          Medications:  aMIOdarone    Tablet 200 milliGRAM(s) Oral daily  aspirin enteric coated 81 milliGRAM(s) Oral daily  atorvastatin 40 milliGRAM(s) Oral at bedtime  bisacodyl Suppository 10 milliGRAM(s) Rectal once  dextrose 50% Injectable 25 Gram(s) IV Push once  dextrose 50% Injectable 12.5 Gram(s) IV Push once  dextrose 50% Injectable 25 Gram(s) IV Push once  furosemide   Injectable 80 milliGRAM(s) IV Push daily  glucagon  Injectable 1 milliGRAM(s) IntraMuscular once  heparin   Injectable 5000 Unit(s) SubCutaneous every 8 hours  insulin glargine Injectable (LANTUS) 15 Unit(s) SubCutaneous every morning  insulin lispro (ADMELOG) corrective regimen sliding scale   SubCutaneous three times a day before meals  polyethylene glycol 3350 17 Gram(s) Oral daily  sacubitril 24 mG/valsartan 26 mG 1 Tablet(s) Oral two times a day  senna 2 Tablet(s) Oral at bedtime    Drips:  dextrose 5%. 1000 milliLiter(s) (50 mL/Hr) IV Continuous <Continuous>  dextrose 5%. 1000 milliLiter(s) (100 mL/Hr) IV Continuous <Continuous>    PRN:     Allergies    Allergy Status Unknown    Intolerances    Plan:   #Acute On chronic Diastolic Heart Failure( EF 25-30%) ; Moderate mitral valve regurgitation, Ischemic Cardiomyopathy, s/p ICD (10 yrs ago)   - Pt admitted for SOB  - 09/14/22 s/p JING  `< from: Transesophageal Echocardiogram (09.14.22 @ 08:10) >  1. Left ventricular ejection fraction, by visual estimation, is 25 to   30%.   2. Moderately to severely decreased global left ventricular systolic   function.   3. Calcified Aortic valve with decreased leaflet opening. Peak   transvalvular velocity is 3.1 m/s. Peak/mean pressure gradient 39/25   mmHg. KELTON VTI 1.02 cm2. Findings are suggestive of moderate to severe   Aortic valve stenosis. Low flow with stroke volume index of 29 ml/m2.   4. The mitral valve leaflets are tethered which is due to reduced   systolic function and elevated LVDP.   5. Severe mitral valve regurgitation.   6. Mild tricuspid regurgitation.   7. Left atrial enlargement.   8. Normal right atrial size.   9. Color flow doppler and intravenous injection of agitated saline   demonstrates the presence of an intact intra atrial septum.  10. Findings discussed with referring cardiologist.  Plan:   - f/u BNP 72674 pg/mL (09.14.22 @ 22:02)   - f/u CXR  -  09/14 s/p Lasix 40 mg x1, patient olguric and unable to lie flat OVN. Lasix dose increased to 80 mg BID IV  - Lasix 40 mg prn   - Cont: Entresto, Toprol    #New Moderate to Severe Aortic Stenosis.  As per JING above  Plan  -  f/u with Structural heart team (TAVR protocol)  - F/U CTA, CT Abdomen / pelvis w/ contrast  - carotid duplex  - simple PFTs     #hx of VT  -continue amiodarone  - f/u tele and EKG    #Chest pain; Hx: CAD (s/p CABG s/p PCI)  - Patient reports mid sternal chest pain for the past few days  - s/p x5 stents (10 yrs ago) 3: Hx: End Stage Renal Disease  - stop trending troponins; ischemia r/o  Plan:  - cont: aspirin, lipitor, Toprol   914 troponin 0.25--> 9/15 0.26. Heparin q8 begun  `CKMB 3.1, myoglobin 246,     #ESRD  -HD on Monday and Friday / Left Arm IV Fistula  Plan:  - 9/15: 7.9 continue to monitor   - HD increase from 2x wk--> 3x a week   - nephrology consulted--> HD today and given epogen with HD due to hgb 7.5     #Hgb 7.5  - anemia of chronic disease w/u   - may need transfusion tomorrow 9/16  - baseline hgb 10-11     #New constipation   9/14 overnight patient complained of constipation  Plan:   -suppository x1 on 9/15, miralax and senna ordered, will f/u with patient on 9/16     #DM II   - Held home dose   - ISS; Monitor BGM   - F/u A1c   - Started Lantus 15 units at night will increase as needed since patient is on 30 units at home   - may need endocrinology consult  - Diet in place     # Hx: HTN, HLD, BPH, Hx of PTCA, Sleep Apnea  - cont: Lipitor  - pt not on CPAP or oxygen at home      Please contact me with any questions or concerns at [phone]. Chief complaint: Patient is a 82y old  Male who presents with a chief complaint of Patient admitted for shortness of breath and mid sternal chest pain (14 Sep 2022 18:07)    Interval history:  9/15: Lasix 40mg PO x1 no response, patient oliguric and retaining fluid. Lasix 80 IV began. Pt also complains of constipation and was given 1x suppository and miralax and senna. Patient seen and examined, all questions answered.   Review of systems: A complete 10-point review of systems was obtained and is negative except as stated in the interval history.    Vitals:  T(F): 97.3, Max: 98 (09-15 @ 00:56)  HR: 84 (84 - 91)  BP: 108/61 (107/65 - 117/55)  RR: 18 (18 - 18)  SpO2: 98% (98% - 98%)    Ins & outs:     09-14 @ 07:01  -  09-15 @ 07:00  --------------------------------------------------------  IN: 180 mL / OUT: 375 mL / NET: -195 mL    09-15 @ 07:01  -  09-15 @ 12:07  --------------------------------------------------------  IN: 0 mL / OUT: 0 mL / NET: 0 mL      Weight trend:  Weight (kg): 102.1 (09-14)    Physical exam:  General: No apparent distress  HEENT: Anicteric sclera. Moist mucous membranes.   Cardiac: Regular rate and rhythm. Systolic murmurs appreciated, no rubs, or gallops.   Vascular: Symmetric radial pulses. Dorsalis pedis pulses palpable.   Respiratory: Normal effort. Bibasilar crackles.   Abdomen: Soft, nontender. Audible bowel sounds.   Extremities: Warm with +2 pitting edema. No cyanosis or clubbing.   Skin: Warm and dry. No rash.   Neurologic: Grossly normal motor function.   Psychiatric: Oriented to person, place, and time.     Data reviewed:  - Telemetry: Bigeminy 9/15/22  - ECG (date 9/14/22):   < from: 12 Lead ECG (09.14.22 @ 23:23) >  Ventricular Rate 82 BPM    Atrial Rate 82 BPM    P-R Interval 216 ms    QRS Duration 176 ms    Q-T Interval 474 ms    QTC Calculation(Bazett) 553 ms    P Axis 53 degrees    R Axis 214 degrees    T Axis 2 degrees    Diagnosis Line Sinus rhythm with 1st degree A-V block with Premature atrial complexes in a  pattern of bigeminy  Right bundle branch block  Inferior infarct , age undetermined  Abnormal ECG    - TTE (date 9/14/22):     Left Ventricle: The left ventricular internal cavity size is moderately   increased. Global LV systolic function was moderately to severely   decreased. Left ventricular ejection fraction, by visual estimation, is   25 to 30%.  Right Ventricle: Normal right ventricular size and function. RV systolic   function is normal.  Left Atrium: Left atrial enlargement. No left atrial appendage thrombus   is seen. Color flow doppler and intravenous injection of agitated saline   demonstrates the presence of an intact intra atrial septum.  Right Atrium: Normal right atrial size.  Pericardium: There is no evidence of pericardial effusion.  Mitral Valve: The mitral valve leaflets are tethered which is due to   reduced systolic function and elevated LVDP. No evidence of mitral valve   stenosis. Severe mitral valve regurgitation isseen.  Tricuspid Valve: The tricuspid valve is normal in structure. Mild   tricuspid regurgitation is visualized.  Aortic Valve: The aortic valve is trileaflet. Trivial aortic valve   regurgitation is seen. Calcified Aortic valve with decreased leaflet   opening. Peak transvalvular velocity is 3.1 m/s. Peak/mean pressure   gradient 39/25 mmHg. KELTON VTI 1.02 cm2. Findings are suggestive of   moderate to severe Aortic valve stenosis. Low flow with stroke volume   index of 29 ml/m2.  Pulmonic Valve: Structurally normal pulmonic valve, with normal leaflet   excursion.  Aorta: Simple atheroma seen in the aortic arch and descending aorta.  Venous: A systolic blunting flow pattern is recorded from three pulmonary   veins.  Shunts: Agitated saline contrast was given intravenously to evaluate for   intracardiac shunting. There is no evidence of a patent foramen ovale.  SPECTRAL DOPPLER ANALYSIS:  Aortic Valve:  AoV VMax:    3.11 m/s  AoV Area, Vmax: 1.09 cm² Vmax Indx: 0.50 cm²/m²  AoV VTI:     0.62 m    AoV Area, VTI:  1.02 cm² VTI Indx:  0.46 cm²/m²  AoV Pk Grad: 38.8 mmHg  AoV Mn Grad: 25.3 mmHg    LVOT Vmax: 0.93 m/s  LVOT VTI:  0.17 m  LVOT Diam: 2.16 cm    - Chest x-ray 9/14/22    < from: Xray Chest 1 View- PORTABLE-Routine (Xray Chest 1 View- PORTABLE-Routine .) (09.14.22 @ 22:12) >    Impression:    Increased bilateral interstitial opacities.    - Stress test:   `< from: NM Nuclear Stress Pharmacologic Multiple (03.18.21 @ 14:47) >    Impression:      1.  Small reversible defect in the apex and anteroseptal wall of the left ventricle consistent with ischemia superimposed on inferolateral scar  2.   Fixed inferolateral defect which does not completely thicken consistent with prior injury (scar).  3.  Dilated left ventricle with mild to moderate global hypokinesis. Incomplete thickening of the inferolateral wall of the left ventricle consistent with prior injury (scar). All other walls of the left ventricle do thicken. .  4.  Left ventricular ejection fraction calculated as 35%, which is low. Echocardiographic estimated ejection fraction was 55% on 3/17/2021. However wall motion analysis suggests ejection fraction of 35-40%    - CCTA:  - Cardiac catheterization:  - Cardiac MRI:    - Labs:                        7.5    12.66 )-----------( 230      ( 15 Sep 2022 06:53 )             24.4     09-15    135  |  94<L>  |  70<HH>  ----------------------------<  127<H>  5.9<H>   |  25  |  7.9<HH>    Ca    8.8      15 Sep 2022 06:53  Mg     2.4     09-15    TPro  6.0  /  Alb  4.3  /  TBili  0.5  /  DBili  0.2  /  AST  32  /  ALT  20  /  AlkPhos  54  09-15    PT/INR - ( 15 Sep 2022 06:53 )   PT: 14.10 sec;   INR: 1.23 ratio           Troponin T, Serum: 0.26 ng/mL (09-15-22 @ 06:53)  Troponin T, Serum: 0.25 ng/mL (09-14-22 @ 22:02)    Serum Pro-Brain Natriuretic Peptide: 08521 pg/mL (09-14)      Triglycerides, Serum: 153 mg/dL (09-15-22 @ 06:53)  LDL Cholesterol Calculated: 33 mg/dL (09-15-22 @ 06:53)          Medications:  aMIOdarone    Tablet 200 milliGRAM(s) Oral daily  aspirin enteric coated 81 milliGRAM(s) Oral daily  atorvastatin 40 milliGRAM(s) Oral at bedtime  bisacodyl Suppository 10 milliGRAM(s) Rectal once  dextrose 50% Injectable 25 Gram(s) IV Push once  dextrose 50% Injectable 12.5 Gram(s) IV Push once  dextrose 50% Injectable 25 Gram(s) IV Push once  furosemide   Injectable 80 milliGRAM(s) IV Push daily  glucagon  Injectable 1 milliGRAM(s) IntraMuscular once  heparin   Injectable 5000 Unit(s) SubCutaneous every 8 hours  insulin glargine Injectable (LANTUS) 15 Unit(s) SubCutaneous every morning  insulin lispro (ADMELOG) corrective regimen sliding scale   SubCutaneous three times a day before meals  polyethylene glycol 3350 17 Gram(s) Oral daily  sacubitril 24 mG/valsartan 26 mG 1 Tablet(s) Oral two times a day  senna 2 Tablet(s) Oral at bedtime    Drips:  dextrose 5%. 1000 milliLiter(s) (50 mL/Hr) IV Continuous <Continuous>  dextrose 5%. 1000 milliLiter(s) (100 mL/Hr) IV Continuous <Continuous>    PRN:     Allergies    Allergy Status Unknown    Intolerances    Plan:   #Acute On chronic Diastolic Heart Failure( EF 25-30%) ; Moderate mitral valve regurgitation, Ischemic Cardiomyopathy, s/p ICD (10 yrs ago)   - Pt admitted for SOB  - 09/14/22 s/p JING  `< from: Transesophageal Echocardiogram (09.14.22 @ 08:10) >  1. Left ventricular ejection fraction, by visual estimation, is 25 to   30%.   2. Moderately to severely decreased global left ventricular systolic   function.   3. Calcified Aortic valve with decreased leaflet opening. Peak   transvalvular velocity is 3.1 m/s. Peak/mean pressure gradient 39/25   mmHg. KELTON VTI 1.02 cm2. Findings are suggestive of moderate to severe   Aortic valve stenosis. Low flow with stroke volume index of 29 ml/m2.   4. The mitral valve leaflets are tethered which is due to reduced   systolic function and elevated LVDP.   5. Severe mitral valve regurgitation.   6. Mild tricuspid regurgitation.   7. Left atrial enlargement.   8. Normal right atrial size.   9. Color flow doppler and intravenous injection of agitated saline   demonstrates the presence of an intact intra atrial septum.  10. Findings discussed with referring cardiologist.  Plan:   - f/u BNP 29087 pg/mL (09.14.22 @ 22:02)   - f/u CXR  -  09/14 s/p Lasix 40 mg x1, patient olguric and unable to lie flat OVN. Lasix dose increased to 80 mg BID IV  - Lasix 40 mg prn   - Cont: Entresto, Toprol    #New Moderate to Severe Aortic Stenosis.  As per JING above  Plan  -  f/u with Structural heart team (TAVR protocol)  - F/U CTA, CT Abdomen / pelvis w/ contrast  - carotid duplex  - simple PFTs     #hx of VT  -continue amiodarone  - f/u tele and EKG    #Chest pain; Hx: CAD (s/p CABG s/p PCI)  - Patient reports mid sternal chest pain for the past few days  - s/p x5 stents (10 yrs ago) 3: Hx: End Stage Renal Disease  - stop trending troponins; ischemia r/o  Plan:  - cont: aspirin, lipitor, Toprol   914 troponin 0.25--> 9/15 0.26. Heparin q8 begun  `CKMB 3.1, myoglobin 246,     #ESRD  -HD on Monday and Friday / Left Arm IV Fistula  Plan:  - 9/15: 7.9 continue to monitor   - HD increase from 2x wk--> 3x a week   - nephrology consulted--> HD today and given epogen with HD due to hgb 7.5     #Hgb 7.5  - anemia of chronic disease w/u   - may need transfusion tomorrow 9/16  - baseline hgb 10-11     #New constipation   9/14 overnight patient complained of constipation  Plan:   - miralax and senna ordered, will f/u with patient on 9/16     #DM II   - Held home dose   - ISS; Monitor BGM   - F/u A1c   - Started Lantus 15 units at night will increase as needed since patient is on 30 units at home   - may need endocrinology consult  - Diet in place     # Hx: HTN, HLD, BPH, Hx of PTCA, Sleep Apnea  - cont: Lipitor  - pt not on CPAP or oxygen at home      Please contact me with any questions or concerns at [phone].

## 2022-09-15 NOTE — CONSULT NOTE ADULT - SUBJECTIVE AND OBJECTIVE BOX
NEPHROLOGY CONSULTATION NOTE    Pt is 83 y/o male ---> PMHx CHF, CAD ( X5 stents), HTN, HLD, DM, MVR, s/p ICD ( 10 yrs ago), ESRF (hemodialysis Monday and Friday with right arm fistula), BPH, Hx of PTCA, Sleep Apnea.   Patient presents today for an inpatient JING procedure and also complaints of shortness of breath for the past few weeks associated with mid-sternal chest tightness radiating to the back. Patient stated that for the past 1 week he developed worsening SOB on both exertion and at rest and is unable to lay flat/ on his back to night. He initially when to a hospital at [place] and was told that he had fluid in both lungs, was treated with Lasix with few hemodialysis sections and  was discharged home last week Wednesday (09/07/22). However, the SOB did not improved for the past few days, which prompted him to came to the ED  today and for his inpatient JING procedure.      PAST MEDICAL & SURGICAL HISTORY:  HTN (Hypertension)    Diabetes Mellitus Type II    Hypercholesterolemia    CAD (Coronary Artery Disease)    History of PTCA  with stents 10 years ago ([facility])    Diabetes mellitus    CAD (coronary artery disease)    H/O hyperlipidemia    HTN - Hypertension    Renal insufficiency    Sleep apnea  does not use machine    GERD (gastroesophageal reflux disease)    BPH (Benign Prostatic Hyperplasia)    CHF (congestive heart failure)    Mitral valve regurgitation    S/P knee surgery  b/l arthroscopic    S/P tonsillectomy    S/P primary angioplasty with coronary stent  6-7 stents last one in 10/12    S/P appendectomy      Allergies:  No Known Allergies    Home Medications Reviewed    SOCIAL HISTORY:  Denies ETOH,Smoking,   FAMILY HISTORY:  No pertinent family history in first degree relatives          REVIEW OF SYSTEMS:  CONSTITUTIONAL: No weakness, fevers or chills  EYES/ENT: No visual changes;  No vertigo or throat pain   NECK: + pain or stiffness  RESPIRATORY: No cough, wheezing, hemoptysis; + shortness of breath  CARDIOVASCULAR: No chest pain or palpitations.  GASTROINTESTINAL: No abdominal or epigastric pain. No nausea, vomiting, or hematemesis; No diarrhea or constipation. No melena or hematochezia.  GENITOURINARY: No dysuria, frequency, foamy urine, urinary urgency, incontinence or hematuria  NEUROLOGICAL: No numbness or weakness  SKIN: No itching, burning, rashes, or lesions   VASCULAR: No bilateral lower extremity edema.   All other review of systems is negative unless indicated above.    PHYSICAL EXAM:  Constitutional: NAD  HEENT: anicteric sclera, oropharynx clear, MMM  Neck: No JVD  Respiratory: decreased BS b/l  Cardiovascular: S1, S2, RRR  Gastrointestinal: BS+, soft, NT/ND  Extremities: No cyanosis or clubbing. No peripheral edema  Neurological: A/O x 3, no focal deficits  Psychiatric: Normal mood, normal affect  : No CVA tenderness. No woods.   Skin: No rashes  Vascular Access: left arm AVF    Hospital Medications:   MEDICATIONS  (STANDING):  aMIOdarone    Tablet 200 milliGRAM(s) Oral daily  aspirin enteric coated 81 milliGRAM(s) Oral daily  atorvastatin 40 milliGRAM(s) Oral at bedtime  bisacodyl Suppository 10 milliGRAM(s) Rectal once  dextrose 5%. 1000 milliLiter(s) (50 mL/Hr) IV Continuous <Continuous>  dextrose 5%. 1000 milliLiter(s) (100 mL/Hr) IV Continuous <Continuous>  dextrose 50% Injectable 25 Gram(s) IV Push once  dextrose 50% Injectable 12.5 Gram(s) IV Push once  dextrose 50% Injectable 25 Gram(s) IV Push once  epoetin nicky-epbx (RETACRIT) Injectable 82714 Unit(s) IV Push <User Schedule>  furosemide   Injectable 80 milliGRAM(s) IV Push daily  glucagon  Injectable 1 milliGRAM(s) IntraMuscular once  heparin   Injectable 5000 Unit(s) SubCutaneous every 8 hours  insulin glargine Injectable (LANTUS) 15 Unit(s) SubCutaneous every morning  insulin lispro (ADMELOG) corrective regimen sliding scale   SubCutaneous three times a day before meals  polyethylene glycol 3350 17 Gram(s) Oral daily  sacubitril 24 mG/valsartan 26 mG 1 Tablet(s) Oral two times a day  senna 2 Tablet(s) Oral at bedtime        VITALS:  T(F): 98.7 (09-15-22 @ 12:44), Max: 98.7 (09-15-22 @ 12:44)  HR: 72 (09-15-22 @ 14:20)  BP: 179/136 (09-15-22 @ 14:20)  RR: 18 (09-15-22 @ 12:44)  SpO2: 98% (09-15-22 @ 05:30)  Wt(kg): --    09-14 @ 07:01  -  09-15 @ 07:00  --------------------------------------------------------  IN: 180 mL / OUT: 375 mL / NET: -195 mL    09-15 @ 07:01  -  09-15 @ 15:30  --------------------------------------------------------  IN: 255 mL / OUT: 150 mL / NET: 105 mL      Height (cm): 177.8 (09-14 @ 15:55)  Weight (kg): 102.1 (09-14 @ 15:55)  BMI (kg/m2): 32.3 (09-14 @ 15:55)  BSA (m2): 2.19 (09-14 @ 15:55)    LABS:  09-15    135  |  94<L>  |  70<HH>  ----------------------------<  127<H>  5.9<H>   |  25  |  7.9<HH>    Ca    8.8      15 Sep 2022 06:53  Mg     2.4     09-15    TPro  6.0  /  Alb  4.3  /  TBili  0.5  /  DBili  0.2  /  AST  32  /  ALT  20  /  AlkPhos  54  09-15                          7.5    12.66 )-----------( 230      ( 15 Sep 2022 06:53 )             24.4       Urine Studies:        RADIOLOGY & ADDITIONAL STUDIES:

## 2022-09-15 NOTE — CONSULT NOTE ADULT - ASSESSMENT
ESRD on HD 2x /week (M/F) in [place], [place]  hyperkalemia  CAD / CABG / CMP / CHF / VHD (severe AS and MR)  HTN  DM2  anemia worsened     plan:    HD today, then Sat (UF as tolerated as pt develops intradialytic hemodynamic symptoms easily)  low K+ renal diet  fluid restriction  left arm precautions  epogen with HD  check ferritin  PRN PRBC transfusion  check stool guaiac   will need to change to TIW HD upon discharge  d/w team

## 2022-09-16 LAB
ANION GAP SERPL CALC-SCNC: 14 MMOL/L — SIGNIFICANT CHANGE UP (ref 7–14)
BUN SERPL-MCNC: 56 MG/DL — HIGH (ref 10–20)
CALCIUM SERPL-MCNC: 8.8 MG/DL — SIGNIFICANT CHANGE UP (ref 8.4–10.5)
CALCIUM SERPL-MCNC: 9 MG/DL — SIGNIFICANT CHANGE UP (ref 8.4–10.5)
CHLORIDE SERPL-SCNC: 94 MMOL/L — LOW (ref 98–110)
CO2 SERPL-SCNC: 30 MMOL/L — SIGNIFICANT CHANGE UP (ref 17–32)
CREAT SERPL-MCNC: 6.6 MG/DL — CRITICAL HIGH (ref 0.7–1.5)
EGFR: 8 ML/MIN/1.73M2 — LOW
FERRITIN SERPL-MCNC: 1588 NG/ML — HIGH (ref 30–400)
FOLATE RBC-MCNC: 2916 NG/ML — HIGH (ref 499–1504)
GLUCOSE BLDC GLUCOMTR-MCNC: 116 MG/DL — HIGH (ref 70–99)
GLUCOSE BLDC GLUCOMTR-MCNC: 130 MG/DL — HIGH (ref 70–99)
GLUCOSE BLDC GLUCOMTR-MCNC: 161 MG/DL — HIGH (ref 70–99)
GLUCOSE BLDC GLUCOMTR-MCNC: 197 MG/DL — HIGH (ref 70–99)
GLUCOSE SERPL-MCNC: 131 MG/DL — HIGH (ref 70–99)
HCT VFR BLD CALC: 25.1 % — LOW (ref 39–50)
HCT VFR BLD CALC: 28.3 % — LOW (ref 42–52)
HGB BLD-MCNC: 8.8 G/DL — LOW (ref 14–18)
MAGNESIUM SERPL-MCNC: 2.5 MG/DL — HIGH (ref 1.8–2.4)
MCHC RBC-ENTMCNC: 29.9 PG — SIGNIFICANT CHANGE UP (ref 27–31)
MCHC RBC-ENTMCNC: 31.1 G/DL — LOW (ref 32–37)
MCV RBC AUTO: 96.3 FL — HIGH (ref 80–94)
NRBC # BLD: 0 /100 WBCS — SIGNIFICANT CHANGE UP (ref 0–0)
PLATELET # BLD AUTO: 243 K/UL — SIGNIFICANT CHANGE UP (ref 130–400)
POTASSIUM SERPL-MCNC: 4.9 MMOL/L — SIGNIFICANT CHANGE UP (ref 3.5–5)
POTASSIUM SERPL-SCNC: 4.9 MMOL/L — SIGNIFICANT CHANGE UP (ref 3.5–5)
PTH-INTACT FLD-MCNC: 192 PG/ML — HIGH (ref 15–65)
RBC # BLD: 2.94 M/UL — LOW (ref 4.7–6.1)
RBC # FLD: 18.3 % — HIGH (ref 11.5–14.5)
SODIUM SERPL-SCNC: 138 MMOL/L — SIGNIFICANT CHANGE UP (ref 135–146)
VIT B12 SERPL-MCNC: 422 PG/ML — SIGNIFICANT CHANGE UP (ref 232–1245)
WBC # BLD: 13.98 K/UL — HIGH (ref 4.8–10.8)
WBC # FLD AUTO: 13.98 K/UL — HIGH (ref 4.8–10.8)

## 2022-09-16 PROCEDURE — 94010 BREATHING CAPACITY TEST: CPT | Mod: 26

## 2022-09-16 PROCEDURE — 99233 SBSQ HOSP IP/OBS HIGH 50: CPT

## 2022-09-16 RX ORDER — METOPROLOL TARTRATE 50 MG
25 TABLET ORAL DAILY
Refills: 0 | Status: DISCONTINUED | OUTPATIENT
Start: 2022-09-17 | End: 2022-09-17

## 2022-09-16 RX ORDER — RANOLAZINE 500 MG/1
500 TABLET, FILM COATED, EXTENDED RELEASE ORAL
Refills: 0 | Status: DISCONTINUED | OUTPATIENT
Start: 2022-09-16 | End: 2022-09-17

## 2022-09-16 RX ORDER — SACUBITRIL AND VALSARTAN 24; 26 MG/1; MG/1
0.5 TABLET, FILM COATED ORAL
Refills: 0 | Status: DISCONTINUED | OUTPATIENT
Start: 2022-09-16 | End: 2022-09-17

## 2022-09-16 RX ADMIN — Medication 80 MILLIGRAM(S): at 06:06

## 2022-09-16 RX ADMIN — RANOLAZINE 500 MILLIGRAM(S): 500 TABLET, FILM COATED, EXTENDED RELEASE ORAL at 17:46

## 2022-09-16 RX ADMIN — HEPARIN SODIUM 5000 UNIT(S): 5000 INJECTION INTRAVENOUS; SUBCUTANEOUS at 22:59

## 2022-09-16 RX ADMIN — HEPARIN SODIUM 5000 UNIT(S): 5000 INJECTION INTRAVENOUS; SUBCUTANEOUS at 15:30

## 2022-09-16 RX ADMIN — SACUBITRIL AND VALSARTAN 0.5 TABLET(S): 24; 26 TABLET, FILM COATED ORAL at 17:46

## 2022-09-16 RX ADMIN — ATORVASTATIN CALCIUM 40 MILLIGRAM(S): 80 TABLET, FILM COATED ORAL at 22:59

## 2022-09-16 RX ADMIN — Medication 10 MILLIGRAM(S): at 13:00

## 2022-09-16 RX ADMIN — Medication 2: at 08:41

## 2022-09-16 RX ADMIN — AMIODARONE HYDROCHLORIDE 200 MILLIGRAM(S): 400 TABLET ORAL at 06:06

## 2022-09-16 RX ADMIN — Medication 81 MILLIGRAM(S): at 12:10

## 2022-09-16 RX ADMIN — HEPARIN SODIUM 5000 UNIT(S): 5000 INJECTION INTRAVENOUS; SUBCUTANEOUS at 06:06

## 2022-09-16 RX ADMIN — INSULIN GLARGINE 15 UNIT(S): 100 INJECTION, SOLUTION SUBCUTANEOUS at 08:41

## 2022-09-16 NOTE — PROGRESS NOTE ADULT - NS ATTEND AMEND GEN_ALL_CORE FT
# Moderate-severe AS  # Severe MR  # ADHF  # Dyspnea on exertion  # ESRD on HD Mondays and Fridays    Patient presented with several weeks of dyspnea. JING demonstrated moderate-severe AS and severe MR, with LVEF 25-30%. Last LHC on 3/01/22 with subtotal LM and 100% occlusion of RCA, with patent LIMA-LAD, patent SVG-diag/OM, and patent SVG-RPDA. Poor targets for revascularization.     Undergoing work-up for TAVR +/- MitraClip versus surgical intervention. Tentative plan for TAVR, and then reevaluation of MR. CT angio chest/ab/pelvis, carotid Duplex, and simple PFTs completed. Dr. Nance to contact [facility] for evaluation of whether patient is a surgical candidate.     On admission, patient was volume overloaded with hypoxic respiratory distress, requiring O2 via nasal cannula. He underwent HD on Thurs 9/15 and received 1u pRBC for Hgb 7.5. He will be started on TIW HD as well as torsemide 40 mg PO daily (instead of Lasix 40 mg PO daily). His BP has been lower in the setting of more aggressive dialysis and diuresis, and therefore his home Toprol and Entresto were reduced by half, now on Toprol 25 mg daily and Entresto 12/13 mg BID.
# Moderate-severe AS  # Severe MR  # ADHF  # Dyspnea on exertion  # ESRD on HD Mondays and Fridays    Patient presented with several weeks of dyspnea. JING demonstrated moderate-severe AS and severe MR, with LVEF 25-30%. Last LHC on 3/01/22 with subtotal LM and 100% occlusion of RCA, with patent LIMA-LAD, patent SVG-diag/OM, and patent SVG-RPDA. Poor targets for revascularization.     Undergoing work-up for TAVR +/- MitraClip versus surgical intervention. Tentative plan for TAVR, and then reevaluation of MR.    Patient is overloaded with hypoxic respiratory distress, requiring O2 via nasal cannula. Will diurese with Lasix 80 mg IV BID, transfuse for goal Hgb > 8 (but only after anemia labs are sent), and initiation TIW HD.

## 2022-09-16 NOTE — PROGRESS NOTE ADULT - ASSESSMENT
ESRD on HD 2x /week (M/F) in [place], [place]  hyperkalemia, better  CAD / CABG / CMP / CHF / VHD (severe AS and MR)  pulmonary edema   HTN  DM2  anemia s/p PRBC transfusion    plan:    HD tomorrow - 3H / 2K / left arm AVF /  /  / UF 2-2.5 kg as tolerates  can increase lasix to 80mg iv q8h while inpatient, though pt will decreasing residual urine output   entresto started by cardiology team.  I think this will have negative BP effects limiting ultrafiltration on HD and ultimately leading to negative outcome.  He has not tolerated any RAAS-Inh in past  low K+ renal diet  fluid restriction  left arm precautions  epogen with HD  no venofer due to hyperferritinemia   d/w [facility] nurse manager ([phone]) re increased HD to TIW with CBC f/u  f/u cardio re TAVR and MV clipping  overall prognosis poor due to advanced ischemic and valvular cardiomyopathy      ESRD on HD 2x /week (M/F) in [place], [place]  hyperkalemia, better  CAD / CABG / CMP / CHF / VHD (severe AS and MR)  pulmonary edema   HTN  DM2  anemia s/p PRBC transfusion    plan:    HD tomorrow - 3H / 2K / left arm AVF /  /  / UF 2-2.5 kg as tolerates  cont diuretics, though pt with less residual urine output  recommend against entresto as it will lower BP and limit needed UF on dialysis  low K+ renal diet  fluid restriction  left arm precautions  epogen with HD  no venofer due to hyperferritinemia   d/w [facility] nurse manager ([phone]) re increased HD to TIW with CBC f/u  f/u cardio re TAVR and MV clipping  overall prognosis poor due to advanced ischemic and valvular cardiomyopathy      ESRD on HD 2x /week (M/F) in [place], [place]  hyperkalemia, better  CAD / CABG / CMP / CHF / VHD (severe AS and MR)  pulmonary edema   HTN  DM2  anemia s/p PRBC transfusion    plan:    HD tomorrow - 3H / 2K / left arm AVF /  /  / UF 2-2.5 kg as tolerates  cont diuretics, though pt with less residual urine output  recommend against entresto as it will lower BP and limit needed UF on dialysis  low K+ renal diet  fluid restriction  left arm precautions  epogen with HD  no venofer due to hyperferritinemia   d/w [facility] nurse manager ([phone]) re increased HD to TIW with CBC f/u  f/u cardio re TAVR and MV clipping  overall prognosis poor due to advanced ischemic and valvular cardiomyopathy

## 2022-09-16 NOTE — PROGRESS NOTE ADULT - SUBJECTIVE AND OBJECTIVE BOX
Chief complaint: Patient is a 82y old  Male who presents with a chief complaint of Patient admitted for shortness of breath and mid sternal chest pain (16 Sep 2022 11:31)    Interval history:  - 9/16- hypotensive Entresto changed to 1/2 dose   - Patient received Lasix 80 ivp today, Torsemide PO 40 daily to start tomorrow  - Patient had small bm yesterday still complaining of constipation, rectal suppository given today   - TAVR protocol: CTA Completed, PFTs completed, Carotid Duplex completed  - Wean O2  - HD tomorrow prior to DC 9/17     Review of systems: A complete 10-point review of systems was obtained and is negative except as stated in the interval history.    Vitals:  T(F): 97, Max: 98.7 (09-15 @ 12:44)  HR: 77 (72 - 94)  BP: 98/50 (96/52 - 179/136)  RR: 17 (17 - 18)  SpO2: --    Ins & outs:     09-14 @ 07:01  -  09-15 @ 07:00  --------------------------------------------------------  IN: 180 mL / OUT: 375 mL / NET: -195 mL    09-15 @ 07:01  -  09-16 @ 07:00  --------------------------------------------------------  IN: 859 mL / OUT: 1650 mL / NET: -791 mL      Weight trend:  Weight (kg): 102.1 (09-14)    Physical exam:  General: No apparent distress  HEENT: Anicteric sclera. Moist mucous membranes.   Cardiac: Regular rate and rhythm. Systolic murmurs appreciated, no rubs, or gallops.   Vascular: Symmetric radial pulses. Dorsalis pedis pulses palpable.   Respiratory: Normal effort. Bibasilar crackles.   Abdomen: Soft, nontender. Audible bowel sounds.   Extremities: Warm with +2 pitting edema. No cyanosis or clubbing.   Skin: Warm and dry. No rash.   Neurologic: Grossly normal motor function.   Psychiatric: Oriented to person, place, and time.     Data reviewed:  - Telemetry: PVCS hr 72-94  - ECG (date 9/14/22):   < from: 12 Lead ECG (09.14.22 @ 23:23) >  Ventricular Rate 82 BPM    Atrial Rate 82 BPM    P-R Interval 216 ms    QRS Duration 176 ms    Q-T Interval 474 ms    QTC Calculation(Bazett) 553 ms    P Axis 53 degrees    R Axis 214 degrees    T Axis 2 degrees    Diagnosis Line Sinus rhythm with 1st degree A-V block with Premature atrial complexes in a  pattern of bigeminy  Right bundle branch block  Inferior infarct , age undetermined  Abnormal ECG    - TTE (date 9/14/22):     Left Ventricle: The left ventricular internal cavity size is moderately   increased. Global LV systolic function was moderately to severely   decreased. Left ventricular ejection fraction, by visual estimation, is   25 to 30%.  Right Ventricle: Normal right ventricular size and function. RV systolic   function is normal.  Left Atrium: Left atrial enlargement. No left atrial appendage thrombus   is seen. Color flow doppler and intravenous injection of agitated saline   demonstrates the presence of an intact intra atrial septum.  Right Atrium: Normal right atrial size.  Pericardium: There is no evidence of pericardial effusion.  Mitral Valve: The mitral valve leaflets are tethered which is due to   reduced systolic function and elevated LVDP. No evidence of mitral valve   stenosis. Severe mitral valve regurgitation isseen.  Tricuspid Valve: The tricuspid valve is normal in structure. Mild   tricuspid regurgitation is visualized.  Aortic Valve: The aortic valve is trileaflet. Trivial aortic valve   regurgitation is seen. Calcified Aortic valve with decreased leaflet   opening. Peak transvalvular velocity is 3.1 m/s. Peak/mean pressure   gradient 39/25 mmHg. KELTON VTI 1.02 cm2. Findings are suggestive of   moderate to severe Aortic valve stenosis. Low flow with stroke volume   index of 29 ml/m2.  Pulmonic Valve: Structurally normal pulmonic valve, with normal leaflet   excursion.  Aorta: Simple atheroma seen in the aortic arch and descending aorta.  Venous: A systolic blunting flow pattern is recorded from three pulmonary   veins.  Shunts: Agitated saline contrast was given intravenously to evaluate for   intracardiac shunting. There is no evidence of a patent foramen ovale.  SPECTRAL DOPPLER ANALYSIS:  Aortic Valve:  AoV VMax:    3.11 m/s  AoV Area, Vmax: 1.09 cm² Vmax Indx: 0.50 cm²/m²  AoV VTI:     0.62 m    AoV Area, VTI:  1.02 cm² VTI Indx:  0.46 cm²/m²  AoV Pk Grad: 38.8 mmHg  AoV Mn Grad: 25.3 mmHg    LVOT Vmax: 0.93 m/s  LVOT VTI:  0.17 m  LVOT Diam: 2.16 cm    - Chest x-ray 9/14/22    < from: Xray Chest 1 View- PORTABLE-Routine (Xray Chest 1 View- PORTABLE-Routine .) (09.14.22 @ 22:12) >    Impression:    Increased bilateral interstitial opacities.    - Stress test:   `< from: NM Nuclear Stress Pharmacologic Multiple (03.18.21 @ 14:47) >    Impression:      1.  Small reversible defect in the apex and anteroseptal wall of the left ventricle consistent with ischemia superimposed on inferolateral scar  2.   Fixed inferolateral defect which does not completely thicken consistent with prior injury (scar).  3.  Dilated left ventricle with mild to moderate global hypokinesis. Incomplete thickening of the inferolateral wall of the left ventricle consistent with prior injury (scar). All other walls of the left ventricle do thicken. .  4.  Left ventricular ejection fraction calculated as 35%, which is low. Echocardiographic estimated ejection fraction was 55% on 3/17/2021. However wall motion analysis suggests ejection fraction of 35-40%      - Labs:                        8.8    13.98 )-----------( 243      ( 16 Sep 2022 04:30 )             28.3     09-16    138  |  94<L>  |  56<H>  ----------------------------<  131<H>  4.9   |  30  |  6.6<HH>    Ca    8.8      16 Sep 2022 04:30  Phos  5.4     09-15  Mg     2.5     09-16    TPro  6.0  /  Alb  4.3  /  TBili  0.5  /  DBili  0.2  /  AST  32  /  ALT  20  /  AlkPhos  54  09-15    PT/INR - ( 15 Sep 2022 06:53 )   PT: 14.10 sec;   INR: 1.23 ratio           Troponin T, Serum: 0.26 ng/mL (09-15-22 @ 06:53)  Troponin T, Serum: 0.25 ng/mL (09-14-22 @ 22:02)    Serum Pro-Brain Natriuretic Peptide: 09724 pg/mL (09-14)      Triglycerides, Serum: 153 mg/dL (09-15-22 @ 06:53)  LDL Cholesterol Calculated: 33 mg/dL (09-15-22 @ 06:53)    Thyroid Stimulating Hormone, Serum: 3.97 uIU/mL (09-15-22 @ 06:53)        Medications:  aMIOdarone    Tablet 200 milliGRAM(s) Oral daily  aspirin enteric coated 81 milliGRAM(s) Oral daily  atorvastatin 40 milliGRAM(s) Oral at bedtime  bisacodyl Suppository 10 milliGRAM(s) Rectal once  dextrose 50% Injectable 25 Gram(s) IV Push once  dextrose 50% Injectable 12.5 Gram(s) IV Push once  dextrose 50% Injectable 25 Gram(s) IV Push once  epoetin nicky-epbx (RETACRIT) Injectable 66481 Unit(s) IV Push <User Schedule>  glucagon  Injectable 1 milliGRAM(s) IntraMuscular once  heparin   Injectable 5000 Unit(s) SubCutaneous every 8 hours  insulin glargine Injectable (LANTUS) 15 Unit(s) SubCutaneous every morning  insulin lispro (ADMELOG) corrective regimen sliding scale   SubCutaneous three times a day before meals  polyethylene glycol 3350 17 Gram(s) Oral daily  ranolazine 500 milliGRAM(s) Oral two times a day  sacubitril 24 mG/valsartan 26 mG 1 Tablet(s) Oral two times a day  senna 2 Tablet(s) Oral at bedtime    Drips:  dextrose 5%. 1000 milliLiter(s) (50 mL/Hr) IV Continuous <Continuous>  dextrose 5%. 1000 milliLiter(s) (100 mL/Hr) IV Continuous <Continuous>    PRN:     Allergies    Allergy Status Unknown    Intolerances      Assessment:    Mr. Centeno is a 82 year old male with PMH CHF, CAD, HTN, HLD, DM, MVR (ICD 10YEARS AGO), ESRD on HD Monday and Friday with L arm Fistula, BPH, PTCA, Sleep apnea who presents for inpatient JING and c/o SOB and midsternal cp radiating to the back. Patient admitted to [facility] for further diuresis.       Plan:   #Acute On chronic Diastolic Heart Failure( EF 25-30%) ; Moderate mitral valve regurgitation, Ischemic Cardiomyopathy, s/p ICD (10 yrs ago)   - Pt admitted for SOB  - 09/14/22 s/p JING  `< from: Transesophageal Echocardiogram (09.14.22 @ 08:10) >  1. Left ventricular ejection fraction, by visual estimation, is 25 to   30%.   2. Moderately to severely decreased global left ventricular systolic   function.   3. Calcified Aortic valve with decreased leaflet opening. Peak   transvalvular velocity is 3.1 m/s. Peak/mean pressure gradient 39/25   mmHg. KELTON VTI 1.02 cm2. Findings are suggestive of moderate to severe   Aortic valve stenosis. Low flow with stroke volume index of 29 ml/m2.   4. The mitral valve leaflets are tethered which is due to reduced   systolic function and elevated LVDP.   5. Severe mitral valve regurgitation.   6. Mild tricuspid regurgitation.   7. Left atrial enlargement.   8. Normal right atrial size.   9. Color flow doppler and intravenous injection of agitated saline   demonstrates the presence of an intact intra atrial septum.  10. Findings discussed with referring cardiologist.  Plan:   - f/u BNP 93042 pg/mL (09.14.22 @ 22:02)   - f/u CXR  -  09/14 s/p Lasix 40 mg x1, patient olguric and unable to lie flat OVN. Lasix dose increased to 80 mg BID IV  - Lasix 80 ivp given this am, Start Torsemide 40mg daily tomorrow 9/17  - Cont: Entresto, Toprol    #New Moderate to Severe Aortic Stenosis.  As per JING above  Plan  -  f/u with Structural heart team (TAVR protocol)  - F/U CTA, CT Abdomen / pelvis w/ contrast  - carotid duplex completed  - simple PFTs completed     #hx of VT  -continue amiodarone 200mg daily    #Chest pain; Hx: CAD (s/p CABG s/p PCI)  - Patient reports mid sternal chest pain for the past few days  - s/p x5 stents (10 yrs ago) 3: Hx: End Stage Renal Disease  - stop trending troponins; ischemia r/o  Plan:  - cont: aspirin, lipitor, Toprol   914 troponin 0.25--> 9/15 0.26. Heparin q8 begun  `CKMB 3.1, myoglobin 246,     #ESRD  -HD on Monday and Friday / Left Arm IV Fistula  Plan:  - 9/15: 7.9 continue to monitor   - HD increase from 2x wk--> 3x a week   - nephrology consulted--> HD today and given epogen with HD due to hgb 7.5   - HD tomorrow prior to dc    #Hgb 7.5  - anemia of chronic disease w/u   - Hg 7.5 yest -> sp 1unit PRBC- Today hg 8.8 today  - baseline hgb 10-11     #New constipation   9/14 overnight patient complained of constipation  Plan:   - Rectal Suppository today, along with PRN miralax and senna regimen    #DM II   - Held home dose   - ISS; Monitor BGM   - A1c- 5.9  - Started Lantus 15 units at night will increase as needed since patient is on 30 units at home   - Diet in place     # Hx: HTN, HLD, BPH, Hx of PTCA, Sleep Apnea  - cont: Lipitor  - pt not on CPAP or oxygen at home      Please contact me with any questions or concerns at [phone].

## 2022-09-16 NOTE — PROGRESS NOTE ADULT - TIME BILLING
Bedside evaluation and exam  Care coordination with Case Mgmt for increase dialysis sessions  Discussion of case with Dr. Nance  PM update
Bedside evaluation and exam  Discussion of TAVR with patient  Care coordination with Nephrology and Structural Cardiology

## 2022-09-16 NOTE — PROGRESS NOTE ADULT - SUBJECTIVE AND OBJECTIVE BOX
NEPHROLOGY FOLLOW UP NOTE    less dyspneic  less urine output  s/p prbc transfusion          PAST MEDICAL & SURGICAL HISTORY:  HTN (Hypertension)    Diabetes Mellitus Type II    Hypercholesterolemia    CAD (Coronary Artery Disease)    History of PTCA  with stents 10 years ago ([facility])    Diabetes mellitus    CAD (coronary artery disease)    H/O hyperlipidemia    HTN - Hypertension    Renal insufficiency    Sleep apnea  does not use machine    GERD (gastroesophageal reflux disease)    BPH (Benign Prostatic Hyperplasia)    CHF (congestive heart failure)    Mitral valve regurgitation    S/P knee surgery  b/l arthroscopic    S/P tonsillectomy    S/P primary angioplasty with coronary stent  6-7 stents last one in 10/12    S/P appendectomy      Allergies:  No Known Allergies    Home Medications Reviewed    SOCIAL HISTORY:  Denies ETOH,Smoking,   FAMILY HISTORY:  No pertinent family history in first degree relatives          REVIEW OF SYSTEMS:  CONSTITUTIONAL: No weakness, fevers or chills  EYES/ENT: No visual changes;  No vertigo or throat pain   NECK: + pain or stiffness  RESPIRATORY: No cough, wheezing, hemoptysis; + shortness of breath  CARDIOVASCULAR: No chest pain or palpitations.  GASTROINTESTINAL: No abdominal or epigastric pain. No nausea, vomiting, or hematemesis; No diarrhea or constipation. No melena or hematochezia.  GENITOURINARY: No dysuria, frequency, foamy urine, urinary urgency, incontinence or hematuria  NEUROLOGICAL: No numbness or weakness  SKIN: No itching, burning, rashes, or lesions   VASCULAR: No bilateral lower extremity edema.   All other review of systems is negative unless indicated above.    advance care planning and directives reviewed     PHYSICAL EXAM:  Constitutional: NAD  HEENT: anicteric sclera, oropharynx clear, MMM  Neck: No JVD  Respiratory: decreased BS b/l  Cardiovascular: S1, S2, RRR  Gastrointestinal: BS+, soft, NT/ND  Extremities: No cyanosis or clubbing. No peripheral edema  Neurological: A/O x 3, no focal deficits  Psychiatric: Normal mood, normal affect  : No CVA tenderness. No woods.   Skin: No rashes  Vascular Access: left arm F    Hospital Medications:   MEDICATIONS  (STANDING):  aMIOdarone    Tablet 200 milliGRAM(s) Oral daily  aspirin enteric coated 81 milliGRAM(s) Oral daily  atorvastatin 40 milliGRAM(s) Oral at bedtime  bisacodyl Suppository 10 milliGRAM(s) Rectal once  dextrose 5%. 1000 milliLiter(s) (50 mL/Hr) IV Continuous <Continuous>  dextrose 5%. 1000 milliLiter(s) (100 mL/Hr) IV Continuous <Continuous>  dextrose 50% Injectable 25 Gram(s) IV Push once  dextrose 50% Injectable 12.5 Gram(s) IV Push once  dextrose 50% Injectable 25 Gram(s) IV Push once  epoetin nicky-epbx (RETACRIT) Injectable 30668 Unit(s) IV Push <User Schedule>  glucagon  Injectable 1 milliGRAM(s) IntraMuscular once  heparin   Injectable 5000 Unit(s) SubCutaneous every 8 hours  insulin glargine Injectable (LANTUS) 15 Unit(s) SubCutaneous every morning  insulin lispro (ADMELOG) corrective regimen sliding scale   SubCutaneous three times a day before meals  polyethylene glycol 3350 17 Gram(s) Oral daily  ranolazine 500 milliGRAM(s) Oral two times a day  sacubitril 24 mG/valsartan 26 mG 1 Tablet(s) Oral two times a day  senna 2 Tablet(s) Oral at bedtime        VITALS:  T(F): 97 (09-16-22 @ 05:17), Max: 98.7 (09-15-22 @ 12:44)  HR: 77 (09-16-22 @ 08:30)  BP: 98/50 (09-16-22 @ 08:30)  RR: 17 (09-16-22 @ 05:17)  SpO2: --  Wt(kg): --    09-14 @ 07:01  -  09-15 @ 07:00  --------------------------------------------------------  IN: 180 mL / OUT: 375 mL / NET: -195 mL    09-15 @ 07:01  -  09-16 @ 07:00  --------------------------------------------------------  IN: 859 mL / OUT: 1650 mL / NET: -791 mL          LABS:  09-16    138  |  94<L>  |  56<H>  ----------------------------<  131<H>  4.9   |  30  |  6.6<HH>    Ca    8.8      16 Sep 2022 04:30  Phos  5.4     09-15  Mg     2.5     09-16    TPro  6.0  /  Alb  4.3  /  TBili  0.5  /  DBili  0.2  /  AST  32  /  ALT  20  /  AlkPhos  54  09-15                          8.8    13.98 )-----------( 243      ( 16 Sep 2022 04:30 )             28.3       Urine Studies:        RADIOLOGY & ADDITIONAL STUDIES:

## 2022-09-17 ENCOUNTER — TRANSCRIPTION ENCOUNTER (OUTPATIENT)
Age: 82
End: 2022-09-17

## 2022-09-17 VITALS
TEMPERATURE: 96 F | SYSTOLIC BLOOD PRESSURE: 131 MMHG | HEART RATE: 76 BPM | RESPIRATION RATE: 18 BRPM | DIASTOLIC BLOOD PRESSURE: 68 MMHG

## 2022-09-17 LAB
ALBUMIN SERPL ELPH-MCNC: 4.2 G/DL — SIGNIFICANT CHANGE UP (ref 3.5–5.2)
ALP SERPL-CCNC: 92 U/L — SIGNIFICANT CHANGE UP (ref 30–115)
ALT FLD-CCNC: 42 U/L — HIGH (ref 0–41)
ANION GAP SERPL CALC-SCNC: 15 MMOL/L — HIGH (ref 7–14)
AST SERPL-CCNC: 36 U/L — SIGNIFICANT CHANGE UP (ref 0–41)
BILIRUB SERPL-MCNC: 0.6 MG/DL — SIGNIFICANT CHANGE UP (ref 0.2–1.2)
BUN SERPL-MCNC: 70 MG/DL — CRITICAL HIGH (ref 10–20)
CALCIUM SERPL-MCNC: 8.8 MG/DL — SIGNIFICANT CHANGE UP (ref 8.4–10.5)
CHLORIDE SERPL-SCNC: 92 MMOL/L — LOW (ref 98–110)
CO2 SERPL-SCNC: 30 MMOL/L — SIGNIFICANT CHANGE UP (ref 17–32)
CREAT SERPL-MCNC: 7.7 MG/DL — CRITICAL HIGH (ref 0.7–1.5)
EGFR: 7 ML/MIN/1.73M2 — LOW
GLUCOSE BLDC GLUCOMTR-MCNC: 124 MG/DL — HIGH (ref 70–99)
GLUCOSE BLDC GLUCOMTR-MCNC: 187 MG/DL — HIGH (ref 70–99)
GLUCOSE SERPL-MCNC: 113 MG/DL — HIGH (ref 70–99)
HAPTOGLOB SERPL-MCNC: 256 MG/DL — HIGH (ref 34–200)
HCT VFR BLD CALC: 27.1 % — LOW (ref 42–52)
HGB BLD-MCNC: 8.5 G/DL — LOW (ref 14–18)
MAGNESIUM SERPL-MCNC: 2.6 MG/DL — HIGH (ref 1.8–2.4)
MCHC RBC-ENTMCNC: 29.5 PG — SIGNIFICANT CHANGE UP (ref 27–31)
MCHC RBC-ENTMCNC: 31.4 G/DL — LOW (ref 32–37)
MCV RBC AUTO: 94.1 FL — HIGH (ref 80–94)
NRBC # BLD: 0 /100 WBCS — SIGNIFICANT CHANGE UP (ref 0–0)
PLATELET # BLD AUTO: 215 K/UL — SIGNIFICANT CHANGE UP (ref 130–400)
POTASSIUM SERPL-MCNC: 5.2 MMOL/L — HIGH (ref 3.5–5)
POTASSIUM SERPL-SCNC: 5.2 MMOL/L — HIGH (ref 3.5–5)
PROT SERPL-MCNC: 5.8 G/DL — LOW (ref 6–8)
RBC # BLD: 2.88 M/UL — LOW (ref 4.7–6.1)
RBC # FLD: 18.1 % — HIGH (ref 11.5–14.5)
SODIUM SERPL-SCNC: 137 MMOL/L — SIGNIFICANT CHANGE UP (ref 135–146)
WBC # BLD: 10.89 K/UL — HIGH (ref 4.8–10.8)
WBC # FLD AUTO: 10.89 K/UL — HIGH (ref 4.8–10.8)

## 2022-09-17 PROCEDURE — 99233 SBSQ HOSP IP/OBS HIGH 50: CPT

## 2022-09-17 RX ORDER — METOPROLOL TARTRATE 50 MG
25 TABLET ORAL ONCE
Refills: 0 | Status: COMPLETED | OUTPATIENT
Start: 2022-09-17 | End: 2022-09-17

## 2022-09-17 RX ORDER — SACUBITRIL AND VALSARTAN 24; 26 MG/1; MG/1
1 TABLET, FILM COATED ORAL
Qty: 0 | Refills: 0 | DISCHARGE

## 2022-09-17 RX ORDER — SODIUM ZIRCONIUM CYCLOSILICATE 10 G/10G
10 POWDER, FOR SUSPENSION ORAL ONCE
Refills: 0 | Status: COMPLETED | OUTPATIENT
Start: 2022-09-17 | End: 2022-09-17

## 2022-09-17 RX ORDER — METOPROLOL TARTRATE 50 MG
50 TABLET ORAL DAILY
Refills: 0 | Status: DISCONTINUED | OUTPATIENT
Start: 2022-09-18 | End: 2022-09-17

## 2022-09-17 RX ORDER — ASPIRIN/CALCIUM CARB/MAGNESIUM 324 MG
1 TABLET ORAL
Qty: 30 | Refills: 0
Start: 2022-09-17 | End: 2022-10-16

## 2022-09-17 RX ADMIN — SODIUM ZIRCONIUM CYCLOSILICATE 10 GRAM(S): 10 POWDER, FOR SUSPENSION ORAL at 11:56

## 2022-09-17 RX ADMIN — Medication 81 MILLIGRAM(S): at 12:06

## 2022-09-17 RX ADMIN — RANOLAZINE 500 MILLIGRAM(S): 500 TABLET, FILM COATED, EXTENDED RELEASE ORAL at 06:02

## 2022-09-17 RX ADMIN — HEPARIN SODIUM 5000 UNIT(S): 5000 INJECTION INTRAVENOUS; SUBCUTANEOUS at 06:03

## 2022-09-17 RX ADMIN — Medication 25 MILLIGRAM(S): at 06:01

## 2022-09-17 RX ADMIN — Medication 40 MILLIGRAM(S): at 06:03

## 2022-09-17 RX ADMIN — SACUBITRIL AND VALSARTAN 0.5 TABLET(S): 24; 26 TABLET, FILM COATED ORAL at 06:03

## 2022-09-17 RX ADMIN — AMIODARONE HYDROCHLORIDE 200 MILLIGRAM(S): 400 TABLET ORAL at 06:02

## 2022-09-17 RX ADMIN — Medication 25 MILLIGRAM(S): at 11:56

## 2022-09-17 NOTE — DISCHARGE NOTE PROVIDER - NSDCFUSCHEDAPPT_GEN_ALL_CORE_FT
Marielos Wright  [facility] Physician [facility]  Cardio [address] [facility]  Scheduled Appointment: 10/19/2022

## 2022-09-17 NOTE — DISCHARGE NOTE PROVIDER - CARE PROVIDER_API CALL
Zay Nance)  Cardiovascular Disease; Internal Medicine  [address] [place] [address]  Phone: [phone]  Fax: [phone]  Follow Up Time: 2 weeks

## 2022-09-17 NOTE — DISCHARGE NOTE PROVIDER - NSDCCPCAREPLAN_GEN_ALL_CORE_FT
PRINCIPAL DISCHARGE DIAGNOSIS  Diagnosis: Acute on chronic systolic congestive heart failure  Assessment and Plan of Treatment:       SECONDARY DISCHARGE DIAGNOSES  Diagnosis: Severe aortic stenosis  Assessment and Plan of Treatment:     Diagnosis: HTN (hypertension)  Assessment and Plan of Treatment:     Diagnosis: ESRD on dialysis  Assessment and Plan of Treatment:     Diagnosis: Severe mitral regurgitation  Assessment and Plan of Treatment:

## 2022-09-17 NOTE — DISCHARGE NOTE NURSING/CASE MANAGEMENT/SOCIAL WORK - NSDCVIVACCINE_GEN_ALL_CORE_FT
Tdap; 03-Apr-2019 17:34; Lucero Johnson (PIOTR); [employer]; [id number] (Exp. Date: 20-Nov-2020); IntraMuscular; Deltoid Right.; 0.5 milliLiter(s); VIS (VIS Published: 09-May-2013, VIS Presented: 03-Apr-2019);

## 2022-09-17 NOTE — DISCHARGE NOTE NURSING/CASE MANAGEMENT/SOCIAL WORK - NSDCPEFALRISK_GEN_ALL_CORE
For information on Fall & Injury Prevention, visit: [email or web address] OR  [email or web address] OR  https://www.cdc.gov/steadi/patient.html

## 2022-09-17 NOTE — DISCHARGE NOTE PROVIDER - ATTENDING DISCHARGE PHYSICAL EXAMINATION:
I had a lengthy discussion with Mr. Centeno regarding the risk of leaving the hospital without HD today. He understands and accepts the risks, and he has chosen to sign out against medical advice. He will be sent home WITHOUT Entresto.

## 2022-09-17 NOTE — PROGRESS NOTE ADULT - SUBJECTIVE AND OBJECTIVE BOX
Chief complaint: Patient is a 82y old  Male who presents with a chief complaint of Patient admitted for shortness of breath and mid sternal chest pain (16 Sep 2022 12:03)    Interval history:    Review of systems: A complete 10-point review of systems was obtained and is negative except as stated in the interval history.    Vitals:  T(F): 95, Max: 97 (09-16 @ 05:17)  HR: 83 (77 - 83)  BP: 147/63 (98/50 - 147/63)  RR: 18 (17 - 18)  SpO2: 100% (100% - 100%)    Ins & outs:     09-14 @ 07:01  -  09-15 @ 07:00  --------------------------------------------------------  IN: 180 mL / OUT: 375 mL / NET: -195 mL    09-15 @ 07:01  -  09-16 @ 07:00  --------------------------------------------------------  IN: 859 mL / OUT: 1650 mL / NET: -791 mL    09-16 @ 07:01  -  09-17 @ 00:39  --------------------------------------------------------  IN: 221 mL / OUT: 0 mL / NET: 221 mL      Weight trend:  Weight (kg): 102.1 (09-14)    Physical exam:  General: No apparent distress  HEENT: Anicteric sclera. Moist mucous membranes. JVP *** cm.   Cardiac: Regular rate and rhythm. No murmurs, rubs, or gallops.   Vascular: Symmetric radial pulses. Dorsalis pedis pulses palpable.   Respiratory: Normal effort. Bibasilar crackles. Clear to ascultation.   Abdomen: Soft, nontender. Audible bowel sounds.   Extremities: Warm with *** edema. No cyanosis or clubbing.   Skin: Warm and dry. No rash.   Neurologic: Grossly normal motor function.   Psychiatric: Oriented to person, place, and time.     Data reviewed:  - Telemetry:   - ECG (date***):   - TTE (date***):   - Chest x-ray (date***):   - Stress test:   - CCTA:  - Cardiac catheterization:  - Cardiac MRI:    - Labs:                        8.8    13.98 )-----------( 243      ( 16 Sep 2022 04:30 )             28.3     09-16    138  |  94<L>  |  56<H>  ----------------------------<  131<H>  4.9   |  30  |  6.6<HH>    Ca    8.8      16 Sep 2022 04:30  Phos  5.4     09-15  Mg     2.5     09-16    TPro  6.0  /  Alb  4.3  /  TBili  0.5  /  DBili  0.2  /  AST  32  /  ALT  20  /  AlkPhos  54  09-15    PT/INR - ( 15 Sep 2022 06:53 )   PT: 14.10 sec;   INR: 1.23 ratio           Troponin T, Serum: 0.26 ng/mL (09-15-22 @ 06:53)  Troponin T, Serum: 0.25 ng/mL (09-14-22 @ 22:02)    Serum Pro-Brain Natriuretic Peptide: 16389 pg/mL (09-14)      Triglycerides, Serum: 153 mg/dL (09-15-22 @ 06:53)  LDL Cholesterol Calculated: 33 mg/dL (09-15-22 @ 06:53)    Thyroid Stimulating Hormone, Serum: 3.97 uIU/mL (09-15-22 @ 06:53)        Medications:  aMIOdarone    Tablet 200 milliGRAM(s) Oral daily  aspirin enteric coated 81 milliGRAM(s) Oral daily  atorvastatin 40 milliGRAM(s) Oral at bedtime  dextrose 50% Injectable 25 Gram(s) IV Push once  dextrose 50% Injectable 12.5 Gram(s) IV Push once  dextrose 50% Injectable 25 Gram(s) IV Push once  epoetin nicky-epbx (RETACRIT) Injectable 02801 Unit(s) IV Push <User Schedule>  glucagon  Injectable 1 milliGRAM(s) IntraMuscular once  heparin   Injectable 5000 Unit(s) SubCutaneous every 8 hours  insulin lispro (ADMELOG) corrective regimen sliding scale   SubCutaneous three times a day before meals  metoprolol succinate ER 25 milliGRAM(s) Oral daily  polyethylene glycol 3350 17 Gram(s) Oral daily  ranolazine 500 milliGRAM(s) Oral two times a day  sacubitril 24 mG/valsartan 26 mG 0.5 Tablet(s) Oral two times a day  senna 2 Tablet(s) Oral at bedtime  torsemide 40 milliGRAM(s) Oral daily    Drips:  dextrose 5%. 1000 milliLiter(s) (50 mL/Hr) IV Continuous <Continuous>  dextrose 5%. 1000 milliLiter(s) (100 mL/Hr) IV Continuous <Continuous>    PRN:     Allergies    Allergy Status Unknown    Intolerances

## 2022-09-17 NOTE — DISCHARGE NOTE PROVIDER - HOSPITAL COURSE
Patient is a 82y old  Male with history of ESRD ( HD mondays and fridays) CAD who presents with a chief complaint of shortness of breath and mid sternal chest pain.  Patient was admitted on cardiology telemetry for ADHF.   JING was done shows moderate-severe AS and severe MR, with LVEF 25-30%.   Undergoing work-up for TAVR +/- MitraClip versus surgical intervention. Tentative plan for TAVR, and then reevaluation of MR. CT angio chest/ab/pelvis, carotid Duplex, and simple PFTs completed during this admission.      Pending intervention for mod-severe AS and severe MR, patient will be started on HD three times weekly and started on torsemide 40 mg PO daily (instead of Lasix 40 mg PO daily). Due to low blood pressure in the setting of additional dialysis day and diuresis, his home Toprol and Entresto were decreased, now on Toprol 25 mg daily and Entresto 12/13 mg BID.     Initial plan was for patient to receive hemodialysis and then discharge home. However, patient refused and requested to be discharged home today against medical advise.   Patient is to followup with his cardiologist, Dr. Nance.   Last [facility] on 3/01/22 with subtotal LM and 100% occlusion of RCA, with patent LIMA-LAD, patent SVG-diag/OM, and patent SVG-RPDA. Poor targets for revascularization. Patient is a 82y old  Male with history of ESRD on HD 2x /week (M/F) in [facility] ([facility] with L arm [place], [place], MVR (ICD 10YEARS AGO), BPH, PTCA, Sleep apnea, CAD/CABG ( [facility] on 3/01/22 with subtotal LM and 100% occlusion of RCA, with patent LIMA-LAD, patent SVG-diag/OM, and patent SVG-RPDA), HTN and HFrEF who presents for inpatient JING and c/o SOB and midsternal cp radiating to the back.   Patient was admitted on cardiology telemetry for ADHF.   JING was done shows moderate-severe AS and severe MR, with LVEF 25-30%.   During the course of admission, patient underwent structural heart evaluation /workup for TAVR +/- MitraClip versus surgical intervention. Tentative plan for TAVR, and then reevaluation of MR. CT angio chest/ab/pelvis, carotid Duplex, and simple PFTs completed during this admission.     Pending intervention for mod-severe AS and severe MR, patient will be started on HD three times weekly and started on torsemide 40 mg PO daily (instead of Lasix 40 mg PO daily). Due to low blood pressure in the setting of additional dialysis day and diuresis,  Entresto will be HELD for now. Continue on home dose toprol xl 50mg due to HX VT.      On 9/17th, patient was suppose to receive HD prior to discharge but patient refused HD  and requested to sign AMA, to discharge home and that he will receive  HD as outpatient on Monday.  The risk of signing out of the hospital and risk of missing HD was discussed in details with patient.     Patient is to followup with his cardiologist, Dr. Nance. Medications sent to pharmacy.    Patient is a 82y old  Male with history of ESRD on HD 2x /week (M/F) in [facility] with L arm [place], MVR (ICD 10YEARS AGO), BPH, PTCA, Sleep apnea, CAD/CABG ( [facility] on 3/01/22 with subtotal LM and 100% occlusion of RCA, with patent LIMA-LAD, patent SVG-diag/OM, and patent SVG-RPDA), HTN and HFrEF who presents for inpatient JING and c/o SOB and midsternal cp radiating to the back.     JING showed moderate-severe AS and severe MR, with LVEF 25-30%. Patient was admitted on cardiology telemetry for ADHF and underwent structural heart evaluation /workup for TAVR +/- MitraClip versus surgical intervention. Tentative plan for TAVR, and then reevaluation of MR. CT angio chest/ab/pelvis, carotid Duplex, and simple PFTs completed during this admission.     Pending intervention for mod-severe AS and severe MR, patient will be started on HD three times weekly and started on torsemide 40 mg PO daily (instead of Lasix 40 mg PO daily). Due to low blood pressure in the setting of additional dialysis day and diuresis,  Entresto will be HELD for now. Continue on home dose toprol xl 50mg and amiodarone 200 mg daily due to history of VT.     On 9/17th, patient was suppose to receive HD prior to discharge but patient refused HD and requested to sign out against medical advice. His K was 5.2, and he was educated on the risk of skipping HD for many days and developing hyperkalemia. Patient understands that leaving the hospital without a session of dialysis is not advised and he understands the risk. He was given a dose of lokalema 10 mg prior to discharge.     Patient is to followup with his cardiologist, Dr. Nance. Medications sent to pharmacy.

## 2022-09-17 NOTE — DISCHARGE NOTE NURSING/CASE MANAGEMENT/SOCIAL WORK - PATIENT PORTAL LINK FT
You can access the FollowMyHealth Patient Portal offered by [facility] by registering at the following website: [email or web address]. By joining [employer]’s FollowMyHealth portal, you will also be able to view your health information using other applications (apps) compatible with our system.

## 2022-09-17 NOTE — PROGRESS NOTE ADULT - ASSESSMENT
ESRD on HD 2x /week (M/F) in [place], [place]  hyperkalemia, better  CAD / CABG / CMP / CHF / VHD (severe AS and MR)  pulmonary edema   HTN  DM2  anemia s/p PRBC transfusion    plan:    refused HD today, going home today to get HD as outpatient on Monday  d/w risks of missing HD  cont diuretics, though pt with less residual urine output  recommend against entresto as it will lower BP and limit needed UF on dialysis  low K+ renal diet  fluid restriction   d/w floor staff

## 2022-09-17 NOTE — DISCHARGE NOTE PROVIDER - NSDCCPTREATMENT_GEN_ALL_CORE_FT
PRINCIPAL PROCEDURE  Procedure: Transesophageal echocardiogram (JING)  Findings and Treatment:

## 2022-09-17 NOTE — PROGRESS NOTE ADULT - ASSESSMENT
82 year old male with PMH CHF, CAD, HTN, HLD, DM, MVR (ICD 10YEARS AGO), ESRD on HD Monday and Friday with L arm [place], BPH, PTCA, Sleep apnea who presents for inpatient JING and c/o SOB and midsternal cp radiating to the back. Patient admitted to [facility] for further diuresis.       Plan:   #Acute On chronic Diastolic Heart Failure( EF 25-30%) ; Moderate mitral valve regurgitation, Ischemic Cardiomyopathy, s/p ICD (10 yrs ago)   - Pt admitted for SOB  - 09/14/22 s/p JING  `< from: Transesophageal Echocardiogram (09.14.22 @ 08:10) >  1. Left ventricular ejection fraction, by visual estimation, is 25 to   30%.   2. Moderately to severely decreased global left ventricular systolic   function.   3. Calcified Aortic valve with decreased leaflet opening. Peak   transvalvular velocity is 3.1 m/s. Peak/mean pressure gradient 39/25   mmHg. KELTON VTI 1.02 cm2. Findings are suggestive of moderate to severe   Aortic valve stenosis. Low flow with stroke volume index of 29 ml/m2.   4. The mitral valve leaflets are tethered which is due to reduced   systolic function and elevated LVDP.   5. Severe mitral valve regurgitation.   6. Mild tricuspid regurgitation.   7. Left atrial enlargement.   8. Normal right atrial size.   9. Color flow doppler and intravenous injection of agitated saline   demonstrates the presence of an intact intra atrial septum.  10. Findings discussed with referring cardiologist.  Plan:   - f/u BNP 48157 pg/mL (09.14.22 @ 22:02)   - f/u CXR  -  09/14 s/p Lasix 40 mg x1, patient olguric and unable to lie flat OVN. Lasix dose increased to 80 mg BID IV  - Lasix 80 ivp given this am, Start Torsemide 40mg daily tomorrow 9/17  - Cont: Entresto, Toprol    #New Moderate to Severe Aortic Stenosis.  As per JING above  Plan  -  f/u with Structural heart team (TAVR protocol)  - F/U CTA, CT Abdomen / pelvis w/ contrast  - carotid duplex completed  - simple PFTs completed     #hx of VT  -continue amiodarone 200mg daily    #Chest pain; Hx: CAD (s/p CABG s/p PCI)  - Patient reports mid sternal chest pain for the past few days  - s/p x5 stents (10 yrs ago) 3: Hx: End Stage Renal Disease  - stop trending troponins; ischemia r/o  Plan:  - cont: aspirin, lipitor, Toprol   914 troponin 0.25--> 9/15 0.26. Heparin q8 begun  `CKMB 3.1, myoglobin 246,     #ESRD  -HD on Monday and Friday / Left Arm IV [place]  Plan:  - 9/15: 7.9 continue to monitor   - HD increase from 2x wk--> 3x a week   - nephrology consulted--> HD today and given epogen with HD due to hgb 7.5   - HD today prior to dc    #Hgb 7.5  - anemia of chronic disease w/u   - Hg 7.5 yest -> sp 1unit PRBC- Today hg 8.8 today  - baseline hgb 10-11   -f/u FOBT    #New constipation   9/14 overnight patient complained of constipation  Plan:   - Rectal Suppository today, along with PRN miralax and senna regimen    #DM II   - Held home dose   - ISS; Monitor BGM   - A1c- 5.9  - Started Lantus 15 units at night will increase as needed since patient is on 30 units at home   - Diet in place     # Hx: HTN, HLD, BPH, Hx of PTCA, Sleep Apnea  - cont: Lipitor  - pt not on CPAP or oxygen at home      Please contact me with any questions or concerns at [phone].

## 2022-09-17 NOTE — DISCHARGE NOTE PROVIDER - NSDCMRMEDTOKEN_GEN_ALL_CORE_FT
amiodarone 200 mg oral tablet: 1 tab(s) orally once a day  Entresto 24 mg-26 mg oral tablet: 1 tab(s) orally 2 times a day  furosemide 40 mg oral tablet: 1 tab(s) orally once a day  Lantus 100 units/mL subcutaneous solution: 30 unit(s) subcutaneous once a day (at bedtime)  Lipitor 40 mg oral tablet: 1 tab(s) orally once a day (at bedtime)  metoprolol succinate 50 mg oral tablet, extended release: 1 tab(s) orally once a day  Ranexa 500 mg oral tablet, extended release: 1 tab(s) orally 2 times a day    amiodarone 200 mg oral tablet: 1 tab(s) orally once a day  aspirin 81 mg oral delayed release tablet: 1 tab(s) orally once a day  Lantus 100 units/mL subcutaneous solution: 30 unit(s) subcutaneous once a day (at bedtime)  Lipitor 40 mg oral tablet: 1 tab(s) orally once a day (at bedtime)  metoprolol succinate 50 mg oral tablet, extended release: 1 tab(s) orally once a day  Ranexa 500 mg oral tablet, extended release: 1 tab(s) orally 2 times a day   torsemide 40 mg oral tablet: 1 tab(s) orally once a day

## 2022-09-17 NOTE — PROGRESS NOTE ADULT - REASON FOR ADMISSION
Patient admitted for shortness of breath and mid sternal chest pain

## 2022-09-17 NOTE — PROGRESS NOTE ADULT - SUBJECTIVE AND OBJECTIVE BOX
SI FOLLOW UP NOTE  --------------------------------------------------------------------------------  Chief Complaint/24 hour events/subjective:    pt seen in HD, no c/o    PAST HISTORY  --------------------------------------------------------------------------------  No significant changes to PMH, PSH, FHx, SHx, unless otherwise noted    ALLERGIES & MEDICATIONS  --------------------------------------------------------------------------------  Allergies    Allergy Status Unknown    Intolerances      Standing Inpatient Medications  aMIOdarone    Tablet 200 milliGRAM(s) Oral daily  aspirin enteric coated 81 milliGRAM(s) Oral daily  atorvastatin 40 milliGRAM(s) Oral at bedtime  dextrose 5%. 1000 milliLiter(s) IV Continuous <Continuous>  dextrose 5%. 1000 milliLiter(s) IV Continuous <Continuous>  dextrose 50% Injectable 25 Gram(s) IV Push once  dextrose 50% Injectable 12.5 Gram(s) IV Push once  dextrose 50% Injectable 25 Gram(s) IV Push once  epoetin nicky-epbx (RETACRIT) Injectable 89904 Unit(s) IV Push <User Schedule>  glucagon  Injectable 1 milliGRAM(s) IntraMuscular once  heparin   Injectable 5000 Unit(s) SubCutaneous every 8 hours  insulin lispro (ADMELOG) corrective regimen sliding scale   SubCutaneous three times a day before meals  metoprolol succinate ER 25 milliGRAM(s) Oral once  polyethylene glycol 3350 17 Gram(s) Oral daily  ranolazine 500 milliGRAM(s) Oral two times a day  sacubitril 24 mG/valsartan 26 mG 0.5 Tablet(s) Oral two times a day  senna 2 Tablet(s) Oral at bedtime  sodium zirconium cyclosilicate 10 Gram(s) Oral once  torsemide 40 milliGRAM(s) Oral daily    PRN Inpatient Medications  acetaminophen     Tablet .. 650 milliGRAM(s) Oral every 6 hours PRN  dextrose Oral Gel 15 Gram(s) Oral once PRN  dextrose Oral Gel 15 Gram(s) Oral once PRN      REVIEW OF SYSTEMS  --------------------------------------------------------------------------------    All other systems were reviewed and are negative, except as noted.    VITALS/PHYSICAL EXAM  --------------------------------------------------------------------------------  T(C): 36.1 (09-17-22 @ 05:34), Max: 36.1 (09-17-22 @ 05:34)  HR: 83 (09-17-22 @ 05:34) (80 - 83)  BP: 110/59 (09-17-22 @ 05:34) (110/59 - 147/63)  RR: 18 (09-17-22 @ 05:34) (18 - 18)  SpO2: 99% (09-16-22 @ 20:00) (99% - 100%)  Wt(kg): --        09-16-22 @ 07:01  -  09-17-22 @ 07:00  --------------------------------------------------------  IN: 441 mL / OUT: 100 mL / NET: 341 mL    09-17-22 @ 07:01  -  09-17-22 @ 11:55  --------------------------------------------------------  IN: 384 mL / OUT: 0 mL / NET: 384 mL      Physical Exam:    	Gen: no distress   	Pulm: CTA B/L  	CV: S1S2; no rub  	Abd: +BS, soft, nontender/nondistended  	LE:  no  edema      LABS/STUDIES  --------------------------------------------------------------------------------              8.5    10.89 >-----------<  215      [09-17-22 @ 05:37]              27.1     137  |  92  |  70  ----------------------------<  113      [09-17-22 @ 05:37]  5.2   |  30  |  7.7        Ca     8.8     [09-17-22 @ 05:37]      Mg     2.6     [09-17-22 @ 05:37]      Phos  5.4     [09-15-22 @ 16:24]    TPro  5.8  /  Alb  4.2  /  TBili  0.6  /  DBili  x   /  AST  36  /  ALT  42  /  AlkPhos  92  [09-17-22 @ 05:37]              [09-15-22 @ 15:31]    Creatinine Trend:  SCr 7.7 [09-17 @ 05:37]  SCr 6.6 [09-16 @ 04:30]  SCr 7.9 [09-15 @ 06:53]        Iron 40, TIBC 245, %sat 16      [09-15-22 @ 15:31]  Ferritin 1588      [09-15-22 @ 15:31]  PTH -- (Ca 9.0)      [09-15-22 @ 16:24]   192  HbA1c 8.1      [03-08-19 @ 07:21]  TSH 3.97      [09-15-22 @ 06:53]  Lipid: chol 103, , HDL 39, LDL --      [09-15-22 @ 06:53]

## 2022-09-20 LAB — GLUCOSE BLDC GLUCOMTR-MCNC: 155 MG/DL — HIGH (ref 70–99)

## 2022-09-21 DIAGNOSIS — I25.10 ATHEROSCLEROTIC HEART DISEASE OF NATIVE CORONARY ARTERY WITHOUT ANGINA PECTORIS: ICD-10-CM

## 2022-09-21 DIAGNOSIS — I50.9 HEART FAILURE, UNSPECIFIED: ICD-10-CM

## 2022-09-21 DIAGNOSIS — N18.6 END STAGE RENAL DISEASE: ICD-10-CM

## 2022-09-21 DIAGNOSIS — E11.22 TYPE 2 DIABETES MELLITUS WITH DIABETIC CHRONIC KIDNEY DISEASE: ICD-10-CM

## 2022-09-21 DIAGNOSIS — I25.5 ISCHEMIC CARDIOMYOPATHY: ICD-10-CM

## 2022-09-21 DIAGNOSIS — Z95.810 PRESENCE OF AUTOMATIC (IMPLANTABLE) CARDIAC DEFIBRILLATOR: ICD-10-CM

## 2022-09-21 DIAGNOSIS — Z95.5 PRESENCE OF CORONARY ANGIOPLASTY IMPLANT AND GRAFT: ICD-10-CM

## 2022-09-21 DIAGNOSIS — E87.5 HYPERKALEMIA: ICD-10-CM

## 2022-09-21 DIAGNOSIS — N40.0 BENIGN PROSTATIC HYPERPLASIA WITHOUT LOWER URINARY TRACT SYMPTOMS: ICD-10-CM

## 2022-09-21 DIAGNOSIS — Z95.1 PRESENCE OF AORTOCORONARY BYPASS GRAFT: ICD-10-CM

## 2022-09-21 DIAGNOSIS — I12.0 HYPERTENSIVE CHRONIC KIDNEY DISEASE WITH STAGE 5 CHRONIC KIDNEY DISEASE OR END STAGE RENAL DISEASE: ICD-10-CM

## 2022-09-21 DIAGNOSIS — I50.33 ACUTE ON CHRONIC DIASTOLIC (CONGESTIVE) HEART FAILURE: ICD-10-CM

## 2022-09-21 DIAGNOSIS — I08.0 RHEUMATIC DISORDERS OF BOTH MITRAL AND AORTIC VALVES: ICD-10-CM

## 2022-09-21 DIAGNOSIS — Z79.4 LONG TERM (CURRENT) USE OF INSULIN: ICD-10-CM

## 2022-09-21 DIAGNOSIS — K59.00 CONSTIPATION, UNSPECIFIED: ICD-10-CM

## 2022-09-21 DIAGNOSIS — D63.1 ANEMIA IN CHRONIC KIDNEY DISEASE: ICD-10-CM

## 2022-09-21 DIAGNOSIS — Z99.2 DEPENDENCE ON RENAL DIALYSIS: ICD-10-CM

## 2022-09-21 DIAGNOSIS — E78.5 HYPERLIPIDEMIA, UNSPECIFIED: ICD-10-CM

## 2022-09-21 DIAGNOSIS — I47.2 VENTRICULAR TACHYCARDIA: ICD-10-CM

## 2022-09-22 ENCOUNTER — APPOINTMENT (OUTPATIENT)
Dept: CARDIOTHORACIC SURGERY | Facility: CLINIC | Age: 82
End: 2022-09-22

## 2022-09-22 ENCOUNTER — APPOINTMENT (OUTPATIENT)
Dept: CARDIOLOGY | Facility: CLINIC | Age: 82
End: 2022-09-22

## 2022-09-22 VITALS
RESPIRATION RATE: 14 BRPM | DIASTOLIC BLOOD PRESSURE: 69 MMHG | OXYGEN SATURATION: 93 % | WEIGHT: 230 LBS | BODY MASS INDEX: 32.93 KG/M2 | HEIGHT: 70 IN | SYSTOLIC BLOOD PRESSURE: 107 MMHG | TEMPERATURE: 98.1 F | HEART RATE: 84 BPM

## 2022-09-22 PROCEDURE — 99214 OFFICE O/P EST MOD 30 MIN: CPT

## 2022-09-22 PROCEDURE — 99213 OFFICE O/P EST LOW 20 MIN: CPT

## 2022-09-22 NOTE — REVIEW OF SYSTEMS
[As noted in HPI] : as noted in HPI [As Noted in HPI] : as noted in HPI [Shortness Of Breath] : shortness of breath [Arthralgias] : arthralgias [Joint Stiffness] : joint stiffness [Limb Pain] : limb pain [Skin Lesions] : skin lesion [Easy Bruising] : a tendency for easy bruising [Negative] : Neurological

## 2022-09-23 VITALS
SYSTOLIC BLOOD PRESSURE: 107 MMHG | BODY MASS INDEX: 32.93 KG/M2 | HEIGHT: 70 IN | OXYGEN SATURATION: 93 % | TEMPERATURE: 98.1 F | WEIGHT: 230 LBS | RESPIRATION RATE: 14 BRPM | HEART RATE: 84 BPM | DIASTOLIC BLOOD PRESSURE: 69 MMHG

## 2022-09-23 NOTE — HISTORY OF PRESENT ILLNESS
[FreeTextEntry1] : Mr. Sushant Centeno is an 82y old  Male, former smoker,  with history of ESRD on HD 2x /week (M/F) in [facility] with L arm [place], [place], MVR (ICD 10YEARS AGO), BPH, PTCA, Sleep apnea, CAD/CABG ( [facility] on 3/01/22 with subtotal LM and 100% occlusion of RCA, with patent LIMA-LAD, patent SVG-diag/OM, and patent SVG-RPDA), HTN and HFrEF Patient had JING which  showed moderate-severe AS and severe MR, with LVEF 25-30%. Patient was admitted on cardiology telemetry for ADHF and underwent structural heart evaluation /workup for TAVR +/- MitraClip versus surgical intervention. Tentative plan for TAVR, and then reevaluation of MR. \par \par Prior to Discharge patient was started on HD three times weekly and started on torsemide 40 mg PO daily (instead of Lasix 40 mg PO daily). Due to low blood pressure in the setting of additional dialysis day and diuresis,  Entresto will be HELD for now. Continue on home dose toprol xl 50mg and amiodarone 200 mg daily due to history of VT. \par \par On 9/17th, patient was suppose to receive HD prior to discharge but patient refused HD and requested to sign out against medical advice. \par \par \par The patient is an 82-year-old gentleman who has been a long-term patient with Dr. Nance from cardiology.\par \par He underwent coronary artery bypass grafting in [place] more than 10 years ago but cannot recall the exact date for severe triple-vessel coronary artery disease at the time.  He has since had multiple PCI's.  He is known to have a cardiomyopathy and has an AICD for his known recurrent congestive heart failure.\par \par His past history includes hypertension, diabetes and recently he has been on dialysis for chronic kidney disease.\par \par He is known to have a low ejection fraction of about 25% and underwent an echocardiogram, transesophageal on 9/14/2022 that showed that he had low-flow low gradient aortic stenosis with significant mitral insufficiency in the setting of a cardiomyopathy.  The valve, the aortic valve looks heavily calcified and has a peak velocity of 3.1 m/s and a mean aortic valve gradient of 25 mmHg with a peak gradient of 39 mmHg with a calculated valve area 1 cm².\par \par The patient himself has had at least 3 admissions for recurrent congestive heart failure this year.\par Arrives today for discussion of Aortic stenosis. Symptoms include SOB.  All questions and concerns were addressed with the patient. Pre-op planning was discussed with the patient, including dental clearance. Patient was educated on the risks and alternatives to surgery.\par \par Echocardiogram 9/2022 showed Moderate to Severe AS with Peak 3.1 Mean 39/25 mmHg and KELTON 1.02, Severe Mitral Regurgitation with tethered leaflets and EF 25-30%\par \par NYHA class II\par Pt lives home with wife - no aid \par Former smoker, quit 50 years ago\par \par Their healthcare team includes the following\par PMD: none\par Cardio: [facility] \par Renal: Nolberto Du,  [place]-F [place],  [place]\par \par 5 Meter walk\par 1. 4.8\par 2. 5.0\par 3. 5.2\par \par Frailty: \par Right 28.7, 27.9, 29. 5\par Left 26.9, 27.6, 27. 2\par \par \par \par \par \par \par \par

## 2022-09-23 NOTE — ASSESSMENT
[FreeTextEntry1] : Mr. NINFA URBAN 82 year M arrives  today for evaluation of their aortic stenosis. All questions and concerns were addressed with the patient. Pre-op planning was discussed with the patient, including dental clearance. Patient was educated on the risks and alternatives to surgery. STS was discussed, as well as MCOT and risk for PPM. \par \par ECHO/JING, cath and carotids reviewed\par Needs dental clearance  -pt aware\par Continue HD 3x weekly - M W F - prior to TAVR ([facility] HD center in [place], [place])\par Will need HD monday and tuesday prior to TAVR \par Scheduled for 10/19/2022\par c/o of difficulty sleeping - can take Tylenol PM\par \par Argelia MAYO [facility], am acting as scribe for Dr. Adan\par \par In summary the patient is an 82-year-old gentleman with a known cardiomyopathy and a low ejection fraction with an ischemic cardiomyopathy and now is severely symptomatic with shortness of breath and dyspnea on exertion with multiple admissions for recurrent congestive heart failure.\par \par His recent echocardiogram showed a low-flow low gradient aortic stenosis with significant mitral insufficiency.\par \par Given his advanced age, multiple medical comorbidities, very high STS score and reoperative status he would be better served for a full evaluation for a transcatheter aortic valve replacement.\par \par I spent a significant amount of time with the patient as he had multiple questions and these were all answered.

## 2022-09-23 NOTE — DATA REVIEWED
[FreeTextEntry1] : ACC: [id number] EXAM:  ECHO JING W DOPP COLOR FLOW#                      \par PROCEDURE DATE:  09/14/2022  \par  TRANSESOPHAGEAL ECHOCARDIOGRAM REPORT\par Patient Name:   NINFA URBAN Accession #: [id number]\par Date of Exam:        9/14/2022 8:10:56 AM\par \par Summary:\par  1. Left ventricular ejection fraction, by visual estimation, is 25 to \par 30%.\par  2. Moderately to severely decreased global left ventricular systolic \par function.\par  3. Calcified Aortic valve with decreased leaflet opening. Peak \par transvalvular velocity is 3.1 m/s. Peak/mean pressure gradient 39/25 \par mmHg. KELTON VTI 1.02 cm2. Findings are suggestive of moderate to severe \par Aortic valve stenosis. Low flow with stroke volume index of 29 ml/m2.\par  4. The mitral valve leaflets are tethered which is due to reduced \par systolic function and elevated LVDP.\par  5. Severe mitral valve regurgitation.\par  6. Mild tricuspid regurgitation.\par  7. Left atrial enlargement.\par  8. Normal right atrial size.\par  9. Color flow doppler and intravenous injection of agitated saline \par demonstrates the presence of an intact intra atrial septum.\par 10. Findings discussed with referring cardiologist.\par \par ACC: [id number] EXAM: CAROTID DUPLEX COMPLETE BILAT\par \par PROCEDURE DATE: 09/15/2022\par \par \par \par INTERPRETATION: CLINICAL INFORMATION: 82 year-old male undergoing preoperative evaluation for aortic valve replacement.\par \par TECHNIQUE: Grayscale, color and spectral Doppler examination of both carotid arteries was performed.\par \par FINDINGS:\par \par No elevated velocities or abnormal waveforms are encountered.\par \par Peak systolic velocities are as follows:\par \par RIGHT:\par PROX CCA = 45 cm/s\par DIST CCA = 54 cm/s\par PROX ICA = 40 cm/s\par DIST ICA = 79 cm/s\par ECA = 66 cm/s\par ICA/CCA = 1.5\par \par LEFT:\par PROX CCA = 64 cm/s\par DIST CCA = 47 cm/s\par PROX ICA = 69 cm/s\par DIST ICA = 83 cm/s\par ECA = 62 cm/s\par ICA/CCA = 1.8\par \par Antegrade flow is noted within both vertebral arteries.\par \par IMPRESSION: No significant hemodynamic stenosis of either carotid artery. Mild bilateral internal carotid artery stenosis estimated at 20-39%.\par

## 2022-09-23 NOTE — PHYSICAL EXAM
[General Appearance - Alert] : alert [General Appearance - In No Acute Distress] : in no acute distress [Sclera] : the sclera and conjunctiva were normal [Outer Ear] : the ears and nose were normal in appearance [] : no respiratory distress [II] : a grade 2 [Bowel Sounds] : normal bowel sounds [Abdomen Soft] : soft [Involuntary Movements] : no involuntary movements were seen [Skin Color & Pigmentation] : normal skin color and pigmentation [Skin Turgor] : normal skin turgor [No Focal Deficits] : no focal deficits [Oriented To Time, Place, And Person] : oriented to person, place, and time [Impaired Insight] : insight and judgment were intact [Neck Appearance] : the appearance of the neck was normal [Neck Cervical Mass (___cm)] : no neck mass was observed [Normal Rate] : normal [Rhythm Regular] : regular [Examination Of The Chest] : the chest was normal in appearance [Breast Appearance] : normal in appearance [Cervical Lymph Nodes Enlarged Posterior Bilaterally] : posterior cervical [Supraclavicular Lymph Nodes Enlarged Bilaterally] : supraclavicular [No CVA Tenderness] : no ~M costovertebral angle tenderness [FreeTextEntry1] : Not examined in detail

## 2022-10-02 NOTE — HISTORY OF PRESENT ILLNESS
[FreeTextEntry1] : Mr. Sushant Centeno is an 82y old Male, former smoker, with history of ESRD on HD 2x /week (M/F) in [facility] with L arm [place], [place], MVR (ICD 10YEARS AGO), BPH, PTCA, Sleep apnea, CAD/CABG ( [facility] on 3/01/22 with subtotal LM and 100% occlusion of RCA, with patent LIMA-LAD, patent SVG-diag/OM, and patent SVG-RPDA), HTN and HFrEF Patient had JNIG which showed moderate-severe AS and severe MR, with LVEF 25-30%. Patient was admitted on cardiology telemetry for ADHF and underwent structural heart evaluation /workup for TAVR +/- MitraClip versus surgical intervention. Tentative plan for TAVR, and then reevaluation of MR. \par \par Prior to Discharge patient was started on HD three times weekly and started on torsemide 40 mg PO daily (instead of Lasix 40 mg PO daily). Due to low blood pressure in the setting of additional dialysis day and diuresis, Entresto will be HELD for now. Continue on home dose toprol xl 50mg and amiodarone 200 mg daily due to history of VT. \par \par Echocardiogram 9/2022 showed Moderate to Severe AS with Peak 3.1 Mean 39/25 mmHg and KELTON 1.02, Severe Mitral Regurgitation with tethered leaflets and EF 25-30%\par \par NYHA class II\par Pt lives home with wife - no aid \par Former smoker, quit 50 years ago\par \par Their healthcare team includes the following\par PMD: none\par Cardio: [facility] \par Renal: Nolberto Florian, HD M-W-F [place], [place]\par \par 5 Meter walk\par 1. 4.8\par 2. 5.0\par 3. 5.2\par \par Frailty: \par Right 28.7, 27.9, 29. 5\par Left 26.9, 27.6, 27. 2\par \par \par \par \par

## 2022-10-02 NOTE — ASSESSMENT
[FreeTextEntry1] : Mr. NINFA URBAN 82 year M arrives today for evaluation of their aortic stenosis. All questions and concerns were addressed with the patient. Pre-op planning was discussed with the patient, including dental clearance. Patient was educated on the risks and alternatives to surgery. STS was discussed, as well as MCOT and risk for PPM. \par \par c/o of difficulty sleeping - can take Tylenol PM\par ECHO/JING, cath and carotids reviewed\par Needs dental clearance -pt aware\par Continue HD 3x weekly - M W F - prior to TAVR ([facility] HD center in [place], [place])\par Will need HD monday and tuesday prior to TAVR \par Scheduled for 10/19/2022\par \par

## 2022-10-02 NOTE — END OF VISIT
[FreeTextEntry3] : Seen / examined and above reviewed.\par \par Interval hospitalization in [place] for decompensated CHF (prior to planned JING).\par Subsequently had JING.  Consistent with severe low-flow low-gradient AS (LV dysfunction and MR).  Severe MR.\par Admitted after JING for optimization and TAVR testing.\par CTA reviewed.  Anatomy favorable for TF TAVR.\par \par Relatively compensated since discharge.  Persistent exertional dyspnea (worse than last year), but overall feels better.\par \par Now going to dialysis 3 / week, which is likely turcios contributor to better compensation.  I explained that this is necessary going forward, and that he will unlikely do well post procedure unless he continues to do so.  He understands.\par \par Given extent of CAD and MR, and strong suspicion AS is severe, doubt benefit of Dobutamine ECHO > risk.\par \par Algorithm with be TAVR, reassessment of MR when compensated, and consideration of MitraClip if remains severe.

## 2022-10-03 ENCOUNTER — NON-APPOINTMENT (OUTPATIENT)
Age: 82
End: 2022-10-03

## 2022-10-11 ENCOUNTER — NON-APPOINTMENT (OUTPATIENT)
Age: 82
End: 2022-10-11

## 2022-10-13 ENCOUNTER — RESULT REVIEW (OUTPATIENT)
Age: 82
End: 2022-10-13

## 2022-10-13 ENCOUNTER — OUTPATIENT (OUTPATIENT)
Dept: OUTPATIENT SERVICES | Facility: HOSPITAL | Age: 82
LOS: 1 days | Discharge: HOME | End: 2022-10-13

## 2022-10-13 VITALS
TEMPERATURE: 98 F | RESPIRATION RATE: 16 BRPM | HEIGHT: 70 IN | HEART RATE: 80 BPM | OXYGEN SATURATION: 96 % | WEIGHT: 225.09 LBS | SYSTOLIC BLOOD PRESSURE: 110 MMHG | DIASTOLIC BLOOD PRESSURE: 56 MMHG

## 2022-10-13 DIAGNOSIS — I35.0 NONRHEUMATIC AORTIC (VALVE) STENOSIS: ICD-10-CM

## 2022-10-13 DIAGNOSIS — Z01.818 ENCOUNTER FOR OTHER PREPROCEDURAL EXAMINATION: ICD-10-CM

## 2022-10-13 DIAGNOSIS — Z95.1 PRESENCE OF AORTOCORONARY BYPASS GRAFT: Chronic | ICD-10-CM

## 2022-10-13 DIAGNOSIS — Z95.810 PRESENCE OF AUTOMATIC (IMPLANTABLE) CARDIAC DEFIBRILLATOR: Chronic | ICD-10-CM

## 2022-10-13 LAB
A1C WITH ESTIMATED AVERAGE GLUCOSE RESULT: 6 % — HIGH (ref 4–5.6)
ALBUMIN SERPL ELPH-MCNC: 4.6 G/DL — SIGNIFICANT CHANGE UP (ref 3.5–5.2)
ALP SERPL-CCNC: 110 U/L — SIGNIFICANT CHANGE UP (ref 30–115)
ALT FLD-CCNC: 58 U/L — HIGH (ref 0–41)
ANION GAP SERPL CALC-SCNC: 16 MMOL/L — HIGH (ref 7–14)
APTT BLD: 28.9 SEC — SIGNIFICANT CHANGE UP (ref 27–39.2)
AST SERPL-CCNC: 21 U/L — SIGNIFICANT CHANGE UP (ref 0–41)
BASOPHILS # BLD AUTO: 0.04 K/UL — SIGNIFICANT CHANGE UP (ref 0–0.2)
BASOPHILS NFR BLD AUTO: 0.4 % — SIGNIFICANT CHANGE UP (ref 0–1)
BILIRUB SERPL-MCNC: 0.9 MG/DL — SIGNIFICANT CHANGE UP (ref 0.2–1.2)
BLD GP AB SCN SERPL QL: SIGNIFICANT CHANGE UP
BUN SERPL-MCNC: 48 MG/DL — HIGH (ref 10–20)
CALCIUM SERPL-MCNC: 8.9 MG/DL — SIGNIFICANT CHANGE UP (ref 8.4–10.5)
CHLORIDE SERPL-SCNC: 92 MMOL/L — LOW (ref 98–110)
CO2 SERPL-SCNC: 29 MMOL/L — SIGNIFICANT CHANGE UP (ref 17–32)
CREAT SERPL-MCNC: 6 MG/DL — CRITICAL HIGH (ref 0.7–1.5)
EGFR: 9 ML/MIN/1.73M2 — LOW
EOSINOPHIL # BLD AUTO: 0.07 K/UL — SIGNIFICANT CHANGE UP (ref 0–0.7)
EOSINOPHIL NFR BLD AUTO: 0.7 % — SIGNIFICANT CHANGE UP (ref 0–8)
ESTIMATED AVERAGE GLUCOSE: 126 MG/DL — HIGH (ref 68–114)
GLUCOSE SERPL-MCNC: 170 MG/DL — HIGH (ref 70–99)
HCT VFR BLD CALC: 32.2 % — LOW (ref 42–52)
HGB BLD-MCNC: 9.7 G/DL — LOW (ref 14–18)
IMM GRANULOCYTES NFR BLD AUTO: 0.7 % — HIGH (ref 0.1–0.3)
INR BLD: 1.31 RATIO — HIGH (ref 0.65–1.3)
LYMPHOCYTES # BLD AUTO: 0.98 K/UL — LOW (ref 1.2–3.4)
LYMPHOCYTES # BLD AUTO: 10 % — LOW (ref 20.5–51.1)
MCHC RBC-ENTMCNC: 28.9 PG — SIGNIFICANT CHANGE UP (ref 27–31)
MCHC RBC-ENTMCNC: 30.1 G/DL — LOW (ref 32–37)
MCV RBC AUTO: 95.8 FL — HIGH (ref 80–94)
MONOCYTES # BLD AUTO: 1.45 K/UL — HIGH (ref 0.1–0.6)
MONOCYTES NFR BLD AUTO: 14.8 % — HIGH (ref 1.7–9.3)
MRSA PCR RESULT.: POSITIVE
NEUTROPHILS # BLD AUTO: 7.18 K/UL — HIGH (ref 1.4–6.5)
NEUTROPHILS NFR BLD AUTO: 73.4 % — SIGNIFICANT CHANGE UP (ref 42.2–75.2)
NRBC # BLD: 0 /100 WBCS — SIGNIFICANT CHANGE UP (ref 0–0)
PLATELET # BLD AUTO: 137 K/UL — SIGNIFICANT CHANGE UP (ref 130–400)
POTASSIUM SERPL-MCNC: 5.6 MMOL/L — HIGH (ref 3.5–5)
POTASSIUM SERPL-SCNC: 5.6 MMOL/L — HIGH (ref 3.5–5)
PROT SERPL-MCNC: 6.2 G/DL — SIGNIFICANT CHANGE UP (ref 6–8)
PROTHROM AB SERPL-ACNC: 15 SEC — HIGH (ref 9.95–12.87)
RBC # BLD: 3.36 M/UL — LOW (ref 4.7–6.1)
RBC # FLD: 18.6 % — HIGH (ref 11.5–14.5)
SODIUM SERPL-SCNC: 137 MMOL/L — SIGNIFICANT CHANGE UP (ref 135–146)
WBC # BLD: 9.79 K/UL — SIGNIFICANT CHANGE UP (ref 4.8–10.8)
WBC # FLD AUTO: 9.79 K/UL — SIGNIFICANT CHANGE UP (ref 4.8–10.8)

## 2022-10-13 PROCEDURE — 71046 X-RAY EXAM CHEST 2 VIEWS: CPT | Mod: 26

## 2022-10-13 RX ORDER — AMIODARONE HYDROCHLORIDE 400 MG/1
1 TABLET ORAL
Qty: 0 | Refills: 0 | DISCHARGE

## 2022-10-13 NOTE — H&P PST ADULT - NSICDXPASTMEDICALHX_GEN_ALL_CORE_FT
PAST MEDICAL HISTORY:  Aortic stenosis     BPH (Benign Prostatic Hyperplasia)     CAD (Coronary Artery Disease)     CAD (coronary artery disease)     CHF (congestive heart failure)     Diabetes mellitus     Diabetes Mellitus Type II     Dialysis patient HD- M/W/FR- [facility] [address].    GERD (gastroesophageal reflux disease)     H/O hyperlipidemia     History of PTCA with stents 10 years ago ([facility])    [place] (hard of hearing)     HTN (Hypertension)     HTN - Hypertension     Hypercholesterolemia     Mitral valve regurgitation     Sleep apnea does not use machine     PAST MEDICAL HISTORY:  AICD (automatic cardioverter/defibrillator) present     Aortic stenosis     BPH (Benign Prostatic Hyperplasia)     CAD (Coronary Artery Disease)     CAD (coronary artery disease)     CHF (congestive heart failure)     Diabetes mellitus     Diabetes Mellitus Type II     Dialysis patient HD- M/W/FR- [facility] [address].    GERD (gastroesophageal reflux disease)     H/O hyperlipidemia     History of PTCA with stents 10 years ago ([facility])    [place] (hard of hearing)     HTN (Hypertension)     HTN - Hypertension     Hypercholesterolemia     Mitral valve regurgitation     Sleep apnea does not use machine

## 2022-10-13 NOTE — H&P PST ADULT - RESPIRATORY
normal/clear to auscultation bilaterally/no wheezes/no rales/no rhonchi/no respiratory distress/no use of accessory muscles/diminished breath sounds, L/diminished breath sounds, R

## 2022-10-13 NOTE — H&P PST ADULT - HISTORY OF PRESENT ILLNESS
82yr old male stated progressively worsening SOB for the last few months -wk up revealed severe calcification of aortic valve with stenosis -is scheduled for TAVR. Denies COVID S/S Recd 1 dose J&J. Verbalized understanding of COVID prevention measures. Exercise alyson less than 30feet LTD by SOB. Very poor historian - [place] , also did not bring his eyeglasses, does not accurate dose of the meds.  Anesthesia Alert  YES--Difficult Airway  NO--History of neck surgery or radiation  NO--Limited ROM of neck  NO--History of Malignant hyperthermia  NO--Personal or family history of Pseudocholinesterase deficiency  NO--Prior Anesthesia Complication  NO--Latex Allergy  NO--Loose teeth  NO--History of Rheumatoid Arthritis  YES--GREG  No Bleeding risk  yes [place]  yes LT ARM PRECAUTIONS  82yr old male stated progressively worsening SOB for the last few months -wk up revealed severe calcification of aortic valve with stenosis -is scheduled for TAVR. Denies COVID S/S Recd 1 dose J&J. Verbalized understanding of COVID prevention measures. Exercise alyson less than 30feet LTD by SOB. Very poor historian - [place] , also did not bring his eyeglasses, does not accurate dose of the meds.  Anesthesia Alert  YES--Difficult Airway  NO--History of neck surgery or radiation  NO--Limited ROM of neck  NO--History of Malignant hyperthermia  NO--Personal or family history of Pseudocholinesterase deficiency  NO--Prior Anesthesia Complication  NO--Latex Allergy  NO--Loose teeth  NO--History of Rheumatoid Arthritis  YES--GREG  No Bleeding risk  yes [place]  yes LT ARM PRECAUTIONS   YES AICD-noted CXR

## 2022-10-13 NOTE — H&P PST ADULT - NSICDXPASTSURGICALHX_GEN_ALL_CORE_FT
PAST SURGICAL HISTORY:  S/P appendectomy     S/P CABG (coronary artery bypass graft) ? 2017    S/P knee surgery b/l arthroscopic    S/P primary angioplasty with coronary stent 6-7 stents last one in 10/12    S/P tonsillectomy      PAST SURGICAL HISTORY:  AICD (automatic cardioverter/defibrillator) present     S/P appendectomy     S/P CABG (coronary artery bypass graft) ? 2017    S/P knee surgery b/l arthroscopic    S/P primary angioplasty with coronary stent 6-7 stents last one in 10/12    S/P tonsillectomy

## 2022-10-14 ENCOUNTER — NON-APPOINTMENT (OUTPATIENT)
Age: 82
End: 2022-10-14

## 2022-10-14 RX ORDER — MUPIROCIN 20 MG/G
2 OINTMENT TOPICAL 3 TIMES DAILY
Qty: 1 | Refills: 0 | Status: ACTIVE | COMMUNITY
Start: 2022-10-14 | End: 1900-01-01

## 2022-10-18 ENCOUNTER — INPATIENT (INPATIENT)
Facility: HOSPITAL | Age: 82
LOS: 10 days | Discharge: HOME HEALTH | End: 2022-10-29
Attending: THORACIC SURGERY (CARDIOTHORACIC VASCULAR SURGERY) | Admitting: THORACIC SURGERY (CARDIOTHORACIC VASCULAR SURGERY)
Payer: MEDICARE

## 2022-10-18 VITALS
OXYGEN SATURATION: 90 % | DIASTOLIC BLOOD PRESSURE: 57 MMHG | SYSTOLIC BLOOD PRESSURE: 92 MMHG | TEMPERATURE: 97 F | RESPIRATION RATE: 20 BRPM | HEART RATE: 84 BPM

## 2022-10-18 DIAGNOSIS — K92.1 MELENA: ICD-10-CM

## 2022-10-18 DIAGNOSIS — E11.22 TYPE 2 DIABETES MELLITUS WITH DIABETIC CHRONIC KIDNEY DISEASE: ICD-10-CM

## 2022-10-18 DIAGNOSIS — I08.0 RHEUMATIC DISORDERS OF BOTH MITRAL AND AORTIC VALVES: ICD-10-CM

## 2022-10-18 DIAGNOSIS — Q21.10 ATRIAL SEPTAL DEFECT, UNSPECIFIED: ICD-10-CM

## 2022-10-18 DIAGNOSIS — R57.0 CARDIOGENIC SHOCK: ICD-10-CM

## 2022-10-18 DIAGNOSIS — D72.829 ELEVATED WHITE BLOOD CELL COUNT, UNSPECIFIED: ICD-10-CM

## 2022-10-18 DIAGNOSIS — Z95.1 PRESENCE OF AORTOCORONARY BYPASS GRAFT: ICD-10-CM

## 2022-10-18 DIAGNOSIS — R94.31 ABNORMAL ELECTROCARDIOGRAM [ECG] [EKG]: ICD-10-CM

## 2022-10-18 DIAGNOSIS — I25.5 ISCHEMIC CARDIOMYOPATHY: ICD-10-CM

## 2022-10-18 DIAGNOSIS — I95.9 HYPOTENSION, UNSPECIFIED: ICD-10-CM

## 2022-10-18 DIAGNOSIS — Z95.1 PRESENCE OF AORTOCORONARY BYPASS GRAFT: Chronic | ICD-10-CM

## 2022-10-18 DIAGNOSIS — G47.33 OBSTRUCTIVE SLEEP APNEA (ADULT) (PEDIATRIC): ICD-10-CM

## 2022-10-18 DIAGNOSIS — Z95.810 PRESENCE OF AUTOMATIC (IMPLANTABLE) CARDIAC DEFIBRILLATOR: Chronic | ICD-10-CM

## 2022-10-18 DIAGNOSIS — I47.20 VENTRICULAR TACHYCARDIA, UNSPECIFIED: ICD-10-CM

## 2022-10-18 DIAGNOSIS — I49.01 VENTRICULAR FIBRILLATION: ICD-10-CM

## 2022-10-18 DIAGNOSIS — D69.6 THROMBOCYTOPENIA, UNSPECIFIED: ICD-10-CM

## 2022-10-18 DIAGNOSIS — I35.0 NONRHEUMATIC AORTIC (VALVE) STENOSIS: ICD-10-CM

## 2022-10-18 DIAGNOSIS — D62 ACUTE POSTHEMORRHAGIC ANEMIA: ICD-10-CM

## 2022-10-18 DIAGNOSIS — Z00.6 ENCOUNTER FOR EXAMINATION FOR NORMAL COMPARISON AND CONTROL IN CLINICAL RESEARCH PROGRAM: ICD-10-CM

## 2022-10-18 DIAGNOSIS — Z99.2 DEPENDENCE ON RENAL DIALYSIS: ICD-10-CM

## 2022-10-18 DIAGNOSIS — R09.02 HYPOXEMIA: ICD-10-CM

## 2022-10-18 DIAGNOSIS — I25.10 ATHEROSCLEROTIC HEART DISEASE OF NATIVE CORONARY ARTERY WITHOUT ANGINA PECTORIS: ICD-10-CM

## 2022-10-18 DIAGNOSIS — E87.20 ACIDOSIS, UNSPECIFIED: ICD-10-CM

## 2022-10-18 DIAGNOSIS — N18.6 END STAGE RENAL DISEASE: ICD-10-CM

## 2022-10-18 DIAGNOSIS — I13.2 HYPERTENSIVE HEART AND CHRONIC KIDNEY DISEASE WITH HEART FAILURE AND WITH STAGE 5 CHRONIC KIDNEY DISEASE, OR END STAGE RENAL DISEASE: ICD-10-CM

## 2022-10-18 DIAGNOSIS — I50.22 CHRONIC SYSTOLIC (CONGESTIVE) HEART FAILURE: ICD-10-CM

## 2022-10-18 LAB
ALBUMIN SERPL ELPH-MCNC: 4.1 G/DL — SIGNIFICANT CHANGE UP (ref 3.5–5.2)
ALP SERPL-CCNC: 85 U/L — SIGNIFICANT CHANGE UP (ref 30–115)
ALT FLD-CCNC: 25 U/L — SIGNIFICANT CHANGE UP (ref 0–41)
ANION GAP SERPL CALC-SCNC: 13 MMOL/L — SIGNIFICANT CHANGE UP (ref 7–14)
ANION GAP SERPL CALC-SCNC: 15 MMOL/L — HIGH (ref 7–14)
APTT BLD: 27.7 SEC — SIGNIFICANT CHANGE UP (ref 27–39.2)
AST SERPL-CCNC: 66 U/L — HIGH (ref 0–41)
BASOPHILS # BLD AUTO: 0.03 K/UL — SIGNIFICANT CHANGE UP (ref 0–0.2)
BASOPHILS NFR BLD AUTO: 0.4 % — SIGNIFICANT CHANGE UP (ref 0–1)
BILIRUB DIRECT SERPL-MCNC: <0.2 MG/DL — SIGNIFICANT CHANGE UP (ref 0–0.3)
BILIRUB INDIRECT FLD-MCNC: >0.4 MG/DL — SIGNIFICANT CHANGE UP (ref 0.2–1.2)
BILIRUB SERPL-MCNC: 0.6 MG/DL — SIGNIFICANT CHANGE UP (ref 0.2–1.2)
BLD GP AB SCN SERPL QL: SIGNIFICANT CHANGE UP
BUN SERPL-MCNC: 51 MG/DL — HIGH (ref 10–20)
BUN SERPL-MCNC: 52 MG/DL — HIGH (ref 10–20)
CALCIUM SERPL-MCNC: 8.2 MG/DL — LOW (ref 8.4–10.5)
CALCIUM SERPL-MCNC: 8.4 MG/DL — SIGNIFICANT CHANGE UP (ref 8.4–10.4)
CHLORIDE SERPL-SCNC: 92 MMOL/L — LOW (ref 98–110)
CHLORIDE SERPL-SCNC: 96 MMOL/L — LOW (ref 98–110)
CO2 SERPL-SCNC: 27 MMOL/L — SIGNIFICANT CHANGE UP (ref 17–32)
CO2 SERPL-SCNC: 30 MMOL/L — SIGNIFICANT CHANGE UP (ref 17–32)
CREAT SERPL-MCNC: 6.4 MG/DL — CRITICAL HIGH (ref 0.7–1.5)
CREAT SERPL-MCNC: 6.4 MG/DL — CRITICAL HIGH (ref 0.7–1.5)
EGFR: 8 ML/MIN/1.73M2 — LOW
EGFR: 8 ML/MIN/1.73M2 — LOW
EOSINOPHIL # BLD AUTO: 0.14 K/UL — SIGNIFICANT CHANGE UP (ref 0–0.7)
EOSINOPHIL NFR BLD AUTO: 1.8 % — SIGNIFICANT CHANGE UP (ref 0–8)
GLUCOSE BLDC GLUCOMTR-MCNC: 127 MG/DL — HIGH (ref 70–99)
GLUCOSE BLDC GLUCOMTR-MCNC: 205 MG/DL — HIGH (ref 70–99)
GLUCOSE SERPL-MCNC: 188 MG/DL — HIGH (ref 70–99)
GLUCOSE SERPL-MCNC: 199 MG/DL — HIGH (ref 70–99)
HCT VFR BLD CALC: 31.4 % — LOW (ref 42–52)
HGB BLD-MCNC: 9.1 G/DL — LOW (ref 14–18)
IMM GRANULOCYTES NFR BLD AUTO: 0.8 % — HIGH (ref 0.1–0.3)
INR BLD: 1.2 RATIO — SIGNIFICANT CHANGE UP (ref 0.65–1.3)
LYMPHOCYTES # BLD AUTO: 1.21 K/UL — SIGNIFICANT CHANGE UP (ref 1.2–3.4)
LYMPHOCYTES # BLD AUTO: 15.4 % — LOW (ref 20.5–51.1)
MAGNESIUM SERPL-MCNC: 2.8 MG/DL — HIGH (ref 1.8–2.4)
MCHC RBC-ENTMCNC: 27.5 PG — SIGNIFICANT CHANGE UP (ref 27–31)
MCHC RBC-ENTMCNC: 29 G/DL — LOW (ref 32–37)
MCV RBC AUTO: 94.9 FL — HIGH (ref 80–94)
MONOCYTES # BLD AUTO: 1.15 K/UL — HIGH (ref 0.1–0.6)
MONOCYTES NFR BLD AUTO: 14.7 % — HIGH (ref 1.7–9.3)
NEUTROPHILS # BLD AUTO: 5.25 K/UL — SIGNIFICANT CHANGE UP (ref 1.4–6.5)
NEUTROPHILS NFR BLD AUTO: 66.9 % — SIGNIFICANT CHANGE UP (ref 42.2–75.2)
NRBC # BLD: 0 /100 WBCS — SIGNIFICANT CHANGE UP (ref 0–0)
PHOSPHATE SERPL-MCNC: 4.8 MG/DL — SIGNIFICANT CHANGE UP (ref 2.1–4.9)
PLATELET # BLD AUTO: 176 K/UL — SIGNIFICANT CHANGE UP (ref 130–400)
POTASSIUM SERPL-MCNC: 4.4 MMOL/L — SIGNIFICANT CHANGE UP (ref 3.5–5)
POTASSIUM SERPL-MCNC: 6.5 MMOL/L — CRITICAL HIGH (ref 3.5–5)
POTASSIUM SERPL-SCNC: 4.4 MMOL/L — SIGNIFICANT CHANGE UP (ref 3.5–5)
POTASSIUM SERPL-SCNC: 6.5 MMOL/L — CRITICAL HIGH (ref 3.5–5)
PROT SERPL-MCNC: 5.9 G/DL — LOW (ref 6–8)
PROTHROM AB SERPL-ACNC: 13.8 SEC — HIGH (ref 9.95–12.87)
RBC # BLD: 3.31 M/UL — LOW (ref 4.7–6.1)
RBC # FLD: 18.2 % — HIGH (ref 11.5–14.5)
SODIUM SERPL-SCNC: 135 MMOL/L — SIGNIFICANT CHANGE UP (ref 135–146)
SODIUM SERPL-SCNC: 138 MMOL/L — SIGNIFICANT CHANGE UP (ref 135–146)
WBC # BLD: 7.84 K/UL — SIGNIFICANT CHANGE UP (ref 4.8–10.8)
WBC # FLD AUTO: 7.84 K/UL — SIGNIFICANT CHANGE UP (ref 4.8–10.8)

## 2022-10-18 PROCEDURE — 71045 X-RAY EXAM CHEST 1 VIEW: CPT | Mod: 26

## 2022-10-18 PROCEDURE — 93010 ELECTROCARDIOGRAM REPORT: CPT

## 2022-10-18 RX ORDER — MUPIROCIN 20 MG/G
1 OINTMENT TOPICAL EVERY 12 HOURS
Refills: 0 | Status: COMPLETED | OUTPATIENT
Start: 2022-10-18 | End: 2022-10-23

## 2022-10-18 RX ORDER — INSULIN GLARGINE 100 [IU]/ML
30 INJECTION, SOLUTION SUBCUTANEOUS AT BEDTIME
Refills: 0 | Status: DISCONTINUED | OUTPATIENT
Start: 2022-10-18 | End: 2022-10-19

## 2022-10-18 RX ORDER — PANTOPRAZOLE SODIUM 20 MG/1
40 TABLET, DELAYED RELEASE ORAL
Refills: 0 | Status: DISCONTINUED | OUTPATIENT
Start: 2022-10-18 | End: 2022-10-19

## 2022-10-18 RX ORDER — SODIUM CHLORIDE 9 MG/ML
250 INJECTION INTRAMUSCULAR; INTRAVENOUS; SUBCUTANEOUS ONCE
Refills: 0 | Status: COMPLETED | OUTPATIENT
Start: 2022-10-18 | End: 2022-10-18

## 2022-10-18 RX ORDER — CLOPIDOGREL BISULFATE 75 MG/1
300 TABLET, FILM COATED ORAL ONCE
Refills: 0 | Status: COMPLETED | OUTPATIENT
Start: 2022-10-19 | End: 2022-10-19

## 2022-10-18 RX ORDER — ATORVASTATIN CALCIUM 80 MG/1
40 TABLET, FILM COATED ORAL AT BEDTIME
Refills: 0 | Status: DISCONTINUED | OUTPATIENT
Start: 2022-10-18 | End: 2022-10-19

## 2022-10-18 RX ORDER — HEPARIN SODIUM 5000 [USP'U]/ML
5000 INJECTION INTRAVENOUS; SUBCUTANEOUS EVERY 8 HOURS
Refills: 0 | Status: DISCONTINUED | OUTPATIENT
Start: 2022-10-18 | End: 2022-10-19

## 2022-10-18 RX ORDER — SODIUM CHLORIDE 9 MG/ML
1000 INJECTION, SOLUTION INTRAVENOUS
Refills: 0 | Status: DISCONTINUED | OUTPATIENT
Start: 2022-10-18 | End: 2022-10-29

## 2022-10-18 RX ORDER — SODIUM CHLORIDE 9 MG/ML
3 INJECTION INTRAMUSCULAR; INTRAVENOUS; SUBCUTANEOUS EVERY 8 HOURS
Refills: 0 | Status: DISCONTINUED | OUTPATIENT
Start: 2022-10-18 | End: 2022-10-18

## 2022-10-18 RX ORDER — CHLORHEXIDINE GLUCONATE 213 G/1000ML
1 SOLUTION TOPICAL ONCE
Refills: 0 | Status: COMPLETED | OUTPATIENT
Start: 2022-10-18 | End: 2022-10-18

## 2022-10-18 RX ORDER — CHLORHEXIDINE GLUCONATE 213 G/1000ML
15 SOLUTION TOPICAL ONCE
Refills: 0 | Status: COMPLETED | OUTPATIENT
Start: 2022-10-18 | End: 2022-10-20

## 2022-10-18 RX ORDER — INSULIN LISPRO 100/ML
VIAL (ML) SUBCUTANEOUS
Refills: 0 | Status: DISCONTINUED | OUTPATIENT
Start: 2022-10-18 | End: 2022-10-19

## 2022-10-18 RX ORDER — SACUBITRIL AND VALSARTAN 24; 26 MG/1; MG/1
0 TABLET, FILM COATED ORAL
Qty: 0 | Refills: 0 | DISCHARGE

## 2022-10-18 RX ORDER — DEXTROSE 50 % IN WATER 50 %
25 SYRINGE (ML) INTRAVENOUS ONCE
Refills: 0 | Status: DISCONTINUED | OUTPATIENT
Start: 2022-10-18 | End: 2022-10-29

## 2022-10-18 RX ORDER — DEXTROSE 50 % IN WATER 50 %
15 SYRINGE (ML) INTRAVENOUS ONCE
Refills: 0 | Status: DISCONTINUED | OUTPATIENT
Start: 2022-10-18 | End: 2022-10-29

## 2022-10-18 RX ORDER — ASPIRIN/CALCIUM CARB/MAGNESIUM 324 MG
81 TABLET ORAL DAILY
Refills: 0 | Status: DISCONTINUED | OUTPATIENT
Start: 2022-10-19 | End: 2022-10-19

## 2022-10-18 RX ORDER — GLUCAGON INJECTION, SOLUTION 0.5 MG/.1ML
1 INJECTION, SOLUTION SUBCUTANEOUS ONCE
Refills: 0 | Status: DISCONTINUED | OUTPATIENT
Start: 2022-10-18 | End: 2022-10-29

## 2022-10-18 RX ADMIN — HEPARIN SODIUM 5000 UNIT(S): 5000 INJECTION INTRAVENOUS; SUBCUTANEOUS at 21:41

## 2022-10-18 RX ADMIN — ATORVASTATIN CALCIUM 40 MILLIGRAM(S): 80 TABLET, FILM COATED ORAL at 21:41

## 2022-10-18 RX ADMIN — MUPIROCIN 1 APPLICATION(S): 20 OINTMENT TOPICAL at 18:58

## 2022-10-18 RX ADMIN — SODIUM CHLORIDE 125 MILLILITER(S): 9 INJECTION INTRAMUSCULAR; INTRAVENOUS; SUBCUTANEOUS at 21:41

## 2022-10-18 RX ADMIN — Medication 2: at 18:57

## 2022-10-18 RX ADMIN — CHLORHEXIDINE GLUCONATE 1 APPLICATION(S): 213 SOLUTION TOPICAL at 21:47

## 2022-10-18 NOTE — PRE-ANESTHESIA EVALUATION ADULT - NSRADCARDRESULTSFT_GEN_ALL_CORE
9/15/22 U/S neck  No significant hemodynamic stenosis of either carotid artery. Mild bilateral internal carotid artery stenosis estimated at 20-39%.    echo 9/14/22 echo  Summary:   1. Left ventricular ejection fraction, by visual estimation, is 25 to 30%.   2. Moderately to severely decreased global left ventricular systolic function.   3. Calcified Aortic valve with decreased leaflet opening. Peak transvalvular velocity is 3.1 m/s. Peak/mean pressure gradient 39/25 mmHg. KELTON VTI 1.02 cm2. Findings are suggestive of moderate to severe Aortic valve stenosis. Low flow with stroke volume index of 29 ml/m2.   4. The mitral valve leaflets are tethered which is due to reduced systolic function and elevated LVDP.   5. Severe mitral valve regurgitation.   6. Mild tricuspid regurgitation.   7. Left atrial enlargement.   8. Normal right atrial size.   9. Color flow doppler and intravenous injection of agitated saline demonstrates the presence of an intact intra atrial septum.  10. Findings discussed with referring cardiologist.

## 2022-10-18 NOTE — PATIENT PROFILE ADULT - FALL HARM RISK - RISK INTERVENTIONS
Monitor gait and stability/Bed in lowest position, wheels locked, appropriate side rails in place/Call bell, personal items and telephone in reach/Non-slip footwear when patient is out of bed/[place] to call system

## 2022-10-18 NOTE — CONSULT NOTE ADULT - SUBJECTIVE AND OBJECTIVE BOX
pt well known to me with ESRD on HD MWF in [place], last HD Monday.  Admitted for TAVR tomorrow AM.  D/w team, will arrange for HD today for renal optimization of procedure and follow closely while inpatient.  Full consult to follow NEPHROLOGY CONSULTATION NOTE    81 yo male with PMH as below, including ESRD on HD, CHF, CAD, VHD, DM, ICD, GREG electively admitted to CTICU for TAVR procedure.  Last HD Monday at [facility].  Pt c/o MURRAY, orthopnea, right leg swelling.  D/w CTS / cardio, requesting inpatient HD in anticipation of procedure tomorrow.      PAST MEDICAL & SURGICAL HISTORY:  HTN (Hypertension)    Diabetes Mellitus Type II    Hypercholesterolemia    CAD (Coronary Artery Disease)    History of PTCA  with stents 10 years ago ([facility])    Diabetes mellitus    CAD (coronary artery disease)    H/O hyperlipidemia    HTN - Hypertension    Renal insufficiency    Sleep apnea  does not use machine    GERD (gastroesophageal reflux disease)    BPH (Benign Prostatic Hyperplasia)    CHF (congestive heart failure)    Mitral valve regurgitation    S/P knee surgery  b/l arthroscopic    S/P tonsillectomy    S/P primary angioplasty with coronary stent  6-7 stents last one in 10/12    S/P appendectomy      Allergies:  No Known Allergies    Home Medications Reviewed    SOCIAL HISTORY:  Denies ETOH,Smoking,   FAMILY HISTORY:  No pertinent family history in first degree relatives          REVIEW OF SYSTEMS:  CONSTITUTIONAL: No weakness, fevers or chills  EYES/ENT: No visual changes;  No vertigo or throat pain   NECK: no pain or stiffness  RESPIRATORY: No cough, wheezing, hemoptysis; + shortness of breath + orthopnea  CARDIOVASCULAR: No chest pain or palpitations.  GASTROINTESTINAL: No abdominal or epigastric pain. No nausea, vomiting, or hematemesis; No diarrhea or constipation. No melena or hematochezia.  GENITOURINARY: No dysuria, frequency, foamy urine, urinary urgency, incontinence or hematuria  NEUROLOGICAL: No numbness or weakness  SKIN: No itching, burning, rashes, or lesions   VASCULAR: + lower extremity edema.   All other review of systems is negative unless indicated above.    PHYSICAL EXAM:  Constitutional: NAD  HEENT: anicteric sclera, oropharynx clear, MMM  Neck: No JVD  Respiratory: decreased BS b/l  Cardiovascular: S1, S2, RRR + murmur  Gastrointestinal: BS+, soft, NT/ND  Extremities: No cyanosis or clubbing. 1+ peripheral edema  Neurological: A/O x 3, no focal deficits  Psychiatric: Normal mood, normal affect  : No CVA tenderness. No woods.   Skin: No rashes  Vascular Access: left arm AVF + [facility] Medications:   MEDICATIONS  (STANDING):  aspirin enteric coated 81 milliGRAM(s) Oral daily  atorvastatin 40 milliGRAM(s) Oral at bedtime  chlorhexidine 0.12% Liquid 15 milliLiter(s) Swish and Spit once  dextrose 5%. 1000 milliLiter(s) (50 mL/Hr) IV Continuous <Continuous>  dextrose 50% Injectable 25 Gram(s) IV Push once  glucagon  Injectable 1 milliGRAM(s) IntraMuscular once  heparin   Injectable 5000 Unit(s) SubCutaneous every 8 hours  insulin glargine Injectable (LANTUS) 30 Unit(s) SubCutaneous at bedtime  insulin lispro (ADMELOG) corrective regimen sliding scale   SubCutaneous three times a day before meals  mupirocin 2% Ointment 1 Application(s) Both Nostrils every 12 hours  pantoprazole    Tablet 40 milliGRAM(s) Oral before breakfast        VITALS:  T(F): 96.8 (10-19-22 @ 08:00), Max: 97.6 (10-19-22 @ 02:11)  HR: 86 (10-19-22 @ 08:00)  BP: 104/74 (10-19-22 @ 08:00)  RR: 28 (10-19-22 @ 08:00)  SpO2: 93% (10-19-22 @ 08:00)  Wt(kg): --    10-18 @ 07:01  -  10-19 @ 07:00  --------------------------------------------------------  IN: 250 mL / OUT: 2000 mL / NET: -1750 mL      Height (cm): 175.3 (10-19 @ 02:11)  Weight (kg): 102.4 (10-19 @ 02:11)  BMI (kg/m2): 33.3 (10-19 @ 02:11)  BSA (m2): 2.18 (10-19 @ 02:11)    LABS:  10-18    138  |  96<L>  |  52<H>  ----------------------------<  188<H>  4.4   |  27  |  6.4<HH>    Ca    8.4      18 Oct 2022 16:23  Phos  4.8     10-18  Mg     2.8     10-18    TPro  5.9<L>  /  Alb  4.1  /  TBili  0.6  /  DBili  <0.2  /  AST  66<H>  /  ALT  25  /  AlkPhos  85  10-18                          9.1    7.84  )-----------( 176      ( 18 Oct 2022 16:23 )             31.4       Urine Studies:        RADIOLOGY & ADDITIONAL STUDIES:

## 2022-10-19 ENCOUNTER — TRANSCRIPTION ENCOUNTER (OUTPATIENT)
Age: 82
End: 2022-10-19

## 2022-10-19 ENCOUNTER — APPOINTMENT (OUTPATIENT)
Dept: CARDIOLOGY | Facility: CLINIC | Age: 82
End: 2022-10-19

## 2022-10-19 ENCOUNTER — APPOINTMENT (OUTPATIENT)
Dept: CARDIOTHORACIC SURGERY | Facility: HOSPITAL | Age: 82
End: 2022-10-19

## 2022-10-19 LAB
ALBUMIN SERPL ELPH-MCNC: 3.5 G/DL — SIGNIFICANT CHANGE UP (ref 3.5–5.2)
ALP SERPL-CCNC: 74 U/L — SIGNIFICANT CHANGE UP (ref 30–115)
ALT FLD-CCNC: 24 U/L — SIGNIFICANT CHANGE UP (ref 0–41)
ANION GAP SERPL CALC-SCNC: 15 MMOL/L — HIGH (ref 7–14)
APTT BLD: 144.2 SEC — CRITICAL HIGH (ref 27–39.2)
APTT BLD: 56.9 SEC — HIGH (ref 27–39.2)
AST SERPL-CCNC: 50 U/L — HIGH (ref 0–41)
BASE EXCESS BLDMV CALC-SCNC: -1.7 MMOL/L — SIGNIFICANT CHANGE UP
BASE EXCESS BLDV CALC-SCNC: -0.8 MMOL/L — SIGNIFICANT CHANGE UP (ref -2–3)
BASE EXCESS BLDV CALC-SCNC: 0.9 MMOL/L — SIGNIFICANT CHANGE UP (ref -2–3)
BILIRUB SERPL-MCNC: 1.2 MG/DL — SIGNIFICANT CHANGE UP (ref 0.2–1.2)
BUN SERPL-MCNC: 48 MG/DL — HIGH (ref 10–20)
CA-I SERPL-SCNC: 1.13 MMOL/L — LOW (ref 1.15–1.33)
CA-I SERPL-SCNC: 1.16 MMOL/L — SIGNIFICANT CHANGE UP (ref 1.15–1.33)
CALCIUM SERPL-MCNC: 9.3 MG/DL — SIGNIFICANT CHANGE UP (ref 8.4–10.5)
CHLORIDE SERPL-SCNC: 98 MMOL/L — SIGNIFICANT CHANGE UP (ref 98–110)
CO2 SERPL-SCNC: 24 MMOL/L — SIGNIFICANT CHANGE UP (ref 17–32)
CREAT SERPL-MCNC: 6 MG/DL — CRITICAL HIGH (ref 0.7–1.5)
EGFR: 9 ML/MIN/1.73M2 — LOW
GAS PNL BLDA: SIGNIFICANT CHANGE UP
GAS PNL BLDMV: SIGNIFICANT CHANGE UP
GAS PNL BLDV: 138 MMOL/L — SIGNIFICANT CHANGE UP (ref 136–145)
GAS PNL BLDV: 138 MMOL/L — SIGNIFICANT CHANGE UP (ref 136–145)
GAS PNL BLDV: SIGNIFICANT CHANGE UP
GLUCOSE BLDC GLUCOMTR-MCNC: 102 MG/DL — HIGH (ref 70–99)
GLUCOSE BLDC GLUCOMTR-MCNC: 185 MG/DL — HIGH (ref 70–99)
GLUCOSE BLDC GLUCOMTR-MCNC: 191 MG/DL — HIGH (ref 70–99)
GLUCOSE BLDC GLUCOMTR-MCNC: 191 MG/DL — HIGH (ref 70–99)
GLUCOSE BLDC GLUCOMTR-MCNC: 223 MG/DL — HIGH (ref 70–99)
GLUCOSE BLDC GLUCOMTR-MCNC: 228 MG/DL — HIGH (ref 70–99)
GLUCOSE SERPL-MCNC: 193 MG/DL — HIGH (ref 70–99)
HCO3 BLDMV-SCNC: 24 MMOL/L — SIGNIFICANT CHANGE UP
HCO3 BLDV-SCNC: 25 MMOL/L — SIGNIFICANT CHANGE UP (ref 22–29)
HCO3 BLDV-SCNC: 26 MMOL/L — SIGNIFICANT CHANGE UP (ref 22–29)
HCT VFR BLD CALC: 21.8 % — LOW (ref 42–52)
HCT VFR BLD CALC: 28.5 % — LOW (ref 42–52)
HCT VFR BLDA CALC: 20 % — CRITICAL LOW (ref 39–51)
HCT VFR BLDA CALC: 22 % — LOW (ref 39–51)
HGB BLD CALC-MCNC: 6.8 G/DL — CRITICAL LOW (ref 12.6–17.4)
HGB BLD CALC-MCNC: 7.4 G/DL — LOW (ref 12.6–17.4)
HGB BLD-MCNC: 6.4 G/DL — CRITICAL LOW (ref 14–18)
HGB BLD-MCNC: 8.3 G/DL — LOW (ref 14–18)
HOROWITZ INDEX BLDMV+IHG-RTO: 60 — SIGNIFICANT CHANGE UP
HOROWITZ INDEX BLDV+IHG-RTO: 60 — SIGNIFICANT CHANGE UP
HOROWITZ INDEX BLDV+IHG-RTO: 70 — SIGNIFICANT CHANGE UP
INR BLD: 1.45 RATIO — HIGH (ref 0.65–1.3)
LACTATE BLDV-MCNC: 1.3 MMOL/L — SIGNIFICANT CHANGE UP (ref 0.5–2)
LACTATE BLDV-MCNC: 2.9 MMOL/L — HIGH (ref 0.5–2)
MAGNESIUM SERPL-MCNC: 2.5 MG/DL — HIGH (ref 1.8–2.4)
MCHC RBC-ENTMCNC: 27.7 PG — SIGNIFICANT CHANGE UP (ref 27–31)
MCHC RBC-ENTMCNC: 27.9 PG — SIGNIFICANT CHANGE UP (ref 27–31)
MCHC RBC-ENTMCNC: 29.1 G/DL — LOW (ref 32–37)
MCHC RBC-ENTMCNC: 29.4 G/DL — LOW (ref 32–37)
MCV RBC AUTO: 94.4 FL — HIGH (ref 80–94)
MCV RBC AUTO: 96 FL — HIGH (ref 80–94)
NRBC # BLD: 0 /100 WBCS — SIGNIFICANT CHANGE UP (ref 0–0)
NRBC # BLD: 0 /100 WBCS — SIGNIFICANT CHANGE UP (ref 0–0)
O2 CT VFR BLD CALC: 41 MMHG — SIGNIFICANT CHANGE UP
PCO2 BLDMV: 45 MMHG — SIGNIFICANT CHANGE UP
PCO2 BLDV: 43 MMHG — SIGNIFICANT CHANGE UP (ref 42–55)
PCO2 BLDV: 45 MMHG — SIGNIFICANT CHANGE UP (ref 42–55)
PH BLDMV: 7.34 — SIGNIFICANT CHANGE UP
PH BLDV: 7.35 — SIGNIFICANT CHANGE UP (ref 7.32–7.43)
PH BLDV: 7.39 — SIGNIFICANT CHANGE UP (ref 7.32–7.43)
PLATELET # BLD AUTO: 141 K/UL — SIGNIFICANT CHANGE UP (ref 130–400)
PLATELET # BLD AUTO: 172 K/UL — SIGNIFICANT CHANGE UP (ref 130–400)
PO2 BLDV: 34 MMHG — SIGNIFICANT CHANGE UP
PO2 BLDV: 35 MMHG — SIGNIFICANT CHANGE UP
POTASSIUM BLDV-SCNC: 4.3 MMOL/L — SIGNIFICANT CHANGE UP (ref 3.5–5.1)
POTASSIUM BLDV-SCNC: 4.4 MMOL/L — SIGNIFICANT CHANGE UP (ref 3.5–5.1)
POTASSIUM SERPL-MCNC: 4.7 MMOL/L — SIGNIFICANT CHANGE UP (ref 3.5–5)
POTASSIUM SERPL-SCNC: 4.7 MMOL/L — SIGNIFICANT CHANGE UP (ref 3.5–5)
PROT SERPL-MCNC: 4.8 G/DL — LOW (ref 6–8)
PROTHROM AB SERPL-ACNC: 16.7 SEC — HIGH (ref 9.95–12.87)
RBC # BLD: 2.31 M/UL — LOW (ref 4.7–6.1)
RBC # BLD: 2.97 M/UL — LOW (ref 4.7–6.1)
RBC # FLD: 18.1 % — HIGH (ref 11.5–14.5)
RBC # FLD: 18.5 % — HIGH (ref 11.5–14.5)
SAO2 % BLDMV: 70.7 % — SIGNIFICANT CHANGE UP
SAO2 % BLDV: 61 % — SIGNIFICANT CHANGE UP
SAO2 % BLDV: 64.8 % — SIGNIFICANT CHANGE UP
SODIUM SERPL-SCNC: 137 MMOL/L — SIGNIFICANT CHANGE UP (ref 135–146)
WBC # BLD: 14.74 K/UL — HIGH (ref 4.8–10.8)
WBC # BLD: 20.38 K/UL — HIGH (ref 4.8–10.8)
WBC # FLD AUTO: 14.74 K/UL — HIGH (ref 4.8–10.8)
WBC # FLD AUTO: 20.38 K/UL — HIGH (ref 4.8–10.8)

## 2022-10-19 PROCEDURE — 93306 TTE W/DOPPLER COMPLETE: CPT | Mod: 26

## 2022-10-19 PROCEDURE — 93312 ECHO TRANSESOPHAGEAL: CPT | Mod: 26

## 2022-10-19 PROCEDURE — 71045 X-RAY EXAM CHEST 1 VIEW: CPT | Mod: 26

## 2022-10-19 PROCEDURE — 93282 PRGRMG EVAL IMPLANTABLE DFB: CPT | Mod: 26

## 2022-10-19 PROCEDURE — 99291 CRITICAL CARE FIRST HOUR: CPT | Mod: 25

## 2022-10-19 PROCEDURE — 33990 INSJ PERQ VAD L HRT ARTERIAL: CPT

## 2022-10-19 PROCEDURE — 93010 ELECTROCARDIOGRAM REPORT: CPT

## 2022-10-19 PROCEDURE — 33361 REPLACE AORTIC VALVE PERQ: CPT | Mod: 62,Q0

## 2022-10-19 PROCEDURE — 33361 REPLACE AORTIC VALVE PERQ: CPT | Mod: 62,XU,Q0

## 2022-10-19 PROCEDURE — 99292 CRITICAL CARE ADDL 30 MIN: CPT | Mod: 25

## 2022-10-19 RX ORDER — ASPIRIN/CALCIUM CARB/MAGNESIUM 324 MG
81 TABLET ORAL DAILY
Refills: 0 | Status: DISCONTINUED | OUTPATIENT
Start: 2022-10-19 | End: 2022-10-22

## 2022-10-19 RX ORDER — CEFEPIME 1 G/1
1000 INJECTION, POWDER, FOR SOLUTION INTRAMUSCULAR; INTRAVENOUS DAILY
Refills: 0 | Status: DISCONTINUED | OUTPATIENT
Start: 2022-10-19 | End: 2022-10-28

## 2022-10-19 RX ORDER — SODIUM CHLORIDE 9 MG/ML
1000 INJECTION INTRAMUSCULAR; INTRAVENOUS; SUBCUTANEOUS
Refills: 0 | Status: DISCONTINUED | OUTPATIENT
Start: 2022-10-19 | End: 2022-10-24

## 2022-10-19 RX ORDER — CHLORHEXIDINE GLUCONATE 213 G/1000ML
5 SOLUTION TOPICAL
Refills: 0 | Status: DISCONTINUED | OUTPATIENT
Start: 2022-10-19 | End: 2022-10-23

## 2022-10-19 RX ORDER — SODIUM CHLORIDE 9 MG/ML
500 INJECTION, SOLUTION INTRAVENOUS
Refills: 0 | Status: DISCONTINUED | OUTPATIENT
Start: 2022-10-19 | End: 2022-10-21

## 2022-10-19 RX ORDER — VASOPRESSIN 20 [USP'U]/ML
0.04 INJECTION INTRAVENOUS
Qty: 40 | Refills: 0 | Status: DISCONTINUED | OUTPATIENT
Start: 2022-10-19 | End: 2022-10-23

## 2022-10-19 RX ORDER — POLYETHYLENE GLYCOL 3350 17 G/17G
17 POWDER, FOR SOLUTION ORAL DAILY
Refills: 0 | Status: DISCONTINUED | OUTPATIENT
Start: 2022-10-20 | End: 2022-10-22

## 2022-10-19 RX ORDER — NOREPINEPHRINE BITARTRATE/D5W 8 MG/250ML
0.05 PLASTIC BAG, INJECTION (ML) INTRAVENOUS
Qty: 8 | Refills: 0 | Status: DISCONTINUED | OUTPATIENT
Start: 2022-10-19 | End: 2022-10-23

## 2022-10-19 RX ORDER — EPINEPHRINE 0.3 MG/.3ML
0.04 INJECTION INTRAMUSCULAR; SUBCUTANEOUS
Qty: 8 | Refills: 0 | Status: DISCONTINUED | OUTPATIENT
Start: 2022-10-19 | End: 2022-10-21

## 2022-10-19 RX ORDER — PROPOFOL 10 MG/ML
10 INJECTION, EMULSION INTRAVENOUS
Qty: 1000 | Refills: 0 | Status: DISCONTINUED | OUTPATIENT
Start: 2022-10-19 | End: 2022-10-23

## 2022-10-19 RX ORDER — NICARDIPINE HYDROCHLORIDE 30 MG/1
5 CAPSULE, EXTENDED RELEASE ORAL
Qty: 40 | Refills: 0 | Status: DISCONTINUED | OUTPATIENT
Start: 2022-10-19 | End: 2022-10-21

## 2022-10-19 RX ORDER — SENNA PLUS 8.6 MG/1
2 TABLET ORAL AT BEDTIME
Refills: 0 | Status: DISCONTINUED | OUTPATIENT
Start: 2022-10-20 | End: 2022-10-22

## 2022-10-19 RX ORDER — VANCOMYCIN HCL 1 G
1000 VIAL (EA) INTRAVENOUS EVERY 24 HOURS
Refills: 0 | Status: DISCONTINUED | OUTPATIENT
Start: 2022-10-19 | End: 2022-10-20

## 2022-10-19 RX ORDER — DEXTROSE 50 % IN WATER 50 %
25 SYRINGE (ML) INTRAVENOUS
Refills: 0 | Status: DISCONTINUED | OUTPATIENT
Start: 2022-10-19 | End: 2022-10-29

## 2022-10-19 RX ORDER — DEXMEDETOMIDINE HYDROCHLORIDE IN 0.9% SODIUM CHLORIDE 4 UG/ML
0.2 INJECTION INTRAVENOUS
Qty: 200 | Refills: 0 | Status: DISCONTINUED | OUTPATIENT
Start: 2022-10-19 | End: 2022-10-20

## 2022-10-19 RX ORDER — OXYCODONE HYDROCHLORIDE 5 MG/1
10 TABLET ORAL EVERY 4 HOURS
Refills: 0 | Status: DISCONTINUED | OUTPATIENT
Start: 2022-10-19 | End: 2022-10-23

## 2022-10-19 RX ORDER — AMIODARONE HYDROCHLORIDE 400 MG/1
0.5 TABLET ORAL
Qty: 900 | Refills: 0 | Status: DISCONTINUED | OUTPATIENT
Start: 2022-10-19 | End: 2022-10-20

## 2022-10-19 RX ORDER — HEPARIN SODIUM 5000 [USP'U]/ML
500 INJECTION INTRAVENOUS; SUBCUTANEOUS
Qty: 25000 | Refills: 0 | Status: DISCONTINUED | OUTPATIENT
Start: 2022-10-19 | End: 2022-10-20

## 2022-10-19 RX ORDER — INSULIN HUMAN 100 [IU]/ML
10 INJECTION, SOLUTION SUBCUTANEOUS
Qty: 100 | Refills: 0 | Status: DISCONTINUED | OUTPATIENT
Start: 2022-10-19 | End: 2022-10-24

## 2022-10-19 RX ORDER — HYDROMORPHONE HYDROCHLORIDE 2 MG/ML
0.5 INJECTION INTRAMUSCULAR; INTRAVENOUS; SUBCUTANEOUS EVERY 6 HOURS
Refills: 0 | Status: DISCONTINUED | OUTPATIENT
Start: 2022-10-19 | End: 2022-10-19

## 2022-10-19 RX ORDER — ACETAMINOPHEN 500 MG
1000 TABLET ORAL ONCE
Refills: 0 | Status: COMPLETED | OUTPATIENT
Start: 2022-10-19 | End: 2022-10-21

## 2022-10-19 RX ORDER — MILRINONE LACTATE 1 MG/ML
0.38 INJECTION, SOLUTION INTRAVENOUS
Qty: 20 | Refills: 0 | Status: DISCONTINUED | OUTPATIENT
Start: 2022-10-19 | End: 2022-10-20

## 2022-10-19 RX ORDER — CEFAZOLIN SODIUM 1 G
1000 VIAL (EA) INJECTION EVERY 8 HOURS
Refills: 0 | Status: COMPLETED | OUTPATIENT
Start: 2022-10-19 | End: 2022-10-20

## 2022-10-19 RX ORDER — NITROGLYCERIN 6.5 MG
30 CAPSULE, EXTENDED RELEASE ORAL
Qty: 50 | Refills: 0 | Status: DISCONTINUED | OUTPATIENT
Start: 2022-10-19 | End: 2022-10-21

## 2022-10-19 RX ORDER — OXYCODONE HYDROCHLORIDE 5 MG/1
5 TABLET ORAL EVERY 4 HOURS
Refills: 0 | Status: DISCONTINUED | OUTPATIENT
Start: 2022-10-19 | End: 2022-10-25

## 2022-10-19 RX ORDER — CLOPIDOGREL BISULFATE 75 MG/1
75 TABLET, FILM COATED ORAL DAILY
Refills: 0 | Status: DISCONTINUED | OUTPATIENT
Start: 2022-10-20 | End: 2022-10-22

## 2022-10-19 RX ORDER — MEPERIDINE HYDROCHLORIDE 50 MG/ML
25 INJECTION INTRAMUSCULAR; INTRAVENOUS; SUBCUTANEOUS ONCE
Refills: 0 | Status: DISCONTINUED | OUTPATIENT
Start: 2022-10-19 | End: 2022-10-23

## 2022-10-19 RX ORDER — DEXTROSE 50 % IN WATER 50 %
50 SYRINGE (ML) INTRAVENOUS
Refills: 0 | Status: DISCONTINUED | OUTPATIENT
Start: 2022-10-19 | End: 2022-10-29

## 2022-10-19 RX ORDER — PANTOPRAZOLE SODIUM 20 MG/1
40 TABLET, DELAYED RELEASE ORAL DAILY
Refills: 0 | Status: DISCONTINUED | OUTPATIENT
Start: 2022-10-19 | End: 2022-10-22

## 2022-10-19 RX ORDER — PANTOPRAZOLE SODIUM 20 MG/1
40 TABLET, DELAYED RELEASE ORAL ONCE
Refills: 0 | Status: COMPLETED | OUTPATIENT
Start: 2022-10-19 | End: 2022-10-19

## 2022-10-19 RX ADMIN — Medication 81 MILLIGRAM(S): at 11:17

## 2022-10-19 RX ADMIN — MUPIROCIN 1 APPLICATION(S): 20 OINTMENT TOPICAL at 05:01

## 2022-10-19 RX ADMIN — PROPOFOL 6.14 MICROGRAM(S)/KG/MIN: 10 INJECTION, EMULSION INTRAVENOUS at 18:55

## 2022-10-19 RX ADMIN — CHLORHEXIDINE GLUCONATE 5 MILLILITER(S): 213 SOLUTION TOPICAL at 19:30

## 2022-10-19 RX ADMIN — VASOPRESSIN 6 UNIT(S)/MIN: 20 INJECTION INTRAVENOUS at 18:51

## 2022-10-19 RX ADMIN — CLOPIDOGREL BISULFATE 300 MILLIGRAM(S): 75 TABLET, FILM COATED ORAL at 11:17

## 2022-10-19 RX ADMIN — SODIUM CHLORIDE 20 MILLILITER(S): 9 INJECTION INTRAMUSCULAR; INTRAVENOUS; SUBCUTANEOUS at 18:52

## 2022-10-19 RX ADMIN — Medication 100 MILLIGRAM(S): at 19:00

## 2022-10-19 RX ADMIN — SODIUM CHLORIDE 17.3 MILLILITER(S): 9 INJECTION, SOLUTION INTRAVENOUS at 18:55

## 2022-10-19 RX ADMIN — Medication 250 MILLILITER(S): at 23:15

## 2022-10-19 RX ADMIN — MILRINONE LACTATE 11.5 MICROGRAM(S)/KG/MIN: 1 INJECTION, SOLUTION INTRAVENOUS at 18:52

## 2022-10-19 RX ADMIN — SODIUM CHLORIDE 17.3 MILLILITER(S): 9 INJECTION, SOLUTION INTRAVENOUS at 18:51

## 2022-10-19 RX ADMIN — HEPARIN SODIUM 5000 UNIT(S): 5000 INJECTION INTRAVENOUS; SUBCUTANEOUS at 05:02

## 2022-10-19 NOTE — CONSULT NOTE ADULT - SUBJECTIVE AND OBJECTIVE BOX
83 y/o M with h/o ESRD on HD, chronic systolic HF s/p ICD, DM, GREG, severe AS who came in electively for TAVR. Valve was deployed successfully. Patient went into Vfib arrest post deployment. An Impella CP was inserted and he was started on multiple vasoppresors, including epinephrine, levophed and vasopressin. Lactate peaked at ~7 but is already trending down. A PA and CVVHD catheters were inserted while in the lab.       Impella support decreased to P7 - Continue to wean based on hemodynamics, keep at P4 through the night   Continue inotropic support with milrinone   Get hourly Mvo2, ABG and lactate   Send UA and hemolysis panel for baseline values   Check Impella position on bedside echo  Monitor pulses to LE   Get full 2D echo tomorrow   DC vasopressin   Wean Levophed as tolerated by MAP - Aim for >70 mmHg   Consult nephrology for CVVHD - aim for net negative  ml/hr   Sedation and vent management per CTU team   Discussed with CTU team, CT surgery, structural cardiology and cardiac critical care    Full consult note to follow

## 2022-10-19 NOTE — CHART NOTE - NSCHARTNOTEFT_GEN_A_CORE
Pt was evaluated in CTU and was found to be hypotensive while on pressors and inotropes. Stat Bedside echo was done which showed impella malposition. Limited 2 D echo was obtained right away which showed that Impella pigtail tip was in aortic root. Impella was repositioned by the critical care cardiologist under echocardiogram guidance.

## 2022-10-19 NOTE — DISCHARGE NOTE PROVIDER - PROVIDER TOKENS
FREE:[LAST:[[facility]],PHONE:[phone]],FAX:[(   )    -],ADDRESS:[[address] [place] [address]],FOLLOWUP:[1 week]],PROVIDER:[TOKEN:[[id number]]] PROVIDER:[TOKEN:[[id number]]],FREE:[LAST:[[facility]],PHONE:[phone]],FAX:[(   )    -],ADDRESS:[[address] [place] [address]],SCHEDULEDAPPT:[11/02/2022],SCHEDULEDAPPTTIME:[12:00 PM]] PROVIDER:[TOKEN:[[id number]]],FREE:[LAST:[[facility]],PHONE:[phone]],FAX:[(   )    -],ADDRESS:[[address] [place] [address]],SCHEDULEDAPPT:[11/03/2022],SCHEDULEDAPPTTIME:[12:00 PM]]

## 2022-10-19 NOTE — CONSULT NOTE ADULT - SUBJECTIVE AND OBJECTIVE BOX
HPI:  83 y/o M with h/o ESRD on HD, chronic systolic HF s/p ICD, DM, GREG, severe AS who came in electively for TAVR. Valve was deployed successfully. Patient went into Vfib arrest post deployment and needed resuscitation for hypotension. An Impella CP was inserted and he was started on multiple vasoppresors, including epinephrine, levophed and vasopressin. Pt also had lactic acidosis which is improving. A PA and CVVHD catheters were inserted while in the lab and pt was brought to CTU.      PAST MEDICAL & SURGICAL HISTORY  HTN (Hypertension)    Diabetes Mellitus Type II    Hypercholesterolemia    CAD (Coronary Artery Disease)    History of PTCA  with [facility] 10 years ago ([facility])    Diabetes mellitus    CAD (coronary artery disease)    H/O hyperlipidemia    HTN - Hypertension    Sleep apnea  does not use machine    GERD (gastroesophageal reflux disease)    BPH (Benign Prostatic Hyperplasia)    CHF (congestive heart failure)    Mitral valve regurgitation    Dialysis patient  HD- M/W/FR- [facility] [address].    Aortic stenosis    [place] (hard of hearing)    AICD (automatic cardioverter/defibrillator) present    S/P knee surgery  b/l arthroscopic    S/P tonsillectomy    S/P primary angioplasty with coronary stent  6-7 stents last one in 10/12    S/P appendectomy    S/P CABG (coronary artery bypass graft)  ?     AICD (automatic cardioverter/defibrillator) present        FAMILY HISTORY:  FAMILY HISTORY:  No pertinent family history in first degree relatives        SOCIAL HISTORY:  Social History:      ALLERGIES:  No Known Allergies      MEDICATIONS:  aMIOdarone Infusion 0.5 mG/Min (16.7 mL/Hr) IV Continuous <Continuous>  aspirin enteric coated 81 milliGRAM(s) Oral daily  ceFAZolin   IVPB 1000 milliGRAM(s) IV Intermittent every 8 hours  cefepime   IVPB 1000 milliGRAM(s) IV Intermittent daily  chlorhexidine 0.12% Liquid 5 milliLiter(s) Oral Mucosa two times a day  chlorhexidine 0.12% Liquid 15 milliLiter(s) Swish and Spit once  CRRT Treatment    <Continuous>  dexMEDEtomidine Infusion 0.2 MICROgram(s)/kG/Hr (5.12 mL/Hr) IV Continuous <Continuous>  dextrose 5% 500 milliLiter(s) (17.3 mL/Hr) IV Continuous <Continuous>  dextrose 5%. 1000 milliLiter(s) (50 mL/Hr) IV Continuous <Continuous>  dextrose 50% Injectable 25 Gram(s) IV Push once  dextrose 50% Injectable 50 milliLiter(s) IV Push every 15 minutes  dextrose 50% Injectable 25 milliLiter(s) IV Push every 15 minutes  EPINEPHrine    Infusion 0.04 MICROgram(s)/kG/Min (7.68 mL/Hr) IV Continuous <Continuous>  glucagon  Injectable 1 milliGRAM(s) IntraMuscular once  heparin  Infusion 500 Unit(s)/Hr (5 mL/Hr) IV Continuous <Continuous>  insulin regular Infusion 10 Unit(s)/Hr (10 mL/Hr) IV Continuous <Continuous>  meperidine     Injectable 25 milliGRAM(s) IV Push once  milrinone Infusion 0.375 MICROgram(s)/kG/Min (11.5 mL/Hr) IV Continuous <Continuous>  mupirocin 2% Ointment 1 Application(s) Both Nostrils every 12 hours  niCARdipine Infusion 5 mG/Hr (25 mL/Hr) IV Continuous <Continuous>  nitroglycerin  Infusion 30 MICROgram(s)/Min (9 mL/Hr) IV Continuous <Continuous>  norepinephrine Infusion 0.05 MICROgram(s)/kG/Min (9.6 mL/Hr) IV Continuous <Continuous>  pantoprazole    Tablet 40 milliGRAM(s) Oral once  pantoprazole  Injectable 40 milliGRAM(s) IV Push daily  propofol Infusion 10 MICROgram(s)/kG/Min (6.14 mL/Hr) IV Continuous <Continuous>  PureFlow Dialysate RFP-400 (K 2 / Ca 3) 5000 milliLiter(s) (2000 mL/Hr) CRRT <Continuous>  sodium chloride 0.9%. 1000 milliLiter(s) (20 mL/Hr) IV Continuous <Continuous>  vancomycin  IVPB 1000 milliGRAM(s) IV Intermittent every 24 hours  vasopressin Infusion 0.04 Unit(s)/Min (6 mL/Hr) IV Continuous <Continuous>    PRN:  acetaminophen   IVPB .. 1000 milliGRAM(s) IV Intermittent once PRN  dextrose Oral Gel 15 Gram(s) Oral once PRN  HYDROmorphone  Injectable 0.5 milliGRAM(s) IV Push every 6 hours PRN  oxyCODONE    IR 5 milliGRAM(s) Oral every 4 hours PRN  oxyCODONE    IR 10 milliGRAM(s) Oral every 4 hours PRN      HOME MEDICATIONS:  Home Medications:  Lantus 100 units/mL subcutaneous solution: 30 unit(s) subcutaneous once a day (at bedtime) (18 Oct 2022 14:24)  Lasix: orally once a day (18 Oct 2022 14:24)  Lipitor 40 mg oral tablet: 1 tab(s) orally once a day (at bedtime) (18 Oct 2022 14:24)  metoprolol succinate 50 mg oral tablet, extended release: 1 tab(s) orally once a day (18 Oct 2022 14:24)      VITALS:   T(F): 97.9 (10-19 @ 16:25), Max: 97.9 (10-19 @ 16:25)  HR: 96 (10-19 @ 18:30) (78 - 111)  BP: 118/62 (10-19 @ 17:15) (87/55 - 121/73)  BP(mean): 85 (10-19 @ 17:15) (65 - 93)  RR: 21 (10-19 @ 18:30) (0 - 39)  SpO2: 95% (10-19 @ 18:30) (80% - 100%)    I&O's Summary    18 Oct 2022 07:  -  19 Oct 2022 07:00  --------------------------------------------------------  IN: 250 mL / OUT: 2000 mL / NET: -1750 mL    19 Oct 2022 07:01  -  19 Oct 2022 19:15  --------------------------------------------------------  IN: 71.3 mL / OUT: 308 mL / NET: -236.7 mL        REVIEW OF SYSTEMS:  unable to obtain    PHYSICAL EXAM:  General: intubated sedated   HEENT: EOMI  Cardio: regular, S1, S2   Pulm: B/L BS.    Abdomen: Soft, non-tender, non-distended. Normoactive bowel sounds  Extremities: No edema b/l le  Neuro: intubated not following commands    LABS:                        8.3    20.38 )-----------( 172      ( 19 Oct 2022 17:08 )             28.5     10    137  |  98  |  48<H>  ----------------------------<  193<H>  4.7   |  24  |  6.0<HH>    Ca    9.3      19 Oct 2022 17:08  Phos  4.8     10  Mg     2.5     10-19    TPro  4.8<L>  /  Alb  3.5  /  TBili  1.2  /  DBili  x   /  AST  50<H>  /  ALT  24  /  AlkPhos  74  10    PT/INR - ( 19 Oct 2022 17:08 )   PT: 16.70 sec;   INR: 1.45 ratio         PTT - ( 19 Oct 2022 17:08 )  PTT:144.2 sec          Troponin trend:      09-15 Chol 103 LDL -- HDL 39<L> Trig 153<H>  COVID-19 PCR: NotDetec (15 Sep 2022 13:36)      RADIOLOGY:  -CXR: < from: Xray Chest 1 View-PORTABLE IMMEDIATE (Xray Chest 1 View-PORTABLE IMMEDIATE .) (10.18.22 @ 15:33) >  Impression:    Increasing bilateral opacities      < end of copied text >    -TTE: < from: TTE Echo Complete w/o Contrast w/ Doppler (10.19.22 @ 08:52) >  Summary:   1. Left ventricular ejection fraction, by visual estimation, is 35 to   40%.   2. Mildly to moderately decreased segmental left ventricular systolic   function.   3. Moderately decreased global left ventricular systolic function.   4. Entire inferior wall is abnormal as described above.   5. Spectral Doppler shows restrictive pattern of left ventricular   myocardial filling (Grade III diastolic dysfunction).   6. Mild to moderately enlarged left atrium.   7. Mildly enlarged right atrium.   8. Mild thickening of the anterior and posterior mitral valve leaflets.   9. Moderate mitral annular calcification.  10. Moderate mitral valve regurgitation.  11. Moderate-severe tricuspid regurgitation.  12. PSAP 55.  13. Sclerotic aortic valve with decreased opening.  14. Mild pulmonic valve regurgitation.    < end of copied text >    -OTHER:  EC Lead ECG:   Ventricular Rate 106 BPM    Atrial Rate 106 BPM    P-R Interval 168 ms    QRS Duration 190 ms    Q-T Interval 428 ms    QTC Calculation(Bazett) 568 ms    P Axis 232 degrees    R Axis 167 degrees    T Axis 19 degrees    Diagnosis Line Unusual P axis, possible ectopic atrial tachycardia with Premature  supraventricular complexes  Right bundle branch block  Left posterior fascicular block  *** Bifascicular block ***  Abnormal ECG    Confirmed by CHRISTO LEBRON, KADY ([id number]) on 10/19/2022 6:23:25 PM (10-19 @ 17:06)

## 2022-10-19 NOTE — DISCHARGE NOTE PROVIDER - NSDCFUSCHEDAPPT_GEN_ALL_CORE_FT
Zay Nance  [facility]  CARDIOLOGY [address]  Scheduled Appointment: 12/22/2022     Zay Nance  [facility]  CARDIOLOGY [address]  Scheduled Appointment: 11/03/2022    Zay Nance  [facility]  CARDIOLOGY [address]  Scheduled Appointment: 12/22/2022    [facility]  CARDIOLOGY [address]  Scheduled Appointment: 01/19/2023

## 2022-10-19 NOTE — BRIEF OPERATIVE NOTE - NSICDXBRIEFPROCEDURE_GEN_ALL_CORE_FT
PROCEDURES:  TAVR, percutaneous 19-Oct-2022 16:02:29 34 mm Medtronic Valve FX Jonathan Gauthier   PROCEDURES:  TAVR, percutaneous 19-Oct-2022 16:02:29 34 mm Medtronic Valve FX Jonathan Gauthier  Insertion, cardiac assist device, Impella, percutaneous, transarterial approach 19-Oct-2022 18:16:40  Jonathan Gauthier

## 2022-10-19 NOTE — DISCHARGE NOTE PROVIDER - CARE PROVIDERS DIRECT ADDRESSES
,DirectAddress_Unknown,[email or web address] ,[email or web address],DirectAddress_Unknown

## 2022-10-19 NOTE — DISCHARGE NOTE PROVIDER - CARE PROVIDER_API CALL
[facility],   [address] [place] [address]  [address]  [place] [address]  Phone: [phone]  Fax: (   )    -  Follow Up Time: 1 week    Zay Nance)  Cardiovascular Disease; Internal Medicine  [address] [place] [address], [facility] [address]  [place] [address]  Phone: [phone]  Fax: [phone]  Follow Up Time:    Zay Nance)  Cardiovascular Disease; Internal Medicine  [address] [place] [address], [facility] [address]  [place] [address]  Phone: [phone]  Fax: [phone]  Follow Up Time:     [facility],   [address] [place] [address]  [address]  [place] [address]  Phone: [phone]  Fax: (   )    -  Scheduled Appointment: 11/02/2022 12:00 PM   Zay Nance)  Cardiovascular Disease; Internal Medicine  [address] [place] [address], [facility] [address]  [place] [address]  Phone: [phone]  Fax: [phone]  Follow Up Time:     [facility],   [address] [place] [address]  [address]  [place] [address]  Phone: [phone]  Fax: (   )    -  Scheduled Appointment: 11/03/2022 12:00 PM

## 2022-10-19 NOTE — DISCHARGE NOTE PROVIDER - DISCHARGE DIET
Renal Diets (for dialysis) DASH Diet/Consistent Carbohydrate Diabetic Diets/Renal Diets (for dialysis)

## 2022-10-19 NOTE — CONSULT NOTE ADULT - SUBJECTIVE AND OBJECTIVE BOX
Patient is a 82y old  Male who presents with a chief complaint of TAVR (19 Oct 2022 11:37)    HPI:    83 yo male with PMH as below, including ESRD on HD, CHF, CAD, VHD, DM, ICD (E[employer] Sci), GREG electively admitted to CTICU for TAVR procedure.   During TAVR, pt had a VF arrest.    PAST MEDICAL & SURGICAL HISTORY:  HTN (Hypertension)    Diabetes Mellitus Type II    Hypercholesterolemia    CAD (Coronary Artery Disease)    History of PTCA  with stents 10 years ago ([facility])    Diabetes mellitus    CAD (coronary artery disease)    H/O hyperlipidemia    HTN - Hypertension    Sleep apnea  does not use machine    GERD (gastroesophageal reflux disease)    BPH (Benign Prostatic Hyperplasia)    CHF (congestive heart failure)    Mitral valve regurgitation    Dialysis patient  HD- M/W/FR- [facility] [address].    Aortic stenosis    [place] (hard of hearing)    AICD (automatic cardioverter/defibrillator) present    S/P knee surgery  b/l arthroscopic    S/P tonsillectom    S/P primary angioplasty with coronary stent  6-7 stents last one in 10/12    S/P appendectomy    S/P CABG (coronary artery bypass graft)  ? 2017    AICD (automatic cardioverter/defibrillator) present    PREVIOUS DIAGNOSTIC TESTING:      ECHO  FINDINGS:  < from: TTE Echo Complete w/o Contrast w/ Doppler (10.19.22 @ 08:52) >  Summary:   1. Left ventricular ejection fraction, by visual estimation, is 35 to   40%.   2. Mildly to moderately decreased segmental left ventricular systolic   function.   3. Moderately decreased global left ventricular systolic function.   4. Entire inferior wall is abnormal as described above.   5. Spectral Doppler shows restrictive pattern of left ventricular   myocardial filling (Grade III diastolic dysfunction).   6. Mild to moderately enlarged left atrium.   7. Mildly enlarged right atrium.   8. Mild thickening of the anterior and posterior mitral valve leaflets.   9. Moderate mitral annular calcification.  10. Moderate mitral valve regurgitation.  11. Moderate-severe tricuspid regurgitation.  12. PSAP 55.  13. Sclerotic aortic valve with decreased opening.  14. Mild pulmonic valve regurgitation.    < end of copied text >    MEDICATIONS  (STANDING):  aspirin enteric coated 81 milliGRAM(s) Oral daily  atorvastatin 40 milliGRAM(s) Oral at bedtime  chlorhexidine 0.12% Liquid 15 milliLiter(s) Swish and Spit once  dextrose 5%. 1000 milliLiter(s) (50 mL/Hr) IV Continuous <Continuous>  dextrose 50% Injectable 25 Gram(s) IV Push once  glucagon  Injectable 1 milliGRAM(s) IntraMuscular once  heparin   Injectable 5000 Unit(s) SubCutaneous every 8 hours  insulin glargine Injectable (LANTUS) 30 Unit(s) SubCutaneous at bedtime  insulin lispro (ADMELOG) corrective regimen sliding scale   SubCutaneous three times a day before meals  mupirocin 2% Ointment 1 Application(s) Both Nostrils every 12 hours  pantoprazole    Tablet 40 milliGRAM(s) Oral before breakfast    MEDICATIONS  (PRN):  dextrose Oral Gel 15 Gram(s) Oral once PRN Blood Glucose LESS THAN 70 milliGRAM(s)/deciliter    FAMILY HISTORY:  No pertinent family history in first degree relatives    SOCIAL HISTORY: unknown    CIGARETTES: unknown    ALCOHOL: unknown    Past Surgical History:    Allergies:    No Known Allergies    REVIEW OF SYSTEMS:  unable to obtain    Vital Signs Last 24 Hrs  T(C): 36 (19 Oct 2022 08:00), Max: 36.4 (19 Oct 2022 02:11)  T(F): 96.8 (19 Oct 2022 08:00), Max: 97.6 (19 Oct 2022 02:11)  HR: 84 (19 Oct 2022 11:00) (78 - 86)  BP: 105/67 (19 Oct 2022 11:00) (87/55 - 121/73)  BP(mean): 82 (19 Oct 2022 11:00) (65 - 93)  RR: 27 (19 Oct 2022 11:00) (18 - 39)  SpO2: 95% (19 Oct 2022 11:00) (80% - 100%)    Parameters below as of 19 Oct 2022 11:00  Patient On (Oxygen Delivery Method): room air    PHYSICAL EXAM:    GENERAL: In no apparent distress, well nourished, and hydrated.  HEART: Regular rate and rhythm; No murmurs, rubs, or gallops.  PULMONARY: Clear to auscultation and perfusion.  No rales, wheezing, or rhonchi bilaterally.  ABDOMEN: Soft, Nontender, Nondistended; Bowel sounds present  NEUROLOGICAL: Grossly nonfocal    INTERPRETATION OF TELEMETRY:    ECG:  < from: 12 Lead ECG (10.18.22 @ 14:49) >  Ventricular Rate 81 BPM    Atrial Rate 81 BPM    P-R Interval 206 ms    QRS Duration 172 ms    Q-T Interval 480 ms    QTC Calculation(Bazett) 557 ms    P Axis 54 degrees    R Axis 214 degrees    T Axis 18 degrees    Diagnosis Line Sinus rhythm withFusion complexes  Right bundle branch block  Abnormal ECG    < end of copied text >    LABS:                        9.1    7.84  )-----------( 176      ( 18 Oct 2022 16:23 )             31.4     10-18    138  |  96<L>  |  52<H>  ----------------------------<  188<H>  4.4   |  27  |  6.4<HH>    Ca    8.4      18 Oct 2022 16:23  Phos  4.8     10-18  Mg     2.8     10-18    TPro  5.9<L>  /  Alb  4.1  /  TBili  0.6  /  DBili  <0.2  /  AST  66<H>  /  ALT  25  /  AlkPhos  85  10-18    RADIOLOGY & ADDITIONAL STUDIES:

## 2022-10-19 NOTE — DISCHARGE NOTE PROVIDER - NSDCMRMEDTOKEN_GEN_ALL_CORE_FT
aspirin 81 mg oral delayed release tablet: 1 tab(s) orally once a day  Lantus 100 units/mL subcutaneous solution: 30 unit(s) subcutaneous once a day (at bedtime)  Lasix: orally once a day  Lipitor 40 mg oral tablet: 1 tab(s) orally once a day (at bedtime)  metoprolol succinate 50 mg oral tablet, extended release: 1 tab(s) orally once a day   acetaminophen 325 mg oral tablet: 2 tab(s) orally every 6 hours, As needed, Temp greater or equal to 38C (100.4F), Mild Pain (1 - 3)  clopidogrel 75 mg oral tablet: 1 tab(s) orally once a day  Lantus 100 units/mL subcutaneous solution: 30 unit(s) subcutaneous once a day (at bedtime)  Lipitor 40 mg oral tablet: 1 tab(s) orally once a day (at bedtime)  metoprolol succinate 50 mg oral tablet, extended release: 1 tab(s) orally once a day  pantoprazole 40 mg intravenous injection: 1 tab(s) intravenous every 12 hours x 30 days

## 2022-10-19 NOTE — DISCHARGE NOTE PROVIDER - HOSPITAL COURSE
Patient is a 82y old  Male with history of ESRD on HD 2x /week (M/F) in [facility] with L arm [place], MVR (ICD 10YEARS AGO), BPH, PTCA, Sleep apnea, CAD/CABG ( [facility] on 3/01/22 with subtotal LM and 100% occlusion of RCA, with patent LIMA-LAD, patent SVG-diag/OM, and patent SVG-RPDA), HTN and HFrEF who presents for Elelctive TAVR .JING showed moderate-severe AS and severe MR, with LVEF 25-30%.  Post-operatively, Patient is a 82y old  Male with history of ESRD on HD 2x /week (M/F) in [facility] with L arm [place], MVR (ICD 10YEARS AGO), BPH, PTCA, Sleep apnea, CAD/CABG ( [facility] on 3/01/22 with subtotal LM and 100% occlusion of RCA, with patent LIMA-LAD, patent SVG-diag/OM, and patent SVG-RPDA), HTN and HFrEF who presents for Elelctive TAVR .JING showed moderate-severe AS and severe MR, with LVEF 25-30%.  Intra-operatively, right after Valve deployment , he became hypotensive , bradycardic and had cardiac arrest. CPR was initiated and Impella was inserted. Post-op, Patient is a 82y old  Male with history of ESRD on HD 2x /week (M/F) in [facility] with L arm [place], MVR (ICD 10YEARS AGO), BPH, PTCA, Sleep apnea, CAD/CABG ( [facility] on 3/01/22 with subtotal LM and 100% occlusion of RCA, with patent LIMA-LAD, patent SVG-diag/OM, and patent SVG-RPDA), HTN and HFrEF who presents for Elelctive TAVR .JING showed moderate-severe AS and severe MR, with LVEF 25-30%.  Intra-operatively, right after Valve deployment , he became hypotensive , bradycardic and had cardiac arrest. CPR was initiated and Impella was inserted. Post-op, impella was removed on POD#1 and switched to IABP for continued hemodynamic support. IABP was removed on POD#2 without complication. Patient had anemia secondary to acute surgical blood loss and was transfused appropriately. Pt had episodes of blood bowel movement. GI consulted. Aspirin was discontinued but pt clear to continue plavix. Pt to follow up as outpatient. Patient continued to improve through his hospital course. He was discharged home on POD#10 with instructions to [facility] on 11/3/2022 @ 12:00pm. Patient to resume his scheduled outpatient HD days of MWF, starting 10/31/22.

## 2022-10-19 NOTE — PROGRESS NOTE ADULT - ASSESSMENT
ESRD on HD MWF @ [facility]  access via left arm AVF  CAD / CABG / CMP / CHF / VHD (severe AS and MR)  HTN  DM2  anemia   GREG    plan:    TAVR today  next HD tomorrow AM  may give high dose loop diuretics (pt non-anuric) if concern for pulm edema  low K+ renal diet  fluid restriction  left arm precautions  epogen with HD  no venofer due to prior hyperferritinemia   [facility] ([phone])   d/w CTS   CTICU care

## 2022-10-19 NOTE — CONSULT NOTE ADULT - NS ATTEND AMEND GEN_ALL_CORE FT
VF arrest  Need for intermittent pacing    ICD reprogrammed to VVI 40  No AADs for now  Possible CRT upgrade as OP  No further EP intervention at this time  Recall as needed.

## 2022-10-19 NOTE — PROGRESS NOTE ADULT - SUBJECTIVE AND OBJECTIVE BOX
NEPHROLOGY FOLLOW UP NOTE    pt seen in CTICU  c/o orthopnea and right leg swelling  last HD yesterday    PAST MEDICAL & SURGICAL HISTORY:  HTN (Hypertension)    Diabetes Mellitus Type II    Hypercholesterolemia    CAD (Coronary Artery Disease)    History of PTCA  with stents 10 years ago ([facility])    Diabetes mellitus    CAD (coronary artery disease)    H/O hyperlipidemia    HTN - Hypertension    Renal insufficiency    Sleep apnea  does not use machine    GERD (gastroesophageal reflux disease)    BPH (Benign Prostatic Hyperplasia)    CHF (congestive heart failure)    Mitral valve regurgitation    S/P knee surgery  b/l arthroscopic    S/P tonsillectomy    S/P primary angioplasty with coronary stent  6-7 stents last one in 10/12    S/P appendectomy      Allergies:  No Known Allergies    Home Medications Reviewed    SOCIAL HISTORY:  Denies ETOH,Smoking,   FAMILY HISTORY:  No pertinent family history in first degree relatives          REVIEW OF SYSTEMS:  CONSTITUTIONAL: No weakness, fevers or chills  EYES/ENT: No visual changes;  No vertigo or throat pain   NECK: no pain or stiffness  RESPIRATORY: No cough, wheezing, hemoptysis; + shortness of breath + orthopnea  CARDIOVASCULAR: No chest pain or palpitations.  GASTROINTESTINAL: No abdominal or epigastric pain. No nausea, vomiting, or hematemesis; No diarrhea or constipation. No melena or hematochezia.  GENITOURINARY: No dysuria, frequency, foamy urine, urinary urgency, incontinence or hematuria  NEUROLOGICAL: No numbness or weakness  SKIN: No itching, burning, rashes, or lesions   VASCULAR: + lower extremity edema.   All other review of systems is negative unless indicated above.    PHYSICAL EXAM:  Constitutional: NAD  HEENT: anicteric sclera, oropharynx clear, MMM  Neck: No JVD  Respiratory: decreased BS b/l  Cardiovascular: S1, S2, RRR + murmur  Gastrointestinal: BS+, soft, NT/ND  Extremities: No cyanosis or clubbing. 1+ peripheral edema  Neurological: A/O x 3, no focal deficits  Psychiatric: Normal mood, normal affect  : No CVA tenderness. No woods.   Skin: No rashes  Vascular Access: left arm AVF + [facility] Medications:   MEDICATIONS  (STANDING):  aspirin enteric coated 81 milliGRAM(s) Oral daily  atorvastatin 40 milliGRAM(s) Oral at bedtime  chlorhexidine 0.12% Liquid 15 milliLiter(s) Swish and Spit once  dextrose 5%. 1000 milliLiter(s) (50 mL/Hr) IV Continuous <Continuous>  dextrose 50% Injectable 25 Gram(s) IV Push once  glucagon  Injectable 1 milliGRAM(s) IntraMuscular once  heparin   Injectable 5000 Unit(s) SubCutaneous every 8 hours  insulin glargine Injectable (LANTUS) 30 Unit(s) SubCutaneous at bedtime  insulin lispro (ADMELOG) corrective regimen sliding scale   SubCutaneous three times a day before meals  mupirocin 2% Ointment 1 Application(s) Both Nostrils every 12 hours  pantoprazole    Tablet 40 milliGRAM(s) Oral before breakfast        VITALS:  T(F): 96.8 (10-19-22 @ 08:00), Max: 97.6 (10-19-22 @ 02:11)  HR: 86 (10-19-22 @ 08:00)  BP: 104/74 (10-19-22 @ 08:00)  RR: 28 (10-19-22 @ 08:00)  SpO2: 93% (10-19-22 @ 08:00)  Wt(kg): --    10-18 @ 07:01  -  10-19 @ 07:00  --------------------------------------------------------  IN: 250 mL / OUT: 2000 mL / NET: -1750 mL      Height (cm): 175.3 (10-19 @ 02:11)  Weight (kg): 102.4 (10-19 @ 02:11)  BMI (kg/m2): 33.3 (10-19 @ 02:11)  BSA (m2): 2.18 (10-19 @ 02:11)    LABS:  10-18    138  |  96<L>  |  52<H>  ----------------------------<  188<H>  4.4   |  27  |  6.4<HH>    Ca    8.4      18 Oct 2022 16:23  Phos  4.8     10-18  Mg     2.8     10-18    TPro  5.9<L>  /  Alb  4.1  /  TBili  0.6  /  DBili  <0.2  /  AST  66<H>  /  ALT  25  /  AlkPhos  85  10-18                          9.1    7.84  )-----------( 176      ( 18 Oct 2022 16:23 )             31.4       Urine Studies:        RADIOLOGY & ADDITIONAL STUDIES:

## 2022-10-20 LAB
ALBUMIN SERPL ELPH-MCNC: 2.8 G/DL — LOW (ref 3.5–5.2)
ALBUMIN SERPL ELPH-MCNC: 2.9 G/DL — LOW (ref 3.5–5.2)
ALBUMIN SERPL ELPH-MCNC: 3.2 G/DL — LOW (ref 3.5–5.2)
ALP SERPL-CCNC: 44 U/L — SIGNIFICANT CHANGE UP (ref 30–115)
ALP SERPL-CCNC: 46 U/L — SIGNIFICANT CHANGE UP (ref 30–115)
ALP SERPL-CCNC: 47 U/L — SIGNIFICANT CHANGE UP (ref 30–115)
ALT FLD-CCNC: 12 U/L — SIGNIFICANT CHANGE UP (ref 0–41)
ALT FLD-CCNC: 14 U/L — SIGNIFICANT CHANGE UP (ref 0–41)
ALT FLD-CCNC: 8 U/L — SIGNIFICANT CHANGE UP (ref 0–41)
ANION GAP SERPL CALC-SCNC: 10 MMOL/L — SIGNIFICANT CHANGE UP (ref 7–14)
ANION GAP SERPL CALC-SCNC: 11 MMOL/L — SIGNIFICANT CHANGE UP (ref 7–14)
ANION GAP SERPL CALC-SCNC: 11 MMOL/L — SIGNIFICANT CHANGE UP (ref 7–14)
ANISOCYTOSIS BLD QL: SLIGHT — SIGNIFICANT CHANGE UP
APTT BLD: 27.8 SEC — SIGNIFICANT CHANGE UP (ref 27–39.2)
APTT BLD: 34.9 SEC — SIGNIFICANT CHANGE UP (ref 27–39.2)
APTT BLD: 42.3 SEC — HIGH (ref 27–39.2)
AST SERPL-CCNC: 31 U/L — SIGNIFICANT CHANGE UP (ref 0–41)
AST SERPL-CCNC: 33 U/L — SIGNIFICANT CHANGE UP (ref 0–41)
AST SERPL-CCNC: 33 U/L — SIGNIFICANT CHANGE UP (ref 0–41)
BASE EXCESS BLDMV CALC-SCNC: 0.5 MMOL/L — SIGNIFICANT CHANGE UP
BASE EXCESS BLDV CALC-SCNC: 1.9 MMOL/L — SIGNIFICANT CHANGE UP (ref -2–3)
BASOPHILS # BLD AUTO: 0 K/UL — SIGNIFICANT CHANGE UP (ref 0–0.2)
BASOPHILS # BLD AUTO: 0 K/UL — SIGNIFICANT CHANGE UP (ref 0–0.2)
BASOPHILS NFR BLD AUTO: 0 % — SIGNIFICANT CHANGE UP (ref 0–1)
BASOPHILS NFR BLD AUTO: 0 % — SIGNIFICANT CHANGE UP (ref 0–1)
BILIRUB DIRECT SERPL-MCNC: 0.4 MG/DL — HIGH (ref 0–0.3)
BILIRUB INDIRECT FLD-MCNC: 0.5 MG/DL — SIGNIFICANT CHANGE UP (ref 0.2–1.2)
BILIRUB SERPL-MCNC: 0.6 MG/DL — SIGNIFICANT CHANGE UP (ref 0.2–1.2)
BILIRUB SERPL-MCNC: 0.7 MG/DL — SIGNIFICANT CHANGE UP (ref 0.2–1.2)
BILIRUB SERPL-MCNC: 0.9 MG/DL — SIGNIFICANT CHANGE UP (ref 0.2–1.2)
BILIRUB SERPL-MCNC: 0.9 MG/DL — SIGNIFICANT CHANGE UP (ref 0.2–1.2)
BUN SERPL-MCNC: 52 MG/DL — HIGH (ref 10–20)
BUN SERPL-MCNC: 53 MG/DL — HIGH (ref 10–20)
BUN SERPL-MCNC: 57 MG/DL — HIGH (ref 10–20)
CA-I SERPL-SCNC: 1.2 MMOL/L — SIGNIFICANT CHANGE UP (ref 1.15–1.33)
CALCIUM SERPL-MCNC: 7.8 MG/DL — LOW (ref 8.4–10.5)
CALCIUM SERPL-MCNC: 8 MG/DL — LOW (ref 8.4–10.4)
CALCIUM SERPL-MCNC: 8 MG/DL — LOW (ref 8.4–10.5)
CHLORIDE SERPL-SCNC: 103 MMOL/L — SIGNIFICANT CHANGE UP (ref 98–110)
CHLORIDE SERPL-SCNC: 104 MMOL/L — SIGNIFICANT CHANGE UP (ref 98–110)
CHLORIDE SERPL-SCNC: 105 MMOL/L — SIGNIFICANT CHANGE UP (ref 98–110)
CO2 SERPL-SCNC: 24 MMOL/L — SIGNIFICANT CHANGE UP (ref 17–32)
CO2 SERPL-SCNC: 26 MMOL/L — SIGNIFICANT CHANGE UP (ref 17–32)
CO2 SERPL-SCNC: 26 MMOL/L — SIGNIFICANT CHANGE UP (ref 17–32)
CREAT SERPL-MCNC: 5.9 MG/DL — CRITICAL HIGH (ref 0.7–1.5)
CREAT SERPL-MCNC: 5.9 MG/DL — CRITICAL HIGH (ref 0.7–1.5)
CREAT SERPL-MCNC: 6.2 MG/DL — CRITICAL HIGH (ref 0.7–1.5)
EGFR: 9 ML/MIN/1.73M2 — LOW
EOSINOPHIL # BLD AUTO: 0 K/UL — SIGNIFICANT CHANGE UP (ref 0–0.7)
EOSINOPHIL # BLD AUTO: 0 K/UL — SIGNIFICANT CHANGE UP (ref 0–0.7)
EOSINOPHIL NFR BLD AUTO: 0 % — SIGNIFICANT CHANGE UP (ref 0–8)
EOSINOPHIL NFR BLD AUTO: 0 % — SIGNIFICANT CHANGE UP (ref 0–8)
GAS PNL BLDA: SIGNIFICANT CHANGE UP
GAS PNL BLDMV: SIGNIFICANT CHANGE UP
GAS PNL BLDV: 137 MMOL/L — SIGNIFICANT CHANGE UP (ref 136–145)
GAS PNL BLDV: SIGNIFICANT CHANGE UP
GAS PNL BLDV: SIGNIFICANT CHANGE UP
GLUCOSE BLDC GLUCOMTR-MCNC: 101 MG/DL — HIGH (ref 70–99)
GLUCOSE BLDC GLUCOMTR-MCNC: 103 MG/DL — HIGH (ref 70–99)
GLUCOSE BLDC GLUCOMTR-MCNC: 105 MG/DL — HIGH (ref 70–99)
GLUCOSE BLDC GLUCOMTR-MCNC: 105 MG/DL — HIGH (ref 70–99)
GLUCOSE BLDC GLUCOMTR-MCNC: 116 MG/DL — HIGH (ref 70–99)
GLUCOSE BLDC GLUCOMTR-MCNC: 118 MG/DL — HIGH (ref 70–99)
GLUCOSE BLDC GLUCOMTR-MCNC: 118 MG/DL — HIGH (ref 70–99)
GLUCOSE BLDC GLUCOMTR-MCNC: 126 MG/DL — HIGH (ref 70–99)
GLUCOSE BLDC GLUCOMTR-MCNC: 129 MG/DL — HIGH (ref 70–99)
GLUCOSE BLDC GLUCOMTR-MCNC: 146 MG/DL — HIGH (ref 70–99)
GLUCOSE BLDC GLUCOMTR-MCNC: 156 MG/DL — HIGH (ref 70–99)
GLUCOSE BLDC GLUCOMTR-MCNC: 162 MG/DL — HIGH (ref 70–99)
GLUCOSE BLDC GLUCOMTR-MCNC: 169 MG/DL — HIGH (ref 70–99)
GLUCOSE BLDC GLUCOMTR-MCNC: 177 MG/DL — HIGH (ref 70–99)
GLUCOSE BLDC GLUCOMTR-MCNC: 179 MG/DL — HIGH (ref 70–99)
GLUCOSE BLDC GLUCOMTR-MCNC: 83 MG/DL — SIGNIFICANT CHANGE UP (ref 70–99)
GLUCOSE SERPL-MCNC: 107 MG/DL — HIGH (ref 70–99)
GLUCOSE SERPL-MCNC: 164 MG/DL — HIGH (ref 70–99)
GLUCOSE SERPL-MCNC: 99 MG/DL — SIGNIFICANT CHANGE UP (ref 70–99)
HAPTOGLOB SERPL-MCNC: <20 MG/DL — LOW (ref 34–200)
HCO3 BLDMV-SCNC: 25 MMOL/L — SIGNIFICANT CHANGE UP
HCO3 BLDV-SCNC: 27 MMOL/L — SIGNIFICANT CHANGE UP (ref 22–29)
HCT VFR BLD CALC: 23.8 % — LOW (ref 42–52)
HCT VFR BLD CALC: 29 % — LOW (ref 42–52)
HCT VFR BLD CALC: 30.2 % — LOW (ref 42–52)
HCT VFR BLDA CALC: 29 % — LOW (ref 39–51)
HGB BLD CALC-MCNC: 9.6 G/DL — LOW (ref 12.6–17.4)
HGB BLD-MCNC: 7.3 G/DL — LOW (ref 14–18)
HGB BLD-MCNC: 9.5 G/DL — LOW (ref 14–18)
HGB BLD-MCNC: 9.8 G/DL — LOW (ref 14–18)
HOROWITZ INDEX BLDV+IHG-RTO: 70 — SIGNIFICANT CHANGE UP
IMM GRANULOCYTES NFR BLD AUTO: 0.7 % — HIGH (ref 0.1–0.3)
INR BLD: 1.31 RATIO — HIGH (ref 0.65–1.3)
INR BLD: 1.61 RATIO — HIGH (ref 0.65–1.3)
LACTATE BLDV-MCNC: 1 MMOL/L — SIGNIFICANT CHANGE UP (ref 0.5–2)
LDH SERPL L TO P-CCNC: 290 U/L — HIGH (ref 50–242)
LYMPHOCYTES # BLD AUTO: 0.9 K/UL — LOW (ref 1.2–3.4)
LYMPHOCYTES # BLD AUTO: 1.05 K/UL — LOW (ref 1.2–3.4)
LYMPHOCYTES # BLD AUTO: 7 % — LOW (ref 20.5–51.1)
LYMPHOCYTES # BLD AUTO: 8.7 % — LOW (ref 20.5–51.1)
MACROCYTES BLD QL: SLIGHT — SIGNIFICANT CHANGE UP
MAGNESIUM SERPL-MCNC: 2.3 MG/DL — SIGNIFICANT CHANGE UP (ref 1.8–2.4)
MAGNESIUM SERPL-MCNC: 2.3 MG/DL — SIGNIFICANT CHANGE UP (ref 1.8–2.4)
MANUAL SMEAR VERIFICATION: SIGNIFICANT CHANGE UP
MCHC RBC-ENTMCNC: 28.4 PG — SIGNIFICANT CHANGE UP (ref 27–31)
MCHC RBC-ENTMCNC: 29.3 PG — SIGNIFICANT CHANGE UP (ref 27–31)
MCHC RBC-ENTMCNC: 29.4 PG — SIGNIFICANT CHANGE UP (ref 27–31)
MCHC RBC-ENTMCNC: 30.7 G/DL — LOW (ref 32–37)
MCHC RBC-ENTMCNC: 32.5 G/DL — SIGNIFICANT CHANGE UP (ref 32–37)
MCHC RBC-ENTMCNC: 32.8 G/DL — SIGNIFICANT CHANGE UP (ref 32–37)
MCV RBC AUTO: 89.8 FL — SIGNIFICANT CHANGE UP (ref 80–94)
MCV RBC AUTO: 90.4 FL — SIGNIFICANT CHANGE UP (ref 80–94)
MCV RBC AUTO: 92.6 FL — SIGNIFICANT CHANGE UP (ref 80–94)
METAMYELOCYTES # FLD: 0.9 % — HIGH (ref 0–0)
MICROCYTES BLD QL: SLIGHT — SIGNIFICANT CHANGE UP
MONOCYTES # BLD AUTO: 1.52 K/UL — HIGH (ref 0.1–0.6)
MONOCYTES # BLD AUTO: 1.85 K/UL — HIGH (ref 0.1–0.6)
MONOCYTES NFR BLD AUTO: 12.3 % — HIGH (ref 1.7–9.3)
MONOCYTES NFR BLD AUTO: 14.7 % — HIGH (ref 1.7–9.3)
NEUTROPHILS # BLD AUTO: 12 K/UL — HIGH (ref 1.4–6.5)
NEUTROPHILS # BLD AUTO: 7.86 K/UL — HIGH (ref 1.4–6.5)
NEUTROPHILS NFR BLD AUTO: 75.9 % — HIGH (ref 42.2–75.2)
NEUTROPHILS NFR BLD AUTO: 79.8 % — HIGH (ref 42.2–75.2)
NRBC # BLD: 0 /100 WBCS — SIGNIFICANT CHANGE UP (ref 0–0)
NRBC # BLD: 0 /100 WBCS — SIGNIFICANT CHANGE UP (ref 0–0)
O2 CT VFR BLD CALC: 34 MMHG — SIGNIFICANT CHANGE UP
PCO2 BLDMV: 41 MMHG — SIGNIFICANT CHANGE UP
PCO2 BLDV: 45 MMHG — SIGNIFICANT CHANGE UP (ref 42–55)
PH BLDMV: 7.4 — SIGNIFICANT CHANGE UP
PH BLDV: 7.39 — SIGNIFICANT CHANGE UP (ref 7.32–7.43)
PLAT MORPH BLD: NORMAL — SIGNIFICANT CHANGE UP
PLATELET # BLD AUTO: 109 K/UL — LOW (ref 130–400)
PLATELET # BLD AUTO: 115 K/UL — LOW (ref 130–400)
PLATELET # BLD AUTO: 117 K/UL — LOW (ref 130–400)
PO2 BLDV: 28 MMHG — SIGNIFICANT CHANGE UP
POLYCHROMASIA BLD QL SMEAR: SLIGHT — SIGNIFICANT CHANGE UP
POTASSIUM BLDV-SCNC: 5 MMOL/L — SIGNIFICANT CHANGE UP (ref 3.5–5.1)
POTASSIUM SERPL-MCNC: 4.6 MMOL/L — SIGNIFICANT CHANGE UP (ref 3.5–5)
POTASSIUM SERPL-MCNC: 4.6 MMOL/L — SIGNIFICANT CHANGE UP (ref 3.5–5)
POTASSIUM SERPL-MCNC: 5.2 MMOL/L — HIGH (ref 3.5–5)
POTASSIUM SERPL-SCNC: 4.6 MMOL/L — SIGNIFICANT CHANGE UP (ref 3.5–5)
POTASSIUM SERPL-SCNC: 4.6 MMOL/L — SIGNIFICANT CHANGE UP (ref 3.5–5)
POTASSIUM SERPL-SCNC: 5.2 MMOL/L — HIGH (ref 3.5–5)
PROT SERPL-MCNC: 3.6 G/DL — LOW (ref 6–8)
PROT SERPL-MCNC: 4 G/DL — LOW (ref 6–8)
PROT SERPL-MCNC: 4 G/DL — LOW (ref 6–8)
PROTHROM AB SERPL-ACNC: 15 SEC — HIGH (ref 9.95–12.87)
PROTHROM AB SERPL-ACNC: 18.6 SEC — HIGH (ref 9.95–12.87)
RBC # BLD: 2.57 M/UL — LOW (ref 4.7–6.1)
RBC # BLD: 3.23 M/UL — LOW (ref 4.7–6.1)
RBC # BLD: 3.34 M/UL — LOW (ref 4.7–6.1)
RBC # FLD: 15.5 % — HIGH (ref 11.5–14.5)
RBC # FLD: 16.1 % — HIGH (ref 11.5–14.5)
RBC # FLD: 16.1 % — HIGH (ref 11.5–14.5)
RBC BLD AUTO: ABNORMAL
SAO2 % BLDMV: 64 % — SIGNIFICANT CHANGE UP
SAO2 % BLDV: 56.1 % — SIGNIFICANT CHANGE UP
SODIUM SERPL-SCNC: 138 MMOL/L — SIGNIFICANT CHANGE UP (ref 135–146)
SODIUM SERPL-SCNC: 140 MMOL/L — SIGNIFICANT CHANGE UP (ref 135–146)
SODIUM SERPL-SCNC: 142 MMOL/L — SIGNIFICANT CHANGE UP (ref 135–146)
WBC # BLD: 10.35 K/UL — SIGNIFICANT CHANGE UP (ref 4.8–10.8)
WBC # BLD: 15.04 K/UL — HIGH (ref 4.8–10.8)
WBC # BLD: 15.86 K/UL — HIGH (ref 4.8–10.8)
WBC # FLD AUTO: 10.35 K/UL — SIGNIFICANT CHANGE UP (ref 4.8–10.8)
WBC # FLD AUTO: 15.04 K/UL — HIGH (ref 4.8–10.8)
WBC # FLD AUTO: 15.86 K/UL — HIGH (ref 4.8–10.8)

## 2022-10-20 PROCEDURE — 93010 ELECTROCARDIOGRAM REPORT: CPT

## 2022-10-20 PROCEDURE — 93321 DOPPLER ECHO F-UP/LMTD STD: CPT | Mod: 26,59

## 2022-10-20 PROCEDURE — 36556 INSERT NON-TUNNEL CV CATH: CPT

## 2022-10-20 PROCEDURE — 71045 X-RAY EXAM CHEST 1 VIEW: CPT | Mod: 26,77

## 2022-10-20 PROCEDURE — 33992 RMVL PERQ LEFT HEART VAD: CPT

## 2022-10-20 PROCEDURE — 93306 TTE W/DOPPLER COMPLETE: CPT | Mod: 26

## 2022-10-20 PROCEDURE — 93283 PRGRMG EVAL IMPLANTABLE DFB: CPT | Mod: 26

## 2022-10-20 PROCEDURE — 33967 INSERT I-AORT PERCUT DEVICE: CPT

## 2022-10-20 PROCEDURE — 71045 X-RAY EXAM CHEST 1 VIEW: CPT | Mod: 26

## 2022-10-20 PROCEDURE — 93312 ECHO TRANSESOPHAGEAL: CPT | Mod: 26,XU

## 2022-10-20 PROCEDURE — 93325 DOPPLER ECHO COLOR FLOW MAPG: CPT | Mod: 26,59

## 2022-10-20 RX ORDER — FUROSEMIDE 40 MG
80 TABLET ORAL ONCE
Refills: 0 | Status: COMPLETED | OUTPATIENT
Start: 2022-10-20 | End: 2022-10-20

## 2022-10-20 RX ORDER — ALBUMIN HUMAN 25 %
250 VIAL (ML) INTRAVENOUS ONCE
Refills: 0 | Status: DISCONTINUED | OUTPATIENT
Start: 2022-10-20 | End: 2022-10-29

## 2022-10-20 RX ORDER — HEPARIN SODIUM 5000 [USP'U]/ML
500 INJECTION INTRAVENOUS; SUBCUTANEOUS
Qty: 25000 | Refills: 0 | Status: DISCONTINUED | OUTPATIENT
Start: 2022-10-20 | End: 2022-10-21

## 2022-10-20 RX ORDER — MILRINONE LACTATE 1 MG/ML
0.38 INJECTION, SOLUTION INTRAVENOUS
Qty: 20 | Refills: 0 | Status: DISCONTINUED | OUTPATIENT
Start: 2022-10-20 | End: 2022-10-21

## 2022-10-20 RX ORDER — VASOPRESSIN 20 [USP'U]/ML
0.04 INJECTION INTRAVENOUS
Qty: 40 | Refills: 0 | Status: DISCONTINUED | OUTPATIENT
Start: 2022-10-20 | End: 2022-10-21

## 2022-10-20 RX ORDER — VANCOMYCIN HCL 1 G
1000 VIAL (EA) INTRAVENOUS ONCE
Refills: 0 | Status: COMPLETED | OUTPATIENT
Start: 2022-10-20 | End: 2022-10-20

## 2022-10-20 RX ORDER — DEXMEDETOMIDINE HYDROCHLORIDE IN 0.9% SODIUM CHLORIDE 4 UG/ML
0.2 INJECTION INTRAVENOUS
Qty: 400 | Refills: 0 | Status: DISCONTINUED | OUTPATIENT
Start: 2022-10-20 | End: 2022-10-22

## 2022-10-20 RX ORDER — ALBUMIN HUMAN 25 %
500 VIAL (ML) INTRAVENOUS ONCE
Refills: 0 | Status: COMPLETED | OUTPATIENT
Start: 2022-10-20 | End: 2022-10-19

## 2022-10-20 RX ADMIN — Medication 250 MILLIGRAM(S): at 04:33

## 2022-10-20 RX ADMIN — CHLORHEXIDINE GLUCONATE 5 MILLILITER(S): 213 SOLUTION TOPICAL at 06:38

## 2022-10-20 RX ADMIN — Medication 100 MILLIGRAM(S): at 16:43

## 2022-10-20 RX ADMIN — CHLORHEXIDINE GLUCONATE 5 MILLILITER(S): 213 SOLUTION TOPICAL at 20:07

## 2022-10-20 RX ADMIN — CEFEPIME 100 MILLIGRAM(S): 1 INJECTION, POWDER, FOR SOLUTION INTRAMUSCULAR; INTRAVENOUS at 04:34

## 2022-10-20 RX ADMIN — CLOPIDOGREL BISULFATE 75 MILLIGRAM(S): 75 TABLET, FILM COATED ORAL at 17:13

## 2022-10-20 RX ADMIN — Medication 80 MILLIGRAM(S): at 20:16

## 2022-10-20 RX ADMIN — Medication 100 MILLIGRAM(S): at 03:30

## 2022-10-20 RX ADMIN — CHLORHEXIDINE GLUCONATE 15 MILLILITER(S): 213 SOLUTION TOPICAL at 21:55

## 2022-10-20 RX ADMIN — PROPOFOL 6.14 MICROGRAM(S)/KG/MIN: 10 INJECTION, EMULSION INTRAVENOUS at 06:39

## 2022-10-20 RX ADMIN — MILRINONE LACTATE 11.5 MICROGRAM(S)/KG/MIN: 1 INJECTION, SOLUTION INTRAVENOUS at 11:29

## 2022-10-20 RX ADMIN — MUPIROCIN 1 APPLICATION(S): 20 OINTMENT TOPICAL at 21:54

## 2022-10-20 RX ADMIN — DEXMEDETOMIDINE HYDROCHLORIDE IN 0.9% SODIUM CHLORIDE 5.12 MICROGRAM(S)/KG/HR: 4 INJECTION INTRAVENOUS at 11:48

## 2022-10-20 RX ADMIN — SENNA PLUS 2 TABLET(S): 8.6 TABLET ORAL at 21:54

## 2022-10-20 RX ADMIN — Medication 250 MILLIGRAM(S): at 20:16

## 2022-10-20 NOTE — PROCEDURE NOTE - NSPROCDETAILS_GEN_ALL_CORE
guidewire recovered/lumen(s) aspirated and flushed/sterile dressing applied/sterile technique, catheter placed
s/p extraction, however with significant ulceration/maceration at site of impaction and food was not able to be passed distally during endoscopy.  f/u esophagram study  slowly advance diet as tolerated  gi f/u dr. Lucio  If no issues on esophagram plan for advancing to clears and resuming po meds

## 2022-10-20 NOTE — PROGRESS NOTE ADULT - SUBJECTIVE AND OBJECTIVE BOX
HPI:  83 y/o M with h/o ESRD on HD, chronic systolic HF s/p ICD, DM, GREG, severe AS who came in electively for TAVR. Valve was deployed successfully. Patient went into Vfib arrest post deployment and needed resuscitation for hypotension. An Impella CP was inserted and he was started on multiple vasoppresors, including epinephrine, levophed and vasopressin. Pt also had lactic acidosis which is improving. A PA and CVVHD catheters were inserted while in the lab and pt was brought to CTU.      PAST MEDICAL & SURGICAL HISTORY  HTN (Hypertension)    Diabetes Mellitus Type II    Hypercholesterolemia    CAD (Coronary Artery Disease)    History of PTCA  with stents 10 years ago ([facility])    Diabetes mellitus    CAD (coronary artery disease)    H/O hyperlipidemia    HTN - Hypertension    Sleep apnea  does not use machine    GERD (gastroesophageal reflux disease)    BPH (Benign Prostatic Hyperplasia)    CHF (congestive heart failure)    Mitral valve regurgitation    Dialysis patient  HD- M/W/FR- [facility] [address] [facility].    Aortic stenosis    [place] (hard of hearing)    AICD (automatic cardioverter/defibrillator) present    S/P knee surgery  b/l arthroscopic    S/P tonsillectomy    S/P primary angioplasty with coronary stent  6-7 stents last one in 10/12    S/P appendectomy    S/P CABG (coronary artery bypass graft)  ?     AICD (automatic cardioverter/defibrillator) present        FAMILY HISTORY:  FAMILY HISTORY:  No pertinent family history in first degree relatives        SOCIAL HISTORY:  Social History:      ALLERGIES:  No Known Allergies      MEDICATIONS:  aMIOdarone Infusion 0.5 mG/Min (16.7 mL/Hr) IV Continuous <Continuous>  aspirin enteric coated 81 milliGRAM(s) Oral daily  ceFAZolin   IVPB 1000 milliGRAM(s) IV Intermittent every 8 hours  cefepime   IVPB 1000 milliGRAM(s) IV Intermittent daily  chlorhexidine 0.12% Liquid 5 milliLiter(s) Oral Mucosa two times a day  chlorhexidine 0.12% Liquid 15 milliLiter(s) Swish and Spit once  CRRT Treatment    <Continuous>  dexMEDEtomidine Infusion 0.2 MICROgram(s)/kG/Hr (5.12 mL/Hr) IV Continuous <Continuous>  dextrose 5% 500 milliLiter(s) (17.3 mL/Hr) IV Continuous <Continuous>  dextrose 5%. 1000 milliLiter(s) (50 mL/Hr) IV Continuous <Continuous>  dextrose 50% Injectable 25 Gram(s) IV Push once  dextrose 50% Injectable 50 milliLiter(s) IV Push every 15 minutes  dextrose 50% Injectable 25 milliLiter(s) IV Push every 15 minutes  EPINEPHrine    Infusion 0.04 MICROgram(s)/kG/Min (7.68 mL/Hr) IV Continuous <Continuous>  glucagon  Injectable 1 milliGRAM(s) IntraMuscular once  heparin  Infusion 500 Unit(s)/Hr (5 mL/Hr) IV Continuous <Continuous>  insulin regular Infusion 10 Unit(s)/Hr (10 mL/Hr) IV Continuous <Continuous>  meperidine     Injectable 25 milliGRAM(s) IV Push once  milrinone Infusion 0.375 MICROgram(s)/kG/Min (11.5 mL/Hr) IV Continuous <Continuous>  mupirocin 2% Ointment 1 Application(s) Both Nostrils every 12 hours  niCARdipine Infusion 5 mG/Hr (25 mL/Hr) IV Continuous <Continuous>  nitroglycerin  Infusion 30 MICROgram(s)/Min (9 mL/Hr) IV Continuous <Continuous>  norepinephrine Infusion 0.05 MICROgram(s)/kG/Min (9.6 mL/Hr) IV Continuous <Continuous>  pantoprazole    Tablet 40 milliGRAM(s) Oral once  pantoprazole  Injectable 40 milliGRAM(s) IV Push daily  propofol Infusion 10 MICROgram(s)/kG/Min (6.14 mL/Hr) IV Continuous <Continuous>  PureFlow Dialysate RFP-400 (K 2 / Ca 3) 5000 milliLiter(s) (2000 mL/Hr) CRRT <Continuous>  sodium chloride 0.9%. 1000 milliLiter(s) (20 mL/Hr) IV Continuous <Continuous>  vancomycin  IVPB 1000 milliGRAM(s) IV Intermittent every 24 hours  vasopressin Infusion 0.04 Unit(s)/Min (6 mL/Hr) IV Continuous <Continuous>    PRN:  acetaminophen   IVPB .. 1000 milliGRAM(s) IV Intermittent once PRN  dextrose Oral Gel 15 Gram(s) Oral once PRN  HYDROmorphone  Injectable 0.5 milliGRAM(s) IV Push every 6 hours PRN  oxyCODONE    IR 5 milliGRAM(s) Oral every 4 hours PRN  oxyCODONE    IR 10 milliGRAM(s) Oral every 4 hours PRN      HOME MEDICATIONS:  Home Medications:  Lantus 100 units/mL subcutaneous solution: 30 unit(s) subcutaneous once a day (at bedtime) (18 Oct 2022 14:24)  Lasix: orally once a day (18 Oct 2022 14:24)  Lipitor 40 mg oral tablet: 1 tab(s) orally once a day (at bedtime) (18 Oct 2022 14:24)  metoprolol succinate 50 mg oral tablet, extended release: 1 tab(s) orally once a day (18 Oct 2022 14:24)      VITALS:   T(F): 97.9 (10-19 @ 16:25), Max: 97.9 (10-19 @ 16:25)  HR: 96 (10-19 @ 18:30) (78 - 111)  BP: 118/62 (10-19 @ 17:15) (87/55 - 121/73)  BP(mean): 85 (10-19 @ 17:15) (65 - 93)  RR: 21 (10-19 @ 18:30) (0 - 39)  SpO2: 95% (10-19 @ 18:30) (80% - 100%)    I&O's Summary    18 Oct 2022 07:01  -  19 Oct 2022 07:00  --------------------------------------------------------  IN: 250 mL / OUT: 2000 mL / NET: -1750 mL    19 Oct 2022 07:01  -  19 Oct 2022 19:15  --------------------------------------------------------  IN: 71.3 mL / OUT: 308 mL / NET: -236.7 mL        REVIEW OF SYSTEMS:  unable to obtain    PHYSICAL EXAM:  General: intubated sedated   HEENT: EOMI  Cardio: regular, S1, S2   Pulm: B/L BS.    Abdomen: Soft, non-tender, non-distended. Normoactive bowel sounds  Extremities: No edema b/l le. LLE balloon pump present  Neuro: intubated not following commands    LABS:                        8.3    20.38 )-----------( 172      ( 19 Oct 2022 17:08 )             28.5     10    137  |  98  |  48<H>  ----------------------------<  193<H>  4.7   |  24  |  6.0<HH>    Ca    9.3      19 Oct 2022 17:08  Phos  4.8     10  Mg     2.5     10-19    TPro  4.8<L>  /  Alb  3.5  /  TBili  1.2  /  DBili  x   /  AST  50<H>  /  ALT  24  /  AlkPhos  74  10    PT/INR - ( 19 Oct 2022 17:08 )   PT: 16.70 sec;   INR: 1.45 ratio         PTT - ( 19 Oct 2022 17:08 )  PTT:144.2 sec          Troponin trend:      09-15 Chol 103 LDL -- HDL 39<L> Trig 153<H>  COVID-19 PCR: NotDetec (15 Sep 2022 13:36)      RADIOLOGY:  -CXR: < from: Xray Chest 1 View-PORTABLE IMMEDIATE (Xray Chest 1 View-PORTABLE IMMEDIATE .) (10.18.22 @ 15:33) >  Impression:    Increasing bilateral opacities      < end of copied text >    -TTE: < from: TTE Echo Complete w/o Contrast w/ Doppler (10.19.22 @ 08:52) >  Summary:   1. Left ventricular ejection fraction, by visual estimation, is 35 to   40%.   2. Mildly to moderately decreased segmental left ventricular systolic   function.   3. Moderately decreased global left ventricular systolic function.   4. Entire inferior wall is abnormal as described above.   5. Spectral Doppler shows restrictive pattern of left ventricular   myocardial filling (Grade III diastolic dysfunction).   6. Mild to moderately enlarged left atrium.   7. Mildly enlarged right atrium.   8. Mild thickening of the anterior and posterior mitral valve leaflets.   9. Moderate mitral annular calcification.  10. Moderate mitral valve regurgitation.  11. Moderate-severe tricuspid regurgitation.  12. PSAP 55.  13. Sclerotic aortic valve with decreased opening.  14. Mild pulmonic valve regurgitation.    < end of copied text >    -OTHER:  EC Lead ECG:   Ventricular Rate 106 BPM    Atrial Rate 106 BPM    P-R Interval 168 ms    QRS Duration 190 ms    Q-T Interval 428 ms    QTC Calculation(Bazett) 568 ms    P Axis 232 degrees    R Axis 167 degrees    T Axis 19 degrees    Diagnosis Line Unusual P axis, possible ectopic atrial tachycardia with Premature  supraventricular complexes  Right bundle branch block  Left posterior fascicular block  *** Bifascicular block ***  Abnormal ECG    Confirmed by CHRISTO LEBRON, KADY ([id number]) on 10/19/2022 6:23:25 PM (10-19 @ 17:06)

## 2022-10-20 NOTE — PROGRESS NOTE ADULT - ASSESSMENT
ESRD [place] HD MWF @ [facility] [place] via left arm AVF  non-oliguric  s/p TAVR complicated by vfib cardiac arrest  cardiogenic shock on multiple pressors, inotropes, impella device removed and now IABP  ARF on vent  CAD / CABG / CMP / CHF / VHD (severe AS and MR)  HTN  DM2  anemia   GREG    plan:    resume CVVHD  consider heparin GTT if no bleeding to maintain CVVHD circuit patency  pt non-oliguric, can start bumex gtt 1mg/hr  if CVVHD still poorly functioning and hemodynamics allow, can try intermittent hemodialysis via AVF tomorrow AM  d/w CTS   CTICU care   d/w team  poor prognosis

## 2022-10-20 NOTE — PROGRESS NOTE ADULT - ASSESSMENT
IMPRESSION  VF Arrest  Cardiogenic shock s/p Impella  s/p Impella removal and balloon pump placement 10/20  severe AS s/p TAVR  CAD/CABG ( [facility] on 3/01/22 with subtotal LM and 100% occlusion of RCA, with patent LIMA-LAD, patent SVG-diag/OM, and patent SVG-RPDA)  ESRD on HD, currently on CVVH  HFrEF s/p ICD    RECOMMENDATIONS    CNS:   -c/w precedex for sedation. Consider to switch propofol to fentanyl for sedation to avoid hypotension.    HEENT:   -Oral care    PULMONARY:    -HOB @ 45 degrees. Aspiration precautions.   -c/w same vent settings, monitor ABGs.  -SBT/SAT in am    CARDIOVASCULAR:   -c/w milrinone and vaso. Wean off Levo as tolerated  -c/w balloon pump support 1:1 for now  -c/w heparin IV 5ml/hr  -check peripheral pulses q1 hour with doppler  -monitor swan and Jhonny numbers q 1 hour  -wean off balloon pump in am, monitor CI and pressor requirements.  -c/w CVVH, aim for even balance  -check BMP q 12     GI:   -GI prophylaxis. OG feeding as tolerated.    RENAL:    -Follow up lytes.  Correct as needed  -c/w CVVH with aim for even balance    INFECTIOUS DISEASE:   -Follow up cultures: blood and urine.   -c/w Vanc, Cefepime  -CHG 4% daily bath    HEMATOLOGICAL:    -DVT prophylaxis.    ENDOCRINE:    -Follow up FS.  -insulin drip prn    MUSCULOSKELETAL:   -bedrest    CTU monitoring

## 2022-10-20 NOTE — PROCEDURE NOTE - NSTIMEOUT_GEN_A_CORE
----- Message from LIBBY Riley sent at 8/17/2019  7:42 AM CDT -----  Please call, labs are normal- but B12 and iron lower end of normal: recommend OTC b12 and 1 iron pill daily     Patient's first and last name, , procedure, and correct site confirmed prior to the start of procedure.

## 2022-10-20 NOTE — PROCEDURE NOTE - ADDITIONAL PROCEDURE DETAILS
pre-existing 16cm length Rt femoral hemodialysis (CVVHD) malfunction: high pressure, resistance.  exchanged over guidewire under sterile conditions   replaced by 20cm length catheter

## 2022-10-20 NOTE — PROGRESS NOTE ADULT - SUBJECTIVE AND OBJECTIVE BOX
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    PAST MEDICAL & SURGICAL HISTORY:  HTN (Hypertension)    Diabetes Mellitus Type II    Hypercholesterolemia    CAD (Coronary Artery Disease)    History of PTCA  with stents 10 years ago ([facility])    Diabetes mellitus    CAD (coronary artery disease)    H/O hyperlipidemia    HTN - Hypertension    Renal insufficiency    Sleep apnea  does not use machine    GERD (gastroesophageal reflux disease)    BPH (Benign Prostatic Hyperplasia)    CHF (congestive heart failure)    Mitral valve regurgitation    S/P knee surgery  b/l arthroscopic    S/P tonsillectomy    S/P primary angioplasty with coronary stent  6-7 stents last one in 10/12    S/P appendectomy      Allergies:  No Known Allergies    Home Medications Reviewed    SOCIAL HISTORY:  Denies ETOH,Smoking,   FAMILY HISTORY:  No pertinent family history in first degree relatives          REVIEW OF SYSTEMS:  unobtainable    PHYSICAL EXAM:  Constitutional: sedated  HEENT: ett  Neck: No JVD  Respiratory: decreased BS b/l  Cardiovascular: S1, S2, RRR + murmur  Gastrointestinal: BS+, soft, NT/ND  Extremities: + peripheral edema  Neurological: sedated  : No CVA tenderness. + woods.   Skin: No rashes  Vascular Access: left arm AVF + thrill + right groin [facility] Medications:   MEDICATIONS  (STANDING):  albumin human  5% IVPB 250 milliLiter(s) IV Intermittent once  albumin human  5% IVPB 250 milliLiter(s) IV Intermittent once  albumin human  5% IVPB 250 milliLiter(s) IV Intermittent once  albumin human  5% IVPB 250 milliLiter(s) IV Intermittent once  aspirin enteric coated 81 milliGRAM(s) Oral daily  ceFAZolin   IVPB 1000 milliGRAM(s) IV Intermittent every 8 hours  cefepime   IVPB 1000 milliGRAM(s) IV Intermittent daily  chlorhexidine 0.12% Liquid 5 milliLiter(s) Oral Mucosa two times a day  chlorhexidine 0.12% Liquid 15 milliLiter(s) Swish and Spit once  clopidogrel Tablet 75 milliGRAM(s) Oral daily  CRRT Treatment    <Continuous>  dexMEDEtomidine Infusion 0.2 MICROgram(s)/kG/Hr (5.12 mL/Hr) IV Continuous <Continuous>  dextrose 5% 500 milliLiter(s) (17.3 mL/Hr) IV Continuous <Continuous>  dextrose 5%. 1000 milliLiter(s) (50 mL/Hr) IV Continuous <Continuous>  dextrose 50% Injectable 25 Gram(s) IV Push once  dextrose 50% Injectable 50 milliLiter(s) IV Push every 15 minutes  dextrose 50% Injectable 25 milliLiter(s) IV Push every 15 minutes  EPINEPHrine    Infusion 0.04 MICROgram(s)/kG/Min (7.68 mL/Hr) IV Continuous <Continuous>  furosemide   Injectable 80 milliGRAM(s) IV Push once  glucagon  Injectable 1 milliGRAM(s) IntraMuscular once  heparin  Infusion 500 Unit(s)/Hr (3 mL/Hr) IV Continuous <Continuous>  insulin regular Infusion 10 Unit(s)/Hr (10 mL/Hr) IV Continuous <Continuous>  meperidine     Injectable 25 milliGRAM(s) IV Push once  milrinone Infusion 0.375 MICROgram(s)/kG/Min (11.5 mL/Hr) IV Continuous <Continuous>  mupirocin 2% Ointment 1 Application(s) Both Nostrils every 12 hours  niCARdipine Infusion 5 mG/Hr (25 mL/Hr) IV Continuous <Continuous>  nitroglycerin  Infusion 30 MICROgram(s)/Min (9 mL/Hr) IV Continuous <Continuous>  norepinephrine Infusion 0.05 MICROgram(s)/kG/Min (9.6 mL/Hr) IV Continuous <Continuous>  pantoprazole    Tablet 40 milliGRAM(s) Oral once  pantoprazole  Injectable 40 milliGRAM(s) IV Push daily  polyethylene glycol 3350 17 Gram(s) Oral daily  propofol Infusion 10 MICROgram(s)/kG/Min (6.14 mL/Hr) IV Continuous <Continuous>  PureFlow Dialysate RFP-400 (K 2 / Ca 3) 5000 milliLiter(s) (2000 mL/Hr) CRRT <Continuous>  senna 2 Tablet(s) Oral at bedtime  sodium chloride 0.9%. 1000 milliLiter(s) (20 mL/Hr) IV Continuous <Continuous>  vancomycin  IVPB 1000 milliGRAM(s) IV Intermittent once  vasopressin Infusion 0.04 Unit(s)/Min (6 mL/Hr) IV Continuous <Continuous>  vasopressin Infusion 0.04 Unit(s)/Min (6 mL/Hr) IV Continuous <Continuous>        VITALS:  T(F): 100.2 (10-20-22 @ 08:00), Max: 100.2 (10-20-22 @ 04:00)  HR: 93 (10-20-22 @ 10:45)  BP: 118/62 (10-19-22 @ 17:15)  RR: 20 (10-20-22 @ 10:45)  SpO2: 96% (10-20-22 @ 10:45)  Wt(kg): --    10-18 @ 07:01  -  10-19 @ 07:00  --------------------------------------------------------  IN: 250 mL / OUT: 2000 mL / NET: -1750 mL    10-19 @ 07:01  -  10-20 @ 07:00  --------------------------------------------------------  IN: 2771.9 mL / OUT: 945 mL / NET: 1826.9 mL    10-20 @ 07:01  -  10-20 @ 14:23  --------------------------------------------------------  IN: 482.1 mL / OUT: 65 mL / NET: 417.1 mL      Height (cm): 175.3 (10-19 @ 16:25)  Weight (kg): 102.4 (10-19 @ 16:25)  BMI (kg/m2): 33.3 (10-19 @ 16:25)  BSA (m2): 2.18 (10-19 @ 16:25)    LABS:  10-20    140  |  104  |  52<H>  ----------------------------<  99  5.2<H>   |  26  |  5.9<HH>    Ca    8.0<L>      20 Oct 2022 04:25  Phos  4.8     10-18  Mg     2.3     10-20    TPro      /  Alb      /  TBili  0.9  /  DBili  0.4<H>  /  AST      /  ALT      /  AlkPhos      10-20                          9.8    15.04 )-----------( 117      ( 20 Oct 2022 04:25 )             30.2       Urine Studies:        RADIOLOGY & ADDITIONAL STUDIES:

## 2022-10-20 NOTE — PROGRESS NOTE ADULT - SUBJECTIVE AND OBJECTIVE BOX
OPERATIVE PROCEDURE(s):                POD #                       82yMale  SURGEON(s): ROSEANN Adan  SUBJECTIVE ASSESSMENT:   Vital Signs Last 24 Hrs  T(F): 100.2 (20 Oct 2022 04:00), Max: 100.2 (20 Oct 2022 04:00)  HR: 71 (20 Oct 2022 07:00) (71 - 111)  BP: 118/62 (19 Oct 2022 17:15) (105/67 - 118/62)  BP(mean): 85 (19 Oct 2022 17:15) (82 - 85)  ABP: 102/48 (20 Oct 2022 07:00) (79/40 - 204/67)  ABP(mean): 65 (20 Oct 2022 07:00)  RR: 18 (20 Oct 2022 07:00) (0 - 37)  SpO2: 98% (20 Oct 2022 07:00) (90% - 100%)  CVP(mm Hg): 12 (20 Oct 2022 07:00)  CO: 4.7 (20 Oct 2022 07:00)  CI: 2.1 (20 Oct 2022 07:00)  PA: 33/18 (20 Oct 2022 07:00)  SVR: 901 (20 Oct 2022 07:00)  Mode: AC/ CMV (Assist Control/ Continuous Mandatory Ventilation)  RR (machine): 18  TV (machine): 480  FiO2: 70  PEEP: 5  MAP: 10    I&O's Detail    19 Oct 2022 07:01  -  20 Oct 2022 07:00  --------------------------------------------------------  IN:    Amiodarone: 16.7 mL    Dexmedetomidine: 367.2 mL    dextrose 5% w/ Additives: 54.6 mL    Heparin: 10 mL    Insulin: 64 mL    IV PiggyBack: 350 mL    Milrinone: 67.6 mL    Norepinephrine: 118 mL    PRBCs (Packed Red Blood Cells): 1147 mL    Propofol: 178.6 mL    sodium chloride 0.9%: 320 mL    Vasopressin: 57 mL  Total IN: 2750.7 mL    OUT:    EPINEPHrine: 0 mL    Indwelling Catheter - Urethral (mL): 845 mL    Voided (mL): 100 mL  Total OUT: 945 mL        Net:   I&O's Detail    18 Oct 2022 07:01  -  19 Oct 2022 07:00  --------------------------------------------------------  Total NET: -1750 mL      19 Oct 2022 07:01  -  20 Oct 2022 07:00  --------------------------------------------------------  Total NET: 1805.7 mL        CAPILLARY BLOOD GLUCOSE      POCT Blood Glucose.: 146 mg/dL (20 Oct 2022 08:32)  POCT Blood Glucose.: 162 mg/dL (20 Oct 2022 06:55)  POCT Blood Glucose.: 179 mg/dL (20 Oct 2022 06:05)  POCT Blood Glucose.: 126 mg/dL (20 Oct 2022 05:11)  POCT Blood Glucose.: 105 mg/dL (20 Oct 2022 04:13)  POCT Blood Glucose.: 83 mg/dL (20 Oct 2022 03:11)  POCT Blood Glucose.: 103 mg/dL (20 Oct 2022 02:12)  POCT Blood Glucose.: 169 mg/dL (20 Oct 2022 00:10)  POCT Blood Glucose.: 185 mg/dL (19 Oct 2022 22:51)  POCT Blood Glucose.: 191 mg/dL (19 Oct 2022 22:01)  POCT Blood Glucose.: 228 mg/dL (19 Oct 2022 21:12)  POCT Blood Glucose.: 223 mg/dL (19 Oct 2022 18:32)  POCT Blood Glucose.: 191 mg/dL (19 Oct 2022 16:35)    Physical Exam:  General: NAD; A&Ox3/Patient is intubated and sedated  Cardiac: S1/S2, RRR, no murmur, no rubs  Lungs: unlabored respirations, CTA b/l, no wheeze, no rales, no crackles  Abdomen: Soft/NT/ND; positive bowel sounds x 4  Sternum: Intact, no click, incision healing well with no drainage  Incisions: Incisions clean/dry/intact  Extremities: No edema b/l lower extremities; good capillary refill; no cyanosis; palpable 1+ pedal pulses b/l    Central Venous Catheter: Yes[]  No[] , If Yes indication:           Day #  Gomez Catheter: Yes  [] , No  [] , If yes indication:                      Day #  NGT: Yes [] No [] ,    If Yes Placement:                                     Day #  EPICARDIAL WIRES:  [] YES [] NO                                              Day #  BOWEL MOVEMENT:  [] YES [] NO, If No, Timing since last BM:      Day #  CHEST TUBE(Left/Right):  [] YES [] NO, If yes -  AIR LEAKS:  [] YES [] NO        LABS:                        9.8<L>  15.04<H> )-----------( 117<L>    ( 20 Oct 2022 04:25 )             30.2<L>                        7.3<L>  10.35 )-----------( 109<L>    ( 20 Oct 2022 00:20 )             23.8<L>    10-20    140  |  104  |  52<H>  ----------------------------<  99  5.2<H>   |  26  |  5.9<HH>  10-20    142  |  105  |  53<H>  ----------------------------<  164<H>  4.6   |  26  |  5.9<HH>    Ca    8.0<L>      20 Oct 2022 04:25  Phos  4.8     10-18  Mg     2.3     10-20    TPro  x   /  Alb  x   /  TBili  0.9 [0.2 - 1.2]  /  DBili  0.4<H> [0.0 - 0.3]  /  AST  x   /  ALT  x   /  AlkPhos  x   10-20    PT/INR - ( 20 Oct 2022 00:20 )   PT: ;   INR: 1.61 ratio         PT/INR - ( 19 Oct 2022 17:08 )   PT: ;   INR: 1.45 ratio         PTT - ( 20 Oct 2022 06:40 )  PTT:34.9 sec, PTT - ( 20 Oct 2022 00:20 )  PTT:42.3 sec    ABG - ( 20 Oct 2022 04:22 )  pH: 7.43  /  pCO2: 38    /  pO2: 100   / HCO3: 25    / Base Excess: 0.9   /  SaO2: 99.0  /  LA: 1.20             RADIOLOGY & ADDITIONAL TESTS:  CXR:  EKG:  MEDICATIONS  (STANDING):  aspirin enteric coated 81 milliGRAM(s) Oral daily  ceFAZolin   IVPB 1000 milliGRAM(s) IV Intermittent every 8 hours  cefepime   IVPB 1000 milliGRAM(s) IV Intermittent daily  chlorhexidine 0.12% Liquid 5 milliLiter(s) Oral Mucosa two times a day  chlorhexidine 0.12% Liquid 15 milliLiter(s) Swish and Spit once  clopidogrel Tablet 75 milliGRAM(s) Oral daily  CRRT Treatment    <Continuous>  dexMEDEtomidine Infusion 0.2 MICROgram(s)/kG/Hr (5.12 mL/Hr) IV Continuous <Continuous>  dextrose 5% 500 milliLiter(s) (17.3 mL/Hr) IV Continuous <Continuous>  dextrose 5%. 1000 milliLiter(s) (50 mL/Hr) IV Continuous <Continuous>  dextrose 50% Injectable 25 Gram(s) IV Push once  dextrose 50% Injectable 50 milliLiter(s) IV Push every 15 minutes  dextrose 50% Injectable 25 milliLiter(s) IV Push every 15 minutes  EPINEPHrine    Infusion 0.04 MICROgram(s)/kG/Min (7.68 mL/Hr) IV Continuous <Continuous>  glucagon  Injectable 1 milliGRAM(s) IntraMuscular once  heparin  Infusion 500 Unit(s)/Hr (3 mL/Hr) IV Continuous <Continuous>  insulin regular Infusion 10 Unit(s)/Hr (10 mL/Hr) IV Continuous <Continuous>  meperidine     Injectable 25 milliGRAM(s) IV Push once  milrinone Infusion 0.375 MICROgram(s)/kG/Min (11.5 mL/Hr) IV Continuous <Continuous>  mupirocin 2% Ointment 1 Application(s) Both Nostrils every 12 hours  norepinephrine Infusion 0.05 MICROgram(s)/kG/Min (9.6 mL/Hr) IV Continuous <Continuous>  pantoprazole  Injectable 40 milliGRAM(s) IV Push daily  polyethylene glycol 3350 17 Gram(s) Oral daily  propofol Infusion 10 MICROgram(s)/kG/Min (6.14 mL/Hr) IV Continuous <Continuous>  PureFlow Dialysate RFP-400 (K 2 / Ca 3) 5000 milliLiter(s) (2000 mL/Hr) CRRT <Continuous>  senna 2 Tablet(s) Oral at bedtime  sodium chloride 0.9%. 1000 milliLiter(s) (20 mL/Hr) IV Continuous <Continuous>  vancomycin  IVPB 1000 milliGRAM(s) IV Intermittent every 24 hours  vasopressin Infusion 0.04 Unit(s)/Min (6 mL/Hr) IV Continuous <Continuous>    MEDICATIONS  (PRN):  acetaminophen   IVPB .. 1000 milliGRAM(s) IV Intermittent once PRN Severe Pain (7 - 10)  dextrose Oral Gel 15 Gram(s) Oral once PRN Blood Glucose LESS THAN 70 milliGRAM(s)/deciliter  HYDROmorphone  Injectable 0.5 milliGRAM(s) IV Push every 6 hours PRN Breakthrough Pain  oxyCODONE    IR 5 milliGRAM(s) Oral every 4 hours PRN Moderate Pain (4 - 6)  oxyCODONE    IR 10 milliGRAM(s) Oral every 4 hours PRN Severe Pain (7 - 10)    HEPARIN:  [] YES [] NO  Dose: XX UNITS/HR UNITS Q8H  LOVENOX:[] YES [] NO  Dose: XX mg Q24H  COUMADIN: []  YES [] NO  Dose: XX mg  Q24H  SCD's: YES b/l  GI Prophylaxis: Protonix [], Pepcid [], None [], (Contra-indication:.....)    Post-Op Beta-Blockers: Yes [], No[], If No, then contraindication:  Post-Op Aspirin: Yes [],  No [], If No, then contraindication:  Post-Op Statin: Yes [], No[], If No, then contraindication:  Allergies    No Known Allergies    Intolerances      Ambulation/Activity Status:    Assessment/Plan:  82y Male status-post .....  - Case and plan discussed with CTU Intensivist and CT Surgeon - Dr. Mosquera/Keshawn/Loco  - Continue CTU supportive care    - Continue DVT/GI prophylaxis  - Incentive Spirometry 10 times an hour  - Continue to advance physical activity as tolerated and continue PT/OT as directed  1. CAD: Continue ASA, statin, BB  2. HTN:   3. A. Fib:   4. COPD/Hypoxia:   5. DM/Glucose Control:     Social Service Disposition:     OPERATIVE PROCEDURE(s):    TAVR           POD #  1                     82yMale  SURGEON(s): ROSEANN Adan  SUBJECTIVE ASSESSMENT: pt seen and examined.   Vital Signs Last 24 Hrs  T(F): 100.2 (20 Oct 2022 04:00), Max: 100.2 (20 Oct 2022 04:00)  HR: 71 (20 Oct 2022 07:00) (71 - 111)  BP: 118/62 (19 Oct 2022 17:15) (105/67 - 118/62)  BP(mean): 85 (19 Oct 2022 17:15) (82 - 85)  ABP: 102/48 (20 Oct 2022 07:00) (79/40 - 204/67)  ABP(mean): 65 (20 Oct 2022 07:00)  RR: 18 (20 Oct 2022 07:00) (0 - 37)  SpO2: 98% (20 Oct 2022 07:00) (90% - 100%)  CVP(mm Hg): 12 (20 Oct 2022 07:00)  CO: 4.7 (20 Oct 2022 07:00)  CI: 2.1 (20 Oct 2022 07:00)  PA: 33/18 (20 Oct 2022 07:00)  SVR: 901 (20 Oct 2022 07:00)  Mode: AC/ CMV (Assist Control/ Continuous Mandatory Ventilation)  RR (machine): 18  TV (machine): 480  FiO2: 70  PEEP: 5  MAP: 10    I&O's Detail    19 Oct 2022 07:01  -  20 Oct 2022 07:00  --------------------------------------------------------  IN:    Amiodarone: 16.7 mL    Dexmedetomidine: 367.2 mL    dextrose 5% w/ Additives: 54.6 mL    Heparin: 10 mL    Insulin: 64 mL    IV PiggyBack: 350 mL    Milrinone: 67.6 mL    Norepinephrine: 118 mL    PRBCs (Packed Red Blood Cells): 1147 mL    Propofol: 178.6 mL    sodium chloride 0.9%: 320 mL    Vasopressin: 57 mL  Total IN: 2750.7 mL    OUT:    EPINEPHrine: 0 mL    Indwelling Catheter - Urethral (mL): 845 mL    Voided (mL): 100 mL  Total OUT: 945 mL        Net:   I&O's Detail    18 Oct 2022 07:01  -  19 Oct 2022 07:00  --------------------------------------------------------  Total NET: -1750 mL      19 Oct 2022 07:01  -  20 Oct 2022 07:00  --------------------------------------------------------  Total NET: 1805.7 mL        CAPILLARY BLOOD GLUCOSE      POCT Blood Glucose.: 146 mg/dL (20 Oct 2022 08:32)  POCT Blood Glucose.: 162 mg/dL (20 Oct 2022 06:55)  POCT Blood Glucose.: 179 mg/dL (20 Oct 2022 06:05)  POCT Blood Glucose.: 126 mg/dL (20 Oct 2022 05:11)  POCT Blood Glucose.: 105 mg/dL (20 Oct 2022 04:13)  POCT Blood Glucose.: 83 mg/dL (20 Oct 2022 03:11)  POCT Blood Glucose.: 103 mg/dL (20 Oct 2022 02:12)  POCT Blood Glucose.: 169 mg/dL (20 Oct 2022 00:10)  POCT Blood Glucose.: 185 mg/dL (19 Oct 2022 22:51)  POCT Blood Glucose.: 191 mg/dL (19 Oct 2022 22:01)  POCT Blood Glucose.: 228 mg/dL (19 Oct 2022 21:12)  POCT Blood Glucose.: 223 mg/dL (19 Oct 2022 18:32)  POCT Blood Glucose.: 191 mg/dL (19 Oct 2022 16:35)    Physical Exam:  General: Patient is intubated and sedated  Cardiac: S1/S2, RRR, no murmur, no rubs  Lungs: unlabored respirations, CTA b/l, no wheeze, no rales, no crackles  Abdomen: Soft/NT/ND; positive bowel sounds x 4  Incisions: Incisions clean/dry/intact  Extremities: No edema b/l lower extremities; good capillary refill; no cyanosis; palpable 1+ pedal pulses b/l    Central Venous Catheter: Yes[x] If Yes indication:  critical illness         Day #1  Gomez Catheter: Yes  [x] ,If yes indication:  monitor strict I/Os                    Day #1  BOWEL MOVEMENT:   [x] NO, If No, Timing since last BM:      Day #1      LABS:                        9.8<L>  15.04<H> )-----------( 117<L>    ( 20 Oct 2022 04:25 )             30.2<L>                        7.3<L>  10.35 )-----------( 109<L>    ( 20 Oct 2022 00:20 )             23.8<L>    10-20    140  |  104  |  52<H>  ----------------------------<  99  5.2<H>   |  26  |  5.9<HH>  10-20    142  |  105  |  53<H>  ----------------------------<  164<H>  4.6   |  26  |  5.9<HH>    Ca    8.0<L>      20 Oct 2022 04:25  Phos  4.8     10-18  Mg     2.3     10-20    TPro  x   /  Alb  x   /  TBili  0.9 [0.2 - 1.2]  /  DBili  0.4<H> [0.0 - 0.3]  /  AST  x   /  ALT  x   /  AlkPhos  x   10-20    PT/INR - ( 20 Oct 2022 00:20 )   PT: ;   INR: 1.61 ratio         PT/INR - ( 19 Oct 2022 17:08 )   PT: ;   INR: 1.45 ratio         PTT - ( 20 Oct 2022 06:40 )  PTT:34.9 sec, PTT - ( 20 Oct 2022 00:20 )  PTT:42.3 sec    ABG - ( 20 Oct 2022 04:22 )  pH: 7.43  /  pCO2: 38    /  pO2: 100   / HCO3: 25    / Base Excess: 0.9   /  SaO2: 99.0  /  LA: 1.20         RADIOLOGY & ADDITIONAL TESTS:  CXR:< from: Xray Chest 1 View AP/PA (10.19.22 @ 22:52) >  Findings:    Support devices: ETT with its tip above the bobby and NGT with its tip   below the diaphragm. Left-sided permanent pacemaker.    Cardiac/mediastinum/hilum: Stable. Status post a median sternotomy and   cardiomegaly.    Lung parenchyma/Pleura: Bilateral opacities, unchanged. No pneumothorax   is seen.    Skeleton/soft tissues: Stable.    Impression:    Low lung volume.    Status post a median sternotomy and cardiac megaly, unchanged.    Bilateral opacities, unchanged.    Support tubes as above.    Left-sided permanent pacemaker.    < end of copied text >      EKG:< from: 12 Lead ECG (10.20.22 @ 07:24) >  Ventricular Rate 73 BPM    Atrial Rate 73 BPM    P-R Interval 208 ms    QRS Duration 148 ms    Q-T Interval 486 ms    QTC Calculation(Bazett) 535 ms    P Axis 42 degrees    R Axis 209 degrees    T Axis 108 degrees    Diagnosis Line Sinus rhythm with Premature atrial complexes with Aberrant conduction  Right bundle branch block  Abnormal ECG    < end of copied text >    MEDICATIONS  (STANDING):  aspirin enteric coated 81 milliGRAM(s) Oral daily  ceFAZolin   IVPB 1000 milliGRAM(s) IV Intermittent every 8 hours  cefepime   IVPB 1000 milliGRAM(s) IV Intermittent daily  chlorhexidine 0.12% Liquid 5 milliLiter(s) Oral Mucosa two times a day  chlorhexidine 0.12% Liquid 15 milliLiter(s) Swish and Spit once  clopidogrel Tablet 75 milliGRAM(s) Oral daily  CRRT Treatment    <Continuous>  dexMEDEtomidine Infusion 0.2 MICROgram(s)/kG/Hr (5.12 mL/Hr) IV Continuous <Continuous>  dextrose 5% 500 milliLiter(s) (17.3 mL/Hr) IV Continuous <Continuous>  dextrose 5%. 1000 milliLiter(s) (50 mL/Hr) IV Continuous <Continuous>  dextrose 50% Injectable 25 Gram(s) IV Push once  dextrose 50% Injectable 50 milliLiter(s) IV Push every 15 minutes  dextrose 50% Injectable 25 milliLiter(s) IV Push every 15 minutes  EPINEPHrine    Infusion 0.04 MICROgram(s)/kG/Min (7.68 mL/Hr) IV Continuous <Continuous>  glucagon  Injectable 1 milliGRAM(s) IntraMuscular once  insulin regular Infusion 10 Unit(s)/Hr (10 mL/Hr) IV Continuous <Continuous>  meperidine     Injectable 25 milliGRAM(s) IV Push once  milrinone Infusion 0.375 MICROgram(s)/kG/Min (11.5 mL/Hr) IV Continuous <Continuous>  mupirocin 2% Ointment 1 Application(s) Both Nostrils every 12 hours  norepinephrine Infusion 0.05 MICROgram(s)/kG/Min (9.6 mL/Hr) IV Continuous <Continuous>  pantoprazole  Injectable 40 milliGRAM(s) IV Push daily  polyethylene glycol 3350 17 Gram(s) Oral daily  propofol Infusion 10 MICROgram(s)/kG/Min (6.14 mL/Hr) IV Continuous <Continuous>  PureFlow Dialysate RFP-400 (K 2 / Ca 3) 5000 milliLiter(s) (2000 mL/Hr) CRRT <Continuous>  senna 2 Tablet(s) Oral at bedtime  sodium chloride 0.9%. 1000 milliLiter(s) (20 mL/Hr) IV Continuous <Continuous>  vancomycin  IVPB 1000 milliGRAM(s) IV Intermittent every 24 hours  vasopressin Infusion 0.04 Unit(s)/Min (6 mL/Hr) IV Continuous <Continuous>    MEDICATIONS  (PRN):  acetaminophen   IVPB .. 1000 milliGRAM(s) IV Intermittent once PRN Severe Pain (7 - 10)  dextrose Oral Gel 15 Gram(s) Oral once PRN Blood Glucose LESS THAN 70 milliGRAM(s)/deciliter  HYDROmorphone  Injectable 0.5 milliGRAM(s) IV Push every 6 hours PRN Breakthrough Pain  oxyCODONE    IR 5 milliGRAM(s) Oral every 4 hours PRN Moderate Pain (4 - 6)  oxyCODONE    IR 10 milliGRAM(s) Oral every 4 hours PRN Severe Pain (7 - 10)    SCD's: YES b/l    GI Prophylaxis: Protonix [x],    Allergies    No Known Allergies    Intolerances      Assessment/Plan:  82y Male status-post TAVR POD#1  - Case and plan discussed with CTU Intensivist and CT Surgeon - Dr. Adan  - Continue CTU supportive care    - Continue DVT/GI prophylaxis  - Incentive Spirometry 10 times an hour  - Continue to advance physical activity as tolerated and continue PT/OT as directed  1. CAD: Continue ASA, plavix  2. HTN: monitor bp  3. A. Fib ppx: d/c amio drip, monitor electrolytes, replete PRN  4. COPD/Hypoxia: intubated, O2 95%  5. DM/Glucose Control: cont insulin drip  6. Anemia 2/2 acute blood loss: transfused 4U RBC, stable.   7. d/c impella    OPERATIVE PROCEDURE(s):    TAVR           POD #  1                     82yMale  SURGEON(s): ROSEANN Adan  SUBJECTIVE ASSESSMENT: pt seen and examined. pt still intubated with impella in place   Vital Signs Last 24 Hrs  T(F): 100.2 (20 Oct 2022 04:00), Max: 100.2 (20 Oct 2022 04:00)  HR: 71 (20 Oct 2022 07:00) (71 - 111)  BP: 118/62 (19 Oct 2022 17:15) (105/67 - 118/62)  BP(mean): 85 (19 Oct 2022 17:15) (82 - 85)  ABP: 102/48 (20 Oct 2022 07:00) (79/40 - 204/67)  ABP(mean): 65 (20 Oct 2022 07:00)  RR: 18 (20 Oct 2022 07:00) (0 - 37)  SpO2: 98% (20 Oct 2022 07:00) (90% - 100%)  CVP(mm Hg): 12 (20 Oct 2022 07:00)  CO: 4.7 (20 Oct 2022 07:00)  CI: 2.1 (20 Oct 2022 07:00)  PA: 33/18 (20 Oct 2022 07:00)  SVR: 901 (20 Oct 2022 07:00)  Mode: AC/ CMV (Assist Control/ Continuous Mandatory Ventilation)  RR (machine): 18  TV (machine): 480  FiO2: 70  PEEP: 5  MAP: 10    I&O's Detail    19 Oct 2022 07:01  -  20 Oct 2022 07:00  --------------------------------------------------------  IN:    Amiodarone: 16.7 mL    Dexmedetomidine: 367.2 mL    dextrose 5% w/ Additives: 54.6 mL    Heparin: 10 mL    Insulin: 64 mL    IV PiggyBack: 350 mL    Milrinone: 67.6 mL    Norepinephrine: 118 mL    PRBCs (Packed Red Blood Cells): 1147 mL    Propofol: 178.6 mL    sodium chloride 0.9%: 320 mL    Vasopressin: 57 mL  Total IN: 2750.7 mL    OUT:    EPINEPHrine: 0 mL    Indwelling Catheter - Urethral (mL): 845 mL    Voided (mL): 100 mL  Total OUT: 945 mL        Net:   I&O's Detail    18 Oct 2022 07:01  -  19 Oct 2022 07:00  --------------------------------------------------------  Total NET: -1750 mL      19 Oct 2022 07:01  -  20 Oct 2022 07:00  --------------------------------------------------------  Total NET: 1805.7 mL        CAPILLARY BLOOD GLUCOSE      POCT Blood Glucose.: 146 mg/dL (20 Oct 2022 08:32)  POCT Blood Glucose.: 162 mg/dL (20 Oct 2022 06:55)  POCT Blood Glucose.: 179 mg/dL (20 Oct 2022 06:05)  POCT Blood Glucose.: 126 mg/dL (20 Oct 2022 05:11)  POCT Blood Glucose.: 105 mg/dL (20 Oct 2022 04:13)  POCT Blood Glucose.: 83 mg/dL (20 Oct 2022 03:11)  POCT Blood Glucose.: 103 mg/dL (20 Oct 2022 02:12)  POCT Blood Glucose.: 169 mg/dL (20 Oct 2022 00:10)  POCT Blood Glucose.: 185 mg/dL (19 Oct 2022 22:51)  POCT Blood Glucose.: 191 mg/dL (19 Oct 2022 22:01)  POCT Blood Glucose.: 228 mg/dL (19 Oct 2022 21:12)  POCT Blood Glucose.: 223 mg/dL (19 Oct 2022 18:32)  POCT Blood Glucose.: 191 mg/dL (19 Oct 2022 16:35)    Physical Exam:  General: Patient is intubated and sedated  Cardiac: S1/S2, RRR, no murmur, no rubs  Lungs: decreased bs at bases bilaterally   Abdomen: Soft/NT/ND; positive bowel sounds x 4  Incisions: Incisions clean/dry/intact  Extremities: mild edema b/l lower extremities; good capillary refill; no cyanosis; doppler pedal pulses b/l, impella in right groin, udall in left     Central Venous Catheter: Yes[x] If Yes indication:  critical illness         Day #1  Gomez Catheter: Yes  [x] ,If yes indication:  monitor strict I/Os                    Day #1  BOWEL MOVEMENT:   [x] NO, If No, Timing since last BM:      Day #1      LABS:                        9.8<L>  15.04<H> )-----------( 117<L>    ( 20 Oct 2022 04:25 )             30.2<L>                        7.3<L>  10.35 )-----------( 109<L>    ( 20 Oct 2022 00:20 )             23.8<L>    10-20    140  |  104  |  52<H>  ----------------------------<  99  5.2<H>   |  26  |  5.9<HH>  10-20    142  |  105  |  53<H>  ----------------------------<  164<H>  4.6   |  26  |  5.9<HH>    Ca    8.0<L>      20 Oct 2022 04:25  Phos  4.8     10-18  Mg     2.3     10-20    TPro  x   /  Alb  x   /  TBili  0.9 [0.2 - 1.2]  /  DBili  0.4<H> [0.0 - 0.3]  /  AST  x   /  ALT  x   /  AlkPhos  x   10-20    PT/INR - ( 20 Oct 2022 00:20 )   PT: ;   INR: 1.61 ratio         PT/INR - ( 19 Oct 2022 17:08 )   PT: ;   INR: 1.45 ratio         PTT - ( 20 Oct 2022 06:40 )  PTT:34.9 sec, PTT - ( 20 Oct 2022 00:20 )  PTT:42.3 sec    ABG - ( 20 Oct 2022 04:22 )  pH: 7.43  /  pCO2: 38    /  pO2: 100   / HCO3: 25    / Base Excess: 0.9   /  SaO2: 99.0  /  LA: 1.20         RADIOLOGY & ADDITIONAL TESTS:  CXR:< from: Xray Chest 1 View AP/PA (10.19.22 @ 22:52) >  Findings:    Support devices: ETT with its tip above the bobby and NGT with its tip   below the diaphragm. Left-sided permanent pacemaker.    Cardiac/mediastinum/hilum: Stable. Status post a median sternotomy and   cardiomegaly.    Lung parenchyma/Pleura: Bilateral opacities, unchanged. No pneumothorax   is seen.    Skeleton/soft tissues: Stable.    Impression:    Low lung volume.    Status post a median sternotomy and cardiac megaly, unchanged.    Bilateral opacities, unchanged.    Support tubes as above.    Left-sided permanent pacemaker.    < end of copied text >      EKG:< from: 12 Lead ECG (10.20.22 @ 07:24) >  Ventricular Rate 73 BPM    Atrial Rate 73 BPM    P-R Interval 208 ms    QRS Duration 148 ms    Q-T Interval 486 ms    QTC Calculation(Bazett) 535 ms    P Axis 42 degrees    R Axis 209 degrees    T Axis 108 degrees    Diagnosis Line Sinus rhythm with Premature atrial complexes with Aberrant conduction  Right bundle branch block  Abnormal ECG    < end of copied text >    MEDICATIONS  (STANDING):  aspirin enteric coated 81 milliGRAM(s) Oral daily  ceFAZolin   IVPB 1000 milliGRAM(s) IV Intermittent every 8 hours  cefepime   IVPB 1000 milliGRAM(s) IV Intermittent daily  chlorhexidine 0.12% Liquid 5 milliLiter(s) Oral Mucosa two times a day  chlorhexidine 0.12% Liquid 15 milliLiter(s) Swish and Spit once  clopidogrel Tablet 75 milliGRAM(s) Oral daily  CRRT Treatment    <Continuous>  dexMEDEtomidine Infusion 0.2 MICROgram(s)/kG/Hr (5.12 mL/Hr) IV Continuous <Continuous>  dextrose 5% 500 milliLiter(s) (17.3 mL/Hr) IV Continuous <Continuous>  dextrose 5%. 1000 milliLiter(s) (50 mL/Hr) IV Continuous <Continuous>  dextrose 50% Injectable 25 Gram(s) IV Push once  dextrose 50% Injectable 50 milliLiter(s) IV Push every 15 minutes  dextrose 50% Injectable 25 milliLiter(s) IV Push every 15 minutes  EPINEPHrine    Infusion 0.04 MICROgram(s)/kG/Min (7.68 mL/Hr) IV Continuous <Continuous>  glucagon  Injectable 1 milliGRAM(s) IntraMuscular once  insulin regular Infusion 10 Unit(s)/Hr (10 mL/Hr) IV Continuous <Continuous>  meperidine     Injectable 25 milliGRAM(s) IV Push once  milrinone Infusion 0.375 MICROgram(s)/kG/Min (11.5 mL/Hr) IV Continuous <Continuous>  mupirocin 2% Ointment 1 Application(s) Both Nostrils every 12 hours  norepinephrine Infusion 0.05 MICROgram(s)/kG/Min (9.6 mL/Hr) IV Continuous <Continuous>  pantoprazole  Injectable 40 milliGRAM(s) IV Push daily  polyethylene glycol 3350 17 Gram(s) Oral daily  propofol Infusion 10 MICROgram(s)/kG/Min (6.14 mL/Hr) IV Continuous <Continuous>  PureFlow Dialysate RFP-400 (K 2 / Ca 3) 5000 milliLiter(s) (2000 mL/Hr) CRRT <Continuous>  senna 2 Tablet(s) Oral at bedtime  sodium chloride 0.9%. 1000 milliLiter(s) (20 mL/Hr) IV Continuous <Continuous>  vancomycin  IVPB 1000 milliGRAM(s) IV Intermittent every 24 hours  vasopressin Infusion 0.04 Unit(s)/Min (6 mL/Hr) IV Continuous <Continuous>    MEDICATIONS  (PRN):  acetaminophen   IVPB .. 1000 milliGRAM(s) IV Intermittent once PRN Severe Pain (7 - 10)  dextrose Oral Gel 15 Gram(s) Oral once PRN Blood Glucose LESS THAN 70 milliGRAM(s)/deciliter  HYDROmorphone  Injectable 0.5 milliGRAM(s) IV Push every 6 hours PRN Breakthrough Pain  oxyCODONE    IR 5 milliGRAM(s) Oral every 4 hours PRN Moderate Pain (4 - 6)  oxyCODONE    IR 10 milliGRAM(s) Oral every 4 hours PRN Severe Pain (7 - 10)    SCD's: YES b/l    GI Prophylaxis: Protonix [x],    Allergies    No Known Allergies    Intolerances      Assessment/Plan:  82y Male status-post TAVR POD#1 s/p v fib arrest and impella placed   - Case and plan discussed with CTU Intensivist and CT Surgeon - Dr. Adan  - Continue CTU supportive care    - Continue DVT/GI prophylaxis  - Incentive Spirometry 10 times an hour  - Continue to advance physical activity as tolerated and continue PT/OT as directed  1.  Continue ASA, plavix  2. HTN: monitor bp, wean off pressors as tolerated   3. A. Fib ppx: d/c amio drip, monitor electrolytes, replete PRN  4. COPD/Hypoxia: intubated, O2 95%, wean vent after impella removed   5. DM/Glucose Control: cont insulin drip  6. Anemia 2/2 acute blood loss: transfused 4U RBC, stable.   7. d/c impella

## 2022-10-21 LAB
ALBUMIN SERPL ELPH-MCNC: 2.7 G/DL — LOW (ref 3.5–5.2)
ALBUMIN SERPL ELPH-MCNC: 2.8 G/DL — LOW (ref 3.5–5.2)
ALP SERPL-CCNC: 48 U/L — SIGNIFICANT CHANGE UP (ref 30–115)
ALP SERPL-CCNC: 50 U/L — SIGNIFICANT CHANGE UP (ref 30–115)
ALT FLD-CCNC: 5 U/L — SIGNIFICANT CHANGE UP (ref 0–41)
ALT FLD-CCNC: <5 U/L — SIGNIFICANT CHANGE UP (ref 0–41)
ANION GAP SERPL CALC-SCNC: 11 MMOL/L — SIGNIFICANT CHANGE UP (ref 7–14)
ANION GAP SERPL CALC-SCNC: 14 MMOL/L — SIGNIFICANT CHANGE UP (ref 7–14)
APTT BLD: 54.5 SEC — HIGH (ref 27–39.2)
AST SERPL-CCNC: 21 U/L — SIGNIFICANT CHANGE UP (ref 0–41)
AST SERPL-CCNC: 26 U/L — SIGNIFICANT CHANGE UP (ref 0–41)
BASE EXCESS BLDMV CALC-SCNC: -2.3 MMOL/L — SIGNIFICANT CHANGE UP
BASOPHILS # BLD AUTO: 0.06 K/UL — SIGNIFICANT CHANGE UP (ref 0–0.2)
BASOPHILS NFR BLD AUTO: 0.3 % — SIGNIFICANT CHANGE UP (ref 0–1)
BILIRUB SERPL-MCNC: 0.6 MG/DL — SIGNIFICANT CHANGE UP (ref 0.2–1.2)
BILIRUB SERPL-MCNC: 0.6 MG/DL — SIGNIFICANT CHANGE UP (ref 0.2–1.2)
BUN SERPL-MCNC: 48 MG/DL — HIGH (ref 10–20)
BUN SERPL-MCNC: 56 MG/DL — HIGH (ref 10–20)
CALCIUM SERPL-MCNC: 7.6 MG/DL — LOW (ref 8.4–10.5)
CALCIUM SERPL-MCNC: 7.7 MG/DL — LOW (ref 8.4–10.4)
CHLORIDE SERPL-SCNC: 105 MMOL/L — SIGNIFICANT CHANGE UP (ref 98–110)
CHLORIDE SERPL-SCNC: 107 MMOL/L — SIGNIFICANT CHANGE UP (ref 98–110)
CO2 SERPL-SCNC: 22 MMOL/L — SIGNIFICANT CHANGE UP (ref 17–32)
CO2 SERPL-SCNC: 23 MMOL/L — SIGNIFICANT CHANGE UP (ref 17–32)
CREAT SERPL-MCNC: 4.7 MG/DL — CRITICAL HIGH (ref 0.7–1.5)
CREAT SERPL-MCNC: 5.5 MG/DL — CRITICAL HIGH (ref 0.7–1.5)
EGFR: 10 ML/MIN/1.73M2 — LOW
EGFR: 12 ML/MIN/1.73M2 — LOW
EOSINOPHIL # BLD AUTO: 0.17 K/UL — SIGNIFICANT CHANGE UP (ref 0–0.7)
EOSINOPHIL NFR BLD AUTO: 0.8 % — SIGNIFICANT CHANGE UP (ref 0–8)
GAS PNL BLDA: SIGNIFICANT CHANGE UP
GAS PNL BLDMV: SIGNIFICANT CHANGE UP
GLUCOSE BLDC GLUCOMTR-MCNC: 105 MG/DL — HIGH (ref 70–99)
GLUCOSE BLDC GLUCOMTR-MCNC: 124 MG/DL — HIGH (ref 70–99)
GLUCOSE BLDC GLUCOMTR-MCNC: 130 MG/DL — HIGH (ref 70–99)
GLUCOSE BLDC GLUCOMTR-MCNC: 140 MG/DL — HIGH (ref 70–99)
GLUCOSE BLDC GLUCOMTR-MCNC: 145 MG/DL — HIGH (ref 70–99)
GLUCOSE BLDC GLUCOMTR-MCNC: 146 MG/DL — HIGH (ref 70–99)
GLUCOSE BLDC GLUCOMTR-MCNC: 159 MG/DL — HIGH (ref 70–99)
GLUCOSE BLDC GLUCOMTR-MCNC: 160 MG/DL — HIGH (ref 70–99)
GLUCOSE BLDC GLUCOMTR-MCNC: 79 MG/DL — SIGNIFICANT CHANGE UP (ref 70–99)
GLUCOSE BLDC GLUCOMTR-MCNC: 84 MG/DL — SIGNIFICANT CHANGE UP (ref 70–99)
GLUCOSE BLDC GLUCOMTR-MCNC: 89 MG/DL — SIGNIFICANT CHANGE UP (ref 70–99)
GLUCOSE BLDC GLUCOMTR-MCNC: 93 MG/DL — SIGNIFICANT CHANGE UP (ref 70–99)
GLUCOSE SERPL-MCNC: 123 MG/DL — HIGH (ref 70–99)
GLUCOSE SERPL-MCNC: 140 MG/DL — HIGH (ref 70–99)
HCO3 BLDMV-SCNC: 21 MMOL/L — SIGNIFICANT CHANGE UP
HCT VFR BLD CALC: 24.3 % — LOW (ref 42–52)
HCT VFR BLD CALC: 27.3 % — LOW (ref 42–52)
HGB BLD-MCNC: 7.7 G/DL — LOW (ref 14–18)
HGB BLD-MCNC: 8.9 G/DL — LOW (ref 14–18)
HOROWITZ INDEX BLDMV+IHG-RTO: 50 — SIGNIFICANT CHANGE UP
IMM GRANULOCYTES NFR BLD AUTO: 1 % — HIGH (ref 0.1–0.3)
LYMPHOCYTES # BLD AUTO: 1.41 K/UL — SIGNIFICANT CHANGE UP (ref 1.2–3.4)
LYMPHOCYTES # BLD AUTO: 6.7 % — LOW (ref 20.5–51.1)
MAGNESIUM SERPL-MCNC: 2.1 MG/DL — SIGNIFICANT CHANGE UP (ref 1.8–2.4)
MCHC RBC-ENTMCNC: 29.1 PG — SIGNIFICANT CHANGE UP (ref 27–31)
MCHC RBC-ENTMCNC: 29.6 PG — SIGNIFICANT CHANGE UP (ref 27–31)
MCHC RBC-ENTMCNC: 31.7 G/DL — LOW (ref 32–37)
MCHC RBC-ENTMCNC: 32.6 G/DL — SIGNIFICANT CHANGE UP (ref 32–37)
MCV RBC AUTO: 90.7 FL — SIGNIFICANT CHANGE UP (ref 80–94)
MCV RBC AUTO: 91.7 FL — SIGNIFICANT CHANGE UP (ref 80–94)
MONOCYTES # BLD AUTO: 3.09 K/UL — HIGH (ref 0.1–0.6)
MONOCYTES NFR BLD AUTO: 14.7 % — HIGH (ref 1.7–9.3)
NEUTROPHILS # BLD AUTO: 16.07 K/UL — HIGH (ref 1.4–6.5)
NEUTROPHILS NFR BLD AUTO: 76.5 % — HIGH (ref 42.2–75.2)
NRBC # BLD: 0 /100 WBCS — SIGNIFICANT CHANGE UP (ref 0–0)
NRBC # BLD: 0 /100 WBCS — SIGNIFICANT CHANGE UP (ref 0–0)
O2 CT VFR BLD CALC: 50 MMHG — SIGNIFICANT CHANGE UP
PCO2 BLDMV: 32 MMHG — SIGNIFICANT CHANGE UP
PH BLDMV: 7.43 — SIGNIFICANT CHANGE UP
PLATELET # BLD AUTO: 101 K/UL — LOW (ref 130–400)
PLATELET # BLD AUTO: 53 K/UL — LOW (ref 130–400)
POTASSIUM SERPL-MCNC: 4.1 MMOL/L — SIGNIFICANT CHANGE UP (ref 3.5–5)
POTASSIUM SERPL-MCNC: 4.7 MMOL/L — SIGNIFICANT CHANGE UP (ref 3.5–5)
POTASSIUM SERPL-SCNC: 4.1 MMOL/L — SIGNIFICANT CHANGE UP (ref 3.5–5)
POTASSIUM SERPL-SCNC: 4.7 MMOL/L — SIGNIFICANT CHANGE UP (ref 3.5–5)
PROT SERPL-MCNC: 3.9 G/DL — LOW (ref 6–8)
PROT SERPL-MCNC: 4.1 G/DL — LOW (ref 6–8)
RBC # BLD: 2.65 M/UL — LOW (ref 4.7–6.1)
RBC # BLD: 3.01 M/UL — LOW (ref 4.7–6.1)
RBC # FLD: 16.3 % — HIGH (ref 11.5–14.5)
RBC # FLD: 16.4 % — HIGH (ref 11.5–14.5)
SAO2 % BLDMV: 87.7 % — SIGNIFICANT CHANGE UP
SODIUM SERPL-SCNC: 139 MMOL/L — SIGNIFICANT CHANGE UP (ref 135–146)
SODIUM SERPL-SCNC: 143 MMOL/L — SIGNIFICANT CHANGE UP (ref 135–146)
WBC # BLD: 14.93 K/UL — HIGH (ref 4.8–10.8)
WBC # BLD: 21.01 K/UL — HIGH (ref 4.8–10.8)
WBC # FLD AUTO: 14.93 K/UL — HIGH (ref 4.8–10.8)
WBC # FLD AUTO: 21.01 K/UL — HIGH (ref 4.8–10.8)

## 2022-10-21 PROCEDURE — 93010 ELECTROCARDIOGRAM REPORT: CPT

## 2022-10-21 PROCEDURE — 71045 X-RAY EXAM CHEST 1 VIEW: CPT | Mod: 26

## 2022-10-21 RX ORDER — AMIODARONE HYDROCHLORIDE 400 MG/1
1 TABLET ORAL
Qty: 900 | Refills: 0 | Status: DISCONTINUED | OUTPATIENT
Start: 2022-10-21 | End: 2022-10-21

## 2022-10-21 RX ORDER — AMIODARONE HYDROCHLORIDE 400 MG/1
150 TABLET ORAL ONCE
Refills: 0 | Status: COMPLETED | OUTPATIENT
Start: 2022-10-21 | End: 2022-10-21

## 2022-10-21 RX ORDER — HEPARIN SODIUM 5000 [USP'U]/ML
200 INJECTION INTRAVENOUS; SUBCUTANEOUS
Qty: 25000 | Refills: 0 | Status: DISCONTINUED | OUTPATIENT
Start: 2022-10-21 | End: 2022-10-21

## 2022-10-21 RX ORDER — AMIODARONE HYDROCHLORIDE 400 MG/1
0.5 TABLET ORAL
Qty: 900 | Refills: 0 | Status: DISCONTINUED | OUTPATIENT
Start: 2022-10-21 | End: 2022-10-21

## 2022-10-21 RX ORDER — MILRINONE LACTATE 1 MG/ML
0.12 INJECTION, SOLUTION INTRAVENOUS
Qty: 20 | Refills: 0 | Status: DISCONTINUED | OUTPATIENT
Start: 2022-10-21 | End: 2022-10-21

## 2022-10-21 RX ORDER — AMIODARONE HYDROCHLORIDE 400 MG/1
1 TABLET ORAL
Qty: 900 | Refills: 0 | Status: DISCONTINUED | OUTPATIENT
Start: 2022-10-21 | End: 2022-10-22

## 2022-10-21 RX ORDER — HEPARIN SODIUM 5000 [USP'U]/ML
500 INJECTION INTRAVENOUS; SUBCUTANEOUS
Qty: 25000 | Refills: 0 | Status: DISCONTINUED | OUTPATIENT
Start: 2022-10-21 | End: 2022-10-21

## 2022-10-21 RX ADMIN — AMIODARONE HYDROCHLORIDE 600 MILLIGRAM(S): 400 TABLET ORAL at 02:35

## 2022-10-21 RX ADMIN — CEFEPIME 100 MILLIGRAM(S): 1 INJECTION, POWDER, FOR SOLUTION INTRAMUSCULAR; INTRAVENOUS at 11:57

## 2022-10-21 RX ADMIN — POLYETHYLENE GLYCOL 3350 17 GRAM(S): 17 POWDER, FOR SOLUTION ORAL at 11:58

## 2022-10-21 RX ADMIN — Medication 9.6 MICROGRAM(S)/KG/MIN: at 14:34

## 2022-10-21 RX ADMIN — CHLORHEXIDINE GLUCONATE 5 MILLILITER(S): 213 SOLUTION TOPICAL at 17:03

## 2022-10-21 RX ADMIN — Medication 400 MILLIGRAM(S): at 17:52

## 2022-10-21 RX ADMIN — CHLORHEXIDINE GLUCONATE 5 MILLILITER(S): 213 SOLUTION TOPICAL at 05:17

## 2022-10-21 RX ADMIN — CLOPIDOGREL BISULFATE 75 MILLIGRAM(S): 75 TABLET, FILM COATED ORAL at 11:58

## 2022-10-21 RX ADMIN — MUPIROCIN 1 APPLICATION(S): 20 OINTMENT TOPICAL at 05:17

## 2022-10-21 RX ADMIN — PANTOPRAZOLE SODIUM 40 MILLIGRAM(S): 20 TABLET, DELAYED RELEASE ORAL at 11:57

## 2022-10-21 RX ADMIN — Medication 81 MILLIGRAM(S): at 11:58

## 2022-10-21 RX ADMIN — VASOPRESSIN 6 UNIT(S)/MIN: 20 INJECTION INTRAVENOUS at 14:33

## 2022-10-21 RX ADMIN — AMIODARONE HYDROCHLORIDE 16.7 MG/MIN: 400 TABLET ORAL at 14:33

## 2022-10-21 RX ADMIN — SODIUM CHLORIDE 20 MILLILITER(S): 9 INJECTION INTRAMUSCULAR; INTRAVENOUS; SUBCUTANEOUS at 14:34

## 2022-10-21 RX ADMIN — Medication 1000 MILLIGRAM(S): at 22:30

## 2022-10-21 RX ADMIN — MUPIROCIN 1 APPLICATION(S): 20 OINTMENT TOPICAL at 17:03

## 2022-10-21 NOTE — PROGRESS NOTE ADULT - SUBJECTIVE AND OBJECTIVE BOX
NEPHROLOGY FOLLOW UP NOTE    pt seen in CTICU  on cvvh  'vented and on pressors and inotropes   balloon pump still in     PAST MEDICAL & SURGICAL HISTORY:  HTN (Hypertension)    Diabetes Mellitus Type II    Hypercholesterolemia    CAD (Coronary Artery Disease)    History of PTCA  with stents 10 years ago ([facility])    Diabetes mellitus    CAD (coronary artery disease)    H/O hyperlipidemia    HTN - Hypertension    Renal insufficiency    Sleep apnea  does not use machine    GERD (gastroesophageal reflux disease)    BPH (Benign Prostatic Hyperplasia)    CHF (congestive heart failure)    Mitral valve regurgitation    S/P knee surgery  b/l arthroscopic    S/P tonsillectomy    S/P primary angioplasty with coronary stent  6-7 stents last one in 10/12    S/P appendectomy      Allergies:  No Known Allergies    Home Medications Reviewed    SOCIAL HISTORY:  Denies ETOH,Smoking,   FAMILY HISTORY:  No pertinent family history in first degree relatives          REVIEW OF SYSTEMS:  unobtainable    PHYSICAL EXAM:  Constitutional: sedated  HEENT: ett  Neck: No JVD  Respiratory: decreased BS b/l  Cardiovascular: S1, S2, RRR + murmur  Gastrointestinal: BS+, soft, NT/ND  Extremities: + peripheral edema  Neurological: sedated  : No CVA tenderness. + woods.   Skin: No rashes  Vascular Access: left arm AVF + thrill + right groin [facility] Medications:   MEDICATIONS  (STANDING):  albumin human  5% IVPB 250 milliLiter(s) IV Intermittent once  albumin human  5% IVPB 250 milliLiter(s) IV Intermittent once  albumin human  5% IVPB 250 milliLiter(s) IV Intermittent once  albumin human  5% IVPB 250 milliLiter(s) IV Intermittent once  aMIOdarone Infusion 0.5 mG/Min (16.7 mL/Hr) IV Continuous <Continuous>  aspirin enteric coated 81 milliGRAM(s) Oral daily  cefepime   IVPB 1000 milliGRAM(s) IV Intermittent daily  chlorhexidine 0.12% Liquid 5 milliLiter(s) Oral Mucosa two times a day  clopidogrel Tablet 75 milliGRAM(s) Oral daily  CRRT Treatment    <Continuous>  CRRT Treatment    <Continuous>  dexMEDEtomidine Infusion 0.2 MICROgram(s)/kG/Hr (5.12 mL/Hr) IV Continuous <Continuous>  dextrose 5% 500 milliLiter(s) (17.3 mL/Hr) IV Continuous <Continuous>  dextrose 5%. 1000 milliLiter(s) (50 mL/Hr) IV Continuous <Continuous>  dextrose 50% Injectable 25 Gram(s) IV Push once  dextrose 50% Injectable 50 milliLiter(s) IV Push every 15 minutes  dextrose 50% Injectable 25 milliLiter(s) IV Push every 15 minutes  glucagon  Injectable 1 milliGRAM(s) IntraMuscular once  heparin  Infusion 500 Unit(s)/Hr (5 mL/Hr) IV Continuous <Continuous>  heparin  Infusion 200 Unit(s)/Hr (2 mL/Hr) IV Continuous <Continuous>  insulin regular Infusion 10 Unit(s)/Hr (10 mL/Hr) IV Continuous <Continuous>  meperidine     Injectable 25 milliGRAM(s) IV Push once  mupirocin 2% Ointment 1 Application(s) Both Nostrils every 12 hours  norepinephrine Infusion 0.05 MICROgram(s)/kG/Min (9.6 mL/Hr) IV Continuous <Continuous>  pantoprazole  Injectable 40 milliGRAM(s) IV Push daily  polyethylene glycol 3350 17 Gram(s) Oral daily  propofol Infusion 10 MICROgram(s)/kG/Min (6.14 mL/Hr) IV Continuous <Continuous>  PureFlow Dialysate RFP-400 (K 2 / Ca 3) 5000 milliLiter(s) (2000 mL/Hr) CRRT <Continuous>  PureFlow Dialysate RFP-400 (K 2 / Ca 3) 5000 milliLiter(s) (2000 mL/Hr) CRRT <Continuous>  senna 2 Tablet(s) Oral at bedtime  sodium chloride 0.9%. 1000 milliLiter(s) (20 mL/Hr) IV Continuous <Continuous>  vasopressin Infusion 0.04 Unit(s)/Min (6 mL/Hr) IV Continuous <Continuous>  vasopressin Infusion 0.04 Unit(s)/Min (6 mL/Hr) IV Continuous <Continuous>        VITALS:  T(F): 99.9 (10-21-22 @ 14:00), Max: 99.9 (10-21-22 @ 14:00)  HR: 86 (10-21-22 @ 14:45)  BP: 116/58 (10-21-22 @ 13:00)  RR: 0 (10-21-22 @ 14:45)  SpO2: 100% (10-21-22 @ 14:45)  Wt(kg): --    10-19 @ 07:01  -  10-20 @ 07:00  --------------------------------------------------------  IN: 2771.9 mL / OUT: 945 mL / NET: 1826.9 mL    10-20 @ 07:01  -  10-21 @ 07:00  --------------------------------------------------------  IN: 2830.3 mL / OUT: 2121 mL / NET: 709.3 mL    10-21 @ 07:01  -  10-21 @ 14:51  --------------------------------------------------------  IN: 824.8 mL / OUT: 583 mL / NET: 241.8 mL          LABS:  10-21    143  |  107  |  48<H>  ----------------------------<  123<H>  4.7   |  22  |  4.7<HH>    Ca    7.6<L>      21 Oct 2022 13:20  Mg     2.1     10-21    TPro  3.9<L>  /  Alb  2.7<L>  /  TBili  0.6  /  DBili      /  AST  21  /  ALT  <5  /  AlkPhos  50  10-21                          7.7    14.93 )-----------( 53       ( 21 Oct 2022 13:20 )             24.3       Urine Studies:        RADIOLOGY & ADDITIONAL STUDIES:

## 2022-10-21 NOTE — DIETITIAN INITIAL EVALUATION ADULT - ENTERAL
when medically feasible, initiate Vital HP @ 20 mL/hr, advance as tolerated by 10 mL q12 hrs to goal rate of 50 mL/hr provides 1200 kcal, 105 g protein, and 1003 mL water  additional 264 kcal provided by propofol @ current rate per 24 hrs

## 2022-10-21 NOTE — PROGRESS NOTE ADULT - SUBJECTIVE AND OBJECTIVE BOX
CHIEF COMPLAINT:  Patient is a 82y old  Male who presents with a chief complaint of I35.0     [id number]     (21 Oct 2022 13:08)      INTERVAL HISTORY/OVERNIGHT EVENTS:  pt had tachyarrythmias overnight likely SVT with aberrancy. Milrinone was d.josias. S/P IABP removal.    ======================  MEDICATIONS:  albumin human  5% IVPB 250 milliLiter(s) IV Intermittent once  albumin human  5% IVPB 250 milliLiter(s) IV Intermittent once  albumin human  5% IVPB 250 milliLiter(s) IV Intermittent once  albumin human  5% IVPB 250 milliLiter(s) IV Intermittent once  aspirin enteric coated 81 milliGRAM(s) Oral daily  cefepime   IVPB 1000 milliGRAM(s) IV Intermittent daily  chlorhexidine 0.12% Liquid 5 milliLiter(s) Oral Mucosa two times a day  clopidogrel Tablet 75 milliGRAM(s) Oral daily  dextrose 50% Injectable 25 Gram(s) IV Push once  dextrose 50% Injectable 50 milliLiter(s) IV Push every 15 minutes  dextrose 50% Injectable 25 milliLiter(s) IV Push every 15 minutes  glucagon  Injectable 1 milliGRAM(s) IntraMuscular once  meperidine     Injectable 25 milliGRAM(s) IV Push once  mupirocin 2% Ointment 1 Application(s) Both Nostrils every 12 hours  pantoprazole  Injectable 40 milliGRAM(s) IV Push daily  polyethylene glycol 3350 17 Gram(s) Oral daily  senna 2 Tablet(s) Oral at bedtime    DRIPS:  aMIOdarone Infusion 1 mG/Min (33.3 mL/Hr) IV Continuous <Continuous>  CRRT Treatment    <Continuous>  dexMEDEtomidine Infusion 0.2 MICROgram(s)/kG/Hr (5.12 mL/Hr) IV Continuous <Continuous>  dextrose 5%. 1000 milliLiter(s) (50 mL/Hr) IV Continuous <Continuous>  insulin regular Infusion 10 Unit(s)/Hr (10 mL/Hr) IV Continuous <Continuous>  norepinephrine Infusion 0.05 MICROgram(s)/kG/Min (9.6 mL/Hr) IV Continuous <Continuous>  propofol Infusion 10 MICROgram(s)/kG/Min (6.14 mL/Hr) IV Continuous <Continuous>  PureFlow Dialysate RFP-400 (K 2 / Ca 3) 5000 milliLiter(s) (2000 mL/Hr) CRRT <Continuous>  sodium chloride 0.9%. 1000 milliLiter(s) (20 mL/Hr) IV Continuous <Continuous>  vasopressin Infusion 0.04 Unit(s)/Min (6 mL/Hr) IV Continuous <Continuous>        ======================  PHYSICAL EXAMINATION:  GEN:  nad. Intubated, following commands.  HEENT:  eomi. ncat  PULM:  b/l lung sounds   CARD: s1, s2  ABD: +bs. ntnd  EXT:  no new rashes.    NEURO: following commands  ======================  OBJECTIVE:    Device: Avea, Mode: standby    VS:  T(F): 99.9 (10-21 @ 16:00), Max: 99.9 (10-21 @ 14:00)  HR: 85 (10-21 @ 19:00) (75 - 141)  BP: 96/51 (10-21 @ 19:00) (93/55 - 166/79)  RR: 27 (10-21 @ 19:00) (0 - 30)  SpO2: 98% (10-21 @ 19:00) (93% - 100%)  CVP(mm Hg): 15 (10-21 @ 19:00) (3 - 290)  CO: 5.8 (10-21 @ 16:00) (5.8 - 9.5)  CI: 2.7 (10-21 @ 16:00) (2.6 - 4.3)  PA: 40/18 (10-21 @ 19:00) (27/13 - 55/25)      I/O:      10-18 @ 07:01  -  10-19 @ 07:00  --------------------------------------------------------  IN: 250 mL / OUT: 2000 mL / NET: -1750 mL    10-19 @ 07:01  -  10-20 @ 07:00  --------------------------------------------------------  IN: 2771.9 mL / OUT: 945 mL / NET: 1826.9 mL    10-20 @ 07:01  -  10-21 @ 07:00  --------------------------------------------------------  IN: 2830.3 mL / OUT: 2121 mL / NET: 709.3 mL    10-21 @ 07:01  -  10-21 @ 21:56  --------------------------------------------------------  IN: 1769.3 mL / OUT: 723 mL / NET: 1046.3 mL        Weight trend:  Weight (kg): 102.4 (10-19)    ======================    LABS:  ABG - ( 21 Oct 2022 19:46 )  pH, Arterial: 7.45  pH, Blood: x     /  pCO2: 28    /  pO2: 145   / HCO3: 20    / Base Excess: -3.8  /  SaO2: 99.3                                    7.7    14.93 )-----------( 53       ( 21 Oct 2022 13:20 )             24.3     10-21    143  |  107  |  48<H>  ----------------------------<  123<H>  4.7   |  22  |  4.7<HH>    Ca    7.6<L>      21 Oct 2022 13:20  Mg     2.1     10-21    TPro  3.9<L>  /  Alb  2.7<L>  /  TBili  0.6  /  DBili  x   /  AST  21  /  ALT  <5  /  AlkPhos  50  10-21    LIVER FUNCTIONS - ( 21 Oct 2022 13:20 )  Alb: 2.7 g/dL / Pro: 3.9 g/dL / ALK PHOS: 50 U/L / ALT: <5 U/L / AST: 21 U/L / GGT: x           PT/INR - ( 20 Oct 2022 14:41 )   PT: 15.00 sec;   INR: 1.31 ratio         PTT - ( 21 Oct 2022 02:41 )  PTT:54.5 sec

## 2022-10-21 NOTE — DIETITIAN INITIAL EVALUATION ADULT - OTHER INFO
PMHx ESRD on HD, HF s/p ICD, DM (A1c 6%), GREG, severe AS, came in electively for TAVR. During procedure pt went into Vfib arrest and needed resuscitation for hypotension.   Cardiogenic shock s/p Impella  s/p Impella removal and balloon pump placement 10/20  severe AS s/p TAVR  CAD/CABG ( [facility] on 3/01/22 with subtotal LM and 100% occlusion of RCA, with patent LIMA-LAD, patent SVG-diag/OM, and patent SVG-RPDA)  ESRD on HD, currently on CVVH  HFrEF s/p ICD

## 2022-10-21 NOTE — DIETITIAN INITIAL EVALUATION ADULT - NSFNSGIIOFT_GEN_A_CORE
10-20-22 @ 07:01  -  10-21-22 @ 07:00  --------------------------------------------------------  OUT:    Nasogastric/Oral tube (mL): 150 mL  Total OUT: 150 mL    Total NET: -150 mL

## 2022-10-21 NOTE — DIETITIAN INITIAL EVALUATION ADULT - NSICDXPASTMEDICALHX_GEN_ALL_CORE_FT
PAST MEDICAL HISTORY:  AICD (automatic cardioverter/defibrillator) present     Aortic stenosis     BPH (Benign Prostatic Hyperplasia)     CAD (Coronary Artery Disease)     CAD (coronary artery disease)     CHF (congestive heart failure)     Diabetes mellitus     Diabetes Mellitus Type II     Dialysis patient HD- M/W/FR- [facility] [address].    GERD (gastroesophageal reflux disease)     H/O hyperlipidemia     History of PTCA with stents 10 years ago ([facility])    [place] (hard of hearing)     HTN (Hypertension)     HTN - Hypertension     Hypercholesterolemia     Mitral valve regurgitation     Sleep apnea does not use machine

## 2022-10-21 NOTE — DIETITIAN INITIAL EVALUATION ADULT - NS FNS DIET ORDER
Diet, NPO after Midnight:      NPO Start Date: 18-Oct-2022,   NPO Start Time: 23:59  Except Medications     Special Instructions for Nursing:  Except Medications (10-18-22 @ 14:37) [Active]

## 2022-10-21 NOTE — DIETITIAN INITIAL EVALUATION ADULT - NSICDXPASTSURGICALHX_GEN_ALL_CORE_FT
PAST SURGICAL HISTORY:  AICD (automatic cardioverter/defibrillator) present     S/P appendectomy     S/P CABG (coronary artery bypass graft) ? 2017    S/P knee surgery b/l arthroscopic    S/P primary angioplasty with coronary stent 6-7 stents last one in 10/12    S/P tonsillectomy

## 2022-10-21 NOTE — DIETITIAN INITIAL EVALUATION ADULT - PERTINENT LABORATORY DATA
10-21    139  |  105  |  56<H>  ----------------------------<  140<H>  4.1   |  23  |  5.5<HH>    Ca    7.7<L>      21 Oct 2022 02:41  Mg     2.1     10-21    TPro  4.1<L>  /  Alb  2.8<L>  /  TBili  0.6  /  DBili  x   /  AST  26  /  ALT  5   /  AlkPhos  48  10-21  POCT Blood Glucose.: 140 mg/dL (10-21-22 @ 12:31)  A1C with Estimated Average Glucose Result: 6.0 % (10-13-22 @ 10:36)  A1C with Estimated Average Glucose Result: 5.9 % (09-15-22 @ 06:53)  A1C with Estimated Average Glucose Result: 8.7 % (03-02-22 @ 06:30)

## 2022-10-21 NOTE — PROVIDER CONTACT NOTE (EICU) - ACTION/TREATMENT ORDERED:
Aforementioned orders above placed. Vasopressors being actively titrated by bedside staff. To be followed up by bedside provider.

## 2022-10-21 NOTE — PROGRESS NOTE ADULT - ASSESSMENT
IMPRESSION  VF Arrest  Cardiogenic shock s/p Impella  s/p Impella removal and intra-aortic balloon pump placement 10/20  s/p IABP removal 10/21  SVT with aberrancy/NSVT while on milrinone - resolved  severe AS s/p TAVR  CAD/CABG ( [facility] on 3/01/22 with subtotal LM and 100% occlusion of RCA, with patent LIMA-LAD, patent SVG-diag/OM, and patent SVG-RPDA)  ESRD on HD, currently on CVVH  HFrEF s/p ICD    RECOMMENDATIONS    CNS:   -c/w precedex for sedation, d/c propofol    HEENT:   -Oral care    PULMONARY:    -HOB @ 45 degrees. Aspiration precautions.   -lower FiO2 to 40 %, monitor ABGs.  -SBT/SAT     CARDIOVASCULAR:   -d/c milrinone  -c/w Levo and vaso. Wean off Levo as tolerated  -s/p IABP removal  -monitor peripheral pulses  -monitor swan and Jhonny numbers q 2 hours  -c/w Amio infusion for now, monitor lytes. EP f.u  -c/w CVVH, aim for negative balance  -check BMP q 12     GI:   -GI prophylaxis. Keep NPO for possible extubation    RENAL:    -Follow up lytes.  Correct as needed  -c/w CVVH with aim for even balance    INFECTIOUS DISEASE:   -Follow up cultures: blood and urine.   -c/w Vanc, Cefepime  -CHG 4% daily bath    HEMATOLOGICAL:    -Off heparin now. Resume DVT prophylaxis.  -transfuse 1 unit PRBC, Hb 7.7  -repeat CBC after transfusion  -consider CT Abd non con if continues to drop Hb to r.o retroperitoneal bleed  -get CTA stat if becomes unstable with acute drop in Hb    ENDOCRINE:    -Follow up FS.  -insulin drip prn    MUSCULOSKELETAL:   -bedrest    CTU monitoring

## 2022-10-21 NOTE — PROGRESS NOTE ADULT - ASSESSMENT
ESRD on HD MWF @ [facility] [place] via left arm AVF  non-oliguric  s/p TAVR complicated by vfib cardiac arrest  cardiogenic shock on multiple pressors, inotropes, impella device removed and now IABP  ARF on vent  CAD / CABG / CMP / CHF / VHD (severe AS and MR)  HTN  DM2  anemia   GREG    plan:    cont CVVHD - UF 50cc/hr negative fluid balance  circuit functioning better with new udall placed   cont heparin GTT to maintain CVVHD circuit patency  if hemodynamics continue to improve, can dc cvvhd in next 24-48 hours and start iHD via arm AVF on Monday  d/w CTICU team     ESRD on HD MWF @ [facility] [place] via left arm AVF  non-oliguric  s/p TAVR complicated by vfib cardiac arrest  cardiogenic shock on multiple pressors, inotropes, impella device removed and now IABP  ARF on vent  CAD / CABG / CMP / CHF / VHD (severe AS and MR)  HTN  DM2  anemia   GREG    plan:    cont CVVHD - UF 50cc/hr negative fluid balance  cont heparin GTT to maintain CVVHD circuit patency  if hemodynamics continue to improve and CVVH circuit keeps clotting, will try regular HD (Sat?) via left arm AVF  d/w CTICU team

## 2022-10-21 NOTE — DIETITIAN INITIAL EVALUATION ADULT - COLLABORATION WITH OTHER PROVIDERS
Interventions: EN, coordination of care. Monitoring/evaluation: protein/energy intake, diet order, EN order, GI, labs, meds, fluid status

## 2022-10-21 NOTE — DIETITIAN INITIAL EVALUATION ADULT - OTHER CALCULATIONS
[facility] 2010 (tmax 37.4, mve 8.8)= 1882 kcal/day vs 4816-8393 kcal/day based on 11-14 g/kg for obese critically ill patient. Pro needs 1.5-2 g/kg IBW 73 kg= 109-145 g/day. Fluid needs 1000 mL + UO for ESRD patient or per LIP.

## 2022-10-21 NOTE — DIETITIAN INITIAL EVALUATION ADULT - PERTINENT MEDS FT
MEDICATIONS  (STANDING):  aMIOdarone Infusion 0.5 mG/Min (16.7 mL/Hr) IV Continuous <Continuous>  cefepime   IVPB 1000 milliGRAM(s) IV Intermittent daily  chlorhexidine 0.12% Liquid 5 milliLiter(s) Oral Mucosa two times a day  clopidogrel Tablet 75 milliGRAM(s) Oral daily  CRRT Treatment    <Continuous>  dexMEDEtomidine Infusion 0.2 MICROgram(s)/kG/Hr (5.12 mL/Hr) IV Continuous <Continuous>  heparin  Infusion 500 Unit(s)/Hr (5 mL/Hr) IV Continuous <Continuous>  heparin  Infusion 200 Unit(s)/Hr (2 mL/Hr) IV Continuous <Continuous>  insulin regular Infusion 10 Unit(s)/Hr (10 mL/Hr) IV Continuous <Continuous>  meperidine     Injectable 25 milliGRAM(s) IV Push once  mupirocin 2% Ointment 1 Application(s) Both Nostrils every 12 hours  norepinephrine Infusion 0.05 MICROgram(s)/kG/Min (9.6 mL/Hr) IV Continuous <Continuous>  pantoprazole  Injectable 40 milliGRAM(s) IV Push daily  polyethylene glycol 3350 17 Gram(s) Oral daily  propofol Infusion 10 MICROgram(s)/kG/Min (6.14 mL/Hr) IV Continuous <Continuous>  PureFlow Dialysate RFP-400 (K 2 / Ca 3) 5000 milliLiter(s) (2000 mL/Hr) CRRT <Continuous>  senna 2 Tablet(s) Oral at bedtime  sodium chloride 0.9%. 1000 milliLiter(s) (20 mL/Hr) IV Continuous <Continuous>  vasopressin Infusion 0.04 Unit(s)/Min (6 mL/Hr) IV Continuous <Continuous>  vasopressin Infusion 0.04 Unit(s)/Min (6 mL/Hr) IV Continuous <Continuous>    MEDICATIONS  (PRN):  acetaminophen   IVPB .. 1000 milliGRAM(s) IV Intermittent once PRN Severe Pain (7 - 10)  dextrose Oral Gel 15 Gram(s) Oral once PRN Blood Glucose LESS THAN 70 milliGRAM(s)/deciliter  HYDROmorphone  Injectable 0.5 milliGRAM(s) IV Push every 6 hours PRN Breakthrough Pain  oxyCODONE    IR 5 milliGRAM(s) Oral every 4 hours PRN Moderate Pain (4 - 6)  oxyCODONE    IR 10 milliGRAM(s) Oral every 4 hours PRN Severe Pain (7 - 10)

## 2022-10-21 NOTE — PROGRESS NOTE ADULT - SUBJECTIVE AND OBJECTIVE BOX
OPERATIVE PROCEDURE(s):                POD #                       82yMale  SURGEON(s): ROSEANN Adan  SUBJECTIVE ASSESSMENT:   Vital Signs Last 24 Hrs  T(F): 96.8 (21 Oct 2022 08:00), Max: 98.4 (20 Oct 2022 16:00)  HR: 75 (21 Oct 2022 08:15) (75 - 141)  BP: 102/54 (21 Oct 2022 08:00) (97/53 - 166/79)  BP(mean): 74 (21 Oct 2022 08:00) (68 - 113)  ABP: 101/45 (21 Oct 2022 08:15) (41/44 - 185/64)  ABP(mean): 77 (21 Oct 2022 08:15)  RR: 19 (21 Oct 2022 08:15) (0 - 48)  SpO2: 98% (21 Oct 2022 08:15) (81% - 100%)  CVP(mm Hg): 10 (21 Oct 2022 08:15)  CVP(cm H2O): --  CO: 6 (21 Oct 2022 06:00)  CI: 2.7 (21 Oct 2022 06:00)  PA: 37/17 (21 Oct 2022 08:15)  SVR: 812 (21 Oct 2022 06:00)  Mode: AC/ CMV (Assist Control/ Continuous Mandatory Ventilation)  RR (machine): 16  TV (machine): 480  FiO2: 50  PEEP: 5  MAP: 10    I&O's Detail    20 Oct 2022 07:01  -  21 Oct 2022 07:00  --------------------------------------------------------  IN:    Amiodarone: 199.8 mL    Dexmedetomidine: 596.7 mL    dextrose 5% w/ Additives: 254.8 mL    Heparin: 10 mL    Heparin: 22 mL    Heparin: 25 mL    Insulin: 84 mL    IV PiggyBack: 400 mL    Milrinone: 19.3 mL    Milrinone: 162.4 mL    Milrinone: 15.6 mL    Norepinephrine: 264.5 mL    Propofol: 213.2 mL    sodium chloride 0.9%: 440 mL    Vasopressin: 123 mL  Total IN: 2830.3 mL    OUT:    Blood Loss (mL): 171 mL    EPINEPHrine: 0 mL    Heparin: 0 mL    Indwelling Catheter - Urethral (mL): 852 mL    Nasogastric/Oral tube (mL): 150 mL    NiCARdipine: 0 mL    Nitroglycerin: 0 mL    Other (mL): 948 mL  Total OUT: 2121 mL        Net:   I&O's Detail    19 Oct 2022 07:01  -  20 Oct 2022 07:00  --------------------------------------------------------  Total NET: 1826.9 mL      20 Oct 2022 07:01  -  21 Oct 2022 07:00  --------------------------------------------------------  Total NET: 709.3 mL        CAPILLARY BLOOD GLUCOSE      POCT Blood Glucose.: 89 mg/dL (21 Oct 2022 06:56)  POCT Blood Glucose.: 93 mg/dL (21 Oct 2022 06:01)  POCT Blood Glucose.: 124 mg/dL (21 Oct 2022 03:58)  POCT Blood Glucose.: 145 mg/dL (21 Oct 2022 02:39)  POCT Blood Glucose.: 159 mg/dL (21 Oct 2022 01:00)  POCT Blood Glucose.: 160 mg/dL (21 Oct 2022 00:29)  POCT Blood Glucose.: 156 mg/dL (20 Oct 2022 23:24)  POCT Blood Glucose.: 177 mg/dL (20 Oct 2022 22:05)  POCT Blood Glucose.: 118 mg/dL (20 Oct 2022 20:02)  POCT Blood Glucose.: 105 mg/dL (20 Oct 2022 18:30)  POCT Blood Glucose.: 101 mg/dL (20 Oct 2022 16:41)  POCT Blood Glucose.: 116 mg/dL (20 Oct 2022 15:15)  POCT Blood Glucose.: 118 mg/dL (20 Oct 2022 14:31)  POCT Blood Glucose.: 129 mg/dL (20 Oct 2022 10:08)    Physical Exam:  General: NAD; A&Ox3/Patient is intubated and sedated  Cardiac: S1/S2, RRR, no murmur, no rubs  Lungs: unlabored respirations, CTA b/l, no wheeze, no rales, no crackles  Abdomen: Soft/NT/ND; positive bowel sounds x 4  Sternum: Intact, no click, incision healing well with no drainage  Incisions: Incisions clean/dry/intact  Extremities: No edema b/l lower extremities; good capillary refill; no cyanosis; palpable 1+ pedal pulses b/l    Central Venous Catheter: Yes[]  No[] , If Yes indication:           Day #  Gomez Catheter: Yes  [] , No  [] , If yes indication:                      Day #  NGT: Yes [] No [] ,    If Yes Placement:                                     Day #  EPICARDIAL WIRES:  [] YES [] NO                                              Day #  BOWEL MOVEMENT:  [] YES [] NO, If No, Timing since last BM:      Day #  CHEST TUBE(Left/Right):  [] YES [] NO, If yes -  AIR LEAKS:  [] YES [] NO        LABS:                        8.9<L>  21.01<H> )-----------( 101<L>    ( 21 Oct 2022 02:41 )             27.3<L>                        9.5<L>  15.86<H> )-----------( 115<L>    ( 20 Oct 2022 14:41 )             29.0<L>    10-21    139  |  105  |  56<H>  ----------------------------<  140<H>  4.1   |  23  |  5.5<HH>  10-20    138  |  103  |  57<H>  ----------------------------<  107<H>  4.6   |  24  |  6.2<HH>    Ca    7.7<L>      21 Oct 2022 02:41  Phos  4.8     10-18  Mg     2.1     10-21    TPro  4.1<L> [6.0 - 8.0]  /  Alb  2.8<L> [3.5 - 5.2]  /  TBili  0.6 [0.2 - 1.2]  /  DBili  x   /  AST  26 [0 - 41]  /  ALT  5 [0 - 41]  /  AlkPhos  48 [30 - 115]  10-21    PT/INR - ( 20 Oct 2022 14:41 )   PT: ;   INR: 1.31 ratio         PT/INR - ( 20 Oct 2022 00:20 )   PT: ;   INR: 1.61 ratio         PTT - ( 21 Oct 2022 02:41 )  PTT:54.5 sec, PTT - ( 20 Oct 2022 14:41 )  PTT:27.8 sec    ABG - ( 21 Oct 2022 04:37 )  pH: 7.42  /  pCO2: 35    /  pO2: 94    / HCO3: 23    / Base Excess: -1.5  /  SaO2: 98.9  /  LA: 1.30             RADIOLOGY & ADDITIONAL TESTS:  CXR:  EKG:  MEDICATIONS  (STANDING):  albumin human  5% IVPB 250 milliLiter(s) IV Intermittent once  albumin human  5% IVPB 250 milliLiter(s) IV Intermittent once  albumin human  5% IVPB 250 milliLiter(s) IV Intermittent once  albumin human  5% IVPB 250 milliLiter(s) IV Intermittent once  aMIOdarone Infusion 1 mG/Min (33.3 mL/Hr) IV Continuous <Continuous>  aspirin enteric coated 81 milliGRAM(s) Oral daily  cefepime   IVPB 1000 milliGRAM(s) IV Intermittent daily  chlorhexidine 0.12% Liquid 5 milliLiter(s) Oral Mucosa two times a day  clopidogrel Tablet 75 milliGRAM(s) Oral daily  CRRT Treatment    <Continuous>  dexMEDEtomidine Infusion 0.2 MICROgram(s)/kG/Hr (5.12 mL/Hr) IV Continuous <Continuous>  dextrose 5% 500 milliLiter(s) (17.3 mL/Hr) IV Continuous <Continuous>  dextrose 5%. 1000 milliLiter(s) (50 mL/Hr) IV Continuous <Continuous>  dextrose 50% Injectable 25 Gram(s) IV Push once  dextrose 50% Injectable 50 milliLiter(s) IV Push every 15 minutes  dextrose 50% Injectable 25 milliLiter(s) IV Push every 15 minutes  EPINEPHrine    Infusion 0.04 MICROgram(s)/kG/Min (7.68 mL/Hr) IV Continuous <Continuous>  glucagon  Injectable 1 milliGRAM(s) IntraMuscular once  heparin  Infusion 500 Unit(s)/Hr (5 mL/Hr) IV Continuous <Continuous>  heparin  Infusion 200 Unit(s)/Hr (2 mL/Hr) IV Continuous <Continuous>  insulin regular Infusion 10 Unit(s)/Hr (10 mL/Hr) IV Continuous <Continuous>  meperidine     Injectable 25 milliGRAM(s) IV Push once  mupirocin 2% Ointment 1 Application(s) Both Nostrils every 12 hours  niCARdipine Infusion 5 mG/Hr (25 mL/Hr) IV Continuous <Continuous>  nitroglycerin  Infusion 30 MICROgram(s)/Min (9 mL/Hr) IV Continuous <Continuous>  norepinephrine Infusion 0.05 MICROgram(s)/kG/Min (9.6 mL/Hr) IV Continuous <Continuous>  pantoprazole  Injectable 40 milliGRAM(s) IV Push daily  polyethylene glycol 3350 17 Gram(s) Oral daily  propofol Infusion 10 MICROgram(s)/kG/Min (6.14 mL/Hr) IV Continuous <Continuous>  PureFlow Dialysate RFP-400 (K 2 / Ca 3) 5000 milliLiter(s) (2000 mL/Hr) CRRT <Continuous>  senna 2 Tablet(s) Oral at bedtime  sodium chloride 0.9%. 1000 milliLiter(s) (20 mL/Hr) IV Continuous <Continuous>  vasopressin Infusion 0.04 Unit(s)/Min (6 mL/Hr) IV Continuous <Continuous>  vasopressin Infusion 0.04 Unit(s)/Min (6 mL/Hr) IV Continuous <Continuous>    MEDICATIONS  (PRN):  acetaminophen   IVPB .. 1000 milliGRAM(s) IV Intermittent once PRN Severe Pain (7 - 10)  dextrose Oral Gel 15 Gram(s) Oral once PRN Blood Glucose LESS THAN 70 milliGRAM(s)/deciliter  HYDROmorphone  Injectable 0.5 milliGRAM(s) IV Push every 6 hours PRN Breakthrough Pain  oxyCODONE    IR 5 milliGRAM(s) Oral every 4 hours PRN Moderate Pain (4 - 6)  oxyCODONE    IR 10 milliGRAM(s) Oral every 4 hours PRN Severe Pain (7 - 10)    HEPARIN:  [] YES [] NO  Dose: XX UNITS/HR UNITS Q8H  LOVENOX:[] YES [] NO  Dose: XX mg Q24H  COUMADIN: []  YES [] NO  Dose: XX mg  Q24H  SCD's: YES b/l  GI Prophylaxis: Protonix [], Pepcid [], None [], (Contra-indication:.....)    Post-Op Beta-Blockers: Yes [], No[], If No, then contraindication:  Post-Op Aspirin: Yes [],  No [], If No, then contraindication:  Post-Op Statin: Yes [], No[], If No, then contraindication:  Allergies    No Known Allergies    Intolerances      Ambulation/Activity Status:    Assessment/Plan:  82y Male status-post .....  - Case and plan discussed with CTU Intensivist and CT Surgeon - Dr. Mosquera/Keshawn/Loco  - Continue CTU supportive care    - Continue DVT/GI prophylaxis  - Incentive Spirometry 10 times an hour  - Continue to advance physical activity as tolerated and continue PT/OT as directed  1. CAD: Continue ASA, statin, BB  2. HTN:   3. A. Fib:   4. COPD/Hypoxia:   5. DM/Glucose Control:     Social Service Disposition:     OPERATIVE PROCEDURE(s):                POD #                       82yMale  SURGEON(s): ROSEANN Adan  SUBJECTIVE ASSESSMENT:   Vital Signs Last 24 Hrs  T(F): 96.8 (21 Oct 2022 08:00), Max: 98.4 (20 Oct 2022 16:00)  HR: 75 (21 Oct 2022 08:15) (75 - 141)  BP: 102/54 (21 Oct 2022 08:00) (97/53 - 166/79)  BP(mean): 74 (21 Oct 2022 08:00) (68 - 113)  ABP: 101/45 (21 Oct 2022 08:15) (41/44 - 185/64)  ABP(mean): 77 (21 Oct 2022 08:15)  RR: 19 (21 Oct 2022 08:15) (0 - 48)  SpO2: 98% (21 Oct 2022 08:15) (81% - 100%)  CVP(mm Hg): 10 (21 Oct 2022 08:15)  CVP(cm H2O): --  CO: 6 (21 Oct 2022 06:00)  CI: 2.7 (21 Oct 2022 06:00)  PA: 37/17 (21 Oct 2022 08:15)  SVR: 812 (21 Oct 2022 06:00)  Mode: AC/ CMV (Assist Control/ Continuous Mandatory Ventilation)  RR (machine): 16  TV (machine): 480  FiO2: 50  PEEP: 5  MAP: 10    I&O's Detail    20 Oct 2022 07:01  -  21 Oct 2022 07:00  --------------------------------------------------------  IN:    Amiodarone: 199.8 mL    Dexmedetomidine: 596.7 mL    dextrose 5% w/ Additives: 254.8 mL    Heparin: 10 mL    Heparin: 22 mL    Heparin: 25 mL    Insulin: 84 mL    IV PiggyBack: 400 mL    Milrinone: 19.3 mL    Milrinone: 162.4 mL    Milrinone: 15.6 mL    Norepinephrine: 264.5 mL    Propofol: 213.2 mL    sodium chloride 0.9%: 440 mL    Vasopressin: 123 mL  Total IN: 2830.3 mL    OUT:    Blood Loss (mL): 171 mL    EPINEPHrine: 0 mL    Heparin: 0 mL    Indwelling Catheter - Urethral (mL): 852 mL    Nasogastric/Oral tube (mL): 150 mL    NiCARdipine: 0 mL    Nitroglycerin: 0 mL    Other (mL): 948 mL  Total OUT: 2121 mL        Net:   I&O's Detail    19 Oct 2022 07:01  -  20 Oct 2022 07:00  --------------------------------------------------------  Total NET: 1826.9 mL      20 Oct 2022 07:01  -  21 Oct 2022 07:00  --------------------------------------------------------  Total NET: 709.3 mL        CAPILLARY BLOOD GLUCOSE      POCT Blood Glucose.: 89 mg/dL (21 Oct 2022 06:56)  POCT Blood Glucose.: 93 mg/dL (21 Oct 2022 06:01)  POCT Blood Glucose.: 124 mg/dL (21 Oct 2022 03:58)  POCT Blood Glucose.: 145 mg/dL (21 Oct 2022 02:39)  POCT Blood Glucose.: 159 mg/dL (21 Oct 2022 01:00)  POCT Blood Glucose.: 160 mg/dL (21 Oct 2022 00:29)  POCT Blood Glucose.: 156 mg/dL (20 Oct 2022 23:24)  POCT Blood Glucose.: 177 mg/dL (20 Oct 2022 22:05)  POCT Blood Glucose.: 118 mg/dL (20 Oct 2022 20:02)  POCT Blood Glucose.: 105 mg/dL (20 Oct 2022 18:30)  POCT Blood Glucose.: 101 mg/dL (20 Oct 2022 16:41)  POCT Blood Glucose.: 116 mg/dL (20 Oct 2022 15:15)  POCT Blood Glucose.: 118 mg/dL (20 Oct 2022 14:31)  POCT Blood Glucose.: 129 mg/dL (20 Oct 2022 10:08)    Physical Exam:  General: NAD; A&Ox3/Patient is intubated and sedated  Cardiac: S1/S2, RRR, no murmur, no rubs  Lungs: unlabored respirations, CTA b/l, no wheeze, no rales, no crackles  Abdomen: Soft/NT/ND; positive bowel sounds x 4  Sternum: Intact, no click, incision healing well with no drainage  Incisions: Incisions clean/dry/intact  Extremities: No edema b/l lower extremities; good capillary refill; no cyanosis; palpable 1+ pedal pulses b/l    Central Venous Catheter: Yes[]  No[] , If Yes indication:           Day #  Gomez Catheter: Yes  [] , No  [] , If yes indication:                      Day #  NGT: Yes [] No [] ,    If Yes Placement:                                     Day #  EPICARDIAL WIRES:  [] YES [] NO                                              Day #  BOWEL MOVEMENT:  [] YES [] NO, If No, Timing since last BM:      Day #  CHEST TUBE(Left/Right):  [] YES [] NO, If yes -  AIR LEAKS:  [] YES [] NO        LABS:                        8.9<L>  21.01<H> )-----------( 101<L>    ( 21 Oct 2022 02:41 )             27.3<L>                        9.5<L>  15.86<H> )-----------( 115<L>    ( 20 Oct 2022 14:41 )             29.0<L>    10-21    139  |  105  |  56<H>  ----------------------------<  140<H>  4.1   |  23  |  5.5<HH>  10-20    138  |  103  |  57<H>  ----------------------------<  107<H>  4.6   |  24  |  6.2<HH>    Ca    7.7<L>      21 Oct 2022 02:41  Phos  4.8     10-18  Mg     2.1     10-21    TPro  4.1<L> [6.0 - 8.0]  /  Alb  2.8<L> [3.5 - 5.2]  /  TBili  0.6 [0.2 - 1.2]  /  DBili  x   /  AST  26 [0 - 41]  /  ALT  5 [0 - 41]  /  AlkPhos  48 [30 - 115]  10-21    PT/INR - ( 20 Oct 2022 14:41 )   PT: ;   INR: 1.31 ratio         PT/INR - ( 20 Oct 2022 00:20 )   PT: ;   INR: 1.61 ratio         PTT - ( 21 Oct 2022 02:41 )  PTT:54.5 sec, PTT - ( 20 Oct 2022 14:41 )  PTT:27.8 sec    ABG - ( 21 Oct 2022 04:37 )  pH: 7.42  /  pCO2: 35    /  pO2: 94    / HCO3: 23    / Base Excess: -1.5  /  SaO2: 98.9  /  LA: 1.30             RADIOLOGY & ADDITIONAL TESTS:  CXR:  EKG:  MEDICATIONS  (STANDING):  albumin human  5% IVPB 250 milliLiter(s) IV Intermittent once  albumin human  5% IVPB 250 milliLiter(s) IV Intermittent once  albumin human  5% IVPB 250 milliLiter(s) IV Intermittent once  albumin human  5% IVPB 250 milliLiter(s) IV Intermittent once  aMIOdarone Infusion 1 mG/Min (33.3 mL/Hr) IV Continuous <Continuous>  aspirin enteric coated 81 milliGRAM(s) Oral daily  cefepime   IVPB 1000 milliGRAM(s) IV Intermittent daily  chlorhexidine 0.12% Liquid 5 milliLiter(s) Oral Mucosa two times a day  clopidogrel Tablet 75 milliGRAM(s) Oral daily  CRRT Treatment    <Continuous>  dexMEDEtomidine Infusion 0.2 MICROgram(s)/kG/Hr (5.12 mL/Hr) IV Continuous <Continuous>  dextrose 5% 500 milliLiter(s) (17.3 mL/Hr) IV Continuous <Continuous>  dextrose 5%. 1000 milliLiter(s) (50 mL/Hr) IV Continuous <Continuous>  dextrose 50% Injectable 25 Gram(s) IV Push once  dextrose 50% Injectable 50 milliLiter(s) IV Push every 15 minutes  dextrose 50% Injectable 25 milliLiter(s) IV Push every 15 minutes  glucagon  Injectable 1 milliGRAM(s) IntraMuscular once  heparin  Infusion 500 Unit(s)/Hr (5 mL/Hr) IV Continuous <Continuous>  heparin  Infusion 200 Unit(s)/Hr (2 mL/Hr) IV Continuous <Continuous>  insulin regular Infusion 10 Unit(s)/Hr (10 mL/Hr) IV Continuous <Continuous>  meperidine     Injectable 25 milliGRAM(s) IV Push once  mupirocin 2% Ointment 1 Application(s) Both Nostrils every 12 hours  norepinephrine Infusion 0.05 MICROgram(s)/kG/Min (9.6 mL/Hr) IV Continuous <Continuous>  pantoprazole  Injectable 40 milliGRAM(s) IV Push daily  polyethylene glycol 3350 17 Gram(s) Oral daily  propofol Infusion 10 MICROgram(s)/kG/Min (6.14 mL/Hr) IV Continuous <Continuous>  PureFlow Dialysate RFP-400 (K 2 / Ca 3) 5000 milliLiter(s) (2000 mL/Hr) CRRT <Continuous>  senna 2 Tablet(s) Oral at bedtime  sodium chloride 0.9%. 1000 milliLiter(s) (20 mL/Hr) IV Continuous <Continuous>  vasopressin Infusion 0.04 Unit(s)/Min (6 mL/Hr) IV Continuous <Continuous>      MEDICATIONS  (PRN):  acetaminophen   IVPB .. 1000 milliGRAM(s) IV Intermittent once PRN Severe Pain (7 - 10)  dextrose Oral Gel 15 Gram(s) Oral once PRN Blood Glucose LESS THAN 70 milliGRAM(s)/deciliter  HYDROmorphone  Injectable 0.5 milliGRAM(s) IV Push every 6 hours PRN Breakthrough Pain  oxyCODONE    IR 5 milliGRAM(s) Oral every 4 hours PRN Moderate Pain (4 - 6)  oxyCODONE    IR 10 milliGRAM(s) Oral every 4 hours PRN Severe Pain (7 - 10)    HEPARIN:  [] YES [] NO  Dose: XX UNITS/HR UNITS Q8H  LOVENOX:[] YES [] NO  Dose: XX mg Q24H  COUMADIN: []  YES [] NO  Dose: XX mg  Q24H  SCD's: YES b/l  GI Prophylaxis: Protonix [], Pepcid [], None [], (Contra-indication:.....)    Post-Op Beta-Blockers: Yes [], No[], If No, then contraindication:  Post-Op Aspirin: Yes [],  No [], If No, then contraindication:  Post-Op Statin: Yes [], No[], If No, then contraindication:  Allergies    No Known Allergies    Intolerances      Ambulation/Activity Status:    Assessment/Plan:  82y Male status-post .....  - Case and plan discussed with CTU Intensivist and CT Surgeon - Dr. Mosquera/Keshawn/Loco  - Continue CTU supportive care    - Continue DVT/GI prophylaxis  - Incentive Spirometry 10 times an hour  - Continue to advance physical activity as tolerated and continue PT/OT as directed  1. CAD: Continue ASA, statin, BB  2. HTN:   3. A. Fib:   4. COPD/Hypoxia:   5. DM/Glucose Control:     Social Service Disposition:     OPERATIVE PROCEDURE(s):      TAVR          POD #  2                     82yMale  SURGEON(s): ROSEANN Adan  SUBJECTIVE ASSESSMENT: pt seen and examined  Vital Signs Last 24 Hrs  T(F): 96.8 (21 Oct 2022 08:00), Max: 98.4 (20 Oct 2022 16:00)  HR: 75 (21 Oct 2022 08:15) (75 - 141)  BP: 102/54 (21 Oct 2022 08:00) (97/53 - 166/79)  BP(mean): 74 (21 Oct 2022 08:00) (68 - 113)  ABP: 101/45 (21 Oct 2022 08:15) (41/44 - 185/64)  ABP(mean): 77 (21 Oct 2022 08:15)  RR: 19 (21 Oct 2022 08:15) (0 - 48)  SpO2: 98% (21 Oct 2022 08:15) (81% - 100%)  CVP(mm Hg): 10 (21 Oct 2022 08:15)  CO: 6 (21 Oct 2022 06:00)  CI: 2.7 (21 Oct 2022 06:00)  PA: 37/17 (21 Oct 2022 08:15)  SVR: 812 (21 Oct 2022 06:00)  Mode: AC/ CMV (Assist Control/ Continuous Mandatory Ventilation)  RR (machine): 16  TV (machine): 480  FiO2: 50  PEEP: 5  MAP: 10    I&O's Detail    20 Oct 2022 07:01  -  21 Oct 2022 07:00  --------------------------------------------------------  IN:    Amiodarone: 199.8 mL    Dexmedetomidine: 596.7 mL    dextrose 5% w/ Additives: 254.8 mL    Heparin: 10 mL    Heparin: 22 mL    Heparin: 25 mL    Insulin: 84 mL    IV PiggyBack: 400 mL    Milrinone: 19.3 mL    Milrinone: 162.4 mL    Milrinone: 15.6 mL    Norepinephrine: 264.5 mL    Propofol: 213.2 mL    sodium chloride 0.9%: 440 mL    Vasopressin: 123 mL  Total IN: 2830.3 mL    OUT:    Blood Loss (mL): 171 mL    EPINEPHrine: 0 mL    Heparin: 0 mL    Indwelling Catheter - Urethral (mL): 852 mL    Nasogastric/Oral tube (mL): 150 mL    NiCARdipine: 0 mL    Nitroglycerin: 0 mL    Other (mL): 948 mL  Total OUT: 2121 mL        Net:   I&O's Detail    19 Oct 2022 07:01  -  20 Oct 2022 07:00  --------------------------------------------------------  Total NET: 1826.9 mL      20 Oct 2022 07:01  -  21 Oct 2022 07:00  --------------------------------------------------------  Total NET: 709.3 mL        CAPILLARY BLOOD GLUCOSE      POCT Blood Glucose.: 89 mg/dL (21 Oct 2022 06:56)  POCT Blood Glucose.: 93 mg/dL (21 Oct 2022 06:01)  POCT Blood Glucose.: 124 mg/dL (21 Oct 2022 03:58)  POCT Blood Glucose.: 145 mg/dL (21 Oct 2022 02:39)  POCT Blood Glucose.: 159 mg/dL (21 Oct 2022 01:00)  POCT Blood Glucose.: 160 mg/dL (21 Oct 2022 00:29)  POCT Blood Glucose.: 156 mg/dL (20 Oct 2022 23:24)  POCT Blood Glucose.: 177 mg/dL (20 Oct 2022 22:05)  POCT Blood Glucose.: 118 mg/dL (20 Oct 2022 20:02)  POCT Blood Glucose.: 105 mg/dL (20 Oct 2022 18:30)  POCT Blood Glucose.: 101 mg/dL (20 Oct 2022 16:41)  POCT Blood Glucose.: 116 mg/dL (20 Oct 2022 15:15)  POCT Blood Glucose.: 118 mg/dL (20 Oct 2022 14:31)  POCT Blood Glucose.: 129 mg/dL (20 Oct 2022 10:08)    Physical Exam:  General: Patient is intubated and sedated  Cardiac: S1/S2, RRR, no murmur, no rubs  Lungs: unlabored respirations, CTA b/l, no wheeze, no rales, no crackles  Abdomen: Soft/NT/ND; positive bowel sounds x 4  Incisions: Incisions clean/dry/intact  Extremities: No edema b/l lower extremities; good capillary refill; no cyanosis; palpable 1+ pedal pulses b/l    Central Venous Catheter: Yes[x]  If Yes indication:   critical pt        Day #2  Gomez Catheter: Yes  [x] , If yes indication:   strict I/Os                   Day #2  BOWEL MOVEMENT:  [x] NO, If No, Timing since last BM:      Day #2      LABS:                        8.9<L>  21.01<H> )-----------( 101<L>    ( 21 Oct 2022 02:41 )             27.3<L>                        9.5<L>  15.86<H> )-----------( 115<L>    ( 20 Oct 2022 14:41 )             29.0<L>    10-21    139  |  105  |  56<H>  ----------------------------<  140<H>  4.1   |  23  |  5.5<HH>  10-20    138  |  103  |  57<H>  ----------------------------<  107<H>  4.6   |  24  |  6.2<HH>    Ca    7.7<L>      21 Oct 2022 02:41  Phos  4.8     10-18  Mg     2.1     10-21    TPro  4.1<L> [6.0 - 8.0]  /  Alb  2.8<L> [3.5 - 5.2]  /  TBili  0.6 [0.2 - 1.2]  /  DBili  x   /  AST  26 [0 - 41]  /  ALT  5 [0 - 41]  /  AlkPhos  48 [30 - 115]  10-21    PT/INR - ( 20 Oct 2022 14:41 )   PT: ;   INR: 1.31 ratio         PT/INR - ( 20 Oct 2022 00:20 )   PT: ;   INR: 1.61 ratio         PTT - ( 21 Oct 2022 02:41 )  PTT:54.5 sec, PTT - ( 20 Oct 2022 14:41 )  PTT:27.8 sec    ABG - ( 21 Oct 2022 04:37 )  pH: 7.42  /  pCO2: 35    /  pO2: 94    / HCO3: 23    / Base Excess: -1.5  /  SaO2: 98.9  /  LA: 1.30             RADIOLOGY & ADDITIONAL TESTS:  CXR:< from: Xray Chest 1 View-PORTABLE IMMEDIATE (Xray Chest 1 View-PORTABLE IMMEDIATE .) (10.20.22 @ 14:36) >  Impression:    Status post a median sternotomy and cardiomegaly, unchanged.    Bilateral opacities, unchanged.    Support tubes and lines as above.    Left-sided permanent pacemaker.    < end of copied text >    EKG:< from: 12 Lead ECG (10.21.22 @ 07:54) >  Ventricular Rate 77 BPM    Atrial Rate 77 BPM    P-R Interval 222 ms    QRS Duration 148 ms    Q-T Interval 468 ms    QTC Calculation(Bazett) 529 ms    P Axis 48 degrees    R Axis 198 degrees    T Axis 69 degrees    Diagnosis Line *** Poor data quality, interpretation may be adversely affected  Sinus rhythm with 1st degree A-V block  Right bundle branch block  Abnormal ECG    < end of copied text >    MEDICATIONS  (STANDING):  albumin human  5% IVPB 250 milliLiter(s) IV Intermittent once  albumin human  5% IVPB 250 milliLiter(s) IV Intermittent once  albumin human  5% IVPB 250 milliLiter(s) IV Intermittent once  albumin human  5% IVPB 250 milliLiter(s) IV Intermittent once  aMIOdarone Infusion 1 mG/Min (33.3 mL/Hr) IV Continuous <Continuous>  aspirin enteric coated 81 milliGRAM(s) Oral daily  cefepime   IVPB 1000 milliGRAM(s) IV Intermittent daily  chlorhexidine 0.12% Liquid 5 milliLiter(s) Oral Mucosa two times a day  clopidogrel Tablet 75 milliGRAM(s) Oral daily  CRRT Treatment    <Continuous>  dexMEDEtomidine Infusion 0.2 MICROgram(s)/kG/Hr (5.12 mL/Hr) IV Continuous <Continuous>  dextrose 5% 500 milliLiter(s) (17.3 mL/Hr) IV Continuous <Continuous>  dextrose 5%. 1000 milliLiter(s) (50 mL/Hr) IV Continuous <Continuous>  dextrose 50% Injectable 25 Gram(s) IV Push once  dextrose 50% Injectable 50 milliLiter(s) IV Push every 15 minutes  dextrose 50% Injectable 25 milliLiter(s) IV Push every 15 minutes  glucagon  Injectable 1 milliGRAM(s) IntraMuscular once  heparin  Infusion 500 Unit(s)/Hr (5 mL/Hr) IV Continuous <Continuous>  heparin  Infusion 200 Unit(s)/Hr (2 mL/Hr) IV Continuous <Continuous>  insulin regular Infusion 10 Unit(s)/Hr (10 mL/Hr) IV Continuous <Continuous>  meperidine     Injectable 25 milliGRAM(s) IV Push once  mupirocin 2% Ointment 1 Application(s) Both Nostrils every 12 hours  norepinephrine Infusion 0.05 MICROgram(s)/kG/Min (9.6 mL/Hr) IV Continuous <Continuous>  pantoprazole  Injectable 40 milliGRAM(s) IV Push daily  polyethylene glycol 3350 17 Gram(s) Oral daily  propofol Infusion 10 MICROgram(s)/kG/Min (6.14 mL/Hr) IV Continuous <Continuous>  PureFlow Dialysate RFP-400 (K 2 / Ca 3) 5000 milliLiter(s) (2000 mL/Hr) CRRT <Continuous>  senna 2 Tablet(s) Oral at bedtime  sodium chloride 0.9%. 1000 milliLiter(s) (20 mL/Hr) IV Continuous <Continuous>  vasopressin Infusion 0.04 Unit(s)/Min (6 mL/Hr) IV Continuous <Continuous>      MEDICATIONS  (PRN):  acetaminophen   IVPB .. 1000 milliGRAM(s) IV Intermittent once PRN Severe Pain (7 - 10)  dextrose Oral Gel 15 Gram(s) Oral once PRN Blood Glucose LESS THAN 70 milliGRAM(s)/deciliter  HYDROmorphone  Injectable 0.5 milliGRAM(s) IV Push every 6 hours PRN Breakthrough Pain  oxyCODONE    IR 5 milliGRAM(s) Oral every 4 hours PRN Moderate Pain (4 - 6)  oxyCODONE    IR 10 milliGRAM(s) Oral every 4 hours PRN Severe Pain (7 - 10)    HEPARIN:  [x] YES   Dose: 200 Unit(s)/Hr (2 mL/Hr) IV Continuous    SCD's: YES b/l  GI Prophylaxis: Protonix [x],      Allergies    No Known Allergies    Intolerances      Ambulation/Activity Status:    Assessment/Plan:  82y Male status-post TAVR POD#2 s/p v fib arrest and impella placed  - Case and plan discussed with CTU Intensivist and CT Surgeon - Dr. Adan  - Continue CTU supportive care    - Continue DVT/GI prophylaxis  - Incentive Spirometry 10 times an hour  - Continue to advance physical activity as tolerated and continue PT/OT as directed  1.  Continue ASA, plavix  2. HTN: monitor bp, wean off pressors as tolerated   3. A. Fib ppx: cont amio drip, monitor electrolytes, replete PRN  4. COPD/Hypoxia: intubated, O2 95%, wean vent after impella removed   5. DM/Glucose Control: A1c 6.0%,  cont insulin drip  6. Anemia 2/2 acute blood loss: transfused 4U RBC, stable.   7. Leukocytosis: f/u blood cx  8. Nephrology Recommendations: resume CVVHD, cont heparin to maintain CVVHD circuit patency, start bumex gtt 1mg/hr   OPERATIVE PROCEDURE(s):      TAVR          POD #  2                     82yMale  SURGEON(s): ROSEANN Adan  SUBJECTIVE ASSESSMENT: pt seen and examined  Vital Signs Last 24 Hrs  T(F): 96.8 (21 Oct 2022 08:00), Max: 98.4 (20 Oct 2022 16:00)  HR: 75 (21 Oct 2022 08:15) (75 - 141)  BP: 102/54 (21 Oct 2022 08:00) (97/53 - 166/79)  BP(mean): 74 (21 Oct 2022 08:00) (68 - 113)  ABP: 101/45 (21 Oct 2022 08:15) (41/44 - 185/64)  ABP(mean): 77 (21 Oct 2022 08:15)  RR: 19 (21 Oct 2022 08:15) (0 - 48)  SpO2: 98% (21 Oct 2022 08:15) (81% - 100%)  CVP(mm Hg): 10 (21 Oct 2022 08:15)  CO: 6 (21 Oct 2022 06:00)  CI: 2.7 (21 Oct 2022 06:00)  PA: 37/17 (21 Oct 2022 08:15)  SVR: 812 (21 Oct 2022 06:00)  Mode: AC/ CMV (Assist Control/ Continuous Mandatory Ventilation)  RR (machine): 16  TV (machine): 480  FiO2: 50  PEEP: 5  MAP: 10    I&O's Detail    20 Oct 2022 07:01  -  21 Oct 2022 07:00  --------------------------------------------------------  IN:    Amiodarone: 199.8 mL    Dexmedetomidine: 596.7 mL    dextrose 5% w/ Additives: 254.8 mL    Heparin: 10 mL    Heparin: 22 mL    Heparin: 25 mL    Insulin: 84 mL    IV PiggyBack: 400 mL    Milrinone: 19.3 mL    Milrinone: 162.4 mL    Milrinone: 15.6 mL    Norepinephrine: 264.5 mL    Propofol: 213.2 mL    sodium chloride 0.9%: 440 mL    Vasopressin: 123 mL  Total IN: 2830.3 mL    OUT:    Blood Loss (mL): 171 mL    EPINEPHrine: 0 mL    Heparin: 0 mL    Indwelling Catheter - Urethral (mL): 852 mL    Nasogastric/Oral tube (mL): 150 mL    NiCARdipine: 0 mL    Nitroglycerin: 0 mL    Other (mL): 948 mL  Total OUT: 2121 mL        Net:   I&O's Detail    19 Oct 2022 07:01  -  20 Oct 2022 07:00  --------------------------------------------------------  Total NET: 1826.9 mL      20 Oct 2022 07:01  -  21 Oct 2022 07:00  --------------------------------------------------------  Total NET: 709.3 mL        CAPILLARY BLOOD GLUCOSE      POCT Blood Glucose.: 89 mg/dL (21 Oct 2022 06:56)  POCT Blood Glucose.: 93 mg/dL (21 Oct 2022 06:01)  POCT Blood Glucose.: 124 mg/dL (21 Oct 2022 03:58)  POCT Blood Glucose.: 145 mg/dL (21 Oct 2022 02:39)  POCT Blood Glucose.: 159 mg/dL (21 Oct 2022 01:00)  POCT Blood Glucose.: 160 mg/dL (21 Oct 2022 00:29)  POCT Blood Glucose.: 156 mg/dL (20 Oct 2022 23:24)  POCT Blood Glucose.: 177 mg/dL (20 Oct 2022 22:05)  POCT Blood Glucose.: 118 mg/dL (20 Oct 2022 20:02)  POCT Blood Glucose.: 105 mg/dL (20 Oct 2022 18:30)  POCT Blood Glucose.: 101 mg/dL (20 Oct 2022 16:41)  POCT Blood Glucose.: 116 mg/dL (20 Oct 2022 15:15)  POCT Blood Glucose.: 118 mg/dL (20 Oct 2022 14:31)  POCT Blood Glucose.: 129 mg/dL (20 Oct 2022 10:08)    Physical Exam:  General: Patient is intubated and sedated  Cardiac: S1/S2, RRR, no murmur, no rubs  Lungs: unlabored respirations, CTA b/l, no wheeze, no rales, no crackles  Abdomen: Soft/NT/ND; positive bowel sounds x 4  Incisions: Incisions clean/dry/intact  Extremities: No edema b/l lower extremities; good capillary refill; no cyanosis; palpable 1+ pedal pulses b/l    Central Venous Catheter: Yes[x]  If Yes indication:   critical pt        Day #2  Gomez Catheter: Yes  [x] , If yes indication:   strict I/Os                   Day #2  BOWEL MOVEMENT:  [x] NO, If No, Timing since last BM:      Day #2      LABS:                        8.9<L>  21.01<H> )-----------( 101<L>    ( 21 Oct 2022 02:41 )             27.3<L>                        9.5<L>  15.86<H> )-----------( 115<L>    ( 20 Oct 2022 14:41 )             29.0<L>    10-21    139  |  105  |  56<H>  ----------------------------<  140<H>  4.1   |  23  |  5.5<HH>  10-20    138  |  103  |  57<H>  ----------------------------<  107<H>  4.6   |  24  |  6.2<HH>    Ca    7.7<L>      21 Oct 2022 02:41  Phos  4.8     10-18  Mg     2.1     10-21    TPro  4.1<L> [6.0 - 8.0]  /  Alb  2.8<L> [3.5 - 5.2]  /  TBili  0.6 [0.2 - 1.2]  /  DBili  x   /  AST  26 [0 - 41]  /  ALT  5 [0 - 41]  /  AlkPhos  48 [30 - 115]  10-21    PT/INR - ( 20 Oct 2022 14:41 )   PT: ;   INR: 1.31 ratio         PT/INR - ( 20 Oct 2022 00:20 )   PT: ;   INR: 1.61 ratio         PTT - ( 21 Oct 2022 02:41 )  PTT:54.5 sec, PTT - ( 20 Oct 2022 14:41 )  PTT:27.8 sec    ABG - ( 21 Oct 2022 04:37 )  pH: 7.42  /  pCO2: 35    /  pO2: 94    / HCO3: 23    / Base Excess: -1.5  /  SaO2: 98.9  /  LA: 1.30             RADIOLOGY & ADDITIONAL TESTS:  CXR:< from: Xray Chest 1 View-PORTABLE IMMEDIATE (Xray Chest 1 View-PORTABLE IMMEDIATE .) (10.20.22 @ 14:36) >  Impression:    Status post a median sternotomy and cardiomegaly, unchanged.    Bilateral opacities, unchanged.    Support tubes and lines as above.    Left-sided permanent pacemaker.    < end of copied text >    EKG:< from: 12 Lead ECG (10.21.22 @ 07:54) >  Ventricular Rate 77 BPM    Atrial Rate 77 BPM    P-R Interval 222 ms    QRS Duration 148 ms    Q-T Interval 468 ms    QTC Calculation(Bazett) 529 ms    P Axis 48 degrees    R Axis 198 degrees    T Axis 69 degrees    Diagnosis Line *** Poor data quality, interpretation may be adversely affected  Sinus rhythm with 1st degree A-V block  Right bundle branch block  Abnormal ECG    < end of copied text >    MEDICATIONS  (STANDING):  albumin human  5% IVPB 250 milliLiter(s) IV Intermittent once  albumin human  5% IVPB 250 milliLiter(s) IV Intermittent once  albumin human  5% IVPB 250 milliLiter(s) IV Intermittent once  albumin human  5% IVPB 250 milliLiter(s) IV Intermittent once  aMIOdarone Infusion 1 mG/Min (33.3 mL/Hr) IV Continuous <Continuous>  aspirin enteric coated 81 milliGRAM(s) Oral daily  cefepime   IVPB 1000 milliGRAM(s) IV Intermittent daily  chlorhexidine 0.12% Liquid 5 milliLiter(s) Oral Mucosa two times a day  clopidogrel Tablet 75 milliGRAM(s) Oral daily  CRRT Treatment    <Continuous>  dexMEDEtomidine Infusion 0.2 MICROgram(s)/kG/Hr (5.12 mL/Hr) IV Continuous <Continuous>  dextrose 5% 500 milliLiter(s) (17.3 mL/Hr) IV Continuous <Continuous>  dextrose 5%. 1000 milliLiter(s) (50 mL/Hr) IV Continuous <Continuous>  dextrose 50% Injectable 25 Gram(s) IV Push once  dextrose 50% Injectable 50 milliLiter(s) IV Push every 15 minutes  dextrose 50% Injectable 25 milliLiter(s) IV Push every 15 minutes  glucagon  Injectable 1 milliGRAM(s) IntraMuscular once  heparin  Infusion 500 Unit(s)/Hr (5 mL/Hr) IV Continuous <Continuous>  heparin  Infusion 200 Unit(s)/Hr (2 mL/Hr) IV Continuous <Continuous>  insulin regular Infusion 10 Unit(s)/Hr (10 mL/Hr) IV Continuous <Continuous>  meperidine     Injectable 25 milliGRAM(s) IV Push once  mupirocin 2% Ointment 1 Application(s) Both Nostrils every 12 hours  norepinephrine Infusion 0.05 MICROgram(s)/kG/Min (9.6 mL/Hr) IV Continuous <Continuous>  pantoprazole  Injectable 40 milliGRAM(s) IV Push daily  polyethylene glycol 3350 17 Gram(s) Oral daily  propofol Infusion 10 MICROgram(s)/kG/Min (6.14 mL/Hr) IV Continuous <Continuous>  PureFlow Dialysate RFP-400 (K 2 / Ca 3) 5000 milliLiter(s) (2000 mL/Hr) CRRT <Continuous>  senna 2 Tablet(s) Oral at bedtime  sodium chloride 0.9%. 1000 milliLiter(s) (20 mL/Hr) IV Continuous <Continuous>  vasopressin Infusion 0.04 Unit(s)/Min (6 mL/Hr) IV Continuous <Continuous>      MEDICATIONS  (PRN):  acetaminophen   IVPB .. 1000 milliGRAM(s) IV Intermittent once PRN Severe Pain (7 - 10)  dextrose Oral Gel 15 Gram(s) Oral once PRN Blood Glucose LESS THAN 70 milliGRAM(s)/deciliter  HYDROmorphone  Injectable 0.5 milliGRAM(s) IV Push every 6 hours PRN Breakthrough Pain  oxyCODONE    IR 5 milliGRAM(s) Oral every 4 hours PRN Moderate Pain (4 - 6)  oxyCODONE    IR 10 milliGRAM(s) Oral every 4 hours PRN Severe Pain (7 - 10)    HEPARIN:  [x] YES   Dose: 200 Unit(s)/Hr (2 mL/Hr) IV Continuous    SCD's: YES b/l  GI Prophylaxis: Protonix [x],      Allergies    No Known Allergies    Intolerances      Assessment/Plan:  82y Male status-post TAVR POD#2 s/p v fib arrest and impella placed  - Case and plan discussed with CTU Intensivist and CT Surgeon - Dr. Adan  - Continue CTU supportive care    - Continue DVT/GI prophylaxis  - Incentive Spirometry 10 times an hour  - Continue to advance physical activity as tolerated and continue PT/OT as directed  1.  Continue ASA, plavix  2. HTN: monitor bp, wean off pressors as tolerated   3. A. Fib ppx: cont amio drip, monitor electrolytes, replete PRN  4. COPD/Hypoxia: intubated, O2 95%, wean vent after impella removed   5. DM/Glucose Control: A1c 6.0%,  cont insulin drip  6. Anemia 2/2 acute blood loss: transfused 4U RBC, stable.   7. Leukocytosis: f/u blood cx  8. Nephrology Recommendations: resume CVVHD, cont heparin to maintain CVVHD circuit patency, start bumex gtt 1mg/hr   OPERATIVE PROCEDURE(s):      TAVR          POD #  2                     82yMale  SURGEON(s): ROSEANN Adan  SUBJECTIVE ASSESSMENT: pt seen and examined. no acute events, iabp still in place, intubated but following commands  Vital Signs Last 24 Hrs  T(F): 96.8 (21 Oct 2022 08:00), Max: 98.4 (20 Oct 2022 16:00)  HR: 75 (21 Oct 2022 08:15) (75 - 141)  BP: 102/54 (21 Oct 2022 08:00) (97/53 - 166/79)  BP(mean): 74 (21 Oct 2022 08:00) (68 - 113)  ABP: 101/45 (21 Oct 2022 08:15) (41/44 - 185/64)  ABP(mean): 77 (21 Oct 2022 08:15)  RR: 19 (21 Oct 2022 08:15) (0 - 48)  SpO2: 98% (21 Oct 2022 08:15) (81% - 100%)  CVP(mm Hg): 10 (21 Oct 2022 08:15)  CO: 6 (21 Oct 2022 06:00)  CI: 2.7 (21 Oct 2022 06:00)  PA: 37/17 (21 Oct 2022 08:15)  SVR: 812 (21 Oct 2022 06:00)  Mode: AC/ CMV (Assist Control/ Continuous Mandatory Ventilation)  RR (machine): 16  TV (machine): 480  FiO2: 50  PEEP: 5  MAP: 10    I&O's Detail    20 Oct 2022 07:01  -  21 Oct 2022 07:00  --------------------------------------------------------  IN:    Amiodarone: 199.8 mL    Dexmedetomidine: 596.7 mL    dextrose 5% w/ Additives: 254.8 mL    Heparin: 10 mL    Heparin: 22 mL    Heparin: 25 mL    Insulin: 84 mL    IV PiggyBack: 400 mL    Milrinone: 19.3 mL    Milrinone: 162.4 mL    Milrinone: 15.6 mL    Norepinephrine: 264.5 mL    Propofol: 213.2 mL    sodium chloride 0.9%: 440 mL    Vasopressin: 123 mL  Total IN: 2830.3 mL    OUT:    Blood Loss (mL): 171 mL    EPINEPHrine: 0 mL    Heparin: 0 mL    Indwelling Catheter - Urethral (mL): 852 mL    Nasogastric/Oral tube (mL): 150 mL    NiCARdipine: 0 mL    Nitroglycerin: 0 mL    Other (mL): 948 mL  Total OUT: 2121 mL        Net:   I&O's Detail    19 Oct 2022 07:01  -  20 Oct 2022 07:00  --------------------------------------------------------  Total NET: 1826.9 mL      20 Oct 2022 07:01  -  21 Oct 2022 07:00  --------------------------------------------------------  Total NET: 709.3 mL        CAPILLARY BLOOD GLUCOSE      POCT Blood Glucose.: 89 mg/dL (21 Oct 2022 06:56)  POCT Blood Glucose.: 93 mg/dL (21 Oct 2022 06:01)  POCT Blood Glucose.: 124 mg/dL (21 Oct 2022 03:58)  POCT Blood Glucose.: 145 mg/dL (21 Oct 2022 02:39)  POCT Blood Glucose.: 159 mg/dL (21 Oct 2022 01:00)  POCT Blood Glucose.: 160 mg/dL (21 Oct 2022 00:29)  POCT Blood Glucose.: 156 mg/dL (20 Oct 2022 23:24)  POCT Blood Glucose.: 177 mg/dL (20 Oct 2022 22:05)  POCT Blood Glucose.: 118 mg/dL (20 Oct 2022 20:02)  POCT Blood Glucose.: 105 mg/dL (20 Oct 2022 18:30)  POCT Blood Glucose.: 101 mg/dL (20 Oct 2022 16:41)  POCT Blood Glucose.: 116 mg/dL (20 Oct 2022 15:15)  POCT Blood Glucose.: 118 mg/dL (20 Oct 2022 14:31)  POCT Blood Glucose.: 129 mg/dL (20 Oct 2022 10:08)    Physical Exam:  General: Patient is intubated and sedated, follows commands  Cardiac: S1/S2, RRR, no murmur, no rubs  Lungs: equal bs bilaterally   Abdomen: Soft/NT/ND; positive bowel sounds x 4  Incisions: Incisions clean/dry/intact  Extremities: No edema b/l lower extremities; good capillary refill; no cyanosis; palpable 1+ pedal pulses b/l    Central Venous Catheter: Yes[x]  If Yes indication:   critical pt        Day #2  Gomez Catheter: Yes  [x] , If yes indication:   strict I/Os                   Day #2  BOWEL MOVEMENT:  [x] NO, If No, Timing since last BM:      Day #2      LABS:                        8.9<L>  21.01<H> )-----------( 101<L>    ( 21 Oct 2022 02:41 )             27.3<L>                        9.5<L>  15.86<H> )-----------( 115<L>    ( 20 Oct 2022 14:41 )             29.0<L>    10-21    139  |  105  |  56<H>  ----------------------------<  140<H>  4.1   |  23  |  5.5<HH>  10-20    138  |  103  |  57<H>  ----------------------------<  107<H>  4.6   |  24  |  6.2<HH>    Ca    7.7<L>      21 Oct 2022 02:41  Phos  4.8     10-18  Mg     2.1     10-21    TPro  4.1<L> [6.0 - 8.0]  /  Alb  2.8<L> [3.5 - 5.2]  /  TBili  0.6 [0.2 - 1.2]  /  DBili  x   /  AST  26 [0 - 41]  /  ALT  5 [0 - 41]  /  AlkPhos  48 [30 - 115]  10-21    PT/INR - ( 20 Oct 2022 14:41 )   PT: ;   INR: 1.31 ratio         PT/INR - ( 20 Oct 2022 00:20 )   PT: ;   INR: 1.61 ratio         PTT - ( 21 Oct 2022 02:41 )  PTT:54.5 sec, PTT - ( 20 Oct 2022 14:41 )  PTT:27.8 sec    ABG - ( 21 Oct 2022 04:37 )  pH: 7.42  /  pCO2: 35    /  pO2: 94    / HCO3: 23    / Base Excess: -1.5  /  SaO2: 98.9  /  LA: 1.30             RADIOLOGY & ADDITIONAL TESTS:  CXR:< from: Xray Chest 1 View-PORTABLE IMMEDIATE (Xray Chest 1 View-PORTABLE IMMEDIATE .) (10.20.22 @ 14:36) >  Impression:    Status post a median sternotomy and cardiomegaly, unchanged.    Bilateral opacities, unchanged.    Support tubes and lines as above.    Left-sided permanent pacemaker.    < end of copied text >    EKG:< from: 12 Lead ECG (10.21.22 @ 07:54) >  Ventricular Rate 77 BPM    Atrial Rate 77 BPM    P-R Interval 222 ms    QRS Duration 148 ms    Q-T Interval 468 ms    QTC Calculation(Bazett) 529 ms    P Axis 48 degrees    R Axis 198 degrees    T Axis 69 degrees    Diagnosis Line *** Poor data quality, interpretation may be adversely affected  Sinus rhythm with 1st degree A-V block  Right bundle branch block  Abnormal ECG    < end of copied text >    MEDICATIONS  (STANDING):  albumin human  5% IVPB 250 milliLiter(s) IV Intermittent once  albumin human  5% IVPB 250 milliLiter(s) IV Intermittent once  albumin human  5% IVPB 250 milliLiter(s) IV Intermittent once  albumin human  5% IVPB 250 milliLiter(s) IV Intermittent once  aMIOdarone Infusion 1 mG/Min (33.3 mL/Hr) IV Continuous <Continuous>  aspirin enteric coated 81 milliGRAM(s) Oral daily  cefepime   IVPB 1000 milliGRAM(s) IV Intermittent daily  chlorhexidine 0.12% Liquid 5 milliLiter(s) Oral Mucosa two times a day  clopidogrel Tablet 75 milliGRAM(s) Oral daily  CRRT Treatment    <Continuous>  dexMEDEtomidine Infusion 0.2 MICROgram(s)/kG/Hr (5.12 mL/Hr) IV Continuous <Continuous>  dextrose 5% 500 milliLiter(s) (17.3 mL/Hr) IV Continuous <Continuous>  dextrose 5%. 1000 milliLiter(s) (50 mL/Hr) IV Continuous <Continuous>  dextrose 50% Injectable 25 Gram(s) IV Push once  dextrose 50% Injectable 50 milliLiter(s) IV Push every 15 minutes  dextrose 50% Injectable 25 milliLiter(s) IV Push every 15 minutes  glucagon  Injectable 1 milliGRAM(s) IntraMuscular once  heparin  Infusion 500 Unit(s)/Hr (5 mL/Hr) IV Continuous <Continuous>  heparin  Infusion 200 Unit(s)/Hr (2 mL/Hr) IV Continuous <Continuous>  insulin regular Infusion 10 Unit(s)/Hr (10 mL/Hr) IV Continuous <Continuous>  meperidine     Injectable 25 milliGRAM(s) IV Push once  mupirocin 2% Ointment 1 Application(s) Both Nostrils every 12 hours  norepinephrine Infusion 0.05 MICROgram(s)/kG/Min (9.6 mL/Hr) IV Continuous <Continuous>  pantoprazole  Injectable 40 milliGRAM(s) IV Push daily  polyethylene glycol 3350 17 Gram(s) Oral daily  propofol Infusion 10 MICROgram(s)/kG/Min (6.14 mL/Hr) IV Continuous <Continuous>  PureFlow Dialysate RFP-400 (K 2 / Ca 3) 5000 milliLiter(s) (2000 mL/Hr) CRRT <Continuous>  senna 2 Tablet(s) Oral at bedtime  sodium chloride 0.9%. 1000 milliLiter(s) (20 mL/Hr) IV Continuous <Continuous>  vasopressin Infusion 0.04 Unit(s)/Min (6 mL/Hr) IV Continuous <Continuous>      MEDICATIONS  (PRN):  acetaminophen   IVPB .. 1000 milliGRAM(s) IV Intermittent once PRN Severe Pain (7 - 10)  dextrose Oral Gel 15 Gram(s) Oral once PRN Blood Glucose LESS THAN 70 milliGRAM(s)/deciliter  HYDROmorphone  Injectable 0.5 milliGRAM(s) IV Push every 6 hours PRN Breakthrough Pain  oxyCODONE    IR 5 milliGRAM(s) Oral every 4 hours PRN Moderate Pain (4 - 6)  oxyCODONE    IR 10 milliGRAM(s) Oral every 4 hours PRN Severe Pain (7 - 10)    HEPARIN:  [x] YES   Dose: 200 Unit(s)/Hr (2 mL/Hr) IV Continuous    SCD's: YES b/l  GI Prophylaxis: Protonix [x],      Allergies    No Known Allergies    Intolerances      Assessment/Plan:  82y Male status-post TAVR POD#2 s/p v fib arrest and impella placed, now removed and replaced with IABP  - Case and plan discussed with CTU Intensivist and CT Surgeon - Dr. Adan  - Continue CTU supportive care    - Continue DVT/GI prophylaxis  - Incentive Spirometry 10 times an hour  - Continue to advance physical activity as tolerated and continue PT/OT as directed  1.  Continue ASA, plavix, wean IABP  2. HTN: monitor bp, wean off pressors as tolerated   3. A. Fib ppx: cont amio drip, monitor electrolytes, replete PRN  4. COPD/Hypoxia: intubated, O2 95%, wean vent after impella removed   5. DM/Glucose Control: A1c 6.0%,  cont insulin drip  6. Anemia 2/2 acute blood loss: transfused 4U RBC, now stable.   7. Leukocytosis: f/u blood cx, cont abx  8. Nephrology Recommendations: resume CVVHD, cont heparin to maintain CVVHD circuit patency, start bumex gtt 1mg/hr

## 2022-10-21 NOTE — PROVIDER CONTACT NOTE (EICU) - SITUATION
eAlerted by bedside provider as patient noted to be tachycardic to 130s and hypotensive to SBP 80s. Bedside provider requesting order for Amiodarone 150mg IVPB, followed by 1mg/m infusion.

## 2022-10-22 LAB
ALBUMIN SERPL ELPH-MCNC: 3.1 G/DL — LOW (ref 3.5–5.2)
ALBUMIN SERPL ELPH-MCNC: 3.3 G/DL — LOW (ref 3.5–5.2)
ALP SERPL-CCNC: 49 U/L — SIGNIFICANT CHANGE UP (ref 30–115)
ALP SERPL-CCNC: 53 U/L — SIGNIFICANT CHANGE UP (ref 30–115)
ALT FLD-CCNC: <5 U/L — SIGNIFICANT CHANGE UP (ref 0–41)
ALT FLD-CCNC: <5 U/L — SIGNIFICANT CHANGE UP (ref 0–41)
ANION GAP SERPL CALC-SCNC: 14 MMOL/L — SIGNIFICANT CHANGE UP (ref 7–14)
ANION GAP SERPL CALC-SCNC: 16 MMOL/L — HIGH (ref 7–14)
APTT BLD: 28.6 SEC — SIGNIFICANT CHANGE UP (ref 27–39.2)
AST SERPL-CCNC: 18 U/L — SIGNIFICANT CHANGE UP (ref 0–41)
AST SERPL-CCNC: 21 U/L — SIGNIFICANT CHANGE UP (ref 0–41)
BASE EXCESS BLDV CALC-SCNC: -4.5 MMOL/L — LOW (ref -2–3)
BASOPHILS # BLD AUTO: 0.04 K/UL — SIGNIFICANT CHANGE UP (ref 0–0.2)
BASOPHILS NFR BLD AUTO: 0.3 % — SIGNIFICANT CHANGE UP (ref 0–1)
BILIRUB SERPL-MCNC: 0.9 MG/DL — SIGNIFICANT CHANGE UP (ref 0.2–1.2)
BILIRUB SERPL-MCNC: 0.9 MG/DL — SIGNIFICANT CHANGE UP (ref 0.2–1.2)
BLD GP AB SCN SERPL QL: SIGNIFICANT CHANGE UP
BUN SERPL-MCNC: 46 MG/DL — HIGH (ref 10–20)
BUN SERPL-MCNC: 56 MG/DL — HIGH (ref 10–20)
CA-I SERPL-SCNC: 1.14 MMOL/L — LOW (ref 1.15–1.33)
CALCIUM SERPL-MCNC: 7.6 MG/DL — LOW (ref 8.4–10.5)
CALCIUM SERPL-MCNC: 7.7 MG/DL — LOW (ref 8.4–10.5)
CHLORIDE SERPL-SCNC: 101 MMOL/L — SIGNIFICANT CHANGE UP (ref 98–110)
CHLORIDE SERPL-SCNC: 103 MMOL/L — SIGNIFICANT CHANGE UP (ref 98–110)
CO2 SERPL-SCNC: 19 MMOL/L — SIGNIFICANT CHANGE UP (ref 17–32)
CO2 SERPL-SCNC: 22 MMOL/L — SIGNIFICANT CHANGE UP (ref 17–32)
CREAT SERPL-MCNC: 4.4 MG/DL — CRITICAL HIGH (ref 0.7–1.5)
CREAT SERPL-MCNC: 5.2 MG/DL — CRITICAL HIGH (ref 0.7–1.5)
EGFR: 11 ML/MIN/1.73M2 — LOW
EGFR: 13 ML/MIN/1.73M2 — LOW
EOSINOPHIL # BLD AUTO: 0.17 K/UL — SIGNIFICANT CHANGE UP (ref 0–0.7)
EOSINOPHIL NFR BLD AUTO: 1.3 % — SIGNIFICANT CHANGE UP (ref 0–8)
GAS PNL BLDA: SIGNIFICANT CHANGE UP
GAS PNL BLDA: SIGNIFICANT CHANGE UP
GAS PNL BLDV: 135 MMOL/L — LOW (ref 136–145)
GAS PNL BLDV: SIGNIFICANT CHANGE UP
GLUCOSE BLDC GLUCOMTR-MCNC: 104 MG/DL — HIGH (ref 70–99)
GLUCOSE BLDC GLUCOMTR-MCNC: 130 MG/DL — HIGH (ref 70–99)
GLUCOSE BLDC GLUCOMTR-MCNC: 146 MG/DL — HIGH (ref 70–99)
GLUCOSE BLDC GLUCOMTR-MCNC: 155 MG/DL — HIGH (ref 70–99)
GLUCOSE BLDC GLUCOMTR-MCNC: 166 MG/DL — HIGH (ref 70–99)
GLUCOSE BLDC GLUCOMTR-MCNC: 169 MG/DL — HIGH (ref 70–99)
GLUCOSE BLDC GLUCOMTR-MCNC: 171 MG/DL — HIGH (ref 70–99)
GLUCOSE BLDC GLUCOMTR-MCNC: 177 MG/DL — HIGH (ref 70–99)
GLUCOSE BLDC GLUCOMTR-MCNC: 180 MG/DL — HIGH (ref 70–99)
GLUCOSE BLDC GLUCOMTR-MCNC: 186 MG/DL — HIGH (ref 70–99)
GLUCOSE SERPL-MCNC: 150 MG/DL — HIGH (ref 70–99)
GLUCOSE SERPL-MCNC: 168 MG/DL — HIGH (ref 70–99)
HCO3 BLDV-SCNC: 20 MMOL/L — LOW (ref 22–29)
HCT VFR BLD CALC: 23.5 % — LOW (ref 42–52)
HCT VFR BLD CALC: 24.9 % — LOW (ref 42–52)
HCT VFR BLD CALC: 27.2 % — LOW (ref 42–52)
HCT VFR BLDA CALC: 22 % — LOW (ref 39–51)
HGB BLD CALC-MCNC: 7.3 G/DL — LOW (ref 12.6–17.4)
HGB BLD-MCNC: 7.5 G/DL — LOW (ref 14–18)
HGB BLD-MCNC: 7.9 G/DL — LOW (ref 14–18)
HGB BLD-MCNC: 9 G/DL — LOW (ref 14–18)
IMM GRANULOCYTES NFR BLD AUTO: 0.7 % — HIGH (ref 0.1–0.3)
LACTATE BLDV-MCNC: 1 MMOL/L — SIGNIFICANT CHANGE UP (ref 0.5–2)
LYMPHOCYTES # BLD AUTO: 1.16 K/UL — LOW (ref 1.2–3.4)
LYMPHOCYTES # BLD AUTO: 8.7 % — LOW (ref 20.5–51.1)
MAGNESIUM SERPL-MCNC: 2 MG/DL — SIGNIFICANT CHANGE UP (ref 1.8–2.4)
MAGNESIUM SERPL-MCNC: 2.1 MG/DL — SIGNIFICANT CHANGE UP (ref 1.8–2.4)
MCHC RBC-ENTMCNC: 29.5 PG — SIGNIFICANT CHANGE UP (ref 27–31)
MCHC RBC-ENTMCNC: 29.6 PG — SIGNIFICANT CHANGE UP (ref 27–31)
MCHC RBC-ENTMCNC: 29.9 PG — SIGNIFICANT CHANGE UP (ref 27–31)
MCHC RBC-ENTMCNC: 31.7 G/DL — LOW (ref 32–37)
MCHC RBC-ENTMCNC: 31.9 G/DL — LOW (ref 32–37)
MCHC RBC-ENTMCNC: 33.1 G/DL — SIGNIFICANT CHANGE UP (ref 32–37)
MCV RBC AUTO: 90.4 FL — SIGNIFICANT CHANGE UP (ref 80–94)
MCV RBC AUTO: 92.9 FL — SIGNIFICANT CHANGE UP (ref 80–94)
MCV RBC AUTO: 92.9 FL — SIGNIFICANT CHANGE UP (ref 80–94)
MONOCYTES # BLD AUTO: 1.82 K/UL — HIGH (ref 0.1–0.6)
MONOCYTES NFR BLD AUTO: 13.6 % — HIGH (ref 1.7–9.3)
NEUTROPHILS # BLD AUTO: 10.07 K/UL — HIGH (ref 1.4–6.5)
NEUTROPHILS NFR BLD AUTO: 75.4 % — HIGH (ref 42.2–75.2)
NRBC # BLD: 0 /100 WBCS — SIGNIFICANT CHANGE UP (ref 0–0)
PCO2 BLDV: 34 MMHG — LOW (ref 42–55)
PH BLDV: 7.38 — SIGNIFICANT CHANGE UP (ref 7.32–7.43)
PLATELET # BLD AUTO: 53 K/UL — LOW (ref 130–400)
PLATELET # BLD AUTO: 83 K/UL — LOW (ref 130–400)
PLATELET # BLD AUTO: 87 K/UL — LOW (ref 130–400)
PO2 BLDV: 40 MMHG — SIGNIFICANT CHANGE UP
POTASSIUM BLDV-SCNC: 3.9 MMOL/L — SIGNIFICANT CHANGE UP (ref 3.5–5.1)
POTASSIUM SERPL-MCNC: 4 MMOL/L — SIGNIFICANT CHANGE UP (ref 3.5–5)
POTASSIUM SERPL-MCNC: 4.5 MMOL/L — SIGNIFICANT CHANGE UP (ref 3.5–5)
POTASSIUM SERPL-SCNC: 4 MMOL/L — SIGNIFICANT CHANGE UP (ref 3.5–5)
POTASSIUM SERPL-SCNC: 4.5 MMOL/L — SIGNIFICANT CHANGE UP (ref 3.5–5)
PROT SERPL-MCNC: 4.2 G/DL — LOW (ref 6–8)
PROT SERPL-MCNC: 4.5 G/DL — LOW (ref 6–8)
RBC # BLD: 2.53 M/UL — LOW (ref 4.7–6.1)
RBC # BLD: 2.68 M/UL — LOW (ref 4.7–6.1)
RBC # BLD: 3.01 M/UL — LOW (ref 4.7–6.1)
RBC # FLD: 17 % — HIGH (ref 11.5–14.5)
RBC # FLD: 17.3 % — HIGH (ref 11.5–14.5)
RBC # FLD: 17.5 % — HIGH (ref 11.5–14.5)
SAO2 % BLDV: 73 % — SIGNIFICANT CHANGE UP
SODIUM SERPL-SCNC: 137 MMOL/L — SIGNIFICANT CHANGE UP (ref 135–146)
SODIUM SERPL-SCNC: 138 MMOL/L — SIGNIFICANT CHANGE UP (ref 135–146)
WBC # BLD: 11.97 K/UL — HIGH (ref 4.8–10.8)
WBC # BLD: 13.35 K/UL — HIGH (ref 4.8–10.8)
WBC # BLD: 14.07 K/UL — HIGH (ref 4.8–10.8)
WBC # FLD AUTO: 11.97 K/UL — HIGH (ref 4.8–10.8)
WBC # FLD AUTO: 13.35 K/UL — HIGH (ref 4.8–10.8)
WBC # FLD AUTO: 14.07 K/UL — HIGH (ref 4.8–10.8)

## 2022-10-22 PROCEDURE — 93306 TTE W/DOPPLER COMPLETE: CPT | Mod: 26

## 2022-10-22 PROCEDURE — 99231 SBSQ HOSP IP/OBS SF/LOW 25: CPT | Mod: 25

## 2022-10-22 PROCEDURE — 71045 X-RAY EXAM CHEST 1 VIEW: CPT | Mod: 26

## 2022-10-22 PROCEDURE — 93010 ELECTROCARDIOGRAM REPORT: CPT

## 2022-10-22 PROCEDURE — 99232 SBSQ HOSP IP/OBS MODERATE 35: CPT

## 2022-10-22 RX ORDER — AMIODARONE HYDROCHLORIDE 400 MG/1
200 TABLET ORAL DAILY
Refills: 0 | Status: DISCONTINUED | OUTPATIENT
Start: 2022-10-22 | End: 2022-10-23

## 2022-10-22 RX ORDER — VANCOMYCIN HCL 1 G
500 VIAL (EA) INTRAVENOUS ONCE
Refills: 0 | Status: COMPLETED | OUTPATIENT
Start: 2022-10-22 | End: 2022-10-22

## 2022-10-22 RX ORDER — PANTOPRAZOLE SODIUM 20 MG/1
8 TABLET, DELAYED RELEASE ORAL
Qty: 80 | Refills: 0 | Status: DISCONTINUED | OUTPATIENT
Start: 2022-10-22 | End: 2022-10-24

## 2022-10-22 RX ORDER — LANOLIN ALCOHOL/MO/W.PET/CERES
5 CREAM (GRAM) TOPICAL ONCE
Refills: 0 | Status: COMPLETED | OUTPATIENT
Start: 2022-10-22 | End: 2022-10-23

## 2022-10-22 RX ORDER — ACETAMINOPHEN 500 MG
1000 TABLET ORAL ONCE
Refills: 0 | Status: COMPLETED | OUTPATIENT
Start: 2022-10-22 | End: 2022-10-22

## 2022-10-22 RX ORDER — PANTOPRAZOLE SODIUM 20 MG/1
80 TABLET, DELAYED RELEASE ORAL ONCE
Refills: 0 | Status: COMPLETED | OUTPATIENT
Start: 2022-10-22 | End: 2022-10-22

## 2022-10-22 RX ORDER — AMIODARONE HYDROCHLORIDE 400 MG/1
0.25 TABLET ORAL
Qty: 900 | Refills: 0 | Status: DISCONTINUED | OUTPATIENT
Start: 2022-10-22 | End: 2022-10-24

## 2022-10-22 RX ADMIN — INSULIN HUMAN 10 UNIT(S)/HR: 100 INJECTION, SOLUTION SUBCUTANEOUS at 00:33

## 2022-10-22 RX ADMIN — POLYETHYLENE GLYCOL 3350 17 GRAM(S): 17 POWDER, FOR SOLUTION ORAL at 12:21

## 2022-10-22 RX ADMIN — Medication 400 MILLIGRAM(S): at 08:24

## 2022-10-22 RX ADMIN — Medication 100 MILLIGRAM(S): at 11:40

## 2022-10-22 RX ADMIN — Medication 1000 MILLIGRAM(S): at 08:46

## 2022-10-22 RX ADMIN — DEXMEDETOMIDINE HYDROCHLORIDE IN 0.9% SODIUM CHLORIDE 5.12 MICROGRAM(S)/KG/HR: 4 INJECTION INTRAVENOUS at 00:32

## 2022-10-22 RX ADMIN — PANTOPRAZOLE SODIUM 40 MILLIGRAM(S): 20 TABLET, DELAYED RELEASE ORAL at 12:21

## 2022-10-22 RX ADMIN — VASOPRESSIN 6 UNIT(S)/MIN: 20 INJECTION INTRAVENOUS at 00:33

## 2022-10-22 RX ADMIN — CHLORHEXIDINE GLUCONATE 5 MILLILITER(S): 213 SOLUTION TOPICAL at 05:58

## 2022-10-22 RX ADMIN — PANTOPRAZOLE SODIUM 80 MILLIGRAM(S): 20 TABLET, DELAYED RELEASE ORAL at 21:48

## 2022-10-22 RX ADMIN — PANTOPRAZOLE SODIUM 10 MG/HR: 20 TABLET, DELAYED RELEASE ORAL at 21:30

## 2022-10-22 RX ADMIN — MUPIROCIN 1 APPLICATION(S): 20 OINTMENT TOPICAL at 05:58

## 2022-10-22 RX ADMIN — CEFEPIME 100 MILLIGRAM(S): 1 INJECTION, POWDER, FOR SOLUTION INTRAMUSCULAR; INTRAVENOUS at 13:47

## 2022-10-22 NOTE — PROGRESS NOTE ADULT - ASSESSMENT
Impression:  ESRD on HD  s/p TAVR      Plan:  HD for 2 hours, 2L fluid removal, transfuse PRBC  If fistula functional can d/c Udoll

## 2022-10-22 NOTE — PROGRESS NOTE ADULT - SUBJECTIVE AND OBJECTIVE BOX
OPERATIVE PROCEDURE(s):                POD # 3 TAVR / V fib arrest / insertion of impella  #2 removal of impella and insertion of IABP #1 removal of IABP and extubation                      82yMale  SURGEON(s): ROSEANN Adan  SUBJECTIVE ASSESSMENT: c/o groin discomfort    Vital Signs Last 24 Hrs  T(F): 99 (22 Oct 2022 04:00), Max: 100 (21 Oct 2022 20:00)  HR: 86 (22 Oct 2022 06:30) (75 - 92)  BP: 117/63 (22 Oct 2022 01:00) (91/54 - 117/63)  BP(mean): 77 (22 Oct 2022 01:00) (68 - 82)  ABP: 121/38 (22 Oct 2022 06:30) (60/55 - 144/49)  ABP(mean): 58 (22 Oct 2022 06:30)  RR: 17 (22 Oct 2022 06:30) (0 - 30)  SpO2: 99% (22 Oct 2022 06:30) (95% - 100%)  CVP(mm Hg): 12 (22 Oct 2022 06:30)  CVP(cm H2O): --  CO: 5.9 (22 Oct 2022 06:30)  CI: 2.7 (22 Oct 2022 06:30)  PA: 37/16 (22 Oct 2022 06:30)  SVR: 622 (22 Oct 2022 06:30)  Mode: standby    I&O's Detail    21 Oct 2022 07:01  -  22 Oct 2022 07:00  --------------------------------------------------------  IN:    Amiodarone: 33.4 mL    Amiodarone: 116.8 mL    Amiodarone: 532.8 mL    Dexmedetomidine: 336.8 mL    Enteral Tube Flush: 50 mL    Heparin: 5 mL    Heparin: 2 mL    Insulin: 55 mL    IV PiggyBack: 200 mL    Norepinephrine: 208.6 mL    PRBCs (Packed Red Blood Cells): 309 mL    Propofol: 98 mL    sodium chloride 0.9%: 480 mL    Vasopressin: 144 mL  Total IN: 2571.4 mL    OUT:    dextrose 5% w/ Additives: 0 mL    EPINEPHrine: 0 mL    Indwelling Catheter - Urethral (mL): 318 mL    Nasogastric/Oral tube (mL): 50 mL    NiCARdipine: 0 mL    Nitroglycerin: 0 mL    Other (mL): 471 mL  Total OUT: 839 mL    Net:   I&O's Detail    20 Oct 2022 07:01  -  21 Oct 2022 07:00  --------------------------------------------------------  Total NET: 709.3 mL    21 Oct 2022 07:01  -  22 Oct 2022 07:00  --------------------------------------------------------  Total NET: 1732.4 mL    CAPILLARY BLOOD GLUCOSE      POCT Blood Glucose.: 104 mg/dL (22 Oct 2022 06:46)  POCT Blood Glucose.: 177 mg/dL (22 Oct 2022 02:41)  POCT Blood Glucose.: 186 mg/dL (22 Oct 2022 01:59)  POCT Blood Glucose.: 180 mg/dL (22 Oct 2022 00:15)  POCT Blood Glucose.: 146 mg/dL (21 Oct 2022 19:39)  POCT Blood Glucose.: 84 mg/dL (21 Oct 2022 18:14)  POCT Blood Glucose.: 130 mg/dL (21 Oct 2022 15:00)  POCT Blood Glucose.: 140 mg/dL (21 Oct 2022 12:31)  POCT Blood Glucose.: 105 mg/dL (21 Oct 2022 10:23)  POCT Blood Glucose.: 79 mg/dL (21 Oct 2022 08:57)    Physical Exam:  General: NAD; A&Ox3  Cardiac: S1/S2, RRR, no murmur, no rubs  Lungs: unlabored shallow respirations, bilateral bs   Abdomen: Soft/NT/protuberant  Groins:   Extremities: No edema b/l lower extremities    Central Venous Catheter: Yes[]  critical patient   Gomez Catheter: Yes  [] , critical patient strict I&O  BOWEL MOVEMENT:[] NO,         LABS:                        7.9<L>  13.35<H> )-----------( 53<L>    ( 22 Oct 2022 02:15 )             24.9<L>                        7.7<L>  14.93<H> )-----------( 53<L>    ( 21 Oct 2022 13:20 )             24.3<L>    10-22    138  |  103  |  56<H>  ----------------------------<  168<H>  4.5   |  19  |  5.2<HH>  10-21    143  |  107  |  48<H>  ----------------------------<  123<H>  4.7   |  22  |  4.7<HH>    Ca    7.6<L>      22 Oct 2022 02:15  Mg     2.1     10-22    TPro  4.2<L> [6.0 - 8.0]  /  Alb  3.1<L> [3.5 - 5.2]  /  TBili  0.9 [0.2 - 1.2]  /  DBili  x   /  AST  21 [0 - 41]  /  ALT  <5 [0 - 41]  /  AlkPhos  49 [30 - 115]  10-22    PT/INR - ( 20 Oct 2022 14:41 )   PT: ;   INR: 1.31 ratio         PTT - ( 22 Oct 2022 02:15 )  PTT:28.6 sec, PTT - ( 21 Oct 2022 02:41 )  PTT:54.5 sec    ABG - ( 22 Oct 2022 03:45 )  pH: 7.43  /  pCO2: 29    /  pO2: 96    / HCO3: 19    / Base Excess: -4.5  /  SaO2: 98.2  /  LA: 1.10       RADIOLOGY & ADDITIONAL TESTS:  CXR:  EKG:  MEDICATIONS  (STANDING):  albumin human  5% IVPB 250 milliLiter(s) IV Intermittent once  albumin human  5% IVPB 250 milliLiter(s) IV Intermittent once  albumin human  5% IVPB 250 milliLiter(s) IV Intermittent once  albumin human  5% IVPB 250 milliLiter(s) IV Intermittent once  aMIOdarone Infusion 1 mG/Min (33.3 mL/Hr) IV Continuous <Continuous>  aspirin enteric coated 81 milliGRAM(s) Oral daily  cefepime   IVPB 1000 milliGRAM(s) IV Intermittent daily  chlorhexidine 0.12% Liquid 5 milliLiter(s) Oral Mucosa two times a day  clopidogrel Tablet 75 milliGRAM(s) Oral daily  CRRT Treatment    <Continuous>  dexMEDEtomidine Infusion 0.2 MICROgram(s)/kG/Hr (5.12 mL/Hr) IV Continuous <Continuous>  dextrose 5%. 1000 milliLiter(s) (50 mL/Hr) IV Continuous <Continuous>  dextrose 50% Injectable 25 Gram(s) IV Push once  dextrose 50% Injectable 50 milliLiter(s) IV Push every 15 minutes  dextrose 50% Injectable 25 milliLiter(s) IV Push every 15 minutes  glucagon  Injectable 1 milliGRAM(s) IntraMuscular once  insulin regular Infusion 10 Unit(s)/Hr (10 mL/Hr) IV Continuous <Continuous>  meperidine     Injectable 25 milliGRAM(s) IV Push once  mupirocin 2% Ointment 1 Application(s) Both Nostrils every 12 hours  norepinephrine Infusion 0.05 MICROgram(s)/kG/Min (9.6 mL/Hr) IV Continuous <Continuous>  pantoprazole  Injectable 40 milliGRAM(s) IV Push daily  polyethylene glycol 3350 17 Gram(s) Oral daily  propofol Infusion 10 MICROgram(s)/kG/Min (6.14 mL/Hr) IV Continuous <Continuous>  PureFlow Dialysate RFP-400 (K 2 / Ca 3) 5000 milliLiter(s) (2000 mL/Hr) CRRT <Continuous>  senna 2 Tablet(s) Oral at bedtime  sodium chloride 0.9%. 1000 milliLiter(s) (20 mL/Hr) IV Continuous <Continuous>  vasopressin Infusion 0.04 Unit(s)/Min (6 mL/Hr) IV Continuous <Continuous>    MEDICATIONS  (PRN):  dextrose Oral Gel 15 Gram(s) Oral once PRN Blood Glucose LESS THAN 70 milliGRAM(s)/deciliter  oxyCODONE    IR 5 milliGRAM(s) Oral every 4 hours PRN Moderate Pain (4 - 6)  oxyCODONE    IR 10 milliGRAM(s) Oral every 4 hours PRN Severe Pain (7 - 10)    HEPARIN:  [] YES [] NO   LOVENOX:[] YES [] NO   SCD's: YES b/l  GI Prophylaxis: Protonix [], Pepcid [],    Post-Op Beta-Blockers: Yes [], No[], If No, then contraindication:  Post-Op Aspirin: Yes [],  No [], If No, then contraindication:  Post-Op Statin: Yes [], No[], If No, then contraindication:  Allergies    No Known Allergies    Intolerances      Ambulation/Activity Status:    Assessment/Plan:  82y Male status-post .....  - Case and plan discussed with CTU Intensivist and CT Surgeon - Dr. Mosquera/Keshawn/Loco  - Continue CTU supportive care    - Continue DVT  - Continue GI prophylaxis  - Incentive Spirometry 10 times an hour  - Continue to advance physical activity as tolerated and continue PT/OT as directed  - CAD: Continue ASA, statin, BB  - Anemia secondary to:  _____ Chronic Disease    ______ Acute PO blood loss anemia,              track and trend H/H  - Bradycardia:    - KIANA (serum cr increase 0.3 over 48hr or rises 1.5 fold  over baseline  - fluid overload secondary too: ____   acute non cardiac fluid over load     - Heart failure:   ____ Acute     ___Chronic:   ____ Diastolic    _____ Systolic        ______ Combined Disastolic on Systolic  - A. Fib:  _____ none _____    Persistent   ______ Chronic   ______      Paroxysmal; Treatment -   - COPD/Hypoxia:   - DM/Glucose Control:     Social Service Disposition:     OPERATIVE PROCEDURE(s):                POD # 3 TAVR / V fib arrest / insertion of impella  #2 removal of impella and insertion of IABP #1 removal of IABP and extubation                      82yMale  SURGEON(s): ROSEANN Adan  SUBJECTIVE ASSESSMENT: c/o groin discomfort, wants to get oob    Vital Signs Last 24 Hrs  T(F): 99 (22 Oct 2022 04:00), Max: 100 (21 Oct 2022 20:00)  HR: 86 (22 Oct 2022 06:30) (75 - 92)  BP: 117/63 (22 Oct 2022 01:00) (91/54 - 117/63)  BP(mean): 77 (22 Oct 2022 01:00) (68 - 82)  ABP: 121/38 (22 Oct 2022 06:30) (60/55 - 144/49)  ABP(mean): 58 (22 Oct 2022 06:30)  RR: 17 (22 Oct 2022 06:30) (0 - 30)  SpO2: 99% (22 Oct 2022 06:30) (95% - 100%)  CVP(mm Hg): 12 (22 Oct 2022 06:30)  CVP(cm H2O): --  CO: 5.9 (22 Oct 2022 06:30)  CI: 2.7 (22 Oct 2022 06:30)  PA: 37/16 (22 Oct 2022 06:30)  SVR: 622 (22 Oct 2022 06:30)  Mode: standby    I&O's Detail    21 Oct 2022 07:01  -  22 Oct 2022 07:00  --------------------------------------------------------  IN:    Amiodarone: 33.4 mL    Amiodarone: 116.8 mL    Amiodarone: 532.8 mL    Dexmedetomidine: 336.8 mL    Enteral Tube Flush: 50 mL    Heparin: 5 mL    Heparin: 2 mL    Insulin: 55 mL    IV PiggyBack: 200 mL    Norepinephrine: 208.6 mL    PRBCs (Packed Red Blood Cells): 309 mL    Propofol: 98 mL    sodium chloride 0.9%: 480 mL    Vasopressin: 144 mL  Total IN: 2571.4 mL    OUT:    dextrose 5% w/ Additives: 0 mL    EPINEPHrine: 0 mL    Indwelling Catheter - Urethral (mL): 318 mL    Nasogastric/Oral tube (mL): 50 mL    NiCARdipine: 0 mL    Nitroglycerin: 0 mL    Other (mL): 471 mL  Total OUT: 839 mL    Net:   I&O's Detail    20 Oct 2022 07:01  -  21 Oct 2022 07:00  --------------------------------------------------------  Total NET: 709.3 mL    21 Oct 2022 07:01  -  22 Oct 2022 07:00  --------------------------------------------------------  Total NET: 1732.4 mL    CAPILLARY BLOOD GLUCOSE      POCT Blood Glucose.: 104 mg/dL (22 Oct 2022 06:46)  POCT Blood Glucose.: 177 mg/dL (22 Oct 2022 02:41)  POCT Blood Glucose.: 186 mg/dL (22 Oct 2022 01:59)  POCT Blood Glucose.: 180 mg/dL (22 Oct 2022 00:15)  POCT Blood Glucose.: 146 mg/dL (21 Oct 2022 19:39)  POCT Blood Glucose.: 84 mg/dL (21 Oct 2022 18:14)  POCT Blood Glucose.: 130 mg/dL (21 Oct 2022 15:00)  POCT Blood Glucose.: 140 mg/dL (21 Oct 2022 12:31)  POCT Blood Glucose.: 105 mg/dL (21 Oct 2022 10:23)  POCT Blood Glucose.: 79 mg/dL (21 Oct 2022 08:57)    Physical Exam:  General: NAD; A&Ox3  Cardiac: S1/S2, RRR, + murmur, no rubs  Lungs: unlabored shallow respirations, bilateral bs decreased  Abdomen: Soft/NT/protuberant  Groins: right with Udual cath, left soft  Extremities: +1  edema b/l lower extremities    Central Venous Catheter: Yes[]  critical patient   Gomez Catheter: Yes  [] , critical patient strict I&O  BOWEL MOVEMENT:[] NO,       LABS:                        7.9<L>  13.35<H> )-----------( 53<L>    ( 22 Oct 2022 02:15 )             24.9<L>                        7.7<L>  14.93<H> )-----------( 53<L>    ( 21 Oct 2022 13:20 )             24.3<L>    10-22    138  |  103  |  56<H>  ----------------------------<  168<H>  4.5   |  19  |  5.2<HH>  10-21    143  |  107  |  48<H>  ----------------------------<  123<H>  4.7   |  22  |  4.7<HH>    Ca    7.6<L>      22 Oct 2022 02:15  Mg     2.1     10-22    TPro  4.2<L> [6.0 - 8.0]  /  Alb  3.1<L> [3.5 - 5.2]  /  TBili  0.9 [0.2 - 1.2]  /  DBili  x   /  AST  21 [0 - 41]  /  ALT  <5 [0 - 41]  /  AlkPhos  49 [30 - 115]  10-22    PT/INR - ( 20 Oct 2022 14:41 )   PT: ;   INR: 1.31 ratio       PTT - ( 22 Oct 2022 02:15 )  PTT:28.6 sec, PTT - ( 21 Oct 2022 02:41 )  PTT:54.5 sec    ABG - ( 22 Oct 2022 03:45 )  pH: 7.43  /  pCO2: 29    /  pO2: 96    / HCO3: 19    / Base Excess: -4.5  /  SaO2: 98.2  /  LA: 1.10     RADIOLOGY & ADDITIONAL TESTS:  CXR: < from: Xray Chest 1 View- PORTABLE-Routine (10.22.22 @ 06:08) >  Right lower lobe opacity/pleural effusion. No air leak.    < end of copied text >    EKG:  MEDICATIONS  (STANDING):  albumin human  5% IVPB 250 milliLiter(s) IV Intermittent once  albumin human  5% IVPB 250 milliLiter(s) IV Intermittent once  albumin human  5% IVPB 250 milliLiter(s) IV Intermittent once  albumin human  5% IVPB 250 milliLiter(s) IV Intermittent once  aMIOdarone Infusion 1 mG/Min (33.3 mL/Hr) IV Continuous <Continuous>  aspirin enteric coated 81 milliGRAM(s) Oral daily  cefepime   IVPB 1000 milliGRAM(s) IV Intermittent daily  chlorhexidine 0.12% Liquid 5 milliLiter(s) Oral Mucosa two times a day  clopidogrel Tablet 75 milliGRAM(s) Oral daily  CRRT Treatment    <Continuous>  dexMEDEtomidine Infusion 0.2 MICROgram(s)/kG/Hr (5.12 mL/Hr) IV Continuous <Continuous>  dextrose 5%. 1000 milliLiter(s) (50 mL/Hr) IV Continuous <Continuous>  dextrose 50% Injectable 25 Gram(s) IV Push once  dextrose 50% Injectable 50 milliLiter(s) IV Push every 15 minutes  dextrose 50% Injectable 25 milliLiter(s) IV Push every 15 minutes  glucagon  Injectable 1 milliGRAM(s) IntraMuscular once  insulin regular Infusion 10 Unit(s)/Hr (10 mL/Hr) IV Continuous <Continuous>  meperidine     Injectable 25 milliGRAM(s) IV Push once  mupirocin 2% Ointment 1 Application(s) Both Nostrils every 12 hours  norepinephrine Infusion 0.05 MICROgram(s)/kG/Min (9.6 mL/Hr) IV Continuous <Continuous>  pantoprazole  Injectable 40 milliGRAM(s) IV Push daily  polyethylene glycol 3350 17 Gram(s) Oral daily  propofol Infusion 10 MICROgram(s)/kG/Min (6.14 mL/Hr) IV Continuous <Continuous>  PureFlow Dialysate RFP-400 (K 2 / Ca 3) 5000 milliLiter(s) (2000 mL/Hr) CRRT <Continuous>  senna 2 Tablet(s) Oral at bedtime  sodium chloride 0.9%. 1000 milliLiter(s) (20 mL/Hr) IV Continuous <Continuous>  vasopressin Infusion 0.04 Unit(s)/Min (6 mL/Hr) IV Continuous <Continuous>    MEDICATIONS  (PRN):  dextrose Oral Gel 15 Gram(s) Oral once PRN Blood Glucose LESS THAN 70 milliGRAM(s)/deciliter  oxyCODONE    IR 5 milliGRAM(s) Oral every 4 hours PRN Moderate Pain (4 - 6)  oxyCODONE    IR 10 milliGRAM(s) Oral every 4 hours PRN Severe Pain (7 - 10)    Allergies    No Known Allergies    Intolerances      Ambulation/Activity Status: OOB to chair after catheter removed    Assessment/Plan:  82y Male status-post TAVR / V fib arrest / Impella day 3, Impella removal and insertion IABP day 2, removal of IABP day 1,   - Case and plan discussed with CTU Intensivist and CT Surgeon - Dr. powell / mason  - Continue CTU supportive care    - Stop pharmacologic DVT prophylaxis - continue SCD's  - Continue GI prophylaxis  - Incentive Spirometry 10 times an hour  - Continue to advance physical activity as tolerated and continue PT/OT as directed  - CAD: Hold ASA, statin, BB  - Anemia secondary to:  Acute PO blood loss anemia,              track and trend H/H    s/p transfusion   - Thrombocytopenia - 53K - stop ASA and Plavix   - ESRD - HD today via fistula left arm, remove udall right groin - give 1 unit packed cells and platelets prior to removal  - Heart failure:  Acute on chronic  Systolic c  - A. Fib:  was on amiodarone drip changed to PO today  - COPD/Hypoxia: Incentive spirometer and duonebs  - DM/Glucose Control: A1c 6.0  - 55 units insulin yesterday - continue drip today  -  V-fib arrest resolved   -  cardiogenic shock  - wean levo and vasopressin  -  remove swan  - maintain MAP 60-65  - Has ASD    Social Service Disposition:  to be determined

## 2022-10-22 NOTE — PROGRESS NOTE ADULT - SUBJECTIVE AND OBJECTIVE BOX
[facility] FOLLOW UP NOTE  --------------------------------------------------------------------------------  24 hour events/subjective:  Awake and alert  No complaints      PAST HISTORY  --------------------------------------------------------------------------------  No significant changes to PMH, PSH, FHx, SHx, unless otherwise noted    ALLERGIES & MEDICATIONS  --------------------------------------------------------------------------------  Allergies    No Known Allergies    Intolerances      Standing Inpatient Medications  albumin human  5% IVPB 250 milliLiter(s) IV Intermittent once  albumin human  5% IVPB 250 milliLiter(s) IV Intermittent once  albumin human  5% IVPB 250 milliLiter(s) IV Intermittent once  albumin human  5% IVPB 250 milliLiter(s) IV Intermittent once  aMIOdarone Infusion 1 mG/Min IV Continuous <Continuous>  aspirin enteric coated 81 milliGRAM(s) Oral daily  cefepime   IVPB 1000 milliGRAM(s) IV Intermittent daily  chlorhexidine 0.12% Liquid 5 milliLiter(s) Oral Mucosa two times a day  clopidogrel Tablet 75 milliGRAM(s) Oral daily  CRRT Treatment    <Continuous>  dexMEDEtomidine Infusion 0.2 MICROgram(s)/kG/Hr IV Continuous <Continuous>  dextrose 5%. 1000 milliLiter(s) IV Continuous <Continuous>  dextrose 50% Injectable 25 Gram(s) IV Push once  dextrose 50% Injectable 50 milliLiter(s) IV Push every 15 minutes  dextrose 50% Injectable 25 milliLiter(s) IV Push every 15 minutes  glucagon  Injectable 1 milliGRAM(s) IntraMuscular once  insulin regular Infusion 10 Unit(s)/Hr IV Continuous <Continuous>  meperidine     Injectable 25 milliGRAM(s) IV Push once  mupirocin 2% Ointment 1 Application(s) Both Nostrils every 12 hours  norepinephrine Infusion 0.05 MICROgram(s)/kG/Min IV Continuous <Continuous>  pantoprazole  Injectable 40 milliGRAM(s) IV Push daily  polyethylene glycol 3350 17 Gram(s) Oral daily  propofol Infusion 10 MICROgram(s)/kG/Min IV Continuous <Continuous>  PureFlow Dialysate RFP-400 (K 2 / Ca 3) 5000 milliLiter(s) CRRT <Continuous>  senna 2 Tablet(s) Oral at bedtime  sodium chloride 0.9%. 1000 milliLiter(s) IV Continuous <Continuous>  vasopressin Infusion 0.04 Unit(s)/Min IV Continuous <Continuous>    PRN Inpatient Medications  dextrose Oral Gel 15 Gram(s) Oral once PRN  oxyCODONE    IR 5 milliGRAM(s) Oral every 4 hours PRN  oxyCODONE    IR 10 milliGRAM(s) Oral every 4 hours PRN      REVIEW OF SYSTEMS  --------------------------------------------------------------------------------  All other systems were reviewed and are negative, except as noted.    VITALS/PHYSICAL EXAM  --------------------------------------------------------------------------------  T(C): 37.2 (10-22-22 @ 07:00), Max: 37.8 (10-21-22 @ 20:00)  HR: 84 (10-22-22 @ 08:45) (75 - 92)  BP: 117/63 (10-22-22 @ 01:00) (91/54 - 117/63)  RR: 15 (10-22-22 @ 08:45) (0 - 30)  SpO2: 97% (10-22-22 @ 08:45) (95% - 100%)  Wt(kg): --        10-21-22 @ 07:01  -  10-22-22 @ 07:00  --------------------------------------------------------  IN: 2571.4 mL / OUT: 839 mL / NET: 1732.4 mL    10-22-22 @ 07:01  -  10-22-22 @ 09:27  --------------------------------------------------------  IN: 157 mL / OUT: 25 mL / NET: 132 mL      Physical Exam:  	Gen: seen sitting up in bed, no distress  	HEENT: No JVD  	Pulm: CTA B/L  	CV: RRR,  	Abd: +BS, soft, nontender, slightly distended              Ext: tr edema  	Vascular access: left AVF, right udoll    LABS/STUDIES  --------------------------------------------------------------------------------              7.9    13.35 >-----------<  53       [10-22-22 @ 02:15]              24.9     138  |  103  |  56  ----------------------------<  168      [10-22-22 @ 02:15]  4.5   |  19  |  5.2        Ca     7.6     [10-22-22 @ 02:15]      Mg     2.1     [10-22-22 @ 02:15]    TPro  4.2  /  Alb  3.1  /  TBili  0.9  /  DBili  x   /  AST  21  /  ALT  <5  /  AlkPhos  49  [10-22-22 @ 02:15]    PT/INR: PT 15.00, INR 1.31       [10-20-22 @ 14:41]  PTT: 28.6       [10-22-22 @ 02:15]    Iron 40, TIBC 245, %sat 16      [09-15-22 @ 15:31]  Ferritin 1588      [09-15-22 @ 15:31]  PTH -- (Ca 9.0)      [09-15-22 @ 16:24]   192  HbA1c 8.1      [03-08-19 @ 07:21]  TSH 3.97      [09-15-22 @ 06:53]  Lipid: chol 103, , HDL 39, LDL --      [09-15-22 @ 06:53]    CXR - bilateral opacities

## 2022-10-22 NOTE — PROGRESS NOTE ADULT - SUBJECTIVE AND OBJECTIVE BOX
Seen / examined multiple times since TAVR.  Close coordination of care with Dr. Carroll / Ck / CTU team.    Continued clinical improvement.  Impella / IABP successfully weaned.  Off inotropes.  Extubated yesterday evening.  Good SaO2 on NC.  SVT quiescent.  Atrial / ventricular ectopy improved on Amiodarone.  Tolerated HD today.  Uldall removed.  Weaning off levophed.  No neuro deficit.  Afebrile / leukocytosis improving.    ECHO: EF 30-35%.  nL THV function.  Mild PVL.    - Cont Amiodarone.  - Wean off Levophed, then Vasopressin as tolerated.  - Transfuse 1-2 units PRBC (lost blood with prior CVVHD clotting).  - Complete antibiotic course.

## 2022-10-23 LAB
ALBUMIN SERPL ELPH-MCNC: 3.3 G/DL — LOW (ref 3.5–5.2)
ALP SERPL-CCNC: 58 U/L — SIGNIFICANT CHANGE UP (ref 30–115)
ALT FLD-CCNC: <5 U/L — SIGNIFICANT CHANGE UP (ref 0–41)
ANION GAP SERPL CALC-SCNC: 14 MMOL/L — SIGNIFICANT CHANGE UP (ref 7–14)
ANION GAP SERPL CALC-SCNC: 16 MMOL/L — HIGH (ref 7–14)
APTT BLD: 28.1 SEC — SIGNIFICANT CHANGE UP (ref 27–39.2)
AST SERPL-CCNC: 21 U/L — SIGNIFICANT CHANGE UP (ref 0–41)
BASOPHILS # BLD AUTO: 0.05 K/UL — SIGNIFICANT CHANGE UP (ref 0–0.2)
BASOPHILS NFR BLD AUTO: 0.3 % — SIGNIFICANT CHANGE UP (ref 0–1)
BILIRUB SERPL-MCNC: 0.8 MG/DL — SIGNIFICANT CHANGE UP (ref 0.2–1.2)
BUN SERPL-MCNC: 51 MG/DL — HIGH (ref 10–20)
BUN SERPL-MCNC: 55 MG/DL — HIGH (ref 10–20)
CALCIUM SERPL-MCNC: 8.2 MG/DL — LOW (ref 8.4–10.5)
CALCIUM SERPL-MCNC: 8.4 MG/DL — SIGNIFICANT CHANGE UP (ref 8.4–10.5)
CHLORIDE SERPL-SCNC: 101 MMOL/L — SIGNIFICANT CHANGE UP (ref 98–110)
CHLORIDE SERPL-SCNC: 103 MMOL/L — SIGNIFICANT CHANGE UP (ref 98–110)
CO2 SERPL-SCNC: 21 MMOL/L — SIGNIFICANT CHANGE UP (ref 17–32)
CO2 SERPL-SCNC: 23 MMOL/L — SIGNIFICANT CHANGE UP (ref 17–32)
CREAT SERPL-MCNC: 5 MG/DL — CRITICAL HIGH (ref 0.7–1.5)
CREAT SERPL-MCNC: 5.5 MG/DL — CRITICAL HIGH (ref 0.7–1.5)
EGFR: 10 ML/MIN/1.73M2 — LOW
EGFR: 11 ML/MIN/1.73M2 — LOW
EOSINOPHIL # BLD AUTO: 0.04 K/UL — SIGNIFICANT CHANGE UP (ref 0–0.7)
EOSINOPHIL NFR BLD AUTO: 0.3 % — SIGNIFICANT CHANGE UP (ref 0–8)
GAS PNL BLDA: SIGNIFICANT CHANGE UP
GAS PNL BLDA: SIGNIFICANT CHANGE UP
GLUCOSE BLDC GLUCOMTR-MCNC: 100 MG/DL — HIGH (ref 70–99)
GLUCOSE BLDC GLUCOMTR-MCNC: 110 MG/DL — HIGH (ref 70–99)
GLUCOSE BLDC GLUCOMTR-MCNC: 118 MG/DL — HIGH (ref 70–99)
GLUCOSE BLDC GLUCOMTR-MCNC: 119 MG/DL — HIGH (ref 70–99)
GLUCOSE BLDC GLUCOMTR-MCNC: 132 MG/DL — HIGH (ref 70–99)
GLUCOSE BLDC GLUCOMTR-MCNC: 137 MG/DL — HIGH (ref 70–99)
GLUCOSE BLDC GLUCOMTR-MCNC: 156 MG/DL — HIGH (ref 70–99)
GLUCOSE BLDC GLUCOMTR-MCNC: 73 MG/DL — SIGNIFICANT CHANGE UP (ref 70–99)
GLUCOSE BLDC GLUCOMTR-MCNC: 89 MG/DL — SIGNIFICANT CHANGE UP (ref 70–99)
GLUCOSE SERPL-MCNC: 126 MG/DL — HIGH (ref 70–99)
GLUCOSE SERPL-MCNC: 60 MG/DL — LOW (ref 70–99)
HCT VFR BLD CALC: 26.5 % — LOW (ref 42–52)
HCT VFR BLD CALC: 27.6 % — LOW (ref 42–52)
HGB BLD-MCNC: 8.4 G/DL — LOW (ref 14–18)
HGB BLD-MCNC: 9 G/DL — LOW (ref 14–18)
IMM GRANULOCYTES NFR BLD AUTO: 0.6 % — HIGH (ref 0.1–0.3)
LYMPHOCYTES # BLD AUTO: 1.17 K/UL — LOW (ref 1.2–3.4)
LYMPHOCYTES # BLD AUTO: 7.9 % — LOW (ref 20.5–51.1)
MAGNESIUM SERPL-MCNC: 2.2 MG/DL — SIGNIFICANT CHANGE UP (ref 1.8–2.4)
MAGNESIUM SERPL-MCNC: 2.3 MG/DL — SIGNIFICANT CHANGE UP (ref 1.8–2.4)
MCHC RBC-ENTMCNC: 29.2 PG — SIGNIFICANT CHANGE UP (ref 27–31)
MCHC RBC-ENTMCNC: 29.7 PG — SIGNIFICANT CHANGE UP (ref 27–31)
MCHC RBC-ENTMCNC: 31.7 G/DL — LOW (ref 32–37)
MCHC RBC-ENTMCNC: 32.6 G/DL — SIGNIFICANT CHANGE UP (ref 32–37)
MCV RBC AUTO: 91.1 FL — SIGNIFICANT CHANGE UP (ref 80–94)
MCV RBC AUTO: 92 FL — SIGNIFICANT CHANGE UP (ref 80–94)
MONOCYTES # BLD AUTO: 2.27 K/UL — HIGH (ref 0.1–0.6)
MONOCYTES NFR BLD AUTO: 15.2 % — HIGH (ref 1.7–9.3)
NEUTROPHILS # BLD AUTO: 11.27 K/UL — HIGH (ref 1.4–6.5)
NEUTROPHILS NFR BLD AUTO: 75.7 % — HIGH (ref 42.2–75.2)
NRBC # BLD: 0 /100 WBCS — SIGNIFICANT CHANGE UP (ref 0–0)
NRBC # BLD: 0 /100 WBCS — SIGNIFICANT CHANGE UP (ref 0–0)
PLATELET # BLD AUTO: 81 K/UL — LOW (ref 130–400)
PLATELET # BLD AUTO: 90 K/UL — LOW (ref 130–400)
POTASSIUM SERPL-MCNC: 3.6 MMOL/L — SIGNIFICANT CHANGE UP (ref 3.5–5)
POTASSIUM SERPL-MCNC: 4.3 MMOL/L — SIGNIFICANT CHANGE UP (ref 3.5–5)
POTASSIUM SERPL-SCNC: 3.6 MMOL/L — SIGNIFICANT CHANGE UP (ref 3.5–5)
POTASSIUM SERPL-SCNC: 4.3 MMOL/L — SIGNIFICANT CHANGE UP (ref 3.5–5)
PROT SERPL-MCNC: 4.8 G/DL — LOW (ref 6–8)
RBC # BLD: 2.88 M/UL — LOW (ref 4.7–6.1)
RBC # BLD: 3.03 M/UL — LOW (ref 4.7–6.1)
RBC # FLD: 17.8 % — HIGH (ref 11.5–14.5)
RBC # FLD: 18.6 % — HIGH (ref 11.5–14.5)
SODIUM SERPL-SCNC: 138 MMOL/L — SIGNIFICANT CHANGE UP (ref 135–146)
SODIUM SERPL-SCNC: 140 MMOL/L — SIGNIFICANT CHANGE UP (ref 135–146)
WBC # BLD: 14.77 K/UL — HIGH (ref 4.8–10.8)
WBC # BLD: 14.89 K/UL — HIGH (ref 4.8–10.8)
WBC # FLD AUTO: 14.77 K/UL — HIGH (ref 4.8–10.8)
WBC # FLD AUTO: 14.89 K/UL — HIGH (ref 4.8–10.8)

## 2022-10-23 PROCEDURE — 93010 ELECTROCARDIOGRAM REPORT: CPT

## 2022-10-23 PROCEDURE — 99232 SBSQ HOSP IP/OBS MODERATE 35: CPT

## 2022-10-23 PROCEDURE — 71045 X-RAY EXAM CHEST 1 VIEW: CPT | Mod: 26

## 2022-10-23 RX ORDER — AMIODARONE HYDROCHLORIDE 400 MG/1
0.25 TABLET ORAL
Qty: 900 | Refills: 0 | Status: DISCONTINUED | OUTPATIENT
Start: 2022-10-23 | End: 2022-10-24

## 2022-10-23 RX ORDER — TRAZODONE HCL 50 MG
50 TABLET ORAL AT BEDTIME
Refills: 0 | Status: DISCONTINUED | OUTPATIENT
Start: 2022-10-23 | End: 2022-10-23

## 2022-10-23 RX ORDER — CHLORHEXIDINE GLUCONATE 213 G/1000ML
1 SOLUTION TOPICAL
Refills: 0 | Status: DISCONTINUED | OUTPATIENT
Start: 2022-10-23 | End: 2022-10-29

## 2022-10-23 RX ORDER — ACETAMINOPHEN 500 MG
650 TABLET ORAL EVERY 6 HOURS
Refills: 0 | Status: DISCONTINUED | OUTPATIENT
Start: 2022-10-23 | End: 2022-10-29

## 2022-10-23 RX ORDER — MAGNESIUM SULFATE 500 MG/ML
1 VIAL (ML) INJECTION ONCE
Refills: 0 | Status: COMPLETED | OUTPATIENT
Start: 2022-10-23 | End: 2022-10-23

## 2022-10-23 RX ADMIN — MUPIROCIN 1 APPLICATION(S): 20 OINTMENT TOPICAL at 05:27

## 2022-10-23 RX ADMIN — Medication 5 MILLIGRAM(S): at 00:04

## 2022-10-23 RX ADMIN — CHLORHEXIDINE GLUCONATE 5 MILLILITER(S): 213 SOLUTION TOPICAL at 05:27

## 2022-10-23 RX ADMIN — Medication 0.5 MILLIGRAM(S): at 21:05

## 2022-10-23 RX ADMIN — AMIODARONE HYDROCHLORIDE 8.33 MG/MIN: 400 TABLET ORAL at 23:49

## 2022-10-23 RX ADMIN — Medication 100 GRAM(S): at 05:26

## 2022-10-23 RX ADMIN — CEFEPIME 100 MILLIGRAM(S): 1 INJECTION, POWDER, FOR SOLUTION INTRAMUSCULAR; INTRAVENOUS at 13:22

## 2022-10-23 NOTE — PHYSICAL THERAPY INITIAL EVALUATION ADULT - GENERAL OBSERVATIONS, REHAB EVAL
pt encountered sitting with bed in chair mode, PIOTR Lacy present, +tele, woods, a-line central line, + O2 via nasal canula, 4L/min

## 2022-10-23 NOTE — CONSULT NOTE ADULT - ASSESSMENT
81 yo male with PMH as below, including ESRD on HD, CHF, CAD, VHD, DM, ICD ([employer]), GREG electively admitted to CTICU for TAVR procedure.   During TAVR, pt had a VF arrest.    VF arrest  ICD in place ([employer]  ESRD on HD  CHF  DM    Plan:  ICD reprogrammed
81 y/o M with h/o ESRD on HD, chronic systolic HF s/p ICD, DM, GREG, severe AS who came in electively for TAVR. Valve was deployed successfully. Patient went into Vfib arrest post deployment. An Impella CP was inserted and he was started on multiple vasoppresors, including epinephrine, levophed and vasopressin. Lactate peaked at ~7 but is already trending down. A PA and CVVHD catheters were inserted while in the lab. pt is extubated. GI is called for evaluation of of melena.    # Melena:  - patient is hemodynamically stable, extubated yesterday, on 2L NC, no pressors  - YIN: + Melena  - Hb was 7.5 , s/p 2 pRBC and repeat today is 9  - was on heparin infusion   - was on ASA/Plavix - last dose 10/22     recommendations:  - clear liquid diet  - PPI infusion  - avoid NSAIDs  - ASA/Plavix are on hold per CTU team  - monitor H/H with active type and screen  - target Hb >8  - patient is High risk for any endoscopic procedures given the cardiac status, c/w conservative management and will follow up the patient closely, endoscopic intervention will be performed only as life saving measure     will follow     discussed with CTU intensivist   discussed with attending 
IMPRESSION  VF Arrest  Cardiogenic shock s/p Impella  severe AS s/p TAVR  CAD/CABG ( [facility] on 3/01/22 with subtotal LM and 100% occlusion of RCA, with patent LIMA-LAD, patent SVG-diag/OM, and patent SVG-RPDA)  ESRD on HD  HFrEF s/p ICD  s/p ICD     RECOMMENDATIONS    CNS:   -c/w precedex for sedation. Consider to switch propofol to fentanyl for sedation to avoid hypotension.    HEENT:   -Oral care    PULMONARY:    -HOB @ 45 degrees. Aspiration precautions.   -c/w same vent settings, monitor ABGs.  -SBT/SAT in am    CARDIOVASCULAR:   -c/w milrinone @ 0.375  -c/w Levo  -c/w Impella support at P6 for now  -c/w heparin purge solution and systemic heparin. Monitor PTTs, target goal of 60-90.  -check peripheral pulses q1 hour with doppler  -monitor swan and Jhonny numbers q 1 hour  -wean off impella to lower P levels depending on hemodynamic numbers, monitor CI and pressor requirements.  -check hemolysis panel q 4; UA, LDH, Haptoglobin, Indirect Bili  -c/w CVVH, aim for negative balance of 1.5L/day  -check BMP q 12     GI:   -GI prophylaxis. OG feeding as tolerated.    RENAL:    -Follow up lytes.  Correct as needed  -c/w CVVH with aim for negative balance as above    INFECTIOUS DISEASE:   -Follow up cultures: blood and urine.   -c/w Vanc, Cefepime  -CHG 4% daily bath    HEMATOLOGICAL:    -DVT prophylaxis.    ENDOCRINE:    -Follow up FS.  -insulin drip prn    MUSCULOSKELETAL:   -bedrest    CTU monitoring  
ESRD on HD MWF @ [facility]  access via left arm AVF  CAD / CABG / CMP / CHF / VHD (severe AS and MR)  HTN  DM2  anemia   GREG    plan:    HD 10/18: 2H / 2K+ / 2H UF  low K+ renal diet  fluid restriction  left arm precautions  epogen with HD  no venofer due to prior hyperferritinemia   Lydia Silva ([phone])   d/w team      addendum:  HD completed, 2kg off, avf without issue, bp's on low side, but chronic

## 2022-10-23 NOTE — PROGRESS NOTE ADULT - SUBJECTIVE AND OBJECTIVE BOX
OPERATIVE PROCEDURE(s):                POD # 4 TAVR with V fib arrest and insertion of Impella  #3 removal of implella and insertion of IABP  # removal of IABP                      82yMale  SURGEON(s): ROSEANN Adan  SUBJECTIVE ASSESSMENT: was OOB to chair, no acute pain    Vital Signs Last 24 Hrs  T(F): 98 (23 Oct 2022 04:00), Max: 98 (23 Oct 2022 04:00)  HR: 82 (23 Oct 2022 06:00) (78 - 94)  ABP: 148/38 (23 Oct 2022 06:00) (82/44 - 158/56)  ABP(mean): 65 (23 Oct 2022 06:00)  RR: 16 (23 Oct 2022 06:00) (12 - 29)  SpO2: 97% (23 Oct 2022 06:00) (92% - 100%)  CVP(mm Hg): -1 (22 Oct 2022 11:30)  CVP(cm H2O): --  CO: 5.6 (22 Oct 2022 07:30)  CI: 2.5 (22 Oct 2022 07:30)  PA: 0/0 (22 Oct 2022 11:30)  SVR: 699 (22 Oct 2022 07:30)    I&O's Detail    22 Oct 2022 07:01  -  23 Oct 2022 07:00  --------------------------------------------------------  IN:    Amiodarone: 100 mL    Amiodarone: 200.4 mL    Insulin: 52 mL    IV PiggyBack: 350 mL    Norepinephrine: 28.7 mL    Oral Fluid: 30 mL    Pantoprazole: 110 mL    Platelets - Single Donor: 217 mL    PRBCs (Packed Red Blood Cells): 339 mL    sodium chloride 0.9%: 240 mL    Vasopressin: 72 mL  Total IN: 1739.1 mL    OUT:    Dexmedetomidine: 0 mL    Indwelling Catheter - Urethral (mL): 295 mL    Other (mL): 2300 mL  Total OUT: 2595 mL    Net:   I&O's Detail    21 Oct 2022 07:01  -  22 Oct 2022 07:00  --------------------------------------------------------  Total NET: 1732.4 mL    22 Oct 2022 07:01  -  23 Oct 2022 07:00  --------------------------------------------------------  Total NET: -855.9 mL    CAPILLARY BLOOD GLUCOSE    POCT Blood Glucose.: 118 mg/dL (23 Oct 2022 06:08)  POCT Blood Glucose.: 100 mg/dL (23 Oct 2022 04:20)  POCT Blood Glucose.: 89 mg/dL (23 Oct 2022 03:08)  POCT Blood Glucose.: 73 mg/dL (23 Oct 2022 01:12)  POCT Blood Glucose.: 130 mg/dL (22 Oct 2022 23:00)  POCT Blood Glucose.: 146 mg/dL (22 Oct 2022 22:12)  POCT Blood Glucose.: 166 mg/dL (22 Oct 2022 20:03)  POCT Blood Glucose.: 169 mg/dL (22 Oct 2022 18:58)  POCT Blood Glucose.: 155 mg/dL (22 Oct 2022 13:28)  POCT Blood Glucose.: 171 mg/dL (22 Oct 2022 10:45)    Physical Exam:  General: NAD; A&Ox3  Cardiac: S1/S2, RRR, no murmur, no rubs  Lungs: unlabored shallow respirations, bilateral bs   Abdomen: Soft/NT/protuberant  Sternum: Intact, no click, incision healing well with no drainage  Incisions: Incisions clean/dry/intact  Extremities: No edema b/l lower extremities    Central Venous Catheter: Yes[]  critical patient   Gomez Catheter: Yes  [] , critical patient strict I&O  BOWEL MOVEMENT:   [] NO,       LABS:                        8.4<L>  14.89<H> )-----------( 81<L>    ( 23 Oct 2022 03:04 )             26.5<L>                        9.0<L>  14.07<H> )-----------( 83<L>    ( 22 Oct 2022 21:25 )             27.2<L>    10-23    138  |  101  |  51<H>  ----------------------------<  60<L>  3.6   |  23  |  5.0<HH>  10-22    137  |  101  |  46<H>  ----------------------------<  150<H>  4.0   |  22  |  4.4<HH>    Ca    8.2<L>      23 Oct 2022 03:04  Mg     2.2     10-23    TPro  4.8<L> [6.0 - 8.0]  /  Alb  3.3<L> [3.5 - 5.2]  /  TBili  0.8 [0.2 - 1.2]  /  DBili  x   /  AST  21 [0 - 41]  /  ALT  <5 [0 - 41]  /  AlkPhos  58 [30 - 115]  10-23    PTT - ( 23 Oct 2022 03:04 )  PTT:28.1 sec, PTT - ( 22 Oct 2022 02:15 )  PTT:28.6 sec    ABG - ( 23 Oct 2022 04:02 )  pH: 7.43  /  pCO2: 33    /  pO2: 78    / HCO3: 22    / Base Excess: -2.0  /  SaO2: 96.7  /  LA: 1.60       MEDICATIONS  (STANDING):  albumin human  5% IVPB 250 milliLiter(s) IV Intermittent once  albumin human  5% IVPB 250 milliLiter(s) IV Intermittent once  albumin human  5% IVPB 250 milliLiter(s) IV Intermittent once  albumin human  5% IVPB 250 milliLiter(s) IV Intermittent once  aMIOdarone    Tablet 200 milliGRAM(s) Oral daily  aMIOdarone Infusion 0.5 mG/Min (16.7 mL/Hr) IV Continuous <Continuous>  cefepime   IVPB 1000 milliGRAM(s) IV Intermittent daily  chlorhexidine 0.12% Liquid 5 milliLiter(s) Oral Mucosa two times a day  dextrose 5%. 1000 milliLiter(s) (50 mL/Hr) IV Continuous <Continuous>  dextrose 50% Injectable 25 Gram(s) IV Push once  dextrose 50% Injectable 50 milliLiter(s) IV Push every 15 minutes  dextrose 50% Injectable 25 milliLiter(s) IV Push every 15 minutes  glucagon  Injectable 1 milliGRAM(s) IntraMuscular once  insulin regular Infusion 10 Unit(s)/Hr (10 mL/Hr) IV Continuous <Continuous>  meperidine     Injectable 25 milliGRAM(s) IV Push once  norepinephrine Infusion 0.05 MICROgram(s)/kG/Min (9.6 mL/Hr) IV Continuous <Continuous>  pantoprazole Infusion 8 mG/Hr (10 mL/Hr) IV Continuous <Continuous>  propofol Infusion 10 MICROgram(s)/kG/Min (6.14 mL/Hr) IV Continuous <Continuous>  sodium chloride 0.9%. 1000 milliLiter(s) (20 mL/Hr) IV Continuous <Continuous>  vasopressin Infusion 0.04 Unit(s)/Min (6 mL/Hr) IV Continuous <Continuous>    MEDICATIONS  (PRN):  dextrose Oral Gel 15 Gram(s) Oral once PRN Blood Glucose LESS THAN 70 milliGRAM(s)/deciliter  oxyCODONE    IR 5 milliGRAM(s) Oral every 4 hours PRN Moderate Pain (4 - 6)  oxyCODONE    IR 10 milliGRAM(s) Oral every 4 hours PRN Severe Pain (7 - 10)    Allergies    No Known Allergies    Intolerances      Ambulation/Activity Status: ambulate with assistance    Assessment/Plan:  82y Male status-post TAVR / V fib arrest / Impella day 4, Impella removal and insertion IABP day 3, removal of IABP day 2,   - Case and plan discussed with CTU Intensivist and CT Surgeon - Dr. powell / mason  - Continue CTU supportive care    - Stopped pharmacologic DVT prophylaxis due to thrombocytopenia- continue SCD's  - Continue GI prophylaxis  - Incentive Spirometry 10 times an hour  - Continue to advance physical activity as tolerated and continue PT/OT as directed  - CAD: restart ASA, statin, hold BB for now  - Anemia secondary to:  Acute PO blood loss anemia,              track and trend H/H    s/p transfusion    stable  - Thrombocytopenia - 81k up from 53K - will restart  ASA hold Plavix  - AS  - s/p TAVR   - ESRD - HD yesterday via fistula left arm, - 2 liters taken off  - Heart failure:  Acute on chronic  Systolic   - Episode of VT and SVT:  amiodarone drip restarted  - COPD/Hypoxia: Incentive spirometer and duonebs  - DM/Glucose Control: A1c 6.0  - 51 units insulin yesterday - discontinue drip today start sliding scale and Lantus  -  V-fib arrest resolved   -  cardiogenic shock  - off levo and vasopressin  - maintain MAP 60-65  - ASD appreciated during TAVR procedure    Social Service Disposition:  to be determined

## 2022-10-23 NOTE — PHYSICAL THERAPY INITIAL EVALUATION ADULT - DID THE PATIENT HAVE SURGERY?
82y Male status-post TAVR / V fib arrest / Impella day 4, Impella removal and insertion IABP day 3, removal of IABP day 2,/yes

## 2022-10-23 NOTE — PROGRESS NOTE ADULT - SUBJECTIVE AND OBJECTIVE BOX
JOSEF FOLLOW UP NOTE  --------------------------------------------------------------------------------  24 hour events/subjective:  Pt seen earlier this AM  tolerated HD via AVF without difficulty  dialysis cath removed  noted to have melena and some low BPs        PAST HISTORY  --------------------------------------------------------------------------------  No significant changes to PMH, PSH, FHx, SHx, unless otherwise noted    ALLERGIES & MEDICATIONS  --------------------------------------------------------------------------------  Allergies    No Known Allergies    Intolerances      Standing Inpatient Medications  albumin human  5% IVPB 250 milliLiter(s) IV Intermittent once  albumin human  5% IVPB 250 milliLiter(s) IV Intermittent once  albumin human  5% IVPB 250 milliLiter(s) IV Intermittent once  albumin human  5% IVPB 250 milliLiter(s) IV Intermittent once  aMIOdarone    Tablet 200 milliGRAM(s) Oral daily  aMIOdarone Infusion 0.5 mG/Min IV Continuous <Continuous>  cefepime   IVPB 1000 milliGRAM(s) IV Intermittent daily  chlorhexidine 0.12% Liquid 5 milliLiter(s) Oral Mucosa two times a day  dextrose 5%. 1000 milliLiter(s) IV Continuous <Continuous>  dextrose 50% Injectable 25 Gram(s) IV Push once  dextrose 50% Injectable 50 milliLiter(s) IV Push every 15 minutes  dextrose 50% Injectable 25 milliLiter(s) IV Push every 15 minutes  glucagon  Injectable 1 milliGRAM(s) IntraMuscular once  insulin regular Infusion 10 Unit(s)/Hr IV Continuous <Continuous>  meperidine     Injectable 25 milliGRAM(s) IV Push once  norepinephrine Infusion 0.05 MICROgram(s)/kG/Min IV Continuous <Continuous>  pantoprazole Infusion 8 mG/Hr IV Continuous <Continuous>  propofol Infusion 10 MICROgram(s)/kG/Min IV Continuous <Continuous>  sodium chloride 0.9%. 1000 milliLiter(s) IV Continuous <Continuous>  vasopressin Infusion 0.04 Unit(s)/Min IV Continuous <Continuous>    PRN Inpatient Medications  dextrose Oral Gel 15 Gram(s) Oral once PRN  oxyCODONE    IR 5 milliGRAM(s) Oral every 4 hours PRN  oxyCODONE    IR 10 milliGRAM(s) Oral every 4 hours PRN      REVIEW OF SYSTEMS  --------------------------------------------------------------------------------  All other systems were reviewed and are negative, except as noted.    VITALS/PHYSICAL EXAM  --------------------------------------------------------------------------------  T(C): 36.1 (10-23-22 @ 12:00), Max: 36.7 (10-23-22 @ 04:00)  HR: 82 (10-23-22 @ 14:00) (78 - 94)  BP: --  RR: 28 (10-23-22 @ 14:00) (15 - 29)  SpO2: 99% (10-23-22 @ 14:00) (92% - 100%)  Wt(kg): --        10-22-22 @ 07:01  -  10-23-22 @ 07:00  --------------------------------------------------------  IN: 1739.1 mL / OUT: 2595 mL / NET: -855.9 mL    10-23-22 @ 07:01  -  10-23-22 @ 14:18  --------------------------------------------------------  IN: 623.4 mL / OUT: 94 mL / NET: 529.4 mL      Physical Exam:   	Gen: NAD  	HEENT: No JVD  	Pulm: CTA B/L  	CV: RRR,               Abd: +BS, soft, nontender/nondistended              tr-1+ edema  	Vascular access: AVF    LABS/STUDIES  --------------------------------------------------------------------------------              9.0    14.77 >-----------<  90       [10-23-22 @ 12:40]              27.6     140  |  103  |  55  ----------------------------<  126      [10-23-22 @ 12:40]  4.3   |  21  |  5.5        Ca     8.4     [10-23-22 @ 12:40]      Mg     2.3     [10-23-22 @ 12:40]    TPro  4.8  /  Alb  3.3  /  TBili  0.8  /  DBili  x   /  AST  21  /  ALT  <5  /  AlkPhos  58  [10-23-22 @ 03:04]      Iron 40, TIBC 245, %sat 16      [09-15-22 @ 15:31]  Ferritin 1588      [09-15-22 @ 15:31]  PTH -- (Ca 9.0)      [09-15-22 @ 16:24]   192  HbA1c 8.1      [03-08-19 @ 07:21]  TSH 3.97      [09-15-22 @ 06:53]  Lipid: chol 103, , HDL 39, LDL --      [09-15-22 @ 06:53]      CXR - stable bilateral opacities

## 2022-10-23 NOTE — PHYSICAL THERAPY INITIAL EVALUATION ADULT - PERTINENT HX OF CURRENT PROBLEM, REHAB EVAL
82y Male status-post TAVR / V fib arrest / Impella day 4, Impella removal and insertion IABP day 3, removal of IABP day 2,

## 2022-10-23 NOTE — CONSULT NOTE ADULT - SUBJECTIVE AND OBJECTIVE BOX
Gastroenterology Consultation:    Patient is a 82y old  Male who presents with a chief complaint of Aortic Stenosis (22 Oct 2022 17:52)    Admitted on: 10-18-22    HPI: 81 y/o M with h/o ESRD on HD, chronic systolic HF s/p ICD, DM, GREG, severe AS who came in electively for TAVR. Valve was deployed successfully. Patient went into Vfib arrest post deployment. An Impella CP was inserted and he was started on multiple vasoppresors, including epinephrine, levophed and vasopressin. Lactate peaked at ~7 but is already trending down. A PA and CVVHD catheters were inserted while in the lab. pt is extubated. GI is called for evaluation of of melena      Prior EGD: none on chart    Prior Colonoscopy: none on chart      PAST MEDICAL & SURGICAL HISTORY:  HTN (Hypertension)      Diabetes Mellitus Type II      Hypercholesterolemia      CAD (Coronary Artery Disease)      History of PTCA  with stents 10 years ago ([facility])      Diabetes mellitus      CAD (coronary artery disease)      H/O hyperlipidemia      HTN - Hypertension      Sleep apnea  does not use machine      GERD (gastroesophageal reflux disease)      BPH (Benign Prostatic Hyperplasia)      CHF (congestive heart failure)      Mitral valve regurgitation      Dialysis patient  HD- M/W/FR- [facility] [address]-[facility].      Aortic stenosis      [place] (hard of hearing)      AICD (automatic cardioverter/defibrillator) present      S/P knee surgery  b/l arthroscopic      S/P tonsillectomy      S/P primary angioplasty with coronary stent  6-7 stents last one in 10/12      S/P appendectomy      S/P CABG (coronary artery bypass graft)  ? 2017      AICD (automatic cardioverter/defibrillator) present            FAMILY HISTORY:  No pertinent family history in first degree relatives        Social History:  Tobacco: denies   Alcohol: denies   Drugs: denies     Home Medications:  Lantus 100 units/mL subcutaneous solution: 30 unit(s) subcutaneous once a day (at bedtime) (18 Oct 2022 14:24)  Lasix: orally once a day (18 Oct 2022 14:24)  Lipitor 40 mg oral tablet: 1 tab(s) orally once a day (at bedtime) (18 Oct 2022 14:24)  metoprolol succinate 50 mg oral tablet, extended release: 1 tab(s) orally once a day (18 Oct 2022 14:24)        MEDICATIONS  (STANDING):  albumin human  5% IVPB 250 milliLiter(s) IV Intermittent once  albumin human  5% IVPB 250 milliLiter(s) IV Intermittent once  albumin human  5% IVPB 250 milliLiter(s) IV Intermittent once  albumin human  5% IVPB 250 milliLiter(s) IV Intermittent once  aMIOdarone    Tablet 200 milliGRAM(s) Oral daily  aMIOdarone Infusion 0.5 mG/Min (16.7 mL/Hr) IV Continuous <Continuous>  cefepime   IVPB 1000 milliGRAM(s) IV Intermittent daily  chlorhexidine 0.12% Liquid 5 milliLiter(s) Oral Mucosa two times a day  dextrose 5%. 1000 milliLiter(s) (50 mL/Hr) IV Continuous <Continuous>  dextrose 50% Injectable 25 Gram(s) IV Push once  dextrose 50% Injectable 50 milliLiter(s) IV Push every 15 minutes  dextrose 50% Injectable 25 milliLiter(s) IV Push every 15 minutes  glucagon  Injectable 1 milliGRAM(s) IntraMuscular once  insulin regular Infusion 10 Unit(s)/Hr (10 mL/Hr) IV Continuous <Continuous>  meperidine     Injectable 25 milliGRAM(s) IV Push once  norepinephrine Infusion 0.05 MICROgram(s)/kG/Min (9.6 mL/Hr) IV Continuous <Continuous>  pantoprazole Infusion 8 mG/Hr (10 mL/Hr) IV Continuous <Continuous>  propofol Infusion 10 MICROgram(s)/kG/Min (6.14 mL/Hr) IV Continuous <Continuous>  sodium chloride 0.9%. 1000 milliLiter(s) (20 mL/Hr) IV Continuous <Continuous>  vasopressin Infusion 0.04 Unit(s)/Min (6 mL/Hr) IV Continuous <Continuous>    MEDICATIONS  (PRN):  dextrose Oral Gel 15 Gram(s) Oral once PRN Blood Glucose LESS THAN 70 milliGRAM(s)/deciliter  oxyCODONE    IR 5 milliGRAM(s) Oral every 4 hours PRN Moderate Pain (4 - 6)  oxyCODONE    IR 10 milliGRAM(s) Oral every 4 hours PRN Severe Pain (7 - 10)      Allergies  No Known Allergies      Review of Systems:   Constitutional:  No Fever, No Chills  ENT/Mouth:  No Hearing Changes,  No Difficulty Swallowing  Eyes:  No Eye Pain, No Vision Changes  Cardiovascular:  No Chest Pain, No Palpitations  Respiratory:  No Cough, No Dyspnea  Gastrointestinal:  As described in HPI  Musculoskeletal:  No Joint Swelling, No Back Pain  Skin:  No Skin Lesions, No Jaundice  Neuro:  No Syncope, No Dizziness  Heme/Lymph:  No Bruising, No Bleeding.          Physical Examination:  T(C): 36.6 (10-23-22 @ 08:00), Max: 36.7 (10-23-22 @ 04:00)  HR: 81 (10-23-22 @ 10:00) (78 - 94)  BP: --  RR: 24 (10-23-22 @ 10:00) (12 - 29)  SpO2: 100% (10-23-22 @ 10:00) (92% - 100%)      10-21-22 @ 07:01  -  10-22-22 @ 07:00  --------------------------------------------------------  IN: 2571.4 mL / OUT: 839 mL / NET: 1732.4 mL    10-22-22 @ 07:01  -  10-23-22 @ 07:00  --------------------------------------------------------  IN: 1739.1 mL / OUT: 2595 mL / NET: -855.9 mL    10-23-22 @ 07:01  -  10-23-22 @ 13:47  --------------------------------------------------------  IN: 100 mL / OUT: 37 mL / NET: 63 mL          GENERAL: AAOx3, no acute distress.  HEAD:  Atraumatic, Normocephalic  EYES: conjunctiva and sclera clear  NECK: Supple, no JVD or thyromegaly  CHEST/LUNG: Clear to auscultation bilaterally; No wheeze, rhonchi, or rales  HEART: Regular rate and rhythm; normal S1, S2, No murmurs.  ABDOMEN: Soft, nontender, nondistended; Bowel sounds present  NEUROLOGY: No asterixis or tremor.   SKIN: Intact, no jaundice        Data:                        9.0    14.77 )-----------( 90       ( 23 Oct 2022 12:40 )             27.6     Hgb Trend:  9.0  10-23-22 @ 12:40  8.4  10-23-22 @ 03:04  9.0  10-22-22 @ 21:25  7.5  10-22-22 @ 15:31  7.9  10-22-22 @ 02:15  7.7  10-21-22 @ 13:20  8.9  10-21-22 @ 02:41  9.5  10-20-22 @ 14:41      10-20-22 @ 07:01  -  10-21-22 @ 07:00  --------------------------------------------------------  IN: 0 mL    10-21-22 @ 07:01  -  10-22-22 @ 07:00  --------------------------------------------------------  IN: 309 mL    10-22-22 @ 07:01  -  10-23-22 @ 07:00  --------------------------------------------------------  IN: 556 mL      10-23    140  |  103  |  55<H>  ----------------------------<  126<H>  4.3   |  21  |  5.5<HH>    Ca    8.4      23 Oct 2022 12:40  Mg     2.3     10-23    TPro  4.8<L>  /  Alb  3.3<L>  /  TBili  0.8  /  DBili  x   /  AST  21  /  ALT  <5  /  AlkPhos  58  10-23    Liver panel trend:  TBili 0.8   /   AST 21   /   ALT <5   /   AlkP 58   /   Tptn 4.8   /   Alb 3.3    /   DBili --      10-23  TBili 0.9   /   AST 18   /   ALT <5   /   AlkP 53   /   Tptn 4.5   /   Alb 3.3    /   DBili --      10-22  TBili 0.9   /   AST 21   /   ALT <5   /   AlkP 49   /   Tptn 4.2   /   Alb 3.1    /   DBili --      10-22  TBili 0.6   /   AST 21   /   ALT <5   /   AlkP 50   /   Tptn 3.9   /   Alb 2.7    /   DBili --      10-21  TBili 0.6   /   AST 26   /   ALT 5   /   AlkP 48   /   Tptn 4.1   /   Alb 2.8    /   DBili --      10-21  TBili 0.7   /   AST 31   /   ALT 8   /   AlkP 44   /   Tptn 4.0   /   Alb 3.2    /   DBili --      10-20  TBili 0.9   /   AST --   /   ALT --   /   AlkP --   /   Tptn --   /   Alb --    /   DBili 0.4      10-20  TBili 0.9   /   AST 33   /   ALT 12   /   AlkP 46   /   Tptn 4.0   /   Alb 2.9    /   DBili --      10-20  TBili 0.6   /   AST 33   /   ALT 14   /   AlkP 47   /   Tptn 3.6   /   Alb 2.8    /   DBili --      10-20  TBili 1.2   /   AST 50   /   ALT 24   /   AlkP 74   /   Tptn 4.8   /   Alb 3.5    /   DBili --      10-19  TBili 0.6   /   AST 66   /   ALT 25   /   AlkP 85   /   Tptn 5.9   /   Alb 4.1    /   DBili <0.2      10-18      PTT - ( 23 Oct 2022 03:04 )  PTT:28.1 sec    Culture - Blood (collected 21 Oct 2022 14:22)  Source: .Blood Blood  Preliminary Report (22 Oct 2022 23:02):    No growth to date.          Radiology:

## 2022-10-24 LAB
ALBUMIN SERPL ELPH-MCNC: 3.4 G/DL — LOW (ref 3.5–5.2)
ALP SERPL-CCNC: 62 U/L — SIGNIFICANT CHANGE UP (ref 30–115)
ALT FLD-CCNC: <5 U/L — SIGNIFICANT CHANGE UP (ref 0–41)
ANION GAP SERPL CALC-SCNC: 17 MMOL/L — HIGH (ref 7–14)
APTT BLD: 26.6 SEC — LOW (ref 27–39.2)
AST SERPL-CCNC: 22 U/L — SIGNIFICANT CHANGE UP (ref 0–41)
BASOPHILS # BLD AUTO: 0.04 K/UL — SIGNIFICANT CHANGE UP (ref 0–0.2)
BASOPHILS NFR BLD AUTO: 0.3 % — SIGNIFICANT CHANGE UP (ref 0–1)
BILIRUB SERPL-MCNC: 0.8 MG/DL — SIGNIFICANT CHANGE UP (ref 0.2–1.2)
BUN SERPL-MCNC: 57 MG/DL — HIGH (ref 10–20)
CALCIUM SERPL-MCNC: 8.4 MG/DL — SIGNIFICANT CHANGE UP (ref 8.4–10.4)
CHLORIDE SERPL-SCNC: 103 MMOL/L — SIGNIFICANT CHANGE UP (ref 98–110)
CO2 SERPL-SCNC: 20 MMOL/L — SIGNIFICANT CHANGE UP (ref 17–32)
CREAT SERPL-MCNC: 5.5 MG/DL — CRITICAL HIGH (ref 0.7–1.5)
EGFR: 10 ML/MIN/1.73M2 — LOW
EOSINOPHIL # BLD AUTO: 0.15 K/UL — SIGNIFICANT CHANGE UP (ref 0–0.7)
EOSINOPHIL NFR BLD AUTO: 1 % — SIGNIFICANT CHANGE UP (ref 0–8)
GAS PNL BLDA: SIGNIFICANT CHANGE UP
GLUCOSE BLDC GLUCOMTR-MCNC: 114 MG/DL — HIGH (ref 70–99)
GLUCOSE BLDC GLUCOMTR-MCNC: 143 MG/DL — HIGH (ref 70–99)
GLUCOSE BLDC GLUCOMTR-MCNC: 158 MG/DL — HIGH (ref 70–99)
GLUCOSE BLDC GLUCOMTR-MCNC: 163 MG/DL — HIGH (ref 70–99)
GLUCOSE SERPL-MCNC: 94 MG/DL — SIGNIFICANT CHANGE UP (ref 70–99)
HCT VFR BLD CALC: 28.9 % — LOW (ref 42–52)
HGB BLD-MCNC: 9 G/DL — LOW (ref 14–18)
IMM GRANULOCYTES NFR BLD AUTO: 0.7 % — HIGH (ref 0.1–0.3)
INR BLD: 1.01 RATIO — SIGNIFICANT CHANGE UP (ref 0.65–1.3)
LYMPHOCYTES # BLD AUTO: 1.21 K/UL — SIGNIFICANT CHANGE UP (ref 1.2–3.4)
LYMPHOCYTES # BLD AUTO: 8 % — LOW (ref 20.5–51.1)
MAGNESIUM SERPL-MCNC: 2.5 MG/DL — HIGH (ref 1.8–2.4)
MCHC RBC-ENTMCNC: 28.9 PG — SIGNIFICANT CHANGE UP (ref 27–31)
MCHC RBC-ENTMCNC: 31.1 G/DL — LOW (ref 32–37)
MCV RBC AUTO: 92.9 FL — SIGNIFICANT CHANGE UP (ref 80–94)
MONOCYTES # BLD AUTO: 2.25 K/UL — HIGH (ref 0.1–0.6)
MONOCYTES NFR BLD AUTO: 15 % — HIGH (ref 1.7–9.3)
NEUTROPHILS # BLD AUTO: 11.28 K/UL — HIGH (ref 1.4–6.5)
NEUTROPHILS NFR BLD AUTO: 75 % — SIGNIFICANT CHANGE UP (ref 42.2–75.2)
NRBC # BLD: 0 /100 WBCS — SIGNIFICANT CHANGE UP (ref 0–0)
PLATELET # BLD AUTO: 99 K/UL — LOW (ref 130–400)
POTASSIUM SERPL-MCNC: 4 MMOL/L — SIGNIFICANT CHANGE UP (ref 3.5–5)
POTASSIUM SERPL-SCNC: 4 MMOL/L — SIGNIFICANT CHANGE UP (ref 3.5–5)
PROT SERPL-MCNC: 5.2 G/DL — LOW (ref 6–8)
PROTHROM AB SERPL-ACNC: 11.5 SEC — SIGNIFICANT CHANGE UP (ref 9.95–12.87)
RBC # BLD: 3.11 M/UL — LOW (ref 4.7–6.1)
RBC # FLD: 18.4 % — HIGH (ref 11.5–14.5)
SODIUM SERPL-SCNC: 140 MMOL/L — SIGNIFICANT CHANGE UP (ref 135–146)
WBC # BLD: 15.04 K/UL — HIGH (ref 4.8–10.8)
WBC # FLD AUTO: 15.04 K/UL — HIGH (ref 4.8–10.8)

## 2022-10-24 PROCEDURE — 99232 SBSQ HOSP IP/OBS MODERATE 35: CPT

## 2022-10-24 PROCEDURE — 99233 SBSQ HOSP IP/OBS HIGH 50: CPT

## 2022-10-24 PROCEDURE — 99231 SBSQ HOSP IP/OBS SF/LOW 25: CPT

## 2022-10-24 PROCEDURE — 93010 ELECTROCARDIOGRAM REPORT: CPT

## 2022-10-24 PROCEDURE — 71045 X-RAY EXAM CHEST 1 VIEW: CPT | Mod: 26

## 2022-10-24 PROCEDURE — 93970 EXTREMITY STUDY: CPT | Mod: 26

## 2022-10-24 RX ORDER — ATORVASTATIN CALCIUM 80 MG/1
40 TABLET, FILM COATED ORAL AT BEDTIME
Refills: 0 | Status: DISCONTINUED | OUTPATIENT
Start: 2022-10-24 | End: 2022-10-29

## 2022-10-24 RX ORDER — METOPROLOL TARTRATE 50 MG
12.5 TABLET ORAL EVERY 6 HOURS
Refills: 0 | Status: DISCONTINUED | OUTPATIENT
Start: 2022-10-24 | End: 2022-10-29

## 2022-10-24 RX ORDER — MAGNESIUM SULFATE 500 MG/ML
1 VIAL (ML) INJECTION ONCE
Refills: 0 | Status: COMPLETED | OUTPATIENT
Start: 2022-10-24 | End: 2022-10-24

## 2022-10-24 RX ORDER — PANTOPRAZOLE SODIUM 20 MG/1
40 TABLET, DELAYED RELEASE ORAL EVERY 12 HOURS
Refills: 0 | Status: DISCONTINUED | OUTPATIENT
Start: 2022-10-24 | End: 2022-10-29

## 2022-10-24 RX ORDER — AMIODARONE HYDROCHLORIDE 400 MG/1
0.5 TABLET ORAL
Qty: 900 | Refills: 0 | Status: DISCONTINUED | OUTPATIENT
Start: 2022-10-24 | End: 2022-10-24

## 2022-10-24 RX ORDER — INSULIN LISPRO 100/ML
VIAL (ML) SUBCUTANEOUS
Refills: 0 | Status: DISCONTINUED | OUTPATIENT
Start: 2022-10-24 | End: 2022-10-29

## 2022-10-24 RX ADMIN — Medication 12.5 MILLIGRAM(S): at 17:02

## 2022-10-24 RX ADMIN — CEFEPIME 100 MILLIGRAM(S): 1 INJECTION, POWDER, FOR SOLUTION INTRAMUSCULAR; INTRAVENOUS at 16:47

## 2022-10-24 RX ADMIN — PANTOPRAZOLE SODIUM 40 MILLIGRAM(S): 20 TABLET, DELAYED RELEASE ORAL at 17:02

## 2022-10-24 RX ADMIN — OXYCODONE HYDROCHLORIDE 5 MILLIGRAM(S): 5 TABLET ORAL at 02:29

## 2022-10-24 RX ADMIN — Medication 100 GRAM(S): at 09:53

## 2022-10-24 RX ADMIN — Medication 1 APPLICATION(S): at 19:58

## 2022-10-24 RX ADMIN — Medication 12.5 MILLIGRAM(S): at 10:00

## 2022-10-24 RX ADMIN — ATORVASTATIN CALCIUM 40 MILLIGRAM(S): 80 TABLET, FILM COATED ORAL at 21:08

## 2022-10-24 RX ADMIN — CHLORHEXIDINE GLUCONATE 1 APPLICATION(S): 213 SOLUTION TOPICAL at 05:07

## 2022-10-24 RX ADMIN — Medication 12.5 MILLIGRAM(S): at 23:11

## 2022-10-24 RX ADMIN — Medication 0.5 MILLIGRAM(S): at 21:08

## 2022-10-24 RX ADMIN — OXYCODONE HYDROCHLORIDE 5 MILLIGRAM(S): 5 TABLET ORAL at 01:14

## 2022-10-24 NOTE — PROGRESS NOTE ADULT - SUBJECTIVE AND OBJECTIVE BOX
CTU Attending Progress Daily Note     24 Oct 2022 12:30  Admited 10-18-22, Hospital Day 6d  POD# -     HPI:    Home Medications:  Lantus 100 units/mL subcutaneous solution: 30 unit(s) subcutaneous once a day (at bedtime) (18 Oct 2022 14:24)  Lasix: orally once a day (18 Oct 2022 14:24)  Lipitor 40 mg oral tablet: 1 tab(s) orally once a day (at bedtime) (18 Oct 2022 14:24)  metoprolol succinate 50 mg oral tablet, extended release: 1 tab(s) orally once a day (18 Oct 2022 14:24)    FAMILY HISTORY:  No pertinent family history in first degree relatives      PAST MEDICAL & SURGICAL HISTORY:  HTN (Hypertension)  Diabetes Mellitus Type II  Hypercholesterolemia  CAD (Coronary Artery Disease)  History of PTCA with stents 10 years ago ([facility])  Sleep apnea does not use machine  GERD (gastroesophageal reflux disease)  BPH (Benign Prostatic Hyperplasia)  CHF (congestive heart failure)  Mitral valve regurgitation  Dialysis patient HD- M/W/FR- [facility] [address]-[facility].  Aortic stenosis  [place] (hard of hearing)  AICD (automatic cardioverter/defibrillator) present  S/P knee surgery b/l arthroscopic  S/P tonsillectomy  S/P primary angioplasty with coronary stent 6-7 stents last one in 10/12  S/P appendectomy  S/P CABG (coronary artery bypass graft) ? 2017      Interval event for past 24 hr:  NINFA URBAN  82y had no event.   Current Complains:  NINFA URBAN has no new complains  Allergies    No Known Allergies    Intolerances      OBJECTIVE:  Vitals last 24 hrs  T(C): 36.9 (10-24-22 @ 08:00), Max: 36.9 (10-24-22 @ 04:00)  T(F): 98.4 (10-24-22 @ 08:00), Max: 98.4 (10-24-22 @ 04:00)  HR: 80 (10-24-22 @ 12:00) (80 - 93)  BP: 120/64 (10-23-22 @ 16:00) (120/64 - 120/64)  ABP: 131/42 (10-24-22 @ 12:00) (126/44 - 158/52)  ABP(mean): 67 (10-24-22 @ 12:00) (62 - 80)  RR: 32 (10-24-22 @ 12:00) (14 - 46)  SpO2: 100% (10-24-22 @ 12:00) (90% - 100%)      10-23-22 @ 07:01  -  10-24-22 @ 07:00  --------------------------------------------------------  IN:    Amiodarone: 183.3 mL    Amiodarone: 116.9 mL    Insulin: 10 mL    IV PiggyBack: 50 mL    Oral Fluid: 500 mL    Pantoprazole: 220 mL    sodium chloride 0.9%: 220 mL  Total IN: 1300.2 mL    OUT:    Indwelling Catheter - Urethral (mL): 437 mL    Norepinephrine: 0 mL    Propofol: 0 mL    Vasopressin: 0 mL  Total OUT: 437 mL    Total NET: 863.2 mL             CAPILLARY BLOOD GLUCOSE      POCT Blood Glucose.: 114 mg/dL (24 Oct 2022 06:53)  POCT Blood Glucose.: 110 mg/dL (23 Oct 2022 23:52)  POCT Blood Glucose.: 137 mg/dL (23 Oct 2022 21:31)  POCT Blood Glucose.: 119 mg/dL (23 Oct 2022 20:09)  POCT Blood Glucose.: 156 mg/dL (23 Oct 2022 17:23)    LABS:  ABG - ( 24 Oct 2022 03:50 )  pH, Arterial: 7.39  pH, Blood: x     /  pCO2: 35    /  pO2: 82    / HCO3: 21    / Base Excess: -3.3  /  SaO2: 97.7            Blood Gas Arterial, Lactate: 0.80 mmol/L (10-24-22 @ 03:50)  Blood Gas Arterial, Lactate: 1.50 mmol/L (10-23-22 @ 13:09)                          9.0    15.04 )-----------( 99       ( 24 Oct 2022 02:00 )             28.9     Hemoglobin: 9.0 g/dL (10-24 @ 02:00)  Hemoglobin: 9.0 g/dL (10-23 @ 12:40)  Hemoglobin: 8.4 g/dL (10-23 @ 03:04)  Hemoglobin: 9.0 g/dL (10-22 @ 21:25)  Hemoglobin: 7.5 g/dL (10-22 @ 15:31)  Hemoglobin: 7.9 g/dL (10-22 @ 02:15)  Hemoglobin: 7.7 g/dL (10-21 @ 13:20)    10-24    140  |  103  |  57<H>  ----------------------------<  94  4.0   |  20  |  5.5<HH>    Ca    8.4      24 Oct 2022 02:00  Mg     2.5     10-24    TPro  5.2<L>  /  Alb  3.4<L>  /  TBili  0.8  /  DBili  x   /  AST  22  /  ALT  <5  /  AlkPhos  62  10-24    Creatinine, Serum: 5.5 mg/dL (10-24 @ 02:00)  Creatinine, Serum: 5.5 mg/dL (10-23 @ 12:40)  Creatinine, Serum: 5.0 mg/dL (10-23 @ 03:04)  Creatinine, Serum: 4.4 mg/dL (10-22 @ 15:31)  Creatinine, Serum: 5.2 mg/dL (10-22 @ 02:15)  Creatinine, Serum: 4.7 mg/dL (10-21 @ 13:20)  Creatinine, Serum: 5.5 mg/dL (10-21 @ 02:41)  Creatinine, Serum: 6.2 mg/dL (10-20 @ 14:41)    PT/INR - ( 24 Oct 2022 02:00 )   PT: 11.50 sec;   INR: 1.01 ratio         PTT - ( 24 Oct 2022 02:00 )  PTT:26.6 sec      Culture - Blood (collected 21 Oct 2022 14:22)  Source: .Blood Blood  Preliminary Report (22 Oct 2022 23:02):    No growth to date.      HOSPITAL MEDICATIONS:  MEDICATIONS  (STANDING):  albumin human  5% IVPB 250 milliLiter(s) IV Intermittent once  albumin human  5% IVPB 250 milliLiter(s) IV Intermittent once  albumin human  5% IVPB 250 milliLiter(s) IV Intermittent once  albumin human  5% IVPB 250 milliLiter(s) IV Intermittent once  atorvastatin 40 milliGRAM(s) Oral at bedtime  cefepime   IVPB 1000 milliGRAM(s) IV Intermittent daily  chlorhexidine 4% Liquid 1 Application(s) Topical <User Schedule>  dextrose 5%. 1000 milliLiter(s) (50 mL/Hr) IV Continuous <Continuous>  dextrose 50% Injectable 25 Gram(s) IV Push once  dextrose 50% Injectable 50 milliLiter(s) IV Push every 15 minutes  dextrose 50% Injectable 25 milliLiter(s) IV Push every 15 minutes  glucagon  Injectable 1 milliGRAM(s) IntraMuscular once  LORazepam     Tablet 0.5 milliGRAM(s) Oral at bedtime  metoprolol tartrate 12.5 milliGRAM(s) Oral every 6 hours  pantoprazole  Injectable 40 milliGRAM(s) IV Push every 12 hours    MEDICATIONS  (PRN):  acetaminophen     Tablet .. 650 milliGRAM(s) Oral every 6 hours PRN Temp greater or equal to 38C (100.4F), Mild Pain (1 - 3)  dextrose Oral Gel 15 Gram(s) Oral once PRN Blood Glucose LESS THAN 70 milliGRAM(s)/deciliter  oxyCODONE    IR 5 milliGRAM(s) Oral every 4 hours PRN Moderate Pain (4 - 6)      REVIEW OF SYSTEMS:  CONSTITUTIONAL: [X] all negative; [ ] weakness, [ ] fevers, [ ] chills  EYES/ENT: [X] all negative; [ ] visual changes, [ ] vertigo, [ ] throat pain, [ ] eye pain  NECK: [X] all negative; [ ] pain, [ ] stiffness  RESPIRATORY: [x ] all negative, [ ] cough, [ ] wheezing, [ ] hemoptysis, [ ] shortness of breath, [  ] chest pain  CARDIOVASCULAR: [X] all negative; [ ] anginal chest pain, [ ] palpitations, [ ] orthopnea  GASTROINTESTINAL: [X] all negative; [ ]abdominal pain, [ ] nausea, [ ] vomiting, [ ] hematemesis, [ ] diarrhea, [ ] constipation, [ ] melena, [ ] hematochezia.  GENITOURINARY: [X] all negative; [ ] dysuria, [ ] frequency, [ ] hematuria  NEUROLOGICAL: [X] all negative; [ ] numbness, [ ] weakness, [ ] paresthesias  MUSCULOSKELETAL: [X] all negative, [ ] joint pain, [ ] joint swelling, [ ] joint redness, [ ] bone pain  SKIN: [X] all negative; [ ] itching, [ ] burning, [ ] rashes, [ ] lesions   All other review of systems is negative unless indicated above.    [  ] Unable to assess ROS because     PHYSICAL EXAM:          CONSTITUTIONAL: Well-developed; well-nourished; in no acute distress.   	SKIN: warm, dry, no rashes or lesions  	HEENT: Atraumatic. Normocephalic. PERRL. Moist membranes, no conjunctival injection, sclera clear  	NECK: Supple; non tender.  No JVD. No lymphadenopathy.  	CARD: Normal S1, S2. Rate and Rhythm are irregular. + murmurs.  	RESP: Good air entry bilaterally, no wheezes, no rales no rhonchi.  	ABD: Soft, not tender, not distended, no CVA tenderness, no rebound no guarding, bowel sounds present  	EXT: Normal ROM.  No clubbing, no cyanosis, + pedal edema, no calf pain b/l, Peripheral pulses intact.  	LYMPH: No acute adenopathy.  	NEURO: Alert, awake, motor 5/5 R, 5/5 L, sensation intact bilat, CN 2-12 intact,          PSYCH: Cooperative, appropriate. Alert & oriented x 3    RADIOLOGY:  X Reviewed and interpreted by me  CxR from 10-24-22 shows mild congestion, no pneumothorax, no effusion, no cardiomegaly,       ECG:  X Reviewed and interpreted by me NSR with PVSC and RBBB 106, QTc - 597

## 2022-10-24 NOTE — PROGRESS NOTE ADULT - ASSESSMENT
PROBLEMS:  I spent 45 minutes of critical care time examining patient, reviewing vitals, labs, medications, imaging and discussing with the team goals of care to prevent life-threatening in this patient who is at high risk for deterioration or death due to:      AS - s/p TAVR - Will hold ASA and Plavix as discusses with Dr. Nance for now  VF arrest - Impella is out, Stop Amiodarone drip ( prolong QTc ) and start lopressor 12.5 q 6 for now  ESRD - Stable HD today 2000 cc out  Melena - last one yesterday - hemoglobin stable for 48 hr - Stop protonix drip - start protonix 40 q 12 IV  difficulty swallowing - S/S consult  DVT prophylaxis - Duplex of LE - the SCD if no DVT  DM - insulin drip  CAD - Lipitor 40      Case and plan discussed with CT Surgeons and CTU PAs.  Continue CTU supportive care and ongoing plan of care as per continuing CTU rounds.   Continue DVT/GI prophylaxis.  Incentive Spirometry 10 times an hour.  Continue to advance physical activity as tolerated and continue PT/OT as directed.    PLAN  Neuro: move all 4 extremities. no sensory or motor deficits  Pain management.   acetaminophen     Tablet .. 650 milliGRAM(s) Oral every 6 hours PRN  LORazepam     Tablet 0.5 milliGRAM(s) Oral at bedtime    Pulm: Wean off supplemental oxygen as able. Daily CXR. Encourage coughing, deep breathing and use of incentive spirometry.     Cardio: Monitor telemetry/alarms. Continue supportive care   metoprolol tartrate 12.5 milliGRAM(s) Oral every 6 hours    GI: Continue stool softeners.    pantoprazole  Injectable 40 milliGRAM(s) IV Push every 12 hours    Nutrition: Continue present diet  Endocrine and glucose control:   atorvastatin 40 milliGRAM(s) Oral at bedtime  dextrose 50% Injectable 25 Gram(s) IV Push once  dextrose Oral Gel 15 Gram(s) Oral once PRN  glucagon  Injectable 1 milliGRAM(s) IntraMuscular once    Renal: monitor urine output, supplement electrolytes as needed,     Vasc: Heparin SC and/or SCDs for DVT prophylaxis    ID: Stable, no fever , no chills. Off antibiotics.  cefepime   IVPB 1000 milliGRAM(s) IV Intermittent daily    Therapy: OOB/ambulate  Disposition: start planing discharge home or placement    Pertinent clinical, laboratory, radiographic, hemodynamic, echocardiographic, respiratory data, microbiologic data and chart were reviewed and analyzed frequently throughout the course of the day and night. GI and DVT prophylaxis, glycemic control, head of bed elevation and skin care issues were addressed.  Patient seen, examined and plan discussed with CT Surgery / CTICU team during rounds.    [ ] The patient remains in critical and unstable condition, and requires ICU care and monitoring  [x ] The patient is improving but requires continued monitoring and adjustment of therapy

## 2022-10-24 NOTE — PROGRESS NOTE ADULT - SUBJECTIVE AND OBJECTIVE BOX
OPERATIVE PROCEDURE(s):                POD #    5                   82yMale  SURGEON(s): ROSEANN Adan  SUBJECTIVE ASSESSMENT:   Vital Signs Last 24 Hrs  T(F): 98.4 (24 Oct 2022 08:00), Max: 98.4 (24 Oct 2022 04:00)  HR: 90 (24 Oct 2022 09:00) (81 - 93)  BP: 120/64 (23 Oct 2022 16:00) (120/64 - 120/64)  BP(mean): 85 (23 Oct 2022 16:00) (85 - 85)  ABP: 137/44 (24 Oct 2022 09:00) (118/58 - 158/52)  ABP(mean): 68 (24 Oct 2022 09:00)  RR: 27 (24 Oct 2022 09:00) (14 - 46)  SpO2: 98% (24 Oct 2022 09:00) (90% - 100%)      I&O's Detail    23 Oct 2022 07:01  -  24 Oct 2022 07:00  --------------------------------------------------------  IN:    Amiodarone: 183.3 mL    Amiodarone: 116.9 mL    Insulin: 10 mL    IV PiggyBack: 50 mL    Oral Fluid: 500 mL    Pantoprazole: 220 mL    sodium chloride 0.9%: 220 mL  Total IN: 1300.2 mL    OUT:    Indwelling Catheter - Urethral (mL): 437 mL    Norepinephrine: 0 mL    Propofol: 0 mL    Vasopressin: 0 mL  Total OUT: 437 mL        Net:   I&O's Detail    22 Oct 2022 07:01  -  23 Oct 2022 07:00  --------------------------------------------------------  Total NET: -855.9 mL      23 Oct 2022 07:01  -  24 Oct 2022 07:00  --------------------------------------------------------  Total NET: 863.2 mL        CAPILLARY BLOOD GLUCOSE      POCT Blood Glucose.: 114 mg/dL (24 Oct 2022 06:53)  POCT Blood Glucose.: 110 mg/dL (23 Oct 2022 23:52)  POCT Blood Glucose.: 137 mg/dL (23 Oct 2022 21:31)  POCT Blood Glucose.: 119 mg/dL (23 Oct 2022 20:09)  POCT Blood Glucose.: 156 mg/dL (23 Oct 2022 17:23)  POCT Blood Glucose.: 132 mg/dL (23 Oct 2022 12:21)    Physical Exam:  General: NAD; A&Ox3/Patient is intubated and sedated  Cardiac: S1/S2, RRR, no murmur, no rubs  Lungs: unlabored respirations, CTA b/l, no wheeze, no rales, no crackles  Abdomen: Soft/NT/ND; positive bowel sounds x 4  Sternum: Intact, no click, incision healing well with no drainage  Incisions: Incisions clean/dry/intact  Extremities: No edema b/l lower extremities; good capillary refill; no cyanosis; palpable 1+ pedal pulses b/l    Central Venous Catheter: Yes[]  No[] , If Yes indication:           Day #  Gomez Catheter: Yes  [] , No  [] , If yes indication:                      Day #  NGT: Yes [] No [] ,    If Yes Placement:                                     Day #  EPICARDIAL WIRES:  [] YES [] NO                                              Day #  BOWEL MOVEMENT:  [] YES [] NO, If No, Timing since last BM:      Day #  CHEST TUBE(Left/Right):  [] YES [] NO, If yes -  AIR LEAKS:  [] YES [] NO        LABS:                        9.0<L>  15.04<H> )-----------( 99<L>    ( 24 Oct 2022 02:00 )             28.9<L>                        9.0<L>  14.77<H> )-----------( 90<L>    ( 23 Oct 2022 12:40 )             27.6<L>    10-24    140  |  103  |  57<H>  ----------------------------<  94  4.0   |  20  |  5.5<HH>  10-23    140  |  103  |  55<H>  ----------------------------<  126<H>  4.3   |  21  |  5.5<HH>    Ca    8.4      24 Oct 2022 02:00  Mg     2.5     10-24    TPro  5.2<L> [6.0 - 8.0]  /  Alb  3.4<L> [3.5 - 5.2]  /  TBili  0.8 [0.2 - 1.2]  /  DBili  x   /  AST  22 [0 - 41]  /  ALT  <5 [0 - 41]  /  AlkPhos  62 [30 - 115]  10-24    PT/INR - ( 24 Oct 2022 02:00 )   PT: ;   INR: 1.01 ratio         PTT - ( 24 Oct 2022 02:00 )  PTT:26.6 sec, PTT - ( 23 Oct 2022 03:04 )  PTT:28.1 sec    ABG - ( 24 Oct 2022 03:50 )  pH: 7.39  /  pCO2: 35    /  pO2: 82    / HCO3: 21    / Base Excess: -3.3  /  SaO2: 97.7  /  LA: 0.80             RADIOLOGY & ADDITIONAL TESTS:  CXR:  EKG:  MEDICATIONS  (STANDING):  albumin human  5% IVPB 250 milliLiter(s) IV Intermittent once  albumin human  5% IVPB 250 milliLiter(s) IV Intermittent once  albumin human  5% IVPB 250 milliLiter(s) IV Intermittent once  albumin human  5% IVPB 250 milliLiter(s) IV Intermittent once  aMIOdarone Infusion 0.25 mG/Min (8.33 mL/Hr) IV Continuous <Continuous>  aMIOdarone Infusion 0.25 mG/Min (8.33 mL/Hr) IV Continuous <Continuous>  cefepime   IVPB 1000 milliGRAM(s) IV Intermittent daily  chlorhexidine 4% Liquid 1 Application(s) Topical <User Schedule>  dextrose 5%. 1000 milliLiter(s) (50 mL/Hr) IV Continuous <Continuous>  dextrose 50% Injectable 25 Gram(s) IV Push once  dextrose 50% Injectable 50 milliLiter(s) IV Push every 15 minutes  dextrose 50% Injectable 25 milliLiter(s) IV Push every 15 minutes  glucagon  Injectable 1 milliGRAM(s) IntraMuscular once  insulin regular Infusion 10 Unit(s)/Hr (10 mL/Hr) IV Continuous <Continuous>  LORazepam     Tablet 0.5 milliGRAM(s) Oral at bedtime  pantoprazole Infusion 8 mG/Hr (10 mL/Hr) IV Continuous <Continuous>  sodium chloride 0.9%. 1000 milliLiter(s) (20 mL/Hr) IV Continuous <Continuous>    MEDICATIONS  (PRN):  acetaminophen     Tablet .. 650 milliGRAM(s) Oral every 6 hours PRN Temp greater or equal to 38C (100.4F), Mild Pain (1 - 3)  dextrose Oral Gel 15 Gram(s) Oral once PRN Blood Glucose LESS THAN 70 milliGRAM(s)/deciliter  oxyCODONE    IR 5 milliGRAM(s) Oral every 4 hours PRN Moderate Pain (4 - 6)    HEPARIN:  [] YES [] NO  Dose: XX UNITS/HR UNITS Q8H  LOVENOX:[] YES [] NO  Dose: XX mg Q24H  COUMADIN: []  YES [] NO  Dose: XX mg  Q24H  SCD's: YES b/l  GI Prophylaxis: Protonix [], Pepcid [], None [], (Contra-indication:.....)    Post-Op Beta-Blockers: Yes [], No[], If No, then contraindication:  Post-Op Aspirin: Yes [],  No [], If No, then contraindication:  Post-Op Statin: Yes [], No[], If No, then contraindication:  Allergies    No Known Allergies    Intolerances      Ambulation/Activity Status:    Assessment/Plan:  82y Male status-post .....  - Case and plan discussed with CTU Intensivist and CT Surgeon - Dr. Mosquera/Keshawn/Loco  - Continue CTU supportive care    - Continue DVT/GI prophylaxis  - Incentive Spirometry 10 times an hour  - Continue to advance physical activity as tolerated and continue PT/OT as directed  1. CAD: Continue ASA, statin, BB  2. HTN:   3. A. Fib:   4. COPD/Hypoxia:   5. DM/Glucose Control:     duplex      Social Service Disposition:     OPERATIVE PROCEDURE(s):   POD#5 TAVR with V fib arrest and insertion of Impella  POD#4 removal of implella and insertion of IABP  POD#3 removal of IABP         82yMale  SURGEON(s): ROSEANN Adan  SUBJECTIVE ASSESSMENT: pt seen and examined   Vital Signs Last 24 Hrs  T(F): 98.4 (24 Oct 2022 08:00), Max: 98.4 (24 Oct 2022 04:00)  HR: 90 (24 Oct 2022 09:00) (81 - 93)  BP: 120/64 (23 Oct 2022 16:00) (120/64 - 120/64)  BP(mean): 85 (23 Oct 2022 16:00) (85 - 85)  ABP: 137/44 (24 Oct 2022 09:00) (118/58 - 158/52)  ABP(mean): 68 (24 Oct 2022 09:00)  RR: 27 (24 Oct 2022 09:00) (14 - 46)  SpO2: 98% (24 Oct 2022 09:00) (90% - 100%)      I&O's Detail    23 Oct 2022 07:01  -  24 Oct 2022 07:00  --------------------------------------------------------  IN:    Amiodarone: 183.3 mL    Amiodarone: 116.9 mL    Insulin: 10 mL    IV PiggyBack: 50 mL    Oral Fluid: 500 mL    Pantoprazole: 220 mL    sodium chloride 0.9%: 220 mL  Total IN: 1300.2 mL    OUT:    Indwelling Catheter - Urethral (mL): 437 mL    Norepinephrine: 0 mL    Propofol: 0 mL    Vasopressin: 0 mL  Total OUT: 437 mL        Net:   I&O's Detail    22 Oct 2022 07:01  -  23 Oct 2022 07:00  --------------------------------------------------------  Total NET: -855.9 mL      23 Oct 2022 07:01  -  24 Oct 2022 07:00  --------------------------------------------------------  Total NET: 863.2 mL        CAPILLARY BLOOD GLUCOSE      POCT Blood Glucose.: 114 mg/dL (24 Oct 2022 06:53)  POCT Blood Glucose.: 110 mg/dL (23 Oct 2022 23:52)  POCT Blood Glucose.: 137 mg/dL (23 Oct 2022 21:31)  POCT Blood Glucose.: 119 mg/dL (23 Oct 2022 20:09)  POCT Blood Glucose.: 156 mg/dL (23 Oct 2022 17:23)  POCT Blood Glucose.: 132 mg/dL (23 Oct 2022 12:21)    Physical Exam:  General: NAD; A&Ox3  Cardiac: S1/S2, RRR, no murmur, no rubs  Lungs: unlabored respirations, CTA b/l, no wheeze, no rales, no crackles  Abdomen: Soft/NT/ND; positive bowel sounds x 4  Incisions: Incisions clean/dry/intact  Extremities: No edema b/l lower extremities; good capillary refill; no cyanosis; palpable 1+ pedal pulses b/l    Central Venous Catheter: Yes[x]    If Yes indication:           Day #5  Gomez Catheter: Yes  [x]      If yes indication:                      Day #5  BOWEL MOVEMENT:   [x] NO, If No, Timing since last BM:      Day #5      LABS:                        9.0<L>  15.04<H> )-----------( 99<L>    ( 24 Oct 2022 02:00 )             28.9<L>                        9.0<L>  14.77<H> )-----------( 90<L>    ( 23 Oct 2022 12:40 )             27.6<L>    10-24    140  |  103  |  57<H>  ----------------------------<  94  4.0   |  20  |  5.5<HH>  10-23    140  |  103  |  55<H>  ----------------------------<  126<H>  4.3   |  21  |  5.5<HH>    Ca    8.4      24 Oct 2022 02:00  Mg     2.5     10-24    TPro  5.2<L> [6.0 - 8.0]  /  Alb  3.4<L> [3.5 - 5.2]  /  TBili  0.8 [0.2 - 1.2]  /  DBili  x   /  AST  22 [0 - 41]  /  ALT  <5 [0 - 41]  /  AlkPhos  62 [30 - 115]  10-24    PT/INR - ( 24 Oct 2022 02:00 )   PT: ;   INR: 1.01 ratio         PTT - ( 24 Oct 2022 02:00 )  PTT:26.6 sec, PTT - ( 23 Oct 2022 03:04 )  PTT:28.1 sec    ABG - ( 24 Oct 2022 03:50 )  pH: 7.39  /  pCO2: 35    /  pO2: 82    / HCO3: 21    / Base Excess: -3.3  /  SaO2: 97.7  /  LA: 0.80             RADIOLOGY & ADDITIONAL TESTS:  CXR:< from: Xray Chest 1 View- PORTABLE-Routine (Xray Chest 1 View- PORTABLE-Routine in AM.) (10.24.22 @ 06:53) >  Impression:    1. Unchanged bilateral opacities.    < end of copied text >    EKG:< from: 12 Lead ECG (10.24.22 @ 07:07) >  Ventricular Rate 106 BPM    Atrial Rate 106 BPM    QRS Duration 172 ms    Q-T Interval 450 ms    QTC Calculation(Bazett) 597 ms    R Axis 187 degrees    T Axis 25 degrees    Diagnosis Line Sinus tachycardia with frequent and consecutive Premature ventricular  complexes  Right bundle branch block  Abnormal ECG    < end of copied text >    MEDICATIONS  (STANDING):  albumin human  5% IVPB 250 milliLiter(s) IV Intermittent once  albumin human  5% IVPB 250 milliLiter(s) IV Intermittent once  albumin human  5% IVPB 250 milliLiter(s) IV Intermittent once  albumin human  5% IVPB 250 milliLiter(s) IV Intermittent once  aMIOdarone Infusion 0.25 mG/Min (8.33 mL/Hr) IV Continuous <Continuous>  aMIOdarone Infusion 0.25 mG/Min (8.33 mL/Hr) IV Continuous <Continuous>  cefepime   IVPB 1000 milliGRAM(s) IV Intermittent daily  chlorhexidine 4% Liquid 1 Application(s) Topical <User Schedule>  dextrose 5%. 1000 milliLiter(s) (50 mL/Hr) IV Continuous <Continuous>  dextrose 50% Injectable 25 Gram(s) IV Push once  dextrose 50% Injectable 50 milliLiter(s) IV Push every 15 minutes  dextrose 50% Injectable 25 milliLiter(s) IV Push every 15 minutes  glucagon  Injectable 1 milliGRAM(s) IntraMuscular once  insulin regular Infusion 10 Unit(s)/Hr (10 mL/Hr) IV Continuous <Continuous>  LORazepam     Tablet 0.5 milliGRAM(s) Oral at bedtime  pantoprazole Infusion 8 mG/Hr (10 mL/Hr) IV Continuous <Continuous>  sodium chloride 0.9%. 1000 milliLiter(s) (20 mL/Hr) IV Continuous <Continuous>    MEDICATIONS  (PRN):  acetaminophen     Tablet .. 650 milliGRAM(s) Oral every 6 hours PRN Temp greater or equal to 38C (100.4F), Mild Pain (1 - 3)  dextrose Oral Gel 15 Gram(s) Oral once PRN Blood Glucose LESS THAN 70 milliGRAM(s)/deciliter  oxyCODONE    IR 5 milliGRAM(s) Oral every 4 hours PRN Moderate Pain (4 - 6)      SCD's: YES b/l  GI Prophylaxis: Protonix [x],      Allergies    No Known Allergies    Intolerances      Ambulation/Activity Status: ambulate with assistance     Assessment/Plan:  82y Male status-post POD#5 TAVR with V fib arrest and insertion of Impella  POD#4 removal of implella and insertion of IABP  POD#3 removal of IABP  - Case and plan discussed with CTU Intensivist and CT Surgeon - Dr. Adan  - Continue CTU supportive care    - Continue DVT/GI prophylaxis  - Incentive Spirometry 10 times an hour  - Continue to advance physical activity as tolerated and continue PT/OT as directed  1. CAD: Continue statin, start metoprolol tartrate 12.5mg q6hrs, hold ASA, plavix due to GI bleed  2. HTN: start metoprolol 12.5mg q6hrs, monitor BP  3. A. Fib ppx: d/c amio, monitor electrolytes, replete prn  4. COPD/Hypoxia: 3-4L NC O2 98%, cont duonebs, encourage incentive spirometry  5. DM/Glucose Control: A1C 6.0%, d/c insulin drip  6. Anemia 2/2 Acute PO blood loss:, track and trend H/H, s/p transfusion, stable  7. ESRD - HD today via fistula left arm  8. Thrombocytopenia - 99k up from 90 - hold Plavix  9. AS  - s/p TAVR   10. V-fib arrest resolved   11. Episode VT and SVT resolved  12. GI Recommendations: clear liquid diet, target Hb >8,   13. Hypomagnesemia: 1g mag IV  - f/u EP, interrogation of AICD  - f/u b/l duplex leg scan  - f/u Speech & Swallow consult         Social Service Disposition:  anticipate home tomorrow    OPERATIVE PROCEDURE(s):   POD#5 TAVR with V fib arrest and insertion of Impella  POD#4 removal of implella and insertion of IABP  POD#3 removal of IABP         82yMale  SURGEON(s): ROSEANN Adan  SUBJECTIVE ASSESSMENT: pt seen and examined. no acute complaints   Vital Signs Last 24 Hrs  T(F): 98.4 (24 Oct 2022 08:00), Max: 98.4 (24 Oct 2022 04:00)  HR: 90 (24 Oct 2022 09:00) (81 - 93)  BP: 120/64 (23 Oct 2022 16:00) (120/64 - 120/64)  BP(mean): 85 (23 Oct 2022 16:00) (85 - 85)  ABP: 137/44 (24 Oct 2022 09:00) (118/58 - 158/52)  ABP(mean): 68 (24 Oct 2022 09:00)  RR: 27 (24 Oct 2022 09:00) (14 - 46)  SpO2: 98% (24 Oct 2022 09:00) (90% - 100%)      I&O's Detail    23 Oct 2022 07:01  -  24 Oct 2022 07:00  --------------------------------------------------------  IN:    Amiodarone: 183.3 mL    Amiodarone: 116.9 mL    Insulin: 10 mL    IV PiggyBack: 50 mL    Oral Fluid: 500 mL    Pantoprazole: 220 mL    sodium chloride 0.9%: 220 mL  Total IN: 1300.2 mL    OUT:    Indwelling Catheter - Urethral (mL): 437 mL    Norepinephrine: 0 mL    Propofol: 0 mL    Vasopressin: 0 mL  Total OUT: 437 mL        Net:   I&O's Detail    22 Oct 2022 07:01  -  23 Oct 2022 07:00  --------------------------------------------------------  Total NET: -855.9 mL      23 Oct 2022 07:01  -  24 Oct 2022 07:00  --------------------------------------------------------  Total NET: 863.2 mL        CAPILLARY BLOOD GLUCOSE      POCT Blood Glucose.: 114 mg/dL (24 Oct 2022 06:53)  POCT Blood Glucose.: 110 mg/dL (23 Oct 2022 23:52)  POCT Blood Glucose.: 137 mg/dL (23 Oct 2022 21:31)  POCT Blood Glucose.: 119 mg/dL (23 Oct 2022 20:09)  POCT Blood Glucose.: 156 mg/dL (23 Oct 2022 17:23)  POCT Blood Glucose.: 132 mg/dL (23 Oct 2022 12:21)    Physical Exam:  General: NAD; A&Ox3  Cardiac: S1/S2, RRR, no murmur, no rubs  Lungs: unlabored respirations, CTA b/l, no wheeze, no rales, no crackles  Abdomen: Soft/NT/ND; positive bowel sounds x 4  Incisions: Incisions clean/dry/intact  Extremities: No edema b/l lower extremities; good capillary refill; no cyanosis; palpable 1+ pedal pulses b/l    Central Venous Catheter: Yes[x]    If Yes indication:           Day #5  Gomez Catheter: Yes  [x]      If yes indication:                      Day #5  BOWEL MOVEMENT:   [x] NO, If No, Timing since last BM:      Day #5      LABS:                        9.0<L>  15.04<H> )-----------( 99<L>    ( 24 Oct 2022 02:00 )             28.9<L>                        9.0<L>  14.77<H> )-----------( 90<L>    ( 23 Oct 2022 12:40 )             27.6<L>    10-24    140  |  103  |  57<H>  ----------------------------<  94  4.0   |  20  |  5.5<HH>  10-23    140  |  103  |  55<H>  ----------------------------<  126<H>  4.3   |  21  |  5.5<HH>    Ca    8.4      24 Oct 2022 02:00  Mg     2.5     10-24    TPro  5.2<L> [6.0 - 8.0]  /  Alb  3.4<L> [3.5 - 5.2]  /  TBili  0.8 [0.2 - 1.2]  /  DBili  x   /  AST  22 [0 - 41]  /  ALT  <5 [0 - 41]  /  AlkPhos  62 [30 - 115]  10-24    PT/INR - ( 24 Oct 2022 02:00 )   PT: ;   INR: 1.01 ratio         PTT - ( 24 Oct 2022 02:00 )  PTT:26.6 sec, PTT - ( 23 Oct 2022 03:04 )  PTT:28.1 sec    ABG - ( 24 Oct 2022 03:50 )  pH: 7.39  /  pCO2: 35    /  pO2: 82    / HCO3: 21    / Base Excess: -3.3  /  SaO2: 97.7  /  LA: 0.80             RADIOLOGY & ADDITIONAL TESTS:  CXR:< from: Xray Chest 1 View- PORTABLE-Routine (Xray Chest 1 View- PORTABLE-Routine in AM.) (10.24.22 @ 06:53) >  Impression:    1. Unchanged bilateral opacities.    < end of copied text >    EKG:< from: 12 Lead ECG (10.24.22 @ 07:07) >  Ventricular Rate 106 BPM    Atrial Rate 106 BPM    QRS Duration 172 ms    Q-T Interval 450 ms    QTC Calculation(Bazett) 597 ms    R Axis 187 degrees    T Axis 25 degrees    Diagnosis Line Sinus tachycardia with frequent and consecutive Premature ventricular  complexes  Right bundle branch block  Abnormal ECG    < end of copied text >    MEDICATIONS  (STANDING):  albumin human  5% IVPB 250 milliLiter(s) IV Intermittent once  albumin human  5% IVPB 250 milliLiter(s) IV Intermittent once  albumin human  5% IVPB 250 milliLiter(s) IV Intermittent once  albumin human  5% IVPB 250 milliLiter(s) IV Intermittent once  aMIOdarone Infusion 0.25 mG/Min (8.33 mL/Hr) IV Continuous <Continuous>  aMIOdarone Infusion 0.25 mG/Min (8.33 mL/Hr) IV Continuous <Continuous>  cefepime   IVPB 1000 milliGRAM(s) IV Intermittent daily  chlorhexidine 4% Liquid 1 Application(s) Topical <User Schedule>  dextrose 5%. 1000 milliLiter(s) (50 mL/Hr) IV Continuous <Continuous>  dextrose 50% Injectable 25 Gram(s) IV Push once  dextrose 50% Injectable 50 milliLiter(s) IV Push every 15 minutes  dextrose 50% Injectable 25 milliLiter(s) IV Push every 15 minutes  glucagon  Injectable 1 milliGRAM(s) IntraMuscular once  insulin regular Infusion 10 Unit(s)/Hr (10 mL/Hr) IV Continuous <Continuous>  LORazepam     Tablet 0.5 milliGRAM(s) Oral at bedtime  pantoprazole Infusion 8 mG/Hr (10 mL/Hr) IV Continuous <Continuous>  sodium chloride 0.9%. 1000 milliLiter(s) (20 mL/Hr) IV Continuous <Continuous>    MEDICATIONS  (PRN):  acetaminophen     Tablet .. 650 milliGRAM(s) Oral every 6 hours PRN Temp greater or equal to 38C (100.4F), Mild Pain (1 - 3)  dextrose Oral Gel 15 Gram(s) Oral once PRN Blood Glucose LESS THAN 70 milliGRAM(s)/deciliter  oxyCODONE    IR 5 milliGRAM(s) Oral every 4 hours PRN Moderate Pain (4 - 6)      SCD's: YES b/l  GI Prophylaxis: Protonix [x],      Allergies    No Known Allergies    Intolerances      Ambulation/Activity Status: ambulate with assistance     Assessment/Plan:  82y Male status-post POD#5 TAVR with V fib arrest and insertion of Impella  POD#4 removal of implella and insertion of IABP  POD#3 removal of IABP  - Case and plan discussed with CTU Intensivist and CT Surgeon - Dr. Adan  - Continue CTU supportive care    - Continue DVT/GI prophylaxis  - Incentive Spirometry 10 times an hour  - Continue to advance physical activity as tolerated and continue PT/OT as directed  1. CAD: Continue statin, start metoprolol tartrate 12.5mg q6hrs, hold ASA, plavix due to GI bleed  2. HTN: start metoprolol 12.5mg q6hrs, monitor BP  3. A. Fib ppx: d/c amio, monitor electrolytes, replete prn  4. COPD/Hypoxia: 3-4L NC O2 98%, cont duonebs, encourage incentive spirometry  5. DM/Glucose Control: A1C 6.0%, d/c insulin drip, start sliding scale   6. Anemia 2/2 Acute PO blood loss:, track and trend H/H, s/p transfusion, stable  7. ESRD - HD today via fistula left arm  8. Thrombocytopenia - 99k up from 90 - hold Plavix  9. AS  - s/p TAVR   10. V-fib arrest resolved   11. Episode VT and SVT resolved  12. GI Recommendations: clear liquid diet, target Hb >8,   13. Hypomagnesemia: 1g mag IV  - f/u b/l duplex leg scan  - f/u Speech & Swallow consult         Social Service Disposition:  anticipate home tomorrow

## 2022-10-24 NOTE — PROGRESS NOTE ADULT - ATTENDING COMMENTS
DC amio  QT acceptable now  Continue BB- increase as tolerated  OP f-up with Dr. Madison
I edited the note.

## 2022-10-24 NOTE — PROGRESS NOTE ADULT - SUBJECTIVE AND OBJECTIVE BOX
INTERVAL HPI/OVERNIGHT EVENTS:  since OR  remained on Amio, prolonged QTc,   NSVT no sustained episodes.  restarted bblockers today, Amio was stopped  QTc on tele ~457 manual calucation )    MEDICATIONS  (STANDING):  albumin human  5% IVPB 250 milliLiter(s) IV Intermittent once  albumin human  5% IVPB 250 milliLiter(s) IV Intermittent once  albumin human  5% IVPB 250 milliLiter(s) IV Intermittent once  albumin human  5% IVPB 250 milliLiter(s) IV Intermittent once  atorvastatin 40 milliGRAM(s) Oral at bedtime  cefepime   IVPB 1000 milliGRAM(s) IV Intermittent daily  chlorhexidine 4% Liquid 1 Application(s) Topical <User Schedule>  dextrose 5%. 1000 milliLiter(s) (50 mL/Hr) IV Continuous <Continuous>  dextrose 50% Injectable 25 Gram(s) IV Push once  dextrose 50% Injectable 50 milliLiter(s) IV Push every 15 minutes  dextrose 50% Injectable 25 milliLiter(s) IV Push every 15 minutes  glucagon  Injectable 1 milliGRAM(s) IntraMuscular once  insulin lispro (ADMELOG) corrective regimen sliding scale   SubCutaneous three times a day before meals  LORazepam     Tablet 0.5 milliGRAM(s) Oral at bedtime  metoprolol tartrate 12.5 milliGRAM(s) Oral every 6 hours  pantoprazole  Injectable 40 milliGRAM(s) IV Push every 12 hours  silver sulfADIAZINE 1% Cream 1 Application(s) Topical every 12 hours    MEDICATIONS  (PRN):  acetaminophen     Tablet .. 650 milliGRAM(s) Oral every 6 hours PRN Temp greater or equal to 38C (100.4F), Mild Pain (1 - 3)  dextrose Oral Gel 15 Gram(s) Oral once PRN Blood Glucose LESS THAN 70 milliGRAM(s)/deciliter  oxyCODONE    IR 5 milliGRAM(s) Oral every 4 hours PRN Moderate Pain (4 - 6)      Allergies    No Known Allergies    Intolerances        REVIEW OF SYSTEMS    [x ] A ten-point review of systems was otherwise negative except as noted.  [ ] Due to altered mental status/intubation, subjective information were not able to be obtained from the patient. History was obtained, to the extent possible, from review of the chart and collateral sources of information.      Vital Signs Last 24 Hrs  T(C): 36.8 (24 Oct 2022 12:00), Max: 36.9 (24 Oct 2022 04:00)  T(F): 98.2 (24 Oct 2022 12:00), Max: 98.4 (24 Oct 2022 04:00)  HR: 79 (24 Oct 2022 14:00) (76 - 93)  BP: 120/64 (23 Oct 2022 16:00) (120/64 - 120/64)  BP(mean): 85 (23 Oct 2022 16:00) (85 - 85)  RR: 28 (24 Oct 2022 14:00) (14 - 46)  SpO2: 98% (24 Oct 2022 14:00) (90% - 100%)    Parameters below as of 24 Oct 2022 12:30  Patient On (Oxygen Delivery Method): nasal cannula  O2 Flow (L/min): 3      10-23-22 @ 07:01  -  10-24-22 @ 07:00  --------------------------------------------------------  IN: 1300.2 mL / OUT: 437 mL / NET: 863.2 mL    10-24-22 @ 07:01  -  10-24-22 @ 15:18  --------------------------------------------------------  IN: 460 mL / OUT: 75 mL / NET: 385 mL        PHYSICAL EXAM:    GENERAL: In no apparent distress, well nourished, and hydrated.  HEART: Regular rate and rhythm; No murmur; NO rubs, or gallops.  PULMONARY: Clear to auscultation and percussion.  Normal expansion/effort. No rales, wheezing, or rhonchi bilaterally.  ABDOMEN: Soft, Nontender, Nondistended; Bowel sounds present  EXTREMITIES:  Extremities warm, pink, well-perfused, 2+ Peripheral Pulses, No clubbing, cyanosis, or edema  NEUROLOGICAL: alert & oriented x 3, no focal deficits, PERRLA, EOMI    LABS:                        9.0    15.04 )-----------( 99       ( 24 Oct 2022 02:00 )             28.9     10-24    140  |  103  |  57<H>  ----------------------------<  94  4.0   |  20  |  5.5<HH>    Ca    8.4      24 Oct 2022 02:00  Mg     2.5     10-24    TPro  5.2<L>  /  Alb  3.4<L>  /  TBili  0.8  /  DBili  x   /  AST  22  /  ALT  <5  /  AlkPhos  62  10-24    PT/INR - ( 24 Oct 2022 02:00 )   PT: 11.50 sec;   INR: 1.01 ratio         PTT - ( 24 Oct 2022 02:00 )  PTT:26.6 sec        12 Lead ECG:   Ventricular Rate 106 BPM    Atrial Rate 106 BPM    QRS Duration 172 ms    Q-T Interval 450 ms    QTC Calculation(Bazett) 597 ms    R Axis 187 degrees    T Axis 25 degrees    Diagnosis Line Sinus tachycardia with frequent and consecutive Premature ventricular  complexes  Right bundle branch block  Abnormal ECG    Confirmed by Behuria, Supreeti ([id number]) on 10/24/2022 10:30:10 AM (10-24-22 @ 07:07)  12 Lead ECG:   Ventricular Rate 78 BPM    Atrial Rate 78 BPM    P-R Interval 204 ms    QRS Duration 168 ms    Q-T Interval 486 ms    QTC Calculation(Bazett) 554 ms    P Axis 46 degrees    R Axis 208 degrees    T Axis 30 degrees    Diagnosis Line Normal sinus rhythm  Right bundle branch block  Abnormal ECG    Confirmed by Bhavna Hernandez MD ([id number]) on 10/23/2022 2:57:13 PM (10-23-22 @ 08:44)  12 Lead ECG:   Ventricular Rate 86 BPM    Atrial Rate 86 BPM    P-R Interval 216 ms    QRS Duration 168 ms    Q-T Interval 466 ms    QTC Calculation(Bazett) 557 ms    P Axis 54 degrees    R Axis 206 degrees    T Axis 22 degrees    Diagnosis Line Sinus rhythm [id number] degree A-V block  Right bundle branch block  Abnormal ECG    Confirmed by Bhavna Hernadnez MD ([id number]) on 10/23/2022 2:57:10 PM (10-22-22 @ 09:13)      RADIOLOGY & ADDITIONAL TESTS:

## 2022-10-24 NOTE — PROGRESS NOTE ADULT - NS ATTEND AMEND GEN_ALL_CORE FT
82 -year-old male    Postop day 5  Status post transcatheter aortic valve replacement  Complicated perioperative and intraoperative course    Fortunately he has been making an excellent recovery over the last few days  Required mechanical cardiac support for the first day which was successfully removed in the cardiac catheterization laboratory  Extubated since    Now tolerating his chronic dialysis    Overall he seems to be making good progress  Neurologically he is intact  Hemodynamically he is stable    The patient, the family as well as the care team updated regarding the plan and progress.

## 2022-10-24 NOTE — PROGRESS NOTE ADULT - SUBJECTIVE AND OBJECTIVE BOX
Gastroenterology progress note:     Patient is a 82y old  Male who presents with a chief complaint of Aortic Stenosis (22 Oct 2022 17:52)       Admitted on: 10-18-22    We are following the patient for: melena       Interval History:    No acute events overnight.   1 bm of brown stool this morning      PAST MEDICAL & SURGICAL HISTORY:  HTN (Hypertension)      Diabetes Mellitus Type II      Hypercholesterolemia      CAD (Coronary Artery Disease)      History of PTCA  with stents 10 years ago ([facility])      Diabetes mellitus      CAD (coronary artery disease)      H/O hyperlipidemia      HTN - Hypertension      Sleep apnea  does not use machine      GERD (gastroesophageal reflux disease)      BPH (Benign Prostatic Hyperplasia)      CHF (congestive heart failure)      Mitral valve regurgitation      Dialysis patient  HD- M/W/FR- [facility] [address].      Aortic stenosis      [place] (hard of hearing)      AICD (automatic cardioverter/defibrillator) present      S/P knee surgery  b/l arthroscopic      S/P tonsillectomy      S/P primary angioplasty with coronary stent  6-7 stents last one in 10/12      S/P appendectomy      S/P CABG (coronary artery bypass graft)  ? 2017      AICD (automatic cardioverter/defibrillator) present          MEDICATIONS  (STANDING):  albumin human  5% IVPB 250 milliLiter(s) IV Intermittent once  albumin human  5% IVPB 250 milliLiter(s) IV Intermittent once  albumin human  5% IVPB 250 milliLiter(s) IV Intermittent once  albumin human  5% IVPB 250 milliLiter(s) IV Intermittent once  atorvastatin 40 milliGRAM(s) Oral at bedtime  cefepime   IVPB 1000 milliGRAM(s) IV Intermittent daily  chlorhexidine 4% Liquid 1 Application(s) Topical <User Schedule>  dextrose 5%. 1000 milliLiter(s) (50 mL/Hr) IV Continuous <Continuous>  dextrose 50% Injectable 25 Gram(s) IV Push once  dextrose 50% Injectable 50 milliLiter(s) IV Push every 15 minutes  dextrose 50% Injectable 25 milliLiter(s) IV Push every 15 minutes  glucagon  Injectable 1 milliGRAM(s) IntraMuscular once  insulin lispro (ADMELOG) corrective regimen sliding scale   SubCutaneous three times a day before meals  LORazepam     Tablet 0.5 milliGRAM(s) Oral at bedtime  metoprolol tartrate 12.5 milliGRAM(s) Oral every 6 hours  pantoprazole  Injectable 40 milliGRAM(s) IV Push every 12 hours  silver sulfADIAZINE 1% Cream 1 Application(s) Topical every 12 hours    MEDICATIONS  (PRN):  acetaminophen     Tablet .. 650 milliGRAM(s) Oral every 6 hours PRN Temp greater or equal to 38C (100.4F), Mild Pain (1 - 3)  dextrose Oral Gel 15 Gram(s) Oral once PRN Blood Glucose LESS THAN 70 milliGRAM(s)/deciliter  oxyCODONE    IR 5 milliGRAM(s) Oral every 4 hours PRN Moderate Pain (4 - 6)      Allergies  No Known Allergies      Review of Systems:   Cardiovascular:  No Chest Pain, No Palpitations  Respiratory:  No Cough, No Dyspnea  Gastrointestinal:  As described in HPI  Skin:  No Skin Lesions, No Jaundice  Neuro:  No Syncope, No Dizziness    Physical Examination:  T(C): 36.8 (10-24-22 @ 12:00), Max: 36.9 (10-24-22 @ 04:00)  HR: 79 (10-24-22 @ 14:00) (76 - 93)  BP: 120/64 (10-23-22 @ 16:00) (120/64 - 120/64)  RR: 28 (10-24-22 @ 14:00) (14 - 46)  SpO2: 98% (10-24-22 @ 14:00) (90% - 100%)      10-23-22 @ 07:01  -  10-24-22 @ 07:00  --------------------------------------------------------  IN: 1300.2 mL / OUT: 437 mL / NET: 863.2 mL    10-24-22 @ 07:01  -  10-24-22 @ 15:37  --------------------------------------------------------  IN: 460 mL / OUT: 75 mL / NET: 385 mL        GENERAL: AAOx3, no acute distress.  HEAD:  Atraumatic, Normocephalic  EYES: conjunctiva and sclera clear  NECK: Supple, no JVD or thyromegaly  CHEST/LUNG: Clear to auscultation bilaterally; No wheeze, rhonchi, or rales  HEART: Regular rate and rhythm; normal S1, S2, No murmurs.  ABDOMEN: Soft, nontender, nondistended; Bowel sounds present  NEUROLOGY: No asterixis or tremor.   SKIN: Intact, no jaundice     Data:                        9.0    15.04 )-----------( 99       ( 24 Oct 2022 02:00 )             28.9     Hgb trend:  9.0  10-24-22 @ 02:00  9.0  10-23-22 @ 12:40  8.4  10-23-22 @ 03:04  9.0  10-22-22 @ 21:25  7.5  10-22-22 @ 15:31  7.9  10-22-22 @ 02:15      10-21-22 @ 07:01  -  10-22-22 @ 07:00  --------------------------------------------------------  IN: 309 mL    10-22-22 @ 07:01  -  10-23-22 @ 07:00  --------------------------------------------------------  IN: 556 mL      10-24    140  |  103  |  57<H>  ----------------------------<  94  4.0   |  20  |  5.5<HH>    Ca    8.4      24 Oct 2022 02:00  Mg     2.5     10-24    TPro  5.2<L>  /  Alb  3.4<L>  /  TBili  0.8  /  DBili  x   /  AST  22  /  ALT  <5  /  AlkPhos  62  10-24    Liver panel trend:  TBili 0.8   /   AST 22   /   ALT <5   /   AlkP 62   /   Tptn 5.2   /   Alb 3.4    /   DBili --      10-24  TBili 0.8   /   AST 21   /   ALT <5   /   AlkP 58   /   Tptn 4.8   /   Alb 3.3    /   DBili --      10-23  TBili 0.9   /   AST 18   /   ALT <5   /   AlkP 53   /   Tptn 4.5   /   Alb 3.3    /   DBili --      10-22  TBili 0.9   /   AST 21   /   ALT <5   /   AlkP 49   /   Tptn 4.2   /   Alb 3.1    /   DBili --      10-22  TBili 0.6   /   AST 21   /   ALT <5   /   AlkP 50   /   Tptn 3.9   /   Alb 2.7    /   DBili --      10-21  TBili 0.6   /   AST 26   /   ALT 5   /   AlkP 48   /   Tptn 4.1   /   Alb 2.8    /   DBili --      10-21  TBili 0.7   /   AST 31   /   ALT 8   /   AlkP 44   /   Tptn 4.0   /   Alb 3.2    /   DBili --      10-20  TBili 0.9   /   AST --   /   ALT --   /   AlkP --   /   Tptn --   /   Alb --    /   DBili 0.4      10-20  TBili 0.9   /   AST 33   /   ALT 12   /   AlkP 46   /   Tptn 4.0   /   Alb 2.9    /   DBili --      10-20  TBili 0.6   /   AST 33   /   ALT 14   /   AlkP 47   /   Tptn 3.6   /   Alb 2.8    /   DBili --      10-20  TBili 1.2   /   AST 50   /   ALT 24   /   AlkP 74   /   Tptn 4.8   /   Alb 3.5    /   DBili --      10-19  TBili 0.6   /   AST 66   /   ALT 25   /   AlkP 85   /   Tptn 5.9   /   Alb 4.1    /   DBili <0.2      10-18      PT/INR - ( 24 Oct 2022 02:00 )   PT: 11.50 sec;   INR: 1.01 ratio         PTT - ( 24 Oct 2022 02:00 )  PTT:26.6 sec      Gastroenterology progress note:     Patient is a 82y old  Male who presents with a chief complaint of Aortic Stenosis (22 Oct 2022 17:52)       Admitted on: 10-18-22    We are following the patient for: melena       Interval History:    No acute events overnight.   1 bm of brown stool this morning      PAST MEDICAL & SURGICAL HISTORY:  HTN (Hypertension)      Diabetes Mellitus Type II      Hypercholesterolemia      CAD (Coronary Artery Disease)      History of PTCA  with stents 10 years ago ([facility])      Diabetes mellitus      CAD (coronary artery disease)      H/O hyperlipidemia      HTN - Hypertension      Sleep apnea  does not use machine      GERD (gastroesophageal reflux disease)      BPH (Benign Prostatic Hyperplasia)      CHF (congestive heart failure)      Mitral valve regurgitation      Dialysis patient  HD- M/W/FR- [facility] [address].      Aortic stenosis      [place] (hard of hearing)      AICD (automatic cardioverter/defibrillator) present      S/P knee surgery  b/l arthroscopic      S/P tonsillectomy      S/P primary angioplasty with coronary stent  6-7 stents last one in 10/12      S/P appendectomy      S/P CABG (coronary artery bypass graft)  ? 2017      AICD (automatic cardioverter/defibrillator) present          MEDICATIONS  (STANDING):  albumin human  5% IVPB 250 milliLiter(s) IV Intermittent once  albumin human  5% IVPB 250 milliLiter(s) IV Intermittent once  albumin human  5% IVPB 250 milliLiter(s) IV Intermittent once  albumin human  5% IVPB 250 milliLiter(s) IV Intermittent once  atorvastatin 40 milliGRAM(s) Oral at bedtime  cefepime   IVPB 1000 milliGRAM(s) IV Intermittent daily  chlorhexidine 4% Liquid 1 Application(s) Topical <User Schedule>  dextrose 5%. 1000 milliLiter(s) (50 mL/Hr) IV Continuous <Continuous>  dextrose 50% Injectable 25 Gram(s) IV Push once  dextrose 50% Injectable 50 milliLiter(s) IV Push every 15 minutes  dextrose 50% Injectable 25 milliLiter(s) IV Push every 15 minutes  glucagon  Injectable 1 milliGRAM(s) IntraMuscular once  insulin lispro (ADMELOG) corrective regimen sliding scale   SubCutaneous three times a day before meals  LORazepam     Tablet 0.5 milliGRAM(s) Oral at bedtime  metoprolol tartrate 12.5 milliGRAM(s) Oral every 6 hours  pantoprazole  Injectable 40 milliGRAM(s) IV Push every 12 hours  silver sulfADIAZINE 1% Cream 1 Application(s) Topical every 12 hours    MEDICATIONS  (PRN):  acetaminophen     Tablet .. 650 milliGRAM(s) Oral every 6 hours PRN Temp greater or equal to 38C (100.4F), Mild Pain (1 - 3)  dextrose Oral Gel 15 Gram(s) Oral once PRN Blood Glucose LESS THAN 70 milliGRAM(s)/deciliter  oxyCODONE    IR 5 milliGRAM(s) Oral every 4 hours PRN Moderate Pain (4 - 6)      Allergies  No Known Allergies      Review of Systems:   Cardiovascular:  No Chest Pain, No Palpitations  Respiratory:  No Cough, No Dyspnea  Gastrointestinal:  As described in HPI  Skin:  No Skin Lesions, No Jaundice  Neuro:  No Syncope, No Dizziness    Physical Examination:  T(C): 36.8 (10-24-22 @ 12:00), Max: 36.9 (10-24-22 @ 04:00)  HR: 79 (10-24-22 @ 14:00) (76 - 93)  BP: 120/64 (10-23-22 @ 16:00) (120/64 - 120/64)  RR: 28 (10-24-22 @ 14:00) (14 - 46)  SpO2: 98% (10-24-22 @ 14:00) (90% - 100%)      10-23-22 @ 07:01  -  10-24-22 @ 07:00  --------------------------------------------------------  IN: 1300.2 mL / OUT: 437 mL / NET: 863.2 mL    10-24-22 @ 07:01  -  10-24-22 @ 15:37  --------------------------------------------------------  IN: 460 mL / OUT: 75 mL / NET: 385 mL        GENERAL: AAOx3, no acute distress.  HEAD:  Atraumatic, Normocephalic  EYES: conjunctiva and sclera clear  NECK: Supple, no JVD or thyromegaly  CHEST/LUNG: Clear to auscultation bilaterally; No wheeze, rhonchi, or rales  HEART: Regular rate and rhythm; normal S1, S2, No murmurs.  ABDOMEN: Soft, nontender, nondistended; Bowel sounds present  NEUROLOGY: No asterixis or tremor.   SKIN: Intact, no jaundice     Data:                        9.0    15.04 )-----------( 99       ( 24 Oct 2022 02:00 ) This data was reviewed.              28.9     Hgb trend:  noteed below, This data was reviewed.   9.0  10-24-22 @ 02:00  9.0  10-23-22 @ 12:40  8.4  10-23-22 @ 03:04  9.0  10-22-22 @ 21:25  7.5  10-22-22 @ 15:31  7.9  10-22-22 @ 02:15      10-21-22 @ 07:01  -  10-22-22 @ 07:00  --------------------------------------------------------  IN: 309 mL    10-22-22 @ 07:01  -  10-23-22 @ 07:00  --------------------------------------------------------  IN: 556 mL      10-24    140  |  103  |  57<H>  ----------------------------<  94 This data was reviewed.   4.0   |  20  |  5.5<HH>    Ca    8.4      24 Oct 2022 02:00  Mg     2.5     10-24    TPro  5.2<L>  /  Alb  3.4<L>  /  TBili  0.8  /  DBili  x   /  AST  22  /  ALT  <5  /  AlkPhos  62  10-24 This data was reviewed.     Liver panel trend: labs below noted   This data was reviewed.   TBili 0.8   /   AST 22   /   ALT <5   /   AlkP 62   /   Tptn 5.2   /   Alb 3.4    /   DBili --      10-24  TBili 0.8   /   AST 21   /   ALT <5   /   AlkP 58   /   Tptn 4.8   /   Alb 3.3    /   DBili --      10-23  TBili 0.9   /   AST 18   /   ALT <5   /   AlkP 53   /   Tptn 4.5   /   Alb 3.3    /   DBili --      10-22  TBili 0.9   /   AST 21   /   ALT <5   /   AlkP 49   /   Tptn 4.2   /   Alb 3.1    /   DBili --      10-22  TBili 0.6   /   AST 21   /   ALT <5   /   AlkP 50   /   Tptn 3.9   /   Alb 2.7    /   DBili --      10-21  TBili 0.6   /   AST 26   /   ALT 5   /   AlkP 48   /   Tptn 4.1   /   Alb 2.8    /   DBili --      10-21  TBili 0.7   /   AST 31   /   ALT 8   /   AlkP 44   /   Tptn 4.0   /   Alb 3.2    /   DBili --      10-20  TBili 0.9   /   AST --   /   ALT --   /   AlkP --   /   Tptn --   /   Alb --    /   DBili 0.4      10-20  TBili 0.9   /   AST 33   /   ALT 12   /   AlkP 46   /   Tptn 4.0   /   Alb 2.9    /   DBili --      10-20  TBili 0.6   /   AST 33   /   ALT 14   /   AlkP 47   /   Tptn 3.6   /   Alb 2.8    /   DBili --      10-20  TBili 1.2   /   AST 50   /   ALT 24   /   AlkP 74   /   Tptn 4.8   /   Alb 3.5    /   DBili --      10-19  TBili 0.6   /   AST 66   /   ALT 25   /   AlkP 85   /   Tptn 5.9   /   Alb 4.1    /   DBili <0.2      10-18      PT/INR - ( 24 Oct 2022 02:00 )   PT: 11.50 sec;   INR: 1.01 ratio         PTT - ( 24 Oct 2022 02:00 )  PTT:26.6 sec

## 2022-10-24 NOTE — PROGRESS NOTE ADULT - SUBJECTIVE AND OBJECTIVE BOX
NEPHROLOGY FOLLOW UP NOTE    pt seen in CTICU  extubated  bp's better  mentating well, conversing    PAST MEDICAL & SURGICAL HISTORY:  HTN (Hypertension)    Diabetes Mellitus Type II    Hypercholesterolemia    CAD (Coronary Artery Disease)    History of PTCA  with stents 10 years ago ([facility])    Diabetes mellitus    CAD (coronary artery disease)    H/O hyperlipidemia    HTN - Hypertension    Renal insufficiency    Sleep apnea  does not use machine    GERD (gastroesophageal reflux disease)    BPH (Benign Prostatic Hyperplasia)    CHF (congestive heart failure)    Mitral valve regurgitation    S/P knee surgery  b/l arthroscopic    S/P tonsillectomy    S/P primary angioplasty with coronary stent  6-7 stents last one in 10/12    S/P appendectomy      Allergies:  No Known Allergies    Home Medications Reviewed    SOCIAL HISTORY:  Denies ETOH,Smoking,   FAMILY HISTORY:  No pertinent family history in first degree relatives          REVIEW OF SYSTEMS:  unobtainable    PHYSICAL EXAM:  Constitutional: sedated  HEENT: moist mm  Neck: No JVD + ecchymosis   Respiratory: decreased BS b/l  Cardiovascular: S1, S2, RRR + murmur  Gastrointestinal: BS+, soft, NT/ND  Extremities: + peripheral edema  Neurological: sedated  : No CVA tenderness.    Skin: No rashes  Vascular Access: left arm AVF + [facility] Medications:   MEDICATIONS  (STANDING):  albumin human  5% IVPB 250 milliLiter(s) IV Intermittent once  albumin human  5% IVPB 250 milliLiter(s) IV Intermittent once  albumin human  5% IVPB 250 milliLiter(s) IV Intermittent once  albumin human  5% IVPB 250 milliLiter(s) IV Intermittent once  atorvastatin 40 milliGRAM(s) Oral at bedtime  cefepime   IVPB 1000 milliGRAM(s) IV Intermittent daily  chlorhexidine 4% Liquid 1 Application(s) Topical <User Schedule>  dextrose 5%. 1000 milliLiter(s) (50 mL/Hr) IV Continuous <Continuous>  dextrose 50% Injectable 25 Gram(s) IV Push once  dextrose 50% Injectable 50 milliLiter(s) IV Push every 15 minutes  dextrose 50% Injectable 25 milliLiter(s) IV Push every 15 minutes  glucagon  Injectable 1 milliGRAM(s) IntraMuscular once  insulin lispro (ADMELOG) corrective regimen sliding scale   SubCutaneous three times a day before meals  LORazepam     Tablet 0.5 milliGRAM(s) Oral at bedtime  metoprolol tartrate 12.5 milliGRAM(s) Oral every 6 hours  pantoprazole  Injectable 40 milliGRAM(s) IV Push every 12 hours  silver sulfADIAZINE 1% Cream 1 Application(s) Topical every 12 hours        VITALS:  T(F): 98.2 (10-24-22 @ 12:00), Max: 98.4 (10-24-22 @ 04:00)  HR: 79 (10-24-22 @ 15:30)  BP: --  RR: 27 (10-24-22 @ 15:30)  SpO2: 100% (10-24-22 @ 15:30)  Wt(kg): --    10-22 @ 07:01  -  10-23 @ 07:00  --------------------------------------------------------  IN: 1739.1 mL / OUT: 2595 mL / NET: -855.9 mL    10-23 @ 07:01  -  10-24 @ 07:00  --------------------------------------------------------  IN: 1300.2 mL / OUT: 437 mL / NET: 863.2 mL    10-24 @ 07:01  -  10-24 @ 16:33  --------------------------------------------------------  IN: 460 mL / OUT: 2075 mL / NET: -1615 mL          LABS:  10-24    140  |  103  |  57<H>  ----------------------------<  94  4.0   |  20  |  5.5<HH>    Ca    8.4      24 Oct 2022 02:00  Mg     2.5     10-24    TPro  5.2<L>  /  Alb  3.4<L>  /  TBili  0.8  /  DBili      /  AST  22  /  ALT  <5  /  AlkPhos  62  10-24                          9.0    15.04 )-----------( 99       ( 24 Oct 2022 02:00 )             28.9       Urine Studies:        RADIOLOGY & ADDITIONAL STUDIES:

## 2022-10-24 NOTE — PROGRESS NOTE ADULT - ASSESSMENT
ESRD on HD MWF @ [facility] [place] via left arm AVF  non-oliguric  s/p TAVR complicated by vfib cardiac arrest  ARF on vent  CAD / CABG / CMP / CHF / VHD (severe AS and MR)  HTN  DM2  anemia   GREG  melena    plan:    HD today - 2kg off goal  next HD then Wed  avoid superfluous IVF / dc KVO   will resume epogen with HD  icu care  d/w team

## 2022-10-24 NOTE — PROGRESS NOTE ADULT - ASSESSMENT
81 y/o M with h/o ESRD on HD, chronic systolic HF s/p ICD, DM, GREG, severe AS who came in electively for TAVR. Valve was deployed successfully. Patient went into Vfib arrest post deployment. An Impella CP was inserted and he was started on multiple vasoppresors, including epinephrine, levophed and vasopressin. Lactate peaked at ~7 but is already trending down. A PA and CVVHD catheters were inserted while in the lab. pt is extubated. GI is called for evaluation of of melena.    # Melena: resolved  - patient is hemodynamically stable  - labs reviewed, h/H is stable after the transfusion  - BM this morning reported to be normal  - was on ASA/Plavix - last dose 10/22     recommendations:  - clear liquid diet  - PPI infusion  - avoid NSAIDs  - ASA/Plavix are on hold per CTU team  - monitor H/H with active type and screen  - target Hb >8  - patient is High risk for any endoscopic procedures given the cardiac status, c/w conservative management and will follow up the patient closely, endoscopic intervention will be performed only as life saving measure     will follow     discussed with CTU intensivist   discussed with attending    81 y/o M with h/o ESRD on HD, chronic systolic HF s/p ICD, DM, GREG, severe AS who came in electively for TAVR. Valve was deployed successfully. Patient went into Vfib arrest post deployment. An Impella CP was inserted and he was started on multiple vasoppresors, including epinephrine, levophed and vasopressin. Lactate peaked at ~7 but is already trending down. A PA and CVVHD catheters were inserted while in the lab. pt is extubated. GI is called for evaluation of of melena.    # Melena: resolved  - patient is hemodynamically stable  - labs reviewed, h/H is stable after the transfusion  - BM this morning reported to be normal  - was on ASA/Plavix - last dose 10/22     recommendations:  - clear liquid diet and advance diet as tolerated  - PPI BID  - avoid NSAIDs  - ASA/Plavix are on hold per CTU team  - monitor H/H with active type and screen  - target Hb >8  - patient is High risk for any endoscopic procedures given the cardiac status, c/w conservative management no plan for any endoscopic procedure        discussed with CTU intensivist   discussed with attending

## 2022-10-24 NOTE — PROGRESS NOTE ADULT - ASSESSMENT
83 yo male with PMH as below, including ESRD on HD, CHF, CAD, VHD, DM, ICD ([employer]), GREG electively admitted to CTICU for TAVR procedure.   During TAVR, pt had a VF arrest.  device was reprogrammed  Pt was loaded on Amiodarone, prolonged QTc with Amio  Amiodarone stopped today, Metoprolol started  QTc improving, (need to adjust for wide QRS)      VF arrest intraop  ICD in place ([employer]  TAVR  ESRD on HD  CHF  DM    con't current management  repeat EKG  con't Metoprolol   Maintain electrolytes K>4.0 Mg >2.0   follow up as out patient with Dr Sargent  upgrade to CRT-D as out patient

## 2022-10-25 LAB
ALBUMIN SERPL ELPH-MCNC: 3.3 G/DL — LOW (ref 3.5–5.2)
ALP SERPL-CCNC: 57 U/L — SIGNIFICANT CHANGE UP (ref 30–115)
ALT FLD-CCNC: <5 U/L — SIGNIFICANT CHANGE UP (ref 0–41)
ANION GAP SERPL CALC-SCNC: 11 MMOL/L — SIGNIFICANT CHANGE UP (ref 7–14)
AST SERPL-CCNC: 20 U/L — SIGNIFICANT CHANGE UP (ref 0–41)
BASOPHILS # BLD AUTO: 0.03 K/UL — SIGNIFICANT CHANGE UP (ref 0–0.2)
BASOPHILS NFR BLD AUTO: 0.3 % — SIGNIFICANT CHANGE UP (ref 0–1)
BILIRUB SERPL-MCNC: 0.7 MG/DL — SIGNIFICANT CHANGE UP (ref 0.2–1.2)
BUN SERPL-MCNC: 47 MG/DL — HIGH (ref 10–20)
CALCIUM SERPL-MCNC: 8 MG/DL — LOW (ref 8.4–10.5)
CHLORIDE SERPL-SCNC: 101 MMOL/L — SIGNIFICANT CHANGE UP (ref 98–110)
CO2 SERPL-SCNC: 27 MMOL/L — SIGNIFICANT CHANGE UP (ref 17–32)
CREAT SERPL-MCNC: 4.7 MG/DL — CRITICAL HIGH (ref 0.7–1.5)
EGFR: 12 ML/MIN/1.73M2 — LOW
EOSINOPHIL # BLD AUTO: 0.26 K/UL — SIGNIFICANT CHANGE UP (ref 0–0.7)
EOSINOPHIL NFR BLD AUTO: 2.4 % — SIGNIFICANT CHANGE UP (ref 0–8)
GLUCOSE BLDC GLUCOMTR-MCNC: 140 MG/DL — HIGH (ref 70–99)
GLUCOSE BLDC GLUCOMTR-MCNC: 143 MG/DL — HIGH (ref 70–99)
GLUCOSE BLDC GLUCOMTR-MCNC: 166 MG/DL — HIGH (ref 70–99)
GLUCOSE BLDC GLUCOMTR-MCNC: 191 MG/DL — HIGH (ref 70–99)
GLUCOSE SERPL-MCNC: 135 MG/DL — HIGH (ref 70–99)
HCT VFR BLD CALC: 28 % — LOW (ref 42–52)
HGB BLD-MCNC: 8.7 G/DL — LOW (ref 14–18)
IMM GRANULOCYTES NFR BLD AUTO: 1 % — HIGH (ref 0.1–0.3)
LYMPHOCYTES # BLD AUTO: 1.06 K/UL — LOW (ref 1.2–3.4)
LYMPHOCYTES # BLD AUTO: 9.7 % — LOW (ref 20.5–51.1)
MAGNESIUM SERPL-MCNC: 2.5 MG/DL — HIGH (ref 1.8–2.4)
MCHC RBC-ENTMCNC: 29.7 PG — SIGNIFICANT CHANGE UP (ref 27–31)
MCHC RBC-ENTMCNC: 31.1 G/DL — LOW (ref 32–37)
MCV RBC AUTO: 95.6 FL — HIGH (ref 80–94)
MONOCYTES # BLD AUTO: 1.73 K/UL — HIGH (ref 0.1–0.6)
MONOCYTES NFR BLD AUTO: 15.8 % — HIGH (ref 1.7–9.3)
NEUTROPHILS # BLD AUTO: 7.79 K/UL — HIGH (ref 1.4–6.5)
NEUTROPHILS NFR BLD AUTO: 70.8 % — SIGNIFICANT CHANGE UP (ref 42.2–75.2)
NRBC # BLD: 0 /100 WBCS — SIGNIFICANT CHANGE UP (ref 0–0)
PLATELET # BLD AUTO: 115 K/UL — LOW (ref 130–400)
POTASSIUM SERPL-MCNC: 3.9 MMOL/L — SIGNIFICANT CHANGE UP (ref 3.5–5)
POTASSIUM SERPL-SCNC: 3.9 MMOL/L — SIGNIFICANT CHANGE UP (ref 3.5–5)
PROT SERPL-MCNC: 4.8 G/DL — LOW (ref 6–8)
RBC # BLD: 2.93 M/UL — LOW (ref 4.7–6.1)
RBC # FLD: 18.3 % — HIGH (ref 11.5–14.5)
SODIUM SERPL-SCNC: 139 MMOL/L — SIGNIFICANT CHANGE UP (ref 135–146)
WBC # BLD: 10.98 K/UL — HIGH (ref 4.8–10.8)
WBC # FLD AUTO: 10.98 K/UL — HIGH (ref 4.8–10.8)

## 2022-10-25 PROCEDURE — 99232 SBSQ HOSP IP/OBS MODERATE 35: CPT

## 2022-10-25 PROCEDURE — 71045 X-RAY EXAM CHEST 1 VIEW: CPT | Mod: 26

## 2022-10-25 PROCEDURE — 93010 ELECTROCARDIOGRAM REPORT: CPT

## 2022-10-25 RX ORDER — CLOPIDOGREL BISULFATE 75 MG/1
75 TABLET, FILM COATED ORAL DAILY
Refills: 0 | Status: DISCONTINUED | OUTPATIENT
Start: 2022-10-25 | End: 2022-10-29

## 2022-10-25 RX ORDER — ERYTHROPOIETIN 10000 [IU]/ML
10000 INJECTION, SOLUTION INTRAVENOUS; SUBCUTANEOUS
Refills: 0 | Status: DISCONTINUED | OUTPATIENT
Start: 2022-10-25 | End: 2022-10-29

## 2022-10-25 RX ADMIN — Medication 12.5 MILLIGRAM(S): at 05:02

## 2022-10-25 RX ADMIN — PANTOPRAZOLE SODIUM 40 MILLIGRAM(S): 20 TABLET, DELAYED RELEASE ORAL at 17:23

## 2022-10-25 RX ADMIN — Medication 1 APPLICATION(S): at 19:17

## 2022-10-25 RX ADMIN — Medication 12.5 MILLIGRAM(S): at 12:03

## 2022-10-25 RX ADMIN — Medication 1 APPLICATION(S): at 05:21

## 2022-10-25 RX ADMIN — CEFEPIME 100 MILLIGRAM(S): 1 INJECTION, POWDER, FOR SOLUTION INTRAMUSCULAR; INTRAVENOUS at 12:48

## 2022-10-25 RX ADMIN — CLOPIDOGREL BISULFATE 75 MILLIGRAM(S): 75 TABLET, FILM COATED ORAL at 12:53

## 2022-10-25 RX ADMIN — Medication 12.5 MILLIGRAM(S): at 17:23

## 2022-10-25 RX ADMIN — Medication 1: at 10:59

## 2022-10-25 RX ADMIN — OXYCODONE HYDROCHLORIDE 5 MILLIGRAM(S): 5 TABLET ORAL at 05:22

## 2022-10-25 RX ADMIN — PANTOPRAZOLE SODIUM 40 MILLIGRAM(S): 20 TABLET, DELAYED RELEASE ORAL at 05:02

## 2022-10-25 RX ADMIN — Medication 12.5 MILLIGRAM(S): at 23:14

## 2022-10-25 RX ADMIN — ATORVASTATIN CALCIUM 40 MILLIGRAM(S): 80 TABLET, FILM COATED ORAL at 21:14

## 2022-10-25 RX ADMIN — Medication 0.5 MILLIGRAM(S): at 21:14

## 2022-10-25 RX ADMIN — CHLORHEXIDINE GLUCONATE 1 APPLICATION(S): 213 SOLUTION TOPICAL at 05:19

## 2022-10-25 RX ADMIN — OXYCODONE HYDROCHLORIDE 5 MILLIGRAM(S): 5 TABLET ORAL at 05:52

## 2022-10-25 NOTE — PROGRESS NOTE ADULT - ASSESSMENT
ESRD on HD MWF @ [place], [place] via left arm AVF  non-oliguric  s/p TAVR complicated by vfib cardiac arrest  ARF on vent  CAD / CABG / CMP / CHF / VHD (severe AS and MR)  HTN  DM2  anemia   GREG  melena    plan:    HD tomorrow - 2.5kg off goal  avoid superfluous IVF    epogen with HD  renal diet / fluid restriction  resume outpatient lasix 80mg po bid   icu care

## 2022-10-25 NOTE — PROGRESS NOTE ADULT - SUBJECTIVE AND OBJECTIVE BOX
OPERATIVE PROCEDURE(s):                POD #     4                  82yMale  SURGEON(s): ROSEANN Adan  SUBJECTIVE ASSESSMENT:     Vital Signs Last 24 Hrs  T(F): 97 (25 Oct 2022 06:00), Max: 98.2 (24 Oct 2022 12:00)  HR: 71 (25 Oct 2022 07:00) (71 - 91)  BP: --  BP(mean): --  ABP: 117/44 (25 Oct 2022 07:00) (111/45 - 149/56)  ABP(mean): 65 (25 Oct 2022 07:00)  RR: 23 (25 Oct 2022 07:00) (18 - 32)  SpO2: 99% (25 Oct 2022 07:00) (94% - 100%)  CVP(mm Hg): --  CVP(cm H2O): --  CO: --  CI: --  PA: --  SVR: --    I&O's Detail    24 Oct 2022 07:01  -  25 Oct 2022 07:00  --------------------------------------------------------  IN:    IV PiggyBack: 100 mL    Oral Fluid: 830 mL  Total IN: 930 mL    OUT:    Indwelling Catheter - Urethral (mL): 350 mL    Other (mL): 2000 mL  Total OUT: 2350 mL        Net:   I&O's Detail    23 Oct 2022 07:01  -  24 Oct 2022 07:00  --------------------------------------------------------  Total NET: 863.2 mL      24 Oct 2022 07:01  -  25 Oct 2022 07:00  --------------------------------------------------------  Total NET: -1420 mL        CAPILLARY BLOOD GLUCOSE      POCT Blood Glucose.: 140 mg/dL (25 Oct 2022 06:28)  POCT Blood Glucose.: 158 mg/dL (24 Oct 2022 21:12)  POCT Blood Glucose.: 143 mg/dL (24 Oct 2022 16:22)  POCT Blood Glucose.: 163 mg/dL (24 Oct 2022 13:02)    Physical Exam:  General: NAD; A&Ox3  Cardiac: S1/S2, RRR, no murmur, no rubs  Lungs: unlabored shallow respirations, bilateral bs   Abdomen: Soft/NT/protuberant  Sternum: Intact, no click, incision healing well with no drainage  Incisions: Incisions clean/dry/intact  Extremities: No edema b/l lower extremities    Central Venous Catheter: Yes[]  critical patient   Gomez Catheter: Yes  [] , critical patient strict I&O  NGT: Yes []   EPICARDIAL WIRES:  [] YES  BOWEL MOVEMENT:  [] YES [] NO,   CHEST TUBE(Left/Right):  [] YES [] NO      LABS:                        8.7<L>  10.98<H> )-----------( 115<L>    ( 25 Oct 2022 01:45 )             28.0<L>                        9.0<L>  15.04<H> )-----------( 99<L>    ( 24 Oct 2022 02:00 )             28.9<L>    10-25    139  |  101  |  47<H>  ----------------------------<  135<H>  3.9   |  27  |  4.7<HH>  10-24    140  |  103  |  57<H>  ----------------------------<  94  4.0   |  20  |  5.5<HH>    Ca    8.0<L>      25 Oct 2022 01:45  Mg     2.5     10-25    TPro  4.8<L> [6.0 - 8.0]  /  Alb  3.3<L> [3.5 - 5.2]  /  TBili  0.7 [0.2 - 1.2]  /  DBili  x   /  AST  20 [0 - 41]  /  ALT  <5 [0 - 41]  /  AlkPhos  57 [30 - 115]  10-25    PT/INR - ( 24 Oct 2022 02:00 )   PT: ;   INR: 1.01 ratio         PTT - ( 24 Oct 2022 02:00 )  PTT:26.6 sec    ABG - ( 24 Oct 2022 03:50 )  pH: 7.39  /  pCO2: 35    /  pO2: 82    / HCO3: 21    / Base Excess: -3.3  /  SaO2: 97.7  /  LA: 0.80             RADIOLOGY & ADDITIONAL TESTS:  CXR:  EKG:  MEDICATIONS  (STANDING):  albumin human  5% IVPB 250 milliLiter(s) IV Intermittent once  albumin human  5% IVPB 250 milliLiter(s) IV Intermittent once  albumin human  5% IVPB 250 milliLiter(s) IV Intermittent once  albumin human  5% IVPB 250 milliLiter(s) IV Intermittent once  atorvastatin 40 milliGRAM(s) Oral at bedtime  cefepime   IVPB 1000 milliGRAM(s) IV Intermittent daily  chlorhexidine 4% Liquid 1 Application(s) Topical <User Schedule>  dextrose 5%. 1000 milliLiter(s) (50 mL/Hr) IV Continuous <Continuous>  dextrose 50% Injectable 25 Gram(s) IV Push once  dextrose 50% Injectable 50 milliLiter(s) IV Push every 15 minutes  dextrose 50% Injectable 25 milliLiter(s) IV Push every 15 minutes  glucagon  Injectable 1 milliGRAM(s) IntraMuscular once  insulin lispro (ADMELOG) corrective regimen sliding scale   SubCutaneous three times a day before meals  LORazepam     Tablet 0.5 milliGRAM(s) Oral at bedtime  metoprolol tartrate 12.5 milliGRAM(s) Oral every 6 hours  pantoprazole  Injectable 40 milliGRAM(s) IV Push every 12 hours  silver sulfADIAZINE 1% Cream 1 Application(s) Topical every 12 hours    MEDICATIONS  (PRN):  acetaminophen     Tablet .. 650 milliGRAM(s) Oral every 6 hours PRN Temp greater or equal to 38C (100.4F), Mild Pain (1 - 3)  dextrose Oral Gel 15 Gram(s) Oral once PRN Blood Glucose LESS THAN 70 milliGRAM(s)/deciliter  oxyCODONE    IR 5 milliGRAM(s) Oral every 4 hours PRN Moderate Pain (4 - 6)    HEPARIN:  [] YES [] NO   LOVENOX:[] YES [] NO   SCD's: YES b/l  GI Prophylaxis: Protonix [], Pepcid [],    Post-Op Beta-Blockers: Yes [], No[], If No, then contraindication:  Post-Op Aspirin: Yes [],  No [], If No, then contraindication:  Post-Op Statin: Yes [], No[], If No, then contraindication:  Allergies    No Known Allergies    Intolerances      Ambulation/Activity Status:    Assessment/Plan:  82y Male status-post .....  - Case and plan discussed with CTU Intensivist and CT Surgeon - Dr. Mosquera/Keshawn/Loco  - Continue CTU supportive care    - Continue DVT  - Continue GI prophylaxis  - Incentive Spirometry 10 times an hour  - Continue to advance physical activity as tolerated and continue PT/OT as directed  - CAD: Continue ASA, statin, BB  - Anemia secondary to:  _____ Chronic Disease    ______ Acute PO blood loss anemia,              track and trend H/H  - Bradycardia:    - KIANA (serum cr increase 0.3 over 48hr or rises 1.5 fold  over baseline  - fluid overload secondary too: ____   acute non cardiac fluid over load     - Heart failure:   ____ Acute     ___Chronic:   ____ Diastolic    _____ Systolic        ______ Combined Disastolic on Systolic  - A. Fib:  _____ none _____    Persistent   ______ Chronic   ______      Paroxysmal; Treatment -   - COPD/Hypoxia:   - DM/Glucose Control:     Social Service Disposition:     OPERATIVE PROCEDURE(s): POD#6 TAVR with V fib arrest and insertion of Impella  POD#5 removal of implella and insertion of IABP  POD#4 removal of IABP  82yMale  SURGEON(s): ROSEANN Adan  SUBJECTIVE ASSESSMENT: pt seen and examined     Vital Signs Last 24 Hrs  T(F): 97 (25 Oct 2022 06:00), Max: 98.2 (24 Oct 2022 12:00)  HR: 71 (25 Oct 2022 07:00) (71 - 91)  ABP: 117/44 (25 Oct 2022 07:00) (111/45 - 149/56)  ABP(mean): 65 (25 Oct 2022 07:00)  RR: 23 (25 Oct 2022 07:00) (18 - 32)  SpO2: 99% (25 Oct 2022 07:00) (94% - 100%)    I&O's Detail    24 Oct 2022 07:01  -  25 Oct 2022 07:00  --------------------------------------------------------  IN:    IV PiggyBack: 100 mL    Oral Fluid: 830 mL  Total IN: 930 mL    OUT:    Indwelling Catheter - Urethral (mL): 350 mL    Other (mL): 2000 mL  Total OUT: 2350 mL        Net:   I&O's Detail    23 Oct 2022 07:01  -  24 Oct 2022 07:00  --------------------------------------------------------  Total NET: 863.2 mL      24 Oct 2022 07:01  -  25 Oct 2022 07:00  --------------------------------------------------------  Total NET: -1420 mL        CAPILLARY BLOOD GLUCOSE      POCT Blood Glucose.: 140 mg/dL (25 Oct 2022 06:28)  POCT Blood Glucose.: 158 mg/dL (24 Oct 2022 21:12)  POCT Blood Glucose.: 143 mg/dL (24 Oct 2022 16:22)  POCT Blood Glucose.: 163 mg/dL (24 Oct 2022 13:02)    Physical Exam:  General: NAD; A&Ox3  Cardiac: S1/S2, RRR, no murmur, no rubs  Lungs: unlabored shallow respirations, bilateral bs   Abdomen: Soft/NT/protuberant  Incisions: Incisions clean/dry/intact  Extremities: No edema b/l lower extremities    Central Venous Catheter: Yes[x]  critical patient    Day #6  Gomez Catheter: Yes  [x] , critical patient strict I&O    Day#6  BOWEL MOVEMENT:  [x] NO,       LABS:                        8.7<L>  10.98<H> )-----------( 115<L>    ( 25 Oct 2022 01:45 )             28.0<L>                        9.0<L>  15.04<H> )-----------( 99<L>    ( 24 Oct 2022 02:00 )             28.9<L>    10-25    139  |  101  |  47<H>  ----------------------------<  135<H>  3.9   |  27  |  4.7<HH>  10-24    140  |  103  |  57<H>  ----------------------------<  94  4.0   |  20  |  5.5<HH>    Ca    8.0<L>      25 Oct 2022 01:45  Mg     2.5     10-25    TPro  4.8<L> [6.0 - 8.0]  /  Alb  3.3<L> [3.5 - 5.2]  /  TBili  0.7 [0.2 - 1.2]  /  DBili  x   /  AST  20 [0 - 41]  /  ALT  <5 [0 - 41]  /  AlkPhos  57 [30 - 115]  10-25    PT/INR - ( 24 Oct 2022 02:00 )   PT: ;   INR: 1.01 ratio         PTT - ( 24 Oct 2022 02:00 )  PTT:26.6 sec    ABG - ( 24 Oct 2022 03:50 )  pH: 7.39  /  pCO2: 35    /  pO2: 82    / HCO3: 21    / Base Excess: -3.3  /  SaO2: 97.7  /  LA: 0.80             RADIOLOGY & ADDITIONAL TESTS:  CXR:< from: Xray Chest 1 View- PORTABLE-Routine (Xray Chest 1 View- PORTABLE-Routine in AM.) (10.24.22 @ 06:53) >  Impression:    1. Unchanged bilateral opacities.    < end of copied text >    EKG:< from: 12 Lead ECG (10.25.22 @ 07:32) >  Ventricular Rate 70 BPM    Atrial Rate 70 BPM    P-R Interval 206 ms    QRS Duration 166 ms    Q-T Interval 482 ms    QTC Calculation(Bazett) 520 ms    P Axis 43 degrees    R Axis 182 degrees    T Axis 45 degrees    Diagnosis Line Normal sinus rhythm  Right bundle branch block  Abnormal ECG    < end of copied text >    MEDICATIONS  (STANDING):  albumin human  5% IVPB 250 milliLiter(s) IV Intermittent once  albumin human  5% IVPB 250 milliLiter(s) IV Intermittent once  albumin human  5% IVPB 250 milliLiter(s) IV Intermittent once  albumin human  5% IVPB 250 milliLiter(s) IV Intermittent once  atorvastatin 40 milliGRAM(s) Oral at bedtime  cefepime   IVPB 1000 milliGRAM(s) IV Intermittent daily  chlorhexidine 4% Liquid 1 Application(s) Topical <User Schedule>  dextrose 5%. 1000 milliLiter(s) (50 mL/Hr) IV Continuous <Continuous>  dextrose 50% Injectable 25 Gram(s) IV Push once  dextrose 50% Injectable 50 milliLiter(s) IV Push every 15 minutes  dextrose 50% Injectable 25 milliLiter(s) IV Push every 15 minutes  glucagon  Injectable 1 milliGRAM(s) IntraMuscular once  insulin lispro (ADMELOG) corrective regimen sliding scale   SubCutaneous three times a day before meals  LORazepam     Tablet 0.5 milliGRAM(s) Oral at bedtime  metoprolol tartrate 12.5 milliGRAM(s) Oral every 6 hours  pantoprazole  Injectable 40 milliGRAM(s) IV Push every 12 hours  silver sulfADIAZINE 1% Cream 1 Application(s) Topical every 12 hours    MEDICATIONS  (PRN):  acetaminophen     Tablet .. 650 milliGRAM(s) Oral every 6 hours PRN Temp greater or equal to 38C (100.4F), Mild Pain (1 - 3)  dextrose Oral Gel 15 Gram(s) Oral once PRN Blood Glucose LESS THAN 70 milliGRAM(s)/deciliter  oxyCODONE    IR 5 milliGRAM(s) Oral every 4 hours PRN Moderate Pain (4 - 6)    SCD's: YES b/l  GI Prophylaxis: Protonix [x],     Post-Op Beta-Blockers: Yes [x],    Post-Op Aspirin: No [x], If No, then contraindication: hold due to thrombocytopenia   Post-Op Statin: Yes [x],    Allergies    No Known Allergies    Intolerances      Ambulation/Activity Status: ambulate as tolerated    Assessment/Plan:  82y Male status-post       POD#6 TAVR with V fib arrest and insertion of Impella  POD#5 removal of implella and insertion of IABP  POD#4 removal of IABP   - Case and plan discussed with CTU Intensivist and CT Surgeon - Dr. Adan  - Continue CTU supportive care    - Continue DVT  - Continue GI prophylaxis  - Incentive Spirometry 10 times an hour  - Continue to advance physical activity as tolerated and continue PT/OT as directed  - CAD: Continue statin, start metoprolol tartrate 12.5mg q6hrs, hold ASA, plavix due to GI bleed  - HTN: start metoprolol 12.5mg q6hrs, monitor BP  - A. Fib ppx: d/c amio, monitor electrolytes, replete prn  - COPD/Hypoxia:  wean off O2, encourage incentive spirometry  - DM/Glucose Control: A1C 6.0%, start sliding scale  - Anemia 2/2 Acute PO blood loss:, track and trend H/H, s/p transfusion, stable  - Thrombocytopenia - 99k up from 90 - hold Plavix, ASA  - AS  - s/p TAVR   - V-fib arrest resolved   - Episode VT and SVT resolved  - GI Recommendations: clear liquid diet and advance diet as tolerated, target Hb >8, PPI BID  - EP Recommendations: repeat EKG, con't Metoprolol. Maintain electrolytes K>4.0 Mg >2.0, f/u as OP with Dr Sargent, upgrade to CRT-D as out patient   - Nephrology Recommendations: HD yesterday via fistula left arm removed 2kg off, next HD Wed, will resume epogen with HD  - Speech & Swallow Recommendations: advance diet to soft and bite size with mildly thick liquids  - Duplex Scan LE B/L: No evidence of deep venous thrombosis or superficial thrombophlebitis in the bilateral lower extremities.      Social Service Disposition:  anticipate for home this week  OPERATIVE PROCEDURE(s): POD#6 TAVR with V fib arrest and insertion of Impella  POD#5 removal of implella and insertion of IABP  POD#4 removal of IABP  82yMale  SURGEON(s): ROSEANN Adan  SUBJECTIVE ASSESSMENT: pt seen and examined, doing better, still has melena, HCT stable    Vital Signs Last 24 Hrs  T(F): 97 (25 Oct 2022 06:00), Max: 98.2 (24 Oct 2022 12:00)  HR: 71 (25 Oct 2022 07:00) (71 - 91)  ABP: 117/44 (25 Oct 2022 07:00) (111/45 - 149/56)  ABP(mean): 65 (25 Oct 2022 07:00)  RR: 23 (25 Oct 2022 07:00) (18 - 32)  SpO2: 99% (25 Oct 2022 07:00) (94% - 100%)    I&O's Detail    24 Oct 2022 07:01  -  25 Oct 2022 07:00  --------------------------------------------------------  IN:    IV PiggyBack: 100 mL    Oral Fluid: 830 mL  Total IN: 930 mL    OUT:    Indwelling Catheter - Urethral (mL): 350 mL    Other (mL): 2000 mL  Total OUT: 2350 mL    Net:   I&O's Detail    23 Oct 2022 07:01  -  24 Oct 2022 07:00  --------------------------------------------------------  Total NET: 863.2 mL    24 Oct 2022 07:01  -  25 Oct 2022 07:00  --------------------------------------------------------  Total NET: -1420 mL    CAPILLARY BLOOD GLUCOSE      POCT Blood Glucose.: 140 mg/dL (25 Oct 2022 06:28)  POCT Blood Glucose.: 158 mg/dL (24 Oct 2022 21:12)  POCT Blood Glucose.: 143 mg/dL (24 Oct 2022 16:22)  POCT Blood Glucose.: 163 mg/dL (24 Oct 2022 13:02)    Physical Exam:  General: NAD; A&Ox3  Cardiac: S1/S2, RRR, ? murmur, no rubs  Lungs: unlabored shallow respirations, bilateral bs decreased at bases  Abdomen: Soft/NT/protuberant  Groing no expanding hematoma  Extremities: No edema b/l lower extremities    Central Venous Catheter: Yes[x]  critical patient    Day #6  Woods Catheter: Yes  [x] , critical patient strict I&O    Day#6  BOWEL MOVEMENT: Yes,       LABS:                        8.7<L>  10.98<H> )-----------( 115<L>    ( 25 Oct 2022 01:45 )             28.0<L>                        9.0<L>  15.04<H> )-----------( 99<L>    ( 24 Oct 2022 02:00 )             28.9<L>    10-25    139  |  101  |  47<H>  ----------------------------<  135<H>  3.9   |  27  |  4.7<HH>  10-24    140  |  103  |  57<H>  ----------------------------<  94  4.0   |  20  |  5.5<HH>    Ca    8.0<L>      25 Oct 2022 01:45  Mg     2.5     10-25    TPro  4.8<L> [6.0 - 8.0]  /  Alb  3.3<L> [3.5 - 5.2]  /  TBili  0.7 [0.2 - 1.2]  /  DBili  x   /  AST  20 [0 - 41]  /  ALT  <5 [0 - 41]  /  AlkPhos  57 [30 - 115]  10-25    PT/INR - ( 24 Oct 2022 02:00 )   PT: ;   INR: 1.01 ratio         PTT - ( 24 Oct 2022 02:00 )  PTT:26.6 sec    ABG - ( 24 Oct 2022 03:50 )  pH: 7.39  /  pCO2: 35    /  pO2: 82    / HCO3: 21    / Base Excess: -3.3  /  SaO2: 97.7  /  LA: 0.80       RADIOLOGY & ADDITIONAL TESTS:  < from: Xray Chest 1 View- PORTABLE-Routine (Xray Chest 1 View- PORTABLE-Routine in AM.) (10.25.22 @ 05:30) >  Increased bilateral opacities/effusions.    < end of copied text >    EKG:< from: 12 Lead ECG (10.25.22 @ 07:32) >  Ventricular Rate 70 BPM    Atrial Rate 70 BPM    P-R Interval 206 ms    QRS Duration 166 ms    Q-T Interval 482 ms    QTC Calculation(Bazett) 520 ms    P Axis 43 degrees    R Axis 182 degrees    T Axis 45 degrees    Diagnosis Line Normal sinus rhythm  Right bundle branch block  Abnormal ECG      MEDICATIONS  (STANDING):  albumin human  5% IVPB 250 milliLiter(s) IV Intermittent once  albumin human  5% IVPB 250 milliLiter(s) IV Intermittent once  albumin human  5% IVPB 250 milliLiter(s) IV Intermittent once  albumin human  5% IVPB 250 milliLiter(s) IV Intermittent once  atorvastatin 40 milliGRAM(s) Oral at bedtime  cefepime   IVPB 1000 milliGRAM(s) IV Intermittent daily  chlorhexidine 4% Liquid 1 Application(s) Topical <User Schedule>  dextrose 5%. 1000 milliLiter(s) (50 mL/Hr) IV Continuous <Continuous>  dextrose 50% Injectable 25 Gram(s) IV Push once  dextrose 50% Injectable 50 milliLiter(s) IV Push every 15 minutes  dextrose 50% Injectable 25 milliLiter(s) IV Push every 15 minutes  glucagon  Injectable 1 milliGRAM(s) IntraMuscular once  insulin lispro (ADMELOG) corrective regimen sliding scale   SubCutaneous three times a day before meals  LORazepam     Tablet 0.5 milliGRAM(s) Oral at bedtime  metoprolol tartrate 12.5 milliGRAM(s) Oral every 6 hours  pantoprazole  Injectable 40 milliGRAM(s) IV Push every 12 hours  silver sulfADIAZINE 1% Cream 1 Application(s) Topical every 12 hours    MEDICATIONS  (PRN):  acetaminophen     Tablet .. 650 milliGRAM(s) Oral every 6 hours PRN Temp greater or equal to 38C (100.4F), Mild Pain (1 - 3)  dextrose Oral Gel 15 Gram(s) Oral once PRN Blood Glucose LESS THAN 70 milliGRAM(s)/deciliter  oxyCODONE    IR 5 milliGRAM(s) Oral every 4 hours PRN Moderate Pain (4 - 6)    SCD's: YES b/l  GI Prophylaxis: Protonix [x],     Post-Op Beta-Blockers: Yes [x],    Post-Op Aspirin: No [x],  Held due to recent thrombocytopenia and melena  Post-Op Statin: Yes [x],    Allergies    No Known Allergies    Intolerances      Ambulation/Activity Status: ambulate as tolerated    Assessment/Plan:  82y Male status-post       POD#6 TAVR with V fib arrest and insertion of Impella  POD#5 removal of implella and insertion of IABP  POD#4 removal of IABP   - Case and plan discussed with CTU Intensivist and CT Surgeon - Dr. Adan / [place]  - Continue CTU supportive care    - Continue DVT SCD's  - Continue GI prophylaxis  - Incentive Spirometry 10 times an hour  - Continue to advance physical activity as tolerated and continue PT/OT as directed  - CAD: Continue statin, start metoprolol tartrate 12.5mg q6hrs, hold ASA,  start  plavix as monotherapy due to melena with stable HGB for 3 days  - HTN: continue metoprolol 12.5mg q6hrs, monitor BP  - A. Fib ppx: d/c amio, monitor electrolytes, replete prn  - COPD/Hypoxia:  wean off O2, encourage incentive spirometry  - DM/Glucose Control: A1C 6.0%, continue sliding scale  - Anemia 2/2 Acute PO blood loss:, track and trend H/H, s/p transfusion, stable  - Thrombocytopenia - 115K up from 99 - start Plavix, holdASA  - AS  - s/p TAVR   - V fib arrest - PO Episode VT and SVT resolved - ICD in place and functioning - Maintain electrolytes K>4.0 Mg >2.0, f/u as OP with Dr Sargent, upgrade to CRT-D as out patient   - GI Recommendations: clear liquid diet and advance diet as tolerated, target Hb >8, PPI BID  - Nutrition - S&S - recommend small bites with thick liquids - monitor feeds  - Nephrology Recommendations: HD tomorrow via fistula left arm removed 2500cc yesterday, next HD Wed, will resume epogen with HD  - Speech & Swallow Recommendations: advance diet to soft and bite size with mildly thick liquids  - Duplex Scan LE B/L: No evidence of deep venous thrombosis or superficial thrombophlebitis in the bilateral lower extremities.  - HYpoxia - mild -pulmonary congestion on CXR for dialysis tomorrow  - remove arterial line and woods      Social Service Disposition:  anticipate for home this week

## 2022-10-25 NOTE — CHART NOTE - NSCHARTNOTEFT_GEN_A_CORE
Registered Dietitian Follow-Up     Patient Profile Reviewed                           Yes [x]   No []     Nutrition History Previously Obtained        Yes []  No [x]       Pertinent Subjective Information: Pt extubated at this time. Diet advanced to soft & bite sized with mildly thick liquids on 10/24. Reports reduced appetite but tolerance to diet order, with 50% po intake at this time. Reports fair appetite & po intake PTP.     Pertinent Medical Interventions: Pt presented to hospital with c/o progressively worsening SOB for the last few months - wk up revealed severe calcification of aortic valve with stenosis electively. Admitted for TAVR. POD#6 TAVR with V fib arrest and insertion of Impella; POD#5 removal of implella and insertion of IABP; POD#4 removal of IABP. Pt intubated earlier; now extubated. GI evaluated pt for melena - noted now resolved. Noted hemodynamically stable. ESRD on HD.     Diet order: Soft & Bite Sized diet with Mildly thick liquids (per SLP recommendations) + Consistent Carbohydrate diet (evening snack) + DASH/TLC diet.    Anthropometrics:  Height (cm): 175.3 (10-19-22 @ 16:25)  Weight (kg): 102.4 (10-19-22 @ 16:25)  BMI (kg/m2): 33.3 (10-19-22 @ 16:25)  IBW: 72.7 kg    Daily Weight in k.5 (10-25), Weight in k.5 (10-23), Weight in k.3 (10-22), Weight in k.1 (10-21), Weight in k (10-20), Weight in k.4 (10-19)  Wt fluctuations observed suspected to be r/t fluid shifts. Pt received lasix earlier this admit.    MEDICATIONS  (STANDING):  albumin human  5% IVPB 250 milliLiter(s) IV Intermittent once  albumin human  5% IVPB 250 milliLiter(s) IV Intermittent once  albumin human  5% IVPB 250 milliLiter(s) IV Intermittent once  albumin human  5% IVPB 250 milliLiter(s) IV Intermittent once  atorvastatin 40 milliGRAM(s) Oral at bedtime  cefepime   IVPB 1000 milliGRAM(s) IV Intermittent daily  chlorhexidine 4% Liquid 1 Application(s) Topical <User Schedule>  clopidogrel Tablet 75 milliGRAM(s) Oral daily  dextrose 5%. 1000 milliLiter(s) (50 mL/Hr) IV Continuous <Continuous>  dextrose 50% Injectable 25 Gram(s) IV Push once  dextrose 50% Injectable 50 milliLiter(s) IV Push every 15 minutes  dextrose 50% Injectable 25 milliLiter(s) IV Push every 15 minutes  epoetin nicky-epbx (RETACRIT) Injectable 47692 Unit(s) IV Push <User Schedule>  glucagon  Injectable 1 milliGRAM(s) IntraMuscular once  insulin lispro (ADMELOG) corrective regimen sliding scale   SubCutaneous three times a day before meals  metoprolol tartrate 12.5 milliGRAM(s) Oral every 6 hours  pantoprazole  Injectable 40 milliGRAM(s) IV Push every 12 hours  silver sulfADIAZINE 1% Cream 1 Application(s) Topical every 12 hours    MEDICATIONS  (PRN):  acetaminophen     Tablet .. 650 milliGRAM(s) Oral every 6 hours PRN Temp greater or equal to 38C (100.4F), Mild Pain (1 - 3)  dextrose Oral Gel 15 Gram(s) Oral once PRN Blood Glucose LESS THAN 70 milliGRAM(s)/deciliter  oxyCODONE    IR 5 milliGRAM(s) Oral every 4 hours PRN Moderate Pain (4 - 6)    Pertinent Labs: 10-25 @ 01:45: Na 139, BUN 47<H>, Cr 4.7<HH>, <H>, K+ 3.9, Mg 2.5<H>, Alk Phos 57, ALT/SGPT <5, AST/SGOT 20    Finger Sticks:  POCT Blood Glucose.: 143 mg/dL (10-25 @ 16:04)  POCT Blood Glucose.: 191 mg/dL (10-25 @ 10:56)  POCT Blood Glucose.: 140 mg/dL (10-25 @ 06:28)    Physical Findings:  - Appearance: 2+ edema (B/L foot); alert & oriented  - GI function: last BM 10/25; loose. No nausea/vomiting reported.  - Tubes: no feeding tubes  - Oral/Mouth cavity: per 10/24 SLP eval: "suspected pharyngeal dysphagia for thins and solids; + toleration observed without overt symptoms of penetration/aspiration for puree, soft bite size and mildly thick liquids". Recommends soft and bite size with mildly thick liquids.  - Skin: suspected DTI (L buttock), wound to L lateral back     Nutrition Requirements:  Weight Used: 72.7 kg IBW as BMI >30 + fluid shifts observed     Estimated Energy Needs    Continue []  Adjust [x]  1915-4075 kcal/day (30-35 kcal/kg IBW)        Estimated Protein Needs    Continue []  Adjust [x]   g/day (1.3-1.5 g/kg IBW) - increased range d/t HD + to account for using IBW for nutrition estimation        Estimated Fluid Needs        Continue []  Adjust [x]  1 L + urine output     Nutrient Intake:     [] Previous Nutrition Diagnosis:            [] Ongoing          [] Resolved    [] No active nutrition diagnosis identified at this time     Nutrition Intervention      Goal/Expected Outcome:      Indicator/Monitoring:      Recommendation: Registered Dietitian Follow-Up     Patient Profile Reviewed                           Yes [x]   No []     Nutrition History Previously Obtained        Yes []  No [x]       Pertinent Subjective Information: Pt extubated at this time. Diet advanced to soft & bite sized with mildly thick liquids on 10/24. Reports reduced appetite but tolerance to diet order, with 50% po intake at this time. Reports fair appetite & po intake PTP. Reportedly follows a regular diet PTP. Reportedly has received nutrition education regarding DM & Heart Healthy nutrition therapy concepts in the past. Demonstrates understanding of current diet order. NKFA. No supplements. No food preferences reported. No dietary restrictions r/t culture/Anabaptism reported.  kg with no known h/o unintentional wt loss. No signs of muscle wasting/subcutaneous fat loss.     Pertinent Medical Interventions: Pt presented to hospital with c/o progressively worsening SOB for the last few months - wk up revealed severe calcification of aortic valve with stenosis electively. Admitted for TAVR. POD#6 TAVR with V fib arrest and insertion of Impella; POD#5 removal of implella and insertion of IABP; POD#4 removal of IABP. Pt intubated earlier; now extubated. GI evaluated pt for melena - noted now resolved. Noted hemodynamically stable. ESRD on HD.     Diet order: Soft & Bite Sized diet with Mildly thick liquids (per SLP recommendations) + Consistent Carbohydrate diet (evening snack) + DASH/TLC diet.    Anthropometrics:  Height (cm): 175.3 (10-19-22 @ 16:25)  Weight (kg): 102.4 (10-19-22 @ 16:25)  BMI (kg/m2): 33.3 (10-19-22 @ 16:25)  IBW: 72.7 kg    Daily Weight in k.5 (10-25), Weight in k.5 (10-), Weight in k.3 (10-), Weight in k.1 (10-), Weight in k (10-20), Weight in k.4 (10-19)  Wt fluctuations observed suspected to be r/t fluid shifts. Pt received lasix earlier this admit.    MEDICATIONS  (STANDING):  albumin human  5% IVPB 250 milliLiter(s) IV Intermittent once  albumin human  5% IVPB 250 milliLiter(s) IV Intermittent once  albumin human  5% IVPB 250 milliLiter(s) IV Intermittent once  albumin human  5% IVPB 250 milliLiter(s) IV Intermittent once  atorvastatin 40 milliGRAM(s) Oral at bedtime  cefepime   IVPB 1000 milliGRAM(s) IV Intermittent daily  chlorhexidine 4% Liquid 1 Application(s) Topical <User Schedule>  clopidogrel Tablet 75 milliGRAM(s) Oral daily  dextrose 5%. 1000 milliLiter(s) (50 mL/Hr) IV Continuous <Continuous>  dextrose 50% Injectable 25 Gram(s) IV Push once  dextrose 50% Injectable 50 milliLiter(s) IV Push every 15 minutes  dextrose 50% Injectable 25 milliLiter(s) IV Push every 15 minutes  epoetin nicky-epbx (RETACRIT) Injectable 22961 Unit(s) IV Push <User Schedule>  glucagon  Injectable 1 milliGRAM(s) IntraMuscular once  insulin lispro (ADMELOG) corrective regimen sliding scale   SubCutaneous three times a day before meals  metoprolol tartrate 12.5 milliGRAM(s) Oral every 6 hours  pantoprazole  Injectable 40 milliGRAM(s) IV Push every 12 hours  silver sulfADIAZINE 1% Cream 1 Application(s) Topical every 12 hours    MEDICATIONS  (PRN):  acetaminophen     Tablet .. 650 milliGRAM(s) Oral every 6 hours PRN Temp greater or equal to 38C (100.4F), Mild Pain (1 - 3)  dextrose Oral Gel 15 Gram(s) Oral once PRN Blood Glucose LESS THAN 70 milliGRAM(s)/deciliter  oxyCODONE    IR 5 milliGRAM(s) Oral every 4 hours PRN Moderate Pain (4 - 6)    Pertinent Labs: 10-25 @ 01:45: Na 139, BUN 47<H>, Cr 4.7<HH>, <H>, K+ 3.9, Mg 2.5<H>, Alk Phos 57, ALT/SGPT <5, AST/SGOT 20    Finger Sticks:  POCT Blood Glucose.: 143 mg/dL (10-25 @ 16:04)  POCT Blood Glucose.: 191 mg/dL (10-25 @ 10:56)  POCT Blood Glucose.: 140 mg/dL (10-25 @ 06:28)    10-18: PO4-4.8 (WDL)    Will avoid adding Renal restriction at this time to avoid adding too many dietary restrictions in setting of inadequate oral intake. Will continue to monitor electrolyte profile.    Physical Findings:  - Appearance: 2+ edema (B/L foot); alert & oriented  - GI function: last BM 10/25; loose. No nausea/vomiting reported.  - Tubes: no feeding tubes  - Oral/Mouth cavity: per 10/24 SLP eval: "suspected pharyngeal dysphagia for thins and solids; + toleration observed without overt symptoms of penetration/aspiration for puree, soft bite size and mildly thick liquids". Recommends soft and bite size with mildly thick liquids.  - Skin: suspected DTI (L buttock), wound to L lateral back     Nutrition Requirements:  Weight Used: 72.7 kg IBW as BMI >30 + fluid shifts observed     Estimated Energy Needs    Continue []  Adjust [x]  6787-2506 kcal/day (30-35 kcal/kg IBW)        Estimated Protein Needs    Continue []  Adjust [x]   g/day (1.3-1.5 g/kg IBW) - increased range d/t HD + to account for using IBW for nutrition estimation        Estimated Fluid Needs        Continue []  Adjust [x]  1 L + urine output     Nutrient Intake: Not meeting estimated nutrient needs at this time as po intake is <75% at this time + pt with increased metabolic demand in setting of ESRD on HD.     [x] Previous Nutrition Diagnosis: Inadequate protein-energy intake            [x] Ongoing          [] Resolved    Nutrition Intervention: Meals & Snacks, Medical Food Supplements     Goal/Expected Outcome: Pt to demonstrate tolerance to diet advance, with at least 75% po intake achieved & maintained over next 6 days. Pt at moderate nutrition risk.     Indicator/Monitoring: Skin, labs, BM, wt, nutrition focused physical findings, body composition, diet order.    Recommendation:  (1) Order Prosource Gelatein 20 sugar free twice daily (160 kcal, 40 g protein)  (2) Order Ensure Pudding vanilla once daily (170 kcal, 4 g protein)  (3) Continue providing Soft & Bite Sized diet with Mildly thick liquids (per SLP recommendations) + Consistent Carbohydrate diet (evening snack) + DASH/TLC diet.

## 2022-10-25 NOTE — ADVANCED PRACTICE NURSE CONSULT - RECOMMEDATIONS
1. Friction injury left buttock- Cleanse skin w/ normal saline. Apply Coloplast Georges Protective barrier cream daily and prn after any soiling/each incontinent episode.    -Assess skin/wound qshift, report changes to primary provider.     Additional recs/PI prevention:  -Continue turning/positioning patient from side-to-side q2h while in bed, q1h when/if OOB chair, or in accordance w/ pt's plan of care. Continue utilizing pillows to assist w/ turning/positioning. When/if OOB chair, continue utiliinge pillows or chair cushion to offload pressure.   -While in bed, offload heels from bed surface with soft pillow under calfs or by applying offloading boots to BLEs.   -Continue utilizing one underpad underneath patient to wick awaty excess moisture from skin surface   contain  any incontinence episodes; change pad when saturated/soiled.  -Continue nutrition consult for optimal wound healing & nutritional status.     Plan of Care: Primary RN Levi at bedside & made aware of above recs. Spoke w/  covering/primaryCTU JUNI Reyes in regards to above. Signing off on patient, no further needs/recs from [facility] service at this time. Staff RN to perform routine skin/wound assessment and manage wound care. Questions or concerns or if wound worsens and reconsult needed, please contact [facility], [phone].

## 2022-10-25 NOTE — ADVANCED PRACTICE NURSE CONSULT - ASSESSMENT
82yr male w/ PMH ESRD on HD, CHF, CAD, VHD, DM, ICD, GREG electively admitted to CTICU (10/18) for TAVR procedure. Now s/p POD#6 TAVR with V fib arrest and insertion of Impella  POD#5 removal of implella and insertion of IABP  POD#4 removal of IABP.     Received patient in CTU, sitting up in recliner chair at bedside. Pt awake, alert, responding appropriately following commands, made aware of purpose of WOCN visit, agreeable to consult. With assistance from primary RN Levi & use of walker, patient placed in standing position for skin assessment.     Sacral & coccyx skin intact.   Healing friction injury to left buttock - skin w/ striated maroon colored tissue throughout- small open area w/ dark pink tissue pressure. No drainage, odor, induration, erythema, warmth crepitus, or c/o pain present on assessment     Patient OOB w/ maximum assistance, limited mobility in bed/chair, oliguric, incontinent of small amount of stool during WOCN visit. Ordered for soft diet, adequate intake as per reported Kg score.

## 2022-10-25 NOTE — PROGRESS NOTE ADULT - SUBJECTIVE AND OBJECTIVE BOX
NEPHROLOGY FOLLOW UP NOTE    pt seen in CTICU  much improved  no sob  last HD yesterday    PAST MEDICAL & SURGICAL HISTORY:  HTN (Hypertension)    Diabetes Mellitus Type II    Hypercholesterolemia    CAD (Coronary Artery Disease)    History of PTCA  with stents 10 years ago ([facility])    Diabetes mellitus    CAD (coronary artery disease)    H/O hyperlipidemia    HTN - Hypertension    Renal insufficiency    Sleep apnea  does not use machine    GERD (gastroesophageal reflux disease)    BPH (Benign Prostatic Hyperplasia)    CHF (congestive heart failure)    Mitral valve regurgitation    S/P knee surgery  b/l arthroscopic    S/P tonsillectomy    S/P primary angioplasty with coronary stent  6-7 stents last one in 10/12    S/P appendectomy      Allergies:  No Known Allergies    Home Medications Reviewed    SOCIAL HISTORY:  Denies ETOH,Smoking,   FAMILY HISTORY:  No pertinent family history in first degree relatives          REVIEW OF SYSTEMS:  unobtainable    PHYSICAL EXAM:  Constitutional: sedated  HEENT: moist mm  Neck: No JVD + ecchymosis   Respiratory: decreased BS b/l  Cardiovascular: S1, S2, RRR + murmur  Gastrointestinal: BS+, soft, NT/ND  Extremities: + peripheral edema  Neurological: sedated  : No CVA tenderness.    Skin: No rashes  Vascular Access: left arm AVF + [facility] Medications:   MEDICATIONS  (STANDING):  albumin human  5% IVPB 250 milliLiter(s) IV Intermittent once  albumin human  5% IVPB 250 milliLiter(s) IV Intermittent once  albumin human  5% IVPB 250 milliLiter(s) IV Intermittent once  albumin human  5% IVPB 250 milliLiter(s) IV Intermittent once  atorvastatin 40 milliGRAM(s) Oral at bedtime  cefepime   IVPB 1000 milliGRAM(s) IV Intermittent daily  chlorhexidine 4% Liquid 1 Application(s) Topical <User Schedule>  clopidogrel Tablet 75 milliGRAM(s) Oral daily  dextrose 5%. 1000 milliLiter(s) (50 mL/Hr) IV Continuous <Continuous>  dextrose 50% Injectable 25 Gram(s) IV Push once  dextrose 50% Injectable 50 milliLiter(s) IV Push every 15 minutes  dextrose 50% Injectable 25 milliLiter(s) IV Push every 15 minutes  glucagon  Injectable 1 milliGRAM(s) IntraMuscular once  insulin lispro (ADMELOG) corrective regimen sliding scale   SubCutaneous three times a day before meals  LORazepam     Tablet 0.5 milliGRAM(s) Oral at bedtime  metoprolol tartrate 12.5 milliGRAM(s) Oral every 6 hours  pantoprazole  Injectable 40 milliGRAM(s) IV Push every 12 hours  silver sulfADIAZINE 1% Cream 1 Application(s) Topical every 12 hours        VITALS:  T(F): 97.2 (10-25-22 @ 12:00), Max: 98 (10-24-22 @ 20:00)  HR: 70 (10-25-22 @ 13:00)  BP: 102/55 (10-25-22 @ 13:00)  RR: 38 (10-25-22 @ 13:00)  SpO2: 94% (10-25-22 @ 13:00)  Wt(kg): --    10-23 @ 07:01  -  10-24 @ 07:00  --------------------------------------------------------  IN: 1300.2 mL / OUT: 437 mL / NET: 863.2 mL    10-24 @ 07:01  -  10-25 @ 07:00  --------------------------------------------------------  IN: 930 mL / OUT: 2350 mL / NET: -1420 mL    10-25 @ 07:01  -  10-25 @ 13:56  --------------------------------------------------------  IN: 240 mL / OUT: 0 mL / NET: 240 mL          LABS:  10-25    139  |  101  |  47<H>  ----------------------------<  135<H>  3.9   |  27  |  4.7<HH>    Ca    8.0<L>      25 Oct 2022 01:45  Mg     2.5     10-25    TPro  4.8<L>  /  Alb  3.3<L>  /  TBili  0.7  /  DBili      /  AST  20  /  ALT  <5  /  AlkPhos  57  10-25                          8.7    10.98 )-----------( 115      ( 25 Oct 2022 01:45 )             28.0       Urine Studies:        RADIOLOGY & ADDITIONAL STUDIES:

## 2022-10-26 LAB
ALBUMIN SERPL ELPH-MCNC: 3.2 G/DL — LOW (ref 3.5–5.2)
ALP SERPL-CCNC: 65 U/L — SIGNIFICANT CHANGE UP (ref 30–115)
ALT FLD-CCNC: <5 U/L — SIGNIFICANT CHANGE UP (ref 0–41)
ANION GAP SERPL CALC-SCNC: 11 MMOL/L — SIGNIFICANT CHANGE UP (ref 7–14)
AST SERPL-CCNC: 17 U/L — SIGNIFICANT CHANGE UP (ref 0–41)
BASOPHILS # BLD AUTO: 0.04 K/UL — SIGNIFICANT CHANGE UP (ref 0–0.2)
BASOPHILS NFR BLD AUTO: 0.4 % — SIGNIFICANT CHANGE UP (ref 0–1)
BILIRUB SERPL-MCNC: 0.7 MG/DL — SIGNIFICANT CHANGE UP (ref 0.2–1.2)
BUN SERPL-MCNC: 58 MG/DL — HIGH (ref 10–20)
CALCIUM SERPL-MCNC: 8 MG/DL — LOW (ref 8.4–10.4)
CHLORIDE SERPL-SCNC: 103 MMOL/L — SIGNIFICANT CHANGE UP (ref 98–110)
CO2 SERPL-SCNC: 27 MMOL/L — SIGNIFICANT CHANGE UP (ref 17–32)
CREAT SERPL-MCNC: 5.9 MG/DL — CRITICAL HIGH (ref 0.7–1.5)
CULTURE RESULTS: SIGNIFICANT CHANGE UP
EGFR: 9 ML/MIN/1.73M2 — LOW
EOSINOPHIL # BLD AUTO: 0.29 K/UL — SIGNIFICANT CHANGE UP (ref 0–0.7)
EOSINOPHIL NFR BLD AUTO: 3.1 % — SIGNIFICANT CHANGE UP (ref 0–8)
GLUCOSE BLDC GLUCOMTR-MCNC: 107 MG/DL — HIGH (ref 70–99)
GLUCOSE BLDC GLUCOMTR-MCNC: 140 MG/DL — HIGH (ref 70–99)
GLUCOSE BLDC GLUCOMTR-MCNC: 170 MG/DL — HIGH (ref 70–99)
GLUCOSE SERPL-MCNC: 116 MG/DL — HIGH (ref 70–99)
HCT VFR BLD CALC: 27.7 % — LOW (ref 42–52)
HGB BLD-MCNC: 8.6 G/DL — LOW (ref 14–18)
IMM GRANULOCYTES NFR BLD AUTO: 1.5 % — HIGH (ref 0.1–0.3)
LYMPHOCYTES # BLD AUTO: 1 K/UL — LOW (ref 1.2–3.4)
LYMPHOCYTES # BLD AUTO: 10.6 % — LOW (ref 20.5–51.1)
MAGNESIUM SERPL-MCNC: 2.5 MG/DL — HIGH (ref 1.8–2.4)
MCHC RBC-ENTMCNC: 29.9 PG — SIGNIFICANT CHANGE UP (ref 27–31)
MCHC RBC-ENTMCNC: 31 G/DL — LOW (ref 32–37)
MCV RBC AUTO: 96.2 FL — HIGH (ref 80–94)
MONOCYTES # BLD AUTO: 1.52 K/UL — HIGH (ref 0.1–0.6)
MONOCYTES NFR BLD AUTO: 16.1 % — HIGH (ref 1.7–9.3)
NEUTROPHILS # BLD AUTO: 6.43 K/UL — SIGNIFICANT CHANGE UP (ref 1.4–6.5)
NEUTROPHILS NFR BLD AUTO: 68.3 % — SIGNIFICANT CHANGE UP (ref 42.2–75.2)
NRBC # BLD: 0 /100 WBCS — SIGNIFICANT CHANGE UP (ref 0–0)
PLATELET # BLD AUTO: 134 K/UL — SIGNIFICANT CHANGE UP (ref 130–400)
POTASSIUM SERPL-MCNC: 4 MMOL/L — SIGNIFICANT CHANGE UP (ref 3.5–5)
POTASSIUM SERPL-SCNC: 4 MMOL/L — SIGNIFICANT CHANGE UP (ref 3.5–5)
PROT SERPL-MCNC: 4.9 G/DL — LOW (ref 6–8)
RBC # BLD: 2.88 M/UL — LOW (ref 4.7–6.1)
RBC # FLD: 17.7 % — HIGH (ref 11.5–14.5)
SARS-COV-2 RNA SPEC QL NAA+PROBE: SIGNIFICANT CHANGE UP
SODIUM SERPL-SCNC: 141 MMOL/L — SIGNIFICANT CHANGE UP (ref 135–146)
SPECIMEN SOURCE: SIGNIFICANT CHANGE UP
WBC # BLD: 9.42 K/UL — SIGNIFICANT CHANGE UP (ref 4.8–10.8)
WBC # FLD AUTO: 9.42 K/UL — SIGNIFICANT CHANGE UP (ref 4.8–10.8)

## 2022-10-26 PROCEDURE — 93010 ELECTROCARDIOGRAM REPORT: CPT

## 2022-10-26 PROCEDURE — 71045 X-RAY EXAM CHEST 1 VIEW: CPT | Mod: 26

## 2022-10-26 RX ADMIN — CEFEPIME 100 MILLIGRAM(S): 1 INJECTION, POWDER, FOR SOLUTION INTRAMUSCULAR; INTRAVENOUS at 15:57

## 2022-10-26 RX ADMIN — ERYTHROPOIETIN 10000 UNIT(S): 10000 INJECTION, SOLUTION INTRAVENOUS; SUBCUTANEOUS at 14:29

## 2022-10-26 RX ADMIN — PANTOPRAZOLE SODIUM 40 MILLIGRAM(S): 20 TABLET, DELAYED RELEASE ORAL at 06:14

## 2022-10-26 RX ADMIN — Medication 1 APPLICATION(S): at 17:39

## 2022-10-26 RX ADMIN — CHLORHEXIDINE GLUCONATE 1 APPLICATION(S): 213 SOLUTION TOPICAL at 06:16

## 2022-10-26 RX ADMIN — Medication 12.5 MILLIGRAM(S): at 06:14

## 2022-10-26 RX ADMIN — PANTOPRAZOLE SODIUM 40 MILLIGRAM(S): 20 TABLET, DELAYED RELEASE ORAL at 17:39

## 2022-10-26 RX ADMIN — Medication 1 APPLICATION(S): at 06:15

## 2022-10-26 RX ADMIN — Medication 12.5 MILLIGRAM(S): at 16:43

## 2022-10-26 RX ADMIN — ATORVASTATIN CALCIUM 40 MILLIGRAM(S): 80 TABLET, FILM COATED ORAL at 22:08

## 2022-10-26 RX ADMIN — CLOPIDOGREL BISULFATE 75 MILLIGRAM(S): 75 TABLET, FILM COATED ORAL at 15:57

## 2022-10-26 NOTE — PROGRESS NOTE ADULT - ASSESSMENT
ESRD on HD MWF @ [place], [place] via left arm AVF  non-oliguric  s/p TAVR complicated by vfib cardiac arrest  ARF on vent  CAD / CABG / CMP / CHF / VHD (severe AS and MR)  HTN  DM2  anemia   GREG  melena    plan:    HD today - 2.5kg off goal  epogen with HD  renal diet / fluid restriction  left arm precautions  may resume po lasix  icu care

## 2022-10-26 NOTE — PROGRESS NOTE ADULT - SUBJECTIVE AND OBJECTIVE BOX
NEPHROLOGY FOLLOW UP NOTE    on HD    PAST MEDICAL & SURGICAL HISTORY:  HTN (Hypertension)    Diabetes Mellitus Type II    Hypercholesterolemia    CAD (Coronary Artery Disease)    History of PTCA  with stents 10 years ago ([facility])    Diabetes mellitus    CAD (coronary artery disease)    H/O hyperlipidemia    HTN - Hypertension    Renal insufficiency    Sleep apnea  does not use machine    GERD (gastroesophageal reflux disease)    BPH (Benign Prostatic Hyperplasia)    CHF (congestive heart failure)    Mitral valve regurgitation    S/P knee surgery  b/l arthroscopic    S/P tonsillectomy    S/P primary angioplasty with coronary stent  6-7 stents last one in 10/12    S/P appendectomy      Allergies:  No Known Allergies    Home Medications Reviewed    SOCIAL HISTORY:  Denies ETOH,Smoking,   FAMILY HISTORY:  No pertinent family history in first degree relatives          REVIEW OF SYSTEMS:  as above, all else negative    PHYSICAL EXAM:  Constitutional: sedated  HEENT: moist mm  Neck: No JVD + ecchymosis   Respiratory: decreased BS b/l  Cardiovascular: S1, S2, RRR + murmur  Gastrointestinal: BS+, soft, NT/ND  Extremities: + peripheral edema  Neurological: sedated  : No CVA tenderness.    Skin: No rashes  Vascular Access: left arm AVF + [facility] Medications:   MEDICATIONS  (STANDING):  albumin human  5% IVPB 250 milliLiter(s) IV Intermittent once  albumin human  5% IVPB 250 milliLiter(s) IV Intermittent once  albumin human  5% IVPB 250 milliLiter(s) IV Intermittent once  albumin human  5% IVPB 250 milliLiter(s) IV Intermittent once  atorvastatin 40 milliGRAM(s) Oral at bedtime  cefepime   IVPB 1000 milliGRAM(s) IV Intermittent daily  chlorhexidine 4% Liquid 1 Application(s) Topical <User Schedule>  clopidogrel Tablet 75 milliGRAM(s) Oral daily  dextrose 5%. 1000 milliLiter(s) (50 mL/Hr) IV Continuous <Continuous>  dextrose 50% Injectable 25 Gram(s) IV Push once  dextrose 50% Injectable 50 milliLiter(s) IV Push every 15 minutes  dextrose 50% Injectable 25 milliLiter(s) IV Push every 15 minutes  epoetin nicky-epbx (RETACRIT) Injectable 50882 Unit(s) IV Push <User Schedule>  glucagon  Injectable 1 milliGRAM(s) IntraMuscular once  insulin lispro (ADMELOG) corrective regimen sliding scale   SubCutaneous three times a day before meals  metoprolol tartrate 12.5 milliGRAM(s) Oral every 6 hours  pantoprazole  Injectable 40 milliGRAM(s) IV Push every 12 hours  silver sulfADIAZINE 1% Cream 1 Application(s) Topical every 12 hours        VITALS:  T(F): 97 (10-26-22 @ 16:00), Max: 98.1 (10-26-22 @ 00:00)  HR: 81 (10-26-22 @ 16:00)  BP: 132/70 (10-26-22 @ 16:00)  RR: 26 (10-26-22 @ 16:00)  SpO2: 99% (10-26-22 @ 16:00)  Wt(kg): --    10-24 @ 07:01  -  10-25 @ 07:00  --------------------------------------------------------  IN: 930 mL / OUT: 2350 mL / NET: -1420 mL    10-25 @ 07:01  -  10-26 @ 07:00  --------------------------------------------------------  IN: 480 mL / OUT: 0 mL / NET: 480 mL    10-26 @ 07:01  -  10-26 @ 16:30  --------------------------------------------------------  IN: 240 mL / OUT: 2500 mL / NET: -2260 mL          LABS:  10-26    141  |  103  |  58<H>  ----------------------------<  116<H>  4.0   |  27  |  5.9<HH>    Ca    8.0<L>      26 Oct 2022 04:02  Mg     2.5     10-26    TPro  4.9<L>  /  Alb  3.2<L>  /  TBili  0.7  /  DBili      /  AST  17  /  ALT  <5  /  AlkPhos  65  10-26                          8.6    9.42  )-----------( 134      ( 26 Oct 2022 04:02 )             27.7       Urine Studies:        RADIOLOGY & ADDITIONAL STUDIES:

## 2022-10-26 NOTE — PROGRESS NOTE ADULT - SUBJECTIVE AND OBJECTIVE BOX
OPERATIVE PROCEDURE(s):                POD #                       82yMale  SURGEON(s): ROSEANN Adan  SUBJECTIVE ASSESSMENT:   Vital Signs Last 24 Hrs  T(F): 97.9 (26 Oct 2022 04:00), Max: 98.1 (26 Oct 2022 00:00)  HR: 72 (26 Oct 2022 07:00) (68 - 80)  BP: 135/61 (26 Oct 2022 07:00) (92/55 - 138/76)  BP(mean): 88 (26 Oct 2022 07:00) (71 - 98)  ABP: 103/35 (25 Oct 2022 09:00) (103/35 - 103/35)  ABP(mean): 57 (25 Oct 2022 09:00)  RR: 19 (26 Oct 2022 07:00) (16 - 38)  SpO2: 100% (26 Oct 2022 07:00) (92% - 100%)       I&O's Detail    25 Oct 2022 07:01  -  26 Oct 2022 07:00  --------------------------------------------------------  IN:    Oral Fluid: 480 mL  Total IN: 480 mL    OUT:  Total OUT: 0 mL        Net:   I&O's Detail    24 Oct 2022 07:01  -  25 Oct 2022 07:00  --------------------------------------------------------  Total NET: -1420 mL      25 Oct 2022 07:01  -  26 Oct 2022 07:00  --------------------------------------------------------  Total NET: 480 mL        CAPILLARY BLOOD GLUCOSE      POCT Blood Glucose.: 107 mg/dL (26 Oct 2022 06:42)  POCT Blood Glucose.: 143 mg/dL (25 Oct 2022 16:04)  POCT Blood Glucose.: 191 mg/dL (25 Oct 2022 10:56)    Physical Exam:  General: NAD; A&Ox3/Patient is intubated and sedated  Cardiac: S1/S2, RRR, no murmur, no rubs  Lungs: unlabored respirations, CTA b/l, no wheeze, no rales, no crackles  Abdomen: Soft/NT/ND; positive bowel sounds x 4  Sternum: Intact, no click, incision healing well with no drainage  Incisions: Incisions clean/dry/intact  Extremities: No edema b/l lower extremities; good capillary refill; no cyanosis; palpable 1+ pedal pulses b/l    Central Venous Catheter: Yes[]  No[] , If Yes indication:           Day #  Gomez Catheter: Yes  [] , No  [] , If yes indication:                      Day #  NGT: Yes [] No [] ,    If Yes Placement:                                     Day #  EPICARDIAL WIRES:  [] YES [] NO                                              Day #  BOWEL MOVEMENT:  [] YES [] NO, If No, Timing since last BM:      Day #  CHEST TUBE(Left/Right):  [] YES [] NO, If yes -  AIR LEAKS:  [] YES [] NO        LABS:                        8.6<L>  9.42  )-----------( 134      ( 26 Oct 2022 04:02 )             27.7<L>                        8.7<L>  10.98<H> )-----------( 115<L>    ( 25 Oct 2022 01:45 )             28.0<L>    10-26    141  |  103  |  58<H>  ----------------------------<  116<H>  4.0   |  27  |  5.9<HH>  10-25    139  |  101  |  47<H>  ----------------------------<  135<H>  3.9   |  27  |  4.7<HH>    Ca    8.0<L>      26 Oct 2022 04:02  Mg     2.5     10-26    TPro  4.9<L> [6.0 - 8.0]  /  Alb  3.2<L> [3.5 - 5.2]  /  TBili  0.7 [0.2 - 1.2]  /  DBili  x   /  AST  17 [0 - 41]  /  ALT  <5 [0 - 41]  /  AlkPhos  65 [30 - 115]  10-26          RADIOLOGY & ADDITIONAL TESTS:  CXR:  EKG:  MEDICATIONS  (STANDING):  atorvastatin 40 milliGRAM(s) Oral at bedtime  cefepime   IVPB 1000 milliGRAM(s) IV Intermittent daily  chlorhexidine 4% Liquid 1 Application(s) Topical <User Schedule>  clopidogrel Tablet 75 milliGRAM(s) Oral daily  dextrose 5%. 1000 milliLiter(s) (50 mL/Hr) IV Continuous <Continuous>  dextrose 50% Injectable 25 Gram(s) IV Push once  dextrose 50% Injectable 50 milliLiter(s) IV Push every 15 minutes  dextrose 50% Injectable 25 milliLiter(s) IV Push every 15 minutes  epoetin nicky-epbx (RETACRIT) Injectable 00078 Unit(s) IV Push <User Schedule>  glucagon  Injectable 1 milliGRAM(s) IntraMuscular once  insulin lispro (ADMELOG) corrective regimen sliding scale   SubCutaneous three times a day before meals  metoprolol tartrate 12.5 milliGRAM(s) Oral every 6 hours  pantoprazole  Injectable 40 milliGRAM(s) IV Push every 12 hours  silver sulfADIAZINE 1% Cream 1 Application(s) Topical every 12 hours    MEDICATIONS  (PRN):  acetaminophen     Tablet .. 650 milliGRAM(s) Oral every 6 hours PRN Temp greater or equal to 38C (100.4F), Mild Pain (1 - 3)  dextrose Oral Gel 15 Gram(s) Oral once PRN Blood Glucose LESS THAN 70 milliGRAM(s)/deciliter  oxyCODONE    IR 5 milliGRAM(s) Oral every 4 hours PRN Moderate Pain (4 - 6)    HEPARIN:  [] YES [] NO  Dose: XX UNITS/HR UNITS Q8H  LOVENOX:[] YES [] NO  Dose: XX mg Q24H  COUMADIN: []  YES [] NO  Dose: XX mg  Q24H  SCD's: YES b/l  GI Prophylaxis: Protonix [], Pepcid [], None [], (Contra-indication:.....)    Post-Op Beta-Blockers: Yes [], No[], If No, then contraindication:  Post-Op Aspirin: Yes [],  No [], If No, then contraindication:  Post-Op Statin: Yes [], No[], If No, then contraindication:  Allergies    No Known Allergies    Intolerances      Ambulation/Activity Status:    Assessment/Plan:  82y Male status-post .....  - Case and plan discussed with CTU Intensivist and CT Surgeon - Dr. Mosquera/Keshawn/Loco  - Continue CTU supportive care    - Continue DVT/GI prophylaxis  - Incentive Spirometry 10 times an hour  - Continue to advance physical activity as tolerated and continue PT/OT as directed  1. CAD: Continue ASA, statin, BB  2. HTN:   3. A. Fib:   4. COPD/Hypoxia:   5. DM/Glucose Control:     Social Service Disposition:     OPERATIVE PROCEDURE(s):  POD#7 TAVR with V fib arrest and insertion of Impella  POD#6 removal of implella and insertion of IABP  POD#5 removal of IABP                     82yMale  SURGEON(s): ROSEANN Adan  SUBJECTIVE ASSESSMENT: pt seen and examined. no acute complaints   Vital Signs Last 24 Hrs  T(F): 97.9 (26 Oct 2022 04:00), Max: 98.1 (26 Oct 2022 00:00)  HR: 72 (26 Oct 2022 07:00) (68 - 80)  BP: 135/61 (26 Oct 2022 07:00) (92/55 - 138/76)  BP(mean): 88 (26 Oct 2022 07:00) (71 - 98)  ABP: 103/35 (25 Oct 2022 09:00) (103/35 - 103/35)  ABP(mean): 57 (25 Oct 2022 09:00)  RR: 19 (26 Oct 2022 07:00) (16 - 38)  SpO2: 100% (26 Oct 2022 07:00) (92% - 100%)       I&O's Detail    25 Oct 2022 07:01  -  26 Oct 2022 07:00  --------------------------------------------------------  IN:    Oral Fluid: 480 mL  Total IN: 480 mL    OUT:  Total OUT: 0 mL        Net:   I&O's Detail    24 Oct 2022 07:01  -  25 Oct 2022 07:00  --------------------------------------------------------  Total NET: -1420 mL      25 Oct 2022 07:01  -  26 Oct 2022 07:00  --------------------------------------------------------  Total NET: 480 mL        CAPILLARY BLOOD GLUCOSE      POCT Blood Glucose.: 107 mg/dL (26 Oct 2022 06:42)  POCT Blood Glucose.: 143 mg/dL (25 Oct 2022 16:04)  POCT Blood Glucose.: 191 mg/dL (25 Oct 2022 10:56)      Physical Exam:  General: NAD; A&Ox3  Cardiac: S1/S2, RRR, ? murmur, no rubs  Lungs: unlabored shallow respirations, bilateral bs decreased at bases  Abdomen: Soft/NT/protuberant  Groing no expanding hematoma  Extremities: No edema b/l lower extremities    Central Venous Catheter: Yes[x]  critical patient    Day #7      BOWEL MOVEMENT: Yes,         LABS:                        8.6<L>  9.42  )-----------( 134      ( 26 Oct 2022 04:02 )             27.7<L>                        8.7<L>  10.98<H> )-----------( 115<L>    ( 25 Oct 2022 01:45 )             28.0<L>    10-26    141  |  103  |  58<H>  ----------------------------<  116<H>  4.0   |  27  |  5.9<HH>  10-25    139  |  101  |  47<H>  ----------------------------<  135<H>  3.9   |  27  |  4.7<HH>    Ca    8.0<L>      26 Oct 2022 04:02  Mg     2.5     10-26    TPro  4.9<L> [6.0 - 8.0]  /  Alb  3.2<L> [3.5 - 5.2]  /  TBili  0.7 [0.2 - 1.2]  /  DBili  x   /  AST  17 [0 - 41]  /  ALT  <5 [0 - 41]  /  AlkPhos  65 [30 - 115]  10-26          RADIOLOGY & ADDITIONAL TESTS:  CXR: < from: Xray Chest 1 View- PORTABLE-Routine (Xray Chest 1 View- PORTABLE-Routine in AM.) (10.25.22 @ 05:30) >  IMPRESSION:    Increased bilateral opacities/effusions.    < end of copied text >    EKG: < from: 12 Lead ECG (10.26.22 @ 07:13) >  Ventricular Rate 86 BPM    Atrial Rate 74 BPM    P-R Interval 204 ms    QRS Duration 162 ms    Q-T Interval 462 ms    QTC Calculation(Bazett) 552 ms    P Axis 43 degrees    R Axis 193 degrees    T Axis 50 degrees    Diagnosis Line Sinus rhythm withoccasional Premature ventricular complexes and Premature  atrial complexes  Right bundle branch block  Abnormal ECG    < end of copied text >    MEDICATIONS  (STANDING):  atorvastatin 40 milliGRAM(s) Oral at bedtime  cefepime   IVPB 1000 milliGRAM(s) IV Intermittent daily  chlorhexidine 4% Liquid 1 Application(s) Topical <User Schedule>  clopidogrel Tablet 75 milliGRAM(s) Oral daily  dextrose 5%. 1000 milliLiter(s) (50 mL/Hr) IV Continuous <Continuous>  dextrose 50% Injectable 25 Gram(s) IV Push once  dextrose 50% Injectable 50 milliLiter(s) IV Push every 15 minutes  dextrose 50% Injectable 25 milliLiter(s) IV Push every 15 minutes  epoetin nicky-epbx (RETACRIT) Injectable 42055 Unit(s) IV Push <User Schedule>  glucagon  Injectable 1 milliGRAM(s) IntraMuscular once  insulin lispro (ADMELOG) corrective regimen sliding scale   SubCutaneous three times a day before meals  metoprolol tartrate 12.5 milliGRAM(s) Oral every 6 hours  pantoprazole  Injectable 40 milliGRAM(s) IV Push every 12 hours  silver sulfADIAZINE 1% Cream 1 Application(s) Topical every 12 hours    MEDICATIONS  (PRN):  acetaminophen     Tablet .. 650 milliGRAM(s) Oral every 6 hours PRN Temp greater or equal to 38C (100.4F), Mild Pain (1 - 3)  dextrose Oral Gel 15 Gram(s) Oral once PRN Blood Glucose LESS THAN 70 milliGRAM(s)/deciliter  oxyCODONE    IR 5 milliGRAM(s) Oral every 4 hours PRN Moderate Pain (4 - 6)      SCD's: YES b/l  GI Prophylaxis: Protonix [x], Pepcid [], None [], (Contra-indication:.....)      Allergies    No Known Allergies    Intolerances      Ambulation/Activity Status: ambulate       Assessment/Plan:  82y Male status-post       POD#7 TAVR with V fib arrest and insertion of Impella  POD#6 removal of implella and insertion of IABP  POD#5 removal of IABP   - Case and plan discussed with CTU Intensivist and CT Surgeon - Dr. Adan / Goyo  - Continue CTU supportive care    - Continue DVT SCD's  - Continue GI prophylaxis  - Incentive Spirometry 10 times an hour  - Continue to advance physical activity as tolerated and continue PT/OT as directed  - CAD: Continue statin, start metoprolol tartrate 12.5mg q6hrs, hold ASA,  start  plavix as monotherapy due to melena with stable HGB for 3 days  - HTN: continue metoprolol 12.5mg q6hrs, monitor BP  - A. Fib ppx: d/c amio, monitor electrolytes, replete prn  - COPD/Hypoxia:  wean off O2, encourage incentive spirometry  - DM/Glucose Control: A1C 6.0%, continue sliding scale  - Anemia 2/2 Acute PO blood loss:, track and trend H/H, s/p transfusion, stable  - Thrombocytopenia - 115K up from 99 - start Plavix, holdASA  - AS  - s/p TAVR   - V fib arrest - PO Episode VT and SVT resolved - ICD in place and functioning - Maintain electrolytes K>4.0 Mg >2.0, f/u as OP with Dr Sargent, upgrade to CRT-D as out patient   - GI Recommendations: clear liquid diet and advance diet as tolerated, target Hb >8, PPI BID  - Nutrition - S&S - recommend small bites with thick liquids - monitor feeds, f/u speech and swallow to r/o aspiration  - Nephrology Recommendations: HD tomorrow via fistula left arm removed 2500cc yesterday, next HD Wed, will resume epogen with HD  - Speech & Swallow Recommendations: advance diet to soft and bite size with mildly thick liquids  - Duplex Scan LE B/L: No evidence of deep venous thrombosis or superficial thrombophlebitis in the bilateral lower extremities.  - HYpoxia - mild -pulmonary congestion on CXR for dialysis tomorrow  - remove arterial line and [facility] Service Disposition:  anticipate for home this week

## 2022-10-27 ENCOUNTER — TRANSCRIPTION ENCOUNTER (OUTPATIENT)
Age: 82
End: 2022-10-27

## 2022-10-27 LAB
ALBUMIN SERPL ELPH-MCNC: 3.4 G/DL — LOW (ref 3.5–5.2)
ALP SERPL-CCNC: 60 U/L — SIGNIFICANT CHANGE UP (ref 30–115)
ALT FLD-CCNC: <5 U/L — SIGNIFICANT CHANGE UP (ref 0–41)
ANION GAP SERPL CALC-SCNC: 12 MMOL/L — SIGNIFICANT CHANGE UP (ref 7–14)
AST SERPL-CCNC: 18 U/L — SIGNIFICANT CHANGE UP (ref 0–41)
BASOPHILS # BLD AUTO: 0.05 K/UL — SIGNIFICANT CHANGE UP (ref 0–0.2)
BASOPHILS NFR BLD AUTO: 0.6 % — SIGNIFICANT CHANGE UP (ref 0–1)
BILIRUB SERPL-MCNC: 0.8 MG/DL — SIGNIFICANT CHANGE UP (ref 0.2–1.2)
BUN SERPL-MCNC: 51 MG/DL — HIGH (ref 10–20)
CALCIUM SERPL-MCNC: 8.2 MG/DL — LOW (ref 8.4–10.4)
CHLORIDE SERPL-SCNC: 101 MMOL/L — SIGNIFICANT CHANGE UP (ref 98–110)
CO2 SERPL-SCNC: 27 MMOL/L — SIGNIFICANT CHANGE UP (ref 17–32)
CREAT SERPL-MCNC: 5.5 MG/DL — CRITICAL HIGH (ref 0.7–1.5)
EGFR: 10 ML/MIN/1.73M2 — LOW
EOSINOPHIL # BLD AUTO: 0.22 K/UL — SIGNIFICANT CHANGE UP (ref 0–0.7)
EOSINOPHIL NFR BLD AUTO: 2.8 % — SIGNIFICANT CHANGE UP (ref 0–8)
GLUCOSE BLDC GLUCOMTR-MCNC: 113 MG/DL — HIGH (ref 70–99)
GLUCOSE BLDC GLUCOMTR-MCNC: 158 MG/DL — HIGH (ref 70–99)
GLUCOSE BLDC GLUCOMTR-MCNC: 163 MG/DL — HIGH (ref 70–99)
GLUCOSE SERPL-MCNC: 117 MG/DL — HIGH (ref 70–99)
HCT VFR BLD CALC: 29.3 % — LOW (ref 42–52)
HGB BLD-MCNC: 9.2 G/DL — LOW (ref 14–18)
IMM GRANULOCYTES NFR BLD AUTO: 1.4 % — HIGH (ref 0.1–0.3)
LYMPHOCYTES # BLD AUTO: 1.16 K/UL — LOW (ref 1.2–3.4)
LYMPHOCYTES # BLD AUTO: 14.5 % — LOW (ref 20.5–51.1)
MAGNESIUM SERPL-MCNC: 2.4 MG/DL — SIGNIFICANT CHANGE UP (ref 1.8–2.4)
MCHC RBC-ENTMCNC: 30.2 PG — SIGNIFICANT CHANGE UP (ref 27–31)
MCHC RBC-ENTMCNC: 31.4 G/DL — LOW (ref 32–37)
MCV RBC AUTO: 96.1 FL — HIGH (ref 80–94)
MONOCYTES # BLD AUTO: 1.53 K/UL — HIGH (ref 0.1–0.6)
MONOCYTES NFR BLD AUTO: 19.2 % — HIGH (ref 1.7–9.3)
NEUTROPHILS # BLD AUTO: 4.91 K/UL — SIGNIFICANT CHANGE UP (ref 1.4–6.5)
NEUTROPHILS NFR BLD AUTO: 61.5 % — SIGNIFICANT CHANGE UP (ref 42.2–75.2)
NRBC # BLD: 0 /100 WBCS — SIGNIFICANT CHANGE UP (ref 0–0)
PLATELET # BLD AUTO: 150 K/UL — SIGNIFICANT CHANGE UP (ref 130–400)
POTASSIUM SERPL-MCNC: 4.1 MMOL/L — SIGNIFICANT CHANGE UP (ref 3.5–5)
POTASSIUM SERPL-SCNC: 4.1 MMOL/L — SIGNIFICANT CHANGE UP (ref 3.5–5)
PROT SERPL-MCNC: 5.2 G/DL — LOW (ref 6–8)
RBC # BLD: 3.05 M/UL — LOW (ref 4.7–6.1)
RBC # FLD: 17.5 % — HIGH (ref 11.5–14.5)
SODIUM SERPL-SCNC: 140 MMOL/L — SIGNIFICANT CHANGE UP (ref 135–146)
WBC # BLD: 7.98 K/UL — SIGNIFICANT CHANGE UP (ref 4.8–10.8)
WBC # FLD AUTO: 7.98 K/UL — SIGNIFICANT CHANGE UP (ref 4.8–10.8)

## 2022-10-27 PROCEDURE — 99231 SBSQ HOSP IP/OBS SF/LOW 25: CPT

## 2022-10-27 PROCEDURE — 71046 X-RAY EXAM CHEST 2 VIEWS: CPT | Mod: 26

## 2022-10-27 PROCEDURE — 93010 ELECTROCARDIOGRAM REPORT: CPT

## 2022-10-27 RX ADMIN — Medication 1: at 11:36

## 2022-10-27 RX ADMIN — Medication 12.5 MILLIGRAM(S): at 01:14

## 2022-10-27 RX ADMIN — Medication 1 APPLICATION(S): at 05:36

## 2022-10-27 RX ADMIN — CHLORHEXIDINE GLUCONATE 1 APPLICATION(S): 213 SOLUTION TOPICAL at 05:36

## 2022-10-27 RX ADMIN — PANTOPRAZOLE SODIUM 40 MILLIGRAM(S): 20 TABLET, DELAYED RELEASE ORAL at 17:50

## 2022-10-27 RX ADMIN — Medication 1 APPLICATION(S): at 17:51

## 2022-10-27 RX ADMIN — PANTOPRAZOLE SODIUM 40 MILLIGRAM(S): 20 TABLET, DELAYED RELEASE ORAL at 05:35

## 2022-10-27 RX ADMIN — ATORVASTATIN CALCIUM 40 MILLIGRAM(S): 80 TABLET, FILM COATED ORAL at 22:33

## 2022-10-27 RX ADMIN — CEFEPIME 100 MILLIGRAM(S): 1 INJECTION, POWDER, FOR SOLUTION INTRAMUSCULAR; INTRAVENOUS at 11:39

## 2022-10-27 RX ADMIN — Medication 12.5 MILLIGRAM(S): at 11:37

## 2022-10-27 RX ADMIN — Medication 1: at 16:21

## 2022-10-27 RX ADMIN — CLOPIDOGREL BISULFATE 75 MILLIGRAM(S): 75 TABLET, FILM COATED ORAL at 11:37

## 2022-10-27 RX ADMIN — Medication 12.5 MILLIGRAM(S): at 17:50

## 2022-10-27 RX ADMIN — Medication 12.5 MILLIGRAM(S): at 05:35

## 2022-10-27 NOTE — DISCHARGE NOTE NURSING/CASE MANAGEMENT/SOCIAL WORK - NSDCVIVACCINE_GEN_ALL_CORE_FT
Tdap; 03-Apr-2019 17:34; Lucero Johnson (PIOTR); [employer]; [id number] (Exp. Date: 20-Nov-2020); IntraMuscular; Deltoid Right.; 0.5 milliLiter(s); VIS (VIS Published: 09-May-2013, VIS Presented: 03-Apr-2019);

## 2022-10-27 NOTE — PROGRESS NOTE ADULT - SUBJECTIVE AND OBJECTIVE BOX
OPERATIVE PROCEDURE(s):                POD #                       SURGEON(s): ROSEANN Adan    HPI:      SUBJECTIVE ASSESSMENT:82yMale patient seen and examined at bedside.    Vital Signs Last 24 Hrs  T(F): 98.8 (27 Oct 2022 05:00), Max: 98.8 (27 Oct 2022 05:00)  HR: 73 (27 Oct 2022 06:00) (71 - 102)  BP: 115/61 (27 Oct 2022 06:00) (102/49 - 134/70)  BP(mean): 80 (27 Oct 2022 06:00) (77 - 96)  ABP: --  ABP(mean): --  RR: 29 (27 Oct 2022 06:00) (19 - 37)  SpO2: 88% (27 Oct 2022 06:00) (82% - 100%)  CVP(mm Hg): --  CVP(cm H2O): --  CO: --  CI: --  PA: --  SVR: --    I&O's Detail    26 Oct 2022 07:01  -  27 Oct 2022 07:00  --------------------------------------------------------  IN:    Oral Fluid: 590 mL  Total IN: 590 mL    OUT:    Blood Loss (mL): 0 mL    IV PiggyBack: 0 mL    Other (mL): 2500 mL    Voided (mL): 0 mL  Total OUT: 2500 mL        Net: I&O's Detail    25 Oct 2022 07:01  -  26 Oct 2022 07:00  --------------------------------------------------------  Total NET: 480 mL      26 Oct 2022 07:01  -  27 Oct 2022 07:00  --------------------------------------------------------  Total NET: -1910 mL        CAPILLARY BLOOD GLUCOSE      POCT Blood Glucose.: 113 mg/dL (27 Oct 2022 05:32)  POCT Blood Glucose.: 170 mg/dL (26 Oct 2022 23:55)  POCT Blood Glucose.: 140 mg/dL (26 Oct 2022 16:22)    A1C with Estimated Average Glucose Result: 6.0 % (10-13-22 @ 10:36)  A1C with Estimated Average Glucose Result: 5.9 % (09-15-22 @ 06:53)      Physical Exam:  General: NAD; A&Ox3  Cardiac: S1/S2, RRR, no murmur, no rubs  Lungs: unlabored respirations, CTA b/l, no wheeze, no rales, no crackles  Abdomen: Soft/NT/ND; positive bowel sounds x 4  Sternum: Intact, no click, incision healing well with no drainage  Incisions: Incisions clean/dry/intact  Extremities: No edema b/l lower extremities; good capillary refill; no cyanosis; palpable 1+ pedal pulses b/l    Central Venous Catheter: Yes: critical illness, intravenous access  Day #  Gomez Catheter: Yes: critical illness; monitor strict i/o's                    Day #  EPICARDIAL WIRES:  YES                                                                         Day #  BOWEL MOVEMENT:  [] YES [] NO, If No, Timing since last BM Day #  CHEST TUBE(MS/Left/Right):  [] YES [] NO, If yes -  AIR LEAKS:  YES/NO        LABS:                        9.2<L>  7.98  )-----------( 150      ( 27 Oct 2022 04:15 )             29.3<L>                        8.6<L>  9.42  )-----------( 134      ( 26 Oct 2022 04:02 )             27.7<L>    10-27    140  |  101  |  51<H>  ----------------------------<  117<H>  4.1   |  27  |  5.5<HH>  10-26    141  |  103  |  58<H>  ----------------------------<  116<H>  4.0   |  27  |  5.9<HH>    Ca    8.2<L>      27 Oct 2022 04:15  Mg     2.4     10-27    TPro  5.2<L> [6.0 - 8.0]  /  Alb  3.4<L> [3.5 - 5.2]  /  TBili  0.8 [0.2 - 1.2]  /  DBili  x   /  AST  18 [0 - 41]  /  ALT  <5 [0 - 41]  /  AlkPhos  60 [30 - 115]  10-27          RADIOLOGY & ADDITIONAL TESTS:  CXR:   EKG:    Allergies    No Known Allergies    Intolerances      MEDICATIONS  (STANDING):  albumin human  5% IVPB 250 milliLiter(s) IV Intermittent once  albumin human  5% IVPB 250 milliLiter(s) IV Intermittent once  albumin human  5% IVPB 250 milliLiter(s) IV Intermittent once  albumin human  5% IVPB 250 milliLiter(s) IV Intermittent once  atorvastatin 40 milliGRAM(s) Oral at bedtime  cefepime   IVPB 1000 milliGRAM(s) IV Intermittent daily  chlorhexidine 4% Liquid 1 Application(s) Topical <User Schedule>  clopidogrel Tablet 75 milliGRAM(s) Oral daily  dextrose 5%. 1000 milliLiter(s) (50 mL/Hr) IV Continuous <Continuous>  dextrose 50% Injectable 25 Gram(s) IV Push once  dextrose 50% Injectable 50 milliLiter(s) IV Push every 15 minutes  dextrose 50% Injectable 25 milliLiter(s) IV Push every 15 minutes  epoetin nicky-epbx (RETACRIT) Injectable 67809 Unit(s) IV Push <User Schedule>  glucagon  Injectable 1 milliGRAM(s) IntraMuscular once  insulin lispro (ADMELOG) corrective regimen sliding scale   SubCutaneous three times a day before meals  metoprolol tartrate 12.5 milliGRAM(s) Oral every 6 hours  pantoprazole  Injectable 40 milliGRAM(s) IV Push every 12 hours  silver sulfADIAZINE 1% Cream 1 Application(s) Topical every 12 hours    MEDICATIONS  (PRN):  acetaminophen     Tablet .. 650 milliGRAM(s) Oral every 6 hours PRN Temp greater or equal to 38C (100.4F), Mild Pain (1 - 3)  dextrose Oral Gel 15 Gram(s) Oral once PRN Blood Glucose LESS THAN 70 milliGRAM(s)/deciliter    Home Medications:  Lantus 100 units/mL subcutaneous solution: 30 unit(s) subcutaneous once a day (at bedtime) (18 Oct 2022 14:24)  Lasix: orally once a day (18 Oct 2022 14:24)  Lipitor 40 mg oral tablet: 1 tab(s) orally once a day (at bedtime) (18 Oct 2022 14:24)  metoprolol succinate 50 mg oral tablet, extended release: 1 tab(s) orally once a day (18 Oct 2022 14:24)      Pharmacologic DVT Prophylaxis [] YES, [] NO: Contraindication:  SCD's: YES b/l    GI Prophylaxis:     Post-Op Beta-Blockers: [] Yes, [] No: contraindication:  Post-Op CCB: [] Yes, [] No: contraindication:  Post-Op Aspirin:  [] Yes, [] No: contraindication:  Post-Op Statin:  [] Yes, [] No: contraindication:    Ambulation/Activity Status:    Assessment/Plan:  82y Male status-post  - Case and plan discussed with CTU Intensivist and CT Surgeon - Dr. Liao/Loco/Loren  - Continue CTU supportive care and ongoing plan of care as per continuing CTU rounds.   - Continue DVT/GI prophylaxis  - Incentive Spirometry 10 times an hour  - Continue to advance physical activity as tolerated and continue PT/OT as directed  1. CAD s/p CABG: Continue ASA, statin, BB  2. HTN:   3. Post-op A. Fib ppx:   4. COPD/Hypoxia:   5. DM/Glucose Control:     Social Service Disposition:     OPERATIVE PROCEDURE(s): TAVR with V fib arrest and insertion of Impella  POD # 8 removal of implella and insertion of IABP  POD#7 removal of IABP  POD#6                     SURGEON(s): ROSEANN Adan    HPI:      SUBJECTIVE ASSESSMENT:82yMale patient seen and examined at bedside.    Vital Signs Last 24 Hrs  T(F): 98.8 (27 Oct 2022 05:00), Max: 98.8 (27 Oct 2022 05:00)  HR: 73 (27 Oct 2022 06:00) (71 - 102)  BP: 115/61 (27 Oct 2022 06:00) (102/49 - 134/70)  BP(mean): 80 (27 Oct 2022 06:00) (77 - 96)  RR: 29 (27 Oct 2022 06:00) (19 - 37)  SpO2: 88% (27 Oct 2022 06:00) (82% - 100%) [facility]    I&O's Detail    26 Oct 2022 07:01  -  27 Oct 2022 07:00  --------------------------------------------------------  IN:    Oral Fluid: 590 mL  Total IN: 590 mL    OUT:    Blood Loss (mL): 0 mL    IV PiggyBack: 0 mL    Other (mL): 2500 mL    Voided (mL): 0 mL  Total OUT: 2500 mL        Net: I&O's Detail    25 Oct 2022 07:01  -  26 Oct 2022 07:00  --------------------------------------------------------  Total NET: 480 mL      26 Oct 2022 07:01  -  27 Oct 2022 07:00  --------------------------------------------------------  Total NET: -1910 mL        CAPILLARY BLOOD GLUCOSE  POCT Blood Glucose.: 113 mg/dL (27 Oct 2022 05:32)  POCT Blood Glucose.: 170 mg/dL (26 Oct 2022 23:55)  POCT Blood Glucose.: 140 mg/dL (26 Oct 2022 16:22)  A1C with Estimated Average Glucose Result: 6.0 % (10-13-22 @ 10:36)  A1C with Estimated Average Glucose Result: 5.9 % (09-15-22 @ 06:53)      Physical Exam:  General: NAD; A&Ox3  Cardiac: S1/S2, RRR, no murmur, no rubs  Lungs: unlabored respirations, CTA b/l, no wheeze, no rales, no crackles  Abdomen: Soft/NT/ND; positive bowel sounds x 4  Incisions: Incisions clean/dry/intact  Extremities: No edema b/l lower extremities; good capillary refill; no cyanosis; palpable 1+ pedal pulses b/l        LABS:                        9.2<L>  7.98  )-----------( 150      ( 27 Oct 2022 04:15 )             29.3<L>                        8.6<L>  9.42  )-----------( 134      ( 26 Oct 2022 04:02 )             27.7<L>    10-27    140  |  101  |  51<H>  ----------------------------<  117<H>  4.1   |  27  |  5.5<HH>  10-26    141  |  103  |  58<H>  ----------------------------<  116<H>  4.0   |  27  |  5.9<HH>    Ca    8.2<L>      27 Oct 2022 04:15  Mg     2.4     10-27    TPro  5.2<L> [6.0 - 8.0]  /  Alb  3.4<L> [3.5 - 5.2]  /  TBili  0.8 [0.2 - 1.2]  /  DBili  x   /  AST  18 [0 - 41]  /  ALT  <5 [0 - 41]  /  AlkPhos  60 [30 - 115]  10-27      Allergies  No Known Allergies  Intolerances      MEDICATIONS  (STANDING):  albumin human  5% IVPB 250 milliLiter(s) IV Intermittent once  albumin human  5% IVPB 250 milliLiter(s) IV Intermittent once  albumin human  5% IVPB 250 milliLiter(s) IV Intermittent once  albumin human  5% IVPB 250 milliLiter(s) IV Intermittent once  atorvastatin 40 milliGRAM(s) Oral at bedtime  cefepime   IVPB 1000 milliGRAM(s) IV Intermittent daily  chlorhexidine 4% Liquid 1 Application(s) Topical <User Schedule>  clopidogrel Tablet 75 milliGRAM(s) Oral daily  dextrose 5%. 1000 milliLiter(s) (50 mL/Hr) IV Continuous <Continuous>  dextrose 50% Injectable 25 Gram(s) IV Push once  dextrose 50% Injectable 50 milliLiter(s) IV Push every 15 minutes  dextrose 50% Injectable 25 milliLiter(s) IV Push every 15 minutes  epoetin nicky-epbx (RETACRIT) Injectable 75256 Unit(s) IV Push <User Schedule>  glucagon  Injectable 1 milliGRAM(s) IntraMuscular once  insulin lispro (ADMELOG) corrective regimen sliding scale   SubCutaneous three times a day before meals  metoprolol tartrate 12.5 milliGRAM(s) Oral every 6 hours  pantoprazole  Injectable 40 milliGRAM(s) IV Push every 12 hours  silver sulfADIAZINE 1% Cream 1 Application(s) Topical every 12 hours    MEDICATIONS  (PRN):  acetaminophen     Tablet .. 650 milliGRAM(s) Oral every 6 hours PRN Temp greater or equal to 38C (100.4F), Mild Pain (1 - 3)  dextrose Oral Gel 15 Gram(s) Oral once PRN Blood Glucose LESS THAN 70 milliGRAM(s)/deciliter    Home Medications:  Lantus 100 units/mL subcutaneous solution: 30 unit(s) subcutaneous once a day (at bedtime) (18 Oct 2022 14:24)  Lasix: orally once a day (18 Oct 2022 14:24)  Lipitor 40 mg oral tablet: 1 tab(s) orally once a day (at bedtime) (18 Oct 2022 14:24)  metoprolol succinate 50 mg oral tablet, extended release: 1 tab(s) orally once a day (18 Oct 2022 14:24)      Assessment/Plan:  82y Male s/p TAVR with V fib arrest and insertion of Impella  POD # 8 removal of implella and insertion of IABP  POD#7 removal of IABP  POD#6   - Case and plan discussed with CTU Intensivist and CT Surgeon - Dr. Adan  - Continue CTU supportive care and ongoing plan of care as per continuing CTU rounds.   - Continue DVT/GI prophylaxis  - Incentive Spirometry 10 times an hour  - Continue to advance physical activity as tolerated and continue PT/OT as directed  1. CAD s/p CABG: Continue ASA, statin, BB  2. HTN:   3. Post-op A. Fib ppx:   4. COPD/Hypoxia:   5. DM/Glucose Control:     - CAD: Continue statin, start metoprolol tartrate 12.5mg q6hrs, hold ASA,  start  plavix as monotherapy due to melena with stable HGB for 3 days  - HTN: continue metoprolol 12.5mg q6hrs, monitor BP  - A. Fib ppx: d/c amio, monitor electrolytes, replete prn  - COPD/Hypoxia:  wean off O2, encourage incentive spirometry  - DM/Glucose Control: A1C 6.0%, continue sliding scale  - Anemia 2/2 Acute PO blood loss:, track and trend H/H, s/p transfusion, stable  - Thrombocytopenia - 115K up from 99 - start Plavix, holdASA  - AS  - s/p TAVR   - V fib arrest - PO Episode VT and SVT resolved - ICD in place and functioning - Maintain electrolytes K>4.0 Mg >2.0, f/u as OP with Dr Sargent, upgrade to CRT-D as out patient   - GI Recommendations: clear liquid diet and advance diet as tolerated, target Hb >8, PPI BID  - Nutrition - S&S - recommend small bites with thick liquids - monitor feeds, f/u speech and swallow to r/o aspiration  - Nephrology Recommendations: HD tomorrow via fistula left arm removed 2500cc yesterday, next HD Wed, will resume epogen with HD  - Speech & Swallow Recommendations: advance diet to soft and bite size with mildly thick liquids  - Duplex Scan LE B/L: No evidence of deep venous thrombosis or superficial thrombophlebitis in the bilateral lower extremities.  - HYpoxia - mild -pulmonary congestion on CXR for dialysis tomorrow  - remove arterial line and [facility] Service Disposition:     OPERATIVE PROCEDURE(s): TAVR with V fib arrest and insertion of Impella  POD # 8 removal of implella and insertion of IABP  POD#7 removal of IABP  POD#6                     SURGEON(s): ROSEANN Adan    SUBJECTIVE ASSESSMENT:82yMale patient seen and examined at bedside.    Vital Signs Last 24 Hrs  T(F): 98.8 (27 Oct 2022 05:00), Max: 98.8 (27 Oct 2022 05:00)  HR: 73 (27 Oct 2022 06:00) (71 - 102)  BP: 115/61 (27 Oct 2022 06:00) (102/49 - 134/70)  BP(mean): 80 (27 Oct 2022 06:00) (77 - 96)  RR: 29 (27 Oct 2022 06:00) (19 - 37)  SpO2: 88% (27 Oct 2022 06:00) (82% - 100%) [facility]    I&O's Detail    26 Oct 2022 07:01  -  27 Oct 2022 07:00  --------------------------------------------------------  IN:    Oral Fluid: 590 mL  Total IN: 590 mL    OUT:    Blood Loss (mL): 0 mL    IV PiggyBack: 0 mL    Other (mL): 2500 mL    Voided (mL): 0 mL  Total OUT: 2500 mL        Net: I&O's Detail    25 Oct 2022 07:01  -  26 Oct 2022 07:00  --------------------------------------------------------  Total NET: 480 mL      26 Oct 2022 07:01  -  27 Oct 2022 07:00  --------------------------------------------------------  Total NET: -1910 mL        CAPILLARY BLOOD GLUCOSE  POCT Blood Glucose.: 113 mg/dL (27 Oct 2022 05:32)  POCT Blood Glucose.: 170 mg/dL (26 Oct 2022 23:55)  POCT Blood Glucose.: 140 mg/dL (26 Oct 2022 16:22)  A1C with Estimated Average Glucose Result: 6.0 % (10-13-22 @ 10:36)  A1C with Estimated Average Glucose Result: 5.9 % (09-15-22 @ 06:53)      Physical Exam:  General: NAD; A&Ox3  Cardiac: S1/S2, RRR, no murmur, no rubs  Lungs: unlabored respirations, CTA b/l, no wheeze, no rales, no crackles  Abdomen: Soft/NT/ND; positive bowel sounds x 4  Incisions: Incisions clean/dry/intact  Extremities: No edema b/l lower extremities; good capillary refill; no cyanosis; palpable 1+ pedal pulses b/l        LABS:                        9.2<L>  7.98  )-----------( 150      ( 27 Oct 2022 04:15 )             29.3<L>                        8.6<L>  9.42  )-----------( 134      ( 26 Oct 2022 04:02 )             27.7<L>    10-27    140  |  101  |  51<H>  ----------------------------<  117<H>  4.1   |  27  |  5.5<HH>  10-26    141  |  103  |  58<H>  ----------------------------<  116<H>  4.0   |  27  |  5.9<HH>    Ca    8.2<L>      27 Oct 2022 04:15  Mg     2.4     10-27    TPro  5.2<L> [6.0 - 8.0]  /  Alb  3.4<L> [3.5 - 5.2]  /  TBili  0.8 [0.2 - 1.2]  /  DBili  x   /  AST  18 [0 - 41]  /  ALT  <5 [0 - 41]  /  AlkPhos  60 [30 - 115]  10-27      Allergies  No Known Allergies  Intolerances      MEDICATIONS  (STANDING):  albumin human  5% IVPB 250 milliLiter(s) IV Intermittent once  albumin human  5% IVPB 250 milliLiter(s) IV Intermittent once  albumin human  5% IVPB 250 milliLiter(s) IV Intermittent once  albumin human  5% IVPB 250 milliLiter(s) IV Intermittent once  atorvastatin 40 milliGRAM(s) Oral at bedtime  cefepime   IVPB 1000 milliGRAM(s) IV Intermittent daily  chlorhexidine 4% Liquid 1 Application(s) Topical <User Schedule>  clopidogrel Tablet 75 milliGRAM(s) Oral daily  dextrose 5%. 1000 milliLiter(s) (50 mL/Hr) IV Continuous <Continuous>  dextrose 50% Injectable 25 Gram(s) IV Push once  dextrose 50% Injectable 50 milliLiter(s) IV Push every 15 minutes  dextrose 50% Injectable 25 milliLiter(s) IV Push every 15 minutes  epoetin nicky-epbx (RETACRIT) Injectable 10767 Unit(s) IV Push <User Schedule>  glucagon  Injectable 1 milliGRAM(s) IntraMuscular once  insulin lispro (ADMELOG) corrective regimen sliding scale   SubCutaneous three times a day before meals  metoprolol tartrate 12.5 milliGRAM(s) Oral every 6 hours  pantoprazole  Injectable 40 milliGRAM(s) IV Push every 12 hours  silver sulfADIAZINE 1% Cream 1 Application(s) Topical every 12 hours    MEDICATIONS  (PRN):  acetaminophen     Tablet .. 650 milliGRAM(s) Oral every 6 hours PRN Temp greater or equal to 38C (100.4F), Mild Pain (1 - 3)  dextrose Oral Gel 15 Gram(s) Oral once PRN Blood Glucose LESS THAN 70 milliGRAM(s)/deciliter    Home Medications:  Lantus 100 units/mL subcutaneous solution: 30 unit(s) subcutaneous once a day (at bedtime) (18 Oct 2022 14:24)  Lasix: orally once a day (18 Oct 2022 14:24)  Lipitor 40 mg oral tablet: 1 tab(s) orally once a day (at bedtime) (18 Oct 2022 14:24)  metoprolol succinate 50 mg oral tablet, extended release: 1 tab(s) orally once a day (18 Oct 2022 14:24)      Assessment/Plan:  82y Male s/p TAVR with V fib arrest and insertion of Impella  POD # 8 removal of implella and insertion of IABP  POD#7 removal of IABP  POD#6   - Case and plan discussed with CTU Intensivist and CT Surgeon - Dr. Adan  - Continue CTU supportive care and ongoing plan of care as per continuing CTU rounds.   - Continue DVT/GI prophylaxis  - Incentive Spirometry 10 times an hour  - Continue to advance physical activity as tolerated and continue PT/OT as directed  - CAD: Continue statin, metoprolol tartrate 12.5mg q6hrs, plavix as monotherapy due to melena with stable HGB for 3 days.  - HTN: continue metoprolol 12.5mg q6hrs, monitor BP  - A. Fib ppx: cont BB, rate controlled off amio, monitor electrolytes, replete prn  - COPD/Hypoxia:  wean off O2, encourage incentive spirometry  - DM/Glucose Control: A1C 6.0%, continue sliding scale  - Anemia 2/2 Acute PO blood loss:, track and trend H/H, s/p transfusion, stable  - Thrombocytopenia - 115K up from 99 - start Plavix, holdASA  - AS: s/p TAVR   - V fib arrest - PO Episode VT and SVT resolved - ICD in place and functioning - Maintain electrolytes K>4.0 Mg >2.0, f/u as OP with Dr Sargent, upgrade to CRT-D as out patient   - Nutrition - S&S - recommend small bites with thick liquids - monitor feeds, f/u speech and swallow to r/o aspiration  - Nephrology Recommendations: HD M.W.F via fistula.  - Speech & Swallow Recommendations: advance diet to soft and bite size with mildly thick liquids    Social Service Disposition: Plan for d/c home after next round of HD.

## 2022-10-27 NOTE — PROGRESS NOTE ADULT - ASSESSMENT
ESRD on HD MWF @ [place], [place] via left arm AVF  non-oliguric  s/p TAVR complicated by vfib cardiac arrest  ARF on vent  CAD / CABG / CMP / CHF / VHD (severe AS and MR)  HTN  DM2  anemia   GREG  melena    plan:    HD tomorrow  epogen with HD  renal diet / fluid restriction  left arm precautions  complete abx  icu care

## 2022-10-27 NOTE — PROGRESS NOTE ADULT - SUBJECTIVE AND OBJECTIVE BOX
NEPHROLOGY FOLLOW UP NOTE    still in icu  hd yest    PAST MEDICAL & SURGICAL HISTORY:  HTN (Hypertension)    Diabetes Mellitus Type II    Hypercholesterolemia    CAD (Coronary Artery Disease)    History of PTCA  with stents 10 years ago ([facility])    Diabetes mellitus    CAD (coronary artery disease)    H/O hyperlipidemia    HTN - Hypertension    Renal insufficiency    Sleep apnea  does not use machine    GERD (gastroesophageal reflux disease)    BPH (Benign Prostatic Hyperplasia)    CHF (congestive heart failure)    Mitral valve regurgitation    S/P knee surgery  b/l arthroscopic    S/P tonsillectomy    S/P primary angioplasty with coronary stent  6-7 stents last one in 10/12    S/P appendectomy      Allergies:  No Known Allergies    Home Medications Reviewed    SOCIAL HISTORY:  Denies ETOH,Smoking,   FAMILY HISTORY:  No pertinent family history in first degree relatives          REVIEW OF SYSTEMS:  as above, all else negative    PHYSICAL EXAM:  Constitutional: sedated  HEENT: moist mm  Neck: No JVD + ecchymosis   Respiratory: decreased BS b/l  Cardiovascular: S1, S2, RRR + murmur  Gastrointestinal: BS+, soft, NT/ND  Extremities: + peripheral edema  Neurological: sedated  : No CVA tenderness.    Skin: No rashes  Vascular Access: left arm AVF + [facility] Medications:   MEDICATIONS  (STANDING):  albumin human  5% IVPB 250 milliLiter(s) IV Intermittent once  albumin human  5% IVPB 250 milliLiter(s) IV Intermittent once  albumin human  5% IVPB 250 milliLiter(s) IV Intermittent once  albumin human  5% IVPB 250 milliLiter(s) IV Intermittent once  atorvastatin 40 milliGRAM(s) Oral at bedtime  cefepime   IVPB 1000 milliGRAM(s) IV Intermittent daily  chlorhexidine 4% Liquid 1 Application(s) Topical <User Schedule>  clopidogrel Tablet 75 milliGRAM(s) Oral daily  dextrose 5%. 1000 milliLiter(s) (50 mL/Hr) IV Continuous <Continuous>  dextrose 50% Injectable 25 Gram(s) IV Push once  dextrose 50% Injectable 50 milliLiter(s) IV Push every 15 minutes  dextrose 50% Injectable 25 milliLiter(s) IV Push every 15 minutes  epoetin nicky-epbx (RETACRIT) Injectable 58421 Unit(s) IV Push <User Schedule>  glucagon  Injectable 1 milliGRAM(s) IntraMuscular once  insulin lispro (ADMELOG) corrective regimen sliding scale   SubCutaneous three times a day before meals  metoprolol tartrate 12.5 milliGRAM(s) Oral every 6 hours  pantoprazole  Injectable 40 milliGRAM(s) IV Push every 12 hours  silver sulfADIAZINE 1% Cream 1 Application(s) Topical every 12 hours        VITALS:  T(F): 97.5 (10-27-22 @ 16:00), Max: 98.8 (10-27-22 @ 05:00)  HR: 72 (10-27-22 @ 18:00)  BP: 105/58 (10-27-22 @ 18:00)  RR: 25 (10-27-22 @ 18:00)  SpO2: 98% (10-27-22 @ 18:00)  Wt(kg): --    10-25 @ 07:01  -  10-26 @ 07:00  --------------------------------------------------------  IN: 480 mL / OUT: 0 mL / NET: 480 mL    10-26 @ 07:01  -  10-27 @ 07:00  --------------------------------------------------------  IN: 590 mL / OUT: 2500 mL / NET: -1910 mL    10-27 @ 07:01  -  10-27 @ 18:40  --------------------------------------------------------  IN: 530 mL / OUT: 150 mL / NET: 380 mL          LABS:  10-27    140  |  101  |  51<H>  ----------------------------<  117<H>  4.1   |  27  |  5.5<HH>    Ca    8.2<L>      27 Oct 2022 04:15  Mg     2.4     10-27    TPro  5.2<L>  /  Alb  3.4<L>  /  TBili  0.8  /  DBili      /  AST  18  /  ALT  <5  /  AlkPhos  60  10-27                          9.2    7.98  )-----------( 150      ( 27 Oct 2022 04:15 )             29.3       Urine Studies:        RADIOLOGY & ADDITIONAL STUDIES:

## 2022-10-27 NOTE — SWALLOW BEDSIDE ASSESSMENT ADULT - SWALLOW EVAL: DIAGNOSIS
suspected pharyngeal dysphagia for thins and solids; + toleration observed without overt symptoms of penetration/aspiration for puree, soft bite size and mildly thick liquids
functional oropharyngeal swallow; + toleration observed without overt symptoms of penetration/aspiration for regular with thins

## 2022-10-27 NOTE — SWALLOW BEDSIDE ASSESSMENT ADULT - SLP GENERAL OBSERVATIONS
pt awake alert without c/o pain
pt awake alert without c/o pain. Improved vocal quality noted.   neck hematoma seems to be improving

## 2022-10-27 NOTE — DISCHARGE NOTE NURSING/CASE MANAGEMENT/SOCIAL WORK - PATIENT PORTAL LINK FT
You can access the FollowMyHealth Patient Portal offered by [facility] by registering at the following website: [email or web address]. By joining [employer]’s FollowMyHealth portal, you will also be able to view your health information using other applications (apps) compatible with our system.

## 2022-10-27 NOTE — DISCHARGE NOTE NURSING/CASE MANAGEMENT/SOCIAL WORK - NSDCPEFALRISK_GEN_ALL_CORE
For information on Fall & Injury Prevention, visit: [email or web address] OR  [email or web address] OR  https://www.cdc.gov/steadi/patient.html

## 2022-10-27 NOTE — SWALLOW BEDSIDE ASSESSMENT ADULT - SLP PERTINENT HISTORY OF CURRENT PROBLEM
83 y/o M with h/o ESRD on HD, chronic systolic HF s/p ICD, DM, GREG, severe AS who came in electively for TAVR. Valve was deployed successfully. Patient went into Vfib arrest post deployment. An Impella CP was inserted and he was started on multiple vasoppresors, including epinephrine, levophed and vasopressin. Lactate peaked at ~7 but is already trending down. A PA and CVVHD catheters were inserted while in the lab. pt is extubated. GI is called for evaluation of melena

## 2022-10-27 NOTE — PROGRESS NOTE ADULT - SUBJECTIVE AND OBJECTIVE BOX
OPERATIVE PROCEDURE(s): TAVR with V fib arrest                       SUBJECTIVE ASSESSMENT:82yMale patient seen and examined at bedside.    Vital Signs Last 24 Hrs  T(F): 98.8 (27 Oct 2022 05:00), Max: 98.8 (27 Oct 2022 05:00)  HR: 73 (27 Oct 2022 06:00) (71 - 102)  BP: 115/61 (27 Oct 2022 06:00) (102/49 - 134/70)  BP(mean): 80 (27 Oct 2022 06:00) (77 - 96)  RR: 29 (27 Oct 2022 06:00) (19 - 37)  SpO2: 88% (27 Oct 2022 06:00) (82% - 100%) [facility]    I&O's Detail    26 Oct 2022 07:01  -  27 Oct 2022 07:00  --------------------------------------------------------  IN:    Oral Fluid: 590 mL  Total IN: 590 mL    OUT:    Blood Loss (mL): 0 mL    IV PiggyBack: 0 mL    Other (mL): 2500 mL    Voided (mL): 0 mL  Total OUT: 2500 mL        Net: I&O's Detail    25 Oct 2022 07:01  -  26 Oct 2022 07:00  --------------------------------------------------------  Total NET: 480 mL      26 Oct 2022 07:01  -  27 Oct 2022 07:00  --------------------------------------------------------  Total NET: -1910 mL        CAPILLARY BLOOD GLUCOSE  POCT Blood Glucose.: 113 mg/dL (27 Oct 2022 05:32)  POCT Blood Glucose.: 170 mg/dL (26 Oct 2022 23:55)  POCT Blood Glucose.: 140 mg/dL (26 Oct 2022 16:22)  A1C with Estimated Average Glucose Result: 6.0 % (10-13-22 @ 10:36)  A1C with Estimated Average Glucose Result: 5.9 % (09-15-22 @ 06:53)      Physical Exam:  General: NAD; A&Ox3  Cardiac: S1/S2, RRR, no murmur, no rubs  Lungs: unlabored respirations, CTA b/l, no wheeze, no rales, no crackles  Abdomen: Soft/NT/ND; positive bowel sounds x 4  Incisions: Incisions clean/dry/intact  Extremities: No edema b/l lower extremities; good capillary refill; no cyanosis; palpable 1+ pedal pulses b/l        LABS:                        9.2<L>  7.98  )-----------( 150      ( 27 Oct 2022 04:15 )             29.3<L>                        8.6<L>  9.42  )-----------( 134      ( 26 Oct 2022 04:02 )             27.7<L>    10-27    140  |  101  |  51<H>  ----------------------------<  117<H>  4.1   |  27  |  5.5<HH>  10-26    141  |  103  |  58<H>  ----------------------------<  116<H>  4.0   |  27  |  5.9<HH>    Ca    8.2<L>      27 Oct 2022 04:15  Mg     2.4     10-27    TPro  5.2<L> [6.0 - 8.0]  /  Alb  3.4<L> [3.5 - 5.2]  /  TBili  0.8 [0.2 - 1.2]  /  DBili  x   /  AST  18 [0 - 41]  /  ALT  <5 [0 - 41]  /  AlkPhos  60 [30 - 115]  10-27      Allergies  No Known Allergies  Intolerances      MEDICATIONS  (STANDING):  albumin human  5% IVPB 250 milliLiter(s) IV Intermittent once  albumin human  5% IVPB 250 milliLiter(s) IV Intermittent once  albumin human  5% IVPB 250 milliLiter(s) IV Intermittent once  albumin human  5% IVPB 250 milliLiter(s) IV Intermittent once  atorvastatin 40 milliGRAM(s) Oral at bedtime  cefepime   IVPB 1000 milliGRAM(s) IV Intermittent daily  chlorhexidine 4% Liquid 1 Application(s) Topical <User Schedule>  clopidogrel Tablet 75 milliGRAM(s) Oral daily  dextrose 5%. 1000 milliLiter(s) (50 mL/Hr) IV Continuous <Continuous>  dextrose 50% Injectable 25 Gram(s) IV Push once  dextrose 50% Injectable 50 milliLiter(s) IV Push every 15 minutes  dextrose 50% Injectable 25 milliLiter(s) IV Push every 15 minutes  epoetin nicky-epbx (RETACRIT) Injectable 76850 Unit(s) IV Push <User Schedule>  glucagon  Injectable 1 milliGRAM(s) IntraMuscular once  insulin lispro (ADMELOG) corrective regimen sliding scale   SubCutaneous three times a day before meals  metoprolol tartrate 12.5 milliGRAM(s) Oral every 6 hours  pantoprazole  Injectable 40 milliGRAM(s) IV Push every 12 hours  silver sulfADIAZINE 1% Cream 1 Application(s) Topical every 12 hours    MEDICATIONS  (PRN):  acetaminophen     Tablet .. 650 milliGRAM(s) Oral every 6 hours PRN Temp greater or equal to 38C (100.4F), Mild Pain (1 - 3)  dextrose Oral Gel 15 Gram(s) Oral once PRN Blood Glucose LESS THAN 70 milliGRAM(s)/deciliter    Home Medications:  Lantus 100 units/mL subcutaneous solution: 30 unit(s) subcutaneous once a day (at bedtime) (18 Oct 2022 14:24)  Lasix: orally once a day (18 Oct 2022 14:24)  Lipitor 40 mg oral tablet: 1 tab(s) orally once a day (at bedtime) (18 Oct 2022 14:24)  metoprolol succinate 50 mg oral tablet, extended release: 1 tab(s) orally once a day (18 Oct 2022 14:24)      Assessment/Plan:  82y Male s/p TAVR with V fib arrest and insertion of Impella  - IABP   - Case and plan discussed with CTU AP and CT Surgeon - Dr. Adan  - Continue CTU supportive care and ongoing plan of care as per continuing CTU rounds.   - Continue DVT/GI prophylaxis  - Incentive Spirometry 10 times an hour  - Continue to advance physical activity as tolerated and continue PT/OT as directed      - CAD: Continue statin, start metoprolol tartrate 12.5mg q6hrs, hold ASA,  start  plavix as monotherapy due to melena with stable HGB for 3 days  - HTN: continue metoprolol 12.5mg q6hrs, monitor BP  - COPD/Hypoxia:  wean off O2, encourage incentive spirometry  - DM/Glucose Control: A1C 6.0%, continue sliding scale  - Anemia 2/2 Acute PO blood loss:, track and trend H/H, s/p transfusion, stable  - Thrombocytopenia - 115K up from 99  - AS  - s/p TAVR   - V fib arrest -  Episode VT and SVT resolved - ICD in place and functioning - Maintain electrolytes K>4.0 Mg >2.0, f/u as OP with Dr Sargent, upgrade to CRT-D as out patient   - GI Recommendations: PPI BID  - Nephrology: HD per schedule  - Duplex Scan LE B/L: No evidence of deep venous thrombosis or superficial thrombophlebitis in the bilateral lower extremities.

## 2022-10-28 PROBLEM — H91.90 UNSPECIFIED HEARING LOSS, UNSPECIFIED EAR: Chronic | Status: ACTIVE | Noted: 2022-10-13

## 2022-10-28 PROBLEM — Z95.810 PRESENCE OF AUTOMATIC (IMPLANTABLE) CARDIAC DEFIBRILLATOR: Chronic | Status: ACTIVE | Noted: 2022-10-13

## 2022-10-28 PROBLEM — Z99.2 DEPENDENCE ON RENAL DIALYSIS: Chronic | Status: ACTIVE | Noted: 2022-10-13

## 2022-10-28 PROBLEM — I35.0 NONRHEUMATIC AORTIC (VALVE) STENOSIS: Chronic | Status: ACTIVE | Noted: 2022-10-13

## 2022-10-28 LAB
ALBUMIN SERPL ELPH-MCNC: 3.7 G/DL — SIGNIFICANT CHANGE UP (ref 3.5–5.2)
ALP SERPL-CCNC: 58 U/L — SIGNIFICANT CHANGE UP (ref 30–115)
ALT FLD-CCNC: <5 U/L — SIGNIFICANT CHANGE UP (ref 0–41)
ANION GAP SERPL CALC-SCNC: 10 MMOL/L — SIGNIFICANT CHANGE UP (ref 7–14)
AST SERPL-CCNC: 17 U/L — SIGNIFICANT CHANGE UP (ref 0–41)
BASOPHILS # BLD AUTO: 0.05 K/UL — SIGNIFICANT CHANGE UP (ref 0–0.2)
BASOPHILS NFR BLD AUTO: 0.5 % — SIGNIFICANT CHANGE UP (ref 0–1)
BILIRUB SERPL-MCNC: 0.8 MG/DL — SIGNIFICANT CHANGE UP (ref 0.2–1.2)
BUN SERPL-MCNC: 60 MG/DL — HIGH (ref 10–20)
CALCIUM SERPL-MCNC: 7.8 MG/DL — LOW (ref 8.4–10.5)
CHLORIDE SERPL-SCNC: 100 MMOL/L — SIGNIFICANT CHANGE UP (ref 98–110)
CO2 SERPL-SCNC: 27 MMOL/L — SIGNIFICANT CHANGE UP (ref 17–32)
CREAT SERPL-MCNC: 5.8 MG/DL — CRITICAL HIGH (ref 0.7–1.5)
EGFR: 9 ML/MIN/1.73M2 — LOW
EOSINOPHIL # BLD AUTO: 0.12 K/UL — SIGNIFICANT CHANGE UP (ref 0–0.7)
EOSINOPHIL NFR BLD AUTO: 1.1 % — SIGNIFICANT CHANGE UP (ref 0–8)
GLUCOSE BLDC GLUCOMTR-MCNC: 111 MG/DL — HIGH (ref 70–99)
GLUCOSE BLDC GLUCOMTR-MCNC: 139 MG/DL — HIGH (ref 70–99)
GLUCOSE BLDC GLUCOMTR-MCNC: 146 MG/DL — HIGH (ref 70–99)
GLUCOSE BLDC GLUCOMTR-MCNC: 154 MG/DL — HIGH (ref 70–99)
GLUCOSE SERPL-MCNC: 118 MG/DL — HIGH (ref 70–99)
HCT VFR BLD CALC: 29.9 % — LOW (ref 42–52)
HGB BLD-MCNC: 9.2 G/DL — LOW (ref 14–18)
IMM GRANULOCYTES NFR BLD AUTO: 1 % — HIGH (ref 0.1–0.3)
LYMPHOCYTES # BLD AUTO: 1.3 K/UL — SIGNIFICANT CHANGE UP (ref 1.2–3.4)
LYMPHOCYTES # BLD AUTO: 12.4 % — LOW (ref 20.5–51.1)
MAGNESIUM SERPL-MCNC: 2.6 MG/DL — HIGH (ref 1.8–2.4)
MCHC RBC-ENTMCNC: 29.4 PG — SIGNIFICANT CHANGE UP (ref 27–31)
MCHC RBC-ENTMCNC: 30.8 G/DL — LOW (ref 32–37)
MCV RBC AUTO: 95.5 FL — HIGH (ref 80–94)
MONOCYTES # BLD AUTO: 1.79 K/UL — HIGH (ref 0.1–0.6)
MONOCYTES NFR BLD AUTO: 17 % — HIGH (ref 1.7–9.3)
NEUTROPHILS # BLD AUTO: 7.13 K/UL — HIGH (ref 1.4–6.5)
NEUTROPHILS NFR BLD AUTO: 68 % — SIGNIFICANT CHANGE UP (ref 42.2–75.2)
NRBC # BLD: 0 /100 WBCS — SIGNIFICANT CHANGE UP (ref 0–0)
PLATELET # BLD AUTO: 165 K/UL — SIGNIFICANT CHANGE UP (ref 130–400)
POTASSIUM SERPL-MCNC: 4.1 MMOL/L — SIGNIFICANT CHANGE UP (ref 3.5–5)
POTASSIUM SERPL-SCNC: 4.1 MMOL/L — SIGNIFICANT CHANGE UP (ref 3.5–5)
PROT SERPL-MCNC: 5.2 G/DL — LOW (ref 6–8)
RBC # BLD: 3.13 M/UL — LOW (ref 4.7–6.1)
RBC # FLD: 17.3 % — HIGH (ref 11.5–14.5)
SARS-COV-2 RNA SPEC QL NAA+PROBE: SIGNIFICANT CHANGE UP
SODIUM SERPL-SCNC: 137 MMOL/L — SIGNIFICANT CHANGE UP (ref 135–146)
WBC # BLD: 10.5 K/UL — SIGNIFICANT CHANGE UP (ref 4.8–10.8)
WBC # FLD AUTO: 10.5 K/UL — SIGNIFICANT CHANGE UP (ref 4.8–10.8)

## 2022-10-28 PROCEDURE — 93010 ELECTROCARDIOGRAM REPORT: CPT

## 2022-10-28 PROCEDURE — 71045 X-RAY EXAM CHEST 1 VIEW: CPT | Mod: 26

## 2022-10-28 RX ORDER — THIAMINE MONONITRATE (VIT B1) 100 MG
100 TABLET ORAL DAILY
Refills: 0 | Status: DISCONTINUED | OUTPATIENT
Start: 2022-10-28 | End: 2022-10-28

## 2022-10-28 RX ORDER — FOLIC ACID 0.8 MG
1 TABLET ORAL DAILY
Refills: 0 | Status: DISCONTINUED | OUTPATIENT
Start: 2022-10-28 | End: 2022-10-28

## 2022-10-28 RX ADMIN — Medication 12.5 MILLIGRAM(S): at 05:50

## 2022-10-28 RX ADMIN — Medication 12.5 MILLIGRAM(S): at 00:14

## 2022-10-28 RX ADMIN — PANTOPRAZOLE SODIUM 40 MILLIGRAM(S): 20 TABLET, DELAYED RELEASE ORAL at 18:08

## 2022-10-28 RX ADMIN — CHLORHEXIDINE GLUCONATE 1 APPLICATION(S): 213 SOLUTION TOPICAL at 05:55

## 2022-10-28 RX ADMIN — ERYTHROPOIETIN 10000 UNIT(S): 10000 INJECTION, SOLUTION INTRAVENOUS; SUBCUTANEOUS at 15:00

## 2022-10-28 RX ADMIN — CLOPIDOGREL BISULFATE 75 MILLIGRAM(S): 75 TABLET, FILM COATED ORAL at 11:22

## 2022-10-28 RX ADMIN — Medication 1 APPLICATION(S): at 05:54

## 2022-10-28 RX ADMIN — Medication 12.5 MILLIGRAM(S): at 18:08

## 2022-10-28 RX ADMIN — Medication 1: at 11:22

## 2022-10-28 RX ADMIN — ATORVASTATIN CALCIUM 40 MILLIGRAM(S): 80 TABLET, FILM COATED ORAL at 21:46

## 2022-10-28 RX ADMIN — PANTOPRAZOLE SODIUM 40 MILLIGRAM(S): 20 TABLET, DELAYED RELEASE ORAL at 09:05

## 2022-10-28 RX ADMIN — Medication 1 APPLICATION(S): at 18:08

## 2022-10-28 RX ADMIN — Medication 12.5 MILLIGRAM(S): at 11:22

## 2022-10-28 NOTE — PROGRESS NOTE ADULT - SUBJECTIVE AND OBJECTIVE BOX
NEPHROLOGY FOLLOW UP NOTE    pt seen and examined in icu      PAST MEDICAL & SURGICAL HISTORY:  HTN (Hypertension)    Diabetes Mellitus Type II    Hypercholesterolemia    CAD (Coronary Artery Disease)    History of PTCA  with stents 10 years ago ([facility])    Diabetes mellitus    CAD (coronary artery disease)    H/O hyperlipidemia    HTN - Hypertension    Renal insufficiency    Sleep apnea  does not use machine    GERD (gastroesophageal reflux disease)    BPH (Benign Prostatic Hyperplasia)    CHF (congestive heart failure)    Mitral valve regurgitation    S/P knee surgery  b/l arthroscopic    S/P tonsillectomy    S/P primary angioplasty with coronary stent  6-7 stents last one in 10/12    S/P appendectomy      Allergies:  No Known Allergies    Home Medications Reviewed    SOCIAL HISTORY:  Denies ETOH,Smoking,   FAMILY HISTORY:  No pertinent family history in first degree relatives          REVIEW OF SYSTEMS:  as above, all else negative    PHYSICAL EXAM:  Constitutional: sedated  HEENT: moist mm  Neck: No JVD + ecchymosis   Respiratory: decreased BS b/l  Cardiovascular: S1, S2, RRR + murmur  Gastrointestinal: BS+, soft, NT/ND  Extremities: + peripheral edema  Neurological: sedated  : No CVA tenderness.    Skin: No rashes  Vascular Access: left arm AVF + [facility] Medications:   MEDICATIONS  (STANDING):  albumin human  5% IVPB 250 milliLiter(s) IV Intermittent once  albumin human  5% IVPB 250 milliLiter(s) IV Intermittent once  albumin human  5% IVPB 250 milliLiter(s) IV Intermittent once  albumin human  5% IVPB 250 milliLiter(s) IV Intermittent once  atorvastatin 40 milliGRAM(s) Oral at bedtime  chlorhexidine 4% Liquid 1 Application(s) Topical <User Schedule>  clopidogrel Tablet 75 milliGRAM(s) Oral daily  dextrose 5%. 1000 milliLiter(s) (50 mL/Hr) IV Continuous <Continuous>  dextrose 50% Injectable 25 Gram(s) IV Push once  dextrose 50% Injectable 50 milliLiter(s) IV Push every 15 minutes  dextrose 50% Injectable 25 milliLiter(s) IV Push every 15 minutes  epoetin nicky-epbx (RETACRIT) Injectable 34957 Unit(s) IV Push <User Schedule>  glucagon  Injectable 1 milliGRAM(s) IntraMuscular once  insulin lispro (ADMELOG) corrective regimen sliding scale   SubCutaneous three times a day before meals  metoprolol tartrate 12.5 milliGRAM(s) Oral every 6 hours  pantoprazole  Injectable 40 milliGRAM(s) IV Push every 12 hours  silver sulfADIAZINE 1% Cream 1 Application(s) Topical every 12 hours        VITALS:  T(F): 97.8 (10-28-22 @ 12:00), Max: 97.9 (10-28-22 @ 08:00)  HR: 70 (10-28-22 @ 12:15)  BP: 120/68 (10-28-22 @ 12:15)  RR: 22 (10-28-22 @ 12:15)  SpO2: 98% (10-28-22 @ 12:15)  Wt(kg): --    10-26 @ 07:01  -  10-27 @ 07:00  --------------------------------------------------------  IN: 590 mL / OUT: 2500 mL / NET: -1910 mL    10-27 @ 07:01  -  10-28 @ 07:00  --------------------------------------------------------  IN: 1000 mL / OUT: 600 mL / NET: 400 mL          LABS:  10-28    137  |  100  |  60<H>  ----------------------------<  118<H>  4.1   |  27  |  5.8<HH>    Ca    7.8<L>      28 Oct 2022 04:25  Mg     2.6     10-28    TPro  5.2<L>  /  Alb  3.7  /  TBili  0.8  /  DBili      /  AST  17  /  ALT  <5  /  AlkPhos  58  10-28                          9.2    10.50 )-----------( 165      ( 28 Oct 2022 04:25 )             29.9       Urine Studies:        RADIOLOGY & ADDITIONAL STUDIES:

## 2022-10-28 NOTE — PROGRESS NOTE ADULT - SUBJECTIVE AND OBJECTIVE BOX
OPERATIVE PROCEDURE(s):                POD #                       82yMale  SURGEON(s): ROSEANN Adan  SUBJECTIVE ASSESSMENT:   Vital Signs Last 24 Hrs  T(F): 97.2 (28 Oct 2022 05:00), Max: 97.8 (27 Oct 2022 12:00)  HR: 75 (28 Oct 2022 06:00) (69 - 77)  BP: 133/69 (28 Oct 2022 06:00) (87/47 - 161/71)  BP(mean): 91 (28 Oct 2022 06:00) (63 - 102)  ABP: --  ABP(mean): --  RR: 27 (28 Oct 2022 06:00) (19 - 48)  SpO2: 96% (28 Oct 2022 06:00) (91% - 100%)  CVP(mm Hg): --  CVP(cm H2O): --  CO: --  CI: --  PA: --  SVR: --    I&O's Detail    27 Oct 2022 07:01  -  28 Oct 2022 07:00  --------------------------------------------------------  IN:    IV PiggyBack: 50 mL    Oral Fluid: 950 mL  Total IN: 1000 mL    OUT:    Voided (mL): 600 mL  Total OUT: 600 mL        Net:   I&O's Detail    26 Oct 2022 07:01  -  27 Oct 2022 07:00  --------------------------------------------------------  Total NET: -1910 mL      27 Oct 2022 07:01  -  28 Oct 2022 07:00  --------------------------------------------------------  Total NET: 400 mL        CAPILLARY BLOOD GLUCOSE      POCT Blood Glucose.: 111 mg/dL (28 Oct 2022 06:12)  POCT Blood Glucose.: 163 mg/dL (27 Oct 2022 16:19)  POCT Blood Glucose.: 158 mg/dL (27 Oct 2022 11:25)    Physical Exam:  General: NAD; A&Ox3/Patient is intubated and sedated  Cardiac: S1/S2, RRR, no murmur, no rubs  Lungs: unlabored respirations, CTA b/l, no wheeze, no rales, no crackles  Abdomen: Soft/NT/ND; positive bowel sounds x 4  Sternum: Intact, no click, incision healing well with no drainage  Incisions: Incisions clean/dry/intact  Extremities: No edema b/l lower extremities; good capillary refill; no cyanosis; palpable 1+ pedal pulses b/l    Central Venous Catheter: Yes[]  No[] , If Yes indication:           Day #  Gomez Catheter: Yes  [] , No  [] , If yes indication:                      Day #  NGT: Yes [] No [] ,    If Yes Placement:                                     Day #  EPICARDIAL WIRES:  [] YES [] NO                                              Day #  BOWEL MOVEMENT:  [] YES [] NO, If No, Timing since last BM:      Day #  CHEST TUBE(Left/Right):  [] YES [] NO, If yes -  AIR LEAKS:  [] YES [] NO        LABS:                        9.2<L>  10.50 )-----------( 165      ( 28 Oct 2022 04:25 )             29.9<L>                        9.2<L>  7.98  )-----------( 150      ( 27 Oct 2022 04:15 )             29.3<L>    10-28    137  |  100  |  60<H>  ----------------------------<  118<H>  4.1   |  27  |  5.8<HH>  10-27    140  |  101  |  51<H>  ----------------------------<  117<H>  4.1   |  27  |  5.5<HH>    Ca    7.8<L>      28 Oct 2022 04:25  Mg     2.6     10-28    TPro  5.2<L> [6.0 - 8.0]  /  Alb  3.7 [3.5 - 5.2]  /  TBili  0.8 [0.2 - 1.2]  /  DBili  x   /  AST  17 [0 - 41]  /  ALT  <5 [0 - 41]  /  AlkPhos  58 [30 - 115]  10-28          RADIOLOGY & ADDITIONAL TESTS:  CXR:  EKG:  MEDICATIONS  (STANDING):  albumin human  5% IVPB 250 milliLiter(s) IV Intermittent once  albumin human  5% IVPB 250 milliLiter(s) IV Intermittent once  albumin human  5% IVPB 250 milliLiter(s) IV Intermittent once  albumin human  5% IVPB 250 milliLiter(s) IV Intermittent once  atorvastatin 40 milliGRAM(s) Oral at bedtime  cefepime   IVPB 1000 milliGRAM(s) IV Intermittent daily  chlorhexidine 4% Liquid 1 Application(s) Topical <User Schedule>  clopidogrel Tablet 75 milliGRAM(s) Oral daily  dextrose 5%. 1000 milliLiter(s) (50 mL/Hr) IV Continuous <Continuous>  dextrose 50% Injectable 25 Gram(s) IV Push once  dextrose 50% Injectable 50 milliLiter(s) IV Push every 15 minutes  dextrose 50% Injectable 25 milliLiter(s) IV Push every 15 minutes  epoetin nicky-epbx (RETACRIT) Injectable 45366 Unit(s) IV Push <User Schedule>  glucagon  Injectable 1 milliGRAM(s) IntraMuscular once  insulin lispro (ADMELOG) corrective regimen sliding scale   SubCutaneous three times a day before meals  metoprolol tartrate 12.5 milliGRAM(s) Oral every 6 hours  pantoprazole  Injectable 40 milliGRAM(s) IV Push every 12 hours  silver sulfADIAZINE 1% Cream 1 Application(s) Topical every 12 hours    MEDICATIONS  (PRN):  acetaminophen     Tablet .. 650 milliGRAM(s) Oral every 6 hours PRN Temp greater or equal to 38C (100.4F), Mild Pain (1 - 3)  dextrose Oral Gel 15 Gram(s) Oral once PRN Blood Glucose LESS THAN 70 milliGRAM(s)/deciliter    HEPARIN:  [] YES [] NO  Dose: XX UNITS/HR UNITS Q8H  LOVENOX:[] YES [] NO  Dose: XX mg Q24H  COUMADIN: []  YES [] NO  Dose: XX mg  Q24H  SCD's: YES b/l  GI Prophylaxis: Protonix [], Pepcid [], None [], (Contra-indication:.....)    Post-Op Beta-Blockers: Yes [], No[], If No, then contraindication:  Post-Op Aspirin: Yes [],  No [], If No, then contraindication:  Post-Op Statin: Yes [], No[], If No, then contraindication:  Allergies    No Known Allergies    Intolerances      Ambulation/Activity Status:    Assessment/Plan:  82y Male status-post .....  - Case and plan discussed with CTU Intensivist and CT Surgeon - Dr. Mosquera/Keshawn/Loco  - Continue CTU supportive care    - Continue DVT/GI prophylaxis  - Incentive Spirometry 10 times an hour  - Continue to advance physical activity as tolerated and continue PT/OT as directed  1. CAD: Continue ASA, statin, BB  2. HTN:   3. A. Fib:   4. COPD/Hypoxia:   5. DM/Glucose Control:     Social Service Disposition:     OPERATIVE PROCEDURE(s):   TAVR with V fib arrest and insertion of Impella  POD # 9 removal of implella and insertion of IABP  POD#8 removal of IABP  POD#6                        82yMale  SURGEON(s): ROSEANN Adan  SUBJECTIVE ASSESSMENT: pt seen and examined. no acute complaints at this time   Vital Signs Last 24 Hrs  T(F): 97.2 (28 Oct 2022 05:00), Max: 97.8 (27 Oct 2022 12:00)  HR: 75 (28 Oct 2022 06:00) (69 - 77)  BP: 133/69 (28 Oct 2022 06:00) (87/47 - 161/71)  BP(mean): 91 (28 Oct 2022 06:00) (63 - 102)  RR: 27 (28 Oct 2022 06:00) (19 - 48)  SpO2: 96% (28 Oct 2022 06:00) (91% - 100%)    I&O's Detail    27 Oct 2022 07:01  -  28 Oct 2022 07:00  --------------------------------------------------------  IN:    IV PiggyBack: 50 mL    Oral Fluid: 950 mL  Total IN: 1000 mL    OUT:    Voided (mL): 600 mL  Total OUT: 600 mL        Net:   I&O's Detail    26 Oct 2022 07:01  -  27 Oct 2022 07:00  --------------------------------------------------------  Total NET: -1910 mL      27 Oct 2022 07:01  -  28 Oct 2022 07:00  --------------------------------------------------------  Total NET: 400 mL        CAPILLARY BLOOD GLUCOSE      POCT Blood Glucose.: 111 mg/dL (28 Oct 2022 06:12)  POCT Blood Glucose.: 163 mg/dL (27 Oct 2022 16:19)  POCT Blood Glucose.: 158 mg/dL (27 Oct 2022 11:25)      Physical Exam:  General: NAD; A&Ox3  Cardiac: S1/S2, RRR, ? murmur, no rubs  Lungs: unlabored shallow respirations, bilateral bs decreased at bases  Abdomen: Soft/NT/protuberant  Groing no expanding hematoma  Extremities: No edema b/l lower extremities      BOWEL MOVEMENT:  [] YES [] NO, If No, Timing since last BM:      Day #    LABS:                        9.2<L>  10.50 )-----------( 165      ( 28 Oct 2022 04:25 )             29.9<L>                        9.2<L>  7.98  )-----------( 150      ( 27 Oct 2022 04:15 )             29.3<L>    10-28    137  |  100  |  60<H>  ----------------------------<  118<H>  4.1   |  27  |  5.8<HH>  10-27    140  |  101  |  51<H>  ----------------------------<  117<H>  4.1   |  27  |  5.5<HH>    Ca    7.8<L>      28 Oct 2022 04:25  Mg     2.6     10-28    TPro  5.2<L> [6.0 - 8.0]  /  Alb  3.7 [3.5 - 5.2]  /  TBili  0.8 [0.2 - 1.2]  /  DBili  x   /  AST  17 [0 - 41]  /  ALT  <5 [0 - 41]  /  AlkPhos  58 [30 - 115]  10-28          RADIOLOGY & ADDITIONAL TESTS:  CXR: < from: Xray Chest 1 View- PORTABLE-Routine (Xray Chest 1 View- PORTABLE-Routine in AM.) (10.28.22 @ 06:17) >  Impression:    Enlarged cardiac silhouette and congestive changes.    < end of copied text >    EKG: < from: 12 Lead ECG (10.28.22 @ 07:38) >  Ventricular Rate 78 BPM    Atrial Rate 78 BPM    P-R Interval 204 ms    QRS Duration 168 ms    Q-T Interval 474 ms    QTC Calculation(Bazett) 540 ms    P Axis 61 degrees    R Axis 148 degrees    T Axis 48 degrees    Diagnosis Line Sinus rhythm withoccasional Premature ventricular complexes and Premature  atrial complexes  Right bundle branch block  Abnormal ECG    < end of copied text >    MEDICATIONS  (STANDING):  albumin human  5% IVPB 250 milliLiter(s) IV Intermittent once  albumin human  5% IVPB 250 milliLiter(s) IV Intermittent once  albumin human  5% IVPB 250 milliLiter(s) IV Intermittent once  albumin human  5% IVPB 250 milliLiter(s) IV Intermittent once  atorvastatin 40 milliGRAM(s) Oral at bedtime  cefepime   IVPB 1000 milliGRAM(s) IV Intermittent daily  chlorhexidine 4% Liquid 1 Application(s) Topical <User Schedule>  clopidogrel Tablet 75 milliGRAM(s) Oral daily  dextrose 5%. 1000 milliLiter(s) (50 mL/Hr) IV Continuous <Continuous>  dextrose 50% Injectable 25 Gram(s) IV Push once  dextrose 50% Injectable 50 milliLiter(s) IV Push every 15 minutes  dextrose 50% Injectable 25 milliLiter(s) IV Push every 15 minutes  epoetin nicky-epbx (RETACRIT) Injectable 66775 Unit(s) IV Push <User Schedule>  glucagon  Injectable 1 milliGRAM(s) IntraMuscular once  insulin lispro (ADMELOG) corrective regimen sliding scale   SubCutaneous three times a day before meals  metoprolol tartrate 12.5 milliGRAM(s) Oral every 6 hours  pantoprazole  Injectable 40 milliGRAM(s) IV Push every 12 hours  silver sulfADIAZINE 1% Cream 1 Application(s) Topical every 12 hours    MEDICATIONS  (PRN):  acetaminophen     Tablet .. 650 milliGRAM(s) Oral every 6 hours PRN Temp greater or equal to 38C (100.4F), Mild Pain (1 - 3)  dextrose Oral Gel 15 Gram(s) Oral once PRN Blood Glucose LESS THAN 70 milliGRAM(s)/deciliter      Allergies    No Known Allergies    Intolerances      Ambulation/Activity Status: ambulate     Assessment/Plan:  82y Male s/p TAVR with V fib arrest and insertion of Impella  POD # 9 removal of implella and insertion of IABP  POD#8 removal of IABP  POD#6   - Case and plan discussed with CTU Intensivist and CT Surgeon - Dr. Shahani  - Continue CTU supportive care and ongoing plan of care as per continuing CTU rounds.   - Continue DVT/GI prophylaxis  - Incentive Spirometry 10 times an hour  - Continue to advance physical activity as tolerated and continue PT/OT as directed  - CAD: Continue statin, metoprolol tartrate 12.5mg q6hrs, plavix as monotherapy due to melena with stable HGB for 3 days.  - HTN: continue metoprolol 12.5mg q6hrs, monitor BP  - A. Fib ppx: cont BB, rate controlled off amio, monitor electrolytes, replete prn  - COPD/Hypoxia:  wean off O2, encourage incentive spirometry  - DM/Glucose Control: A1C 6.0%, continue sliding scale  - Anemia 2/2 Acute PO blood loss:, track and trend H/H, s/p transfusion, stable  - Thrombocytopenia - 115K up from 99 - cont Plavix, holdASA  - AS: s/p TAVR   - V fib arrest - PO Episode VT and SVT resolved - ICD in place and functioning - Maintain electrolytes K>4.0 Mg >2.0, f/u as OP with Dr Sargent, upgrade to CRT-D as out patient   - Nutrition - S&S - recommend regular diet with thin liquids f/u speech and swallow to r/o aspiration  - Nephrology Recommendations: HD M.W.F via fistula.      Social Service Disposition: Plan for d/c home tomorrow

## 2022-10-28 NOTE — PROGRESS NOTE ADULT - ASSESSMENT
ESRD on HD MWF @ [place], [place] via left arm AVF  non-oliguric  s/p TAVR complicated by vfib cardiac arrest  ARF on vent  CAD / CABG / CMP / CHF / VHD (severe AS and MR)  HTN  DM2  anemia   GREG  melena    plan:    HD today - 2.5KG off, 2K+ bath  epogen with HD  renal diet / fluid restriction  left arm precautions  complete abx  icu care  d/w CTICU team

## 2022-10-29 VITALS
SYSTOLIC BLOOD PRESSURE: 113 MMHG | RESPIRATION RATE: 35 BRPM | HEART RATE: 77 BPM | DIASTOLIC BLOOD PRESSURE: 82 MMHG | OXYGEN SATURATION: 92 %

## 2022-10-29 LAB
ALBUMIN SERPL ELPH-MCNC: 3.6 G/DL — SIGNIFICANT CHANGE UP (ref 3.5–5.2)
ALP SERPL-CCNC: 55 U/L — SIGNIFICANT CHANGE UP (ref 30–115)
ALT FLD-CCNC: <5 U/L — SIGNIFICANT CHANGE UP (ref 0–41)
ANION GAP SERPL CALC-SCNC: 11 MMOL/L — SIGNIFICANT CHANGE UP (ref 7–14)
AST SERPL-CCNC: 17 U/L — SIGNIFICANT CHANGE UP (ref 0–41)
BASOPHILS # BLD AUTO: 0.05 K/UL — SIGNIFICANT CHANGE UP (ref 0–0.2)
BASOPHILS NFR BLD AUTO: 0.7 % — SIGNIFICANT CHANGE UP (ref 0–1)
BILIRUB SERPL-MCNC: 0.8 MG/DL — SIGNIFICANT CHANGE UP (ref 0.2–1.2)
BUN SERPL-MCNC: 50 MG/DL — HIGH (ref 10–20)
CALCIUM SERPL-MCNC: 7.8 MG/DL — LOW (ref 8.4–10.5)
CHLORIDE SERPL-SCNC: 101 MMOL/L — SIGNIFICANT CHANGE UP (ref 98–110)
CO2 SERPL-SCNC: 28 MMOL/L — SIGNIFICANT CHANGE UP (ref 17–32)
CREAT SERPL-MCNC: 5.2 MG/DL — CRITICAL HIGH (ref 0.7–1.5)
EGFR: 11 ML/MIN/1.73M2 — LOW
EOSINOPHIL # BLD AUTO: 0.2 K/UL — SIGNIFICANT CHANGE UP (ref 0–0.7)
EOSINOPHIL NFR BLD AUTO: 2.7 % — SIGNIFICANT CHANGE UP (ref 0–8)
GLUCOSE BLDC GLUCOMTR-MCNC: 122 MG/DL — HIGH (ref 70–99)
GLUCOSE BLDC GLUCOMTR-MCNC: 185 MG/DL — HIGH (ref 70–99)
GLUCOSE SERPL-MCNC: 108 MG/DL — HIGH (ref 70–99)
HCT VFR BLD CALC: 29.3 % — LOW (ref 42–52)
HGB BLD-MCNC: 9 G/DL — LOW (ref 14–18)
IMM GRANULOCYTES NFR BLD AUTO: 1.2 % — HIGH (ref 0.1–0.3)
LYMPHOCYTES # BLD AUTO: 1.16 K/UL — LOW (ref 1.2–3.4)
LYMPHOCYTES # BLD AUTO: 15.6 % — LOW (ref 20.5–51.1)
MAGNESIUM SERPL-MCNC: 2.4 MG/DL — SIGNIFICANT CHANGE UP (ref 1.8–2.4)
MCHC RBC-ENTMCNC: 29.5 PG — SIGNIFICANT CHANGE UP (ref 27–31)
MCHC RBC-ENTMCNC: 30.7 G/DL — LOW (ref 32–37)
MCV RBC AUTO: 96.1 FL — HIGH (ref 80–94)
MONOCYTES # BLD AUTO: 1.4 K/UL — HIGH (ref 0.1–0.6)
MONOCYTES NFR BLD AUTO: 18.8 % — HIGH (ref 1.7–9.3)
NEUTROPHILS # BLD AUTO: 4.53 K/UL — SIGNIFICANT CHANGE UP (ref 1.4–6.5)
NEUTROPHILS NFR BLD AUTO: 61 % — SIGNIFICANT CHANGE UP (ref 42.2–75.2)
NRBC # BLD: 0 /100 WBCS — SIGNIFICANT CHANGE UP (ref 0–0)
PLATELET # BLD AUTO: 168 K/UL — SIGNIFICANT CHANGE UP (ref 130–400)
POTASSIUM SERPL-MCNC: 3.8 MMOL/L — SIGNIFICANT CHANGE UP (ref 3.5–5)
POTASSIUM SERPL-SCNC: 3.8 MMOL/L — SIGNIFICANT CHANGE UP (ref 3.5–5)
PROT SERPL-MCNC: 5 G/DL — LOW (ref 6–8)
RBC # BLD: 3.05 M/UL — LOW (ref 4.7–6.1)
RBC # FLD: 17.3 % — HIGH (ref 11.5–14.5)
SODIUM SERPL-SCNC: 140 MMOL/L — SIGNIFICANT CHANGE UP (ref 135–146)
WBC # BLD: 7.43 K/UL — SIGNIFICANT CHANGE UP (ref 4.8–10.8)
WBC # FLD AUTO: 7.43 K/UL — SIGNIFICANT CHANGE UP (ref 4.8–10.8)

## 2022-10-29 PROCEDURE — 71045 X-RAY EXAM CHEST 1 VIEW: CPT | Mod: 26

## 2022-10-29 PROCEDURE — 99232 SBSQ HOSP IP/OBS MODERATE 35: CPT

## 2022-10-29 PROCEDURE — 93926 LOWER EXTREMITY STUDY: CPT | Mod: 26,RT

## 2022-10-29 PROCEDURE — 99231 SBSQ HOSP IP/OBS SF/LOW 25: CPT

## 2022-10-29 RX ORDER — ACETAMINOPHEN 500 MG
2 TABLET ORAL
Qty: 0 | Refills: 0 | DISCHARGE
Start: 2022-10-29

## 2022-10-29 RX ORDER — METOPROLOL TARTRATE 50 MG
1 TABLET ORAL
Qty: 0 | Refills: 0 | DISCHARGE

## 2022-10-29 RX ORDER — CLOPIDOGREL BISULFATE 75 MG/1
1 TABLET, FILM COATED ORAL
Qty: 90 | Refills: 0
Start: 2022-10-29 | End: 2023-01-26

## 2022-10-29 RX ORDER — METOPROLOL TARTRATE 50 MG
1 TABLET ORAL
Qty: 30 | Refills: 0
Start: 2022-10-29 | End: 2022-11-27

## 2022-10-29 RX ORDER — FUROSEMIDE 40 MG
0 TABLET ORAL
Qty: 0 | Refills: 0 | DISCHARGE

## 2022-10-29 RX ORDER — METOPROLOL TARTRATE 50 MG
50 TABLET ORAL DAILY
Refills: 0 | Status: DISCONTINUED | OUTPATIENT
Start: 2022-10-29 | End: 2022-10-29

## 2022-10-29 RX ORDER — PANTOPRAZOLE SODIUM 20 MG/1
1 TABLET, DELAYED RELEASE ORAL
Qty: 60 | Refills: 0
Start: 2022-10-29 | End: 2022-11-27

## 2022-10-29 RX ADMIN — CLOPIDOGREL BISULFATE 75 MILLIGRAM(S): 75 TABLET, FILM COATED ORAL at 11:35

## 2022-10-29 RX ADMIN — Medication 50 MILLIGRAM(S): at 11:35

## 2022-10-29 RX ADMIN — CHLORHEXIDINE GLUCONATE 1 APPLICATION(S): 213 SOLUTION TOPICAL at 05:15

## 2022-10-29 RX ADMIN — Medication 1: at 11:49

## 2022-10-29 RX ADMIN — Medication 12.5 MILLIGRAM(S): at 05:13

## 2022-10-29 RX ADMIN — PANTOPRAZOLE SODIUM 40 MILLIGRAM(S): 20 TABLET, DELAYED RELEASE ORAL at 05:13

## 2022-10-29 RX ADMIN — Medication 1 APPLICATION(S): at 05:14

## 2022-10-29 RX ADMIN — Medication 12.5 MILLIGRAM(S): at 00:05

## 2022-10-29 NOTE — PROGRESS NOTE ADULT - SUBJECTIVE AND OBJECTIVE BOX
OPERATIVE PROCEDURE(s):       TAVR         POD # 10                      82yMale  SURGEON(s): ROSEANN Adan  SUBJECTIVE ASSESSMENT:  Patient has no complaints at this time.    Vital Signs Last 24 Hrs  T(F): 98.9 (29 Oct 2022 04:00), Max: 98.9 (29 Oct 2022 04:00)  HR: 70 (29 Oct 2022 07:00) (66 - 111)  BP: 121/62 (29 Oct 2022 07:00) (109/56 - 152/73)  BP(mean): 86 (29 Oct 2022 07:00) (77 - 106)  RR: 29 (29 Oct 2022 07:00) (18 - 49)  SpO2: 96% (29 Oct 2022 07:00) (96% - 100%) RA    I&O's Detail    28 Oct 2022 07:01  -  29 Oct 2022 07:00  --------------------------------------------------------  IN:    Oral Fluid: 140 mL  Total IN: 140 mL    OUT:    Other (mL): 2000 mL    Voided (mL): 355 mL  Total OUT: 2355 mL        Net:   I&O's Detail    27 Oct 2022 07:01  -  28 Oct 2022 07:00  --------------------------------------------------------  Total NET: 400 mL      28 Oct 2022 07:01  -  29 Oct 2022 07:00  --------------------------------------------------------  Total NET: -2215 mL        CAPILLARY BLOOD GLUCOSE  POCT Blood Glucose.: 122 mg/dL (29 Oct 2022 06:41)  POCT Blood Glucose.: 146 mg/dL (28 Oct 2022 21:49)  POCT Blood Glucose.: 139 mg/dL (28 Oct 2022 16:52)  POCT Blood Glucose.: 154 mg/dL (28 Oct 2022 11:18)    Physical Exam:  General: NAD; A&Ox3  Cardiac: S1/S2, RRR, no murmur, no rubs  Lungs: unlabored respirations, CTA b/l, no wheeze, no rales, no crackles  Abdomen: Soft/NT/ND; positive bowel sounds x 4  Extremities: No edema b/l lower extremities; good capillary refill; no cyanosis; palpable 1+ pedal pulses b/l    LABS:                        9.0<L>  7.43  )-----------( 168      ( 29 Oct 2022 05:38 )             29.3<L>                        9.2<L>  10.50 )-----------( 165      ( 28 Oct 2022 04:25 )             29.9<L>    10-29    140  |  101  |  50<H>  ----------------------------<  108<H>  3.8   |  28  |  5.2<HH>  10-28    137  |  100  |  60<H>  ----------------------------<  118<H>  4.1   |  27  |  5.8<HH>    Ca    7.8<L>      29 Oct 2022 05:38  Mg     2.4     10-29    TPro  5.0<L> [6.0 - 8.0]  /  Alb  3.6 [3.5 - 5.2]  /  TBili  0.8 [0.2 - 1.2]  /  DBili  x   /  AST  17 [0 - 41]  /  ALT  <5 [0 - 41]  /  AlkPhos  55 [30 - 115]  10-29        MEDICATIONS  (STANDING):  albumin human  5% IVPB 250 milliLiter(s) IV Intermittent once  albumin human  5% IVPB 250 milliLiter(s) IV Intermittent once  albumin human  5% IVPB 250 milliLiter(s) IV Intermittent once  albumin human  5% IVPB 250 milliLiter(s) IV Intermittent once  atorvastatin 40 milliGRAM(s) Oral at bedtime  chlorhexidine 4% Liquid 1 Application(s) Topical <User Schedule>  clopidogrel Tablet 75 milliGRAM(s) Oral daily  dextrose 5%. 1000 milliLiter(s) (50 mL/Hr) IV Continuous <Continuous>  dextrose 50% Injectable 25 Gram(s) IV Push once  dextrose 50% Injectable 50 milliLiter(s) IV Push every 15 minutes  dextrose 50% Injectable 25 milliLiter(s) IV Push every 15 minutes  epoetin nicky-epbx (RETACRIT) Injectable 32161 Unit(s) IV Push <User Schedule>  glucagon  Injectable 1 milliGRAM(s) IntraMuscular once  insulin lispro (ADMELOG) corrective regimen sliding scale   SubCutaneous three times a day before meals  metoprolol tartrate 12.5 milliGRAM(s) Oral every 6 hours  pantoprazole  Injectable 40 milliGRAM(s) IV Push every 12 hours  silver sulfADIAZINE 1% Cream 1 Application(s) Topical every 12 hours    MEDICATIONS  (PRN):  acetaminophen     Tablet .. 650 milliGRAM(s) Oral every 6 hours PRN Temp greater or equal to 38C (100.4F), Mild Pain (1 - 3)  dextrose Oral Gel 15 Gram(s) Oral once PRN Blood Glucose LESS THAN 70 milliGRAM(s)/deciliter        Allergies:  No Known Allergies      Assessment/Plan:  82y Male status-post TAVR  - Case and plan discussed with CTU Intensivist and CT Surgeon - Dr. Mosquera/Keshawn/Loco  - Continue CTU supportive care    - Continue DVT/GI prophylaxis  - Incentive Spirometry 10 times an hour  - Continue to advance physical activity as tolerated and continue PT/OT as directed   - D/C home today and change lopressor to Toprol XL 50mg q24h

## 2022-10-29 NOTE — PROGRESS NOTE ADULT - SUBJECTIVE AND OBJECTIVE BOX
NEPHROLOGY FOLLOW UP NOTE    last HD yesterday  for dc home today    PAST MEDICAL & SURGICAL HISTORY:  HTN (Hypertension)    Diabetes Mellitus Type II    Hypercholesterolemia    CAD (Coronary Artery Disease)    History of PTCA  with stents 10 years ago ([facility])    Diabetes mellitus    CAD (coronary artery disease)    H/O hyperlipidemia    HTN - Hypertension    Renal insufficiency    Sleep apnea  does not use machine    GERD (gastroesophageal reflux disease)    BPH (Benign Prostatic Hyperplasia)    CHF (congestive heart failure)    Mitral valve regurgitation    S/P knee surgery  b/l arthroscopic    S/P tonsillectomy    S/P primary angioplasty with coronary stent  6-7 stents last one in 10/12    S/P appendectomy      Allergies:  No Known Allergies    Home Medications Reviewed    SOCIAL HISTORY:  Denies ETOH,Smoking,   FAMILY HISTORY:  No pertinent family history in first degree relatives          REVIEW OF SYSTEMS:  as above, all else negative    PHYSICAL EXAM:  Constitutional: sedated  HEENT: moist mm  Neck: No JVD + ecchymosis   Respiratory: b/l BS  Cardiovascular: S1, S2, RRR + murmur  Gastrointestinal: BS+, soft, NT/ND  Extremities: + peripheral edema  Neurological: sedated  : No CVA tenderness.    Skin: No rashes  Vascular Access: left arm AVF + [facility] Medications:   MEDICATIONS  (STANDING):  albumin human  5% IVPB 250 milliLiter(s) IV Intermittent once  albumin human  5% IVPB 250 milliLiter(s) IV Intermittent once  albumin human  5% IVPB 250 milliLiter(s) IV Intermittent once  albumin human  5% IVPB 250 milliLiter(s) IV Intermittent once  atorvastatin 40 milliGRAM(s) Oral at bedtime  chlorhexidine 4% Liquid 1 Application(s) Topical <User Schedule>  clopidogrel Tablet 75 milliGRAM(s) Oral daily  dextrose 5%. 1000 milliLiter(s) (50 mL/Hr) IV Continuous <Continuous>  dextrose 50% Injectable 25 Gram(s) IV Push once  dextrose 50% Injectable 50 milliLiter(s) IV Push every 15 minutes  dextrose 50% Injectable 25 milliLiter(s) IV Push every 15 minutes  epoetin nicky-epbx (RETACRIT) Injectable 21078 Unit(s) IV Push <User Schedule>  glucagon  Injectable 1 milliGRAM(s) IntraMuscular once  insulin lispro (ADMELOG) corrective regimen sliding scale   SubCutaneous three times a day before meals  metoprolol succinate ER 50 milliGRAM(s) Oral daily  pantoprazole  Injectable 40 milliGRAM(s) IV Push every 12 hours  silver sulfADIAZINE 1% Cream 1 Application(s) Topical every 12 hours        VITALS:  T(F): 98.8 (10-29-22 @ 08:00), Max: 98.9 (10-29-22 @ 04:00)  HR: 77 (10-29-22 @ 12:00)  BP: 113/82 (10-29-22 @ 12:00)  RR: 35 (10-29-22 @ 12:00)  SpO2: 92% (10-29-22 @ 12:00)  Wt(kg): --    10-27 @ 07:01  -  10-28 @ 07:00  --------------------------------------------------------  IN: 1000 mL / OUT: 600 mL / NET: 400 mL    10-28 @ 07:01  -  10-29 @ 07:00  --------------------------------------------------------  IN: 140 mL / OUT: 2355 mL / NET: -2215 mL          LABS:  10-29    140  |  101  |  50<H>  ----------------------------<  108<H>  3.8   |  28  |  5.2<HH>    Ca    7.8<L>      29 Oct 2022 05:38  Mg     2.4     10-29    TPro  5.0<L>  /  Alb  3.6  /  TBili  0.8  /  DBili      /  AST  17  /  ALT  <5  /  AlkPhos  55  10-29                          9.0    7.43  )-----------( 168      ( 29 Oct 2022 05:38 )             29.3       Urine Studies:        RADIOLOGY & ADDITIONAL STUDIES:

## 2022-10-29 NOTE — PROGRESS NOTE ADULT - SUBJECTIVE AND OBJECTIVE BOX
Collaborative care with CTU team since procedure.    Doing well.  OOB / ambulating several times daily without exertional symptoms.  Notices improvement in breathing.  Tolerated several dialysis sessions.  Still urinating.  Hemodynamics stable.  Mild hypertension.  Telemetry reviewed.  Less atrial / ventricular ectopy.  No sustained arrhythmias.  Stable SR with RBBB on EKG.  QTc decreased off Amiodarone.  Hb stable.  Platelets improved.  Tolerating Plavix.  Minor clear drainage expressible from right arterial puncture site.  US without pseudoaneurysm.  Plan for wound care.    Discharge today as planned.  Cont Plavix  Change Metoprolol to Toprol 50mg q245  Will reinstitute Entresto as outpatient.  Resume home dialysis schedule.  Follow-up Valve Clinic 1-week.

## 2022-10-29 NOTE — PROGRESS NOTE ADULT - ASSESSMENT
ESRD on HD MWF @ [facility] [place] via left arm AVF  non-oliguric  s/p TAVR complicated by vfib cardiac arrest  ARF on vent  CAD / CABG / CMP / CHF / VHD (severe AS and MR)  HTN  DM2  anemia   GREG  melena    plan:    resume HD as outpt   med changes per cardio  renal diet / fluid restriction  left arm precautions  complete abx  full code

## 2022-10-29 NOTE — PROGRESS NOTE ADULT - PROVIDER SPECIALTY LIST ADULT
CT Surgery
Critical Care
Nephrology
CCU
CT Surgery
CT Surgery
CTU
Cardiology
Cardiology
Critical Care
Electrophysiology
Nephrology
CCU
Gastroenterology
Nephrology

## 2022-11-03 ENCOUNTER — APPOINTMENT (OUTPATIENT)
Dept: CARDIOLOGY | Facility: CLINIC | Age: 82
End: 2022-11-03

## 2022-11-08 ENCOUNTER — NON-APPOINTMENT (OUTPATIENT)
Age: 82
End: 2022-11-08

## 2022-11-08 RX ORDER — CLOPIDOGREL BISULFATE 75 MG/1
75 TABLET, FILM COATED ORAL DAILY
Qty: 30 | Refills: 0 | Status: ACTIVE | COMMUNITY
Start: 2022-11-08 | End: 1900-01-01

## 2022-11-17 ENCOUNTER — APPOINTMENT (OUTPATIENT)
Dept: CARDIOLOGY | Facility: CLINIC | Age: 82
End: 2022-11-17

## 2022-11-17 VITALS
RESPIRATION RATE: 12 BRPM | OXYGEN SATURATION: 93 % | TEMPERATURE: 97.5 F | HEART RATE: 85 BPM | DIASTOLIC BLOOD PRESSURE: 74 MMHG | HEIGHT: 70 IN | SYSTOLIC BLOOD PRESSURE: 122 MMHG

## 2022-11-17 PROCEDURE — 99214 OFFICE O/P EST MOD 30 MIN: CPT

## 2022-11-17 RX ORDER — OFLOXACIN 3 MG/ML
0.3 SOLUTION/ DROPS OPHTHALMIC
Qty: 10 | Refills: 0 | Status: DISCONTINUED | COMMUNITY
Start: 2022-08-12

## 2022-11-17 RX ORDER — DOXYCYCLINE HYCLATE 100 MG/1
100 TABLET ORAL
Qty: 10 | Refills: 0 | Status: DISCONTINUED | COMMUNITY
Start: 2022-09-01

## 2022-11-17 RX ORDER — PREDNISOLONE ACETATE 10 MG/ML
1 SUSPENSION/ DROPS OPHTHALMIC
Qty: 15 | Refills: 0 | Status: DISCONTINUED | COMMUNITY
Start: 2022-08-13

## 2022-11-17 RX ORDER — ASPIRIN 81 MG/1
81 TABLET ORAL DAILY
Refills: 0 | Status: DISCONTINUED | COMMUNITY
End: 2022-11-17

## 2022-11-17 RX ORDER — TORSEMIDE 40 MG/1
40 TABLET, FILM COATED ORAL
Qty: 30 | Refills: 0 | Status: DISCONTINUED | COMMUNITY
Start: 2022-09-17

## 2022-11-17 RX ORDER — AMIODARONE HYDROCHLORIDE 200 MG/1
200 TABLET ORAL
Qty: 90 | Refills: 1 | Status: DISCONTINUED | COMMUNITY
End: 2022-11-17

## 2022-11-17 RX ORDER — REPAGLINIDE 1 MG/1
1 TABLET ORAL
Qty: 270 | Refills: 0 | Status: DISCONTINUED | COMMUNITY
Start: 2022-08-24

## 2022-11-17 RX ORDER — LIDOCAINE HYDROCHLORIDE 20 MG/ML
2 SOLUTION ORAL; TOPICAL
Qty: 100 | Refills: 0 | Status: DISCONTINUED | COMMUNITY
Start: 2022-07-29

## 2022-11-17 RX ORDER — CEPHALEXIN 500 MG/1
500 CAPSULE ORAL
Qty: 20 | Refills: 0 | Status: DISCONTINUED | COMMUNITY
Start: 2022-10-13

## 2022-11-17 RX ORDER — FLUOROMETHOLONE ACETATE 1 MG/ML
0.1 SUSPENSION/ DROPS OPHTHALMIC
Qty: 5 | Refills: 0 | Status: DISCONTINUED | COMMUNITY
Start: 2022-05-16

## 2022-11-20 NOTE — ASSESSMENT
[FreeTextEntry1] : Mr. NINFA URBAN is a 82 year M that arrives today for a post op visit. Patient is status post TAVR on 10/19/2022 via TF approach. Patient arrives to the office for their first post op visit. On arrival patient states he is improving. Denies SOB or palpitation. NYHA class I. Patient currently living at home alone with no aid. Access site looks good no hematoma. Patient was recommended to follow up with their cardiologist for final clearance to undergo Cardiac Rehab post procedure to improve their functional capacity.\par \par \par Plan:\par F/U with Structural Heart 2 weeks.\par F/U PMD for routine medical care\par

## 2022-11-20 NOTE — END OF VISIT
[FreeTextEntry3] : Seen / examined and above reviewed\par TAVR complicated by VF / cardiogenic shock s/p Impella.\par Recovering well.  Getting stronger.  Using walker less.\par Tolerating dialysis.\par BP controlled.  Relatively compensated.\par \par Plavix monotherapy\par Cont Toprol\par Labs\par ECHO / follow-up 2-weeks (likely resume Entresto)

## 2022-11-20 NOTE — REASON FOR VISIT
[Symptom and Test Evaluation] : symptom and test evaluation [Structural Heart and Valve Disease] : structural heart and valve disease [FreeTextEntry1] : Patient is a 82 y old  Male with history of ESRD on HD 2x /week (M/F) in [facility] with L arm [place], MVR (ICD 10YEARS AGO), BPH, PTCA, Sleep apnea, CAD/CABG ( [facility] on 3/01/22 with subtotal LM and 100% occlusion of RCA, with patent LIMA-LAD, patent SVG-diag/OM, and patent SVG-RPDA), HTN and HFrEF who presents for Elelctive TAVR .JING showed moderate-severe AS and severe MR, with \par LVEF 25-30%.  Intra-operatively, right after Valve deployment , he became hypotensive , bradycardic and had cardiac arrest. CPR was initiated and Impella was inserted. Post-op, impella was removed on POD#1 and switched to IABP for continued hemodynamic support. IABP was removed on POD#2 without complication. Patient had anemia secondary to acute surgical blood loss and was transfused appropriately. Pt had episodes of blood bowel movement. GI consulted. Aspirin was discontinued but pt clear to continue Plavix. Pt to follow up as outpatient. Patient continued to improve through his hospital course. He was discharged home on POD#10 with instructions to follow up with the Heart Valve \par Clinic on 11/3/2022 @ 12:00pm. Patient to resume his scheduled outpatient HD days of MWF, starting 10/31/22. \par

## 2022-12-16 NOTE — CDI QUERY NOTE - NSCDINOTECODERS_GEN_A_CORE
CDI Specialist Bactrim Pregnancy And Lactation Text: This medication is Pregnancy Category D and is known to cause fetal risk.  It is also excreted in breast milk.

## 2022-12-20 ENCOUNTER — APPOINTMENT (OUTPATIENT)
Dept: CARDIOLOGY | Facility: CLINIC | Age: 82
End: 2022-12-20
Payer: MEDICARE

## 2022-12-20 ENCOUNTER — APPOINTMENT (OUTPATIENT)
Dept: CARDIOTHORACIC SURGERY | Facility: CLINIC | Age: 82
End: 2022-12-20

## 2022-12-20 ENCOUNTER — APPOINTMENT (OUTPATIENT)
Dept: CARDIOLOGY | Facility: CLINIC | Age: 82
End: 2022-12-20

## 2022-12-20 VITALS
RESPIRATION RATE: 14 BRPM | BODY MASS INDEX: 30.78 KG/M2 | HEART RATE: 83 BPM | SYSTOLIC BLOOD PRESSURE: 139 MMHG | HEIGHT: 70 IN | WEIGHT: 215 LBS | OXYGEN SATURATION: 97 % | TEMPERATURE: 98 F | DIASTOLIC BLOOD PRESSURE: 81 MMHG

## 2022-12-20 DIAGNOSIS — Z95.2 PRESENCE OF PROSTHETIC HEART VALVE: ICD-10-CM

## 2022-12-20 PROCEDURE — 99214 OFFICE O/P EST MOD 30 MIN: CPT

## 2022-12-20 PROCEDURE — 93306 TTE W/DOPPLER COMPLETE: CPT

## 2022-12-20 PROCEDURE — 93000 ELECTROCARDIOGRAM COMPLETE: CPT

## 2022-12-20 NOTE — PHYSICAL EXAM
[Sclera] : the sclera and conjunctiva were normal [PERRL With Normal Accommodation] : pupils were equal in size, round, and reactive to light [Extraocular Movements] : extraocular movements were intact [Neck Appearance] : the appearance of the neck was normal [Neck Cervical Mass (___cm)] : no neck mass was observed [Jugular Venous Distention Increased] : there was no jugular-venous distention [Thyroid Diffuse Enlargement] : the thyroid was not enlarged [Thyroid Nodule] : there were no palpable thyroid nodules [Auscultation Breath Sounds / Voice Sounds] : lungs were clear to auscultation bilaterally [Normal Rate] : normal [II] : a grade 2 [Bowel Sounds] : normal bowel sounds [Abdomen Soft] : soft [Abdomen Tenderness] : non-tender [Abdomen Mass (___ Cm)] : no abdominal mass palpated [Abnormal Walk] : normal gait [Nail Clubbing] : no clubbing  or cyanosis of the fingernails [Musculoskeletal - Swelling] : no joint swelling seen [Motor Tone] : muscle strength and tone were normal [Skin Color & Pigmentation] : normal skin color and pigmentation [Skin Turgor] : normal skin turgor [] : no rash [Deep Tendon Reflexes (DTR)] : deep tendon reflexes were 2+ and symmetric [Sensation] : the sensory exam was normal to light touch and pinprick [No Focal Deficits] : no focal deficits [Oriented To Time, Place, And Person] : oriented to person, place, and time [Impaired Insight] : insight and judgment were intact [Affect] : the affect was normal

## 2022-12-21 NOTE — HISTORY OF PRESENT ILLNESS
[FreeTextEntry1] : Patient is a 82 y old Male with history of ESRD on HD 2x /week (M/F) in [facility] with L arm [place], MVR (ICD 10YEARS AGO), BPH, PTCA, Sleep apnea, CAD/CABG ( [facility] on 3/01/22 with subtotal LM and 100% occlusion of RCA, with patent LIMA-LAD, patent SVG-diag/OM, and patent SVG-RPDA), HTN and HFrEF who presents for Elelctive TAVR .JING showed moderate-severe AS and severe MR, with \par LVEF 25-30%. Intra-operatively, right after Valve deployment , he became hypotensive , bradycardic and had cardiac arrest. CPR was initiated and Impella was inserted. Post-op, impella was removed on POD#1 and switched to IABP for continued hemodynamic support. IABP was removed on POD#2 without complication. Patient had anemia secondary to acute surgical blood loss and was transfused appropriately. Pt had episodes of blood bowel movement. GI consulted. Aspirin was discontinued but pt clear to continue Plavix. Pt to follow up as outpatient. Patient continued to improve through his hospital course. He was discharged home on POD#10 with instructions to follow up with the Heart Valve Clinic on 11/3/2022 @ 12:00pm. Patient to resume his scheduled outpatient HD days of MWF, starting 10/31/22. \par

## 2022-12-21 NOTE — ASSESSMENT
[FreeTextEntry1] : Mr. NINFA URBAN is a 82 year M that arrives today for a post op visit. Patient is status post TAVR on 10/19/2022 via TF approach. Patient arrives to the office for their 30 day post op visit. On arrival patient states he is improving. Denies SOB or palpitation. NYHA class I. Patient currently living at home alone with no aid.  Patient was recommended to follow up with their cardiologist for final clearance to undergo Cardiac Rehab post procedure to improve their functional capacity.\par \par S/P TAVR\par Bioprosthetic Valve working well\par Patient states he is feeling much better. F/U in 1 year for routine medical follow up\par \par HTN\par Continue Current MEdication Regimen\par NOt on Entresto, Will follow up with Dr. Parrish in 2 months for cardiology routine management\par \par Mitral Regurgitation\par Mitral Valve Regurgitation Moderate, Follow up with Echo 6 months\par \par

## 2022-12-22 ENCOUNTER — APPOINTMENT (OUTPATIENT)
Dept: CARDIOLOGY | Facility: CLINIC | Age: 82
End: 2022-12-22

## 2022-12-25 ENCOUNTER — RESULT CHARGE (OUTPATIENT)
Age: 82
End: 2022-12-25

## 2022-12-25 PROBLEM — Z95.2 S/P TAVR (TRANSCATHETER AORTIC VALVE REPLACEMENT): Status: ACTIVE | Noted: 2022-11-08

## 2022-12-29 NOTE — DISCHARGE NOTE PROVIDER - HOSPITAL COURSE
HPI:  79yo male with PMH of h/o ESRD on HD, DLD, CAD s/p CABG, HTN, and DM who presents to the ED w/ chest pain and SOB nausea, poor appetite for the past 2 weeks, worsening in the last day with chills after testing positive for COVID 2 weeks ago.  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  He suspects he transmitted the virus to his wife. Patients wife is also admitted to [facility] for COVID. Patient states that he has been progressively getting weaker, with poor PO intake. Patient admits to intermittent SOB, N/V, and abdominal pain.     ED:  Patient is currently saturating at 98% of 3L NC, BP: 141/91 HR: 97  Temp: 101  CT AP showed patchy bibasilar ground-glass airspace opacities, which may be attributed to viral pneumonia in the appropriate clinical setting.  Troponin : 0.07   Serum BNP: 594   (28 Jan 2021 13:33)  Admitted to medicine with the primary diagnosis of COVID-19      Hospital course:   #COVID-19 infection - septic on admission in ED  Sepsis resolved; dyspnea resolved  Asymptomatic at this time except for persistent fevers; last on 02/01 101.0F  UA + cultures negative   CRP: 5.73 (01-28-21)  Procalcitonin: 0.47 (01-28-21)  Ferritin: 1032 (01-28-21)  D-Dimer: 630 (01-29-21)<--533 (01-28-21)  On 10 day steroid course; last dose Feb 7     #Rhinorrhea and fevers  - RVP negative   - Cultures negative  - fevers likely due to covid long haul  - UA unremarkable    COVID-19  - COVID PCR + for two weeks  - Inflammatory Markers:    ESRD  - next HD on 2/2/2020  - pt states he gets fistula "cleaned out" every 3 months  f/u with [facility] [address] for AVF maintenance    HTN  - controlled on home medications    DLD  - c/w lipitor 40 mg daily    DM II  - FS controlled  - Lantus 30 mg qhs     HPI:  81yo male with PMH of h/o ESRD on HD, DLD, CAD s/p CABG, HTN, and DM who presents to the ED w/ chest pain and SOB nausea, poor appetite for the past 2 weeks, worsening in the last day with chills after testing positive for COVID 2 weeks ago.  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  He suspects he transmitted the virus to his wife. Patients wife is also admitted to [facility] for COVID. Patient states that he has been progressively getting weaker, with poor PO intake. Patient admits to intermittent SOB, N/V, and abdominal pain.     ED:  Patient is currently saturating at 98% of 3L NC, BP: 141/91 HR: 97  Temp: 101  CT AP showed patchy bibasilar ground-glass airspace opacities, which may be attributed to viral pneumonia in the appropriate clinical setting.  Troponin : 0.07   Serum BNP: 594   (28 Jan 2021 13:33)  Admitted to medicine with the primary diagnosis of COVID-19      Hospital course:   #COVID-19 infection - septic on admission in ED  Sepsis resolved; dyspnea resolved  Asymptomatic at this time except for persistent fevers; last on 02/01 101.0F  UA + cultures negative   CRP: 5.73 (01-28-21)  Procalcitonin: 0.47 (01-28-21)  Ferritin: 1032 (01-28-21)  D-Dimer: 630 (01-29-21)<--533 (01-28-21)      #Rhinorrhea and fevers  - RVP negative   - Cultures negative  - fevers likely due to covid long haul  - UA unremarkable    COVID-19  - COVID PCR + for two weeks  - Inflammatory Markers:    ESRD  - next HD on 2/2/2020  - pt states he gets fistula "cleaned out" every 3 months  f/u with [facility] [address] for AVF maintenance    HTN  - controlled on home medications    DLD  - c/w lipitor 40 mg daily    DM II  - FS controlled  - Lantus 30 mg qhs     Female Completion Statement: After discussing her treatment course we decided to discontinue isotretinoin therapy at this time. I explained that she would need to continue her birth control methods for at least one month after the last dosage. She should also get a pregnancy test one month after the last dose. She shouldn't donate blood for one month after the last dose. She should call with any new symptoms of depression.

## 2023-01-19 ENCOUNTER — APPOINTMENT (OUTPATIENT)
Dept: CARDIOLOGY | Facility: CLINIC | Age: 83
End: 2023-01-19
Payer: MEDICARE

## 2023-01-19 ENCOUNTER — NON-APPOINTMENT (OUTPATIENT)
Age: 83
End: 2023-01-19

## 2023-01-19 PROCEDURE — 93296 REM INTERROG EVL PM/IDS: CPT

## 2023-01-19 PROCEDURE — 93295 DEV INTERROG REMOTE 1/2/MLT: CPT

## 2023-02-02 NOTE — PATIENT PROFILE ADULT - FALL HARM RISK TYPE OF ASSESSMENT
Physical Therapy    Visit Type: treatment  Precautions:  Medical precautions:  fall risk; standard precautions.    Lines:     Basic: indwelling urinary catheter, telemetry, continuous pulse oximetry and central line      Lines in chart and on patient reviewed, precautions maintained throughout session.  SUBJECTIVE  Patient agreed to participate in therapy this date.  Patient verbally agrees to allow the following to be present during session: student and spouse  Again, talking more than previous days. States she had a rough night last night. That she was bladder scanned 3x and told she needed to stay in bed to void. Pt upset that they wouldn't let her use the commode to urinate. She also states she was given Ambien and wants to make sure the events actually happened and she wasn't hallucinating. Updated RN.   Patient / Family Goal: return home and maximize function    Pain     Location: sore generalized    At onset of session (out of 10): 0  RN informed on pain level     OBJECTIVE     Cognitive Status   Orientation    - Oriented to: person, place and situation   - Disoriented to: time  Functional Communication   - Overall Status: within functional limits    Patient Activity Tolerance: 1 to 1 activity to rest         Sitting Balance  (CARLA = base of support)  Static      - Trial 1 details: supervision and with double UE support  Dynamic      - Trial 1 details: with double UE support and contact guard  Able to maintain sitting balance in chair, leaning forward over sink to complete self cares for approx 8 minutes. Mild shortness of breath     Standing Balance  (CARLA = base of support)  Firm Surface: Double Leg      - Static, Eyes Open       - Trial 1 details: with double UE support and contact guard     - Dynamic, Eyes Open       - Trial 1 details: minimal assist and with single UE support  Able to stand and attempt pericares and pulling up brief. CGA/min A for steadying with 1 UE support         Bed Mobility  - Supine  to sit: supervision  - Sit to supine: minimal assist  Head of bed elevated, able to come to sitting with increased time but no physical assist. Min A at LEs to fully bring into bed  Transfers  Assistive devices: 2-wheeled walker, gait belt  - Sit to stand: with verbal cues, contact guard/touching/steadying assist  - Stand to sit: with verbal cues, contact guard/touching/steadying assist  - Toilet: minimal assist  CGA from bed, min A from all other surfaces due to lower height. Cues for hand placement to stand    Ambulation / Gait  - Assistive device: gait belt and two-wheeled walker  - Distance (feet unless otherwise indicated): 15, 15  to from bathroom  - Assist Level: minimal assist and with tactile cues  - Surface: even  - Description: step to, heavy reliance on BUE, shuffling and decreased marcelo/pace  Improved gait speed from previous sessions, improved walker management. Continues with decreased speed and step length overall.        Interventions     Training provided: activity tolerance, balance retraining, bed mobility training, energy conservation, functional ambulation, gait training, positioning, safety training, transfer training and use of assistive device    Skilled input: Verbal instruction/cues  Verbal Consent: Writer verbally educated and received verbal consent for hand placement, positioning of patient, and techniques to be performed today from patient for clothing adjustments for techniques, therapist position for techniques and hand placement and palpation for techniques as described above and how they are pertinent to the patient's plan of care.         ASSESSMENT   Impairments: activity tolerance, strength, balance, endurance and shortness of breath  Functional Limitations: all functional mobility    Pt having a better day today. Says she is tired but awake all night. Able to walk to and from the bathroom.  Pt would be a good candidate for IRP once she has been medically optimized. She is  very motivated but remains restricted in mobility currently by medical status.  reports that he works from home and would be available as needed along with their daughter has moved in to help as well.         Recommended Consults: PT: Physical Medicine and Rehabilitation Physician    Discharge Recommendations  Recommendation for Discharge Location: PT WI: Intensive therapy/oversight of PMR physician  Recommendation for Discharge Support: PT WI: 24 Hour assist  PT/OT Mobility Equipment for Discharge: TBD  PT/OT ADL Equipment for Discharge: TBD  PT Identified Barriers to Discharge: mobility, medical          Progress: progressing toward goals    • Skilled therapy is required to address these limitations in attempt to maximize the patient's independence.    Education:   - Present and ready to learn: patient  Education provided during session:  - Results of above outlined education: Verbalizes understanding    Patient at End of Session:   Location: in bed  Safety measures: lines intact and call light within reach  Handoff to: nurse    PLAN   Suggestions for next session as indicated: Progress mobility, transfers, strengthening, gait with chair follow    PT Frequency: 6-7 x per week  Frequency Comments: Fri, IRP consult (Jan25 6/6-7 Jan18 4/3-5)            Interventions: balance, bed mobility, functional transfer training, gait training, strengthening and energy conservation  Agreement to plan and goals: patient agrees with goals and treatment plan        GOALS  Review Date: 2/1/2023  Long Term Goals: (to be met by time of discharge from hospital)  Sit to supine: Patient will complete sit to supine modified independent.  Status: progressing/ongoing  Supine to sit: Patient will complete supine to sit modified independent.  Status: progressing/ongoing  Sit to stand: Patient will complete sit to stand transfer with least restrictive device, modified independent.   Status: progressing/ongoing  Ambulation (even):  Patient will ambulate on even surface for 150 feet with least restrictive device, modified independent.   Status: progressing/ongoing  Stairs: Patient will ambulate 3 steps with modified independent. Status: progressing/ongoing    Documented in the chart in the following areas: Pain. Assessment.      Therapy procedure time and total treatment time can be found documented on the Time Entry flowsheet   admission

## 2023-02-09 ENCOUNTER — APPOINTMENT (OUTPATIENT)
Dept: ELECTROPHYSIOLOGY | Facility: CLINIC | Age: 83
End: 2023-02-09

## 2023-02-10 ENCOUNTER — INPATIENT (INPATIENT)
Facility: HOSPITAL | Age: 83
LOS: 4 days | Discharge: HOME | DRG: 377 | End: 2023-02-15
Attending: STUDENT IN AN ORGANIZED HEALTH CARE EDUCATION/TRAINING PROGRAM | Admitting: INTERNAL MEDICINE
Payer: MEDICARE

## 2023-02-10 VITALS
HEART RATE: 71 BPM | SYSTOLIC BLOOD PRESSURE: 104 MMHG | OXYGEN SATURATION: 98 % | TEMPERATURE: 98 F | WEIGHT: 218.04 LBS | RESPIRATION RATE: 18 BRPM | DIASTOLIC BLOOD PRESSURE: 70 MMHG

## 2023-02-10 DIAGNOSIS — Z95.1 PRESENCE OF AORTOCORONARY BYPASS GRAFT: Chronic | ICD-10-CM

## 2023-02-10 DIAGNOSIS — Z95.810 PRESENCE OF AUTOMATIC (IMPLANTABLE) CARDIAC DEFIBRILLATOR: Chronic | ICD-10-CM

## 2023-02-10 DIAGNOSIS — E87.5 HYPERKALEMIA: ICD-10-CM

## 2023-02-10 DIAGNOSIS — D50.8 OTHER IRON DEFICIENCY ANEMIAS: ICD-10-CM

## 2023-02-10 LAB
ALBUMIN SERPL ELPH-MCNC: 4.3 G/DL — SIGNIFICANT CHANGE UP (ref 3.5–5.2)
ALP SERPL-CCNC: 70 U/L — SIGNIFICANT CHANGE UP (ref 30–115)
ALT FLD-CCNC: 7 U/L — SIGNIFICANT CHANGE UP (ref 0–41)
ANION GAP SERPL CALC-SCNC: 15 MMOL/L — HIGH (ref 7–14)
ANISOCYTOSIS BLD QL: SLIGHT — SIGNIFICANT CHANGE UP
AST SERPL-CCNC: 12 U/L — SIGNIFICANT CHANGE UP (ref 0–41)
BASOPHILS # BLD AUTO: 0.06 K/UL — SIGNIFICANT CHANGE UP (ref 0–0.2)
BASOPHILS NFR BLD AUTO: 0.9 % — SIGNIFICANT CHANGE UP (ref 0–1)
BILIRUB SERPL-MCNC: 0.4 MG/DL — SIGNIFICANT CHANGE UP (ref 0.2–1.2)
BLD GP AB SCN SERPL QL: SIGNIFICANT CHANGE UP
BUN SERPL-MCNC: 71 MG/DL — CRITICAL HIGH (ref 10–20)
CALCIUM SERPL-MCNC: 8.8 MG/DL — SIGNIFICANT CHANGE UP (ref 8.4–10.5)
CHLORIDE SERPL-SCNC: 94 MMOL/L — LOW (ref 98–110)
CO2 SERPL-SCNC: 25 MMOL/L — SIGNIFICANT CHANGE UP (ref 17–32)
CREAT SERPL-MCNC: 6.1 MG/DL — CRITICAL HIGH (ref 0.7–1.5)
EGFR: 9 ML/MIN/1.73M2 — LOW
EOSINOPHIL # BLD AUTO: 0.26 K/UL — SIGNIFICANT CHANGE UP (ref 0–0.7)
EOSINOPHIL NFR BLD AUTO: 3.6 % — SIGNIFICANT CHANGE UP (ref 0–8)
GIANT PLATELETS BLD QL SMEAR: PRESENT — SIGNIFICANT CHANGE UP
GLUCOSE BLDC GLUCOMTR-MCNC: 143 MG/DL — HIGH (ref 70–99)
GLUCOSE SERPL-MCNC: 233 MG/DL — HIGH (ref 70–99)
HCT VFR BLD CALC: 23.2 % — LOW (ref 42–52)
HCT VFR BLD CALC: 28.2 % — LOW (ref 42–52)
HGB BLD-MCNC: 6.6 G/DL — CRITICAL LOW (ref 14–18)
HGB BLD-MCNC: 8.5 G/DL — LOW (ref 14–18)
LYMPHOCYTES # BLD AUTO: 0.92 K/UL — LOW (ref 1.2–3.4)
LYMPHOCYTES # BLD AUTO: 12.7 % — LOW (ref 20.5–51.1)
MACROCYTES BLD QL: SLIGHT — SIGNIFICANT CHANGE UP
MAGNESIUM SERPL-MCNC: 2.6 MG/DL — HIGH (ref 1.8–2.4)
MANUAL SMEAR VERIFICATION: SIGNIFICANT CHANGE UP
MCHC RBC-ENTMCNC: 25 PG — LOW (ref 27–31)
MCHC RBC-ENTMCNC: 26.2 PG — LOW (ref 27–31)
MCHC RBC-ENTMCNC: 28.4 G/DL — LOW (ref 32–37)
MCHC RBC-ENTMCNC: 30.1 G/DL — LOW (ref 32–37)
MCV RBC AUTO: 87 FL — SIGNIFICANT CHANGE UP (ref 80–94)
MCV RBC AUTO: 87.9 FL — SIGNIFICANT CHANGE UP (ref 80–94)
METAMYELOCYTES # FLD: 0.9 % — HIGH (ref 0–0)
MICROCYTES BLD QL: SLIGHT — SIGNIFICANT CHANGE UP
MONOCYTES # BLD AUTO: 0.46 K/UL — SIGNIFICANT CHANGE UP (ref 0.1–0.6)
MONOCYTES NFR BLD AUTO: 6.4 % — SIGNIFICANT CHANGE UP (ref 1.7–9.3)
NEUTROPHILS # BLD AUTO: 5.44 K/UL — SIGNIFICANT CHANGE UP (ref 1.4–6.5)
NEUTROPHILS NFR BLD AUTO: 75.5 % — HIGH (ref 42.2–75.2)
NRBC # BLD: 0 /100 WBCS — SIGNIFICANT CHANGE UP (ref 0–0)
NRBC # BLD: 1 /100 — HIGH (ref 0–0)
NRBC # BLD: SIGNIFICANT CHANGE UP /100 WBCS (ref 0–0)
NT-PROBNP SERPL-SCNC: 8222 PG/ML — HIGH (ref 0–300)
PLAT MORPH BLD: NORMAL — SIGNIFICANT CHANGE UP
PLATELET # BLD AUTO: 103 K/UL — LOW (ref 130–400)
PLATELET # BLD AUTO: 132 K/UL — SIGNIFICANT CHANGE UP (ref 130–400)
POIKILOCYTOSIS BLD QL AUTO: SIGNIFICANT CHANGE UP
POLYCHROMASIA BLD QL SMEAR: SLIGHT — SIGNIFICANT CHANGE UP
POTASSIUM SERPL-MCNC: 5.7 MMOL/L — HIGH (ref 3.5–5)
POTASSIUM SERPL-SCNC: 5.7 MMOL/L — HIGH (ref 3.5–5)
PROT SERPL-MCNC: 6.1 G/DL — SIGNIFICANT CHANGE UP (ref 6–8)
RBC # BLD: 2.64 M/UL — LOW (ref 4.7–6.1)
RBC # BLD: 3.24 M/UL — LOW (ref 4.7–6.1)
RBC # FLD: 18.5 % — HIGH (ref 11.5–14.5)
RBC # FLD: 20.2 % — HIGH (ref 11.5–14.5)
RBC BLD AUTO: ABNORMAL
SARS-COV-2 RNA SPEC QL NAA+PROBE: SIGNIFICANT CHANGE UP
SODIUM SERPL-SCNC: 134 MMOL/L — LOW (ref 135–146)
TROPONIN T SERPL-MCNC: 0.04 NG/ML — CRITICAL HIGH
WBC # BLD: 7.21 K/UL — SIGNIFICANT CHANGE UP (ref 4.8–10.8)
WBC # BLD: 8.45 K/UL — SIGNIFICANT CHANGE UP (ref 4.8–10.8)
WBC # FLD AUTO: 7.21 K/UL — SIGNIFICANT CHANGE UP (ref 4.8–10.8)
WBC # FLD AUTO: 8.45 K/UL — SIGNIFICANT CHANGE UP (ref 4.8–10.8)

## 2023-02-10 PROCEDURE — 80048 BASIC METABOLIC PNL TOTAL CA: CPT

## 2023-02-10 PROCEDURE — 88305 TISSUE EXAM BY PATHOLOGIST: CPT

## 2023-02-10 PROCEDURE — 83036 HEMOGLOBIN GLYCOSYLATED A1C: CPT

## 2023-02-10 PROCEDURE — 85025 COMPLETE CBC W/AUTO DIFF WBC: CPT

## 2023-02-10 PROCEDURE — 90935 HEMODIALYSIS ONE EVALUATION: CPT

## 2023-02-10 PROCEDURE — 71045 X-RAY EXAM CHEST 1 VIEW: CPT | Mod: 26

## 2023-02-10 PROCEDURE — 86923 COMPATIBILITY TEST ELECTRIC: CPT

## 2023-02-10 PROCEDURE — 99223 1ST HOSP IP/OBS HIGH 75: CPT

## 2023-02-10 PROCEDURE — 85610 PROTHROMBIN TIME: CPT

## 2023-02-10 PROCEDURE — 87635 SARS-COV-2 COVID-19 AMP PRB: CPT

## 2023-02-10 PROCEDURE — 83735 ASSAY OF MAGNESIUM: CPT

## 2023-02-10 PROCEDURE — 88312 SPECIAL STAINS GROUP 1: CPT

## 2023-02-10 PROCEDURE — P9016: CPT

## 2023-02-10 PROCEDURE — 36415 COLL VENOUS BLD VENIPUNCTURE: CPT

## 2023-02-10 PROCEDURE — 84484 ASSAY OF TROPONIN QUANT: CPT

## 2023-02-10 PROCEDURE — 85730 THROMBOPLASTIN TIME PARTIAL: CPT

## 2023-02-10 PROCEDURE — 36430 TRANSFUSION BLD/BLD COMPNT: CPT

## 2023-02-10 PROCEDURE — 97110 THERAPEUTIC EXERCISES: CPT | Mod: GP

## 2023-02-10 PROCEDURE — C9113: CPT

## 2023-02-10 PROCEDURE — 80061 LIPID PANEL: CPT

## 2023-02-10 PROCEDURE — 84100 ASSAY OF PHOSPHORUS: CPT

## 2023-02-10 PROCEDURE — 82962 GLUCOSE BLOOD TEST: CPT

## 2023-02-10 PROCEDURE — 86900 BLOOD TYPING SEROLOGIC ABO: CPT

## 2023-02-10 PROCEDURE — 86901 BLOOD TYPING SEROLOGIC RH(D): CPT

## 2023-02-10 PROCEDURE — 80053 COMPREHEN METABOLIC PANEL: CPT

## 2023-02-10 PROCEDURE — 85027 COMPLETE CBC AUTOMATED: CPT

## 2023-02-10 PROCEDURE — 86850 RBC ANTIBODY SCREEN: CPT

## 2023-02-10 RX ORDER — PANTOPRAZOLE SODIUM 20 MG/1
80 TABLET, DELAYED RELEASE ORAL ONCE
Refills: 0 | Status: COMPLETED | OUTPATIENT
Start: 2023-02-10 | End: 2023-02-10

## 2023-02-10 RX ORDER — SODIUM ZIRCONIUM CYCLOSILICATE 10 G/10G
10 POWDER, FOR SUSPENSION ORAL EVERY 12 HOURS
Refills: 0 | Status: DISCONTINUED | OUTPATIENT
Start: 2023-02-10 | End: 2023-02-11

## 2023-02-10 RX ORDER — SODIUM CHLORIDE 9 MG/ML
1000 INJECTION, SOLUTION INTRAVENOUS
Refills: 0 | Status: DISCONTINUED | OUTPATIENT
Start: 2023-02-10 | End: 2023-02-15

## 2023-02-10 RX ORDER — ERYTHROPOIETIN 10000 [IU]/ML
20000 INJECTION, SOLUTION INTRAVENOUS; SUBCUTANEOUS
Refills: 0 | Status: DISCONTINUED | OUTPATIENT
Start: 2023-02-10 | End: 2023-02-15

## 2023-02-10 RX ORDER — AMIODARONE HYDROCHLORIDE 400 MG/1
1 TABLET ORAL
Qty: 0 | Refills: 0 | DISCHARGE

## 2023-02-10 RX ORDER — DEXTROSE 50 % IN WATER 50 %
15 SYRINGE (ML) INTRAVENOUS ONCE
Refills: 0 | Status: DISCONTINUED | OUTPATIENT
Start: 2023-02-10 | End: 2023-02-15

## 2023-02-10 RX ORDER — METOPROLOL TARTRATE 50 MG
50 TABLET ORAL DAILY
Refills: 0 | Status: DISCONTINUED | OUTPATIENT
Start: 2023-02-10 | End: 2023-02-15

## 2023-02-10 RX ORDER — INSULIN GLARGINE 100 [IU]/ML
20 INJECTION, SOLUTION SUBCUTANEOUS AT BEDTIME
Refills: 0 | Status: DISCONTINUED | OUTPATIENT
Start: 2023-02-10 | End: 2023-02-13

## 2023-02-10 RX ORDER — AMIODARONE HYDROCHLORIDE 400 MG/1
200 TABLET ORAL DAILY
Refills: 0 | Status: DISCONTINUED | OUTPATIENT
Start: 2023-02-10 | End: 2023-02-15

## 2023-02-10 RX ORDER — SENNA PLUS 8.6 MG/1
2 TABLET ORAL AT BEDTIME
Refills: 0 | Status: DISCONTINUED | OUTPATIENT
Start: 2023-02-10 | End: 2023-02-15

## 2023-02-10 RX ORDER — ATORVASTATIN CALCIUM 80 MG/1
1 TABLET, FILM COATED ORAL
Qty: 0 | Refills: 0 | DISCHARGE

## 2023-02-10 RX ORDER — CALCIUM ACETATE 667 MG
1 TABLET ORAL
Qty: 0 | Refills: 0 | DISCHARGE

## 2023-02-10 RX ORDER — CLOPIDOGREL BISULFATE 75 MG/1
75 TABLET, FILM COATED ORAL DAILY
Refills: 0 | Status: DISCONTINUED | OUTPATIENT
Start: 2023-02-10 | End: 2023-02-11

## 2023-02-10 RX ORDER — DEXTROSE 50 % IN WATER 50 %
50 SYRINGE (ML) INTRAVENOUS ONCE
Refills: 0 | Status: COMPLETED | OUTPATIENT
Start: 2023-02-10 | End: 2023-02-10

## 2023-02-10 RX ORDER — GLUCAGON INJECTION, SOLUTION 0.5 MG/.1ML
1 INJECTION, SOLUTION SUBCUTANEOUS ONCE
Refills: 0 | Status: DISCONTINUED | OUTPATIENT
Start: 2023-02-10 | End: 2023-02-15

## 2023-02-10 RX ORDER — ATORVASTATIN CALCIUM 80 MG/1
40 TABLET, FILM COATED ORAL AT BEDTIME
Refills: 0 | Status: DISCONTINUED | OUTPATIENT
Start: 2023-02-10 | End: 2023-02-15

## 2023-02-10 RX ORDER — IRON SUCROSE 20 MG/ML
100 INJECTION, SOLUTION INTRAVENOUS
Refills: 0 | Status: DISCONTINUED | OUTPATIENT
Start: 2023-02-10 | End: 2023-02-11

## 2023-02-10 RX ORDER — INSULIN LISPRO 100/ML
VIAL (ML) SUBCUTANEOUS
Refills: 0 | Status: DISCONTINUED | OUTPATIENT
Start: 2023-02-10 | End: 2023-02-15

## 2023-02-10 RX ORDER — DEXTROSE 50 % IN WATER 50 %
12.5 SYRINGE (ML) INTRAVENOUS ONCE
Refills: 0 | Status: DISCONTINUED | OUTPATIENT
Start: 2023-02-10 | End: 2023-02-15

## 2023-02-10 RX ORDER — DEXTROSE 50 % IN WATER 50 %
25 SYRINGE (ML) INTRAVENOUS ONCE
Refills: 0 | Status: DISCONTINUED | OUTPATIENT
Start: 2023-02-10 | End: 2023-02-15

## 2023-02-10 RX ORDER — CALCIUM GLUCONATE 100 MG/ML
1 VIAL (ML) INTRAVENOUS ONCE
Refills: 0 | Status: COMPLETED | OUTPATIENT
Start: 2023-02-10 | End: 2023-02-10

## 2023-02-10 RX ORDER — FUROSEMIDE 40 MG
1 TABLET ORAL
Qty: 0 | Refills: 0 | DISCHARGE

## 2023-02-10 RX ORDER — INSULIN HUMAN 100 [IU]/ML
10 INJECTION, SOLUTION SUBCUTANEOUS ONCE
Refills: 0 | Status: COMPLETED | OUTPATIENT
Start: 2023-02-10 | End: 2023-02-10

## 2023-02-10 RX ORDER — CALCIUM ACETATE 667 MG
667 TABLET ORAL
Refills: 0 | Status: DISCONTINUED | OUTPATIENT
Start: 2023-02-10 | End: 2023-02-15

## 2023-02-10 RX ORDER — FUROSEMIDE 40 MG
40 TABLET ORAL DAILY
Refills: 0 | Status: DISCONTINUED | OUTPATIENT
Start: 2023-02-10 | End: 2023-02-11

## 2023-02-10 RX ORDER — PANTOPRAZOLE SODIUM 20 MG/1
40 TABLET, DELAYED RELEASE ORAL EVERY 12 HOURS
Refills: 0 | Status: DISCONTINUED | OUTPATIENT
Start: 2023-02-10 | End: 2023-02-14

## 2023-02-10 RX ADMIN — ATORVASTATIN CALCIUM 40 MILLIGRAM(S): 80 TABLET, FILM COATED ORAL at 21:09

## 2023-02-10 RX ADMIN — IRON SUCROSE 100 MILLIGRAM(S): 20 INJECTION, SOLUTION INTRAVENOUS at 18:05

## 2023-02-10 RX ADMIN — SENNA PLUS 2 TABLET(S): 8.6 TABLET ORAL at 21:09

## 2023-02-10 RX ADMIN — Medication 100 GRAM(S): at 15:33

## 2023-02-10 RX ADMIN — Medication 667 MILLIGRAM(S): at 21:09

## 2023-02-10 RX ADMIN — Medication 50 MILLILITER(S): at 15:33

## 2023-02-10 RX ADMIN — PANTOPRAZOLE SODIUM 40 MILLIGRAM(S): 20 TABLET, DELAYED RELEASE ORAL at 20:36

## 2023-02-10 RX ADMIN — ERYTHROPOIETIN 20000 UNIT(S): 10000 INJECTION, SOLUTION INTRAVENOUS; SUBCUTANEOUS at 17:00

## 2023-02-10 RX ADMIN — INSULIN GLARGINE 20 UNIT(S): 100 INJECTION, SOLUTION SUBCUTANEOUS at 21:10

## 2023-02-10 RX ADMIN — INSULIN HUMAN 10 UNIT(S): 100 INJECTION, SOLUTION SUBCUTANEOUS at 15:33

## 2023-02-10 RX ADMIN — SODIUM ZIRCONIUM CYCLOSILICATE 10 GRAM(S): 10 POWDER, FOR SUSPENSION ORAL at 21:09

## 2023-02-10 NOTE — ED PROVIDER NOTE - PROGRESS NOTE DETAILS
nephro notified of hyperK, no ekg changes, PT treated; Hb noted to be 6.6, will obtain consent and transfuse; nephro consulted for need for emergent dialysis -CD nephro notified of hyperK, no ekg changes, PT treated; nephro consulted for need for emergent dialysis; Hb noted to be 6.6, melena on exam, will obtain consent and transfuse;  -CD GI consulted for GIB, will give protonix bolus and drip -CD nephro consulted for hyperK and need for dialysis, no ekg changes, PT treated; Hb noted to be 6.6, melena on exam, will obtain consent and transfuse;  -CD GI consulted for GIB, will give protonix bolus -CD Spoke to Dr. Du (nephro), will arrange dialysis -CD

## 2023-02-10 NOTE — ED ADULT TRIAGE NOTE - CHIEF COMPLAINT QUOTE
Patient presents to ED c/o SOB and MURRAY x 2 days. Patient states that he finds himself trying to catch his breath on the way to the bathroom. Patient states he is a dialysis patient and normally goes M/W/F but skipped today.   Patient reports having AICD and PCI 8 years ago.

## 2023-02-10 NOTE — H&P ADULT - NSHPPHYSICALEXAM_GEN_ALL_CORE
GENERAL: NAD, lying in bed comfortably  HEAD:  Atraumatic, Normocephalic  CHEST/LUNG: Clear to auscultation bilaterally; Unlabored respirations  HEART: Regular rate and rhythm  ABDOMEN: Soft, Nondistended. Mild generalized tenderness  EXTREMITIES:  No clubbing, cyanosis, or edema  NERVOUS SYSTEM:  Alert & Oriented X3, speech clear. No deficits   MSK: FROM all 4 extremities, full and equal strength

## 2023-02-10 NOTE — ED PROVIDER NOTE - OBJECTIVE STATEMENT
ESRD on HD 2x /week (M/F) in [facility] with L arm [place] (previously [place], MVR (ICD 10YEARS AGO), BPH, PTCA, Sleep apnea, CAD/CABG ( [facility] on 3/01/22 with subtotal LM and 100% occlusion of RCA, with patent LIMA-LAD, patent SVG-diag/OM, and patent SVG-RPDA), HTN, HLD, DM, HFrEF elelctive TAVR in October 2022, MVR p/w progressively worsening dyspnea on exertion since October, significantly worse since Wednesday. PT received dialyssi Wednesday,says it did not improve symptoms. Did not receive dialysis today. Denies chest pain, diaphoresis, nausea, SOB at rest. Denies LE edema. Denies BRBPR or melena. PT taking eliquis. ESRD on HD 2x /week (M/F) in [facility] with L arm [place] (previously [place]), [place], MVR (ICD 10YEARS AGO), BPH, PTCA, Sleep apnea, CAD/CABG ( [facility] on 3/01/22 with subtotal LM and 100% occlusion of RCA, with patent LIMA-LAD, patent SVG-diag/OM, and patent SVG-RPDA), HTN, HLD, DM, HFrEF elelctive TAVR in October 2022, MV regurgitation p/w progressively worsening dyspnea on exertion since October, significantly worse since Wednesday. PT received dialyssi Wednesday, says it did not improve symptoms. Did not receive dialysis today. Denies chest pain, diaphoresis, nausea, SOB at rest. Denies LE edema. PT endorsing melena x2-3d, worse today. Denies BRBPR or diarrhea. PT taking plavix.

## 2023-02-10 NOTE — ED PROVIDER NOTE - MDM ORDERS SUBMITTED SELECTION
Pt with 1 cm non-obstructing R renal stone, no CVA tenderness  - Consult pt's urologist Dr. Wisdom  - Coverage of urinary tract infection as above Labs/EKG/Medications

## 2023-02-10 NOTE — ED ADULT TRIAGE NOTE - MEANS OF ARRIVAL
[FreeTextEntry1] : pt is high risk for thrombotic /embolic complications.  she also has lost some domain for her abdominal viscera.  she is advised to wear a tight compression corset and keep abdominal contents tightly compacted.  she will receive instruction from hematologist regarding anticoagulation perioperatively. I have deemed this surgery to be necessary for the approach to the patients hernia repairs and also for her well being,  I do not consider this to be cosmetic and elective 
ambulatory

## 2023-02-10 NOTE — H&P ADULT - HISTORY OF PRESENT ILLNESS
ESRD on HD 2x /week (M/F) in [facility] with L arm [place] (previously [place]), [place], MVR (ICD 10YEARS AGO), BPH, PTCA, Sleep apnea, CAD/CABG ( [facility] on 3/01/22 with subtotal LM and 100% occlusion of RCA, with patent LIMA-LAD, patent SVG-diag/OM, and patent SVG-RPDA), HTN, HLD, DM, HFrEF elelctive TAVR in October 2022, MV regurgitation p/w progressively worsening dyspnea on exertion since October, significantly worse since Wednesday. PT received dialyssi Wednesday, says it did not improve symptoms. Did not receive dialysis today. Denies chest pain, diaphoresis, nausea, SOB at rest. Denies LE edema. PT endorsing melena x2-3d, worse today. Denies BRBPR or diarrhea. PT taking plavix.    Date: 10-Feb-2023 14:50.     Progress: nephro consulted for hyperK and need for dialysis, no ekg changes, PT treated; Hb noted to be 6.6, melena on exam, will obtain consent and transfuse;  -CD.     Date: 10-Feb-2023 15:08.     Progress: GI consulted for GIB, will give protonix bolus -CD.     Date: 10-Feb-2023 15:32.     Progress: Spoke to Dr. Du (nephro), will arrange dialysis -CD. 83 YO M, w/Pmhx of ESRD on HD 2x /week (M/F) in [facility] with L arm Fistula (previously Dr. Du, now [place]), MVR (ICD 10 years ago), BPH, PTCA, Sleep apnea, CAD/CABG ( [facility] on 3/01/22 with subtotal LM and 100% occlusion of RCA, with patent LIMA-LAD, patent SVG-diag/OM, and patent SVG-RPDA), HTN, HLD, DM, HFrEF,  elective TAVR in October 2022 and MV regurgitation presented with melena and SOB. Patient had chronic SOB which resolved s/p TAVR in October, 2022.    Patient was in his usual state of health 4 days ago when he started having dark black bowel movement, around same time patient started having dyspnea on exertion which is new since october. The symptoms progressed, not associated with chest pain and worse with exertion. Denies HA, dizziness, NVD, fever, abdominal pain, change in urination and change in bowel habits.     In the ED, Hb was 6.6 (baseline 9), K 5.7, patient emergently dialyzed.  Vital Signs Last 24 Hrs:  T(F): 98 (10 Feb 2023 15:35), Max: 98 (10 Feb 2023 15:35)  HR: 66 (10 Feb 2023 18:10) (66 - 76)  BP: 132/75 (10 Feb 2023 18:10) (104/70 - 132/75)  RR: 18 (10 Feb 2023 18:10) (18 - 18)  SpO2: 99% (10 Feb 2023 18:10) (96% - 99%) on RA

## 2023-02-10 NOTE — H&P ADULT - ASSESSMENT
#Acute anemia 2/2 GIB      #Dyspnea 2/2 Volume overload?    #Hyperkalemia      #Misc  -Routine Lab frequency:   -DVT prophylaxis:  -GI prophylaxis:  -Diet:  -Code status:  -Activity:  -Bowel regimen:  -Urinary catheter:  -Dispo:    -Med rec confirmed with  81 YO M, w/Pmhx of ESRD on HD 2x /week (M/F) in [facility] with L arm Fistula (previously Dr. Du, now [place]), MVR (ICD 10 years ago), BPH, PTCA, Sleep apnea, CAD/CABG ( [facility] on 3/01/22 with subtotal LM and 100% occlusion of RCA, with patent LIMA-LAD, patent SVG-diag/OM, and patent SVG-RPDA), HTN, HLD, DM, HFrEF,  elective TAVR in October 2022 and MV regurgitation presented with melena and SOB. Patient had chronic SOB which resolved s/p TAVR in October, 2022.    #Acute anemia 2/2 GIB  -s/p 2 units pRBC  -Start Protonix 40mg BID  -Clear diet, NPO after midnight  -2X 18G IVs, Maintain active T&S  -GI c/s  -Will c/w Plavix given the extensive cardiac history  -CBC 8PM    #Exertional Dyspnea likely 2/2 anemia- Monitor for improvement  #HFrEF s/p AICD  #Moderate to Severe AS s/p elective TAVR  #CAD/CABG ([facility] on 3/01/22 with subtotal LM and 100% occlusion of RCA, with patent LIMA-LAD, patent SVG-diag/OM, and patent SVG-RPDA)  -c/w Metoprolol, Plavix, Lipitor, Lasix, Amio  -EKG shows old RBBB  -Check TTE  -Trend Trops    #Hyperkalemia- s/p emergent HD  #ESRD on HD 2x /week (M/F) in [facility] with L arm Fistula   -Start Lokelma 10g BID for 4 doses  -F/u BMP 8PM    #HTN- c/w Home meds  #HLD- c/w Statin  #DM- c/w Lantus 30unit HS + SS, Monitor FS    #Misc  -DVT prophylaxis: SCD  -GI prophylaxis: Protonix BID  -Diet: Renal- FLuid restriction  -Code status: Full code  -Activity: IAT  -Bowel regimen: Senna  -Dispo: Pending PT    -Med rec confirmed with patient

## 2023-02-10 NOTE — ED PROVIDER NOTE - PHYSICAL EXAMINATION
CONSTITUTIONAL: Well-developed; well-nourished; NAD  SKIN: warm, dry, w/o rash  HEAD: NCAT  EYES: PERRLA, EOMI, no conjunctival injection  ENT: No nasal discharge; nl OP without erythema or exudates  NECK: Supple, non-tender  CARD: nl S1, S2; RRR, no MRG, no JVD  RESP: CTAB, normal respiratory effort  ABD: BS+, soft, NTND, no HSM  Rectal: +melena, no BRBPR  EXT: Normal ROM.  No clubbing, cyanosis or edema  NEURO: Alert, oriented, grossly unremarkable  PSYCH: Cooperative, appropriate

## 2023-02-10 NOTE — ED PROVIDER NOTE - CARE PLAN
Principal Discharge DX:	Anemia  Secondary Diagnosis:	Hyperkalemia  Secondary Diagnosis:	Dyspnea on exertion   1

## 2023-02-10 NOTE — H&P ADULT - NSICDXPASTMEDICALHX_GEN_ALL_CORE_FT
PAST MEDICAL HISTORY:  AICD (automatic cardioverter/defibrillator) present     Aortic stenosis     BPH (Benign Prostatic Hyperplasia)     CAD (Coronary Artery Disease)     CAD (coronary artery disease)     CHF (congestive heart failure)     Diabetes mellitus     Diabetes Mellitus Type II     Dialysis patient HD- M/W/FR- [address].    GERD (gastroesophageal reflux disease)     H/O hyperlipidemia     History of PTCA with stents 10 years ago ([facility])    [place] (hard of hearing)     HTN (Hypertension)     HTN - Hypertension     Hypercholesterolemia     Mitral valve regurgitation     Sleep apnea does not use machine

## 2023-02-10 NOTE — ED PROVIDER NOTE - NSICDXPASTMEDICALHX_GEN_ALL_CORE_FT
PAST MEDICAL HISTORY:  AICD (automatic cardioverter/defibrillator) present     Aortic stenosis     BPH (Benign Prostatic Hyperplasia)     CAD (Coronary Artery Disease)     CAD (coronary artery disease)     CHF (congestive heart failure)     Diabetes mellitus     Diabetes Mellitus Type II     Dialysis patient HD- M/W/FR- [address].    GERD (gastroesophageal reflux disease)     H/O hyperlipidemia     History of PTCA with stents 10 years ago ([facility])    [place] (hard of hearing)     HTN (Hypertension)     HTN - Hypertension     Hypercholesterolemia     Mitral valve regurgitation     Sleep apnea does not use machine

## 2023-02-10 NOTE — ED PROVIDER NOTE - CLINICAL SUMMARY MEDICAL DECISION MAKING FREE TEXT BOX
82-year-old male with history of ESRD on dialysis Mondays and Fridays via left arm aVF, MVR status post ICD, GREG, CAD status post CABG, TAVR October 2022 with Dr. Dexter Ireland who presents to the ER for gradually worsening MURRAY since October.  Symptoms acutely worsened Wednesday.  He did not receive dialysis today.  He also endorses melena, 2-3 episodes, increasing in volume today.  Vitals noted, triage note reviewed and agree with exam as documented above.  Labs and imaging personally reviewed.  Acute on chronic anemia appreciated, hyperkalemia.  Troponin actually improved from baseline.  Patient was given calcium, nephrology was consulted and recommend slow transfusion of PRBC during dialysis.  Consent was obtained and patient was admitted for further management.  30 minutes of critical care time. 82-year-old male with history of ESRD on dialysis Mondays and Fridays via left arm aVF, MVR status post ICD, GREG, CAD status post CABG, TAVR October 2022 with Dr. Dexter Ireland who presents to the ER for gradually worsening MURRAY since October.  Symptoms acutely worsened Wednesday.  He did not receive dialysis today.  He also endorses melena, 2-3 episodes, increasing in volume today.  Vitals noted, triage note reviewed and agree with exam as documented above.  Labs and imaging personally reviewed.  Acute on chronic anemia appreciated, hyperkalemia, suspect UGIB, s/p 80mg PPI IVP.  Troponin actually improved from baseline.  Patient was given calcium, nephrology was consulted and recommend slow transfusion of PRBC during dialysis.  Consent was obtained and patient was admitted for further management.  30 minutes of critical care time.

## 2023-02-11 LAB
A1C WITH ESTIMATED AVERAGE GLUCOSE RESULT: 5.9 % — HIGH (ref 4–5.6)
ALBUMIN SERPL ELPH-MCNC: 4.1 G/DL — SIGNIFICANT CHANGE UP (ref 3.5–5.2)
ALP SERPL-CCNC: 57 U/L — SIGNIFICANT CHANGE UP (ref 30–115)
ALT FLD-CCNC: 6 U/L — SIGNIFICANT CHANGE UP (ref 0–41)
ANION GAP SERPL CALC-SCNC: 14 MMOL/L — SIGNIFICANT CHANGE UP (ref 7–14)
ANION GAP SERPL CALC-SCNC: 15 MMOL/L — HIGH (ref 7–14)
APTT BLD: 30.2 SEC — SIGNIFICANT CHANGE UP (ref 27–39.2)
AST SERPL-CCNC: 12 U/L — SIGNIFICANT CHANGE UP (ref 0–41)
BASOPHILS # BLD AUTO: 0.03 K/UL — SIGNIFICANT CHANGE UP (ref 0–0.2)
BASOPHILS # BLD AUTO: 0.03 K/UL — SIGNIFICANT CHANGE UP (ref 0–0.2)
BASOPHILS NFR BLD AUTO: 0.4 % — SIGNIFICANT CHANGE UP (ref 0–1)
BASOPHILS NFR BLD AUTO: 0.4 % — SIGNIFICANT CHANGE UP (ref 0–1)
BILIRUB SERPL-MCNC: 0.5 MG/DL — SIGNIFICANT CHANGE UP (ref 0.2–1.2)
BUN SERPL-MCNC: 53 MG/DL — HIGH (ref 10–20)
BUN SERPL-MCNC: 55 MG/DL — HIGH (ref 10–20)
CALCIUM SERPL-MCNC: 9.1 MG/DL — SIGNIFICANT CHANGE UP (ref 8.4–10.5)
CALCIUM SERPL-MCNC: 9.2 MG/DL — SIGNIFICANT CHANGE UP (ref 8.4–10.5)
CHLORIDE SERPL-SCNC: 100 MMOL/L — SIGNIFICANT CHANGE UP (ref 98–110)
CHLORIDE SERPL-SCNC: 99 MMOL/L — SIGNIFICANT CHANGE UP (ref 98–110)
CHOLEST SERPL-MCNC: 69 MG/DL — SIGNIFICANT CHANGE UP
CO2 SERPL-SCNC: 25 MMOL/L — SIGNIFICANT CHANGE UP (ref 17–32)
CO2 SERPL-SCNC: 26 MMOL/L — SIGNIFICANT CHANGE UP (ref 17–32)
CREAT SERPL-MCNC: 4.9 MG/DL — CRITICAL HIGH (ref 0.7–1.5)
CREAT SERPL-MCNC: 5.5 MG/DL — CRITICAL HIGH (ref 0.7–1.5)
EGFR: 10 ML/MIN/1.73M2 — LOW
EGFR: 11 ML/MIN/1.73M2 — LOW
EOSINOPHIL # BLD AUTO: 0.08 K/UL — SIGNIFICANT CHANGE UP (ref 0–0.7)
EOSINOPHIL # BLD AUTO: 0.16 K/UL — SIGNIFICANT CHANGE UP (ref 0–0.7)
EOSINOPHIL NFR BLD AUTO: 1 % — SIGNIFICANT CHANGE UP (ref 0–8)
EOSINOPHIL NFR BLD AUTO: 2.1 % — SIGNIFICANT CHANGE UP (ref 0–8)
ESTIMATED AVERAGE GLUCOSE: 123 MG/DL — HIGH (ref 68–114)
GLUCOSE BLDC GLUCOMTR-MCNC: 130 MG/DL — HIGH (ref 70–99)
GLUCOSE BLDC GLUCOMTR-MCNC: 153 MG/DL — HIGH (ref 70–99)
GLUCOSE BLDC GLUCOMTR-MCNC: 244 MG/DL — HIGH (ref 70–99)
GLUCOSE BLDC GLUCOMTR-MCNC: 99 MG/DL — SIGNIFICANT CHANGE UP (ref 70–99)
GLUCOSE SERPL-MCNC: 140 MG/DL — HIGH (ref 70–99)
GLUCOSE SERPL-MCNC: 91 MG/DL — SIGNIFICANT CHANGE UP (ref 70–99)
HCT VFR BLD CALC: 27.8 % — LOW (ref 42–52)
HCT VFR BLD CALC: 29.3 % — LOW (ref 42–52)
HCT VFR BLD CALC: 29.8 % — LOW (ref 42–52)
HDLC SERPL-MCNC: 29 MG/DL — LOW
HGB BLD-MCNC: 8.3 G/DL — LOW (ref 14–18)
HGB BLD-MCNC: 8.6 G/DL — LOW (ref 14–18)
HGB BLD-MCNC: 8.7 G/DL — LOW (ref 14–18)
IMM GRANULOCYTES NFR BLD AUTO: 0.4 % — HIGH (ref 0.1–0.3)
IMM GRANULOCYTES NFR BLD AUTO: 0.5 % — HIGH (ref 0.1–0.3)
INR BLD: 1.22 RATIO — SIGNIFICANT CHANGE UP (ref 0.65–1.3)
LIPID PNL WITH DIRECT LDL SERPL: 19 MG/DL — SIGNIFICANT CHANGE UP
LYMPHOCYTES # BLD AUTO: 1.27 K/UL — SIGNIFICANT CHANGE UP (ref 1.2–3.4)
LYMPHOCYTES # BLD AUTO: 1.32 K/UL — SIGNIFICANT CHANGE UP (ref 1.2–3.4)
LYMPHOCYTES # BLD AUTO: 15.7 % — LOW (ref 20.5–51.1)
LYMPHOCYTES # BLD AUTO: 17.6 % — LOW (ref 20.5–51.1)
MAGNESIUM SERPL-MCNC: 2.6 MG/DL — HIGH (ref 1.8–2.4)
MCHC RBC-ENTMCNC: 25.7 PG — LOW (ref 27–31)
MCHC RBC-ENTMCNC: 25.8 PG — LOW (ref 27–31)
MCHC RBC-ENTMCNC: 26.2 PG — LOW (ref 27–31)
MCHC RBC-ENTMCNC: 29.2 G/DL — LOW (ref 32–37)
MCHC RBC-ENTMCNC: 29.4 G/DL — LOW (ref 32–37)
MCHC RBC-ENTMCNC: 29.9 G/DL — LOW (ref 32–37)
MCV RBC AUTO: 87.7 FL — SIGNIFICANT CHANGE UP (ref 80–94)
MCV RBC AUTO: 87.7 FL — SIGNIFICANT CHANGE UP (ref 80–94)
MCV RBC AUTO: 88.4 FL — SIGNIFICANT CHANGE UP (ref 80–94)
MONOCYTES # BLD AUTO: 0.87 K/UL — HIGH (ref 0.1–0.6)
MONOCYTES # BLD AUTO: 0.94 K/UL — HIGH (ref 0.1–0.6)
MONOCYTES NFR BLD AUTO: 10.7 % — HIGH (ref 1.7–9.3)
MONOCYTES NFR BLD AUTO: 12.6 % — HIGH (ref 1.7–9.3)
NEUTROPHILS # BLD AUTO: 4.99 K/UL — SIGNIFICANT CHANGE UP (ref 1.4–6.5)
NEUTROPHILS # BLD AUTO: 5.83 K/UL — SIGNIFICANT CHANGE UP (ref 1.4–6.5)
NEUTROPHILS NFR BLD AUTO: 66.8 % — SIGNIFICANT CHANGE UP (ref 42.2–75.2)
NEUTROPHILS NFR BLD AUTO: 71.8 % — SIGNIFICANT CHANGE UP (ref 42.2–75.2)
NON HDL CHOLESTEROL: 40 MG/DL — SIGNIFICANT CHANGE UP
NRBC # BLD: 0 /100 WBCS — SIGNIFICANT CHANGE UP (ref 0–0)
PLATELET # BLD AUTO: 108 K/UL — LOW (ref 130–400)
PLATELET # BLD AUTO: 109 K/UL — LOW (ref 130–400)
PLATELET # BLD AUTO: 117 K/UL — LOW (ref 130–400)
POTASSIUM SERPL-MCNC: 4.4 MMOL/L — SIGNIFICANT CHANGE UP (ref 3.5–5)
POTASSIUM SERPL-MCNC: 4.5 MMOL/L — SIGNIFICANT CHANGE UP (ref 3.5–5)
POTASSIUM SERPL-SCNC: 4.4 MMOL/L — SIGNIFICANT CHANGE UP (ref 3.5–5)
POTASSIUM SERPL-SCNC: 4.5 MMOL/L — SIGNIFICANT CHANGE UP (ref 3.5–5)
PROT SERPL-MCNC: 5.8 G/DL — LOW (ref 6–8)
PROTHROM AB SERPL-ACNC: 14 SEC — HIGH (ref 9.95–12.87)
RBC # BLD: 3.17 M/UL — LOW (ref 4.7–6.1)
RBC # BLD: 3.34 M/UL — LOW (ref 4.7–6.1)
RBC # BLD: 3.37 M/UL — LOW (ref 4.7–6.1)
RBC # FLD: 18.6 % — HIGH (ref 11.5–14.5)
RBC # FLD: 18.9 % — HIGH (ref 11.5–14.5)
RBC # FLD: 19.1 % — HIGH (ref 11.5–14.5)
SODIUM SERPL-SCNC: 139 MMOL/L — SIGNIFICANT CHANGE UP (ref 135–146)
SODIUM SERPL-SCNC: 140 MMOL/L — SIGNIFICANT CHANGE UP (ref 135–146)
TRIGL SERPL-MCNC: 103 MG/DL — SIGNIFICANT CHANGE UP
TROPONIN T SERPL-MCNC: 0.05 NG/ML — CRITICAL HIGH
TROPONIN T SERPL-MCNC: 0.05 NG/ML — CRITICAL HIGH
WBC # BLD: 7.48 K/UL — SIGNIFICANT CHANGE UP (ref 4.8–10.8)
WBC # BLD: 7.72 K/UL — SIGNIFICANT CHANGE UP (ref 4.8–10.8)
WBC # BLD: 8.11 K/UL — SIGNIFICANT CHANGE UP (ref 4.8–10.8)
WBC # FLD AUTO: 7.48 K/UL — SIGNIFICANT CHANGE UP (ref 4.8–10.8)
WBC # FLD AUTO: 7.72 K/UL — SIGNIFICANT CHANGE UP (ref 4.8–10.8)
WBC # FLD AUTO: 8.11 K/UL — SIGNIFICANT CHANGE UP (ref 4.8–10.8)

## 2023-02-11 PROCEDURE — 99233 SBSQ HOSP IP/OBS HIGH 50: CPT

## 2023-02-11 RX ORDER — FUROSEMIDE 40 MG
80 TABLET ORAL
Refills: 0 | Status: DISCONTINUED | OUTPATIENT
Start: 2023-02-11 | End: 2023-02-15

## 2023-02-11 RX ADMIN — Medication 667 MILLIGRAM(S): at 12:00

## 2023-02-11 RX ADMIN — PANTOPRAZOLE SODIUM 40 MILLIGRAM(S): 20 TABLET, DELAYED RELEASE ORAL at 18:48

## 2023-02-11 RX ADMIN — Medication 80 MILLIGRAM(S): at 13:03

## 2023-02-11 RX ADMIN — Medication 50 MILLIGRAM(S): at 07:06

## 2023-02-11 RX ADMIN — Medication 40 MILLIGRAM(S): at 07:06

## 2023-02-11 RX ADMIN — Medication 1: at 18:47

## 2023-02-11 RX ADMIN — ATORVASTATIN CALCIUM 40 MILLIGRAM(S): 80 TABLET, FILM COATED ORAL at 21:49

## 2023-02-11 RX ADMIN — Medication 667 MILLIGRAM(S): at 18:47

## 2023-02-11 RX ADMIN — PANTOPRAZOLE SODIUM 40 MILLIGRAM(S): 20 TABLET, DELAYED RELEASE ORAL at 07:06

## 2023-02-11 RX ADMIN — INSULIN GLARGINE 20 UNIT(S): 100 INJECTION, SOLUTION SUBCUTANEOUS at 21:50

## 2023-02-11 RX ADMIN — Medication 667 MILLIGRAM(S): at 21:49

## 2023-02-11 RX ADMIN — AMIODARONE HYDROCHLORIDE 200 MILLIGRAM(S): 400 TABLET ORAL at 18:47

## 2023-02-11 NOTE — PROGRESS NOTE ADULT - SUBJECTIVE AND OBJECTIVE BOX
NINFA URBAN  82y  [facility] ED Hold 073 A      Patient is a 82y old  Male who presents with a chief complaint of ESRD (10 Feb 2023 17:28)      INTERVAL HPI/OVERNIGHT EVENTS:        REVIEW OF SYSTEMS:        FAMILY HISTORY:    T(C): 36.4 (02-10-23 @ 19:36), Max: 36.7 (02-10-23 @ 15:35)  HR: 84 (02-11-23 @ 07:06) (66 - 84)  BP: 136/65 (02-11-23 @ 07:06) (104/70 - 140/75)  RR: 18 (02-10-23 @ 19:36) (18 - 18)  SpO2: 97% (02-10-23 @ 19:36) (96% - 99%)  Wt(kg): --Vital Signs Last 24 Hrs  T(C): 36.4 (10 Feb 2023 19:36), Max: 36.7 (10 Feb 2023 15:35)  T(F): 97.6 (10 Feb 2023 19:36), Max: 98 (10 Feb 2023 15:35)  HR: 84 (11 Feb 2023 07:06) (66 - 84)  BP: 136/65 (11 Feb 2023 07:06) (104/70 - 140/75)  BP(mean): --  RR: 18 (10 Feb 2023 19:36) (18 - 18)  SpO2: 97% (10 Feb 2023 19:36) (96% - 99%)    Parameters below as of 10 Feb 2023 19:36  Patient On (Oxygen Delivery Method): room air        PHYSICAL EXAM:  GENERAL: NAD, well-groomed, well-developed  HEAD:  Atraumatic, Normocephalic  EYES: EOMI, PERRLA, conjunctiva and sclera clear  ENMT: No tonsillar erythema, exudates, or enlargement; Moist mucous membranes, Good dentition, No lesions  NECK: Supple, No JVD, Normal thyroid  NERVOUS SYSTEM:  Alert & Oriented X3, Good concentration; Motor Strength 5/5 B/L upper and lower extremities; DTRs 2+ intact and symmetric  PULM: Clear to auscultation bilaterally  CARDIAC: Regular rate and rhythm; No murmurs, rubs, or gallops  GI: Soft, Nontender, Nondistended; Bowel sounds present  EXTREMITIES:  2+ Peripheral Pulses, No clubbing, cyanosis, or edema  LYMPH: No lymphadenopathy noted  SKIN: No rashes or lesions    Consultant(s) Notes Reviewed:  [x ] YES  [ ] NO  Care Discussed with Consultants/Other Providers [ x] YES  [ ] NO    LABS:                            8.3    8.11  )-----------( 108      ( 11 Feb 2023 00:29 )             27.8   02-10    139  |  99  |  53<H>  ----------------------------<  140<H>  4.5   |  26  |  4.9<HH>    Ca    9.1      10 Feb 2023 22:42  Mg     2.6     02-10    TPro  6.1  /  Alb  4.3  /  TBili  0.4  /  DBili  x   /  AST  12  /  ALT  7   /  AlkPhos  70  02-10            aMIOdarone    Tablet 200 milliGRAM(s) Oral daily  atorvastatin 40 milliGRAM(s) Oral at bedtime  calcium acetate 667 milliGRAM(s) Oral four times a day with meals  clopidogrel Tablet 75 milliGRAM(s) Oral daily  dextrose 5%. 1000 milliLiter(s) IV Continuous <Continuous>  dextrose 5%. 1000 milliLiter(s) IV Continuous <Continuous>  dextrose 50% Injectable 25 Gram(s) IV Push once  dextrose 50% Injectable 12.5 Gram(s) IV Push once  dextrose 50% Injectable 25 Gram(s) IV Push once  dextrose Oral Gel 15 Gram(s) Oral once PRN  epoetin nicky-epbx (RETACRIT) Injectable 81288 Unit(s) IV Push <User Schedule>  furosemide    Tablet 40 milliGRAM(s) Oral daily  glucagon  Injectable 1 milliGRAM(s) IntraMuscular once  insulin glargine Injectable (LANTUS) 20 Unit(s) SubCutaneous at bedtime  insulin lispro (ADMELOG) corrective regimen sliding scale   SubCutaneous three times a day before meals  iron sucrose Injectable 100 milliGRAM(s) IV Push <User Schedule>  metoprolol succinate ER 50 milliGRAM(s) Oral daily  pantoprazole  Injectable 40 milliGRAM(s) IV Push every 12 hours  senna 2 Tablet(s) Oral at bedtime  sodium zirconium cyclosilicate 10 Gram(s) Oral every 12 hours      83 YO M, w/Pmhx of ESRD on HD 2x /week (M/F) in [facility] with L arm Fistula (previously Dr. Du, now [place]), MVR (ICD 10 years ago), BPH, PTCA, Sleep apnea, CAD/CABG ( [facility] on 3/01/22 with subtotal LM and 100% occlusion of RCA, with patent LIMA-LAD, patent SVG-diag/OM, and patent SVG-RPDA), HTN, HLD, DM, HFrEF,  elective TAVR in October 2022 and MV regurgitation presented with melena and SOB. Patient had chronic SOB which resolved s/p TAVR in October, 2022.    1. Acute blood loss anemia secondary to most likely upper gi bleeding (melena) s/p 2 PRBC   - Telemetry         - INR   - Hb relatively stable after transfusion   - Good IV access   - Cont Protonix 40mg BID  -Clear diet, NPO after midnight  - Hold plavix due to active bleeding   - GI consult:    2. Exertional Dyspnea likely 2/2 anemia- Monitor for improvement. HFrEF s/p AICD. Moderate to Severe AS s/p elective TAVR. 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 Hyperkalemia- s/p emergent HD. hx of ESRD on HD 2x /week (M/F) in [facility] with L arm Fistula   - Received Lokelma   -F/u BMP 8PM    4. HTN- c/w Home meds    5. HLD- c/w Statin    6. DM- c/w Lantus 30unit HS + SS, Monitor FS    #Misc  -DVT prophylaxis: SCD  -GI prophylaxis: Protonix BID  -Diet: Renal- FLuid restriction  -Code status: Full code  -Activity: IAT  -Bowel regimen: Senna  -Dispo: Pending PT    -Med rec confirmed with patient          Case Discussed with House Staff   39  minutes spent on total encounter; more than 50% of the visit was spent counseling and/or coordinating care by the attending physician.   Spectra [id number]     NNIFA URBAN  82y  [facility]      Patient is a 82y old  Male who presents with a chief complaint of ESRD (10 Feb 2023 17:28)      INTERVAL HPI/OVERNIGHT EVENTS:      Patient feels better. his sob improved and he's laying flat without any sob   he's been having melena before presenitng to the hospital   he's agreeable with plan of care.             FAMILY HISTORY:    T(C): 36.4 (02-10-23 @ 19:36), Max: 36.7 (02-10-23 @ 15:35)  HR: 84 (02-11-23 @ 07:06) (66 - 84)  BP: 136/65 (02-11-23 @ 07:06) (104/70 - 140/75)  RR: 18 (02-10-23 @ 19:36) (18 - 18)  SpO2: 97% (02-10-23 @ 19:36) (96% - 99%)  Wt(kg): --Vital Signs Last 24 Hrs  T(C): 36.4 (10 Feb 2023 19:36), Max: 36.7 (10 Feb 2023 15:35)  T(F): 97.6 (10 Feb 2023 19:36), Max: 98 (10 Feb 2023 15:35)  HR: 84 (11 Feb 2023 07:06) (66 - 84)  BP: 136/65 (11 Feb 2023 07:06) (104/70 - 140/75)  BP(mean): --  RR: 18 (10 Feb 2023 19:36) (18 - 18)  SpO2: 97% (10 Feb 2023 19:36) (96% - 99%)    Parameters below as of 10 Feb 2023 19:36  Patient On (Oxygen Delivery Method): room air        PHYSICAL EXAM:  GENERAL: NAD, well-groomed, well-developed  HEAD:  Atraumatic, Normocephalic  NERVOUS SYSTEM:  Alert & Oriented X3, Good concentration;  PULM: Clear to auscultation bilaterally  CARDIAC: Regular rate and rhythm;  GI: Soft, Nontender, Nondistended; Bowel sounds present  EXTREMITIES:  2+ Peripheral Pulses,    Consultant(s) Notes Reviewed:  [x ] YES  [ ] NO  Care Discussed with Consultants/Other Providers [ x] YES  [ ] NO    LABS:                            8.3    8.11  )-----------( 108      ( 11 Feb 2023 00:29 )             27.8   02-10    139  |  99  |  53<H>  ----------------------------<  140<H>  4.5   |  26  |  4.9<HH>    Ca    9.1      10 Feb 2023 22:42  Mg     2.6     02-10    TPro  6.1  /  Alb  4.3  /  TBili  0.4  /  DBili  x   /  AST  12  /  ALT  7   /  AlkPhos  70  02-10            aMIOdarone    Tablet 200 milliGRAM(s) Oral daily  atorvastatin 40 milliGRAM(s) Oral at bedtime  calcium acetate 667 milliGRAM(s) Oral four times a day with meals  clopidogrel Tablet 75 milliGRAM(s) Oral daily  dextrose 5%. 1000 milliLiter(s) IV Continuous <Continuous>  dextrose 5%. 1000 milliLiter(s) IV Continuous <Continuous>  dextrose 50% Injectable 25 Gram(s) IV Push once  dextrose 50% Injectable 12.5 Gram(s) IV Push once  dextrose 50% Injectable 25 Gram(s) IV Push once  dextrose Oral Gel 15 Gram(s) Oral once PRN  epoetin nicky-epbx (RETACRIT) Injectable 01773 Unit(s) IV Push <User Schedule>  furosemide    Tablet 40 milliGRAM(s) Oral daily  glucagon  Injectable 1 milliGRAM(s) IntraMuscular once  insulin glargine Injectable (LANTUS) 20 Unit(s) SubCutaneous at bedtime  insulin lispro (ADMELOG) corrective regimen sliding scale   SubCutaneous three times a day before meals  iron sucrose Injectable 100 milliGRAM(s) IV Push <User Schedule>  metoprolol succinate ER 50 milliGRAM(s) Oral daily  pantoprazole  Injectable 40 milliGRAM(s) IV Push every 12 hours  senna 2 Tablet(s) Oral at bedtime  sodium zirconium cyclosilicate 10 Gram(s) Oral every 12 hours      81 YO M, w/Pmhx of ESRD on HD 2x /week (M/F) in [facility] with L arm Fistula (previously Dr. Du, now [place]), MVR (ICD 10 years ago), BPH, PTCA, Sleep apnea, CAD/CABG ( [facility] on 3/01/22 with subtotal LM and 100% occlusion of RCA, with patent LIMA-LAD, patent SVG-diag/OM, and patent SVG-RPDA), HTN, HLD, DM, HFrEF,  elective TAVR in October 2022 and MV regurgitation presented with melena and SOB. Patient had chronic SOB which resolved s/p TAVR in October, 2022.    1. Acute blood loss anemia secondary to most likely upper gi bleeding (melena) s/p 2 PRBC   - Telemetry         - INR:1.22   - Hb relatively stable after transfusion   - Good IV access   - Cont Protonix 40mg BID  - Renal diet for now. awaiting GI eval   - Hold plavix due to active bleeding   - GI consult:pending     2. Exertional Dyspnea likely 2/2 anemia- Monitor for improvement. HFrEF s/p AICD. Moderate to Severe AS s/p elective TAVR. hx of CAD/CABG ([facility] on 3/01/22 with subtotal LM and 100% occlusion of RCA, with patent LIMA-LAD, patent SVG-diag/OM, and patent SVG-RPDA)  -c/w Metoprolol, Plavix, Lipitor, Lasix, Amio  -EKG shows old RBBB  - Echocardio :pending   -Trend Trops  - Nephro consult:  a) Increase furosemide to 80mg PO BID while in hospital  b) DC Lokelma/low K+ renal diet  c) fluid restriction  d) left arm precautions  e) epogen with HD  d) no venofer due to prior hyperferritinemia       3. HTN- c/w Home meds    4. HLD- c/w Statin    5. DM- c/w Lantus 30unit HS + SS, Monitor FS    #Misc  -DVT prophylaxis: SCD  -GI prophylaxis: Protonix BID  -Diet: Renal- FLuid restriction  -Code status: Full code  -Activity: IAT  -Bowel regimen: Senna  -Dispo: Pending PT

## 2023-02-11 NOTE — CONSULT NOTE ADULT - SUBJECTIVE AND OBJECTIVE BOX
Gastroenterology Consultation:    Patient is a 82y old  Male who presents with a chief complaint of ESRD (10 Feb 2023 17:28)    Admitted on: 02-10-23      HPI:  81 YO M, w/Pmhx of ESRD on HD 2x /week (M/F) in [facility] with L arm Fistula (previously Dr. Du, now [place]), MVR (ICD 10 years ago), BPH, PTCA, Sleep apnea, CAD/CABG ( [facility] on 3/01/22 with subtotal LM and 100% occlusion of RCA, with patent LIMA-LAD, patent SVG-diag/OM, and patent SVG-RPDA), HTN, HLD, DM, HFrEF,  elective TAVR in October 2022 and MV regurgitation presented with melena and SOB. Patient had chronic SOB which resolved s/p TAVR in October, 2022.    Patient was in his usual state of health 4 days ago when he started having dark black bowel movement, around same time patient started having dyspnea on exertion which is new since october. The symptoms progressed, not associated with chest pain and worse with exertion. Denies HA, dizziness, NVD, fever, abdominal pain, change in urination and change in bowel habits.     In the ED, Hb was 6.6 (baseline 9), K 5.7, patient emergently dialyzed.  Vital Signs Last 24 Hrs:  T(F): 98 (10 Feb 2023 15:35), Max: 98 (10 Feb 2023 15:35)  HR: 66 (10 Feb 2023 18:10) (66 - 76)  BP: 132/75 (10 Feb 2023 18:10) (104/70 - 132/75)  RR: 18 (10 Feb 2023 18:10) (18 - 18)  SpO2: 99% (10 Feb 2023 18:10) (96% - 99%) on RA (10 Feb 2023 17:25)        Prior EGD: none on chart    Prior Colonoscopy: none on chart       PAST MEDICAL & SURGICAL HISTORY:  HTN (Hypertension)      Diabetes Mellitus Type II      Hypercholesterolemia      CAD (Coronary Artery Disease)      History of PTCA  with stents 10 years ago ([facility])      Diabetes mellitus      CAD (coronary artery disease)      H/O hyperlipidemia      HTN - Hypertension      Sleep apnea  does not use machine      GERD (gastroesophageal reflux disease)      BPH (Benign Prostatic Hyperplasia)      CHF (congestive heart failure)      Mitral valve regurgitation      Dialysis patient  HD- M/W/FR- [facility] [address].      Aortic stenosis      Tribal (hard of hearing)      AICD (automatic cardioverter/defibrillator) present      S/P knee surgery  b/l arthroscopic      S/P tonsillectomy      S/P primary angioplasty with coronary stent  6-7 stents last one in 10/12      S/P appendectomy      S/P CABG (coronary artery bypass graft)  ? 2017      AICD (automatic cardioverter/defibrillator) present            FAMILY HISTORY:  negative for gi malignancy     Social History:  Tobacco: denies  Alcohol: denies  Drugs: denies     Home Medications:  amiodarone 200 mg oral tablet: 1 tab(s) orally once a day (10 Feb 2023 19:11)  Lantus 100 units/mL subcutaneous solution: 30 unit(s) subcutaneous once a day (at bedtime) (10 Feb 2023 19:11)  Lasix 40 mg oral tablet: 1 tab(s) orally once a day (10 Feb 2023 19:11)  Lipitor 40 mg oral tablet: 1 tab(s) orally once a day (at bedtime) (10 Feb 2023 19:11)  PhosLo 667 mg oral tablet: 1 tab(s) orally 3 times a day (10 Feb 2023 19:11)        MEDICATIONS  (STANDING):  aMIOdarone    Tablet 200 milliGRAM(s) Oral daily  atorvastatin 40 milliGRAM(s) Oral at bedtime  calcium acetate 667 milliGRAM(s) Oral four times a day with meals  dextrose 5%. 1000 milliLiter(s) (50 mL/Hr) IV Continuous <Continuous>  dextrose 5%. 1000 milliLiter(s) (100 mL/Hr) IV Continuous <Continuous>  dextrose 50% Injectable 25 Gram(s) IV Push once  dextrose 50% Injectable 12.5 Gram(s) IV Push once  dextrose 50% Injectable 25 Gram(s) IV Push once  epoetin nicky-epbx (RETACRIT) Injectable 99870 Unit(s) IV Push <User Schedule>  furosemide    Tablet 80 milliGRAM(s) Oral two times a day  glucagon  Injectable 1 milliGRAM(s) IntraMuscular once  insulin glargine Injectable (LANTUS) 20 Unit(s) SubCutaneous at bedtime  insulin lispro (ADMELOG) corrective regimen sliding scale   SubCutaneous three times a day before meals  iron sucrose Injectable 100 milliGRAM(s) IV Push <User Schedule>  metoprolol succinate ER 50 milliGRAM(s) Oral daily  pantoprazole  Injectable 40 milliGRAM(s) IV Push every 12 hours  senna 2 Tablet(s) Oral at bedtime    MEDICATIONS  (PRN):  dextrose Oral Gel 15 Gram(s) Oral once PRN Blood Glucose LESS THAN 70 milliGRAM(s)/deciliter      Allergies  No Known Allergies      Review of Systems:   Constitutional:  No Fever, No Chills  ENT/Mouth:  No Hearing Changes,  No Difficulty Swallowing  Eyes:  No Eye Pain, No Vision Changes  Cardiovascular:  No Chest Pain, No Palpitations  Respiratory:  No Cough, No Dyspnea  Gastrointestinal:  As described in HPI  Musculoskeletal:  No Joint Swelling, No Back Pain  Skin:  No Skin Lesions, No Jaundice  Neuro:  No Syncope, No Dizziness  Heme/Lymph:  No Bruising, No Bleeding.          Physical Examination:  T(C): 36.4 (02-10-23 @ 19:36), Max: 36.7 (02-10-23 @ 15:35)  HR: 84 (02-11-23 @ 07:06) (66 - 84)  BP: 136/65 (02-11-23 @ 07:06) (115/63 - 140/75)  RR: 18 (02-10-23 @ 19:36) (18 - 18)  SpO2: 97% (02-10-23 @ 19:36) (96% - 99%)      02-10-23 @ 07:01  -  02-11-23 @ 07:00  --------------------------------------------------------  IN: 706 mL / OUT: 2000 mL / NET: -1294 mL          GENERAL: AAOx3, no acute distress.  HEAD:  Atraumatic, Normocephalic  EYES: conjunctiva and sclera clear  NECK: Supple, no JVD or thyromegaly  CHEST/LUNG: Clear to auscultation bilaterally; No wheeze, rhonchi, or rales  HEART: Regular rate and rhythm; normal S1, S2, No murmurs.  ABDOMEN: Soft, nontender, nondistended; Bowel sounds present  NEUROLOGY: No asterixis or tremor.   SKIN: Intact, no jaundice        Data:                        8.7    7.48  )-----------( 109      ( 11 Feb 2023 08:31 )             29.8     Hgb Trend:  8.7  02-11-23 @ 08:31  8.3  02-11-23 @ 00:29  8.5  02-10-23 @ 22:42  6.6  02-10-23 @ 13:39      02-10-23 @ 07:01  -  02-11-23 @ 07:00  --------------------------------------------------------  IN: 706 mL      02-11    140  |  100  |  55<H>  ----------------------------<  91  4.4   |  25  |  5.5<HH>    Ca    9.2      11 Feb 2023 08:31  Mg     2.6     02-11    TPro  5.8<L>  /  Alb  4.1  /  TBili  0.5  /  DBili  x   /  AST  12  /  ALT  6   /  AlkPhos  57  02-11    Liver panel trend:  TBili 0.5   /   AST 12   /   ALT 6   /   AlkP 57   /   Tptn 5.8   /   Alb 4.1    /   DBili --      02-11  TBili 0.4   /   AST 12   /   ALT 7   /   AlkP 70   /   Tptn 6.1   /   Alb 4.3    /   DBili --      02-10      PT/INR - ( 11 Feb 2023 08:31 )   PT: 14.00 sec;   INR: 1.22 ratio         PTT - ( 11 Feb 2023 08:31 )  PTT:30.2 sec        Radiology:

## 2023-02-11 NOTE — CONSULT NOTE ADULT - ASSESSMENT
ESRD on HD MF @ [facility]  access via left arm AVF  Acute anemia s/p transfusion 2u PRBC  CAD / CABG / CMP / CHF / VHD (severe AS and MR)  HTN  DM2  anemia   GREG    plan:    Patient refused HD today  Increase furosemide to 80mg PO BID while in hospital  [facility]  low K+ renal diet  fluid restriction  left arm precautions  epogen with HD  no venofer due to prior hyperferritinemia   Lydia Silva ([phone])

## 2023-02-11 NOTE — CONSULT NOTE ADULT - ASSESSMENT
83 YO M, w/Pmhx of ESRD on HD 2x /week MVR (ICD 10 years ago), Sleep apnea, CAD/CABG, HTN, HLD, DM, HFrEF,  elective TAVR in October 2022 and MV regurgitation presented with SOB and black stool. GI is called for evaluation.    # Acute on chronic anemia  # Wt loss / loss of appetite   - pt is hemodynamically stable  - Hb is 6.6 from 9  - s/p 2 pRBC and repeat Hb is 8.7   - YIN: brown stool  - no evidence of overt GI bleeding   - on ASA/Plavix, plavix last dose 2/9  - extensive cardiac history   - never had EGD, last colonoscopy 10 years ago  - endorses 50 pound wt loss over    recommendations:  - diet as tolerated  - monitor H/h  - active type and screen  - keep Hb > 8  - will plan for EGD/colonoscopy  recommendations tentatively on tuesday  83 YO M, w/Pmhx of ESRD on HD 2x /week MVR (ICD 10 years ago), Sleep apnea, CAD/CABG, HTN, HLD, DM, HFrEF,  elective TAVR in October 2022 and MV regurgitation presented with SOB and black stool. GI is called for evaluation.    # Acute on chronic anemia  # Wt loss / loss of appetite   - pt is hemodynamically stable  - Hb is 6.6 from 9  - s/p 2 pRBC and repeat Hb is 8.7   - YIN: brown stool  - no evidence of overt GI bleeding   - on ASA/Plavix, plavix last dose 2/9  - extensive cardiac history   - never had EGD, last colonoscopy 10 years ago  - endorses 50 pound wt loss over    recommendations:  - diet as tolerated  - monitor H/h  - active type and screen  - keep Hb > 8  - will plan for EGD/colonoscopy - tentatively on Tuesday  - will need cardiology risk stratification

## 2023-02-11 NOTE — CONSULT NOTE ADULT - SUBJECTIVE AND OBJECTIVE BOX
[place] NEPHROLOGY INITIAL CONSULT NOTE  --------------------------------------------------------------------------------  HPI:  83 YO M, w/Pmhx of ESRD on HD 2x /week (M/F) in [facility] via [place] (previously Dr. Du, now [place]), MVR (ICD 10 years ago), BPH, PTCA, Sleep apnea, CAD/CABG ( [facility] on 3/01/22 with subtotal LM and 100% occlusion of RCA, with patent LIMA-LAD, patent SVG-diag/OM, and patent SVG-RPDA), HTN, HLD, DM, HFrEF,  elective TAVR in October 2022 and MV regurgitation presented with melena and SOB. Patient had chronic SOB which resolved s/p TAVR in October, 2022.    Patient was in his usual state of health 4 days ago when he started having dark black bowel movement, around same time patient started having dyspnea on exertion which is new since october. The symptoms progressed, not associated with chest pain and worse with exertion. Patient was transfused 2u PRBC and received urgent HD.    PAST HISTORY  --------------------------------------------------------------------------------  PAST MEDICAL & SURGICAL HISTORY:  HTN (Hypertension)  Diabetes Mellitus Type II  Hypercholesterolemia  CAD (Coronary Artery Disease)  History of PTCA with stents 10 years ago  Diabetes mellitus  H/O hyperlipidemia  HTN - Hypertension  Sleep apnea  GERD (gastroesophageal reflux disease)  BPH (Benign Prostatic Hyperplasia)  CHF (congestive heart failure)  Mitral valve regurgitation  Dialysis patient  HD- M/FR- [facility] [address]-[facility].  Aortic stenosis  [place] (hard of hearing)  AICD (automatic cardioverter/defibrillator) present  S/P knee surgery b/l arthroscopic  S/P tonsillectomy  S/P appendectomy  S/P CABG (coronary artery bypass graft)  AICD (automatic cardioverter/defibrillator) present    FAMILY HISTORY:  Negative for kidney disease    SOCIAL HISTORY:  Denies current ETOH, tobacco, and illicit drug use    ALLERGIES & MEDICATIONS  --------------------------------------------------------------------------------  Allergies    No Known Allergies    Standing Inpatient Medications  aMIOdarone    Tablet 200 milliGRAM(s) Oral daily  atorvastatin 40 milliGRAM(s) Oral at bedtime  calcium acetate 667 milliGRAM(s) Oral four times a day with meals  dextrose 5%. 1000 milliLiter(s) IV Continuous <Continuous>  dextrose 5%. 1000 milliLiter(s) IV Continuous <Continuous>  dextrose 50% Injectable 25 Gram(s) IV Push once  dextrose 50% Injectable 12.5 Gram(s) IV Push once  dextrose 50% Injectable 25 Gram(s) IV Push once  epoetin nicky-epbx (RETACRIT) Injectable 59340 Unit(s) IV Push <User Schedule>  furosemide    Tablet 40 milliGRAM(s) Oral daily  glucagon  Injectable 1 milliGRAM(s) IntraMuscular once  insulin glargine Injectable (LANTUS) 20 Unit(s) SubCutaneous at bedtime  insulin lispro (ADMELOG) corrective regimen sliding scale   SubCutaneous three times a day before meals  iron sucrose Injectable 100 milliGRAM(s) IV Push <User Schedule>  metoprolol succinate ER 50 milliGRAM(s) Oral daily  pantoprazole  Injectable 40 milliGRAM(s) IV Push every 12 hours  senna 2 Tablet(s) Oral at bedtime  sodium zirconium cyclosilicate 10 Gram(s) Oral every 12 hours    PRN Inpatient Medications  dextrose Oral Gel 15 Gram(s) Oral once PRN    REVIEW OF SYSTEMS  --------------------------------------------------------------------------------  Gen: No fevers/chills  Skin: No rashes  Head/Eyes/Ears/Mouth: No headache; No sinus pain/discomfort, sore throat  Respiratory: No dyspnea, cough, wheezing, hemoptysis  CV: No chest pain, PND, orthopnea  GI: No abdominal pain, diarrhea, constipation, nausea, vomiting  : No increased frequency, dysuria, hematuria, nocturia  All other systems were reviewed and are negative, except as noted.    VITALS/PHYSICAL EXAM  --------------------------------------------------------------------------------  T(C): 36.4 (02-10-23 @ 19:36), Max: 36.7 (02-10-23 @ 15:35)  HR: 84 (02-11-23 @ 07:06) (66 - 84)  BP: 136/65 (02-11-23 @ 07:06) (104/70 - 140/75)  RR: 18 (02-10-23 @ 19:36) (18 - 18)  SpO2: 97% (02-10-23 @ 19:36) (96% - 99%)  Weight (kg): 98.9 (02-10-23 @ 12:10)    02-10-23 @ 07:01  -  02-11-23 @ 07:00  --------------------------------------------------------  IN: 706 mL / OUT: 2000 mL / NET: -1294 mL    Physical Exam:  	Gen: NAD  	Pulm: B/L basilar rales  	CV: RRR, S1S2; no rub  	Back: No CVA tenderness; no sacral edema  	Abd: +BS, soft, nontender/nondistended  	: No suprapubic tenderness  	LE: Warm, trace edema  	Neuro: AAO x3  	Vascular access: AVF with good thrill/bruit    LABS/STUDIES  --------------------------------------------------------------------------------              8.7    7.48  >-----------<  109      [02-11-23 @ 08:31]              29.8     140  |  100  |  55  ----------------------------<  91      [02-11-23 @ 08:31]  4.4   |  25  |  x         Ca     9.2     [02-11-23 @ 08:31]      Mg     2.6     [02-11-23 @ 08:31]    TPro  5.8  /  Alb  4.1  /  TBili  0.5  /  DBili  x   /  AST  12  /  ALT  6   /  AlkPhos  57  [02-11-23 @ 08:31]    PT/INR: PT 14.00, INR 1.22       [02-11-23 @ 08:31]  PTT: 30.2       [02-11-23 @ 08:31]    Troponin 0.05      [02-10-23 @ 22:42]    Creatinine Trend:  SCr 4.9 [02-10 @ 22:42]  SCr 6.1 [02-10 @ 13:39]    Urinalysis - [01-31-21 @ 10:25]      Color Light Yellow / Appearance Clear / SG 1.014 / pH 6.0      Gluc Negative / Ketone Negative  / Bili Negative / Urobili <2 mg/dL       Blood Trace / Protein 30 mg/dL / Leuk Est Negative / Nitrite Negative      RBC 3 / WBC 1 / Hyaline 1 / Gran  / Sq Epi  / Non Sq Epi 0 / Bacteria Negative      Iron 40, TIBC 245, %sat 16      [09-15-22 @ 15:31]  Ferritin 1588      [09-15-22 @ 15:31]  PTH -- (Ca 9.0)      [09-15-22 @ 16:24]   192  HbA1c 8.1      [03-08-19 @ 07:21]  TSH 3.97      [09-15-22 @ 06:53]  Lipid: chol 69, , HDL 29, LDL --      [02-11-23 @ 08:31]    HBsAb Nonreact      [03-11-19 @ 14:45]  HBsAg Nonreact      [03-09-19 @ 06:26]  HCV 0.13, Nonreact      [03-11-19 @ 14:45]

## 2023-02-12 LAB
ANION GAP SERPL CALC-SCNC: 16 MMOL/L — HIGH (ref 7–14)
BLD GP AB SCN SERPL QL: SIGNIFICANT CHANGE UP
BUN SERPL-MCNC: 61 MG/DL — CRITICAL HIGH (ref 10–20)
CALCIUM SERPL-MCNC: 9 MG/DL — SIGNIFICANT CHANGE UP (ref 8.4–10.5)
CHLORIDE SERPL-SCNC: 97 MMOL/L — LOW (ref 98–110)
CO2 SERPL-SCNC: 24 MMOL/L — SIGNIFICANT CHANGE UP (ref 17–32)
CREAT SERPL-MCNC: 5.6 MG/DL — CRITICAL HIGH (ref 0.7–1.5)
EGFR: 10 ML/MIN/1.73M2 — LOW
GLUCOSE BLDC GLUCOMTR-MCNC: 109 MG/DL — HIGH (ref 70–99)
GLUCOSE BLDC GLUCOMTR-MCNC: 199 MG/DL — HIGH (ref 70–99)
GLUCOSE BLDC GLUCOMTR-MCNC: 217 MG/DL — HIGH (ref 70–99)
GLUCOSE BLDC GLUCOMTR-MCNC: 241 MG/DL — HIGH (ref 70–99)
GLUCOSE SERPL-MCNC: 95 MG/DL — SIGNIFICANT CHANGE UP (ref 70–99)
HCT VFR BLD CALC: 28.2 % — LOW (ref 42–52)
HGB BLD-MCNC: 8.4 G/DL — LOW (ref 14–18)
MCHC RBC-ENTMCNC: 25.9 PG — LOW (ref 27–31)
MCHC RBC-ENTMCNC: 29.8 G/DL — LOW (ref 32–37)
MCV RBC AUTO: 87 FL — SIGNIFICANT CHANGE UP (ref 80–94)
NRBC # BLD: 0 /100 WBCS — SIGNIFICANT CHANGE UP (ref 0–0)
PLATELET # BLD AUTO: 107 K/UL — LOW (ref 130–400)
POTASSIUM SERPL-MCNC: 4.5 MMOL/L — SIGNIFICANT CHANGE UP (ref 3.5–5)
POTASSIUM SERPL-SCNC: 4.5 MMOL/L — SIGNIFICANT CHANGE UP (ref 3.5–5)
RBC # BLD: 3.24 M/UL — LOW (ref 4.7–6.1)
RBC # FLD: 19.2 % — HIGH (ref 11.5–14.5)
SODIUM SERPL-SCNC: 137 MMOL/L — SIGNIFICANT CHANGE UP (ref 135–146)
WBC # BLD: 7.96 K/UL — SIGNIFICANT CHANGE UP (ref 4.8–10.8)
WBC # FLD AUTO: 7.96 K/UL — SIGNIFICANT CHANGE UP (ref 4.8–10.8)

## 2023-02-12 PROCEDURE — 99233 SBSQ HOSP IP/OBS HIGH 50: CPT

## 2023-02-12 RX ADMIN — Medication 667 MILLIGRAM(S): at 21:35

## 2023-02-12 RX ADMIN — Medication 667 MILLIGRAM(S): at 13:27

## 2023-02-12 RX ADMIN — Medication 80 MILLIGRAM(S): at 06:01

## 2023-02-12 RX ADMIN — INSULIN GLARGINE 20 UNIT(S): 100 INJECTION, SOLUTION SUBCUTANEOUS at 21:34

## 2023-02-12 RX ADMIN — ATORVASTATIN CALCIUM 40 MILLIGRAM(S): 80 TABLET, FILM COATED ORAL at 21:35

## 2023-02-12 RX ADMIN — AMIODARONE HYDROCHLORIDE 200 MILLIGRAM(S): 400 TABLET ORAL at 06:01

## 2023-02-12 RX ADMIN — Medication 50 MILLIGRAM(S): at 06:01

## 2023-02-12 RX ADMIN — Medication 667 MILLIGRAM(S): at 17:39

## 2023-02-12 RX ADMIN — PANTOPRAZOLE SODIUM 40 MILLIGRAM(S): 20 TABLET, DELAYED RELEASE ORAL at 06:00

## 2023-02-12 RX ADMIN — PANTOPRAZOLE SODIUM 40 MILLIGRAM(S): 20 TABLET, DELAYED RELEASE ORAL at 17:39

## 2023-02-12 RX ADMIN — SENNA PLUS 2 TABLET(S): 8.6 TABLET ORAL at 21:35

## 2023-02-12 RX ADMIN — Medication 667 MILLIGRAM(S): at 11:35

## 2023-02-12 RX ADMIN — Medication 80 MILLIGRAM(S): at 13:33

## 2023-02-12 NOTE — PROGRESS NOTE ADULT - SUBJECTIVE AND OBJECTIVE BOX
[place] NEPHROLOGY FOLLOW UP NOTE  --------------------------------------------------------------------------------  24 hour events/subjective: Patient examined. Appears comfortable.    PAST HISTORY  --------------------------------------------------------------------------------  No significant changes to PMH, PSH, FHx, SHx, unless otherwise noted    ALLERGIES & MEDICATIONS  --------------------------------------------------------------------------------  Allergies    No Known Allergies    Standing Inpatient Medications  aMIOdarone    Tablet 200 milliGRAM(s) Oral daily  atorvastatin 40 milliGRAM(s) Oral at bedtime  calcium acetate 667 milliGRAM(s) Oral four times a day with meals  dextrose 5%. 1000 milliLiter(s) IV Continuous <Continuous>  dextrose 5%. 1000 milliLiter(s) IV Continuous <Continuous>  dextrose 50% Injectable 25 Gram(s) IV Push once  dextrose 50% Injectable 12.5 Gram(s) IV Push once  dextrose 50% Injectable 25 Gram(s) IV Push once  epoetin nicky-epbx (RETACRIT) Injectable 20512 Unit(s) IV Push <User Schedule>  furosemide    Tablet 80 milliGRAM(s) Oral two times a day  glucagon  Injectable 1 milliGRAM(s) IntraMuscular once  insulin glargine Injectable (LANTUS) 20 Unit(s) SubCutaneous at bedtime  insulin lispro (ADMELOG) corrective regimen sliding scale   SubCutaneous three times a day before meals  metoprolol succinate ER 50 milliGRAM(s) Oral daily  pantoprazole  Injectable 40 milliGRAM(s) IV Push every 12 hours  senna 2 Tablet(s) Oral at bedtime    PRN Inpatient Medications  dextrose Oral Gel 15 Gram(s) Oral once PRN    VITALS/PHYSICAL EXAM  --------------------------------------------------------------------------------  T(C): 36.4 (02-12-23 @ 08:39), Max: 36.6 (02-11-23 @ 23:30)  HR: 60 (02-12-23 @ 08:39) (60 - 75)  BP: 125/59 (02-12-23 @ 08:39) (115/61 - 125/59)  RR: 18 (02-12-23 @ 08:39) (18 - 18)  SpO2: 97% (02-12-23 @ 08:39) (97% - 99%)    Physical Exam:  	Gen: NAD  	Pulm: CTA B/L  	CV: RRR, S1S2  	Abd: +BS, soft, nontender/nondistended  	: No suprapubic tenderness  	LE: Warm, no edema  	Vascular access: AVF    LABS/STUDIES  --------------------------------------------------------------------------------              8.4    7.96  >-----------<  107      [02-12-23 @ 05:43]              28.2     137  |  97  |  61  ----------------------------<  95      [02-12-23 @ 05:43]  4.5   |  24  |  5.6        Ca     9.0     [02-12-23 @ 05:43]      Mg     2.6     [02-11-23 @ 08:31]    TPro  5.8  /  Alb  4.1  /  TBili  0.5  /  DBili  x   /  AST  12  /  ALT  6   /  AlkPhos  57  [02-11-23 @ 08:31]    PT/INR: PT 14.00, INR 1.22       [02-11-23 @ 08:31]  PTT: 30.2       [02-11-23 @ 08:31]    Troponin 0.05      [02-11-23 @ 08:31]    Creatinine Trend:  SCr 5.6 [02-12 @ 05:43]  SCr 5.5 [02-11 @ 08:31]  SCr 4.9 [02-10 @ 22:42]  SCr 6.1 [02-10 @ 13:39]    Iron 40, TIBC 245, %sat 16      [09-15-22 @ 15:31]  Ferritin 1588      [09-15-22 @ 15:31]  PTH -- (Ca 9.0)      [09-15-22 @ 16:24]   192  HbA1c 8.1      [03-08-19 @ 07:21]  TSH 3.97      [09-15-22 @ 06:53]  Lipid: chol 69, , HDL 29, LDL --      [02-11-23 @ 08:31]

## 2023-02-12 NOTE — PROGRESS NOTE ADULT - SUBJECTIVE AND OBJECTIVE BOX
NINFA URBAN  82y Male    Patient is a 82y old  Male who presents with a chief complaint of weakness and SOB    INTERVAL HPI/OVERNIGHT EVENTS:    ROS: SOB improved. Pt denies fever, chills, chest pain, palpitations, nausea, vomiting, abdominal pain, dysuria, diarrhea, constipation, rash, muscle or joint pain.    Overnight events: No acute events overnight.    Vital Signs Last 24 Hrs  T(C): 36.2 (12 Feb 2023 15:53), Max: 36.6 (11 Feb 2023 23:30)  T(F): 97.2 (12 Feb 2023 15:53), Max: 97.9 (11 Feb 2023 23:30)  HR: 70 (12 Feb 2023 15:53) (60 - 75)  BP: 115/65 (12 Feb 2023 15:53) (115/61 - 125/59)  BP(mean): 85 (12 Feb 2023 05:54) (82 - 85)  RR: 18 (12 Feb 2023 15:53) (18 - 18)  SpO2: 99% (12 Feb 2023 15:53) (97% - 99%)    Parameters below as of 12 Feb 2023 15:53  Patient On (Oxygen Delivery Method): room air        No Known Allergies      MEDICATIONS  (STANDING):  aMIOdarone    Tablet 200 milliGRAM(s) Oral daily  atorvastatin 40 milliGRAM(s) Oral at bedtime  calcium acetate 667 milliGRAM(s) Oral four times a day with meals  dextrose 5%. 1000 milliLiter(s) (50 mL/Hr) IV Continuous <Continuous>  dextrose 5%. 1000 milliLiter(s) (100 mL/Hr) IV Continuous <Continuous>  dextrose 50% Injectable 25 Gram(s) IV Push once  dextrose 50% Injectable 12.5 Gram(s) IV Push once  dextrose 50% Injectable 25 Gram(s) IV Push once  epoetin nicky-epbx (RETACRIT) Injectable 17482 Unit(s) IV Push <User Schedule>  furosemide    Tablet 80 milliGRAM(s) Oral two times a day  glucagon  Injectable 1 milliGRAM(s) IntraMuscular once  insulin glargine Injectable (LANTUS) 20 Unit(s) SubCutaneous at bedtime  insulin lispro (ADMELOG) corrective regimen sliding scale   SubCutaneous three times a day before meals  metoprolol succinate ER 50 milliGRAM(s) Oral daily  pantoprazole  Injectable 40 milliGRAM(s) IV Push every 12 hours  senna 2 Tablet(s) Oral at bedtime    MEDICATIONS  (PRN):  dextrose Oral Gel 15 Gram(s) Oral once PRN Blood Glucose LESS THAN 70 milliGRAM(s)/deciliter      PHYSICAL EXAM:  General Appearance: NAD, normal for age and gender. 	  Neck: Normal JVP, no bruit.   Eyes: Conjunctiva clear, Extra Ocular muscles intact. No scleral icterus.  ENMT: Moist oral mucosa. No oral lesion.  Cardiovascular: Regular rate and rhythm S1 S2, No JVD, systolic murmur.  Respiratory: Bilateral air entry. No wheezes, rales or rhonchi.  Psychiatry: Alert and oriented x 3, Mood & affect appropriate.  Gastrointestinal:  Soft, Non-tender, Non-distended.  Neurologic: Non-focal deficits.  Musculoskeletal/ extremities: Move all extremities, No clubbing, cyanosis or edema.  Vascular: Peripheral pulses palpable bilaterally.  Skin/Integumen: No rashes, No ecchymoses, No cyanosis.    Telemetry - no events    LABS:                        8.4    7.96  )-----------( 107      ( 12 Feb 2023 05:43 )             28.2     02-12    137  |  97<L>  |  61<HH>  ----------------------------<  95  4.5   |  24  |  5.6<HH>    Ca    9.0      12 Feb 2023 05:43  Mg     2.6     02-11    TPro  5.8<L>  /  Alb  4.1  /  TBili  0.5  /  DBili  x   /  AST  12  /  ALT  6   /  AlkPhos  57  02-11    PT/INR - ( 11 Feb 2023 08:31 )   PT: 14.00 sec;   INR: 1.22 ratio    PTT - ( 11 Feb 2023 08:31 )  PTT:30.2 sec      RADIOLOGY & ADDITIONAL TESTS:

## 2023-02-12 NOTE — PATIENT PROFILE ADULT - FALL HARM RISK - HARM RISK INTERVENTIONS
No Assistance OOB with selected safe patient handling equipment/Communicate Risk of Fall with Harm to all staff/Discuss with provider need for PT consult/Monitor gait and stability/Provide patient with walking aids - walker, cane, crutches/Reinforce activity limits and safety measures with patient and family/Tailored Fall Risk Interventions/Visual Cue: Yellow wristband and red socks/Bed in lowest position, wheels locked, appropriate side rails in place/Call bell, personal items and telephone in reach/Instruct patient to call for assistance before getting out of bed or chair/Non-slip footwear when patient is out of bed/[place] to call system/Physically safe environment - no spills, clutter or unnecessary equipment/Purposeful Proactive Rounding/Room/bathroom lighting operational, light cord in reach

## 2023-02-12 NOTE — PATIENT PROFILE ADULT - HEALTH LITERACY
CVA (cerebral vascular accident)  left side weakness  Fibromyalgia    HTN (hypertension)    Hypertension    Neuropathy    Osteoarthritis    
no

## 2023-02-13 LAB
ALBUMIN SERPL ELPH-MCNC: 4.2 G/DL — SIGNIFICANT CHANGE UP (ref 3.5–5.2)
ALP SERPL-CCNC: 67 U/L — SIGNIFICANT CHANGE UP (ref 30–115)
ALT FLD-CCNC: 6 U/L — SIGNIFICANT CHANGE UP (ref 0–41)
ANION GAP SERPL CALC-SCNC: 17 MMOL/L — HIGH (ref 7–14)
APTT BLD: 31.6 SEC — SIGNIFICANT CHANGE UP (ref 27–39.2)
AST SERPL-CCNC: 11 U/L — SIGNIFICANT CHANGE UP (ref 0–41)
BASOPHILS # BLD AUTO: 0.03 K/UL — SIGNIFICANT CHANGE UP (ref 0–0.2)
BASOPHILS NFR BLD AUTO: 0.3 % — SIGNIFICANT CHANGE UP (ref 0–1)
BILIRUB SERPL-MCNC: 0.6 MG/DL — SIGNIFICANT CHANGE UP (ref 0.2–1.2)
BUN SERPL-MCNC: 69 MG/DL — CRITICAL HIGH (ref 10–20)
CALCIUM SERPL-MCNC: 9.5 MG/DL — SIGNIFICANT CHANGE UP (ref 8.4–10.5)
CHLORIDE SERPL-SCNC: 99 MMOL/L — SIGNIFICANT CHANGE UP (ref 98–110)
CO2 SERPL-SCNC: 25 MMOL/L — SIGNIFICANT CHANGE UP (ref 17–32)
CREAT SERPL-MCNC: 6.6 MG/DL — CRITICAL HIGH (ref 0.7–1.5)
EGFR: 8 ML/MIN/1.73M2 — LOW
EOSINOPHIL # BLD AUTO: 0.06 K/UL — SIGNIFICANT CHANGE UP (ref 0–0.7)
EOSINOPHIL NFR BLD AUTO: 0.7 % — SIGNIFICANT CHANGE UP (ref 0–8)
GLUCOSE BLDC GLUCOMTR-MCNC: 117 MG/DL — HIGH (ref 70–99)
GLUCOSE BLDC GLUCOMTR-MCNC: 127 MG/DL — HIGH (ref 70–99)
GLUCOSE BLDC GLUCOMTR-MCNC: 94 MG/DL — SIGNIFICANT CHANGE UP (ref 70–99)
GLUCOSE BLDC GLUCOMTR-MCNC: 98 MG/DL — SIGNIFICANT CHANGE UP (ref 70–99)
GLUCOSE SERPL-MCNC: 102 MG/DL — HIGH (ref 70–99)
HCT VFR BLD CALC: 28.8 % — LOW (ref 42–52)
HGB BLD-MCNC: 8.6 G/DL — LOW (ref 14–18)
IMM GRANULOCYTES NFR BLD AUTO: 0.3 % — SIGNIFICANT CHANGE UP (ref 0.1–0.3)
INR BLD: 1.22 RATIO — SIGNIFICANT CHANGE UP (ref 0.65–1.3)
LYMPHOCYTES # BLD AUTO: 1.69 K/UL — SIGNIFICANT CHANGE UP (ref 1.2–3.4)
LYMPHOCYTES # BLD AUTO: 18.6 % — LOW (ref 20.5–51.1)
MAGNESIUM SERPL-MCNC: 2.5 MG/DL — HIGH (ref 1.8–2.4)
MCHC RBC-ENTMCNC: 26.1 PG — LOW (ref 27–31)
MCHC RBC-ENTMCNC: 29.9 G/DL — LOW (ref 32–37)
MCV RBC AUTO: 87.5 FL — SIGNIFICANT CHANGE UP (ref 80–94)
MONOCYTES # BLD AUTO: 1.02 K/UL — HIGH (ref 0.1–0.6)
MONOCYTES NFR BLD AUTO: 11.2 % — HIGH (ref 1.7–9.3)
NEUTROPHILS # BLD AUTO: 6.26 K/UL — SIGNIFICANT CHANGE UP (ref 1.4–6.5)
NEUTROPHILS NFR BLD AUTO: 68.9 % — SIGNIFICANT CHANGE UP (ref 42.2–75.2)
NRBC # BLD: 0 /100 WBCS — SIGNIFICANT CHANGE UP (ref 0–0)
PHOSPHATE SERPL-MCNC: 5.3 MG/DL — HIGH (ref 2.1–4.9)
PLATELET # BLD AUTO: 123 K/UL — LOW (ref 130–400)
POTASSIUM SERPL-MCNC: 4.8 MMOL/L — SIGNIFICANT CHANGE UP (ref 3.5–5)
POTASSIUM SERPL-SCNC: 4.8 MMOL/L — SIGNIFICANT CHANGE UP (ref 3.5–5)
PROT SERPL-MCNC: 5.9 G/DL — LOW (ref 6–8)
PROTHROM AB SERPL-ACNC: 14 SEC — HIGH (ref 9.95–12.87)
RBC # BLD: 3.29 M/UL — LOW (ref 4.7–6.1)
RBC # FLD: 19.5 % — HIGH (ref 11.5–14.5)
SARS-COV-2 RNA SPEC QL NAA+PROBE: SIGNIFICANT CHANGE UP
SODIUM SERPL-SCNC: 141 MMOL/L — SIGNIFICANT CHANGE UP (ref 135–146)
WBC # BLD: 9.09 K/UL — SIGNIFICANT CHANGE UP (ref 4.8–10.8)
WBC # FLD AUTO: 9.09 K/UL — SIGNIFICANT CHANGE UP (ref 4.8–10.8)

## 2023-02-13 PROCEDURE — 99222 1ST HOSP IP/OBS MODERATE 55: CPT

## 2023-02-13 PROCEDURE — 99233 SBSQ HOSP IP/OBS HIGH 50: CPT

## 2023-02-13 RX ORDER — SOD SULF/SODIUM/NAHCO3/KCL/PEG
4000 SOLUTION, RECONSTITUTED, ORAL ORAL ONCE
Refills: 0 | Status: COMPLETED | OUTPATIENT
Start: 2023-02-13 | End: 2023-02-13

## 2023-02-13 RX ORDER — INSULIN GLARGINE 100 [IU]/ML
20 INJECTION, SOLUTION SUBCUTANEOUS AT BEDTIME
Refills: 0 | Status: DISCONTINUED | OUTPATIENT
Start: 2023-02-14 | End: 2023-02-15

## 2023-02-13 RX ORDER — INSULIN GLARGINE 100 [IU]/ML
10 INJECTION, SOLUTION SUBCUTANEOUS AT BEDTIME
Refills: 0 | Status: COMPLETED | OUTPATIENT
Start: 2023-02-13 | End: 2023-02-13

## 2023-02-13 RX ADMIN — AMIODARONE HYDROCHLORIDE 200 MILLIGRAM(S): 400 TABLET ORAL at 05:48

## 2023-02-13 RX ADMIN — Medication 20 MILLIGRAM(S): at 22:35

## 2023-02-13 RX ADMIN — SENNA PLUS 2 TABLET(S): 8.6 TABLET ORAL at 22:35

## 2023-02-13 RX ADMIN — Medication 4000 MILLILITER(S): at 14:26

## 2023-02-13 RX ADMIN — Medication 667 MILLIGRAM(S): at 17:31

## 2023-02-13 RX ADMIN — Medication 667 MILLIGRAM(S): at 12:30

## 2023-02-13 RX ADMIN — ATORVASTATIN CALCIUM 40 MILLIGRAM(S): 80 TABLET, FILM COATED ORAL at 22:35

## 2023-02-13 RX ADMIN — PANTOPRAZOLE SODIUM 40 MILLIGRAM(S): 20 TABLET, DELAYED RELEASE ORAL at 17:31

## 2023-02-13 RX ADMIN — Medication 667 MILLIGRAM(S): at 22:35

## 2023-02-13 RX ADMIN — Medication 50 MILLIGRAM(S): at 05:48

## 2023-02-13 RX ADMIN — PANTOPRAZOLE SODIUM 40 MILLIGRAM(S): 20 TABLET, DELAYED RELEASE ORAL at 05:48

## 2023-02-13 RX ADMIN — Medication 80 MILLIGRAM(S): at 14:11

## 2023-02-13 RX ADMIN — Medication 80 MILLIGRAM(S): at 05:48

## 2023-02-13 RX ADMIN — INSULIN GLARGINE 10 UNIT(S): 100 INJECTION, SOLUTION SUBCUTANEOUS at 22:39

## 2023-02-13 RX ADMIN — ERYTHROPOIETIN 20000 UNIT(S): 10000 INJECTION, SOLUTION INTRAVENOUS; SUBCUTANEOUS at 10:58

## 2023-02-13 NOTE — PROGRESS NOTE ADULT - SUBJECTIVE AND OBJECTIVE BOX
NEPHROLOGY FOLLOW UP NOTE    seen on HD  c/o walking "like a duck"    PAST MEDICAL & SURGICAL HISTORY:  HTN (Hypertension)    Diabetes Mellitus Type II    Hypercholesterolemia    CAD (Coronary Artery Disease)    History of PTCA  with stents 10 years ago ([facility])    Diabetes mellitus    CAD (coronary artery disease)    H/O hyperlipidemia    HTN - Hypertension    Renal insufficiency    Sleep apnea  does not use machine    GERD (gastroesophageal reflux disease)    BPH (Benign Prostatic Hyperplasia)    CHF (congestive heart failure)    Mitral valve regurgitation    S/P knee surgery  b/l arthroscopic    S/P tonsillectomy    S/P primary angioplasty with coronary stent  6-7 stents last one in 10/12    S/P appendectomy      Allergies:  No Known Allergies    Home Medications Reviewed    SOCIAL HISTORY:  Denies ETOH,Smoking,   FAMILY HISTORY:  No pertinent family history in first degree relatives          REVIEW OF SYSTEMS:  as above, all else negative    PHYSICAL EXAM:  Constitutional: sedated  HEENT: moist mm  Neck: No JVD + ecchymosis   Respiratory: b/l BS  Cardiovascular: S1, S2, RRR + murmur  Gastrointestinal: BS+, soft, NT/ND  Extremities: + peripheral edema  Neurological: sedated  : No CVA tenderness.    Skin: No rashes  Vascular Access: left arm AVF + [facility] Medications:   MEDICATIONS  (STANDING):  aMIOdarone    Tablet 200 milliGRAM(s) Oral daily  atorvastatin 40 milliGRAM(s) Oral at bedtime  bisacodyl 20 milliGRAM(s) Oral once  calcium acetate 667 milliGRAM(s) Oral four times a day with meals  dextrose 5%. 1000 milliLiter(s) (50 mL/Hr) IV Continuous <Continuous>  dextrose 5%. 1000 milliLiter(s) (100 mL/Hr) IV Continuous <Continuous>  dextrose 50% Injectable 25 Gram(s) IV Push once  dextrose 50% Injectable 12.5 Gram(s) IV Push once  dextrose 50% Injectable 25 Gram(s) IV Push once  epoetin nicky-epbx (RETACRIT) Injectable 66474 Unit(s) IV Push <User Schedule>  furosemide    Tablet 80 milliGRAM(s) Oral two times a day  glucagon  Injectable 1 milliGRAM(s) IntraMuscular once  insulin glargine Injectable (LANTUS) 20 Unit(s) SubCutaneous at bedtime  insulin lispro (ADMELOG) corrective regimen sliding scale   SubCutaneous three times a day before meals  metoprolol succinate ER 50 milliGRAM(s) Oral daily  pantoprazole  Injectable 40 milliGRAM(s) IV Push every 12 hours  senna 2 Tablet(s) Oral at bedtime        VITALS:  T(F): 97.3 (02-13-23 @ 15:56), Max: 98 (02-13-23 @ 04:55)  HR: 59 (02-13-23 @ 15:56)  BP: 141/65 (02-13-23 @ 15:56)  RR: 18 (02-13-23 @ 15:56)  SpO2: 100% (02-13-23 @ 15:56)  Wt(kg): --        LABS:  02-13    141  |  99  |  69<HH>  ----------------------------<  102<H>  4.8   |  25  |  6.6<HH>    Ca    9.5      13 Feb 2023 08:04  Phos  5.3     02-13  Mg     2.5     02-13    TPro  5.9<L>  /  Alb  4.2  /  TBili  0.6  /  DBili      /  AST  11  /  ALT  6   /  AlkPhos  67  02-13                          8.6    9.09  )-----------( 123      ( 13 Feb 2023 08:04 )             28.8       Urine Studies:        RADIOLOGY & ADDITIONAL STUDIES:

## 2023-02-13 NOTE — CONSULT NOTE ADULT - ASSESSMENT
Assessment:   82 yr old male with ESRD and HD twice a week, CAD/ sp CABG 3/22 and TAVR 22/22, presents to ED for dark tarry stool and SOB for 4 days.  Consulted for Risk stratification for Upper and Lower Endoscopy   Denies associated chest pain and exertional dyspnea  EKG RBBB, no ischemia changes noted  Revised cardiac risk index: 2  Endoscopy, low risk procedure  MET score unable to be obtained due to patient refusal to talk    Plan:  Risk stratification is mild to moderate depending on MET score    No further cardiac testing needed at this time       Assessment:   82 yr old male with ESRD and HD twice a week, CAD/ sp CABG 3/22 and TAVR, presents to ED for dark tarry stool and SOB for 4 days.  Consulted for Risk stratification for Upper and Lower Endoscopy   Unable to ascertain if anginal symptoms  EKG RBBB, no ischemia changes noted  Revised cardiac risk index: 2  Endoscopy, low risk procedure  MET score unable to be obtained due to patient refusal to talk    Plan:  Unable to accurately risk stratify this patient as he refused to speak to the team. From chart review, he is at moderate to high risk for this low risk procedure because we are unable to ascertain if he has any anginal symptoms or what his functional capacity is.

## 2023-02-13 NOTE — PROGRESS NOTE ADULT - ATTENDING COMMENTS
Increased cardiac risk, melena without recent endoscopic work up. Will schedule EGD and colonoscopy in order to safely resume DAPT.

## 2023-02-13 NOTE — CONSULT NOTE ADULT - ATTENDING COMMENTS
Briefly, 82 year old man for whom cardiology is consulted for preoperative risk stratification. Patient refused to speak with cardiology. Explained to him that we needed to risk stratify him for the procedure but he said he would only speak to the surgeon. From chart review, and not knowing his functional capacity or whether he has anginal symptoms, would deem him moderate to high risk for this low risk procedure. His RCRI is 2. Unable to say whether further cardiac testing would be helpful as patient does not wish to speak to cardiology. Please call back if this changes.

## 2023-02-13 NOTE — PHYSICAL THERAPY INITIAL EVALUATION ADULT - GENERAL OBSERVATIONS, REHAB EVAL
[id number] Pt received semifowlers in bed, NAD, deferring PT IE. Pt reports ambulating to/from bathroom overnight, now fatigued and wishing to rest. Pt educated importance of OOB mobility, role of PT and POC. Pt able to provide PLOF/PSH, see below. PT to f/u at next available session.

## 2023-02-13 NOTE — CONSULT NOTE ADULT - SUBJECTIVE AND OBJECTIVE BOX
Cardiology Consult Note     NINFA URBAN  82y  Male      Patient is a 82y old  Male who presents with a chief complaint of ESRD (10 Feb 2023 17:28)        PAST MEDICAL/SURGICAL HISTORY  PAST MEDICAL & SURGICAL HISTORY:  HTN (Hypertension)      Diabetes Mellitus Type II      Hypercholesterolemia      CAD (Coronary Artery Disease)      History of PTCA  with stents 10 years ago ([facility])      Diabetes mellitus      CAD (coronary artery disease)      H/O hyperlipidemia      HTN - Hypertension      Sleep apnea  does not use machine      GERD (gastroesophageal reflux disease)      BPH (Benign Prostatic Hyperplasia)      CHF (congestive heart failure)      Mitral valve regurgitation      Dialysis patient  HD- M/W/FR- [facility] [address].      Aortic stenosis      [place] (hard of hearing)      AICD (automatic cardioverter/defibrillator) present      S/P knee surgery  b/l arthroscopic      S/P tonsillectomy      S/P primary angioplasty with coronary stent  6-7 stents last one in 10/12      S/P appendectomy      S/P CABG (coronary artery bypass graft)  ? 2017      AICD (automatic cardioverter/defibrillator) present          REVIEW OF SYSTEMS: Unable to obtain due to pt not wishing to talk to us      T(C): 36.3 (02-13-23 @ 15:56), Max: 36.7 (02-13-23 @ 04:55)  HR: 59 (02-13-23 @ 15:56) (59 - 69)  BP: 141/65 (02-13-23 @ 15:56) (114/61 - 141/65)  RR: 18 (02-13-23 @ 15:56) (18 - 18)  SpO2: 100% (02-13-23 @ 15:56) (98% - 100%)  Wt(kg): --Vital Signs Last 24 Hrs  T(C): 36.3 (13 Feb 2023 15:56), Max: 36.7 (13 Feb 2023 04:55)  T(F): 97.3 (13 Feb 2023 15:56), Max: 98 (13 Feb 2023 04:55)  HR: 59 (13 Feb 2023 15:56) (59 - 69)  BP: 141/65 (13 Feb 2023 15:56) (114/61 - 141/65)  BP(mean): 94 (13 Feb 2023 15:56) (92 - 94)  RR: 18 (13 Feb 2023 15:56) (18 - 18)  SpO2: 100% (13 Feb 2023 15:56) (98% - 100%)    Parameters below as of 13 Feb 2023 15:56  Patient On (Oxygen Delivery Method): room air        PHYSICAL EXAM: Pt would only talk to his surgeon       Consultant(s) Notes Reviewed:  [x ] YES  [ ] NO  Care Discussed with Consultants/Other Providers [ x] YES  [ ] NO    LABS:  CBC   02-13-23 @ 08:04  Hematcorit 28.8  Hemoglobin 8.6  Mean Cell Hemoglobin 26.1  Platelet Count-Automated 123  RBC Count 3.29  Red Cell Distrib Width 19.5  Wbc Count 9.09      BMP  02-13-23 @ 08:04  Anion Gap. Serum 17  Blood Urea Nitrogen,Serm 69  Calcium, Total Serum 9.5  Carbon Dioxide, Serum 25  Chloride, Serum 99  Creatinine, Serum 6.6  eGFR in  --  eGFR in Non Afican American --  Gloucose, serum 102  Potassium, Serum 4.8  Sodium, Serum 141              02-12-23 @ 05:43  Anion Gap. Serum 16  Blood Urea Nitrogen,Serm 61  Calcium, Total Serum 9.0  Carbon Dioxide, Serum 24  Chloride, Serum 97  Creatinine, Serum 5.6  eGFR in  --  eGFR in Non Afican American --  Gloucose, serum 95  Potassium, Serum 4.5  Sodium, Serum 137              02-11-23 @ 08:31  Anion Gap. Serum 15  Blood Urea Nitrogen,Serm 55  Calcium, Total Serum 9.2  Carbon Dioxide, Serum 25  Chloride, Serum 100  Creatinine, Serum 5.5  eGFR in  --  eGFR in Non Afican American --  Gloucose, serum 91  Potassium, Serum 4.4  Sodium, Serum 140              02-10-23 @ 22:42  Anion Gap. Serum 14  Blood Urea Nitrogen,Serm 53  Calcium, Total Serum 9.1  Carbon Dioxide, Serum 26  Chloride, Serum 99  Creatinine, Serum 4.9  eGFR in  --  eGFR in Non Afican American --  Gloucose, serum 140  Potassium, Serum 4.5  Sodium, Serum 139                  CMP  02-13-23 @ 08:04  Jaimie Aminotransferase(ALT/SGPT)6  Albumin, Serum 4.2  Alkaline Phosphatase, Serum 67  Anion Gap, Serum 17  Aspartate Aminotransferase (AST/SGOT)11  Bilirubin Total, Serum 0.6  Blood Urea Nitrogen, Serum 69  Calcium,Total Serum 9.5  Carbon Dioxide, Serum 25  Chloride, Serum 99  Creatinine, Serum 6.6  eGFR if  --  eGFR if Non African American --  Glucose, Serum 102  Potassium, Serum 4.8  Protein Total, Serum 5.9  Sodium, Serum 141                      02-12-23 @ 05:43  Jaimie Aminotransferase(ALT/SGPT)--  Albumin, Serum --  Alkaline Phosphatase, Serum --  Anion Gap, Serum 16  Aspartate Aminotransferase (AST/SGOT)--  Bilirubin Total, Serum --  Blood Urea Nitrogen, Serum 61  Calcium,Total Serum 9.0  Carbon Dioxide, Serum 24  Chloride, Serum 97  Creatinine, Serum 5.6  eGFR if  --  eGFR if Non African American --  Glucose, Serum 95  Potassium, Serum 4.5  Protein Total, Serum --  Sodium, Serum 137                      02-11-23 @ 08:31  Jaimie Aminotransferase(ALT/SGPT)6  Albumin, Serum 4.1  Alkaline Phosphatase, Serum 57  Anion Gap, Serum 15  Aspartate Aminotransferase (AST/SGOT)12  Bilirubin Total, Serum 0.5  Blood Urea Nitrogen, Serum 55  Calcium,Total Serum 9.2  Carbon Dioxide, Serum 25  Chloride, Serum 100  Creatinine, Serum 5.5  eGFR if  --  eGFR if Non African American --  Glucose, Serum 91  Potassium, Serum 4.4  Protein Total, Serum 5.8  Sodium, Serum 140                      02-10-23 @ 22:42  Jaimie Aminotransferase(ALT/SGPT)--  Albumin, Serum --  Alkaline Phosphatase, Serum --  Anion Gap, Serum 14  Aspartate Aminotransferase (AST/SGOT)--  Bilirubin Total, Serum --  Blood Urea Nitrogen, Serum 53  Calcium,Total Serum 9.1  Carbon Dioxide, Serum 26  Chloride, Serum 99  Creatinine, Serum 4.9  eGFR if  --  eGFR if Non African American --  Glucose, Serum 140  Potassium, Serum 4.5  Protein Total, Serum --  Sodium, Serum 139                          PT/INR  PT/INR  02-11-23 @ 08:31  INR 1.22  Prothrombin Time Comment --  Prothrobin Time, [id number]      Amylase/Lipase             Cardiology Consult Note     NINFA URBAN  82y  Male      Patient is a 82y old  Male who presents with a chief complaint of shortness of breath.    Patient refused evaluation. Unable to get further history.        PAST MEDICAL/SURGICAL HISTORY  PAST MEDICAL & SURGICAL HISTORY:  HTN (Hypertension)      Diabetes Mellitus Type II      Hypercholesterolemia      CAD (Coronary Artery Disease)      History of PTCA  with stents 10 years ago ([facility])      Diabetes mellitus      CAD (coronary artery disease)      H/O hyperlipidemia      HTN - Hypertension      Sleep apnea  does not use machine      GERD (gastroesophageal reflux disease)      BPH (Benign Prostatic Hyperplasia)      CHF (congestive heart failure)      Mitral valve regurgitation      Dialysis patient  HD- M/W/FR- [facility] [address].      Aortic stenosis      [place] (hard of hearing)      AICD (automatic cardioverter/defibrillator) present      S/P knee surgery  b/l arthroscopic      S/P tonsillectomy      S/P primary angioplasty with coronary stent  6-7 stents last one in 10/12      S/P appendectomy      S/P CABG (coronary artery bypass graft)  ? 2017      AICD (automatic cardioverter/defibrillator) present      Family history: no family history of premature CAD or SCD    Social history: unable to obtain    REVIEW OF SYSTEMS: Unable to obtain due to pt not wishing to talk to us      T(C): 36.3 (02-13-23 @ 15:56), Max: 36.7 (02-13-23 @ 04:55)  HR: 59 (02-13-23 @ 15:56) (59 - 69)  BP: 141/65 (02-13-23 @ 15:56) (114/61 - 141/65)  RR: 18 (02-13-23 @ 15:56) (18 - 18)  SpO2: 100% (02-13-23 @ 15:56) (98% - 100%)  Wt(kg): --Vital Signs Last 24 Hrs  T(C): 36.3 (13 Feb 2023 15:56), Max: 36.7 (13 Feb 2023 04:55)  T(F): 97.3 (13 Feb 2023 15:56), Max: 98 (13 Feb 2023 04:55)  HR: 59 (13 Feb 2023 15:56) (59 - 69)  BP: 141/65 (13 Feb 2023 15:56) (114/61 - 141/65)  BP(mean): 94 (13 Feb 2023 15:56) (92 - 94)  RR: 18 (13 Feb 2023 15:56) (18 - 18)  SpO2: 100% (13 Feb 2023 15:56) (98% - 100%)    Parameters below as of 13 Feb 2023 15:56  Patient On (Oxygen Delivery Method): room air        PHYSICAL EXAM: Pt would only talk to his surgeon       Consultant(s) Notes Reviewed:  [x ] YES  [ ] NO  Care Discussed with Consultants/Other Providers [ x] YES  [ ] NO    LABS:  CBC   02-13-23 @ 08:04  Hematcorit 28.8  Hemoglobin 8.6  Mean Cell Hemoglobin 26.1  Platelet Count-Automated 123  RBC Count 3.29  Red Cell Distrib Width 19.5  Wbc Count 9.09      BMP  02-13-23 @ 08:04  Anion Gap. Serum 17  Blood Urea Nitrogen,Serm 69  Calcium, Total Serum 9.5  Carbon Dioxide, Serum 25  Chloride, Serum 99  Creatinine, Serum 6.6  eGFR in  --  eGFR in Non Afican American --  Gloucose, serum 102  Potassium, Serum 4.8  Sodium, Serum 141              02-12-23 @ 05:43  Anion Gap. Serum 16  Blood Urea Nitrogen,Serm 61  Calcium, Total Serum 9.0  Carbon Dioxide, Serum 24  Chloride, Serum 97  Creatinine, Serum 5.6  eGFR in  --  eGFR in Non Afican American --  Gloucose, serum 95  Potassium, Serum 4.5  Sodium, Serum 137              02-11-23 @ 08:31  Anion Gap. Serum 15  Blood Urea Nitrogen,Serm 55  Calcium, Total Serum 9.2  Carbon Dioxide, Serum 25  Chloride, Serum 100  Creatinine, Serum 5.5  eGFR in  --  eGFR in Non Afican American --  Gloucose, serum 91  Potassium, Serum 4.4  Sodium, Serum 140              02-10-23 @ 22:42  Anion Gap. Serum 14  Blood Urea Nitrogen,Serm 53  Calcium, Total Serum 9.1  Carbon Dioxide, Serum 26  Chloride, Serum 99  Creatinine, Serum 4.9  eGFR in  --  eGFR in Non Afican American --  Gloucose, serum 140  Potassium, Serum 4.5  Sodium, Serum 139                  CMP  02-13-23 @ 08:04  Jaimie Aminotransferase(ALT/SGPT)6  Albumin, Serum 4.2  Alkaline Phosphatase, Serum 67  Anion Gap, Serum 17  Aspartate Aminotransferase (AST/SGOT)11  Bilirubin Total, Serum 0.6  Blood Urea Nitrogen, Serum 69  Calcium,Total Serum 9.5  Carbon Dioxide, Serum 25  Chloride, Serum 99  Creatinine, Serum 6.6  eGFR if  --  eGFR if Non African American --  Glucose, Serum 102  Potassium, Serum 4.8  Protein Total, Serum 5.9  Sodium, Serum 141                      02-12-23 @ 05:43  Jaimie Aminotransferase(ALT/SGPT)--  Albumin, Serum --  Alkaline Phosphatase, Serum --  Anion Gap, Serum 16  Aspartate Aminotransferase (AST/SGOT)--  Bilirubin Total, Serum --  Blood Urea Nitrogen, Serum 61  Calcium,Total Serum 9.0  Carbon Dioxide, Serum 24  Chloride, Serum 97  Creatinine, Serum 5.6  eGFR if  --  eGFR if Non African American --  Glucose, Serum 95  Potassium, Serum 4.5  Protein Total, Serum --  Sodium, Serum 137                      02-11-23 @ 08:31  Jaimie Aminotransferase(ALT/SGPT)6  Albumin, Serum 4.1  Alkaline Phosphatase, Serum 57  Anion Gap, Serum 15  Aspartate Aminotransferase (AST/SGOT)12  Bilirubin Total, Serum 0.5  Blood Urea Nitrogen, Serum 55  Calcium,Total Serum 9.2  Carbon Dioxide, Serum 25  Chloride, Serum 100  Creatinine, Serum 5.5  eGFR if  --  eGFR if Non African American --  Glucose, Serum 91  Potassium, Serum 4.4  Protein Total, Serum 5.8  Sodium, Serum 140                      02-10-23 @ 22:42  Jaimie Aminotransferase(ALT/SGPT)--  Albumin, Serum --  Alkaline Phosphatase, Serum --  Anion Gap, Serum 14  Aspartate Aminotransferase (AST/SGOT)--  Bilirubin Total, Serum --  Blood Urea Nitrogen, Serum 53  Calcium,Total Serum 9.1  Carbon Dioxide, Serum 26  Chloride, Serum 99  Creatinine, Serum 4.9  eGFR if  --  eGFR if Non African American --  Glucose, Serum 140  Potassium, Serum 4.5  Protein Total, Serum --  Sodium, Serum 139                          PT/INR  PT/INR  02-11-23 @ 08:31  INR 1.22  Prothrombin Time Comment --  Prothrobin Time, [id number]      Amylase/Lipase

## 2023-02-13 NOTE — PROGRESS NOTE ADULT - SUBJECTIVE AND OBJECTIVE BOX
NINFA URBAN  82y Male    Patient is a 82y old  Male who presents with a chief complaint of weakness and SOB    INTERVAL HPI/OVERNIGHT EVENTS:    ROS: No further bleed at this time. Pt denies fever, chills, SOB, chest pain, palpitations, nausea, vomiting, abdominal pain, dysuria, diarrhea, constipation, rash, muscle or joint pain.    Overnight events: No acute events overnight.    Vital Signs Last 24 Hrs  T(C): 36.3 (13 Feb 2023 15:56), Max: 36.7 (13 Feb 2023 04:55)  T(F): 97.3 (13 Feb 2023 15:56), Max: 98 (13 Feb 2023 04:55)  HR: 59 (13 Feb 2023 15:56) (59 - 69)  BP: 141/65 (13 Feb 2023 15:56) (114/61 - 141/65)  BP(mean): 94 (13 Feb 2023 15:56) (92 - 94)  ABP: --  ABP(mean): --  RR: 18 (13 Feb 2023 15:56) (18 - 18)  SpO2: 100% (13 Feb 2023 15:56) (98% - 100%)    O2 Parameters below as of 13 Feb 2023 15:56  Patient On (Oxygen Delivery Method): room air    No Known Allergies    MEDICATIONS  (STANDING):  aMIOdarone    Tablet 200 milliGRAM(s) Oral daily  atorvastatin 40 milliGRAM(s) Oral at bedtime  bisacodyl 20 milliGRAM(s) Oral once  calcium acetate 667 milliGRAM(s) Oral four times a day with meals  dextrose 5%. 1000 milliLiter(s) (50 mL/Hr) IV Continuous <Continuous>  dextrose 5%. 1000 milliLiter(s) (100 mL/Hr) IV Continuous <Continuous>  dextrose 50% Injectable 25 Gram(s) IV Push once  dextrose 50% Injectable 12.5 Gram(s) IV Push once  dextrose 50% Injectable 25 Gram(s) IV Push once  epoetin nicky-epbx (RETACRIT) Injectable 24951 Unit(s) IV Push <User Schedule>  furosemide    Tablet 80 milliGRAM(s) Oral two times a day  glucagon  Injectable 1 milliGRAM(s) IntraMuscular once  insulin glargine Injectable (LANTUS) 20 Unit(s) SubCutaneous at bedtime  insulin lispro (ADMELOG) corrective regimen sliding scale   SubCutaneous three times a day before meals  metoprolol succinate ER 50 milliGRAM(s) Oral daily  pantoprazole  Injectable 40 milliGRAM(s) IV Push every 12 hours  senna 2 Tablet(s) Oral at bedtime    MEDICATIONS  (PRN):  dextrose Oral Gel 15 Gram(s) Oral once PRN Blood Glucose LESS THAN 70 milliGRAM(s)/deciliter      PHYSICAL EXAM:  General Appearance: NAD, normal for age and gender. 	  Neck: Normal JVP, no bruit.   Eyes: Conjunctiva clear, Extra Ocular muscles intact. No scleral icterus.  ENMT: Moist oral mucosa. No oral lesion.  Cardiovascular: Regular rate and rhythm S1 S2, No JVD, systolic murmur.  Respiratory: Bilateral air entry. No wheezes, rales or rhonchi.  Psychiatry: Alert and oriented x 3, Mood & affect appropriate.  Gastrointestinal:  Soft, Non-tender, Non-distended.  Neurologic: Non-focal deficits.  Musculoskeletal/ extremities: Move all extremities, No clubbing, cyanosis or edema.  Vascular: Peripheral pulses palpable bilaterally.  Skin/Integumen: No rashes, No ecchymoses, No cyanosis.    MEDICATIONS  (STANDING):  aMIOdarone    Tablet 200 milliGRAM(s) Oral daily  atorvastatin 40 milliGRAM(s) Oral at bedtime  bisacodyl 20 milliGRAM(s) Oral once  calcium acetate 667 milliGRAM(s) Oral four times a day with meals  dextrose 5%. 1000 milliLiter(s) (50 mL/Hr) IV Continuous <Continuous>  dextrose 5%. 1000 milliLiter(s) (100 mL/Hr) IV Continuous <Continuous>  dextrose 50% Injectable 25 Gram(s) IV Push once  dextrose 50% Injectable 12.5 Gram(s) IV Push once  dextrose 50% Injectable 25 Gram(s) IV Push once  epoetin nicky-epbx (RETACRIT) Injectable 56853 Unit(s) IV Push <User Schedule>  furosemide    Tablet 80 milliGRAM(s) Oral two times a day  glucagon  Injectable 1 milliGRAM(s) IntraMuscular once  insulin glargine Injectable (LANTUS) 20 Unit(s) SubCutaneous at bedtime  insulin lispro (ADMELOG) corrective regimen sliding scale   SubCutaneous three times a day before meals  metoprolol succinate ER 50 milliGRAM(s) Oral daily  pantoprazole  Injectable 40 milliGRAM(s) IV Push every 12 hours  senna 2 Tablet(s) Oral at bedtime    MEDICATIONS  (PRN):  dextrose Oral Gel 15 Gram(s) Oral once PRN Blood Glucose LESS THAN 70 milliGRAM(s)/deciliter      RADIOLOGY & ADDITIONAL TESTS:

## 2023-02-13 NOTE — PHYSICAL THERAPY INITIAL EVALUATION ADULT - ADDITIONAL COMMENTS
Pt lives with wife and son in house with no ANGELA, no steps inside. Pt ambulates independently but reports recently feeling unsteady. No hx of falls.

## 2023-02-13 NOTE — PROGRESS NOTE ADULT - SUBJECTIVE AND OBJECTIVE BOX
Gastroenterology progress note:     Patient is a 82y old  Male who presents with a chief complaint of SOB secondary to anemia (13 Feb 2023 16:40)       Admitted on: 02-10-23    We are following the patient for:       Interval History:    No acute events overnight.   - Diet -   - last BM -   - Abdominal pain -       PAST MEDICAL & SURGICAL HISTORY:  HTN (Hypertension)      Diabetes Mellitus Type II      Hypercholesterolemia      CAD (Coronary Artery Disease)      History of PTCA  with [facility]ents 10 years ago ([facility])      Diabetes mellitus      CAD (coronary artery disease)      H/O hyperlipidemia      HTN - Hypertension      Sleep apnea  does not use machine      GERD (gastroesophageal reflux disease)      BPH (Benign Prostatic Hyperplasia)      CHF (congestive heart failure)      Mitral valve regurgitation      Dialysis patient  HD- M/W/FR- [facility] [address].      Aortic stenosis      [place] (hard of hearing)      AICD (automatic cardioverter/defibrillator) present      S/P knee surgery  b/l arthroscopic      S/P tonsillectomy      S/P primary angioplasty with coronary stent  6-7 stents last one in 10/12      S/P appendectomy      S/P CABG (coronary artery bypass graft)  ? 2017      AICD (automatic cardioverter/defibrillator) present          MEDICATIONS  (STANDING):  aMIOdarone    Tablet 200 milliGRAM(s) Oral daily  atorvastatin 40 milliGRAM(s) Oral at bedtime  bisacodyl 20 milliGRAM(s) Oral once  calcium acetate 667 milliGRAM(s) Oral four times a day with meals  dextrose 5%. 1000 milliLiter(s) (50 mL/Hr) IV Continuous <Continuous>  dextrose 5%. 1000 milliLiter(s) (100 mL/Hr) IV Continuous <Continuous>  dextrose 50% Injectable 25 Gram(s) IV Push once  dextrose 50% Injectable 12.5 Gram(s) IV Push once  dextrose 50% Injectable 25 Gram(s) IV Push once  epoetin nicky-epbx (RETACRIT) Injectable 32460 Unit(s) IV Push <User Schedule>  furosemide    Tablet 80 milliGRAM(s) Oral two times a day  glucagon  Injectable 1 milliGRAM(s) IntraMuscular once  insulin glargine Injectable (LANTUS) 20 Unit(s) SubCutaneous at bedtime  insulin lispro (ADMELOG) corrective regimen sliding scale   SubCutaneous three times a day before meals  metoprolol succinate ER 50 milliGRAM(s) Oral daily  pantoprazole  Injectable 40 milliGRAM(s) IV Push every 12 hours  senna 2 Tablet(s) Oral at bedtime    MEDICATIONS  (PRN):  dextrose Oral Gel 15 Gram(s) Oral once PRN Blood Glucose LESS THAN 70 milliGRAM(s)/deciliter      Allergies  No Known Allergies      Review of Systems:   Cardiovascular:  No Chest Pain, No Palpitations  Respiratory:  No Cough, No Dyspnea  Gastrointestinal:  As described in HPI  Skin:  No Skin Lesions, No Jaundice  Neuro:  No Syncope, No Dizziness    Physical Examination:  T(C): 36.3 (02-13-23 @ 15:56), Max: 36.7 (02-13-23 @ 04:55)  HR: 59 (02-13-23 @ 15:56) (59 - 69)  BP: 141/65 (02-13-23 @ 15:56) (114/61 - 141/65)  RR: 18 (02-13-23 @ 15:56) (18 - 18)  SpO2: 100% (02-13-23 @ 15:56) (98% - 100%)        GENERAL: AAOx3, no acute distress.  HEAD:  Atraumatic, Normocephalic  EYES: conjunctiva and sclera clear  NECK: Supple, no JVD or thyromegaly  CHEST/LUNG: Clear to auscultation bilaterally; No wheeze, rhonchi, or rales  HEART: Regular rate and rhythm; normal S1, S2, No murmurs.  ABDOMEN: Soft, nontender, nondistended; Bowel sounds present  NEUROLOGY: No asterixis or tremor.   SKIN: Intact, no jaundice     Data:                        8.6    9.09  )-----------( 123      ( 13 Feb 2023 08:04 )             28.8     Hgb trend:  8.6  02-13-23 @ 08:04  8.4  02-12-23 @ 05:43  8.6  02-11-23 @ 16:40  8.7  02-11-23 @ 08:31  8.3  02-11-23 @ 00:29  8.5  02-10-23 @ 22:42      02-10-23 @ 07:01  -  02-11-23 @ 07:00  --------------------------------------------------------  IN: 706 mL      02-13    141  |  99  |  69<HH>  ----------------------------<  102<H>  4.8   |  25  |  6.6<HH>    Ca    9.5      13 Feb 2023 08:04  Phos  5.3     02-13  Mg     2.5     02-13    TPro  5.9<L>  /  Alb  4.2  /  TBili  0.6  /  DBili  x   /  AST  11  /  ALT  6   /  AlkPhos  67  02-13    Liver panel trend:  TBili 0.6   /   AST 11   /   ALT 6   /   AlkP 67   /   Tptn 5.9   /   Alb 4.2    /   DBili --      02-13  TBili 0.5   /   AST 12   /   ALT 6   /   AlkP 57   /   Tptn 5.8   /   Alb 4.1    /   DBili --      02-11  TBili 0.4   /   AST 12   /   ALT 7   /   AlkP 70   /   Tptn 6.1   /   Alb 4.3    /   DBili --      02-10             Radiology:

## 2023-02-14 ENCOUNTER — RESULT REVIEW (OUTPATIENT)
Age: 83
End: 2023-02-14

## 2023-02-14 ENCOUNTER — TRANSCRIPTION ENCOUNTER (OUTPATIENT)
Age: 83
End: 2023-02-14

## 2023-02-14 LAB
ALBUMIN SERPL ELPH-MCNC: 4.2 G/DL — SIGNIFICANT CHANGE UP (ref 3.5–5.2)
ALP SERPL-CCNC: 55 U/L — SIGNIFICANT CHANGE UP (ref 30–115)
ALT FLD-CCNC: 6 U/L — SIGNIFICANT CHANGE UP (ref 0–41)
ANION GAP SERPL CALC-SCNC: 16 MMOL/L — HIGH (ref 7–14)
AST SERPL-CCNC: 12 U/L — SIGNIFICANT CHANGE UP (ref 0–41)
BASOPHILS # BLD AUTO: 0.03 K/UL — SIGNIFICANT CHANGE UP (ref 0–0.2)
BASOPHILS NFR BLD AUTO: 0.5 % — SIGNIFICANT CHANGE UP (ref 0–1)
BILIRUB SERPL-MCNC: 0.8 MG/DL — SIGNIFICANT CHANGE UP (ref 0.2–1.2)
BUN SERPL-MCNC: 48 MG/DL — HIGH (ref 10–20)
CALCIUM SERPL-MCNC: 9.5 MG/DL — SIGNIFICANT CHANGE UP (ref 8.4–10.5)
CHLORIDE SERPL-SCNC: 100 MMOL/L — SIGNIFICANT CHANGE UP (ref 98–110)
CO2 SERPL-SCNC: 28 MMOL/L — SIGNIFICANT CHANGE UP (ref 17–32)
CREAT SERPL-MCNC: 5.5 MG/DL — CRITICAL HIGH (ref 0.7–1.5)
EGFR: 10 ML/MIN/1.73M2 — LOW
EOSINOPHIL # BLD AUTO: 0.1 K/UL — SIGNIFICANT CHANGE UP (ref 0–0.7)
EOSINOPHIL NFR BLD AUTO: 1.5 % — SIGNIFICANT CHANGE UP (ref 0–8)
GLUCOSE BLDC GLUCOMTR-MCNC: 152 MG/DL — HIGH (ref 70–99)
GLUCOSE BLDC GLUCOMTR-MCNC: 198 MG/DL — HIGH (ref 70–99)
GLUCOSE BLDC GLUCOMTR-MCNC: 75 MG/DL — SIGNIFICANT CHANGE UP (ref 70–99)
GLUCOSE BLDC GLUCOMTR-MCNC: 88 MG/DL — SIGNIFICANT CHANGE UP (ref 70–99)
GLUCOSE SERPL-MCNC: 79 MG/DL — SIGNIFICANT CHANGE UP (ref 70–99)
HCT VFR BLD CALC: 29 % — LOW (ref 42–52)
HGB BLD-MCNC: 8.6 G/DL — LOW (ref 14–18)
IMM GRANULOCYTES NFR BLD AUTO: 0.3 % — SIGNIFICANT CHANGE UP (ref 0.1–0.3)
LYMPHOCYTES # BLD AUTO: 1.65 K/UL — SIGNIFICANT CHANGE UP (ref 1.2–3.4)
LYMPHOCYTES # BLD AUTO: 25.4 % — SIGNIFICANT CHANGE UP (ref 20.5–51.1)
MAGNESIUM SERPL-MCNC: 2.1 MG/DL — SIGNIFICANT CHANGE UP (ref 1.8–2.4)
MCHC RBC-ENTMCNC: 26.1 PG — LOW (ref 27–31)
MCHC RBC-ENTMCNC: 29.7 G/DL — LOW (ref 32–37)
MCV RBC AUTO: 88.1 FL — SIGNIFICANT CHANGE UP (ref 80–94)
MONOCYTES # BLD AUTO: 0.83 K/UL — HIGH (ref 0.1–0.6)
MONOCYTES NFR BLD AUTO: 12.8 % — HIGH (ref 1.7–9.3)
NEUTROPHILS # BLD AUTO: 3.87 K/UL — SIGNIFICANT CHANGE UP (ref 1.4–6.5)
NEUTROPHILS NFR BLD AUTO: 59.5 % — SIGNIFICANT CHANGE UP (ref 42.2–75.2)
NRBC # BLD: 0 /100 WBCS — SIGNIFICANT CHANGE UP (ref 0–0)
PLATELET # BLD AUTO: 116 K/UL — LOW (ref 130–400)
POTASSIUM SERPL-MCNC: 4.5 MMOL/L — SIGNIFICANT CHANGE UP (ref 3.5–5)
POTASSIUM SERPL-SCNC: 4.5 MMOL/L — SIGNIFICANT CHANGE UP (ref 3.5–5)
PROT SERPL-MCNC: 6 G/DL — SIGNIFICANT CHANGE UP (ref 6–8)
RBC # BLD: 3.29 M/UL — LOW (ref 4.7–6.1)
RBC # FLD: 19.8 % — HIGH (ref 11.5–14.5)
SODIUM SERPL-SCNC: 144 MMOL/L — SIGNIFICANT CHANGE UP (ref 135–146)
WBC # BLD: 6.5 K/UL — SIGNIFICANT CHANGE UP (ref 4.8–10.8)
WBC # FLD AUTO: 6.5 K/UL — SIGNIFICANT CHANGE UP (ref 4.8–10.8)

## 2023-02-14 PROCEDURE — 43239 EGD BIOPSY SINGLE/MULTIPLE: CPT | Mod: XS

## 2023-02-14 PROCEDURE — 99232 SBSQ HOSP IP/OBS MODERATE 35: CPT

## 2023-02-14 PROCEDURE — 88305 TISSUE EXAM BY PATHOLOGIST: CPT | Mod: 26

## 2023-02-14 PROCEDURE — 45380 COLONOSCOPY AND BIOPSY: CPT

## 2023-02-14 PROCEDURE — 88312 SPECIAL STAINS GROUP 1: CPT | Mod: 26

## 2023-02-14 RX ORDER — SODIUM CHLORIDE 9 MG/ML
1000 INJECTION, SOLUTION INTRAVENOUS
Refills: 0 | Status: DISCONTINUED | OUTPATIENT
Start: 2023-02-14 | End: 2023-02-15

## 2023-02-14 RX ORDER — CLOPIDOGREL BISULFATE 75 MG/1
75 TABLET, FILM COATED ORAL DAILY
Refills: 0 | Status: DISCONTINUED | OUTPATIENT
Start: 2023-02-15 | End: 2023-02-15

## 2023-02-14 RX ORDER — HEPARIN SODIUM 5000 [USP'U]/ML
5000 INJECTION INTRAVENOUS; SUBCUTANEOUS EVERY 12 HOURS
Refills: 0 | Status: DISCONTINUED | OUTPATIENT
Start: 2023-02-14 | End: 2023-02-15

## 2023-02-14 RX ORDER — PANTOPRAZOLE SODIUM 20 MG/1
40 TABLET, DELAYED RELEASE ORAL
Refills: 0 | Status: DISCONTINUED | OUTPATIENT
Start: 2023-02-14 | End: 2023-02-15

## 2023-02-14 RX ADMIN — PANTOPRAZOLE SODIUM 40 MILLIGRAM(S): 20 TABLET, DELAYED RELEASE ORAL at 18:33

## 2023-02-14 RX ADMIN — AMIODARONE HYDROCHLORIDE 200 MILLIGRAM(S): 400 TABLET ORAL at 06:12

## 2023-02-14 RX ADMIN — ATORVASTATIN CALCIUM 40 MILLIGRAM(S): 80 TABLET, FILM COATED ORAL at 22:11

## 2023-02-14 RX ADMIN — INSULIN GLARGINE 20 UNIT(S): 100 INJECTION, SOLUTION SUBCUTANEOUS at 22:23

## 2023-02-14 RX ADMIN — Medication 50 MILLIGRAM(S): at 06:12

## 2023-02-14 RX ADMIN — PANTOPRAZOLE SODIUM 40 MILLIGRAM(S): 20 TABLET, DELAYED RELEASE ORAL at 06:12

## 2023-02-14 RX ADMIN — Medication 667 MILLIGRAM(S): at 22:11

## 2023-02-14 RX ADMIN — Medication 80 MILLIGRAM(S): at 06:12

## 2023-02-14 RX ADMIN — SODIUM CHLORIDE 100 MILLILITER(S): 9 INJECTION, SOLUTION INTRAVENOUS at 15:00

## 2023-02-14 NOTE — PROGRESS NOTE ADULT - SUBJECTIVE AND OBJECTIVE BOX
T H I S   I S    N O  T   A    F I N A L I Z E D   N O T NINFA ROBINS  82y, Male  Allergy: No Known Allergies    Hospital Day: 4d    Patient seen and examined earlier today.     PMH/PSH:  PAST MEDICAL & SURGICAL HISTORY:  HTN (Hypertension)      Diabetes Mellitus Type II      Hypercholesterolemia      CAD (Coronary Artery Disease)      History of PTCA  with stents 10 years ago ([facility])      Diabetes mellitus      CAD (coronary artery disease)      H/O hyperlipidemia      HTN - Hypertension      Sleep apnea  does not use machine      GERD (gastroesophageal reflux disease)      BPH (Benign Prostatic Hyperplasia)      CHF (congestive heart failure)      Mitral valve regurgitation      Dialysis patient  HD- M/W/FR- [facility] [address].      Aortic stenosis      [place] (hard of hearing)      AICD (automatic cardioverter/defibrillator) present      S/P knee surgery  b/l arthroscopic      S/P tonsillectomy      S/P primary angioplasty with coronary stent  6-7 stents last one in 10/12      S/P appendectomy      S/P CABG (coronary artery bypass graft)  ? 2017      AICD (automatic cardioverter/defibrillator) present          LAST 24-Hr EVENTS:    VITALS:  T(F): 97.2 (02-14-23 @ 15:30), Max: 97.5 (02-14-23 @ 08:35)  HR: 76 (02-14-23 @ 15:32)  BP: 175/81 (02-14-23 @ 15:32) (116/62 - 175/81)  RR: 20 (02-14-23 @ 15:32)  SpO2: 97% (02-14-23 @ 15:32)          TESTS & MEASUREMENTS:  Weight/Height/BMI  Height (cm): 177.8 (02-14-23 @ 15:32)  Weight (kg): 98.9 (02-14-23 @ 15:32)  BMI (kg/m2): 31.3 (02-14-23 @ 15:32)                          8.6    6.50  )-----------( 116      ( 14 Feb 2023 07:57 )             29.0     PT/INR - ( 13 Feb 2023 17:50 )   PT: 14.00 sec;   INR: 1.22 ratio         PTT - ( 13 Feb 2023 17:50 )  PTT:31.6 sec  INR: 1.22 ratio (02-13-23 @ 17:50)  INR: 1.22 ratio (02-11-23 @ 08:31)    02-14    144  |  100  |  48<H>  ----------------------------<  79  4.5   |  28  |  5.5<HH>    Ca    9.5      14 Feb 2023 07:57  Phos  5.3     02-13  Mg     2.1     02-14    TPro  6.0  /  Alb  4.2  /  TBili  0.8  /  DBili  x   /  AST  12  /  ALT  6   /  AlkPhos  55  02-14    LIVER FUNCTIONS - ( 14 Feb 2023 07:57 )  Alb: 4.2 g/dL / Pro: 6.0 g/dL / ALK PHOS: 55 U/L / ALT: 6 U/L / AST: 12 U/L / GGT: x                         Serum Pro-Brain Natriuretic Peptide: 8222 pg/mL (02-10-23 @ 13:39)    COVID-19 PCR: NotDetec (02-13-23 @ 14:17)  COVID-19 PCR: NotDetec (02-10-23 @ 14:00)        A1C with Estimated Average Glucose Result: 5.9 % (02-11-23 @ 08:31)  A1C with Estimated Average Glucose Result: 6.0 % (10-13-22 @ 10:36)          RADIOLOGY, ECG, & ADDITIONAL TESTS:  12 Lead ECG:   Ventricular Rate 68 BPM    Atrial Rate 67 BPM    QRS Duration 164 ms    Q-T Interval 498 ms    QTC Calculation(Bazett) 529 ms    R Axis 201 degrees    T Axis -1 degrees    Diagnosis Line Normal sinus rhythm  Right bundle branch block  Abnormal ECG    Confirmed by Kevin Ruvalcaba ([id number]) on 2/10/2023 3:32:45 PM (02-10-23 @ 13:29)      RECENT DIAGNOSTIC ORDERS:  Magnesium, Serum: AM Sched. Collection: 15-Feb-2023 04:30 (02-14-23 @ 07:44)  Comprehensive Metabolic Panel: AM Sched. Collection: 15-Feb-2023 04:30 (02-14-23 @ 07:44)  Complete Blood Count + Automated Diff: AM Sched. Collection: 15-Feb-2023 04:30 (02-14-23 @ 07:44)      MEDICATIONS:  MEDICATIONS  (STANDING):  aMIOdarone    Tablet 200 milliGRAM(s) Oral daily  atorvastatin 40 milliGRAM(s) Oral at bedtime  calcium acetate 667 milliGRAM(s) Oral four times a day with meals  dextrose 5% + lactated ringers. 1000 milliLiter(s) (100 mL/Hr) IV Continuous <Continuous>  dextrose 5%. 1000 milliLiter(s) (50 mL/Hr) IV Continuous <Continuous>  dextrose 5%. 1000 milliLiter(s) (100 mL/Hr) IV Continuous <Continuous>  dextrose 50% Injectable 25 Gram(s) IV Push once  dextrose 50% Injectable 12.5 Gram(s) IV Push once  dextrose 50% Injectable 25 Gram(s) IV Push once  epoetin nicky-epbx (RETACRIT) Injectable 04954 Unit(s) IV Push <User Schedule>  furosemide    Tablet 80 milliGRAM(s) Oral two times a day  glucagon  Injectable 1 milliGRAM(s) IntraMuscular once  insulin glargine Injectable (LANTUS) 20 Unit(s) SubCutaneous at bedtime  insulin lispro (ADMELOG) corrective regimen sliding scale   SubCutaneous three times a day before meals  metoprolol succinate ER 50 milliGRAM(s) Oral daily  pantoprazole  Injectable 40 milliGRAM(s) IV Push every 12 hours  senna 2 Tablet(s) Oral at bedtime    MEDICATIONS  (PRN):  dextrose Oral Gel 15 Gram(s) Oral once PRN Blood Glucose LESS THAN 70 milliGRAM(s)/deciliter      HOME MEDICATIONS (as per chart review):  amiodarone 200 mg oral tablet (02-10)  clopidogrel 75 mg oral tablet (02-10)  Lantus 100 units/mL subcutaneous solution (02-10)  Lasix 40 mg oral tablet (02-10)  Lipitor 40 mg oral tablet (02-10)  metoprolol succinate 50 mg oral tablet, extended release (02-10)  PhosLo 667 mg oral tablet (02-10)      PHYSICAL EXAM:  GENERAL:   CHEST/LUNG:   HEART:   ABDOMEN:   EXTREMITIES:               NINFA URBAN  82y, Male  Allergy: No Known Allergies    Hospital Day: 4d    Patient seen and examined earlier today in ED4.      PMH/PSH:    HTN (Hypertension)  Diabetes Mellitus Type II  Hypercholesterolemia  CAD (Coronary Artery Disease) s/p PCI and CABG  GREG  ESRD on renal replacement therapy   Aortic stenosis  CHF s/p AICD  [place] (hard of hearing)      LAST 24-Hr EVENTS:  Planned endoscopy     VITALS:  T(F): 97.2 (02-14-23 @ 15:30), Max: 97.5 (02-14-23 @ 08:35)  HR: 76 (02-14-23 @ 15:32)  BP: 175/81 (02-14-23 @ 15:32) (116/62 - 175/81)  RR: 20 (02-14-23 @ 15:32)  SpO2: 97% (02-14-23 @ 15:32)      TESTS & MEASUREMENTS:  Weight/Height/BMI  Height (cm): 177.8 (02-14-23 @ 15:32)  Weight (kg): 98.9 (02-14-23 @ 15:32)  BMI (kg/m2): 31.3 (02-14-23 @ 15:32)                          8.6    6.50  )-----------( 116      ( 14 Feb 2023 07:57 )             29.0     PT/INR - ( 13 Feb 2023 17:50 )   PT: 14.00 sec;   INR: 1.22 ratio         PTT - ( 13 Feb 2023 17:50 )  PTT:31.6 sec  INR: 1.22 ratio (02-13-23 @ 17:50)  INR: 1.22 ratio (02-11-23 @ 08:31)    02-14    144  |  100  |  48<H>  ----------------------------<  79  4.5   |  28  |  5.5<HH>    Ca    9.5      14 Feb 2023 07:57  Phos  5.3     02-13  Mg     2.1     02-14    TPro  6.0  /  Alb  4.2  /  TBili  0.8  /  DBili  x   /  AST  12  /  ALT  6   /  AlkPhos  55  02-14    LIVER FUNCTIONS - ( 14 Feb 2023 07:57 )  Alb: 4.2 g/dL / Pro: 6.0 g/dL / ALK PHOS: 55 U/L / ALT: 6 U/L / AST: 12 U/L / GGT: x                 Serum Pro-Brain Natriuretic Peptide: 8222 pg/mL (02-10-23 @ 13:39)    COVID-19 PCR: NotDetec (02-13-23 @ 14:17)  COVID-19 PCR: NotDetec (02-10-23 @ 14:00)        A1C with Estimated Average Glucose Result: 5.9 % (02-11-23 @ 08:31)  A1C with Estimated Average Glucose Result: 6.0 % (10-13-22 @ 10:36)          RADIOLOGY, ECG, & ADDITIONAL TESTS:  12 Lead ECG:   Ventricular Rate 68 BPM  Normal sinus rhythm  Right bundle branch block  Abnormal ECG    Confirmed by Kevin Ruvalcaba ([id number]) on 2/10/2023 3:32:45 PM (02-10-23 @ 13:29)        MEDICATIONS:  MEDICATIONS  (STANDING):  aMIOdarone    Tablet 200 milliGRAM(s) Oral daily  atorvastatin 40 milliGRAM(s) Oral at bedtime  calcium acetate 667 milliGRAM(s) Oral four times a day with meals  dextrose 5% + lactated ringers. 1000 milliLiter(s) (100 mL/Hr) IV Continuous <Continuous>  dextrose 5%. 1000 milliLiter(s) (50 mL/Hr) IV Continuous <Continuous>  dextrose 5%. 1000 milliLiter(s) (100 mL/Hr) IV Continuous <Continuous>  dextrose 50% Injectable 25 Gram(s) IV Push once  dextrose 50% Injectable 12.5 Gram(s) IV Push once  dextrose 50% Injectable 25 Gram(s) IV Push once  epoetin nicky-epbx (RETACRIT) Injectable 51474 Unit(s) IV Push <User Schedule>  furosemide    Tablet 80 milliGRAM(s) Oral two times a day  glucagon  Injectable 1 milliGRAM(s) IntraMuscular once  insulin glargine Injectable (LANTUS) 20 Unit(s) SubCutaneous at bedtime  insulin lispro (ADMELOG) corrective regimen sliding scale   SubCutaneous three times a day before meals  metoprolol succinate ER 50 milliGRAM(s) Oral daily  pantoprazole  Injectable 40 milliGRAM(s) IV Push every 12 hours  senna 2 Tablet(s) Oral at bedtime    MEDICATIONS  (PRN):  dextrose Oral Gel 15 Gram(s) Oral once PRN Blood Glucose LESS THAN 70 milliGRAM(s)/deciliter      HOME MEDICATIONS (as per chart review):  amiodarone 200 mg oral tablet (02-10)  clopidogrel 75 mg oral tablet (02-10)  Lantus 100 units/mL subcutaneous solution (02-10)  Lasix 40 mg oral tablet (02-10)  Lipitor 40 mg oral tablet (02-10)  metoprolol succinate 50 mg oral tablet, extended release (02-10)  PhosLo 667 mg oral tablet (02-10)      PHYSICAL EXAM:  GENERAL: [place], hard of hearing, in NAD/P  CHEST/LUNG: Clear to auscultation bilaterally   HEART: S1S2  ABDOMEN: BS+  EXTREMITIES:  + old tattoo

## 2023-02-14 NOTE — CHART NOTE - NSCHARTNOTEFT_GEN_A_CORE
s/p EGD:    Normal mucosa in the whole esophagus.  Erythema in the stomach compatible with non-erosive gastritis.  Hiatal Hernia.  Normal mucosa in the whole examined duodenum. (Biopsy).      s/p colonoscopy:    findings:  Polyp (4 mm) in the transverse colon. (Polypectomy).  Polyp (3 mm) in the transverse colon. (Polypectomy).  Moderate diverticulosis of the sigmoid colon.    recommendations:  Advance diet as tolerated    Follow up pathology results    restart anticoagulation / antiplatelet if clinically indicated    Repeat colonoscopy in 1 year for screening proposes    Outpatient VCE  Follow up with our [facility] located at [address] [place] [address]. Phone Number: [phone]    recall MIRTA Arita  PGY 5  Gastroenterology Fellow

## 2023-02-14 NOTE — PROGRESS NOTE ADULT - SUBJECTIVE AND OBJECTIVE BOX
NEPHROLOGY FOLLOW UP NOTE    HD yesterday    PAST MEDICAL & SURGICAL HISTORY:  HTN (Hypertension)    Diabetes Mellitus Type II    Hypercholesterolemia    CAD (Coronary Artery Disease)    History of PTCA  with stents 10 years ago ([facility])    Diabetes mellitus    CAD (coronary artery disease)    H/O hyperlipidemia    HTN - Hypertension    Renal insufficiency    Sleep apnea  does not use machine    GERD (gastroesophageal reflux disease)    BPH (Benign Prostatic Hyperplasia)    CHF (congestive heart failure)    Mitral valve regurgitation    S/P knee surgery  b/l arthroscopic    S/P tonsillectomy    S/P primary angioplasty with coronary stent  6-7 stents last one in 10/12    S/P appendectomy      Allergies:  No Known Allergies    Home Medications Reviewed    SOCIAL HISTORY:  Denies ETOH,Smoking,   FAMILY HISTORY:  No pertinent family history in first degree relatives          REVIEW OF SYSTEMS:  as above, all else negative    PHYSICAL EXAM:  Constitutional: sedated  HEENT: moist mm  Neck: No JVD + ecchymosis   Respiratory: b/l BS  Cardiovascular: S1, S2, RRR + murmur  Gastrointestinal: BS+, soft, NT/ND  Extremities: + peripheral edema  Neurological: sedated  : No CVA tenderness.    Skin: No rashes  Vascular Access: left arm AVF + [facility] Medications:   MEDICATIONS  (STANDING):  aMIOdarone    Tablet 200 milliGRAM(s) Oral daily  atorvastatin 40 milliGRAM(s) Oral at bedtime  calcium acetate 667 milliGRAM(s) Oral four times a day with meals  dextrose 5% + lactated ringers. 1000 milliLiter(s) (100 mL/Hr) IV Continuous <Continuous>  dextrose 5%. 1000 milliLiter(s) (50 mL/Hr) IV Continuous <Continuous>  dextrose 5%. 1000 milliLiter(s) (100 mL/Hr) IV Continuous <Continuous>  dextrose 50% Injectable 25 Gram(s) IV Push once  dextrose 50% Injectable 12.5 Gram(s) IV Push once  dextrose 50% Injectable 25 Gram(s) IV Push once  epoetin nicky-epbx (RETACRIT) Injectable 76767 Unit(s) IV Push <User Schedule>  furosemide    Tablet 80 milliGRAM(s) Oral two times a day  glucagon  Injectable 1 milliGRAM(s) IntraMuscular once  insulin glargine Injectable (LANTUS) 20 Unit(s) SubCutaneous at bedtime  insulin lispro (ADMELOG) corrective regimen sliding scale   SubCutaneous three times a day before meals  metoprolol succinate ER 50 milliGRAM(s) Oral daily  pantoprazole  Injectable 40 milliGRAM(s) IV Push every 12 hours  senna 2 Tablet(s) Oral at bedtime        VITALS:  T(F): 97.2 (02-14-23 @ 15:30), Max: 97.5 (02-14-23 @ 08:35)  HR: 67 (02-14-23 @ 17:20)  BP: 155/76 (02-14-23 @ 17:20)  RR: 18 (02-14-23 @ 17:20)  SpO2: 95% (02-14-23 @ 17:20)  Wt(kg): --    Height (cm): 177.8 (02-14 @ 15:32)  Weight (kg): 98.9 (02-14 @ 15:32)  BMI (kg/m2): 31.3 (02-14 @ 15:32)  BSA (m2): 2.17 (02-14 @ 15:32)    LABS:  02-14    144  |  100  |  48<H>  ----------------------------<  79  4.5   |  28  |  5.5<HH>    Ca    9.5      14 Feb 2023 07:57  Phos  5.3     02-13  Mg     2.1     02-14    TPro  6.0  /  Alb  4.2  /  TBili  0.8  /  DBili      /  AST  12  /  ALT  6   /  AlkPhos  55  02-14                          8.6    6.50  )-----------( 116      ( 14 Feb 2023 07:57 )             29.0       Urine Studies:        RADIOLOGY & ADDITIONAL STUDIES:

## 2023-02-15 ENCOUNTER — TRANSCRIPTION ENCOUNTER (OUTPATIENT)
Age: 83
End: 2023-02-15

## 2023-02-15 VITALS
DIASTOLIC BLOOD PRESSURE: 67 MMHG | HEART RATE: 64 BPM | OXYGEN SATURATION: 97 % | SYSTOLIC BLOOD PRESSURE: 135 MMHG | TEMPERATURE: 97 F | RESPIRATION RATE: 18 BRPM

## 2023-02-15 LAB
ALBUMIN SERPL ELPH-MCNC: 4.1 G/DL — SIGNIFICANT CHANGE UP (ref 3.5–5.2)
ALP SERPL-CCNC: 61 U/L — SIGNIFICANT CHANGE UP (ref 30–115)
ALT FLD-CCNC: <5 U/L — SIGNIFICANT CHANGE UP (ref 0–41)
ANION GAP SERPL CALC-SCNC: 14 MMOL/L — SIGNIFICANT CHANGE UP (ref 7–14)
AST SERPL-CCNC: 10 U/L — SIGNIFICANT CHANGE UP (ref 0–41)
BASOPHILS # BLD AUTO: 0.02 K/UL — SIGNIFICANT CHANGE UP (ref 0–0.2)
BASOPHILS NFR BLD AUTO: 0.3 % — SIGNIFICANT CHANGE UP (ref 0–1)
BILIRUB SERPL-MCNC: 0.6 MG/DL — SIGNIFICANT CHANGE UP (ref 0.2–1.2)
BUN SERPL-MCNC: 53 MG/DL — HIGH (ref 10–20)
CALCIUM SERPL-MCNC: 9 MG/DL — SIGNIFICANT CHANGE UP (ref 8.4–10.5)
CHLORIDE SERPL-SCNC: 99 MMOL/L — SIGNIFICANT CHANGE UP (ref 98–110)
CO2 SERPL-SCNC: 26 MMOL/L — SIGNIFICANT CHANGE UP (ref 17–32)
CREAT SERPL-MCNC: 6 MG/DL — CRITICAL HIGH (ref 0.7–1.5)
EGFR: 9 ML/MIN/1.73M2 — LOW
EOSINOPHIL # BLD AUTO: 0.05 K/UL — SIGNIFICANT CHANGE UP (ref 0–0.7)
EOSINOPHIL NFR BLD AUTO: 0.8 % — SIGNIFICANT CHANGE UP (ref 0–8)
GLUCOSE BLDC GLUCOMTR-MCNC: 122 MG/DL — HIGH (ref 70–99)
GLUCOSE SERPL-MCNC: 120 MG/DL — HIGH (ref 70–99)
HCT VFR BLD CALC: 28.4 % — LOW (ref 42–52)
HGB BLD-MCNC: 8.3 G/DL — LOW (ref 14–18)
IMM GRANULOCYTES NFR BLD AUTO: 0.2 % — SIGNIFICANT CHANGE UP (ref 0.1–0.3)
LYMPHOCYTES # BLD AUTO: 1.37 K/UL — SIGNIFICANT CHANGE UP (ref 1.2–3.4)
LYMPHOCYTES # BLD AUTO: 22 % — SIGNIFICANT CHANGE UP (ref 20.5–51.1)
MAGNESIUM SERPL-MCNC: 2.1 MG/DL — SIGNIFICANT CHANGE UP (ref 1.8–2.4)
MCHC RBC-ENTMCNC: 25.9 PG — LOW (ref 27–31)
MCHC RBC-ENTMCNC: 29.2 G/DL — LOW (ref 32–37)
MCV RBC AUTO: 88.5 FL — SIGNIFICANT CHANGE UP (ref 80–94)
MONOCYTES # BLD AUTO: 0.95 K/UL — HIGH (ref 0.1–0.6)
MONOCYTES NFR BLD AUTO: 15.2 % — HIGH (ref 1.7–9.3)
NEUTROPHILS # BLD AUTO: 3.84 K/UL — SIGNIFICANT CHANGE UP (ref 1.4–6.5)
NEUTROPHILS NFR BLD AUTO: 61.5 % — SIGNIFICANT CHANGE UP (ref 42.2–75.2)
NRBC # BLD: 0 /100 WBCS — SIGNIFICANT CHANGE UP (ref 0–0)
PLATELET # BLD AUTO: 115 K/UL — LOW (ref 130–400)
POTASSIUM SERPL-MCNC: 4 MMOL/L — SIGNIFICANT CHANGE UP (ref 3.5–5)
POTASSIUM SERPL-SCNC: 4 MMOL/L — SIGNIFICANT CHANGE UP (ref 3.5–5)
PROT SERPL-MCNC: 5.6 G/DL — LOW (ref 6–8)
RBC # BLD: 3.21 M/UL — LOW (ref 4.7–6.1)
RBC # FLD: 19.4 % — HIGH (ref 11.5–14.5)
SODIUM SERPL-SCNC: 139 MMOL/L — SIGNIFICANT CHANGE UP (ref 135–146)
WBC # BLD: 6.24 K/UL — SIGNIFICANT CHANGE UP (ref 4.8–10.8)
WBC # FLD AUTO: 6.24 K/UL — SIGNIFICANT CHANGE UP (ref 4.8–10.8)

## 2023-02-15 RX ORDER — PANTOPRAZOLE SODIUM 20 MG/1
1 TABLET, DELAYED RELEASE ORAL
Qty: 30 | Refills: 0
Start: 2023-02-15 | End: 2023-03-16

## 2023-02-15 RX ADMIN — Medication 50 MILLIGRAM(S): at 05:54

## 2023-02-15 RX ADMIN — Medication 667 MILLIGRAM(S): at 08:02

## 2023-02-15 RX ADMIN — PANTOPRAZOLE SODIUM 40 MILLIGRAM(S): 20 TABLET, DELAYED RELEASE ORAL at 05:55

## 2023-02-15 RX ADMIN — AMIODARONE HYDROCHLORIDE 200 MILLIGRAM(S): 400 TABLET ORAL at 05:54

## 2023-02-15 RX ADMIN — HEPARIN SODIUM 5000 UNIT(S): 5000 INJECTION INTRAVENOUS; SUBCUTANEOUS at 05:56

## 2023-02-15 NOTE — PROGRESS NOTE ADULT - ASSESSMENT
ESRD on HD MF @ [place], [place]  access via left arm AVF  Acute anemia s/p transfusion 2u PRBC  r/o GIB  CAD / CABG / CMP / CHF / VHD (severe AS and MR)  HTN  DM2  anemia   GREG    plan:    s/p egd / colonoscopy   resume outpt meds  fluid restriction  epogen with HD  pt states he will have HD at [place] outpt unit today  [facility] ([phone])   
ESRD on HD MF @ [facility] [place]  access via left arm AVF  Acute anemia s/p transfusion 2u PRBC  CAD / CABG / CMP / CHF / VHD (severe AS and MR)  HTN  DM2  anemia   GREG    plan:    HD tomorrow: 3 hours, opti 160 dialyzer, 2K bath, 2LUF  GI f/u  cont furosemide   low K+ renal diet  fluid restriction  left arm precautions  epogen with HD  no venofer due to prior hyperferritinemia   [facility] ([phone]) 
81 YO M, w/Pmhx of ESRD on HD 2x /week (M/F) in [facility] with L arm Fistula (previously Dr. Du, now [place]), MVR (ICD 10 years ago), BPH, PTCA, Sleep apnea, CAD/CABG ( [facility] on 3/01/22 with subtotal LM and 100% occlusion of RCA, with patent LIMA-LAD, patent SVG-diag/OM, and patent SVG-RPDA), HTN, HLD, DM, HFrEF,  elective TAVR in October 2022 and MV regurgitation presented with melena and SOB. Patient had chronic SOB which resolved s/p TAVR in October, 2022.    1. Acute blood loss anemia secondary to most likely upper gi bleeding (melena) s/p 2 PRBC   - Telemetry         - INR:1.22   - Hb relatively stable after transfusion   - Good IV access   - Cont Protonix 40mg BID  - Renal diet for now  - Hold plavix due to active bleeding   - As per GI, EGD/Colonoscopy Tuesday, prep on Monday, repeat COVID-19 tomorrow if needed for procedure    2. Exertional Dyspnea likely 2/2 anemia- Monitor for improvement. HFrEF s/p AICD. Moderate to Severe AS s/p elective TAVR. hx of CAD/CABG ([facility] on 3/01/22 with subtotal LM and 100% occlusion of RCA, with patent LIMA-LAD, patent SVG-diag/OM, and patent SVG-RPDA)  - c/w Metoprolol, Plavix, Lipitor, Lasix, Amio  - EKG shows old RBBB  - Echocardio :pending   - Trend Trops  - Nephro consult:  a) Increase furosemide to 80mg PO BID while in hospital  b) DC Lokelma/low K+ renal diet  c) fluid restriction  d) left arm precautions  e) epogen with HD  d) no venofer due to prior hyperferritinemia     3. HTN - c/w Home meds  4. HLD - c/w Statin  5. DM - c/w Lantus 30unit HS + SS, Monitor FS    # Misc  - DVT prophylaxis: SCD  - GI prophylaxis: Protonix BID  - Diet: Renal- FLuid restriction  - Code status: Full code  - Activity: IAT  - Bowel regimen: Senna  - Dispo: Pending PT
81 YO M, w/Pmhx of ESRD on HD 2x /week (M/F) in [facility] with L arm Fistula (previously Dr. Du, now [place]), MVR (ICD 10 years ago), BPH, PTCA, Sleep apnea, CAD/CABG ( [facility] on 3/01/22 with subtotal LM and 100% occlusion of RCA, with patent LIMA-LAD, patent SVG-diag/OM, and patent SVG-RPDA), HTN, HLD, DM, HFrEF,  elective TAVR in October 2022 and MV regurgitation presented with melena and SOB. Patient had chronic SOB which resolved s/p TAVR in October, 2022.    1. Acute blood loss anemia secondary to most likely upper gi bleeding (melena) s/p 2 PRBC   - Telemetry         - INR:1.22   - Hb relatively stable after transfusion   - Good IV access   - Cont Protonix 40mg BID  - Renal diet for now  - Hold plavix due to active bleeding   - As per GI, EGD/Colonoscopy Tuesday, prep on today, repeated COVID-19 today  - Cardiac risk stratification requested by GI, deemed mod-high risk for low risk procedure, patient denies angina on my assessment or MURRAY at this time after blood transfusion    2. Exertional Dyspnea likely 2/2 anemia- Monitor for improvement. HFrEF s/p AICD. Moderate to Severe AS s/p elective TAVR. hx of CAD/CABG ([facility] on 3/01/22 with subtotal LM and 100% occlusion of RCA, with patent LIMA-LAD, patent SVG-diag/OM, and patent SVG-RPDA)  - c/w Metoprolol, Plavix, Lipitor, Lasix, Amio  - EKG shows old RBBB  - Echocardio :pending   - Trend Trops  - Nephro consult:  a) Increased furosemide to 80mg PO BID while in hospital  b) DC Lokelma/low K+ renal diet  c) fluid restriction  d) left arm precautions  e) epogen with HD  d) no venofer due to prior hyperferritinemia     3. HTN - c/w Home meds  4. HLD - c/w Statin  5. DM - c/w Lantus 20unit HS + SS, Monitor FS glucose (will cut lantus to 10 units as pt will be NPO for procedure tomorrow)    # Misc  - DVT prophylaxis: SCD  - GI prophylaxis: Protonix BID  - Diet: Renal- FLuid restriction  - Code status: Full code  - Activity: IAT  - Bowel regimen: Senna  - Dispo: Pending PT
ESRD on HD MF @ [facility] [place], [place]  access via left arm AVF  Acute anemia s/p transfusion 2u PRBC  r/o GIB  CAD / CABG / CMP / CHF / VHD (severe AS and MR)  HTN  DM2  anemia   GREG    plan:    HD today  GI f/u - endoscopy tomorrow?  cont furosemide   low K+ renal diet / phoslo with meals   fluid restriction  left arm precautions  epogen with HD  lantus / humalog  [facility] ([phone])       addendum:  seen again on HD.  UF adjusted accordingly.  next hd wed
ESRD on HD MF @ [facility] [place], [place]  access via left arm AVF  Acute anemia s/p transfusion 2u PRBC  r/o GIB  CAD / CABG / CMP / CHF / VHD (severe AS and MR)  HTN  DM2  anemia   GREG    plan:    HD tomorrow  s/p egd / colonoscopy, results noted   cont furosemide   low K+ renal diet / phoslo with meals   fluid restriction  left arm precautions  epogen with HD  lantus / humalog  [facility] ([phone])   full code      addendum:  seen again on HD.  UF adjusted accordingly.  next hd wed
81 YO M, w/Pmhx of ESRD on HD 2x /week MVR (ICD 10 years ago), Sleep apnea, CAD/CABG, HTN, HLD, DM, HFrEF,  elective TAVR in October 2022 and MV regurgitation presented with SOB and black stool. GI is called for evaluation.    # Acute on chronic anemia  # Wt loss / loss of appetite   - pt is hemodynamically stable  - Hb is 6.6 from 9  - s/p 2 pRBC and repeat Hb is 8.7 and has been stable   - YIN: brown stool  - no evidence of overt GI bleeding   - on ASA/Plavix, plavix last dose 2/9  - extensive cardiac history, seen by cardiology: moderate to high risk    - never had EGD, last colonoscopy 10 years ago  - endorses 50 pound wt loss over    recommendations:  - clear liquid diet today, NPO after mid night   - will plan for EGD/colonoscopy in AM  - golytely and dulcolax tomorrow    - monitor H/h  - active type and screen  - keep Hb > 8    
·	Suspected acute blood anemia   ·	Suspected GI bleed s/p EGD (gastritis) and c-scopy (polypectomy x2, diverticulosis)   ·	ESRD on renal replacement therapy   ·	Extensive CAD and vasculopathy   ·	HTN/ CHF    PLAN  ·	May restart Plavix (cleared by GI), extensive CAD  ·	Lasix at 80 mg daily for now   ·	hemodialysis Wednesday  ·	May switch to Proton Pump Inhibitor q 24 hours PO  ·	Discharge planning and outpatient F/U with GI for pathology results    Case discussed at length with multidisciplinary team on rounds.

## 2023-02-15 NOTE — DISCHARGE NOTE PROVIDER - NSDCCPCAREPLAN_GEN_ALL_CORE_FT
PRINCIPAL DISCHARGE DIAGNOSIS  Diagnosis: Anemia  Assessment and Plan of Treatment: You were noted to have a drop in your hemoglobin on amdission. You were seen by the GI team and an EGD with colonoscopy was done. There were no signs of bleeidng. You will need to follow up with GI as OP for a video capsule and further management.

## 2023-02-15 NOTE — DISCHARGE NOTE PROVIDER - CARE PROVIDER_API CALL
Maurice Wright)  Gastroenterology; Internal Medicine  [address] [place] [id number]  Phone: [phone]  Fax: [phone]  Follow Up Time: 2 weeks

## 2023-02-15 NOTE — DISCHARGE NOTE PROVIDER - HOSPITAL COURSE
H&P   81 YO M, w/Pmhx of ESRD on HD 2x /week (M/F) in [facility] with L arm Fistula (previously Dr. Du, now [place]), MVR (ICD 10 years ago), BPH, PTCA, Sleep apnea, CAD/CABG ( [place] on 3/01/22 with subtotal LM and 100% occlusion of RCA, with patent LIMA-LAD, patent SVG-diag/OM, and patent SVG-RPDA), HTN, HLD, DM, HFrEF,  elective TAVR in October 2022 and MV regurgitation presented with melena and SOB. Patient had chronic SOB which resolved s/p TAVR in October, 2022.    Patient was in his usual state of health 4 days ago when he started having dark black bowel movement, around same time patient started having dyspnea on exertion which is new since october. The symptoms progressed, not associated with chest pain and worse with exertion. Denies HA, dizziness, NVD, fever, abdominal pain, change in urination and change in bowel habits.     ED course:   Hb was 6.6 (baseline 9), K 5.7, patient emergently dialyzed.  Vital Signs Last 24 Hrs:  T(F): 98 (10 Feb 2023 15:35), Max: 98 (10 Feb 2023 15:35)  HR: 66 (10 Feb 2023 18:10) (66 - 76)  BP: 132/75 (10 Feb 2023 18:10) (104/70 - 132/75)  RR: 18 (10 Feb 2023 18:10) (18 - 18)  SpO2: 99% (10 Feb 2023 18:10) (96% - 99%) on RA    Hospital course:   Pt admitted to medical floor. Pt received a total of 2 units of pRBCs du   H&P   83 YO M, w/Pmhx of ESRD on HD 2x /week (M/F) in [facility] with L arm Fistula (previously Dr. Du, now [place]), MVR (ICD 10 years ago), BPH, PTCA, Sleep apnea, CAD/CABG ( [facility] on 3/01/22 with subtotal LM and 100% occlusion of RCA, with patent LIMA-LAD, patent SVG-diag/OM, and patent SVG-RPDA), HTN, HLD, DM, HFrEF,  elective TAVR in October 2022 and MV regurgitation presented with melena and SOB. Patient had chronic SOB which resolved s/p TAVR in October, 2022.    Patient was in his usual state of health 4 days ago when he started having dark black bowel movement, around same time patient started having dyspnea on exertion which is new since october. The symptoms progressed, not associated with chest pain and worse with exertion. Denies HA, dizziness, NVD, fever, abdominal pain, change in urination and change in bowel habits.     ED course:   Hb was 6.6 (baseline 9), K 5.7, patient emergently dialyzed.  Vital Signs Last 24 Hrs:  T(F): 98 (10 Feb 2023 15:35), Max: 98 (10 Feb 2023 15:35)  HR: 66 (10 Feb 2023 18:10) (66 - 76)  BP: 132/75 (10 Feb 2023 18:10) (104/70 - 132/75)  RR: 18 (10 Feb 2023 18:10) (18 - 18)  SpO2: 99% (10 Feb 2023 18:10) (96% - 99%) on RA    Hospital course:   Pt admitted to medical floor. Pt received a total of 2 units of pRBCs during hospital course. GI consulted sp EGD and colonoscopy with no signs of active bleeding.  Pt to receive a VCE as OP.  Plavix was resumed. Pt received  HD as per nephro during her stay. Hgb stable. Pt hemodynamically stable.

## 2023-02-15 NOTE — PROGRESS NOTE ADULT - SUBJECTIVE AND OBJECTIVE BOX
NEPHROLOGY FOLLOW UP NOTE    declined inpt HD today, states he will receive HD at his outpt unit today    PAST MEDICAL & SURGICAL HISTORY:  HTN (Hypertension)    Diabetes Mellitus Type II    Hypercholesterolemia    CAD (Coronary Artery Disease)    History of PTCA  with stents 10 years ago ([facility])    Diabetes mellitus    CAD (coronary artery disease)    H/O hyperlipidemia    HTN - Hypertension    Renal insufficiency    Sleep apnea  does not use machine    GERD (gastroesophageal reflux disease)    BPH (Benign Prostatic Hyperplasia)    CHF (congestive heart failure)    Mitral valve regurgitation    S/P knee surgery  b/l arthroscopic    S/P tonsillectomy    S/P primary angioplasty with coronary stent  6-7 stents last one in 10/12    S/P appendectomy      Allergies:  No Known Allergies    Home Medications Reviewed    SOCIAL HISTORY:  Denies ETOH,Smoking,   FAMILY HISTORY:  No pertinent family history in first degree relatives          REVIEW OF SYSTEMS:  as above, all else negative    PHYSICAL EXAM:  Constitutional: sedated  HEENT: moist mm  Neck: No JVD + ecchymosis   Respiratory: b/l BS  Cardiovascular: S1, S2, RRR + murmur  Gastrointestinal: BS+, soft, NT/ND  Extremities: + peripheral edema  Neurological: sedated  : No CVA tenderness.    Skin: No rashes  Vascular Access: left arm AVF + [facility] Medications:   MEDICATIONS  (STANDING):  aMIOdarone    Tablet 200 milliGRAM(s) Oral daily  atorvastatin 40 milliGRAM(s) Oral at bedtime  calcium acetate 667 milliGRAM(s) Oral four times a day with meals  clopidogrel Tablet 75 milliGRAM(s) Oral daily  dextrose 5% + lactated ringers. 1000 milliLiter(s) (100 mL/Hr) IV Continuous <Continuous>  dextrose 5%. 1000 milliLiter(s) (50 mL/Hr) IV Continuous <Continuous>  dextrose 5%. 1000 milliLiter(s) (100 mL/Hr) IV Continuous <Continuous>  dextrose 50% Injectable 25 Gram(s) IV Push once  dextrose 50% Injectable 12.5 Gram(s) IV Push once  dextrose 50% Injectable 25 Gram(s) IV Push once  epoetin nicky-epbx (RETACRIT) Injectable 54402 Unit(s) IV Push <User Schedule>  furosemide    Tablet 80 milliGRAM(s) Oral two times a day  glucagon  Injectable 1 milliGRAM(s) IntraMuscular once  heparin   Injectable 5000 Unit(s) SubCutaneous every 12 hours  insulin glargine Injectable (LANTUS) 20 Unit(s) SubCutaneous at bedtime  insulin lispro (ADMELOG) corrective regimen sliding scale   SubCutaneous three times a day before meals  metoprolol succinate ER 50 milliGRAM(s) Oral daily  pantoprazole    Tablet 40 milliGRAM(s) Oral before breakfast  senna 2 Tablet(s) Oral at bedtime        VITALS:  T(F): 97.4 (02-15-23 @ 08:04), Max: 98.1 (02-15-23 @ 04:47)  HR: 64 (02-15-23 @ 08:04)  BP: 135/67 (02-15-23 @ 08:04)  RR: 18 (02-15-23 @ 08:04)  SpO2: 97% (02-15-23 @ 08:04)  Wt(kg): --    02-14 @ 07:01  -  02-15 @ 07:00  --------------------------------------------------------  IN: 0 mL / OUT: 200 mL / NET: -200 mL      Height (cm): 177.8 (02-14 @ 15:32)  Weight (kg): 98.9 (02-14 @ 15:32)  BMI (kg/m2): 31.3 (02-14 @ 15:32)  BSA (m2): 2.17 (02-14 @ 15:32)    LABS:  02-15    139  |  99  |  53<H>  ----------------------------<  120<H>  4.0   |  26  |  6.0<HH>    Ca    9.0      15 Feb 2023 06:33  Mg     2.1     02-15    TPro  5.6<L>  /  Alb  4.1  /  TBili  0.6  /  DBili      /  AST  10  /  ALT  <5  /  AlkPhos  61  02-15                          8.3    6.24  )-----------( 115      ( 15 Feb 2023 06:33 )             28.4       Urine Studies:        RADIOLOGY & ADDITIONAL STUDIES:

## 2023-02-15 NOTE — DISCHARGE NOTE PROVIDER - NSDCFUSCHEDAPPT_GEN_ALL_CORE_FT
Zay Nance  [facility]  CARDIOLOGY [address]  Scheduled Appointment: 02/27/2023    [facility]  CARDIOLOGY [address]  Scheduled Appointment: 05/11/2023

## 2023-02-15 NOTE — PROGRESS NOTE ADULT - PROVIDER SPECIALTY LIST ADULT
Gastroenterology
Hospitalist
Nephrology
Hospitalist
Hospitalist
Internal Medicine
Nephrology

## 2023-02-15 NOTE — DISCHARGE NOTE NURSING/CASE MANAGEMENT/SOCIAL WORK - PATIENT PORTAL LINK FT
You can access the FollowMyHealth Patient Portal offered by [facility] by registering at the following website: [email or web address]. By joining [facility]’s FollowMyHealth portal, you will also be able to view your health information using other applications (apps) compatible with our system.

## 2023-02-15 NOTE — DISCHARGE NOTE PROVIDER - NSDCMRMEDTOKEN_GEN_ALL_CORE_FT
amiodarone 200 mg oral tablet: 1 tab(s) orally once a day  clopidogrel 75 mg oral tablet: 1 tab(s) orally once a day  Lantus 100 units/mL subcutaneous solution: 30 unit(s) subcutaneous once a day (at bedtime)  Lasix 40 mg oral tablet: 1 tab(s) orally once a day  Lipitor 40 mg oral tablet: 1 tab(s) orally once a day (at bedtime)  metoprolol succinate 50 mg oral tablet, extended release: 1 tab(s) orally once a day  PhosLo 667 mg oral tablet: 1 tab(s) orally 3 times a day   amiodarone 200 mg oral tablet: 1 tab(s) orally once a day  clopidogrel 75 mg oral tablet: 1 tab(s) orally once a day  Lantus 100 units/mL subcutaneous solution: 30 unit(s) subcutaneous once a day (at bedtime)  Lasix 40 mg oral tablet: 1 tab(s) orally once a day  Lipitor 40 mg oral tablet: 1 tab(s) orally once a day (at bedtime)  metoprolol succinate 50 mg oral tablet, extended release: 1 tab(s) orally once a day  pantoprazole 40 mg oral delayed release tablet: 1 tab(s) orally once a day (before a meal)  PhosLo 667 mg oral tablet: 1 tab(s) orally 3 times a day

## 2023-02-15 NOTE — DISCHARGE NOTE NURSING/CASE MANAGEMENT/SOCIAL WORK - NSDCVIVACCINE_GEN_ALL_CORE_FT
Tdap; 03-Apr-2019 17:34; Lucero Johnson (PIOTR); [employer]; [id number] (Exp. Date: 20-Nov-2020); IntraMuscular; Deltoid Right.; 0.5 milliLiter(s); VIS (VIS Published: 09-May-2013, VIS Presented: 03-Apr-2019);

## 2023-02-16 DIAGNOSIS — I10 ESSENTIAL (PRIMARY) HYPERTENSION: ICD-10-CM

## 2023-02-16 LAB — SURGICAL PATHOLOGY STUDY: SIGNIFICANT CHANGE UP

## 2023-02-20 LAB — SURGICAL PATHOLOGY STUDY: SIGNIFICANT CHANGE UP

## 2023-02-21 DIAGNOSIS — I50.22 CHRONIC SYSTOLIC (CONGESTIVE) HEART FAILURE: ICD-10-CM

## 2023-02-21 DIAGNOSIS — N40.0 BENIGN PROSTATIC HYPERPLASIA WITHOUT LOWER URINARY TRACT SYMPTOMS: ICD-10-CM

## 2023-02-21 DIAGNOSIS — I25.10 ATHEROSCLEROTIC HEART DISEASE OF NATIVE CORONARY ARTERY WITHOUT ANGINA PECTORIS: ICD-10-CM

## 2023-02-21 DIAGNOSIS — I45.10 UNSPECIFIED RIGHT BUNDLE-BRANCH BLOCK: ICD-10-CM

## 2023-02-21 DIAGNOSIS — D62 ACUTE POSTHEMORRHAGIC ANEMIA: ICD-10-CM

## 2023-02-21 DIAGNOSIS — K29.01 ACUTE GASTRITIS WITH BLEEDING: ICD-10-CM

## 2023-02-21 DIAGNOSIS — Z95.810 PRESENCE OF AUTOMATIC (IMPLANTABLE) CARDIAC DEFIBRILLATOR: ICD-10-CM

## 2023-02-21 DIAGNOSIS — K57.31 DIVERTICULOSIS OF LARGE INTESTINE WITHOUT PERFORATION OR ABSCESS WITH BLEEDING: ICD-10-CM

## 2023-02-21 DIAGNOSIS — K44.9 DIAPHRAGMATIC HERNIA WITHOUT OBSTRUCTION OR GANGRENE: ICD-10-CM

## 2023-02-21 DIAGNOSIS — G47.33 OBSTRUCTIVE SLEEP APNEA (ADULT) (PEDIATRIC): ICD-10-CM

## 2023-02-21 DIAGNOSIS — K63.5 POLYP OF COLON: ICD-10-CM

## 2023-02-21 DIAGNOSIS — N18.6 END STAGE RENAL DISEASE: ICD-10-CM

## 2023-02-21 DIAGNOSIS — R06.02 SHORTNESS OF BREATH: ICD-10-CM

## 2023-02-21 DIAGNOSIS — Z99.2 DEPENDENCE ON RENAL DIALYSIS: ICD-10-CM

## 2023-02-21 DIAGNOSIS — E87.5 HYPERKALEMIA: ICD-10-CM

## 2023-02-21 DIAGNOSIS — E11.22 TYPE 2 DIABETES MELLITUS WITH DIABETIC CHRONIC KIDNEY DISEASE: ICD-10-CM

## 2023-02-21 DIAGNOSIS — Z95.3 PRESENCE OF XENOGENIC HEART VALVE: ICD-10-CM

## 2023-02-21 DIAGNOSIS — Z79.4 LONG TERM (CURRENT) USE OF INSULIN: ICD-10-CM

## 2023-02-21 DIAGNOSIS — Z95.5 PRESENCE OF CORONARY ANGIOPLASTY IMPLANT AND GRAFT: ICD-10-CM

## 2023-02-21 DIAGNOSIS — I13.2 HYPERTENSIVE HEART AND CHRONIC KIDNEY DISEASE WITH HEART FAILURE AND WITH STAGE 5 CHRONIC KIDNEY DISEASE, OR END STAGE RENAL DISEASE: ICD-10-CM

## 2023-02-27 ENCOUNTER — APPOINTMENT (OUTPATIENT)
Dept: CARDIOLOGY | Facility: CLINIC | Age: 83
End: 2023-02-27
Payer: MEDICARE

## 2023-02-27 VITALS
SYSTOLIC BLOOD PRESSURE: 126 MMHG | HEIGHT: 70 IN | BODY MASS INDEX: 31.07 KG/M2 | WEIGHT: 217 LBS | DIASTOLIC BLOOD PRESSURE: 60 MMHG | HEART RATE: 74 BPM

## 2023-02-27 DIAGNOSIS — I50.22 CHRONIC SYSTOLIC (CONGESTIVE) HEART FAILURE: ICD-10-CM

## 2023-02-27 DIAGNOSIS — I34.0 NONRHEUMATIC MITRAL (VALVE) INSUFFICIENCY: ICD-10-CM

## 2023-02-27 DIAGNOSIS — I25.10 ATHEROSCLEROTIC HEART DISEASE OF NATIVE CORONARY ARTERY W/OUT ANGINA PECTORIS: ICD-10-CM

## 2023-02-27 DIAGNOSIS — I10 ESSENTIAL (PRIMARY) HYPERTENSION: ICD-10-CM

## 2023-02-27 DIAGNOSIS — I25.5 ISCHEMIC CARDIOMYOPATHY: ICD-10-CM

## 2023-02-27 DIAGNOSIS — Z86.79 PERSONAL HISTORY OF OTHER DISEASES OF THE CIRCULATORY SYSTEM: ICD-10-CM

## 2023-02-27 DIAGNOSIS — I35.0 NONRHEUMATIC AORTIC (VALVE) STENOSIS: ICD-10-CM

## 2023-02-27 PROCEDURE — 93000 ELECTROCARDIOGRAM COMPLETE: CPT

## 2023-02-27 PROCEDURE — 99214 OFFICE O/P EST MOD 30 MIN: CPT | Mod: 25

## 2023-03-05 PROBLEM — I35.0 NONRHEUMATIC AORTIC VALVE STENOSIS: Status: ACTIVE | Noted: 2022-10-02

## 2023-03-05 PROBLEM — I50.22 CHRONIC SYSTOLIC CONGESTIVE HEART FAILURE: Status: ACTIVE | Noted: 2018-01-24

## 2023-03-05 PROBLEM — I34.0 NON-RHEUMATIC MITRAL REGURGITATION: Status: ACTIVE | Noted: 2019-02-25

## 2023-03-05 PROBLEM — Z86.79 HISTORY OF AORTIC VALVE STENOSIS: Status: RESOLVED | Noted: 2019-05-01 | Resolved: 2023-03-05

## 2023-03-05 RX ORDER — SACUBITRIL AND VALSARTAN 24; 26 MG/1; MG/1
24-26 TABLET, FILM COATED ORAL TWICE DAILY
Qty: 90 | Refills: 1 | Status: ACTIVE | COMMUNITY
Start: 2019-11-19 | End: 1900-01-01

## 2023-03-05 NOTE — HISTORY OF PRESENT ILLNESS
[FreeTextEntry1] : 77 year-old male presents for hospital followup.\par \par Cardiac history of CAD s/p CABG s/p PCI, ischemic cardiomyopathy, and chronic systolic CHF s/p AICD. Previously followed with cardiologist in [place].\par \par Risk factors include hypertension, diabetes,hyperlipidemia, and CKD.\par \[facility] Presented 2 weeks ago with subacute decompensated CHF with volume overload and uncontrolled hypertension (possibly the setting of URI). Had been off Lasix for several weeks and antihypertensives for several days. Rapidly improved with diuresis.  Course complicated by KIANA.\par \par Feels well since discharge. No angina. Breathing comfortable. No palpitations, lightheadedness, syncope.  Previously had good functional capacity.  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 EF 25-35%. Moderate MR.\par \par Hospital labs reviewed:\par Creatinine 4.4\par Hemoglobin 14.7\par LFTs normal

## 2023-03-05 NOTE — REASON FOR VISIT
[Follow-Up - Clinic] : a clinic follow-up of [Cardiomyopathy] : cardiomyopathy [Coronary Artery Disease] : coronary artery disease [Heart Failure] : congestive heart failure [FreeTextEntry1] : Recovered well.\par \par Biggest complaint is weakness / fatigue.  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  Less active / frustrated.\par \par Breathing comfortable.\par \par Weight stable.\par \par Tolerating Rx.\par \par ECHO (12/20/22): EF 43%.  nL THV function / trace PVL.  Mild to Mod MR.  Mod TR / Pulm HTN.  Mild RV dysfunction.

## 2023-03-05 NOTE — DISCUSSION/SUMMARY
[FreeTextEntry1] : Cont Plavix\par Cont Lipitor\par Cont Toprol\par Resume Entresto\par Cont Ranexa.\par Dialysis and diuretics per renal.\par Cardiac Rehab\par Follow-up 3-months

## 2023-03-05 NOTE — ASSESSMENT
[FreeTextEntry1] : s/p CABG s/p PCI.\par Diffuse disease.  Patent grafts.  Poor revascularization targets.\par Chronic / stable angina (CCS 1-2)\par \par ICM s/p AICD (moderate LV dysfunction).\par Compensated / euvolemic.\par \par Severe AS s/p TAVR\par Modest improvement in LV function post TAVR / significant MR reduction.\par \par Mod MR\par \par BP controlled.\par \par VT / VF\par No recurrence on Amiodarone.\par \par ECHO (12/20/22): EF 43%.  nL THV function / trace PVL.  Mild to Mod MR.  Mod TR / Pulm HTN.  Mild RV dysfunction.

## 2023-04-21 NOTE — DISCHARGE NOTE PROVIDER - CARE PROVIDER_API CALL
Zay Nance)  Cardiovascular Disease; Internal Medicine  [address] [place] [address], [address] [address]  Phone: [phone]  Fax: [phone]  Established Patient  Follow Up Time: Routine    Nolberto Du  INTERNAL MEDICINE  [address] [facility] [place] [address]  Phone: [phone]  Fax: [phone]  Established Patient  Follow Up Time: Routine  
The patient is a 29y Male complaining of multiple medical complaints.

## 2023-05-11 ENCOUNTER — NON-APPOINTMENT (OUTPATIENT)
Age: 83
End: 2023-05-11

## 2023-05-11 ENCOUNTER — APPOINTMENT (OUTPATIENT)
Dept: CARDIOLOGY | Facility: CLINIC | Age: 83
End: 2023-05-11
Payer: MEDICARE

## 2023-05-11 PROCEDURE — 93295 DEV INTERROG REMOTE 1/2/MLT: CPT

## 2023-05-11 PROCEDURE — 93296 REM INTERROG EVL PM/IDS: CPT

## 2023-05-19 RX ORDER — METOPROLOL SUCCINATE 50 MG/1
50 TABLET, EXTENDED RELEASE ORAL DAILY
Qty: 90 | Refills: 3 | Status: ACTIVE | COMMUNITY
Start: 2018-01-24 | End: 1900-01-01

## 2023-05-22 ENCOUNTER — APPOINTMENT (OUTPATIENT)
Dept: CARDIOLOGY | Facility: CLINIC | Age: 83
End: 2023-05-22

## 2023-08-10 ENCOUNTER — APPOINTMENT (OUTPATIENT)
Dept: CARDIOLOGY | Facility: CLINIC | Age: 83
End: 2023-08-10

## 2023-10-30 ENCOUNTER — NON-APPOINTMENT (OUTPATIENT)
Age: 83
End: 2023-10-30

## 2023-11-16 NOTE — DISCHARGE NOTE PROVIDER - ATTENDING ATTESTATION STATEMENT
Attempted to contact pt for condition update however no answer. Call continued to ring and did not go to a VM. Will await a returned phone call. I have personally seen and examined the patient. I have collaborated with and supervised the

## 2023-11-20 ENCOUNTER — APPOINTMENT (OUTPATIENT)
Dept: CARDIOLOGY | Facility: CLINIC | Age: 83
End: 2023-11-20

## 2023-11-27 NOTE — ED ADULT NURSE NOTE - DOES PATIENT HAVE ADVANCE DIRECTIVE
Kaitlin Sarkar is a 89 y.o. female admitted for Pneumonia due to COVID-19 virus. Pharmacy reviewed the patient's maugi-uw-dwwnuncqs medications and allergies for accuracy.    The list below reflectives the updated PTA list. Please review each medication in order reconciliation for additional clarification and justification.    Medications added: all medications    Medications modified:    Medications to be removed: n/a    Medications of concern:      None       Tasha Swanson CPhT        No

## 2023-12-15 ENCOUNTER — NON-APPOINTMENT (OUTPATIENT)
Age: 83
End: 2023-12-15

## 2024-05-13 NOTE — DISCHARGE NOTE NURSING/CASE MANAGEMENT/SOCIAL WORK - NSPROEXTENSIONSOFSELF_GEN_A_NUR
Dr. Garcia, please advise on message below. Thank you!  
Nurse called [facility] in regards to service to pain management referral to Dr. Vidhya Gann. Per , [facility] is only accepting pain management referrals from internal providers at [facility]. Dr. Garcia, please advise who to change referral to. Thank you!  
Referral changed to Dr. Damari Cox at [facility]   
eyeglasses

## 2024-09-11 NOTE — H&P ADULT - ATTENDING COMMENTS
Medication: metoPROLOL succinate (TOPROL-XL) 200 MG 24 hr tablet   Medication refill denied due to being too soon for refill.   81 YO M, w/Pmhx of ESRD on HD 2x /week (M/F) in [facility] with L arm Fistula (previously Dr. Du, now [place]), MVR (ICD 10 years ago), BPH, PTCA, Sleep apnea, CAD/CABG ( [facility] on 3/01/22 with subtotal LM and 100% occlusion of RCA, with patent LIMA-LAD, patent SVG-diag/OM, and patent SVG-RPDA), HTN, HLD, DM, HFrEF,  elective TAVR in October 2022 and MV regurgitation presented with melena and SOB. Patient had chronic SOB which resolved s/p TAVR in October, 2022.      IMPRESSION 83 YO M, w/Pmhx of ESRD on HD 2x /week (M/F) in [facility] with L arm Fistula (previously Dr. Du, now [place]), MVR (ICD 10 years ago), BPH, PTCA, Sleep Apnea, CAD/CABG ( [facility] on 3/01/22),  and, HTN, HLD, DM, HFrEF,  elective TAVR in October 2022 and MV regurgitation presented with melena and SOB. Patient was in his usual state of health 4 days ago when he started having dark black bowel movement, around same time patient started having dyspnea on exertion which is new since october. The symptoms progressed, not associated with chest pain and worse with exertion.    Chest X-Ray Lordotic view without definite evidence of focal consolidation pleural effusion or pneumothorax.        IMPRESSION  Suspected GI Bleed with complains of melena    > Exertional Dyspnea  Likely  secondary to Symptomatic Anemia    > Hgb 6.6 Hemodynamically Stable, S/p 2 Units PRBC   > Check cbc, pt ptt, bmp (bun:cr >25 suggests ugib), Maintain active t/s , IV PPI  BID,   >Clear liquid diet for tonight   >Monitor CBC q6   >Transfuse to keep hemoglobin > 8 Given  CAD hx   F/u GI for possible EGD/colonoscopy  Correct electrolytes (Target Na = 135-145 | Mg = 1.7-2.2 | K = 3.5-5)  F/U INR,  correct INR to <1.5 if indicated  Hx #HFrEF s/p AICD, Moderate to Severe AS s/p elective TAVR  >Hx CAD/CABG ([facility] on 3/01/22 with subtotal LM and 100% occlusion of RCA, with patent LIMA-LAD, patent SVG-diag/OM, and patent SVG-RPDA)  >c/w Metoprolol, Plavix, Lipitor, Lasix, Amio  >EKG shows old RBBB, No  Acute ischemic  Changes   >Check TTE, Trend Trops  Hx ESRD  with Hyperkalemia   > HD Today  (2H / 2K /  /  / UF 2kg / LUE AVF) with 2U PRBC   > c/w Lokelma ,  Repeat  BMP  Calcium Gluconate , Epo PHoslo   > Nephrology following   Hx HLD- Statin   Hx DM - Hold oral meds;  check fs;  Start insulin  regimen if  serum Glucose >180, start insulin  protocol and adjust insulin  Lantus/Lispro,  Hold for Hypoglycemia Goal  Gaol 140-180 83 YO M, w/Pmhx of ESRD on HD 2x /week (M/F) in [facility] with L arm Fistula (previously Dr. Du, now [place]), MVR (ICD 10 years ago), BPH, PTCA, Sleep Apnea, CAD/CABG ( [facility] on 3/01/22),  and, HTN, HLD, DM, HFrEF,  elective TAVR in October 2022 and MV regurgitation presented with melena and SOB. Patient was in his usual state of health 4 days ago when he started having dark black bowel movement, around same time patient started having dyspnea on exertion which is new since october. The symptoms progressed, not associated with chest pain and worse with exertion.    Chest X-Ray Lordotic view without definite evidence of focal consolidation pleural effusion or pneumothorax.      VITAL SIGNS: AFebrile, vital signs stable  CONSTITUTIONAL: Well-developed; well-nourished; in no acute distress.  SKIN: Skin exam is warm and dry, no acute rash.  HEAD: Normocephalic; atraumatic.  EYES: Pupils  reactive to light, Extraocular movements intact; conjunctiva and sclera clear.  ENT: No nasal discharge; airway clear. Moist mucus membranes.  NECK: Supple; non tender. No rigidity  CARD: Irregular rate and rhythm. Normal S1, S2; no murmurs, gallops, or rubs.  RESP: CT  auscultation bilaterally. No wheezes, rales or rhonchi.  ABD: Abdomen soft; non-tender; non-distended  EXT: Normal ROM. No clubbing, cyanosis or edema.   NEURO: Alert and oriented x 3. No focal deficits.  PSYCH: cooperative, appropriate.         IMPRESSION  Suspected GI Bleed with complains of melena    > Exertional Dyspnea  Likely  secondary to Symptomatic Anemia    > Hgb 6.6 Hemodynamically Stable, S/p 2 Units PRBC   > Check cbc, pt ptt, bmp (bun:cr >25 suggests ugib), Maintain active t/s , IV PPI  BID,   >Clear liquid diet for tonight   >Monitor CBC q6   >Transfuse to keep hemoglobin > 8 Given  CAD hx   F/u GI for possible EGD/colonoscopy  Correct electrolytes (Target Na = 135-145 | Mg = 1.7-2.2 | K = 3.5-5)  F/U INR,  correct INR to <1.5 if indicated  Hx #HFrEF s/p AICD, Moderate to Severe AS s/p elective TAVR  >Hx CAD/CABG ([facility] on 3/01/22 with subtotal LM and 100% occlusion of RCA, with patent LIMA-LAD, patent SVG-diag/OM, and patent SVG-RPDA)  >c/w Metoprolol, Plavix, Lipitor, Lasix, Amio  >EKG shows old RBBB, No  Acute ischemic  Changes   >Check TTE, Trend Trops  Hx ESRD  with Hyperkalemia   > HD Today  (2H / 2K /  /  / UF 2kg / LUE AVF) with 2U PRBC   > c/w Lokelma ,  Repeat  BMP  Calcium Gluconate , Epo PHoslo   > Nephrology following   Hx HLD- Statin   Hx DM - Hold oral meds;  check fs;  Start insulin  regimen if  serum Glucose >180, start insulin  protocol and adjust insulin  Lantus/Lispro,  Hold for Hypoglycemia Goal  Gaol 140-180      Seen on 02/10/23 Protopic Pregnancy And Lactation Text: This medication is Pregnancy Category C. It is unknown if this medication is excreted in breast milk when applied topically.

## 2024-11-20 NOTE — ED ADULT NURSE NOTE - CAS TRG GEN SKIN COLOR
Caller: Patient    Doctor: Suzie    Reason for call: Needs to reschedule usgi. Please cb     Call back#: [phone]  
Normal for race

## 2024-12-10 ENCOUNTER — DOCTOR'S OFFICE (OUTPATIENT)
Dept: URBAN - METROPOLITAN AREA CLINIC 163 | Facility: CLINIC | Age: 84
Setting detail: OPHTHALMOLOGY
End: 2024-12-10
Payer: MEDICARE

## 2024-12-10 DIAGNOSIS — E11.3292: ICD-10-CM

## 2024-12-10 DIAGNOSIS — H25.12: ICD-10-CM

## 2024-12-10 DIAGNOSIS — H25.13: ICD-10-CM

## 2024-12-10 DIAGNOSIS — H40.033: ICD-10-CM

## 2024-12-10 DIAGNOSIS — E11.3211: ICD-10-CM

## 2024-12-10 DIAGNOSIS — H35.373: ICD-10-CM

## 2024-12-10 PROCEDURE — 92004 COMPRE OPH EXAM NEW PT 1/>: CPT | Performed by: OPHTHALMOLOGY

## 2024-12-10 PROCEDURE — 92250 FUNDUS PHOTOGRAPHY W/I&R: CPT | Performed by: OPHTHALMOLOGY

## 2024-12-10 PROCEDURE — 92136 OPHTHALMIC BIOMETRY: CPT | Mod: LT | Performed by: OPHTHALMOLOGY

## 2024-12-10 ASSESSMENT — VISUAL ACUITY
OD_BCVA: 20/100-
OS_BCVA: 20/200-

## 2024-12-10 ASSESSMENT — CONFRONTATIONAL VISUAL FIELD TEST (CVF)
OS_FINDINGS: FULL
OD_FINDINGS: FULL

## 2024-12-10 ASSESSMENT — TONOMETRY
OD_IOP_MMHG: 13
OS_IOP_MMHG: 13

## 2024-12-19 ENCOUNTER — DOCTOR'S OFFICE (OUTPATIENT)
Dept: URBAN - METROPOLITAN AREA CLINIC 162 | Facility: CLINIC | Age: 84
Setting detail: OPHTHALMOLOGY
End: 2024-12-19
Payer: MEDICARE

## 2024-12-19 DIAGNOSIS — Z79.4: ICD-10-CM

## 2024-12-19 DIAGNOSIS — E11.3392: ICD-10-CM

## 2024-12-19 DIAGNOSIS — E11.3211: ICD-10-CM

## 2024-12-19 DIAGNOSIS — H40.033: ICD-10-CM

## 2024-12-19 DIAGNOSIS — H25.13: ICD-10-CM

## 2024-12-19 DIAGNOSIS — H35.373: ICD-10-CM

## 2024-12-19 PROCEDURE — 92134 CPTRZ OPH DX IMG PST SGM RTA: CPT | Performed by: STUDENT IN AN ORGANIZED HEALTH CARE EDUCATION/TRAINING PROGRAM

## 2024-12-19 PROCEDURE — 92014 COMPRE OPH EXAM EST PT 1/>: CPT | Performed by: STUDENT IN AN ORGANIZED HEALTH CARE EDUCATION/TRAINING PROGRAM

## 2024-12-19 ASSESSMENT — VISUAL ACUITY
OS_BCVA: 20/200-
OD_BCVA: 20/100-

## 2024-12-19 ASSESSMENT — CONFRONTATIONAL VISUAL FIELD TEST (CVF)
OS_FINDINGS: FULL
OD_FINDINGS: FULL

## 2025-01-06 NOTE — PROGRESS NOTE ADULT - SUBJECTIVE AND OBJECTIVE BOX
[facility]  [address]  [facility] [address]   January 6, 2025    Gomez Villasenor  [address] [address]      [facility]     To:   [facility] [place]           Please give to facility    From:   Bere Bae  RN  Care Coordinator   [facility]   P: [phone]  [email or web address]   Patient Name:  Gomez Villasenor YOB: 1926   Admit date: 1/3/25      *Information Needed:  Please contact me when the patient will discharge (or if they will move to long term care)- include the discharge date, disposition, and main diagnosis   If the patient is discharged with home care services, please provide the name of the agency    Also- Please inform me if a care conference is being held.   Phone, Fax or Email with information                              Thank you    HPI:  81yo male with PMH of h/o ESRD on HD, DLD, CAD s/p CABG, HTN, and DM who presents to the ED w/ chest pain and SOB nausea, poor appetite for the past 2 weeks, worsening in the last day with chills after testing positive for COVID 2 weeks ago.  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  He suspects he transmitted the virus to his wife. Patients wife is also admitted to [facility] for COVID. Patient states that he has been progressively getting weaker, with poor PO intake. Patient admits to intermittent SOB, N/V, and abdominal pain.     In the ED:  Patient is currently saturating at 98% of 3L NC, BP: 141/91 HR: 97  Temp: 101  CT AP showed patchy bibasilar ground-glass airspace opacities, which may be attributed to viral pneumonia in the appropriate clinical setting.  Troponin : 0.07   Serum BNP: 594   (28 Jan 2021 13:33)    Currently admitted to medicine with the primary diagnosis of COVID-19       Today is hospital day 5d.     INTERVAL HPI / OVERNIGHT EVENTS:  Patient was examined and seen at bedside. This morning he is resting comfortably in bed and reports no new issues or overnight events.     ROS: Otherwise unremarkable     PAST MEDICAL & SURGICAL HISTORY  Mitral valve regurgitation    CHF (congestive heart failure)    BPH (Benign Prostatic Hyperplasia)    GERD (gastroesophageal reflux disease)    Sleep apnea  does not use machine    Renal insufficiency    HTN - Hypertension    H/O hyperlipidemia    CAD (coronary artery disease)    Diabetes mellitus    History of PTCA  with stents 10 years ago ([facility])    CAD (Coronary Artery Disease)    Hypercholesterolemia    Diabetes Mellitus Type II    HTN (Hypertension)    S/P appendectomy    S/P primary angioplasty with coronary stent  6-7 stents last one in 10/12    S/P tonsillectomy    S/P knee surgery  b/l arthroscopic      ALLERGIES  No Known Allergies    MEDICATIONS  STANDING MEDICATIONS  atorvastatin 40 milliGRAM(s) Oral at bedtime  calcium acetate 667 milliGRAM(s) Oral three times a day with meals  chlorhexidine 4% Liquid 1 Application(s) Topical <User Schedule>  dexAMETHasone  Injectable 6 milliGRAM(s) IV Push daily  dextrose 40% Gel 15 Gram(s) Oral once  dextrose 5%. 1000 milliLiter(s) IV Continuous <Continuous>  dextrose 5%. 1000 milliLiter(s) IV Continuous <Continuous>  dextrose 50% Injectable 25 Gram(s) IV Push once  dextrose 50% Injectable 12.5 Gram(s) IV Push once  dextrose 50% Injectable 25 Gram(s) IV Push once  ergocalciferol 79168 Unit(s) Oral <User Schedule>  furosemide    Tablet 40 milliGRAM(s) Oral two times a day  glucagon  Injectable 1 milliGRAM(s) IntraMuscular once  heparin   Injectable 5000 Unit(s) SubCutaneous every 8 hours  heparin   Injectable. 2000 Unit(s) Dialysis. once  insulin glargine SubCutaneous Injection (LANTUS) - Peds 30 Unit(s) SubCutaneous at bedtime  insulin lispro (ADMELOG) corrective regimen sliding scale   SubCutaneous three times a day before meals  insulin lispro Injectable (ADMELOG) 8 Unit(s) SubCutaneous three times a day before meals  metoprolol succinate ER 50 milliGRAM(s) Oral daily  sacubitril 24 mG/valsartan 26 mG 1 Tablet(s) Oral two times a day    PRN MEDICATIONS  acetaminophen   Tablet .. 650 milliGRAM(s) Oral every 6 hours PRN  polyethylene glycol 3350 17 Gram(s) Oral daily PRN    VITALS:  T(F): 96.8  HR: 69  BP: 131/71  RR: 19  SpO2: 96%      LABS                        12.7   8.16  )-----------( 182      ( 02 Feb 2021 06:13 )             36.6     02-02    135  |  94<L>  |  91<HH>  ----------------------------<  171<H>  4.8   |  23  |  5.2<HH>    Ca    8.5      02 Feb 2021 06:13  Phos  3.6     02-01  Mg     1.9     02-01    TPro  6.2  /  Alb  4.1  /  TBili  0.6  /  DBili  x   /  AST  21  /  ALT  16  /  AlkPhos  48  02-02      PHYSICAL EXAM  GEN: NAD, Resting comfortably in bed  PULM: Clear to auscultation bilaterally, No wheezes  CVS: Regular rate and rhythm, S1-S2, no murmurs  ABD: Soft, non-tender, non-distended, no guarding  EXT: warm  NEURO: A&Ox3, no focal deficits    ASSESSMENT AND PLAN    80 yr old m w/ a PMHx significant for HTN, DMII, DLD, ESRD on HD, CAD s/p CABG,  presents with nausea, vomiting, weakness after testing positive for COVID 19 two weeks ago.     #Fever of unclear etiology, associated with dyspnea  Presumed sepsis secondary to COVID-19 on admission   COVID PCR + for two weeks  BNP: 594  CT AP: Patchy bibasilar ground-glass airspace opacities, which may be attributed to viral pneumonia in the appropriate clinical setting  Not hypoxic  Cxs negative; monitor off antibiotics   As per ID may be long term COVID syndrome  Continues to be febrile overnight  RVP sent    # ESRD  - scheduled for HD Tuesday   - Continue lasix   - renal diet  - continue phoslo  - trend BMP  --> f/u with [facility] [address] for AVF maintenance     #HTN  - controlled on home medications  - c/w metoprolol 50 mg daily  - c/w lasix 40 mg BID  - c/w entresto    #DLD  - c/w lipitor 40 mg daily    #DMII  - Lantus 30 mg qhs    #MISC  - DIET: DASH  - DVT ppx: Heparin 5000  - Dispo: Acute  - Activity: IAT    ?Anticipate for d/c

## 2025-01-15 ENCOUNTER — RX ONLY (RX ONLY)
Age: 85
End: 2025-01-15

## 2025-01-15 ENCOUNTER — AMBUL SURGICAL CARE (OUTPATIENT)
Dept: URBAN - METROPOLITAN AREA CLINIC 158 | Facility: CLINIC | Age: 85
Setting detail: OPHTHALMOLOGY
End: 2025-01-15
Payer: COMMERCIAL

## 2025-01-15 DIAGNOSIS — H25.12: ICD-10-CM

## 2025-01-15 DIAGNOSIS — H52.222: ICD-10-CM

## 2025-01-15 PROCEDURE — LIMBAL REL LIMBAL RELAXING INCISION: Mod: GY,LT | Performed by: OPHTHALMOLOGY

## 2025-01-15 PROCEDURE — 66984 XCAPSL CTRC RMVL W/O ECP: CPT | Mod: LT | Performed by: OPHTHALMOLOGY

## 2025-01-16 ENCOUNTER — DOCTOR'S OFFICE (OUTPATIENT)
Dept: URBAN - METROPOLITAN AREA CLINIC 163 | Facility: CLINIC | Age: 85
Setting detail: OPHTHALMOLOGY
End: 2025-01-16
Payer: COMMERCIAL

## 2025-01-16 DIAGNOSIS — H25.12: ICD-10-CM

## 2025-01-16 PROBLEM — E11.3392 DM TYPE 2; RIGHT MILD WITH ME, LEFT MOD WITHOUT ME: Status: ACTIVE | Noted: 2025-01-16

## 2025-01-16 PROBLEM — E11.3211 DM TYPE 2; RIGHT MILD WITH ME, LEFT MOD WITHOUT ME: Status: ACTIVE | Noted: 2025-01-16

## 2025-01-16 PROCEDURE — 99024 POSTOP FOLLOW-UP VISIT: CPT | Performed by: OPHTHALMOLOGY

## 2025-01-16 ASSESSMENT — TONOMETRY: OD_IOP_MMHG: 13

## 2025-01-16 ASSESSMENT — CONFRONTATIONAL VISUAL FIELD TEST (CVF)
OS_FINDINGS: FULL
OD_FINDINGS: FULL

## 2025-01-16 ASSESSMENT — VISUAL ACUITY
OD_BCVA: 20/200-
OS_BCVA: 20/200-

## 2025-02-04 ENCOUNTER — DOCTOR'S OFFICE (OUTPATIENT)
Dept: URBAN - METROPOLITAN AREA CLINIC 163 | Facility: CLINIC | Age: 85
Setting detail: OPHTHALMOLOGY
End: 2025-02-04
Payer: COMMERCIAL

## 2025-02-04 DIAGNOSIS — H25.11: ICD-10-CM

## 2025-02-04 DIAGNOSIS — E11.3392: ICD-10-CM

## 2025-02-04 DIAGNOSIS — H25.12: ICD-10-CM

## 2025-02-04 DIAGNOSIS — H40.033: ICD-10-CM

## 2025-02-04 DIAGNOSIS — Z79.4: ICD-10-CM

## 2025-02-04 DIAGNOSIS — E11.3211: ICD-10-CM

## 2025-02-04 DIAGNOSIS — H35.373: ICD-10-CM

## 2025-02-04 PROBLEM — H40.031 ANATOMICAL NARROW ANGLE;  , RIGHT EYE: Status: ACTIVE | Noted: 2025-02-04

## 2025-02-04 PROCEDURE — 99024 POSTOP FOLLOW-UP VISIT: CPT | Performed by: OPHTHALMOLOGY

## 2025-02-04 PROCEDURE — 92136 OPHTHALMIC BIOMETRY: CPT | Mod: 26,RT | Performed by: OPHTHALMOLOGY

## 2025-02-04 ASSESSMENT — CONFRONTATIONAL VISUAL FIELD TEST (CVF)
OD_FINDINGS: FULL
OS_FINDINGS: FULL

## 2025-02-06 PROBLEM — E11.3211 DM TYPE 2; RIGHT MILD WITH ME, LEFT MOD WITHOUT ME: Status: ACTIVE | Noted: 2025-02-04

## 2025-02-06 PROBLEM — E11.3392 DM TYPE 2; RIGHT MILD WITH ME, LEFT MOD WITHOUT ME: Status: ACTIVE | Noted: 2025-02-04

## 2025-02-06 ASSESSMENT — REFRACTION_AUTOREFRACTION
OD_CYLINDER: +3.75
OS_AXIS: 122
OS_CYLINDER: +1.00
OD_AXIS: 167
OD_SPHERE: -0.75
OS_SPHERE: -1.00

## 2025-02-06 ASSESSMENT — VISUAL ACUITY
OD_BCVA: 20/50-1
OS_BCVA: 20/200-

## 2025-03-25 ENCOUNTER — DOCTOR'S OFFICE (OUTPATIENT)
Dept: URBAN - METROPOLITAN AREA CLINIC 163 | Facility: CLINIC | Age: 85
Setting detail: OPHTHALMOLOGY
End: 2025-03-25
Payer: COMMERCIAL

## 2025-03-25 DIAGNOSIS — H40.031: ICD-10-CM

## 2025-03-25 DIAGNOSIS — H04.122: ICD-10-CM

## 2025-03-25 DIAGNOSIS — Z79.4: ICD-10-CM

## 2025-03-25 DIAGNOSIS — E11.3211: ICD-10-CM

## 2025-03-25 DIAGNOSIS — E11.3392: ICD-10-CM

## 2025-03-25 DIAGNOSIS — H25.13: ICD-10-CM

## 2025-03-25 DIAGNOSIS — H35.373: ICD-10-CM

## 2025-03-25 PROCEDURE — 68761 CLOSE TEAR DUCT OPENING: CPT | Mod: 79,E1,E2 | Performed by: OPHTHALMOLOGY

## 2025-03-25 PROCEDURE — 99024 POSTOP FOLLOW-UP VISIT: CPT | Performed by: OPHTHALMOLOGY

## 2025-03-25 ASSESSMENT — CONFRONTATIONAL VISUAL FIELD TEST (CVF)
OS_FINDINGS: FULL
OD_FINDINGS: FULL

## 2025-03-25 ASSESSMENT — VISUAL ACUITY
OS_BCVA: 20/200-
OD_BCVA: 20/50-1

## 2025-03-25 ASSESSMENT — REFRACTION_AUTOREFRACTION
OS_SPHERE: -1.00
OD_AXIS: 167
OD_SPHERE: -0.75
OD_CYLINDER: +3.75
OS_AXIS: 122
OS_CYLINDER: +1.00

## 2025-08-07 ENCOUNTER — DOCTOR'S OFFICE (OUTPATIENT)
Dept: URBAN - METROPOLITAN AREA CLINIC 163 | Facility: CLINIC | Age: 85
Setting detail: OPHTHALMOLOGY
End: 2025-08-07
Payer: COMMERCIAL

## 2025-08-07 DIAGNOSIS — E11.3211: ICD-10-CM

## 2025-08-07 DIAGNOSIS — Z79.4: ICD-10-CM

## 2025-08-07 DIAGNOSIS — H40.031: ICD-10-CM

## 2025-08-07 DIAGNOSIS — H35.373: ICD-10-CM

## 2025-08-07 DIAGNOSIS — H25.11: ICD-10-CM

## 2025-08-07 DIAGNOSIS — H04.122: ICD-10-CM

## 2025-08-07 DIAGNOSIS — E11.3392: ICD-10-CM

## 2025-08-07 PROCEDURE — 92250 FUNDUS PHOTOGRAPHY W/I&R: CPT | Performed by: OPHTHALMOLOGY

## 2025-08-07 PROCEDURE — 92014 COMPRE OPH EXAM EST PT 1/>: CPT | Mod: 25 | Performed by: OPHTHALMOLOGY

## 2025-08-07 PROCEDURE — 68761 CLOSE TEAR DUCT OPENING: CPT | Mod: E1,E2 | Performed by: OPHTHALMOLOGY

## 2025-08-07 ASSESSMENT — KERATOMETRY
OS_AXISANGLE_DEGREES: 139
OD_K1POWER_DIOPTERS: 43.50
OD_AXISANGLE_DEGREES: 004
OD_K2POWER_DIOPTERS: 44.75
OS_K1POWER_DIOPTERS: 43.50
OS_K2POWER_DIOPTERS: 44.25

## 2025-08-07 ASSESSMENT — REFRACTION_AUTOREFRACTION
OD_SPHERE: -0.75
OS_CYLINDER: +0.75
OS_AXIS: 154
OS_SPHERE: -0.50
OD_AXIS: 174
OD_CYLINDER: +4.25

## 2025-08-07 ASSESSMENT — CONFRONTATIONAL VISUAL FIELD TEST (CVF)
OD_FINDINGS: FULL
OS_FINDINGS: FULL

## 2025-08-07 ASSESSMENT — VISUAL ACUITY
OD_BCVA: 20/25-2
OS_BCVA: 20/150-2